# Patient Record
Sex: FEMALE | Race: WHITE | NOT HISPANIC OR LATINO | Employment: OTHER | URBAN - METROPOLITAN AREA
[De-identification: names, ages, dates, MRNs, and addresses within clinical notes are randomized per-mention and may not be internally consistent; named-entity substitution may affect disease eponyms.]

---

## 2017-01-23 LAB
ALT SERPL-CCNC: 10 U/L (ref 0–46)
AST SERPL-CCNC: 14 U/L (ref 0–41)
CHOLEST SERPL-MCNC: 194 MG/DL (ref 100–199)
CREAT SERPL-MCNC: 0.8 MG/DL (ref 0.6–1.4)
GFR SERPL CREATININE-BSD FRML MDRD: 75 ML/MIN/1.73M2
GLUCOSE SERPL-MCNC: 103 MG/DL (ref 65–99)
HDLC SERPL-MCNC: 71 MG/DL (ref 40–85)
LDLC SERPL CALC-MCNC: 115 MG/DL (ref 0–129)
POTASSIUM SERPL-SCNC: 3.85 MMOL/L (ref 3.5–5.2)
TRIGL SERPL-MCNC: 142 MG/DL (ref 0–149)
TSH SERPL-ACNC: 3.77 UIU/ML (ref 0.45–4.5)

## 2017-01-24 ENCOUNTER — TRANSFERRED RECORDS (OUTPATIENT)
Dept: HEALTH INFORMATION MANAGEMENT | Facility: CLINIC | Age: 72
End: 2017-01-24

## 2017-02-16 ENCOUNTER — TRANSFERRED RECORDS (OUTPATIENT)
Dept: HEALTH INFORMATION MANAGEMENT | Facility: CLINIC | Age: 72
End: 2017-02-16

## 2017-04-02 ENCOUNTER — HOSPITAL ENCOUNTER (OUTPATIENT)
Facility: CLINIC | Age: 72
Setting detail: OBSERVATION
Discharge: HOME | End: 2017-04-03
Attending: EMERGENCY MEDICINE | Admitting: INTERNAL MEDICINE
Payer: MEDICARE

## 2017-04-02 ENCOUNTER — APPOINTMENT (OUTPATIENT)
Dept: CT IMAGING | Facility: CLINIC | Age: 72
End: 2017-04-02
Attending: EMERGENCY MEDICINE
Payer: MEDICARE

## 2017-04-02 DIAGNOSIS — R07.89 CHEST PRESSURE: ICD-10-CM

## 2017-04-02 DIAGNOSIS — J18.9 PNEUMONIA OF RIGHT UPPER LOBE DUE TO INFECTIOUS ORGANISM: ICD-10-CM

## 2017-04-02 DIAGNOSIS — R94.31 ABNORMAL ELECTROCARDIOGRAM: ICD-10-CM

## 2017-04-02 DIAGNOSIS — R07.89 OTHER CHEST PAIN: ICD-10-CM

## 2017-04-02 DIAGNOSIS — R07.9 ACUTE CHEST PAIN: ICD-10-CM

## 2017-04-02 LAB
ANION GAP SERPL CALCULATED.3IONS-SCNC: 9 MMOL/L (ref 3–14)
BASOPHILS # BLD AUTO: 0 10E9/L (ref 0–0.2)
BASOPHILS NFR BLD AUTO: 0.6 %
BUN SERPL-MCNC: 21 MG/DL (ref 7–30)
CALCIUM SERPL-MCNC: 8.8 MG/DL (ref 8.5–10.1)
CHLORIDE SERPL-SCNC: 105 MMOL/L (ref 94–109)
CHOLEST SERPL-MCNC: 201 MG/DL
CO2 SERPL-SCNC: 26 MMOL/L (ref 20–32)
CREAT SERPL-MCNC: 0.58 MG/DL (ref 0.52–1.04)
D DIMER PPP FEU-MCNC: 0.3 UG/ML FEU (ref 0–0.5)
DIFFERENTIAL METHOD BLD: NORMAL
EOSINOPHIL # BLD AUTO: 0.2 10E9/L (ref 0–0.7)
EOSINOPHIL NFR BLD AUTO: 2.7 %
ERYTHROCYTE [DISTWIDTH] IN BLOOD BY AUTOMATED COUNT: 13.4 % (ref 10–15)
GFR SERPL CREATININE-BSD FRML MDRD: ABNORMAL ML/MIN/1.7M2
GLUCOSE SERPL-MCNC: 141 MG/DL (ref 70–99)
HCT VFR BLD AUTO: 42 % (ref 35–47)
HDLC SERPL-MCNC: 66 MG/DL
HGB BLD-MCNC: 14 G/DL (ref 11.7–15.7)
IMM GRANULOCYTES # BLD: 0 10E9/L (ref 0–0.4)
IMM GRANULOCYTES NFR BLD: 0.3 %
INTERPRETATION ECG - MUSE: NORMAL
LDLC SERPL CALC-MCNC: 109 MG/DL
LYMPHOCYTES # BLD AUTO: 1.9 10E9/L (ref 0.8–5.3)
LYMPHOCYTES NFR BLD AUTO: 30.5 %
MCH RBC QN AUTO: 29.6 PG (ref 26.5–33)
MCHC RBC AUTO-ENTMCNC: 33.3 G/DL (ref 31.5–36.5)
MCV RBC AUTO: 89 FL (ref 78–100)
MONOCYTES # BLD AUTO: 0.4 10E9/L (ref 0–1.3)
MONOCYTES NFR BLD AUTO: 6.5 %
NEUTROPHILS # BLD AUTO: 3.7 10E9/L (ref 1.6–8.3)
NEUTROPHILS NFR BLD AUTO: 59.4 %
NONHDLC SERPL-MCNC: 135 MG/DL
NRBC # BLD AUTO: 0 10*3/UL
NRBC BLD AUTO-RTO: 0 /100
PLATELET # BLD AUTO: 255 10E9/L (ref 150–450)
POTASSIUM SERPL-SCNC: 3.4 MMOL/L (ref 3.4–5.3)
RBC # BLD AUTO: 4.73 10E12/L (ref 3.8–5.2)
SODIUM SERPL-SCNC: 140 MMOL/L (ref 133–144)
TRIGL SERPL-MCNC: 131 MG/DL
TROPONIN I SERPL-MCNC: NORMAL UG/L (ref 0–0.04)
WBC # BLD AUTO: 6.3 10E9/L (ref 4–11)

## 2017-04-02 PROCEDURE — 93005 ELECTROCARDIOGRAM TRACING: CPT

## 2017-04-02 PROCEDURE — G0378 HOSPITAL OBSERVATION PER HR: HCPCS

## 2017-04-02 PROCEDURE — 96376 TX/PRO/DX INJ SAME DRUG ADON: CPT | Mod: 59

## 2017-04-02 PROCEDURE — 80048 BASIC METABOLIC PNL TOTAL CA: CPT | Performed by: EMERGENCY MEDICINE

## 2017-04-02 PROCEDURE — 25000128 H RX IP 250 OP 636: Performed by: EMERGENCY MEDICINE

## 2017-04-02 PROCEDURE — 36415 COLL VENOUS BLD VENIPUNCTURE: CPT | Performed by: PHYSICIAN ASSISTANT

## 2017-04-02 PROCEDURE — 25000132 ZZH RX MED GY IP 250 OP 250 PS 637: Mod: GY | Performed by: EMERGENCY MEDICINE

## 2017-04-02 PROCEDURE — 80061 LIPID PANEL: CPT | Performed by: EMERGENCY MEDICINE

## 2017-04-02 PROCEDURE — 74177 CT ABD & PELVIS W/CONTRAST: CPT

## 2017-04-02 PROCEDURE — 84484 ASSAY OF TROPONIN QUANT: CPT | Performed by: EMERGENCY MEDICINE

## 2017-04-02 PROCEDURE — 96361 HYDRATE IV INFUSION ADD-ON: CPT

## 2017-04-02 PROCEDURE — 85025 COMPLETE CBC W/AUTO DIFF WBC: CPT | Performed by: EMERGENCY MEDICINE

## 2017-04-02 PROCEDURE — A9270 NON-COVERED ITEM OR SERVICE: HCPCS | Mod: GY | Performed by: EMERGENCY MEDICINE

## 2017-04-02 PROCEDURE — 84484 ASSAY OF TROPONIN QUANT: CPT | Mod: 91 | Performed by: PHYSICIAN ASSISTANT

## 2017-04-02 PROCEDURE — 71260 CT THORAX DX C+: CPT

## 2017-04-02 PROCEDURE — 96365 THER/PROPH/DIAG IV INF INIT: CPT

## 2017-04-02 PROCEDURE — 99285 EMERGENCY DEPT VISIT HI MDM: CPT | Mod: 25

## 2017-04-02 PROCEDURE — 85379 FIBRIN DEGRADATION QUANT: CPT | Performed by: EMERGENCY MEDICINE

## 2017-04-02 PROCEDURE — 25500064 ZZH RX 255 OP 636: Performed by: EMERGENCY MEDICINE

## 2017-04-02 PROCEDURE — 99220 ZZC INITIAL OBSERVATION CARE,LEVL III: CPT | Performed by: PHYSICIAN ASSISTANT

## 2017-04-02 RX ORDER — ALUMINA, MAGNESIA, AND SIMETHICONE 2400; 2400; 240 MG/30ML; MG/30ML; MG/30ML
15-30 SUSPENSION ORAL EVERY 4 HOURS PRN
Status: DISCONTINUED | OUTPATIENT
Start: 2017-04-02 | End: 2017-04-03 | Stop reason: HOSPADM

## 2017-04-02 RX ORDER — CEFTRIAXONE 1 G/1
1 INJECTION, POWDER, FOR SOLUTION INTRAMUSCULAR; INTRAVENOUS ONCE
Status: COMPLETED | OUTPATIENT
Start: 2017-04-02 | End: 2017-04-02

## 2017-04-02 RX ORDER — MECLIZINE HYDROCHLORIDE 25 MG/1
25 TABLET ORAL EVERY 6 HOURS PRN
Status: DISCONTINUED | OUTPATIENT
Start: 2017-04-02 | End: 2017-04-03 | Stop reason: HOSPADM

## 2017-04-02 RX ORDER — TRIAMTERENE/HYDROCHLOROTHIAZID 37.5-25 MG
1 TABLET ORAL DAILY
COMMUNITY
End: 2017-04-19

## 2017-04-02 RX ORDER — ASPIRIN 81 MG/1
81 TABLET ORAL DAILY
Status: DISCONTINUED | OUTPATIENT
Start: 2017-04-03 | End: 2017-04-03 | Stop reason: HOSPADM

## 2017-04-02 RX ORDER — ONDANSETRON 4 MG/1
4 TABLET, ORALLY DISINTEGRATING ORAL EVERY 8 HOURS PRN
Status: DISCONTINUED | OUTPATIENT
Start: 2017-04-02 | End: 2017-04-03 | Stop reason: HOSPADM

## 2017-04-02 RX ORDER — PANTOPRAZOLE SODIUM 40 MG/1
40 TABLET, DELAYED RELEASE ORAL
Status: DISCONTINUED | OUTPATIENT
Start: 2017-04-02 | End: 2017-04-03 | Stop reason: HOSPADM

## 2017-04-02 RX ORDER — MORPHINE SULFATE 2 MG/ML
2-4 INJECTION, SOLUTION INTRAMUSCULAR; INTRAVENOUS
Status: DISCONTINUED | OUTPATIENT
Start: 2017-04-02 | End: 2017-04-03 | Stop reason: HOSPADM

## 2017-04-02 RX ORDER — ACETAMINOPHEN 650 MG/1
650 SUPPOSITORY RECTAL EVERY 4 HOURS PRN
Status: DISCONTINUED | OUTPATIENT
Start: 2017-04-02 | End: 2017-04-03 | Stop reason: HOSPADM

## 2017-04-02 RX ORDER — NITROGLYCERIN 0.4 MG/1
0.4 TABLET SUBLINGUAL EVERY 5 MIN PRN
Status: DISCONTINUED | OUTPATIENT
Start: 2017-04-02 | End: 2017-04-02

## 2017-04-02 RX ORDER — NITROGLYCERIN 0.4 MG/1
0.4 TABLET SUBLINGUAL EVERY 5 MIN PRN
Status: DISCONTINUED | OUTPATIENT
Start: 2017-04-02 | End: 2017-04-03 | Stop reason: HOSPADM

## 2017-04-02 RX ORDER — ASPIRIN 81 MG/1
162 TABLET, CHEWABLE ORAL ONCE
Status: DISCONTINUED | OUTPATIENT
Start: 2017-04-02 | End: 2017-04-02

## 2017-04-02 RX ORDER — NITROGLYCERIN 0.4 MG/1
TABLET SUBLINGUAL
Status: DISCONTINUED
Start: 2017-04-02 | End: 2017-04-02 | Stop reason: HOSPADM

## 2017-04-02 RX ORDER — NALOXONE HYDROCHLORIDE 0.4 MG/ML
.1-.4 INJECTION, SOLUTION INTRAMUSCULAR; INTRAVENOUS; SUBCUTANEOUS
Status: DISCONTINUED | OUTPATIENT
Start: 2017-04-02 | End: 2017-04-03 | Stop reason: HOSPADM

## 2017-04-02 RX ORDER — LEVOFLOXACIN 500 MG/1
500 TABLET, FILM COATED ORAL DAILY
Status: DISCONTINUED | OUTPATIENT
Start: 2017-04-03 | End: 2017-04-03 | Stop reason: HOSPADM

## 2017-04-02 RX ORDER — ASPIRIN 81 MG/1
324 TABLET, CHEWABLE ORAL ONCE
Status: COMPLETED | OUTPATIENT
Start: 2017-04-02 | End: 2017-04-02

## 2017-04-02 RX ORDER — IOPAMIDOL 755 MG/ML
500 INJECTION, SOLUTION INTRAVASCULAR ONCE
Status: COMPLETED | OUTPATIENT
Start: 2017-04-02 | End: 2017-04-02

## 2017-04-02 RX ORDER — LIDOCAINE 40 MG/G
CREAM TOPICAL
Status: DISCONTINUED | OUTPATIENT
Start: 2017-04-02 | End: 2017-04-03 | Stop reason: HOSPADM

## 2017-04-02 RX ORDER — ACETAMINOPHEN 325 MG/1
650 TABLET ORAL EVERY 4 HOURS PRN
Status: DISCONTINUED | OUTPATIENT
Start: 2017-04-02 | End: 2017-04-03 | Stop reason: HOSPADM

## 2017-04-02 RX ORDER — TRIAMTERENE/HYDROCHLOROTHIAZID 37.5-25 MG
1 TABLET ORAL DAILY
Status: DISCONTINUED | OUTPATIENT
Start: 2017-04-03 | End: 2017-04-03 | Stop reason: HOSPADM

## 2017-04-02 RX ADMIN — CEFTRIAXONE 1 G: 1 INJECTION, POWDER, FOR SOLUTION INTRAMUSCULAR; INTRAVENOUS at 20:14

## 2017-04-02 RX ADMIN — SODIUM CHLORIDE 1000 ML: 9 INJECTION, SOLUTION INTRAVENOUS at 18:35

## 2017-04-02 RX ADMIN — IOPAMIDOL 65 ML: 755 INJECTION, SOLUTION INTRAVENOUS at 18:35

## 2017-04-02 RX ADMIN — AZITHROMYCIN MONOHYDRATE 500 MG: 500 INJECTION, POWDER, LYOPHILIZED, FOR SOLUTION INTRAVENOUS at 20:55

## 2017-04-02 RX ADMIN — ASPIRIN 81 MG 324 MG: 81 TABLET ORAL at 20:15

## 2017-04-02 ASSESSMENT — ENCOUNTER SYMPTOMS
DIZZINESS: 0
SHORTNESS OF BREATH: 1
CHEST TIGHTNESS: 1
DIAPHORESIS: 0
VOMITING: 0
DIARRHEA: 0
ABDOMINAL PAIN: 0
NAUSEA: 1
FEVER: 0
LIGHT-HEADEDNESS: 1

## 2017-04-02 NOTE — IP AVS SNAPSHOT
MRN:[id number]                      After Visit Summary   4/2/2017    Cynthia Arita    MRN: [id number]           Thank you!     Thank you for choosing [facility] for your care. Our goal is always to provide you with excellent care. Hearing back from our patients is one way we can continue to improve our services. Please take a few minutes to complete the written survey that you may receive in the mail after you visit. If you would like to speak to someone directly about your visit please contact Patient Relations at [phone]. Thank you!          Patient Information     Date Of Birth          1945        About your hospital stay     You were admitted on:  April 2, 2017 You last received care in the:  [facility] Observation Department    You were discharged on:  April 3, 2017        Reason for your hospital stay       You were hospitalized with chest pain.  You had serial labs and an stress echcoardiogram which were normal.  You had a CT of the chest which revealed pneumonia.  Your chest pain is likely due to pneumonia.                  Who to Call     For medical emergencies, please call 911.  For non-urgent questions about your medical care, please call your primary care provider or clinic, [phone]          Attending Provider     Provider Specialty    Chavez Vizcaino MD Emergency Medicine    Sonia Goode DO Internal Medicine       Primary Care Provider Office Phone # Fax #    Huyne Samuels [phone]       [facility] [address]   [facility] [address]         When to contact your care team       Notify for fever >101.5; black or bloody stools; severe, persistent, or worsening nausea, vomiting, abdominal pain or distention, diarrhea, constipation; chest pain, difficulty breathing, swelling; dizziness, weakness, lightheadedness, fainting.  Proceed to the nearest emergency room for any urgent  "concerns.                  After Care Instructions     Activity       Your activity upon discharge: activity as tolerated            Diet       Follow this diet upon discharge: regular                  Follow-up Appointments     Follow-up and recommended labs and tests        Finish 7 days of Levofloxacin for pneumonia (first dose was given to you today in the hospital) and follow up with your PCP next week.  Nonobstructing kidney stones and Diverticulosis were noted on your CT.  Follow up with PCP.                  Your next 10 appointments already scheduled     May 01, 2017  9:00 AM CDT   [facility]    [address]  [facility] [address]   [phone]                         Pending Results     No orders found for last 3 day(s).            Statement of Approval     Ordered          04/03/17 1017  I have reviewed and agree with all the recommendations and orders detailed in this document.  EFFECTIVE NOW     Approved and electronically signed by:  Adriana Best PA-C             Admission Information     Date & Time Provider Department Dept. Phone    4/2/2017 Sonia Goode DO [facility] Observation Department [phone]      Your Vitals Were     Blood Pressure Pulse Temperature Respirations Height Weight    119/69 (BP Location: Right arm) 71 96.1  F (35.6  C) (Oral) 16 1.6 m (5' 3\") 60.1 kg (132 lb 8 oz)    Pulse Oximetry BMI (Body Mass Index)                98% 23.47 kg/m2          MyChart Information     [employer] lets you send messages to your doctor, view your test results, renew your prescriptions, schedule appointments and more. To sign up, go to [email or web address] . Click on \"Log in\" on the left side of the screen, which will take you to the Welcome page. Then click on \"Sign up Now\" on the right side of the page.     You will be asked to enter the access code listed below, as " well as some personal information. Please follow the directions to create your username and password.     Your access code is: [email or web address]  Expires: 2017 10:25 AM     Your access code will  in 90 days. If you need help or a new code, please call your [facility] clinic or [phone].        Care EveryWhere ID     This is your Care EveryWhere ID. This could be used by other organizations to access your [facility] medical records  [id number]           Review of your medicines      START taking        Dose / Directions    levofloxacin 500 MG tablet   Commonly known as:  LEVAQUIN   Indication:  Community Acquired Pneumonia   Used for:  Pneumonia of right upper lobe due to infectious organism        Dose:  500 mg   Start taking on:  2017   Take 1 tablet (500 mg) by mouth daily   Quantity:  6 tablet   Refills:  0         CONTINUE these medicines which have NOT CHANGED        Dose / Directions    ANTIVERT PO        Dose:  25 mg   Take 25 mg by mouth every 6 hours as needed for dizziness   Refills:  0       calcitonin (salmon) 200 UNIT/ACT nasal spray   Commonly known as:  MIACALCIN        Dose:  1 spray   Spray 1 spray into one nostril alternating nostrils daily Alternate nostril each day.   Refills:  0       * CENTRUM SILVER per tablet        Dose:  1 tablet   Take 1 tablet by mouth daily   Refills:  0       * OCUVITE PRESERVISION PO        Dose:  1 tablet   Take 1 tablet by mouth 2 times daily   Refills:  0       DIAZEPAM PO        Dose:  2 mg   Take 2 mg by mouth every 6 hours as needed for anxiety   Refills:  0       flaxseed oil 1000 MG Caps        Dose:  1000 mg   Take 1,000 mg by mouth every evening   Refills:  0       GLUCOSAMINE CHONDR COMPLEX PO        Dose:  1 capsule   Take 1 capsule by mouth 2 times daily   Refills:  0       NEXIUM PO        Dose:  40 mg   Take 40 mg by mouth daily (with dinner)   Refills:  0       Omega-3 Fish Oil 1000 MG Caps        Dose:  1000 mg   Take 1,000 mg by mouth 2  times daily   Refills:  0       TOPROL XL PO        Dose:  50 mg   Take 50 mg by mouth daily   Refills:  0       triamterene-hydrochlorothiazide 37.5-25 MG per tablet   Commonly known as:  MAXZIDE-25        Dose:  1 tablet   Take 1 tablet by mouth daily   Refills:  0       vitamin D3 2000 UNITS Caps        Dose:  2000 Units   Take 2,000 Units by mouth every morning   Refills:  0       ZOFRAN ODT PO        Dose:  4 mg   Take 4 mg by mouth every 8 hours as needed for nausea   Refills:  0       * Notice:  This list has 2 medication(s) that are the same as other medications prescribed for you. Read the directions carefully, and ask your doctor or other care provider to review them with you.      STOP taking     NONFORMULARY                Where to get your medicines      These medications were sent to [facility] [id number] [facility] [address]     Phone:  [phone]     levofloxacin 500 MG tablet                Protect others around you: Learn how to safely use, store and throw away your medicines at www.disposemymeds.org.             Medication List: This is a list of all your medications and when to take them. Check marks below indicate your daily home schedule. Keep this list as a reference.      Medications           Morning Afternoon Evening Bedtime As Needed    ANTIVERT PO   Take 25 mg by mouth every 6 hours as needed for dizziness                                   calcitonin (salmon) 200 UNIT/ACT nasal spray   Commonly known as:  MIACALCIN   Spray 1 spray into one nostril alternating nostrils daily Alternate nostril each day.   Next Dose Due:  Resume home regimen                                * CENTRUM SILVER per tablet   Take 1 tablet by mouth daily   Next Dose Due:  Resume home regimen                                   * OCUVITE PRESERVISION PO   Take 1 tablet by mouth 2 times daily   Next Dose Due:  Resume home regimen                                       DIAZEPAM PO   Take 2 mg by mouth every 6 hours as needed for anxiety                                   flaxseed oil 1000 MG Caps   Take 1,000 mg by mouth every evening   Next Dose Due:  Resume home regimen                                   GLUCOSAMINE CHONDR COMPLEX PO   Take 1 capsule by mouth 2 times daily   Next Dose Due:  Resume home regimen                                      levofloxacin 500 MG tablet   Commonly known as:  LEVAQUIN   Take 1 tablet (500 mg) by mouth daily   Start taking on:  4/4/2017   Last time this was given:  500 mg on 4/3/2017 10:03 AM   Next Dose Due:  Take next dose tomorrow morning                                   NEXIUM PO   Take 40 mg by mouth daily (with dinner)   Next Dose Due:  Take next dose with dinner                                   Omega-3 Fish Oil 1000 MG Caps   Take 1,000 mg by mouth 2 times daily   Next Dose Due:  Resume home regimen                                      TOPROL XL PO   Take 50 mg by mouth daily   Next Dose Due:  Resume home regimen                                   triamterene-hydrochlorothiazide 37.5-25 MG per tablet   Commonly known as:  MAXZIDE-25   Take 1 tablet by mouth daily   Next Dose Due:  Take next dose tomorrow morning if you took it today.                                    vitamin D3 2000 UNITS Caps   Take 2,000 Units by mouth every morning   Next Dose Due:  Resume home regimen                                   ZOFRAN ODT PO   Take 4 mg by mouth every 8 hours as needed for nausea                                   * Notice:  This list has 2 medication(s) that are the same as other medications prescribed for you. Read the directions carefully, and ask your doctor or other care provider to review them with you.

## 2017-04-02 NOTE — IP AVS SNAPSHOT
[facility] Observation Department    [address]    [facility] [address]    Phone:  [phone]                                       After Visit Summary   4/2/2017    Cynthia Arita    MRN: [id number]           After Visit Summary Signature Page     I have received my discharge instructions, and my questions have been answered. I have discussed any challenges I see with this plan with the nurse or doctor.    ..........................................................................................................................................  Patient/Patient Representative Signature      ..........................................................................................................................................  Patient Representative Print Name and Relationship to Patient    ..................................................               ................................................  Date                                            Time    ..........................................................................................................................................  Reviewed by Signature/Title    ...................................................              ..............................................  Date                                                            Time

## 2017-04-03 ENCOUNTER — APPOINTMENT (OUTPATIENT)
Dept: CARDIOLOGY | Facility: CLINIC | Age: 72
End: 2017-04-03
Attending: PHYSICIAN ASSISTANT
Payer: MEDICARE

## 2017-04-03 VITALS
RESPIRATION RATE: 16 BRPM | BODY MASS INDEX: 23.48 KG/M2 | HEIGHT: 63 IN | WEIGHT: 132.5 LBS | HEART RATE: 71 BPM | SYSTOLIC BLOOD PRESSURE: 119 MMHG | DIASTOLIC BLOOD PRESSURE: 69 MMHG | OXYGEN SATURATION: 98 % | TEMPERATURE: 96.1 F

## 2017-04-03 LAB
TROPONIN I SERPL-MCNC: NORMAL UG/L (ref 0–0.04)
TROPONIN I SERPL-MCNC: NORMAL UG/L (ref 0–0.04)

## 2017-04-03 PROCEDURE — 84484 ASSAY OF TROPONIN QUANT: CPT | Performed by: PHYSICIAN ASSISTANT

## 2017-04-03 PROCEDURE — 99217 ZZC OBSERVATION CARE DISCHARGE: CPT | Performed by: PHYSICIAN ASSISTANT

## 2017-04-03 PROCEDURE — A9270 NON-COVERED ITEM OR SERVICE: HCPCS | Mod: GY | Performed by: PHYSICIAN ASSISTANT

## 2017-04-03 PROCEDURE — G0378 HOSPITAL OBSERVATION PER HR: HCPCS

## 2017-04-03 PROCEDURE — 93321 DOPPLER ECHO F-UP/LMTD STD: CPT | Mod: 26 | Performed by: INTERNAL MEDICINE

## 2017-04-03 PROCEDURE — 93016 CV STRESS TEST SUPVJ ONLY: CPT | Performed by: INTERNAL MEDICINE

## 2017-04-03 PROCEDURE — 93325 DOPPLER ECHO COLOR FLOW MAPG: CPT | Mod: 26 | Performed by: INTERNAL MEDICINE

## 2017-04-03 PROCEDURE — 93018 CV STRESS TEST I&R ONLY: CPT | Performed by: INTERNAL MEDICINE

## 2017-04-03 PROCEDURE — 40000264 ECHO STRESS WITH OPTISON

## 2017-04-03 PROCEDURE — 25000132 ZZH RX MED GY IP 250 OP 250 PS 637: Mod: GY | Performed by: PHYSICIAN ASSISTANT

## 2017-04-03 PROCEDURE — 93350 STRESS TTE ONLY: CPT | Mod: 26 | Performed by: INTERNAL MEDICINE

## 2017-04-03 PROCEDURE — 36415 COLL VENOUS BLD VENIPUNCTURE: CPT | Performed by: PHYSICIAN ASSISTANT

## 2017-04-03 PROCEDURE — 25500064 ZZH RX 255 OP 636: Performed by: INTERNAL MEDICINE

## 2017-04-03 RX ORDER — LEVOFLOXACIN 500 MG/1
500 TABLET, FILM COATED ORAL DAILY
Qty: 6 TABLET | Refills: 0 | Status: SHIPPED | OUTPATIENT
Start: 2017-04-04 | End: 2017-04-19

## 2017-04-03 RX ADMIN — ASPIRIN 81 MG: 81 TABLET, COATED ORAL at 10:03

## 2017-04-03 RX ADMIN — HUMAN ALBUMIN MICROSPHERES AND PERFLUTREN 6 ML: 10; .22 INJECTION, SOLUTION INTRAVENOUS at 07:30

## 2017-04-03 RX ADMIN — LEVOFLOXACIN 500 MG: 500 TABLET, FILM COATED ORAL at 10:03

## 2017-04-03 NOTE — PROGRESS NOTES
ROOM # 226    Living Situation (if not independent, order SW consult): Independent  Facility name: N/A    Activity level at baseline: Independent  Activity level on admit: Independent    Is patient a falls risk? No  Falls armband on? No  Within Arm's Reach? No.Reason not within arm's reach is: N/A  Bed alarm turned on?   No  Personal alarm in place and turned on?   No    Patient registered to observation; given Patient Bill of Rights; given the opportunity to ask questions about observation status and their plan of care.  Patient has been oriented to the observation room, bathroom and call light is in place.    : Son- Jose Luis Arita

## 2017-04-03 NOTE — PLAN OF CARE
Problem: Discharge Planning  Goal: Discharge Planning (Adult, OB, Behavioral, Peds)  Outcome: Adequate for Discharge Date Met:  04/03/17  Pt to D/C to home. Pt provided with d/c instructions, including new medications, when medications were last given, and when to take them again. Pt also informed to f/u with PCP in 1 week. Pt verbalized understanding of all d/c and f/u instructions. All questions were answered at this time. Copy of paperwork sent with pt. Medication (Levaquin) x 1 sent with pt. Friend to provide transport.

## 2017-04-03 NOTE — PLAN OF CARE
Problem: Discharge Planning  Goal: Discharge Planning (Adult, OB, Behavioral, Peds)  Outcome: Improving  PRIMARY DIAGNOSIS: CHEST PAIN  OUTPATIENT/OBSERVATION GOALS TO BE MET BEFORE DISCHARGE:     1. Negative Serial Troponin Yes  2. Resolution of chest pain Yes  3. Pain status: Pain free.  4. Negative stress test No  5. Stable vital signs Yes  6. ADLs back to baseline?  Yes  7. Activity and level of assistance: Ambulating independently.  8. Barriers to discharge noted Yes- Pending serial trops and exercise stress test  9.Interpretation of rhythm per telemetry tech:     VSS. A&O x4. Independent. Denies CP, SOB, dizziness. Trop neg x1, 2nd trop pending. Exercise stress test scheduled for tomorrow. Will continue to monitor.

## 2017-04-03 NOTE — DISCHARGE SUMMARY
[facility] Observation Unit Discharge Summary          Cynthia Arita MRN# [id number]   Age: 71 year old YOB: 1945     Date of Admission:  4/2/2017  Date of Discharge::  4/3/2017  Admitting Physician:  Sonia Goode, DO  Discharge Physician:  Adriana Best PA-C  Primary Physician: Huyen Samuels     Primary Discharge Diagnoses:   Chest Pain, likely MSK in nature  RUL/RML Community Acquired Pneumonia     Secondary Discharge Diagnoses:   HTN  GERD  Meniere's Disaease  Nephrolithiasis  Diverticulosis     Hospital Course:   For detail history, please refer to H & P from 4/2/2017. In brief, this is a 71 year old female who presented to the ED on 4/2 with a productive cough and chest tightness.  ED was concerned because they saw some ST depression on ECG that resolved on repeat ECGs. She has had a cough x 3 weeks and suffers from reflux/anxiety.  Patient was admitted to the observation unit where she had 3 negative troponin levels and negative ddimer.  Telemetry monitoring revealed SR with PACs with HR 65.  She had an exercise stress echocardiogram which was normal.  Patient continued to have reproducible chest pain.  She had a CT C/A/P on arrival to the ED which revealed a RUL and RML Pneumonia.  She remained afebrile with a normal wbc and stable on room air.  She was treated with 1 dose of IV Ceftriaxone and Azithromycin and transitioned to po Levofloxacin. She will complete a 7 day course of Levofloxacin and follow up with her PCP in one week.       Procedures/Imaging:     Results for orders placed or performed during the hospital encounter of 04/02/17   CT Chest/Abdomen/Pelvis w Contrast    Narrative    CT CHEST/ABDOMEN/PELVIS WITH CONTRAST 4/2/2017 7:49 PM     HISTORY: Chest pain and pressure.     COMPARISON: CT abdomen pelvis 6/23/2015.    TECHNIQUE: Axial images from the thoracic inlet to the symphysis are  performed with additional coronal reformatted images. 65 mL of  Isovue  370 are given intravenously.  Radiation dose for this scan was reduced  using automated exposure control, adjustment of the mA and/or kV  according to patient size, or iterative reconstruction technique.    FINDINGS:     Chest: Patchy infiltrate is noted within the posterolateral right  upper lobe concerning for pneumonia. No other infiltrate or lung  consolidation is noted in the right upper lobe. Additional infiltrate  noted in the anterior right middle lobe. Left lung is clear. No  pleural or pericardial fluid. The heart is normal in size. The  esophagus is unremarkable. Right thyroid lobe nodule is indeterminate  measuring 1.4 cm in diameter. No enlarged axillary or intrathoracic  lymph nodes. No evidence of thoracic aortic aneurysm or dissection.    Abdomen: A few scattered tiny liver cysts are present measuring less  than 1 cm. These are technically indeterminate but of doubtful  clinical significance. The liver, spleen, gallbladder, pancreas and  adrenal glands are within normal limits. Small simple appearing cysts  are noted in the kidneys. Nonobstructing stones are noted within each  kidney as well. The largest is in the right renal collecting system  measuring 0.4 cm. No hydronephrosis or ureteral calculi. There are no  enlarged abdominal lymph nodes. Aorta is mildly tortuous with a few  scattered calcified plaques. No evidence of aneurysm or dissection.  The bowel is unremarkable. No obstruction or diverticulitis.    Pelvis: The bladder and rectum are unremarkable. No enlarged pelvic  lymph nodes or free pelvic fluid. Bone window examination demonstrates  mild degenerative spine changes.      Impression    IMPRESSION:  1. Pneumonia right upper lobe and right middle lobe. Lungs are  otherwise clear. No enlarged lymph nodes.  2. No evidence of thoracic or abdominal aortic aneurysm. No evidence  of aortic dissection. No mediastinal or retroperitoneal hemorrhage.  3. Nonobstructing renal collecting  "system stones. No hydronephrosis.  4. Diverticulosis without evidence of diverticulitis. No bowel  obstruction.    ILDA BEARD MD     (4/3/17) Stress Echocardiogram: Interpretation Summary  1. Average exercise capacity for age.  2. The patient exhibited no chest pain during exercise.  3. This was a normal stress EKG with no evidence of stress-induced ischemia.  4. Normal resting echo.  5. This was a normal stress echocardiogram with no evidence of stress-induced  ischemia.     Consultations:   none    Code Status:   Full    Allergies:   No Known Allergies     Subjective:   Patient reports reproducible mid sternal/left of mid sternum chest pain.  Chest feels sore. No shortness of breath and still with intermittent coughing.  Reports she is hungry and denies nausea or emesis.  Has a hx of reflux with no significant symptoms this morning.       Physical Exam:   /69 (BP Location: Right arm)  Pulse 71  Temp 96.1  F (35.6  C) (Oral)  Resp 16  Ht 1.6 m (5' 3\")  Wt 60.1 kg (132 lb 8 oz)  SpO2 98%  BMI 23.47 kg/m2  Temp (24hrs), Av.3  F (35.7  C), Min:95.6  F (35.3  C), Max:96.8  F (36  C)    Blood pressure 119/69, pulse 71, temperature 96.1  F (35.6  C), temperature source Oral, resp. rate 16, height 1.6 m (5' 3\"), weight 60.1 kg (132 lb 8 oz), SpO2 98 %, not currently breastfeeding.  General: Alert, interactive, NAD, sitting in bed talking on her cellphone.  HEENT: PERRL, EOMI  Resp: clear to auscultation bilaterally, no crackles or wheezes  Cardiac: regular rate and rhythm, no murmur.  Reproducible left chest wall tenderness to deep palpation.  Abdomen: Soft, nontender, nondistended. +BS  Extremities: No LE edema  Skin: Warm and dry  Neuro: Alert & oriented x 3, moves all extremities.     Discharge Medicatios:        Current Discharge Medication List      START taking these medications    Details   levofloxacin (LEVAQUIN) 500 MG tablet Take 1 tablet (500 mg) by mouth daily  Qty: 6 tablet, Refills: 0    " Associated Diagnoses: Pneumonia of right upper lobe due to infectious organism         CONTINUE these medications which have NOT CHANGED    Details   triamterene-hydrochlorothiazide (MAXZIDE-25) 37.5-25 MG per tablet Take 1 tablet by mouth daily      Ondansetron (ZOFRAN ODT PO) Take 4 mg by mouth every 8 hours as needed for nausea      DIAZEPAM PO Take 2 mg by mouth every 6 hours as needed for anxiety      Meclizine HCl (ANTIVERT PO) Take 25 mg by mouth every 6 hours as needed for dizziness      Metoprolol Succinate (TOPROL XL PO) Take 50 mg by mouth daily      Esomeprazole Magnesium (NEXIUM PO) Take 40 mg by mouth daily (with dinner)       !! Multiple Vitamins-Minerals (CENTRUM SILVER) per tablet Take 1 tablet by mouth daily      Glucosamine-Chondroitin (GLUCOSAMINE CHONDR COMPLEX PO) Take 1 capsule by mouth 2 times daily       Cholecalciferol (VITAMIN D3) 2000 UNITS CAPS Take 2,000 Units by mouth every morning       !! Multiple Vitamins-Minerals (OCUVITE PRESERVISION PO) Take 1 tablet by mouth 2 times daily       Omega-3 Fatty Acids (OMEGA-3 FISH OIL) 1000 MG CAPS Take 1,000 mg by mouth 2 times daily       calcitonin, salmon, (MIACALCIN) 200 UNIT/ACT nasal spray Spray 1 spray into one nostril alternating nostrils daily Alternate nostril each day.      Flaxseed, Linseed, (FLAXSEED OIL) 1000 MG CAPS Take 1,000 mg by mouth every evening        !! - Potential duplicate medications found. Please discuss with provider.      STOP taking these medications       NONFORMULARY Comments:   Reason for Stopping:               Instructions Given to Patient as Discharge:     Discharge Procedure Orders  Reason for your hospital stay   Order Comments: You were hospitalized with chest pain.  You had serial labs and an stress echcoardiogram which were normal.  You had a CT of the chest which revealed pneumonia.  Your chest pain is likely due to pneumonia.     Follow-up and recommended labs and tests    Order Comments: Finish 7 days  of Levofloxacin for pneumonia (first dose was given to you today in the hospital) and follow up with your PCP next week.  Nonobstructing kidney stones and Diverticulosis were noted on your CT.  Follow up with PCP.     Activity   Order Comments: Your activity upon discharge: activity as tolerated   Order Specific Question Answer Comments   Is discharge order? Yes      When to contact your care team   Order Comments: Notify for fever >101.5; black or bloody stools; severe, persistent, or worsening nausea, vomiting, abdominal pain or distention, diarrhea, constipation; chest pain, difficulty breathing, swelling; dizziness, weakness, lightheadedness, fainting.  Proceed to the nearest emergency room for any urgent concerns.     Full Code     Diet   Order Comments: Follow this diet upon discharge: regular   Order Specific Question Answer Comments   Is discharge order? Yes          Pending Tests at Discharge:   none    Discharge Disposition:   Discharged to home     Adriana Best MS, PA-C  Hospitalist Service  Pager [phone]    >30 minutes was spent in discharge planning, care coordination, physical examination and medication reconciliation on the date of discharge, 4/3/2017

## 2017-04-03 NOTE — H&P
History and Physical     Cynthia Arita MRN# [id number]   YOB: 1945 Age: 71 year old      Date of Admission:  4/2/2017    Primary care provider: Huyen Samuels          Assessment and Plan:   Cynthia Arita is a 71 year old female with a PMH significant for Meniere's disease and vertigo who presents with productive cough and chest tightness.     ED sign out by Dr. Vizcaino and chart review performed: Vitals show temp of 96.8, pulse 62, and pressure 122/74 with spontaneous respirations and no hypoxia. Labs remarkable for Creatinine 0.58, normal electrolytes, WBC 6.3, Hgb 14, undetectable troponin. D dimer negative. CT chest/abdomen/pelvis shows a pneumonia in the right upper lobe and right middle lobe. Initial ECG showed some mild ST depression.    1. Chest tightness  She has had two episodes of non exertional chest tightness with associated shortness of breath. She has chronic nausea and dizziness which was not affected and denies radiating pain. Today the pain lasted 1 hr and resolved after nitro. ED was concerned because they saw some ST depression on ECG that resolved on repeat ECGs. She has had a cough x 3 weeks and suffers from reflux/anxiety. Risk factors include HTN but no HLP, diabetes, smoking or family hx. She was very upset about the potential cost of OBS stay and was reluctant to admit. I offered to discharge her with outpatient stress test but she decided to stay.  -Tele  -Trend trops  -Aspirin-  -Add cholesterol check  -Stress echo in AM   -If negative suspect related to #2    2. Pneumonia  Patient has had a productive cough x 3 weeks and self treated with an extra z pack she had with minimal improvement. She is not febrile nor does she have unstable vital signs or elevated WBC. Clinically I am actually surprised this was positive. Given that she used a Z pack without improvement will order Levaquin.  -Oral Levaquin    3. HTN  Holding metoprolol for stress test    4. Meniere's  disease  Continue home meds        Social: No concerns. Was very upset in ED regarding observation stay.  Code: Full code  VTE prophylaxis: Ambulation  Disposition: Observation                    Chief Complaint:   Chest tightness and productive cough         History of Present Illness:   Cynthia Arita is a 71 year old female who presents with non exertional chest tightness. She states she was at rest today when she developed a non radiating central chest tightness associated with shortness of breath. She is always nauseated with some dizziness and this unchanged. The discomfort lasted 1 hour and resolved after nitro. She had 1 similar episode 1 month ago. For the past 3 weeks she has had a productive cough that she took a z pack for that helped but it came back. She has not had fever or shortness of breath otherwise. She denies new medications and comments that she suffers from reflux and anxiety.    Of note she is very upset about this observation stay and would like to leave as early as possible tomorrow.              Past Medical History:     Past Medical History:   Diagnosis Date     Meniere's disease                Past Surgical History:   No past surgical history on file.            Social History:     Social History     Social History     Marital status:      Spouse name: N/A     Number of children: N/A     Years of education: N/A     Occupational History     Not on file.     Social History Main Topics     Smoking status: Never Smoker     Smokeless tobacco: Not on file     Alcohol use Not on file     Drug use: No     Sexual activity: Not Currently     Partners: Male     Other Topics Concern     Not on file     Social History Narrative               Family History:   Reviewed and non contributory.         Allergies:    No Known Allergies            Medications:     Prior to Admission medications    Medication Sig Last Dose Taking? Auth Provider   NONFORMULARY Take 1 Units by mouth every morning  Narayan-B12 liquid 4/2/2017 at am Yes Unknown, Entered By History   triamterene-hydrochlorothiazide (MAXZIDE-25) 37.5-25 MG per tablet Take 1 tablet by mouth daily 4/2/2017 at am Yes Unknown, Entered By History   Ondansetron (ZOFRAN ODT PO) Take 4 mg by mouth every 8 hours as needed for nausea 4/1/2017 at Unknown time Yes Unknown, Entered By History   DIAZEPAM PO Take 2 mg by mouth every 6 hours as needed for anxiety 4/2/2017 at Unknown time Yes Unknown, Entered By History   Meclizine HCl (ANTIVERT PO) Take 25 mg by mouth every 6 hours as needed for dizziness Past Week at Unknown time Yes Unknown, Entered By History   Metoprolol Succinate (TOPROL XL PO) Take 50 mg by mouth daily 4/2/2017 at am Yes Unknown, Entered By History   Esomeprazole Magnesium (NEXIUM PO) Take 40 mg by mouth daily (with dinner)  4/1/2017 at Unknown time Yes Reported, Patient   Multiple Vitamins-Minerals (CENTRUM SILVER) per tablet Take 1 tablet by mouth daily 4/2/2017 at am Yes Reported, Patient   Glucosamine-Chondroitin (GLUCOSAMINE CHONDR COMPLEX PO) Take 1 capsule by mouth 2 times daily  4/2/2017 at am Yes Reported, Patient   Cholecalciferol (VITAMIN D3) 2000 UNITS CAPS Take 2,000 Units by mouth every morning  4/2/2017 at am Yes Reported, Patient   Multiple Vitamins-Minerals (OCUVITE PRESERVISION PO) Take 1 tablet by mouth 2 times daily  4/2/2017 at am Yes Reported, Patient   Omega-3 Fatty Acids (OMEGA-3 FISH OIL) 1000 MG CAPS Take 1,000 mg by mouth 2 times daily  4/2/2017 at am Yes Reported, Patient   calcitonin, salmon, (MIACALCIN) 200 UNIT/ACT nasal spray Spray 1 spray into one nostril alternating nostrils daily Alternate nostril each day. 4/2/2017 at am Yes Reported, Patient   Flaxseed, Linseed, (FLAXSEED OIL) 1000 MG CAPS Take 1,000 mg by mouth every evening  4/1/2017 at Unknown time Yes Reported, Patient              Review of Systems:   A Comprehensive greater than 10 system review of systems was carried out.  Pertinent positives and  negatives are noted above.  Otherwise negative for contributory information.            Physical Exam:   Blood pressure 138/89, SpO2 100 %, not currently breastfeeding.  Exam:  GENERAL:  Comfortable.  PSYCH: pleasant, oriented, No acute distress.  HEENT:  PERRLA. Normal conjunctiva, normal hearing, nasal mucosa and Oropharynx are normal.  NECK:  Supple, no neck vein distention, adenopathy or bruits, normal thyroid.  HEART:  Normal S1, S2 with no murmur, no pericardial rub, gallops or S3 or S4.  LUNGS:  Clear to auscultation, normal Respiratory effort. No wheezing, rales or ronchi.  ABDOMEN:  Soft, no hepatosplenomegaly, normal bowel sounds. Non-tender, non distended.   EXTREMITIES:  No pedal edema, +2 pulses bilateral and equal.  SKIN:  Dry to touch, No rash, wound or ulcerations.  NEUROLOGIC:  CN 2-12 grossly intact, sensation is intact with no focal deficits.               Data:       Recent Labs  Lab 04/02/17  1734   WBC 6.3   HGB 14.0   HCT 42.0   MCV 89          Recent Labs  Lab 04/02/17  1734      POTASSIUM 3.4   CHLORIDE 105   CO2 26   ANIONGAP 9   *   BUN 21   CR 0.58   GFRESTIMATED >90Non  GFR Calc   GFRESTBLACK >90African American GFR Calc   ANDERS 8.8       Recent Labs  Lab 04/02/17  1734   TROPI <0.015The 99th percentile for upper reference range is 0.045 ug/L.  Troponin values in the range of 0.045 - 0.120 ug/L may be associated with risks of adverse clinical events.         Recent Results (from the past 24 hour(s))   CT Chest/Abdomen/Pelvis w Contrast    Narrative    CT CHEST/ABDOMEN/PELVIS WITH CONTRAST 4/2/2017 7:49 PM     HISTORY: Chest pain and pressure.     COMPARISON: CT abdomen pelvis 6/23/2015.    TECHNIQUE: Axial images from the thoracic inlet to the symphysis are  performed with additional coronal reformatted images. 65 mL of Isovue  370 are given intravenously.  Radiation dose for this scan was reduced  using automated exposure control, adjustment of the mA  and/or kV  according to patient size, or iterative reconstruction technique.    FINDINGS:     Chest: Patchy infiltrate is noted within the posterolateral right  upper lobe concerning for pneumonia. No other infiltrate or lung  consolidation is noted in the right upper lobe. Additional infiltrate  noted in the anterior right middle lobe. Left lung is clear. No  pleural or pericardial fluid. The heart is normal in size. The  esophagus is unremarkable. Right thyroid lobe nodule is indeterminate  measuring 1.4 cm in diameter. No enlarged axillary or intrathoracic  lymph nodes. No evidence of thoracic aortic aneurysm or dissection.    Abdomen: A few scattered tiny liver cysts are present measuring less  than 1 cm. These are technically indeterminate but of doubtful  clinical significance. The liver, spleen, gallbladder, pancreas and  adrenal glands are within normal limits. Small simple appearing cysts  are noted in the kidneys. Nonobstructing stones are noted within each  kidney as well. The largest is in the right renal collecting system  measuring 0.4 cm. No hydronephrosis or ureteral calculi. There are no  enlarged abdominal lymph nodes. Aorta is mildly tortuous with a few  scattered calcified plaques. No evidence of aneurysm or dissection.  The bowel is unremarkable. No obstruction or diverticulitis.    Pelvis: The bladder and rectum are unremarkable. No enlarged pelvic  lymph nodes or free pelvic fluid. Bone window examination demonstrates  mild degenerative spine changes.      Impression    IMPRESSION:  1. Pneumonia right upper lobe and right middle lobe. Lungs are  otherwise clear. No enlarged lymph nodes.  2. No evidence of thoracic or abdominal aortic aneurysm. No evidence  of aortic dissection. No mediastinal or retroperitoneal hemorrhage.  3. Nonobstructing renal collecting system stones. No hydronephrosis.  4. Diverticulosis without evidence of diverticulitis. No bowel  obstruction.    ILDA BEARD MD          Arabella Rodriguez PA-C

## 2017-04-03 NOTE — ED PROVIDER NOTES
History     Chief Complaint:  Chest pain       HPI   Cynthia Arita is a 71 year old female with a history of HTN and Meniere's disease who presents with chest pain.  The patient reports developing tightness in the left side of her chest which radiates toward her back about 2 hours prior to presentation.  She describes feeling as if someone punched her in the lower chest and rates her pain at 8/10.  She could feel her breathing was different than normal, although she had not been exerting herself at the time.  She checked her blood pressure and noted it was quite elevated.  She feels nauseous and has been spitting up bitter tasting mucous for the past few days.  She had similar chest tightness and shortness of breath about a month ago and was evaluated in [place].  She also had a vertigo attack a month ago.  Although she feels lightheaded currently, she denies any similarity to her symptoms of vertigo.  Her vertigo is treated with Triamterene and Metoprolol and she did take these medications this morning.  She just moved to the area from [place] and is to see a new provider tomorrow to establish care.      Cardiac/PE Risk Factors:  The patient had a history of hypertension but denies any history of hyperlipidemia or diabetes and is not a smoker.  She reports no family history of heart disease.  She moved from [place] about 1 month ago.  No recent surgeries or immobilizations.  She denies any personal or family history of blood clots or clotting issues.  She has no history of malignancy and is not on hormone therapy.    Allergies:  No known drug allergies.     Allergies:  No known drug allergies    Medications:  Triamterene  Metoprolol  Meclizine   Esomeprazole  Alprazolam  Calcium Citrate-Vitamin D  Magnesium-Potassium  Glucosamine  [place] B-12  Calcitonin  Omega-3  Multivitamin  Flaxseed    Past Medical History:  GERD  Meniere s disease  Kidney stone  Osteoporosis     Past Surgical History:  History reviewed. No  significant past surgical history.    Social History:  Presents to the ED with her son.  Recently relocated to the [place] from [place].  Tobacco Use: No previous or current tobacco use.   Alcohol Use: No alcohol use.      Review of Systems   Constitutional: Negative for diaphoresis and fever.   Respiratory: Positive for chest tightness and shortness of breath.    Cardiovascular: Positive for chest pain.   Gastrointestinal: Positive for nausea. Negative for abdominal pain, diarrhea and vomiting.   Neurological: Positive for light-headedness. Negative for dizziness.   All other systems reviewed and are negative.      Physical Exam   First Vitals:  BP: 159/75 mmHg  Heart Rate: 76   Resp: 14  SpO2: 99% RA  Temp: 98.3  F (oral)     Physical Exam  General:   Well-nourished   Speaking in full sentences   Appears uncomfortable   No diaphoresis   Anxious affect  Eyes:   Conjunctiva without injection or scleral icterus   PERRL  ENT:   Moist mucous membranes   Posterior oropharynx clear without erythema or exudate   Nares patent   Pinnae normal  Neck:   Full ROM   No stiffness appreciated  Resp:   Lungs CTAB   No crackles, wheezing or audible rubs   Good air movement  CV:    Normal rate, regular rhythm   S1 and S2 present   No murmur, gallop or rub  GI:   BS present   Abdomen soft without distention   Tenderness to palpation to epigastric and luly-umbilical region   No overlying skin changes   No guarding or rebound tenderness  Skin:   Warm, dry, well perfused   No rashes or open wounds on exposed skin  MSK:   Moves all extremities   No focal deformities or swelling   Extremities are warm, well-perfused  Neuro:   Alert   Answers questions appropriately   Moves all extremities equally  Psych:   Normal affect, normal mood    Emergency Department Course   ECG:  @ 1644:  Indication: chest pain   Ventricular Rate 73 bpm, NM interval 152 msec, QRS duration 90 msec, QT/QTc 406/447 msec, P-R-T axes 52 -48 53  Sinus rhythm with  premature atrial complexes. Left anterior fascicular block. Nonspecific ST abnormality. ST depression in leads V3 - V5. Abnormal ECG.  Read at 1645 by Dr. Vizcaino    Posterior @ 1653:  Indication: chest pain   Ventricular Rate 72 bpm, NC interval 154 msec, QRS duration 92 msec, QT/QTc 416/455 msec, P-R-T axes 50 -46 55  Sinus rhythm with premature atrial complexes. Left anterior fascicular block.  Read at 1655 by Dr. Vizcaino    Repeat @ 1731:  Indication: evaluate for change  Ventricular Rate 76 bpm, NC interval 152 msec, QRS duration 90 msec, QT/QTc 434/488 msec, P-R-T axes 40 -54 60  Sinus rhythm with premature atrial complexes. Left anterior fascicular block. Nonspecific ST abnormality. Abnormal ECG.  Read at 1740 by Dr. Vizcaino    Repeat @ 1746:  Indication: recurrent chest pain   Ventricular Rate 74 bpm, NC interval 156 msec, QRS duration 84 msec, PT/PTc 434/481 msec, P-R-T axes 48 -48 46   Sinus rhythm with premature atrial complexes. Left anterior fascicular block. Nonspecific ST abnormality. Abnormal ECG.  Read at 1754 by Dr. Vizcaino    Imaging:  Chest/Abdomen/Pelvis CT with IV contrast:  RUL pneumonia. Non-obstructing stone right kidney. No evidence of aortic aneurysm or dissection.  Report per radiology.     Radiographic findings were communicated with the patient who voiced understanding of the findings.    Laboratory:  CBC: WNL (WBC 6.3, HGB 13.4, )  BMP: Glucose 141 (H), otherwise WNL (Creatinine 0.58)  D-dimer: 0.3  Troponin I: <0.015    Interventions:  (1707) Nitroglycerin, 0.4 mg, SL   (1715) Nitroglycerin, 0.4 mg, SL   (1730) Nitroglycerin, 0.4 mg, SL   (2014) Rocephin, 1 g, IV infusion   (2015) Aspirin, 324 mg, PO   (2055) Azithromycin, 500 mg, IV infusion    Emergency Department Course:  Nursing notes and vitals reviewed.  I performed a history and examination as documented above.    EKG and posterior EKG were done.      IV was inserted, blood drawn and sent to the laboratory for evaluation, and  the patient was placed on continuous cardiac monitoring.      (1725) Patient is now chest pain free after 2 Nitroglycerin.    Repeat EKG was done.    Additional repeat EKG was done after the patient complained of recurrent chest pain.    (1810) Recheck: patient s chest pain is much improved.    The patient was sent for a CT scan of the chest, abdomen, and pelvis while in the emergency department, findings above.      (1930) Recheck: patient updated    Findings and plan explained to the patient who consents to observation.   (1957) I discussed the patient with CHRISTIANO Veras of the hospitalist service, who will place the patient in observation in the observation  area.      (2023) Recheck    Impression & Plan      Medical Decision Making:  Cynthia Arita is a 71 year old female with a past medical history significant for esophageal reflux who presents to the emergency department for evaluation of chest pain.  Vital signs on presentation as above.  History, exam, and ED course as above.  Pt presented with L sided chest pain.  Broad differential considered including ACS, PE, PTX, PNA, among others.  Concern for possible cardiac process.  EKG on presentation reveals ST depression approximately 1 mm in leads V3-V5.  Posterior EKG without STEMI.  CP improved with nitro and repeat EKG demonstrated resolution of STD.  No additional dynamic changes noted and CP essentially resolved with 3 Nitro.  ASA provided.  Initial trop negative.  PE unlikely as pt is low risk by wells criteria and D-dimer within normal limits.  On discussion with the patient she does have symptoms suspicious for recurrent upper respiratory infection.  Advanced imaging does demonstrate right upper lobe pneumonia.  At present, she is clinically well appearing.  White blood cell count is within normal limits.  She is not showing signs of systemic toxicity to suggest severe sepsis or septic shock.  At this point she will be treated for  community-acquired pneumonia with Rocephin and Azithromycin.  Advanced imaging negative for acute aortic pathology.  Pt informed of non-obstructing renal stones without hydronephrosis.  In light of dynamic EKG changes in the setting of infectious process, I am concerned about possible demand ischemia.  Symptoms were well controlled in the ED. Results of the above studies were discussed with the patient.  At this point she will be admitted to the observation unit in light of her dynamic EKG changes for further treatment and care.  Case discussed with Arabella Rodriguez, who has graciously accepted patient for observation.  Pt expressed concern for observation stay, siting financial concerns, though after consideration, is agreeable to staying.    Diagnosis:    ICD-10-CM    1. Pneumonia of right upper lobe due to infectious organism J18.9    2. Acute chest pain R07.9    3. Abnormal electrocardiogram R94.31    4. Other chest pain R07.89 CT Chest/Abdomen/Pelvis w Contrast     CT Chest/Abdomen/Pelvis w Contrast   5. Chest pressure R07.89 CT Chest/Abdomen/Pelvis w Contrast     CT Chest/Abdomen/Pelvis w Contrast       Disposition:  Observation      I, Rosetta Hermanwes, am serving as a scribe on 4/2/2017 at 7:28 PM to personally document services performed by Dr. Vizcaino based on my observations and the provider's statements to me.    Rosetta Cervantes  4/2/2017   [facility] EMERGENCY DEPARTMENT       Chavez Vizcaino MD  04/02/17 3450

## 2017-04-03 NOTE — PLAN OF CARE
Problem: Discharge Planning  Goal: Discharge Planning (Adult, OB, Behavioral, Peds)  Outcome: Improving  PRIMARY DIAGNOSIS: CHEST PAIN  OUTPATIENT/OBSERVATION GOALS TO BE MET BEFORE DISCHARGE:     1. Negative Serial Troponin Yes  2. Resolution of chest pain Yes  3. Pain status: Pain free.  4. Negative stress test Stress test done this morning, results pending  5. Stable vital signs Yes  6. ADLs back to baseline?  Yes  7. Activity and level of assistance: Ambulating independently.  8. Barriers to discharge noted No  9.Interpretation of rhythm per telemetry tech: SR with PAC's HR 65     VSS, afebrile, on room air. Denies chest pain/discomfort. Troponins negative. Stress test done this morning, results pending. LS diminished/clear. Infrequent, nonproductive cough. On oral Levaquin for pneumonia. Ambulating independently.

## 2017-04-03 NOTE — PLAN OF CARE
Problem: Discharge Planning  Goal: Discharge Planning (Adult, OB, Behavioral, Peds)  Outcome: Improving  PRIMARY DIAGNOSIS: CHEST PAIN  OUTPATIENT/OBSERVATION GOALS TO BE MET BEFORE DISCHARGE:      1. Negative Serial Troponin Yes  2. Resolution of chest pain Yes  3. Pain status: Pain free.  4. Negative stress test No  5. Stable vital signs Yes  6. ADLs back to baseline? Yes  7. Activity and level of assistance: Ambulating independently.  8. Barriers to discharge noted Yes- Pending serial trops and exercise stress test  9.Interpretation of rhythm per telemetry tech: NSR HR 61      VSS. A&O x4. Independent. Denies CP, SOB, dizziness. Trop neg x3. Exercise stress test scheduled for today. Will continue to monitor.

## 2017-04-03 NOTE — PHARMACY-ADMISSION MEDICATION HISTORY
Admission medication history interview status for this patient is complete. See [facility] admission navigator for allergy information, prior to admission medications and immunization status.     Medication history interview source(s):Patient  Medication history resources (including written lists, pill bottles, clinic record):written list    Changes made to PTA medication list:  Added: diazepam, toprol, zofran  Deleted: calcium  Changed: triamterene / HCtz,     Actions taken by pharmacist (provider contacted, etc):None     Additional medication history information:None    Medication reconciliation/reorder completed by provider prior to medication history? No    For patients on insulin therapy: no (Yes/No)   Lantus/levemir/NPH/Mix 70/30 dose: _____ in AM/PM or twice daily   Sliding scale Novolog Y/N   If Yes, do you have a baseline novolog pre-meal dose: ______units with meals   Patients eat three meals a day: Y/N   Any Barriers to therapy: cost of medications/comfortable with giving injections (if applicable)/ comfortable and confident with current diabetes regimen       Prior to Admission medications    Medication Sig Last Dose Taking? Auth Provider   NONFORMULARY Take 1 Units by mouth every morning Narayan-B12 liquid 4/2/2017 at am Yes Unknown, Entered By History   triamterene-hydrochlorothiazide (MAXZIDE-25) 37.5-25 MG per tablet Take 1 tablet by mouth daily 4/2/2017 at am Yes Unknown, Entered By History   Ondansetron (ZOFRAN ODT PO) Take 4 mg by mouth every 8 hours as needed for nausea 4/1/2017 at Unknown time Yes Unknown, Entered By History   DIAZEPAM PO Take 2 mg by mouth every 6 hours as needed for anxiety 4/2/2017 at Unknown time Yes Unknown, Entered By History   Meclizine HCl (ANTIVERT PO) Take 25 mg by mouth every 6 hours as needed for dizziness Past Week at Unknown time Yes Unknown, Entered By History   Metoprolol Succinate (TOPROL XL PO) Take 50 mg by mouth daily 4/2/2017 at am Yes Unknown, Entered By History    Esomeprazole Magnesium (NEXIUM PO) Take 40 mg by mouth daily (with dinner)  4/1/2017 at Unknown time Yes Reported, Patient   Multiple Vitamins-Minerals (CENTRUM SILVER) per tablet Take 1 tablet by mouth daily 4/2/2017 at am Yes Reported, Patient   Glucosamine-Chondroitin (GLUCOSAMINE CHONDR COMPLEX PO) Take 1 capsule by mouth 2 times daily  4/2/2017 at am Yes Reported, Patient   Cholecalciferol (VITAMIN D3) 2000 UNITS CAPS Take 2,000 Units by mouth every morning  4/2/2017 at am Yes Reported, Patient   Multiple Vitamins-Minerals (OCUVITE PRESERVISION PO) Take 1 tablet by mouth 2 times daily  4/2/2017 at am Yes Reported, Patient   Omega-3 Fatty Acids (OMEGA-3 FISH OIL) 1000 MG CAPS Take 1,000 mg by mouth 2 times daily  4/2/2017 at am Yes Reported, Patient   calcitonin, salmon, (MIACALCIN) 200 UNIT/ACT nasal spray Spray 1 spray into one nostril alternating nostrils daily Alternate nostril each day. 4/2/2017 at am Yes Reported, Patient   Flaxseed, Linseed, (FLAXSEED OIL) 1000 MG CAPS Take 1,000 mg by mouth every evening  4/1/2017 at Unknown time Yes Reported, Patient

## 2017-04-04 ENCOUNTER — TELEPHONE (OUTPATIENT)
Dept: INTERNAL MEDICINE | Facility: CLINIC | Age: 72
End: 2017-04-04

## 2017-04-04 NOTE — TELEPHONE ENCOUNTER
"ED/Discharge Protocol    \"Hi, my name is Nohemi Morris, a registered nurse, and I am calling on behalf of Dr. Samuels's office at [place].  I am calling to follow up and see how things are going for you after your recent visit.\"    \"I see that you were in the (ER/UC/IP) on 4/2/17.    How are you doing now that you are home?\" its going ok.      Is patient experiencing symptoms that may require a hospital visit?  No    Discharge Instructions    \"Let's review your discharge instructions.  What is/are the follow-up recommendations?  Pt. Response: Levofloxacin 1 tablet by mouth daily x 7 days.    \"Were you instructed to make a follow-up appointment?\"  Pt. Response: Yes.  Has appointment been made?   No.  \"Can I help you schedule that appointment?\" 4/5/17 at 1100 with Dr. Samuels.      \"When you see the provider, I would recommend that you bring your discharge instructions with you.    Medications    \"How many new medications are you on since your hospitalization/ED visit?\"    0-1  \"How many of your current medicines changed (dose, timing, name, etc.) while you were in the hospital/ED visit?\"   0-1 Maxide  \"Do you have questions about your medications?\"   No  \"Were you newly diagnosed with heart failure, COPD, diabetes or did you have a heart attack?\"   NO.  For patients on insulin: \"Did you start on insulin in the hospital or did you have your insulin dose changed?\"   No    Medication reconciliation completed? Yes    Was MTM referral placed (*Make sure to put transitions as reason for referral)?   No    Call Summary    \"Do you have any questions or concerns about your condition or care plan at the moment?\"    No  Triage nurse advice given: Schedule appointment with PCP. 4/5/17 at 1100.    Patient was in ER once in the past year (assess appropriateness of ER visits.)      \"If you have questions or things don't continue to improve, we encourage you contact us through the main clinic number,  [phone].  Even if the clinic " "is not open, triage nurses are available 24/7 to help you.     We would like you to know that our clinic has extended hours (provide information).  We also have urgent care (provide details on closest location and hours/contact info)\"      \"Thank you for your time and take care!\"        "

## 2017-04-04 NOTE — TELEPHONE ENCOUNTER
IP F/U    Date: 04/03/17  Diagnosis: Pneumonia  Is patient active in care coordination? No  Was patient in TCU? No    Next 5 appointments (look out 90 days)     May 01, 2017  9:00 AM CDT   PHYSICAL with Huyen Samuels MD   [facility] ([facility])    [address]  [facility] [address]   [phone]

## 2017-04-05 ENCOUNTER — OFFICE VISIT (OUTPATIENT)
Dept: INTERNAL MEDICINE | Facility: CLINIC | Age: 72
End: 2017-04-05
Payer: MEDICARE

## 2017-04-05 VITALS
TEMPERATURE: 98.2 F | HEART RATE: 74 BPM | OXYGEN SATURATION: 98 % | HEIGHT: 63 IN | WEIGHT: 130.1 LBS | DIASTOLIC BLOOD PRESSURE: 90 MMHG | BODY MASS INDEX: 23.05 KG/M2 | SYSTOLIC BLOOD PRESSURE: 134 MMHG

## 2017-04-05 DIAGNOSIS — Z12.39 SCREENING FOR BREAST CANCER: ICD-10-CM

## 2017-04-05 DIAGNOSIS — K21.00 GASTROESOPHAGEAL REFLUX DISEASE WITH ESOPHAGITIS: ICD-10-CM

## 2017-04-05 DIAGNOSIS — Z12.31 ENCOUNTER FOR SCREENING MAMMOGRAM FOR MALIGNANT NEOPLASM OF BREAST: ICD-10-CM

## 2017-04-05 DIAGNOSIS — J18.9 PNEUMONIA OF RIGHT UPPER LOBE DUE TO INFECTIOUS ORGANISM: Primary | ICD-10-CM

## 2017-04-05 DIAGNOSIS — R19.7 DIARRHEA, UNSPECIFIED TYPE: ICD-10-CM

## 2017-04-05 PROCEDURE — 99496 TRANSJ CARE MGMT HIGH F2F 7D: CPT | Performed by: INTERNAL MEDICINE

## 2017-04-05 RX ORDER — SUCRALFATE ORAL 1 G/10ML
1 SUSPENSION ORAL 4 TIMES DAILY
Qty: 420 ML | Refills: 1 | Status: SHIPPED | OUTPATIENT
Start: 2017-04-05 | End: 2017-07-31

## 2017-04-05 NOTE — NURSING NOTE
"Chief Complaint   Patient presents with     [facility] F/U       Initial /90 (BP Location: Left arm, Patient Position: Chair, Cuff Size: Adult Regular)  Pulse 74  Temp 98.2  F (36.8  C) (Oral)  Ht 5' 3\" (1.6 m)  Wt 130 lb 1.6 oz (59 kg)  SpO2 98%  Breastfeeding? No  BMI 23.05 kg/m2 Estimated body mass index is 23.05 kg/(m^2) as calculated from the following:    Height as of this encounter: 5' 3\" (1.6 m).    Weight as of this encounter: 130 lb 1.6 oz (59 kg).  Medication Reconciliation: complete    "

## 2017-04-05 NOTE — PROGRESS NOTES
SUBJECTIVE:                                                    Cynthia Arita is a 71 year old female who presents to clinic today for the following health issues:      New Patient/Transfer of Care      Hospital Follow-up Visit:    Hospital/Nursing Home/IP Rehab Facility: [facility]  Date of Admission: 4-2-2017  Date of Discharge: 4-3-2017  Reason(s) for Admission: pneumonia            Problems taking medications regularly:  None       Medication changes since discharge: None       Problems adhering to non-medication therapy:  None    Summary of hospitalization:  [facility] discharge summary reviewed  Diagnostic Tests/Treatments reviewed.  Follow up needed: none  Other Healthcare Providers Involved in Patient s Care:         None  Update since discharge: improved. Her cough and breathing are better. But she still has significant   GERD and diarrhea. Denies any fever chills or night sweats. No blood in stool. Some nausea no vomting.        Post Discharge Medication Reconciliation: discharge medications reconciled, continue medications without change.  Plan of care communicated with patient     Coding guidelines for this visit:  Type of Medical   Decision Making Face-to-Face Visit       within 7 Days of discharge Face-to-Face Visit        within 14 days of discharge   Moderate Complexity 75002 [id number]   High Complexity 93282 [id number]            Problem list and histories reviewed & adjusted, as indicated.  Additional history: as documented    Patient Active Problem List   Diagnosis     Esophageal reflux     Osteoporosis     Meniere's disease     Calculus of right kidney     Pneumonia of right upper lobe due to infectious organism     No past surgical history on file.    Social History   Substance Use Topics     Smoking status: Never Smoker     Smokeless tobacco: Not on file     Alcohol use Yes     Family History   Problem Relation Age of Onset     Neurologic Disorder Mother      palsy     Esophageal  "Cancer Father      CANCER Maternal Grandmother      lymph     DIABETES Paternal Grandmother      Neurologic Disorder Sister      Parkinson's     Hypertension Brother      Bipolar Disorder Sister      Brain Cancer Son 17           Reviewed and updated as needed this visit by clinical staff  Tobacco  Allergies  Meds  Soc Hx      Reviewed and updated as needed this visit by Provider         ROS:  Constitutional, HEENT, cardiovascular, pulmonary, GI, , musculoskeletal, neuro, skin, endocrine and psych systems are negative, except as otherwise noted.    OBJECTIVE:                                                    /90 (BP Location: Left arm, Patient Position: Chair, Cuff Size: Adult Regular)  Pulse 74  Temp 98.2  F (36.8  C) (Oral)  Ht 5' 3\" (1.6 m)  Wt 130 lb 1.6 oz (59 kg)  SpO2 98%  Breastfeeding? No  BMI 23.05 kg/m2  Body mass index is 23.05 kg/(m^2).  GENERAL: healthy, alert and no distress  EYES: Eyes grossly normal to inspection, PERRL and conjunctivae and sclerae normal  HENT: ear canals and TM's normal, nose and mouth without ulcers or lesions  NECK: no adenopathy, no asymmetry, masses, or scars and thyroid normal to palpation  RESP: lungs clear to auscultation - no rales, rhonchi or wheezes  CV: regular rate and rhythm, normal S1 S2, no S3 or S4, no murmur, click or rub, no peripheral edema and peripheral pulses strong  ABDOMEN: soft, nontender, no hepatosplenomegaly, no masses and bowel sounds normal  MS: no gross musculoskeletal defects noted, no edema  PSYCH: mentation appears normal, affect normal/bright         ASSESSMENT/PLAN:                                                        1. Pneumonia of right upper lobe due to infectious organism  resolving    2. Gastroesophageal reflux disease with esophagitis  Will add Carafate, Follow up in 2 weeks   - sucralfate (CARAFATE) 1 GM/10ML suspension; Take 10 mLs (1 g) by mouth 4 times daily  Dispense: 420 mL; Refill: 1    3. Diarrhea, unspecified " type  Still on antibiotics she had it before antibiotics started Follow up in 2 weeks If no improvement will start work up then.     4. Screening for breast cancer     - MA Screening Digital Bilateral; Future    5. Encounter for screening mammogram for malignant neoplasm of breast      - MA Screening Digital Bilateral; Future      Huyen Samuels MD  [facility]

## 2017-04-05 NOTE — MR AVS SNAPSHOT
After Visit Summary   4/5/2017    Cynthia Arita    MRN: [id number]           Patient Information     Date Of Birth          1945        Visit Information        Provider Department      4/5/2017 11:00 AM Huyen Samuels MD [facility]        Today's Diagnoses     Pneumonia of right upper lobe due to infectious organism    -  1    Gastroesophageal reflux disease with esophagitis        Diarrhea, unspecified type        Screening for breast cancer        Encounter for screening mammogram for malignant neoplasm of breast            Follow-ups after your visit        Your next 10 appointments already scheduled     Apr 19, 2017  8:40 AM CDT   [facility]    [address]  [facility] [address]   [phone]            May 01, 2017  9:00 AM CDT   PHYSICAL with Huyen Smauels MD   [facility]    [address]  [facility] [address]   [phone]              Future tests that were ordered for you today     Open Future Orders        Priority Expected Expires Ordered    MA Screening Digital Bilateral Routine  4/5/2018 4/5/2017            Who to contact     If you have questions or need follow up information about today's clinic visit or your schedule please contact [facility] directly at [phone].  Normal or non-critical lab and imaging results will be communicated to you by MyChart, letter or phone within 4 business days after the clinic has received the results. If you do not hear from us within 7 days, please contact the clinic through MyChart or phone. If you have a critical or abnormal lab result, we will notify you by phone as soon as possible.  Submit refill requests through [employer] or call your pharmacy and they will forward the refill request to us. Please allow 3 business days for your refill to be  "completed.          Additional Information About Your Visit        [employer] Information     [employer] lets you send messages to your doctor, view your test results, renew your prescriptions, schedule appointments and more. To sign up, go to [email or web address] . Click on \"Log in\" on the left side of the screen, which will take you to the Welcome page. Then click on \"Sign up Now\" on the right side of the page.     You will be asked to enter the access code listed below, as well as some personal information. Please follow the directions to create your username and password.     Your access code is: [email or web address]  Expires: 2017 10:25 AM     Your access code will  in 90 days. If you need help or a new code, please call your [facility] clinic or [phone].        Care EveryWhere ID     This is your Care EveryWhere ID. This could be used by other organizations to access your [place] medical records  [id number]        Your Vitals Were     Pulse Temperature Height Pulse Oximetry Breastfeeding? BMI (Body Mass Index)    74 98.2  F (36.8  C) (Oral) 5' 3\" (1.6 m) 98% No 23.05 kg/m2       Blood Pressure from Last 3 Encounters:   17 134/90   17 119/69   06/18/15 100/60    Weight from Last 3 Encounters:   17 130 lb 1.6 oz (59 kg)   17 132 lb 8 oz (60.1 kg)   06/18/15 125 lb (56.7 kg)                 Today's Medication Changes          These changes are accurate as of: 17  5:45 PM.  If you have any questions, ask your nurse or doctor.               Start taking these medicines.        Dose/Directions    sucralfate 1 GM/10ML suspension   Commonly known as:  CARAFATE   Used for:  Gastroesophageal reflux disease with esophagitis   Started by:  Huyen Samuels MD        Dose:  1 g   Take 10 mLs (1 g) by mouth 4 times daily   Quantity:  420 mL   Refills:  1            Where to get your medicines      These medications were sent to [facility] [address] [place] [address] [id number] AT [place] [address]  [address] [facility] [address]    Hours:  24-hours Phone:  [phone]     sucralfate 1 GM/10ML suspension                Primary Care Provider Office Phone # Fax #    Huyen Samuels [phone]       [facility] [address] [facility] [address]  [facility] [address]        Thank you!     Thank you for choosing [facility]  for your care. Our goal is always to provide you with excellent care. Hearing back from our patients is one way we can continue to improve our services. Please take a few minutes to complete the written survey that you may receive in the mail after your visit with us. Thank you!             Your Updated Medication List - Protect others around you: Learn how to safely use, store and throw away your medicines at www.disposemymeds.org.          This list is accurate as of: 4/5/17  5:45 PM.  Always use your most recent med list.                   Brand Name Dispense Instructions for use    ANTIVERT PO      Take 25 mg by mouth every 6 hours as needed for dizziness       calcitonin (salmon) 200 UNIT/ACT nasal spray    MIACALCIN     Spray 1 spray into one nostril alternating nostrils daily Alternate nostril each day.       * CENTRUM SILVER per tablet      Take 1 tablet by mouth daily       * OCUVITE PRESERVISION PO      Take 1 tablet by mouth 2 times daily       DIAZEPAM PO      Take 2 mg by mouth every 6 hours as needed for anxiety       flaxseed oil 1000 MG Caps      Take 1,000 mg by mouth every evening       GLUCOSAMINE CHONDR COMPLEX PO      Take 1 capsule by mouth 2 times daily       levofloxacin 500 MG tablet    LEVAQUIN    6 tablet    Take 1 tablet (500 mg) by mouth daily       NEXIUM PO      Take 40 mg by mouth daily (with dinner)       Omega-3 Fish Oil 1000 MG Caps      Take 1,000 mg by mouth 2 times daily       sucralfate 1 GM/10ML suspension    CARAFATE    420 mL    Take 10 mLs (1 g) by mouth 4 times  daily       TOPROL XL PO      Take 50 mg by mouth daily       triamterene-hydrochlorothiazide 37.5-25 MG per tablet    MAXZIDE-25     Take 1 tablet by mouth daily       vitamin D3 2000 UNITS Caps      Take 2,000 Units by mouth every morning       ZOFRAN ODT PO      Take 4 mg by mouth every 8 hours as needed for nausea       * Notice:  This list has 2 medication(s) that are the same as other medications prescribed for you. Read the directions carefully, and ask your doctor or other care provider to review them with you.

## 2017-04-19 ENCOUNTER — OFFICE VISIT (OUTPATIENT)
Dept: INTERNAL MEDICINE | Facility: CLINIC | Age: 72
End: 2017-04-19
Payer: MEDICARE

## 2017-04-19 ENCOUNTER — TELEPHONE (OUTPATIENT)
Dept: BONE DENSITY | Facility: CLINIC | Age: 72
End: 2017-04-19

## 2017-04-19 VITALS
WEIGHT: 131.9 LBS | DIASTOLIC BLOOD PRESSURE: 76 MMHG | HEIGHT: 63 IN | OXYGEN SATURATION: 98 % | SYSTOLIC BLOOD PRESSURE: 120 MMHG | BODY MASS INDEX: 23.37 KG/M2 | TEMPERATURE: 98.6 F | HEART RATE: 68 BPM

## 2017-04-19 DIAGNOSIS — K21.00 GASTROESOPHAGEAL REFLUX DISEASE WITH ESOPHAGITIS: ICD-10-CM

## 2017-04-19 DIAGNOSIS — H81.03 MENIERE'S DISEASE, BILATERAL: ICD-10-CM

## 2017-04-19 DIAGNOSIS — M81.0 OSTEOPOROSIS: ICD-10-CM

## 2017-04-19 DIAGNOSIS — I10 ESSENTIAL HYPERTENSION: ICD-10-CM

## 2017-04-19 DIAGNOSIS — F41.9 ANXIETY: ICD-10-CM

## 2017-04-19 DIAGNOSIS — J18.9 PNEUMONIA OF RIGHT UPPER LOBE DUE TO INFECTIOUS ORGANISM: Primary | ICD-10-CM

## 2017-04-19 PROCEDURE — 99214 OFFICE O/P EST MOD 30 MIN: CPT | Performed by: INTERNAL MEDICINE

## 2017-04-19 RX ORDER — DIAZEPAM 2 MG
2 TABLET ORAL EVERY 6 HOURS PRN
Qty: 60 TABLET | Refills: 1 | Status: SHIPPED | OUTPATIENT
Start: 2017-04-19 | End: 2017-07-31 | Stop reason: ALTCHOICE

## 2017-04-19 RX ORDER — ESOMEPRAZOLE MAGNESIUM 40 MG/1
40 GRANULE, DELAYED RELEASE ORAL
Qty: 30 EACH | COMMUNITY
Start: 2017-04-19 | End: 2017-04-19

## 2017-04-19 RX ORDER — ESOMEPRAZOLE MAGNESIUM 40 MG/1
40 GRANULE, DELAYED RELEASE ORAL
Qty: 90 EACH | Refills: 3 | Status: SHIPPED | OUTPATIENT
Start: 2017-04-19 | End: 2017-07-31

## 2017-04-19 RX ORDER — METOPROLOL SUCCINATE 50 MG/1
50 TABLET, EXTENDED RELEASE ORAL DAILY
Qty: 30 TABLET | COMMUNITY
Start: 2017-04-19 | End: 2017-04-19

## 2017-04-19 RX ORDER — METOPROLOL SUCCINATE 50 MG/1
50 TABLET, EXTENDED RELEASE ORAL DAILY
Qty: 90 TABLET | Refills: 3 | Status: SHIPPED | OUTPATIENT
Start: 2017-04-19 | End: 2017-08-28 | Stop reason: SINTOL

## 2017-04-19 RX ORDER — CALCITONIN SALMON 200 [IU]/.09ML
1 SPRAY, METERED NASAL DAILY
Qty: 3 BOTTLE | Refills: 3 | Status: SHIPPED | OUTPATIENT
Start: 2017-04-19 | End: 2017-08-28

## 2017-04-19 RX ORDER — CALCITONIN SALMON 200 [IU]/.09ML
1 SPRAY, METERED NASAL DAILY
COMMUNITY
Start: 2017-04-19 | End: 2017-04-19

## 2017-04-19 RX ORDER — DIAZEPAM 2 MG
2 TABLET ORAL EVERY 6 HOURS PRN
Qty: 60 TABLET | COMMUNITY
Start: 2017-04-19 | End: 2017-04-19

## 2017-04-19 RX ORDER — TRIAMTERENE/HYDROCHLOROTHIAZID 37.5-25 MG
1 TABLET ORAL DAILY
Qty: 60 TABLET | COMMUNITY
Start: 2017-04-19 | End: 2017-08-28

## 2017-04-19 NOTE — NURSING NOTE
"Chief Complaint   Patient presents with     RECHECK     Cough       Initial /76 (BP Location: Left arm, Patient Position: Chair, Cuff Size: Adult Large)  Pulse 68  Temp 98.6  F (37  C) (Oral)  Ht 5' 3\" (1.6 m)  Wt 131 lb 14.4 oz (59.8 kg)  SpO2 98%  Breastfeeding? No  BMI 23.37 kg/m2 Estimated body mass index is 23.37 kg/(m^2) as calculated from the following:    Height as of this encounter: 5' 3\" (1.6 m).    Weight as of this encounter: 131 lb 14.4 oz (59.8 kg).  Medication Reconciliation: complete    "

## 2017-04-19 NOTE — PROGRESS NOTES
SUBJECTIVE:                                                    Cynthia Arita is a 71 year old female who presents to clinic today for the following health issues:    Follow up pneumonia.    HPI:   Cynthia Arita a 71 year old female here for several concerns.    Her pneumonia is much better. Cough is gone, breathing is back to normal and Energy is good. But she still has a lot of chesdt wall pain in various locations. It is getting better. But she wants to know if this is normal.    She has been on both Metoprolol and HCTZ and wants to know if she can stop the Metoprolol since the hctz treats HTN. She has to be on the HCTZ for Meniere's. Explained at length that she needs both meds to control her hypertension. She denies side effects on either medication.     She needs refills on her Calcitonin. She is due for DEXA.    Both GERD and anxiety have been under good control.     Problem list and histories reviewed & adjusted, as indicated.  Additional history: as documented    Patient Active Problem List   Diagnosis     Esophageal reflux     Osteoporosis     Meniere's disease     Calculus of right kidney     Pneumonia of right upper lobe due to infectious organism     Anxiety     History reviewed. No pertinent surgical history.    Social History   Substance Use Topics     Smoking status: Never Smoker     Smokeless tobacco: Not on file     Alcohol use Yes     Family History   Problem Relation Age of Onset     Neurologic Disorder Mother      palsy     Esophageal Cancer Father      CANCER Maternal Grandmother      lymph     DIABETES Paternal Grandmother      Neurologic Disorder Sister      Parkinson's     Hypertension Brother      Bipolar Disorder Sister      Brain Cancer Son 17           Reviewed and updated as needed this visit by clinical staff  Tobacco  Allergies  Meds  Med Hx  Surg Hx  Fam Hx  Soc Hx      Reviewed and updated as needed this visit by Provider         ROS:  Constitutional, HEENT, cardiovascular,  "pulmonary, GI, , musculoskeletal, neuro, skin, endocrine and psych systems are negative, except as otherwise noted.    OBJECTIVE:                                                    /76 (BP Location: Left arm, Patient Position: Chair, Cuff Size: Adult Large)  Pulse 68  Temp 98.6  F (37  C) (Oral)  Ht 5' 3\" (1.6 m)  Wt 131 lb 14.4 oz (59.8 kg)  SpO2 98%  Breastfeeding? No  BMI 23.37 kg/m2  Body mass index is 23.37 kg/(m^2).  GENERAL: healthy, alert and no distress  NECK: no adenopathy, no asymmetry, masses, or scars and thyroid normal to palpation  RESP: lungs clear to auscultation - no rales, rhonchi or wheezes  CV: regular rate and rhythm, normal S1 S2, no S3 or S4, no murmur, click or rub, no peripheral edema and peripheral pulses strong  ABDOMEN: soft, nontender, no hepatosplenomegaly, no masses and bowel sounds normal  MS: no gross musculoskeletal defects noted, no edema  SKIN: no suspicious lesions or rashes         ASSESSMENT/PLAN:                                                        1. Pneumonia of right upper lobe due to infectious organism  Resolving nicely, offered reassurance in regard to her chest wall pain.    2. Essential hypertension  under good control Continue current medications. Follow up in 6 months   - metoprolol (TOPROL XL) 50 MG 24 hr tablet; Take 1 tablet (50 mg) by mouth daily  Dispense: 90 tablet; Refill: 3    3. Meniere's disease, bilateral  under good control Continue current medications. Follow up in 6 months     4. Osteoporosis  Will check DEXA  - calcitonin, salmon, (MIACALCIN) 200 UNIT/ACT nasal spray; Spray 1 spray into one nostril alternating nostrils daily Alternate nostril each day.  Dispense: 3 Bottle; Refill: 3  - DX Hip/Pelvis/Spine; Future    5. Anxiety  under good control   - diazepam (VALIUM) 2 MG tablet; Take 1 tablet (2 mg) by mouth every 6 hours as needed for anxiety  Dispense: 60 tablet; Refill: 1    6. Gastroesophageal reflux disease with " esophagitis  under good control   - Esomeprazole Magnesium (NEXIUM) 40 MG PACK; Take 1 packet (40 mg) by mouth daily (with dinner)  Dispense: 90 each; Refill: 3      Huyen Samuels MD  [facility]

## 2017-04-19 NOTE — MR AVS SNAPSHOT
After Visit Summary   4/19/2017    Cynthia Arita    MRN: [id number]           Patient Information     Date Of Birth          1945        Visit Information        Provider Department      4/19/2017 8:40 AM Huyen Samuels MD [facility]        Today's Diagnoses     Pneumonia of right upper lobe due to infectious organism    -  1    Essential hypertension        Meniere's disease, bilateral        Osteoporosis        Anxiety        Gastroesophageal reflux disease with esophagitis           Follow-ups after your visit        Your next 10 appointments already scheduled     May 01, 2017  9:00 AM CDT   PHYSICAL with Huyen Samuels MD   [facility] ([facility])    [address]  [facility] [address]   [phone]            May 05, 2017  8:30 AM CDT   MA SCREENING DIGITAL BILATERAL with [id number]   [facility] ([facility])    [address]  [facility] [address]   [phone]           Do not use any powder, lotion or deodorant under your arms or on your breast. If you do, we will ask you to remove it before your exam.  Wear comfortable, two-piece clothing.  If you have any allergies, tell your care team.  Bring any previous mammograms from other facilities or have them mailed to the breast center. This mammogram location, [facility], now offers 3D mammography. It doesn't replace a screening mammogram and can be done with a regular screening mammogram. It is optional and not all insurances will pay for it. 3D mammography is a special kind of mammogram that produces a three-dimensional image of the breast by using low dose-xrays. 3D allows the radiologist to see the breast tissue differently from 2D, which reduces the chance of repeat testing due to overlapping breast tissue. If you are interested in have a 3D mammogram, please check with your insurance before you arrive  "for your exam. On the day of your exam you will be asked if you would like 3D imaging.              Future tests that were ordered for you today     Open Future Orders        Priority Expected Expires Ordered    DX Hip/Pelvis/Spine Routine  2018            Who to contact     If you have questions or need follow up information about today's clinic visit or your schedule please contact [facility] directly at [phone].  Normal or non-critical lab and imaging results will be communicated to you by Marakanahart, letter or phone within 4 business days after the clinic has received the results. If you do not hear from us within 7 days, please contact the clinic through [employer] or phone. If you have a critical or abnormal lab result, we will notify you by phone as soon as possible.  Submit refill requests through [employer] or call your pharmacy and they will forward the refill request to us. Please allow 3 business days for your refill to be completed.          Additional Information About Your Visit        [employer] Information     [employer] lets you send messages to your doctor, view your test results, renew your prescriptions, schedule appointments and more. To sign up, go to [email or web address] . Click on \"Log in\" on the left side of the screen, which will take you to the Welcome page. Then click on \"Sign up Now\" on the right side of the page.     You will be asked to enter the access code listed below, as well as some personal information. Please follow the directions to create your username and password.     Your access code is: [email or web address]  Expires: 2017 10:25 AM     Your access code will  in 90 days. If you need help or a new code, please call your [facility] or [phone].        Care EveryWhere ID     This is your Care EveryWhere ID. This could be used by other organizations to access your [place] medical records  [id number]        Your Vitals Were     Pulse " "Temperature Height Pulse Oximetry Breastfeeding? BMI (Body Mass Index)    68 98.6  F (37  C) (Oral) 5' 3\" (1.6 m) 98% No 23.37 kg/m2       Blood Pressure from Last 3 Encounters:   04/19/17 120/76   04/05/17 134/90   04/03/17 119/69    Weight from Last 3 Encounters:   04/19/17 131 lb 14.4 oz (59.8 kg)   04/05/17 130 lb 1.6 oz (59 kg)   04/02/17 132 lb 8 oz (60.1 kg)                 Today's Medication Changes          These changes are accurate as of: 4/19/17  2:51 PM.  If you have any questions, ask your nurse or doctor.               Start taking these medicines.        Dose/Directions    calcitonin (salmon) 200 UNIT/ACT nasal spray   Commonly known as:  MIACALCIN   Used for:  Osteoporosis   Started by:  Huyen aSmuels MD        Dose:  1 spray   Spray 1 spray into one nostril alternating nostrils daily Alternate nostril each day.   Quantity:  3 Bottle   Refills:  3       diazepam 2 MG tablet   Commonly known as:  VALIUM   Used for:  Anxiety   Started by:  Huyen Samuels MD        Dose:  2 mg   Take 1 tablet (2 mg) by mouth every 6 hours as needed for anxiety   Quantity:  60 tablet   Refills:  1       Esomeprazole Magnesium 40 MG Pack   Commonly known as:  nexIUM   Used for:  Gastroesophageal reflux disease with esophagitis   Started by:  Huyen Samuels MD        Dose:  40 mg   Take 1 packet (40 mg) by mouth daily (with dinner)   Quantity:  90 each   Refills:  3       metoprolol 50 MG 24 hr tablet   Commonly known as:  TOPROL XL   Used for:  Essential hypertension   Started by:  Huyen Samuels MD        Dose:  50 mg   Take 1 tablet (50 mg) by mouth daily   Quantity:  90 tablet   Refills:  3            Where to get your medicines      Some of these will need a paper prescription and others can be bought over the counter.  Ask your nurse if you have questions.     Bring a paper prescription for each of these medications     calcitonin (salmon) 200 UNIT/ACT nasal spray    diazepam 2 MG tablet    " Esomeprazole Magnesium 40 MG Pack    metoprolol 50 MG 24 hr tablet                Primary Care Provider Office Phone # Fax #    Huyen Samuels [phone]       [facility] [address] [facility] [address]  [facility] [address]        Thank you!     Thank you for choosing [facility]  for your care. Our goal is always to provide you with excellent care. Hearing back from our patients is one way we can continue to improve our services. Please take a few minutes to complete the written survey that you may receive in the mail after your visit with us. Thank you!             Your Updated Medication List - Protect others around you: Learn how to safely use, store and throw away your medicines at www.disposemymeds.org.          This list is accurate as of: 4/19/17  2:51 PM.  Always use your most recent med list.                   Brand Name Dispense Instructions for use    ANTIVERT PO      Take 25 mg by mouth every 6 hours as needed for dizziness       calcitonin (salmon) 200 UNIT/ACT nasal spray    MIACALCIN    3 Bottle    Spray 1 spray into one nostril alternating nostrils daily Alternate nostril each day.       * CENTRUM SILVER per tablet      Take 1 tablet by mouth daily       * OCUVITE PRESERVISION PO      Take 1 tablet by mouth 2 times daily       diazepam 2 MG tablet    VALIUM    60 tablet    Take 1 tablet (2 mg) by mouth every 6 hours as needed for anxiety       Esomeprazole Magnesium 40 MG Pack    nexIUM    90 each    Take 1 packet (40 mg) by mouth daily (with dinner)       fish oil-omega-3 fatty acids 1000 MG capsule      Take 1,000 mg by mouth 2 times daily       flaxseed oil 1000 MG Caps      Take 1,000 mg by mouth every evening       GLUCOSAMINE CHONDR COMPLEX PO      Take 1 capsule by mouth 2 times daily       metoprolol 50 MG 24 hr tablet    TOPROL XL    90 tablet    Take 1 tablet (50 mg) by mouth daily       sucralfate 1 GM/10ML suspension    CARAFATE    420 mL     Take 10 mLs (1 g) by mouth 4 times daily       triamterene-hydrochlorothiazide 37.5-25 MG per tablet    MAXZIDE-25    60 tablet    Take 1 tablet by mouth daily       vitamin D3 2000 UNITS Caps      Take 2,000 Units by mouth every morning       ZOFRAN ODT PO      Take 4 mg by mouth every 8 hours as needed for nausea       * Notice:  This list has 2 medication(s) that are the same as other medications prescribed for you. Read the directions carefully, and ask your doctor or other care provider to review them with you.

## 2017-05-03 ENCOUNTER — HOSPITAL ENCOUNTER (OUTPATIENT)
Dept: MAMMOGRAPHY | Facility: CLINIC | Age: 72
Discharge: HOME | End: 2017-05-03
Attending: INTERNAL MEDICINE | Admitting: INTERNAL MEDICINE
Payer: MEDICARE

## 2017-05-03 DIAGNOSIS — Z12.39 SCREENING FOR BREAST CANCER: ICD-10-CM

## 2017-05-03 DIAGNOSIS — Z12.31 ENCOUNTER FOR SCREENING MAMMOGRAM FOR MALIGNANT NEOPLASM OF BREAST: ICD-10-CM

## 2017-05-03 PROCEDURE — G0202 SCR MAMMO BI INCL CAD: HCPCS

## 2017-05-08 ENCOUNTER — TELEPHONE (OUTPATIENT)
Dept: BONE DENSITY | Facility: CLINIC | Age: 72
End: 2017-05-08

## 2017-05-17 ENCOUNTER — HOSPITAL ENCOUNTER (OUTPATIENT)
Dept: MAMMOGRAPHY | Facility: CLINIC | Age: 72
Discharge: HOME | End: 2017-05-17
Attending: INTERNAL MEDICINE | Admitting: INTERNAL MEDICINE
Payer: MEDICARE

## 2017-05-17 DIAGNOSIS — R92.8 ABNORMAL MAMMOGRAM: ICD-10-CM

## 2017-05-17 PROCEDURE — G0206 DX MAMMO INCL CAD UNI: HCPCS

## 2017-06-05 ENCOUNTER — TELEPHONE (OUTPATIENT)
Dept: INTERNAL MEDICINE | Facility: CLINIC | Age: 72
End: 2017-06-05

## 2017-06-05 DIAGNOSIS — K21.00 GASTROESOPHAGEAL REFLUX DISEASE WITH ESOPHAGITIS: Primary | ICD-10-CM

## 2017-06-05 NOTE — TELEPHONE ENCOUNTER
Patient states she has had nausea since 2/1/17, it was mild at office visit 4/19/17 but has gradually gotten worse and is nearly constant at this point.  Doesn't really have vomiting but does a lot of belching and bringing up acid into mouth with burning sensation.  Takes Nexium pill and uses Zofran as needed but still has symptoms.  Patient did not feel that the Carafate helped at all.    Has ENT appt 6/7/17 for vertigo but feels she may need to see GI also.     Med not on list: Alprazolam 0.25 mg as needed, given by another MD for her anxiety.  YUE Avalos R.N.

## 2017-06-07 ENCOUNTER — TRANSFERRED RECORDS (OUTPATIENT)
Dept: HEALTH INFORMATION MANAGEMENT | Facility: CLINIC | Age: 72
End: 2017-06-07

## 2017-06-19 ENCOUNTER — OFFICE VISIT (OUTPATIENT)
Dept: NURSING | Facility: CLINIC | Age: 72
End: 2017-06-19
Payer: MEDICARE

## 2017-06-19 DIAGNOSIS — K21.00 GASTROESOPHAGEAL REFLUX DISEASE WITH ESOPHAGITIS: Primary | ICD-10-CM

## 2017-06-19 DIAGNOSIS — R11.0 NAUSEA: Primary | ICD-10-CM

## 2017-06-19 PROCEDURE — 99207 ZZC NO CHARGE NURSE ONLY: CPT

## 2017-06-19 RX ORDER — ONDANSETRON 4 MG/1
4 TABLET, ORALLY DISINTEGRATING ORAL EVERY 8 HOURS PRN
Qty: 120 TABLET | Refills: 1 | Status: SHIPPED | OUTPATIENT
Start: 2017-06-19 | End: 2017-06-21

## 2017-06-19 NOTE — MR AVS SNAPSHOT
"              After Visit Summary   6/19/2017    Cynthia Arita    MRN: [id number]           Patient Information     Date Of Birth          1945        Visit Information        Provider Department      6/19/2017 11:00 AM Ling Riley [facility]        Today's Diagnoses     Gastroesophageal reflux disease with esophagitis    -  1       Follow-ups after your visit        Your next 10 appointments already scheduled     Jun 20, 2017  1:20 PM CDT   Office Visit with Senait Antunez MD   [facility] ([facility])    [address]  [facility] [id number] [phone]           Bring a current list of meds and any records pertaining to this visit.  For Physicals, please bring immunization records and any forms needing to be filled out.  Please arrive 10 minutes early to complete paperwork.              Who to contact     If you have questions or need follow up information about today's clinic visit or your schedule please contact [facility] directly at [phone].  Normal or non-critical lab and imaging results will be communicated to you by myDockethart, letter or phone within 4 business days after the clinic has received the results. If you do not hear from us within 7 days, please contact the clinic through [employer] or phone. If you have a critical or abnormal lab result, we will notify you by phone as soon as possible.  Submit refill requests through [employer] or call your pharmacy and they will forward the refill request to us. Please allow 3 business days for your refill to be completed.          Additional Information About Your Visit        [employer]DocketharSwarmBuild Information     [employer] lets you send messages to your doctor, view your test results, renew your prescriptions, schedule appointments and more. To sign up, go to [email or web address] . Click on \"Log in\" on the left side of the screen, which will take you to the Welcome page. Then " "click on \"Sign up Now\" on the right side of the page.     You will be asked to enter the access code listed below, as well as some personal information. Please follow the directions to create your username and password.     Your access code is: [email or web address]  Expires: 2017 10:25 AM     Your access code will  in 90 days. If you need help or a new code, please call your [place] clinic or [phone].        Care EveryWhere ID     This is your Care EveryWhere ID. This could be used by other organizations to access your [place] medical records  [id number]         Blood Pressure from Last 3 Encounters:   17 120/76   17 134/90   17 119/69    Weight from Last 3 Encounters:   17 131 lb 14.4 oz (59.8 kg)   17 130 lb 1.6 oz (59 kg)   17 132 lb 8 oz (60.1 kg)              Today, you had the following     No orders found for display       Primary Care Provider Office Phone # Fax #    Huyen Samuels [phone]       [facility] [address]   [facility] [address]        Thank you!     Thank you for choosing [facility]  for your care. Our goal is always to provide you with excellent care. Hearing back from our patients is one way we can continue to improve our services. Please take a few minutes to complete the written survey that you may receive in the mail after your visit with us. Thank you!             Your Updated Medication List - Protect others around you: Learn how to safely use, store and throw away your medicines at www.disposemymeds.org.          This list is accurate as of: 17 11:48 AM.  Always use your most recent med list.                   Brand Name Dispense Instructions for use    ANTIVERT PO      Take 25 mg by mouth every 6 hours as needed for dizziness       calcitonin (salmon) 200 UNIT/ACT nasal spray    MIACALCIN    3 Bottle    Spray 1 spray into one nostril alternating nostrils daily Alternate " nostril each day.       * CENTRUM SILVER per tablet      Take 1 tablet by mouth daily       * OCUVITE PRESERVISION PO      Take 1 tablet by mouth 2 times daily       diazepam 2 MG tablet    VALIUM    60 tablet    Take 1 tablet (2 mg) by mouth every 6 hours as needed for anxiety       Esomeprazole Magnesium 40 MG Pack    nexIUM    90 each    Take 1 packet (40 mg) by mouth daily (with dinner)       fish oil-omega-3 fatty acids 1000 MG capsule      Take 1,000 mg by mouth 2 times daily       flaxseed oil 1000 MG Caps      Take 1,000 mg by mouth every evening       GLUCOSAMINE CHONDR COMPLEX PO      Take 1 capsule by mouth 2 times daily       metoprolol 50 MG 24 hr tablet    TOPROL XL    90 tablet    Take 1 tablet (50 mg) by mouth daily       sucralfate 1 GM/10ML suspension    CARAFATE    420 mL    Take 10 mLs (1 g) by mouth 4 times daily       triamterene-hydrochlorothiazide 37.5-25 MG per tablet    MAXZIDE-25    60 tablet    Take 1 tablet by mouth daily       vitamin D3 2000 UNITS Caps      Take 2,000 Units by mouth every morning       ZOFRAN ODT PO      Take 4 mg by mouth every 8 hours as needed for nausea       * Notice:  This list has 2 medication(s) that are the same as other medications prescribed for you. Read the directions carefully, and ask your doctor or other care provider to review them with you.

## 2017-06-19 NOTE — NURSING NOTE
Spoke with Dr. Samuels.  She asked for me to call [facility] and get her procedure moved up.      Called and [facility] and they had her down for consult only.  I asked for an endoscopy and they had appts for this week.  She will keep the July 12th appt for f/u to scope. They had so many openings, they will call her to schedule.    I also asked her about taking Nexium BID for right now.    Christopher KOVACS RN        Pt called, left VM:  Informed of the messages above, Nexium to BID, gonna need to drive self to endoscopy, leave car and have son  and take her home.    Christopher KOVACS RN

## 2017-06-19 NOTE — NURSING NOTE
Pt comes into clinic today.  She is bedside herself.  She states that her GERD is driving her crazy, she states Nexium and Carafate are not helping.  She is scheduled for a Endoscopy in July on the 12.  She is wanting to get that done sooner.  What do you think of trying Protonix?    Pt has a GI Referral but it is for [facility] and for a consult only.  Yet when pt called she was scheduled for procedure only.    I will need to ask Dr. Samuels if ok to do procedure at [facility] for Endoscopy?  Order is pended.      Pt has many other complaints:  Fatigue, needing to sleep a great deal more than her usual, coughing up phlegm all night long, GERD, nausea, body aches.    Pt scheduled to see Dr. Oconnor tomorrow    I see she had a pneumonia in April, did it go away post treatment, does she need an CXR?  April labs show Hgb is good.  Asked about the possibility of Lyme's and has not been in woods, does mow lawn.    She was at a hotel and picked up bed bugs, discussed how to get rid of them, how to treat skin with anti itch cream.    Dr. Samuels, please advise

## 2017-06-19 NOTE — PROGRESS NOTES
Would like her to increase Nexium ro bid. Lets see if we can get her upper endoscopy moved up sooner

## 2017-06-19 NOTE — TELEPHONE ENCOUNTER
Zofran had been prescribed by MD in [place] and she is now in [place] to stay.  Asking if PCP will prescribe this med now.  Knows that MD told her she could double the Nexium but that is too expensive.  Zofran works quite well and she would like to continue to use it.  YUE Avalos R.N.

## 2017-06-21 RX ORDER — ONDANSETRON 4 MG/1
4 TABLET, ORALLY DISINTEGRATING ORAL EVERY 8 HOURS PRN
Qty: 120 TABLET | Refills: 1 | Status: SHIPPED | OUTPATIENT
Start: 2017-06-21 | End: 2018-02-15

## 2017-06-24 DIAGNOSIS — R11.0 NAUSEA: ICD-10-CM

## 2017-06-24 RX ORDER — ONDANSETRON 4 MG/1
TABLET, ORALLY DISINTEGRATING ORAL
Qty: 240 TABLET | OUTPATIENT
Start: 2017-06-24

## 2017-06-26 ENCOUNTER — OFFICE VISIT (OUTPATIENT)
Dept: NURSING | Facility: CLINIC | Age: 72
End: 2017-06-26
Payer: MEDICARE

## 2017-06-26 DIAGNOSIS — Z76.0 ENCOUNTER FOR MEDICATION REFILL: Primary | ICD-10-CM

## 2017-06-26 PROCEDURE — 99207 ZZC NO CHARGE NURSE ONLY: CPT

## 2017-06-26 NOTE — NURSING NOTE
Pt came walking in today.  She is still struggling with her pharmacy [facility].    They will only send her 1 bottle of her nasal spray at an out of pocket expense of 40.00.  Yet if she gets the 3 bottles for a 90 days supply it's 82.47.  S this is costing her a lot more money.  She is at her wits end about this this issue.    Called and spoke to rep    Pt Calcitonin Nasal Spray, I called in a new refill given a 90 day supply/3 bottles with 3 refills. (As Dr. Samuels wrote on 4/19/2017.    Nexium will need a refill before 8/22/17  Metoprolol good until 2/16/2018    Christopher KOVACS RN    PT ID number is [id number] per [facility]  I had to call [phone] to speak to pharmacist to get this med ordered as Dr. Samuels had written on 4/19/17.    Christopher KOVACS RN

## 2017-06-26 NOTE — MR AVS SNAPSHOT
"              After Visit Summary   6/26/2017    Cynthia Arita    MRN: [id number]           Patient Information     Date Of Birth          1945        Visit Information        Provider Department      6/26/2017 3:00 PM Rn, [facility]        Today's Diagnoses     Encounter for medication refill    -  1       Follow-ups after your visit        Your next 10 appointments already scheduled     Jun 26, 2017  3:00 PM CDT   [facility] with Ling [facility]   [facility]    [address]  [facility] [id number] [phone]              Who to contact     If you have questions or need follow up information about today's clinic visit or your schedule please contact [facility] directly at [phone].  Normal or non-critical lab and imaging results will be communicated to you by MyChart, letter or phone within 4 business days after the clinic has received the results. If you do not hear from us within 7 days, please contact the clinic through MyChart or phone. If you have a critical or abnormal lab result, we will notify you by phone as soon as possible.  Submit refill requests through [employer] or call your pharmacy and they will forward the refill request to us. Please allow 3 business days for your refill to be completed.          Additional Information About Your Visit        MyChart Information     [employer] lets you send messages to your doctor, view your test results, renew your prescriptions, schedule appointments and more. To sign up, go to [email or web address] . Click on \"Log in\" on the left side of the screen, which will take you to the Welcome page. Then click on \"Sign up Now\" on the right side of the page.     You will be asked to enter the access code listed below, as well as some personal information. Please follow the directions to create your username and password.     Your access code is: [email or web address]  Expires: 7/2/2017 10:25 " AM     Your access code will  in 90 days. If you need help or a new code, please call your [place] clinic or [phone].        Care EveryWhere ID     This is your Care EveryWhere ID. This could be used by other organizations to access your [place] medical records  [id number]         Blood Pressure from Last 3 Encounters:   17 120/76   17 134/90   17 119/69    Weight from Last 3 Encounters:   17 131 lb 14.4 oz (59.8 kg)   17 130 lb 1.6 oz (59 kg)   17 132 lb 8 oz (60.1 kg)              Today, you had the following     No orders found for display       Primary Care Provider Office Phone # Fax #    Huyen Samuels [phone]       [facility] [address] [facility] [address]  [facility] [address]        Equal Access to Services     [place] EPRLA : Hadii aad ku hadasho Soomaali, waaxda luqadaha, qaybta kaalmada adeegyada, waxay idiin hayaan sharmin lyons . So [facility] [phone].    ATENCIÓN: Si habla español, tiene a moralez disposición servicios gratuitos de asistencia lingüística. Llame al [phone].    We comply with applicable federal civil rights laws and [place] laws. We do not discriminate on the basis of race, color, national origin, age, disability sex, sexual orientation or gender identity.            Thank you!     Thank you for choosing [facility]  for your care. Our goal is always to provide you with excellent care. Hearing back from our patients is one way we can continue to improve our services. Please take a few minutes to complete the written survey that you may receive in the mail after your visit with us. Thank you!             Your Updated Medication List - Protect others around you: Learn how to safely use, store and throw away your medicines at www.disposemymeds.org.          This list is accurate as of: 17  2:46 PM.  Always use your most recent med list.                   Brand Name Dispense  Instructions for use Diagnosis    ANTIVERT PO      Take 25 mg by mouth every 6 hours as needed for dizziness        calcitonin (salmon) 200 UNIT/ACT nasal spray    MIACALCIN    3 Bottle    Spray 1 spray into one nostril alternating nostrils daily Alternate nostril each day.    Osteoporosis       * CENTRUM SILVER per tablet      Take 1 tablet by mouth daily        * OCUVITE PRESERVISION PO      Take 1 tablet by mouth 2 times daily        diazepam 2 MG tablet    VALIUM    60 tablet    Take 1 tablet (2 mg) by mouth every 6 hours as needed for anxiety    Anxiety       Esomeprazole Magnesium 40 MG Pack    nexIUM    90 each    Take 1 packet (40 mg) by mouth daily (with dinner)    Gastroesophageal reflux disease with esophagitis       fish oil-omega-3 fatty acids 1000 MG capsule      Take 1,000 mg by mouth 2 times daily        flaxseed oil 1000 MG Caps      Take 1,000 mg by mouth every evening        GLUCOSAMINE CHONDR COMPLEX PO      Take 1 capsule by mouth 2 times daily        metoprolol 50 MG 24 hr tablet    TOPROL XL    90 tablet    Take 1 tablet (50 mg) by mouth daily    Essential hypertension       ondansetron 4 MG ODT tab    ZOFRAN ODT    120 tablet    Take 1 tablet (4 mg) by mouth every 8 hours as needed for nausea    Nausea       sucralfate 1 GM/10ML suspension    CARAFATE    420 mL    Take 10 mLs (1 g) by mouth 4 times daily    Gastroesophageal reflux disease with esophagitis       triamterene-hydrochlorothiazide 37.5-25 MG per tablet    MAXZIDE-25    60 tablet    Take 1 tablet by mouth daily        vitamin D3 2000 UNITS Caps      Take 2,000 Units by mouth every morning        * Notice:  This list has 2 medication(s) that are the same as other medications prescribed for you. Read the directions carefully, and ask your doctor or other care provider to review them with you.

## 2017-06-29 ENCOUNTER — TRANSFERRED RECORDS (OUTPATIENT)
Dept: HEALTH INFORMATION MANAGEMENT | Facility: CLINIC | Age: 72
End: 2017-06-29

## 2017-07-03 PROBLEM — I10 ESSENTIAL HYPERTENSION: Status: ACTIVE | Noted: 2017-07-03

## 2017-07-21 ENCOUNTER — TELEPHONE (OUTPATIENT)
Dept: INTERNAL MEDICINE | Facility: CLINIC | Age: 72
End: 2017-07-21

## 2017-07-21 ENCOUNTER — OFFICE VISIT (OUTPATIENT)
Dept: NURSING | Facility: CLINIC | Age: 72
End: 2017-07-21

## 2017-07-21 DIAGNOSIS — Z53.9 DIAGNOSIS FOR ++++ WALK IN CLINIC ++++: Primary | ICD-10-CM

## 2017-07-21 DIAGNOSIS — M24.60 FROZEN JOINT: ICD-10-CM

## 2017-07-21 DIAGNOSIS — M79.646 THUMB PAIN: Primary | ICD-10-CM

## 2017-07-21 RX ORDER — PREDNISONE 20 MG/1
20 TABLET ORAL DAILY
Qty: 5 TABLET | Refills: 0 | Status: SHIPPED | OUTPATIENT
Start: 2017-07-21 | End: 2017-07-31

## 2017-07-21 NOTE — TELEPHONE ENCOUNTER
"Pt walked into clinic-Stated about 3wks ago she was laying in bed and all of sudden her L thumb \"snapped\" . Since then its been very painful, and at her knuckle its locking, and pt has to pull it out to unlock it.       Per Elvin-Refer to [facility] for pt to see hand surgeon. And ok to sent rx to pharm for Prednisone 20mg-1 x5d       Called pt-relayed above.   "

## 2017-07-21 NOTE — MR AVS SNAPSHOT
"              After Visit Summary   7/21/2017    Cynthia Arita    MRN: [id number]           Patient Information     Date Of Birth          1945        Visit Information        Provider Department      7/21/2017 3:00 PM Osvaldo, Ri [facility]        Today's Diagnoses     DIAGNOSIS FOR ++++ WALK IN CLINIC ++++    -  1      Care Instructions    Pt walked into clinic regarding her L thumb           Follow-ups after your visit        Your next 10 appointments already scheduled     Jul 31, 2017  8:40 AM CDT   SHORT with Huyen Samuels MD   [facility] ([facility])    [address]  [facility] [id number] [phone]              Who to contact     If you have questions or need follow up information about today's clinic visit or your schedule please contact [facility] directly at [phone].  Normal or non-critical lab and imaging results will be communicated to you by MyChart, letter or phone within 4 business days after the clinic has received the results. If you do not hear from us within 7 days, please contact the clinic through MyChart or phone. If you have a critical or abnormal lab result, we will notify you by phone as soon as possible.  Submit refill requests through Kaazing or call your pharmacy and they will forward the refill request to us. Please allow 3 business days for your refill to be completed.          Additional Information About Your Visit        MyChart Information     Kaazing lets you send messages to your doctor, view your test results, renew your prescriptions, schedule appointments and more. To sign up, go to [email or web address] . Click on \"Log in\" on the left side of the screen, which will take you to the Welcome page. Then click on \"Sign up Now\" on the right side of the page.     You will be asked to enter the access code listed below, as well as some personal information. Please follow the directions to " create your username and password.     Your access code is: [email or web address]  Expires: 10/19/2017  3:13 PM     Your access code will  in 90 days. If you need help or a new code, please call your [facility] or [phone].        Care EveryWhere ID     This is your Care EveryWhere ID. This could be used by other organizations to access your [place] medical records  [id number]         Blood Pressure from Last 3 Encounters:   17 120/76   17 134/90   17 119/69    Weight from Last 3 Encounters:   17 131 lb 14.4 oz (59.8 kg)   17 130 lb 1.6 oz (59 kg)   17 132 lb 8 oz (60.1 kg)              Today, you had the following     No orders found for display       Primary Care Provider Office Phone # Fax #    Huyen Samuels [phone]       [facility] [address]   [facility] [address]        Equal Access to Services     [facility]: Hadii aad ku hadasho Soomaali, waaxda luqadaha, qaybta kaalmada adeegyada, waxay vania haykarly lyons . So [facility] [phone].    ATENCIÓN: Si habla español, tiene a moralez disposición servicios gratuitos de asistencia lingüística. Llame al [phone].    We comply with applicable federal civil rights laws and [place] laws. We do not discriminate on the basis of race, color, national origin, age, disability sex, sexual orientation or gender identity.            Thank you!     Thank you for choosing [facility]  for your care. Our goal is always to provide you with excellent care. Hearing back from our patients is one way we can continue to improve our services. Please take a few minutes to complete the written survey that you may receive in the mail after your visit with us. Thank you!             Your Updated Medication List - Protect others around you: Learn how to safely use, store and throw away your medicines at www.disposemymeds.org.          This list is accurate as of:  7/21/17  3:13 PM.  Always use your most recent med list.                   Brand Name Dispense Instructions for use Diagnosis    ANTIVERT PO      Take 25 mg by mouth every 6 hours as needed for dizziness        calcitonin (salmon) 200 UNIT/ACT nasal spray    MIACALCIN    3 Bottle    Spray 1 spray into one nostril alternating nostrils daily Alternate nostril each day.    Osteoporosis       * CENTRUM SILVER per tablet      Take 1 tablet by mouth daily        * OCUVITE PRESERVISION PO      Take 1 tablet by mouth 2 times daily        diazepam 2 MG tablet    VALIUM    60 tablet    Take 1 tablet (2 mg) by mouth every 6 hours as needed for anxiety    Anxiety       Esomeprazole Magnesium 40 MG Pack    nexIUM    90 each    Take 1 packet (40 mg) by mouth daily (with dinner)    Gastroesophageal reflux disease with esophagitis       fish oil-omega-3 fatty acids 1000 MG capsule      Take 1,000 mg by mouth 2 times daily        flaxseed oil 1000 MG Caps      Take 1,000 mg by mouth every evening        GLUCOSAMINE CHONDR COMPLEX PO      Take 1 capsule by mouth 2 times daily        metoprolol 50 MG 24 hr tablet    TOPROL XL    90 tablet    Take 1 tablet (50 mg) by mouth daily    Essential hypertension       ondansetron 4 MG ODT tab    ZOFRAN ODT    120 tablet    Take 1 tablet (4 mg) by mouth every 8 hours as needed for nausea    Nausea       sucralfate 1 GM/10ML suspension    CARAFATE    420 mL    Take 10 mLs (1 g) by mouth 4 times daily    Gastroesophageal reflux disease with esophagitis       triamterene-hydrochlorothiazide 37.5-25 MG per tablet    MAXZIDE-25    60 tablet    Take 1 tablet by mouth daily        vitamin D3 2000 UNITS Caps      Take 2,000 Units by mouth every morning        * Notice:  This list has 2 medication(s) that are the same as other medications prescribed for you. Read the directions carefully, and ask your doctor or other care provider to review them with you.

## 2017-07-31 ENCOUNTER — OFFICE VISIT (OUTPATIENT)
Dept: INTERNAL MEDICINE | Facility: CLINIC | Age: 72
End: 2017-07-31
Payer: MEDICARE

## 2017-07-31 VITALS
WEIGHT: 133 LBS | OXYGEN SATURATION: 100 % | TEMPERATURE: 97.9 F | SYSTOLIC BLOOD PRESSURE: 104 MMHG | BODY MASS INDEX: 23.57 KG/M2 | HEART RATE: 64 BPM | HEIGHT: 63 IN | DIASTOLIC BLOOD PRESSURE: 60 MMHG | RESPIRATION RATE: 12 BRPM

## 2017-07-31 DIAGNOSIS — F41.9 ANXIETY: ICD-10-CM

## 2017-07-31 DIAGNOSIS — K21.00 GASTROESOPHAGEAL REFLUX DISEASE WITH ESOPHAGITIS: ICD-10-CM

## 2017-07-31 DIAGNOSIS — F43.9 STRESS: ICD-10-CM

## 2017-07-31 DIAGNOSIS — M25.50 ARTHRALGIA, UNSPECIFIED JOINT: ICD-10-CM

## 2017-07-31 DIAGNOSIS — K21.9 GASTROESOPHAGEAL REFLUX DISEASE WITHOUT ESOPHAGITIS: ICD-10-CM

## 2017-07-31 DIAGNOSIS — R35.0 URINARY FREQUENCY: Primary | ICD-10-CM

## 2017-07-31 DIAGNOSIS — I10 ESSENTIAL HYPERTENSION: ICD-10-CM

## 2017-07-31 LAB
ALBUMIN UR-MCNC: NEGATIVE MG/DL
APPEARANCE UR: CLEAR
BACTERIA #/AREA URNS HPF: ABNORMAL /HPF
BILIRUB UR QL STRIP: NEGATIVE
COLOR UR AUTO: YELLOW
GLUCOSE UR STRIP-MCNC: NEGATIVE MG/DL
HGB UR QL STRIP: NEGATIVE
KETONES UR STRIP-MCNC: NEGATIVE MG/DL
LEUKOCYTE ESTERASE UR QL STRIP: NEGATIVE
NITRATE UR QL: NEGATIVE
NON-SQ EPI CELLS #/AREA URNS LPF: ABNORMAL /LPF
PH UR STRIP: 7 PH (ref 5–7)
RBC #/AREA URNS AUTO: ABNORMAL /HPF (ref 0–2)
SP GR UR STRIP: 1.01 (ref 1–1.03)
URN SPEC COLLECT METH UR: ABNORMAL
UROBILINOGEN UR STRIP-ACNC: 0.2 EU/DL (ref 0.2–1)
WBC #/AREA URNS AUTO: ABNORMAL /HPF (ref 0–2)

## 2017-07-31 PROCEDURE — 87086 URINE CULTURE/COLONY COUNT: CPT | Performed by: INTERNAL MEDICINE

## 2017-07-31 PROCEDURE — 81001 URINALYSIS AUTO W/SCOPE: CPT | Performed by: INTERNAL MEDICINE

## 2017-07-31 PROCEDURE — 87088 URINE BACTERIA CULTURE: CPT | Performed by: INTERNAL MEDICINE

## 2017-07-31 PROCEDURE — 99214 OFFICE O/P EST MOD 30 MIN: CPT | Performed by: INTERNAL MEDICINE

## 2017-07-31 PROCEDURE — 87186 SC STD MICRODIL/AGAR DIL: CPT | Performed by: INTERNAL MEDICINE

## 2017-07-31 RX ORDER — ALPRAZOLAM 0.25 MG
0.25 TABLET ORAL 3 TIMES DAILY PRN
COMMUNITY
Start: 2017-07-31 | End: 2018-05-18

## 2017-07-31 RX ORDER — ESOMEPRAZOLE MAGNESIUM 40 MG/1
40 GRANULE, DELAYED RELEASE ORAL
Qty: 90 EACH | Refills: 1 | Status: SHIPPED | OUTPATIENT
Start: 2017-07-31 | End: 2017-08-08

## 2017-07-31 NOTE — NURSING NOTE
"Chief Complaint   Patient presents with     Pain     Follow up for left thumb pain. Had a cortisone injection last week.      RECHECK     Discuss EGD results.        Initial /60 (BP Location: Left arm, Patient Position: Chair, Cuff Size: Adult Regular)  Pulse 64  Temp 97.9  F (36.6  C) (Oral)  Resp 12  Ht 5' 3\" (1.6 m)  Wt 133 lb (60.3 kg)  SpO2 100%  BMI 23.56 kg/m2 Estimated body mass index is 23.56 kg/(m^2) as calculated from the following:    Height as of this encounter: 5' 3\" (1.6 m).    Weight as of this encounter: 133 lb (60.3 kg).  Medication Reconciliation: complete     ARCENIO Hobbs      "

## 2017-07-31 NOTE — MR AVS SNAPSHOT
"              After Visit Summary   2017    Cynthia Arita    MRN: [id number]           Patient Information     Date Of Birth          1945        Visit Information        Provider Department      2017 8:40 AM Huyen Samuels MD [facility]        Today's Diagnoses     Urinary frequency    -  1    Essential hypertension        Gastroesophageal reflux disease without esophagitis           Follow-ups after your visit        Who to contact     If you have questions or need follow up information about today's clinic visit or your schedule please contact [facility] directly at [phone].  Normal or non-critical lab and imaging results will be communicated to you by [employer], letter or phone within 4 business days after the clinic has received the results. If you do not hear from us within 7 days, please contact the clinic through [employer] or phone. If you have a critical or abnormal lab result, we will notify you by phone as soon as possible.  Submit refill requests through [employer] or call your pharmacy and they will forward the refill request to us. Please allow 3 business days for your refill to be completed.          Additional Information About Your Visit        MyChart Information     Flypaper lets you send messages to your doctor, view your test results, renew your prescriptions, schedule appointments and more. To sign up, go to [email or web address] . Click on \"Log in\" on the left side of the screen, which will take you to the Welcome page. Then click on \"Sign up Now\" on the right side of the page.     You will be asked to enter the access code listed below, as well as some personal information. Please follow the directions to create your username and password.     Your access code is: [email or web address]  Expires: 10/19/2017  3:13 PM     Your access code will  in 90 days. If you need help or a new code, please call your [place] clinic or [phone].      " "  Care EveryWhere ID     This is your Care EveryWhere ID. This could be used by other organizations to access your [place] medical records  [id number]        Your Vitals Were     Pulse Temperature Respirations Height Pulse Oximetry BMI (Body Mass Index)    64 97.9  F (36.6  C) (Oral) 12 5' 3\" (1.6 m) 100% 23.56 kg/m2       Blood Pressure from Last 3 Encounters:   07/31/17 104/60   04/19/17 120/76   04/05/17 134/90    Weight from Last 3 Encounters:   07/31/17 133 lb (60.3 kg)   04/19/17 131 lb 14.4 oz (59.8 kg)   04/05/17 130 lb 1.6 oz (59 kg)              We Performed the Following     UA with Microscopic reflex to Culture          Today's Medication Changes          These changes are accurate as of: 7/31/17  9:06 AM.  If you have any questions, ask your nurse or doctor.               Stop taking these medicines if you haven't already. Please contact your care team if you have questions.     diazepam 2 MG tablet   Commonly known as:  VALIUM   Stopped by:  Huyen Samuels MD                    Primary Care Provider Office Phone # Fax #    Huyen Samuels [phone]       [facility] [address] [facility] [address]  [place] [id number]        Equal Access to Services     [facility]: Hadii aad ku hadasho Soomaali, waaxda luqadaha, qaybta kaalmada adeegyada, waxyecenia keithin hayaan sharmin lyons . So [facility] [phone].    ATENCIÓN: Si habla español, tiene a moralez disposición servicios gratuitos de asistencia lingüística. Llame al [phone].    We comply with applicable federal civil rights laws and [place] laws. We do not discriminate on the basis of race, color, national origin, age, disability sex, sexual orientation or gender identity.            Thank you!     Thank you for choosing [facility]  for your care. Our goal is always to provide you with excellent care. Hearing back from our patients is one way we can continue to improve our services. Please take " a few minutes to complete the written survey that you may receive in the mail after your visit with us. Thank you!             Your Updated Medication List - Protect others around you: Learn how to safely use, store and throw away your medicines at www.disposemymeds.org.          This list is accurate as of: 7/31/17  9:06 AM.  Always use your most recent med list.                   Brand Name Dispense Instructions for use Diagnosis    ALPRAZolam 0.25 MG tablet    XANAX     Take 1 tablet (0.25 mg) by mouth 3 times daily as needed for anxiety        ANTIVERT PO      Take 25 mg by mouth every 6 hours as needed for dizziness        calcitonin (salmon) 200 UNIT/ACT nasal spray    MIACALCIN    3 Bottle    Spray 1 spray into one nostril alternating nostrils daily Alternate nostril each day.    Osteoporosis       * CENTRUM SILVER per tablet      Take 1 tablet by mouth daily        * OCUVITE PRESERVISION PO      Take 1 tablet by mouth 2 times daily        Esomeprazole Magnesium 40 MG Pack    nexIUM    90 each    Take 1 packet (40 mg) by mouth daily (with dinner)    Gastroesophageal reflux disease with esophagitis       fish oil-omega-3 fatty acids 1000 MG capsule      Take 1,000 mg by mouth 2 times daily        flaxseed oil 1000 MG Caps      Take 1,000 mg by mouth every evening        GLUCOSAMINE CHONDR COMPLEX PO      Take 1 capsule by mouth 2 times daily        metoprolol 50 MG 24 hr tablet    TOPROL XL    90 tablet    Take 1 tablet (50 mg) by mouth daily    Essential hypertension       ondansetron 4 MG ODT tab    ZOFRAN ODT    120 tablet    Take 1 tablet (4 mg) by mouth every 8 hours as needed for nausea    Nausea       triamterene-hydrochlorothiazide 37.5-25 MG per tablet    MAXZIDE-25    60 tablet    Take 1 tablet by mouth daily        vitamin D3 2000 UNITS Caps      Take 2,000 Units by mouth every morning        * Notice:  This list has 2 medication(s) that are the same as other medications prescribed for you. Read the  directions carefully, and ask your doctor or other care provider to review them with you.

## 2017-07-31 NOTE — TELEPHONE ENCOUNTER
Patient says [facility] has no record of the Rx we gave on 4/19/17 for one year.    nexium      Last Written Prescription Date: 4/19/17  Last Fill Quantity: 90,  # refills: 3   [facility] Visit with [facility] prescribing provider: 7/31/17                                         Next 5 appointments (look out 90 days)     Aug 28, 2017  9:00 AM CDT   SHORT with Huyen Samuels MD   [facility]    [address]  [facility] [id number] [phone]

## 2017-08-02 LAB
BACTERIA SPEC CULT: ABNORMAL
MICRO REPORT STATUS: ABNORMAL
MICROORGANISM SPEC CULT: ABNORMAL
SPECIMEN SOURCE: ABNORMAL

## 2017-08-02 RX ORDER — CIPROFLOXACIN 500 MG/1
500 TABLET, FILM COATED ORAL 2 TIMES DAILY
Qty: 14 TABLET | Refills: 0 | Status: SHIPPED | OUTPATIENT
Start: 2017-08-02 | End: 2017-08-28

## 2017-08-04 ENCOUNTER — TELEPHONE (OUTPATIENT)
Dept: INTERNAL MEDICINE | Facility: CLINIC | Age: 72
End: 2017-08-04

## 2017-08-04 DIAGNOSIS — K21.00 GASTROESOPHAGEAL REFLUX DISEASE WITH ESOPHAGITIS: ICD-10-CM

## 2017-08-04 NOTE — TELEPHONE ENCOUNTER
Received fax from [facility] stating:  Our records indicate the patient was previously on Nexium 40 mg capsule.  The new rx indicates a change to Nexium 40 mg packet.  Please verify if dosage form change is intended.      Order# [id number], call with response to [phone]

## 2017-08-07 NOTE — TELEPHONE ENCOUNTER
Kesha, from [facility] calling asking if provider intended to prescribe the Nexium packets versus the capsules, as patient was previously taking Nexium capsules, previously to refill.    Reference number: [id number] when calling back.    Provider please review and advise. Thank you.

## 2017-08-08 RX ORDER — ESOMEPRAZOLE MAGNESIUM 40 MG/1
40 GRANULE, DELAYED RELEASE ORAL
Qty: 90 EACH | Refills: 1 | Status: CANCELLED | OUTPATIENT
Start: 2017-08-08

## 2017-08-08 RX ORDER — ESOMEPRAZOLE MAGNESIUM 40 MG/1
40 CAPSULE, DELAYED RELEASE ORAL
Qty: 90 CAPSULE | Refills: 0
Start: 2017-08-08 | End: 2017-08-28

## 2017-08-15 ENCOUNTER — ALLIED HEALTH/NURSE VISIT (OUTPATIENT)
Dept: NURSING | Facility: CLINIC | Age: 72
End: 2017-08-15
Payer: MEDICARE

## 2017-08-15 DIAGNOSIS — R35.0 URINARY FREQUENCY: Primary | ICD-10-CM

## 2017-08-15 LAB
ALBUMIN UR-MCNC: NEGATIVE MG/DL
APPEARANCE UR: CLEAR
BILIRUB UR QL STRIP: NEGATIVE
COLOR UR AUTO: YELLOW
GLUCOSE UR STRIP-MCNC: NEGATIVE MG/DL
HGB UR QL STRIP: NEGATIVE
KETONES UR STRIP-MCNC: NEGATIVE MG/DL
LEUKOCYTE ESTERASE UR QL STRIP: NEGATIVE
NITRATE UR QL: NEGATIVE
PH UR STRIP: 7 PH (ref 5–7)
RBC #/AREA URNS AUTO: NORMAL /HPF
SOURCE: NORMAL
SP GR UR STRIP: 1.02 (ref 1–1.03)
UROBILINOGEN UR STRIP-ACNC: 0.2 EU/DL (ref 0.2–1)
WBC #/AREA URNS AUTO: NORMAL /HPF

## 2017-08-15 PROCEDURE — 81001 URINALYSIS AUTO W/SCOPE: CPT | Performed by: INTERNAL MEDICINE

## 2017-08-15 PROCEDURE — 99207 ZZC NO CHARGE NURSE ONLY: CPT

## 2017-08-15 NOTE — MR AVS SNAPSHOT
"              After Visit Summary   8/15/2017    Cynthia Arita    MRN: [id number]           Patient Information     Date Of Birth          1945        Visit Information        Provider Department      8/15/2017 10:00 AM Ling Riley [facility]        Today's Diagnoses     Urinary frequency    -  1       Follow-ups after your visit        Your next 10 appointments already scheduled     Aug 28, 2017  9:00 AM CDT   SHORT with Huyen Samuels MD   [facility]    [address]  [facility] [id number] [phone]              Who to contact     If you have questions or need follow up information about today's clinic visit or your schedule please contact [facility] directly at [phone].  Normal or non-critical lab and imaging results will be communicated to you by MyChart, letter or phone within 4 business days after the clinic has received the results. If you do not hear from us within 7 days, please contact the clinic through MyChart or phone. If you have a critical or abnormal lab result, we will notify you by phone as soon as possible.  Submit refill requests through [employer] or call your pharmacy and they will forward the refill request to us. Please allow 3 business days for your refill to be completed.          Additional Information About Your Visit        Zdoroviohart Information     [employer] lets you send messages to your doctor, view your test results, renew your prescriptions, schedule appointments and more. To sign up, go to [email or web address] . Click on \"Log in\" on the left side of the screen, which will take you to the Welcome page. Then click on \"Sign up Now\" on the right side of the page.     You will be asked to enter the access code listed below, as well as some personal information. Please follow the directions to create your username and password.     Your access code is: [email or web address]  Expires: 10/19/2017  " 3:13 PM     Your access code will  in 90 days. If you need help or a new code, please call your [place] clinic or [phone].        Care EveryWhere ID     This is your Care EveryWhere ID. This could be used by other organizations to access your [place] medical records  [id number]         Blood Pressure from Last 3 Encounters:   17 104/60   17 120/76   17 134/90    Weight from Last 3 Encounters:   17 133 lb (60.3 kg)   17 131 lb 14.4 oz (59.8 kg)   17 130 lb 1.6 oz (59 kg)              We Performed the Following     UA with Microscopic reflex to Culture        Primary Care Provider Office Phone # Fax #    Huyen Samuels [phone]       [address] [facility] [address]  [facility] [address]        Equal Access to Services     [facility] : Hadii aad ku hadasho Soomaali, waaxda luqadaha, qaybta kaalmada adeegyada, waxay idiin hayaan sharmin lyons . So [facility] [phone].    ATENCIÓN: Si habla español, tiene a moralez disposición servicios gratuitos de asistencia lingüística. Aliyabeatriz al [phone].    We comply with applicable federal civil rights laws and [place] laws. We do not discriminate on the basis of race, color, national origin, age, disability sex, sexual orientation or gender identity.            Thank you!     Thank you for choosing [facility]  for your care. Our goal is always to provide you with excellent care. Hearing back from our patients is one way we can continue to improve our services. Please take a few minutes to complete the written survey that you may receive in the mail after your visit with us. Thank you!             Your Updated Medication List - Protect others around you: Learn how to safely use, store and throw away your medicines at www.disposemymeds.org.          This list is accurate as of: 8/15/17 11:59 PM.  Always use your most recent med list.                   Brand Name Dispense Instructions for  use Diagnosis    ALPRAZolam 0.25 MG tablet    XANAX     Take 1 tablet (0.25 mg) by mouth 3 times daily as needed for anxiety        ANTIVERT PO      Take 25 mg by mouth every 6 hours as needed for dizziness        calcitonin (salmon) 200 UNIT/ACT nasal spray    MIACALCIN    3 Bottle    Spray 1 spray into one nostril alternating nostrils daily Alternate nostril each day.    Osteoporosis       * CENTRUM SILVER per tablet      Take 1 tablet by mouth daily        * OCUVITE PRESERVISION PO      Take 1 tablet by mouth 2 times daily        ciprofloxacin 500 MG tablet    CIPRO    14 tablet    Take 1 tablet (500 mg) by mouth 2 times daily    Urinary frequency       esomeprazole 40 MG CR capsule    nexIUM    90 capsule    Take 1 capsule (40 mg) by mouth daily (with dinner)    Gastroesophageal reflux disease with esophagitis       fish oil-omega-3 fatty acids 1000 MG capsule      Take 1,000 mg by mouth 2 times daily        flaxseed oil 1000 MG Caps      Take 1,000 mg by mouth every evening        GLUCOSAMINE CHONDR COMPLEX PO      Take 1 capsule by mouth 2 times daily        metoprolol 50 MG 24 hr tablet    TOPROL XL    90 tablet    Take 1 tablet (50 mg) by mouth daily    Essential hypertension       ondansetron 4 MG ODT tab    ZOFRAN ODT    120 tablet    Take 1 tablet (4 mg) by mouth every 8 hours as needed for nausea    Nausea       triamterene-hydrochlorothiazide 37.5-25 MG per tablet    MAXZIDE-25    60 tablet    Take 1 tablet by mouth daily        vitamin D3 2000 UNITS Caps      Take 2,000 Units by mouth every morning        * Notice:  This list has 2 medication(s) that are the same as other medications prescribed for you. Read the directions carefully, and ask your doctor or other care provider to review them with you.

## 2017-08-15 NOTE — NURSING NOTE
Discussed with Dr. Samuels, recommends pt stop metoprolol and f/u in 3 weeks. Also order UA/UC.    Relay above to pt. Already has appt scheduled for 08/28. Due to pt's schedule and MD not in office first week of Sept pt decided to keep this appt. UA/UC collected and brought to lab.

## 2017-08-15 NOTE — NURSING NOTE
"1) Pt walks-in with nausea and HA.    Per 07/31 OV note: \"She is concerned that the Metoprolol is making her achy and giving her nausea/GERD symptoms\" and \"Gastroesophageal reflux disease without esophagitis  as above. But I think it is stress related. I would like to try her on Zoloft or related med but we were both reluctant to change 2 meds at once in her. Follow up in 1 month\"    Pt reports she has been taking half of her Metoprolol 50 mg XL tablets daily. She continues to have nausea that lasts all day. Taking zofran without relief. Indicates she forces herself to eat. Drinks water t/o day. Has not vomited, but reports having reflex with small amount of regurge into her mouth. Pt states she has recently started getting a HA.     /78, HR 68    2) Pt also reports she was recently treated for UTI. Urinary frequency and urgency continues, but burning with urination resolved.    Finished cipro on 08/09.    Please advise, thanks.  "

## 2017-08-28 ENCOUNTER — OFFICE VISIT (OUTPATIENT)
Dept: INTERNAL MEDICINE | Facility: CLINIC | Age: 72
End: 2017-08-28
Payer: MEDICARE

## 2017-08-28 VITALS
DIASTOLIC BLOOD PRESSURE: 72 MMHG | TEMPERATURE: 98.6 F | BODY MASS INDEX: 23.25 KG/M2 | HEART RATE: 80 BPM | OXYGEN SATURATION: 97 % | HEIGHT: 63 IN | WEIGHT: 131.2 LBS | SYSTOLIC BLOOD PRESSURE: 118 MMHG

## 2017-08-28 DIAGNOSIS — H81.03 MENIERE'S DISEASE, BILATERAL: ICD-10-CM

## 2017-08-28 DIAGNOSIS — I10 ESSENTIAL HYPERTENSION: Primary | ICD-10-CM

## 2017-08-28 DIAGNOSIS — M81.0 AGE-RELATED OSTEOPOROSIS WITHOUT CURRENT PATHOLOGICAL FRACTURE: ICD-10-CM

## 2017-08-28 DIAGNOSIS — K21.00 GASTROESOPHAGEAL REFLUX DISEASE WITH ESOPHAGITIS: ICD-10-CM

## 2017-08-28 DIAGNOSIS — Z23 NEED FOR TDAP VACCINATION: ICD-10-CM

## 2017-08-28 DIAGNOSIS — H53.9 VISION CHANGES: ICD-10-CM

## 2017-08-28 DIAGNOSIS — Z23 NEED FOR PNEUMOCOCCAL VACCINE: ICD-10-CM

## 2017-08-28 PROCEDURE — G0009 ADMIN PNEUMOCOCCAL VACCINE: HCPCS | Mod: 59 | Performed by: INTERNAL MEDICINE

## 2017-08-28 PROCEDURE — 90471 IMMUNIZATION ADMIN: CPT | Performed by: INTERNAL MEDICINE

## 2017-08-28 PROCEDURE — 90715 TDAP VACCINE 7 YRS/> IM: CPT | Performed by: INTERNAL MEDICINE

## 2017-08-28 PROCEDURE — 99214 OFFICE O/P EST MOD 30 MIN: CPT | Mod: 25 | Performed by: INTERNAL MEDICINE

## 2017-08-28 PROCEDURE — 90732 PPSV23 VACC 2 YRS+ SUBQ/IM: CPT | Performed by: INTERNAL MEDICINE

## 2017-08-28 RX ORDER — ESOMEPRAZOLE MAGNESIUM 40 MG/1
40 CAPSULE, DELAYED RELEASE ORAL
Qty: 90 CAPSULE | Refills: 3 | Status: SHIPPED | OUTPATIENT
Start: 2017-08-28 | End: 2018-02-15

## 2017-08-28 RX ORDER — TRIAMTERENE/HYDROCHLOROTHIAZID 37.5-25 MG
1 TABLET ORAL DAILY
Qty: 90 TABLET | Refills: 3 | Status: SHIPPED | OUTPATIENT
Start: 2017-08-28 | End: 2018-05-18

## 2017-08-28 RX ORDER — CALCITONIN SALMON 200 [IU]/.09ML
1 SPRAY, METERED NASAL DAILY
Qty: 3 BOTTLE | Refills: 3 | Status: SHIPPED | OUTPATIENT
Start: 2017-08-28 | End: 2018-05-18

## 2017-08-28 NOTE — PROGRESS NOTES
SUBJECTIVE:   Cynthia Arita is a 72 year old female who presents to clinic today for the following health issues:      Hypertension Follow-up      Outpatient blood pressures are not being checked.    Low Salt Diet: low salt      Amount of exercise or physical activity: 2-3 days/week for an average of 15-30 minutes    Problems taking medications regularly: Yes,  side effects    Medication side effects: Stopped Metoprolol about 2 weeks ago due to severe nausea.  Diet: low salt      HPI:   She is feeling better. Nausea is gone. lightheadedness is at baseline. Energy is good. She is back to exercising.     She needs her glasses checked but does not know who to go to. Wants to know what group I recommend.     Her GERD has been under good control. She is using the Nexium regularly.    She needs her Calcitonin refilled. She has no side effects on it.    Problem list and histories reviewed & adjusted, as indicated.  Additional history: as documented    Patient Active Problem List   Diagnosis     Esophageal reflux     Osteoporosis     Meniere's disease     Calculus of right kidney     Pneumonia of right upper lobe due to infectious organism (H)     Anxiety     Essential hypertension     Stress     No past surgical history on file.    Social History   Substance Use Topics     Smoking status: Never Smoker     Smokeless tobacco: Never Used     Alcohol use Yes     Family History   Problem Relation Age of Onset     Neurologic Disorder Mother      palsy     Esophageal Cancer Father      CANCER Maternal Grandmother      lymph     DIABETES Paternal Grandmother      Neurologic Disorder Sister      Parkinson's     Hypertension Brother      Bipolar Disorder Sister      Brain Cancer Son 17             Reviewed and updated as needed this visit by clinical staffTobacco  Meds       Reviewed and updated as needed this visit by Provider         ROS:  Constitutional, HEENT, cardiovascular, pulmonary, GI, , musculoskeletal, neuro, skin,  "endocrine and psych systems are negative, except as otherwise noted.      OBJECTIVE:   /72 (BP Location: Left arm, Patient Position: Chair, Cuff Size: Adult Regular)  Pulse 80  Temp 98.6  F (37  C) (Oral)  Ht 5' 3\" (1.6 m)  Wt 131 lb 3.2 oz (59.5 kg)  SpO2 97%  Breastfeeding? No  BMI 23.24 kg/m2  Body mass index is 23.24 kg/(m^2).  GENERAL: healthy, alert and no distress  NECK: no adenopathy, no asymmetry, masses, or scars and thyroid normal to palpation  RESP: lungs clear to auscultation - no rales, rhonchi or wheezes  CV: regular rate and rhythm, normal S1 S2, no S3 or S4, no murmur, click or rub, no peripheral edema and peripheral pulses strong  ABDOMEN: soft, nontender, no hepatosplenomegaly, no masses and bowel sounds normal  MS: no gross musculoskeletal defects noted, no edema  PSYCH: mentation appears normal, affect normal/bright        ASSESSMENT/PLAN:       1. Essential hypertension  under good control Continue current medications. Follow up in 6 months   - triamterene-hydrochlorothiazide (MAXZIDE-25) 37.5-25 MG per tablet; Take 1 tablet by mouth daily  Dispense: 90 tablet; Refill: 3    2. Meniere's disease, bilateral  stable    3. Vision changes  Will refer to   - OPHTHALMOLOGY ADULT REFERRAL    4. Gastroesophageal reflux disease with esophagitis  Continue current medications. under good control   - esomeprazole (NEXIUM) 40 MG CR capsule; Take 1 capsule (40 mg) by mouth daily (with dinner)  Dispense: 90 capsule; Refill: 3    5. Age-related osteoporosis without current pathological fracture  Continue current medications.   - calcitonin, salmon, (MIACALCIN) 200 UNIT/ACT nasal spray; Spray 1 spray into one nostril alternating nostrils daily Alternate nostril each day.  Dispense: 3 Bottle; Refill: 3    6. Need for pneumococcal vaccine     - PNEUMOCOCCAL VACCINE,ADULT,SQ OR IM    7. Need for Tdap vaccination     - TDAP VACCINE (ADACEL)      Huyen Samuels MD  [facility]  "

## 2017-08-28 NOTE — NURSING NOTE
Screening Questionnaire for Adult Immunization    Are you sick today?   No   Do you have allergies to medications, food, a vaccine component or latex?   No   Have you ever had a serious reaction after receiving a vaccination?   No   Do you have a long-term health problem with heart disease, lung disease, asthma, kidney disease, metabolic disease (e.g. diabetes), anemia, or other blood disorder?   No   Do you have cancer, leukemia, HIV/AIDS, or any other immune system problem?   No   In the past 3 months, have you taken medications that affect  your immune system, such as prednisone, other steroids, or anticancer drugs; drugs for the treatment of rheumatoid arthritis, Crohn s disease, or psoriasis; or have you had radiation treatments?   No   Have you had a seizure, or a brain or other nervous system problem?   No   During the past year, have you received a transfusion of blood or blood     products, or been given immune (gamma) globulin or antiviral drug?   No   For women: Are you pregnant or is there a chance you could become        pregnant during the next month?   No   Have you received any vaccinations in the past 4 weeks?   No     Immunization questionnaire answers were all negative.        Per orders of Dr. Samuels, injection of  Tdap and Pneumo 23 given by Kylah Gray. Patient instructed to remain in clinic for 15 minutes afterwards, and to report any adverse reaction to me immediately.       Screening performed by Kylah Gray on 8/28/2017 at 9:54 AM.

## 2017-08-28 NOTE — MR AVS SNAPSHOT
After Visit Summary   8/28/2017    Cynthia Arita    MRN: [id number]           Patient Information     Date Of Birth          1945        Visit Information        Provider Department      8/28/2017 9:00 AM Huyen Samuels MD [facility]        Today's Diagnoses     Need for pneumococcal vaccine    -  1    Gastroesophageal reflux disease with esophagitis        Need for Tdap vaccination        Vision changes        Essential hypertension        Age-related osteoporosis without current pathological fracture           Follow-ups after your visit        Additional Services     OPHTHALMOLOGY ADULT REFERRAL       Your provider has referred you to: N: [facility]  [phone]  [email or web address]    Please be aware that coverage of these services is subject to the terms and limitations of your health insurance plan.  Call member services at your health plan with any benefit or coverage questions.      Please bring the following with you to your appointment:    (1) Any X-Rays, CTs or MRIs which have been performed.  Contact the facility where they were done to arrange for  prior to your scheduled appointment.    (2) List of current medications  (3) This referral request   (4) Any documents/labs given to you for this referral                  Who to contact     If you have questions or need follow up information about today's clinic visit or your schedule please contact [facility] directly at [phone].  Normal or non-critical lab and imaging results will be communicated to you by MyChart, letter or phone within 4 business days after the clinic has received the results. If you do not hear from us within 7 days, please contact the clinic through MyChart or phone. If you have a critical or abnormal lab result, we will notify you by phone as soon as possible.  Submit refill requests through [employer] or call your  "pharmacy and they will forward the refill request to us. Please allow 3 business days for your refill to be completed.          Additional Information About Your Visit        MyChart Information     [employer] lets you send messages to your doctor, view your test results, renew your prescriptions, schedule appointments and more. To sign up, go to [email or web address] . Click on \"Log in\" on the left side of the screen, which will take you to the Welcome page. Then click on \"Sign up Now\" on the right side of the page.     You will be asked to enter the access code listed below, as well as some personal information. Please follow the directions to create your username and password.     Your access code is: [email or web address]  Expires: 10/19/2017  3:13 PM     Your access code will  in 90 days. If you need help or a new code, please call your [place] clinic or [phone].        Care EveryWhere ID     This is your [facility] EveryWhere ID. This could be used by other organizations to access your [place] medical records  [id number]        Your Vitals Were     Pulse Temperature Height Pulse Oximetry Breastfeeding? BMI (Body Mass Index)    80 98.6  F (37  C) (Oral) 5' 3\" (1.6 m) 97% No 23.24 kg/m2       Blood Pressure from Last 3 Encounters:   17 118/72   17 104/60   17 120/76    Weight from Last 3 Encounters:   17 131 lb 3.2 oz (59.5 kg)   17 133 lb (60.3 kg)   17 131 lb 14.4 oz (59.8 kg)              We Performed the Following     OPHTHALMOLOGY ADULT REFERRAL     PNEUMOCOCCAL CONJ VACCINE 13 VALENT IM     TDAP VACCINE (ADACEL)          Today's Medication Changes          These changes are accurate as of: 17  9:40 AM.  If you have any questions, ask your nurse or doctor.               Stop taking these medicines if you haven't already. Please contact your care team if you have questions.     metoprolol 50 MG 24 hr tablet   Commonly known as:  TOPROL XL   Stopped by:  Huyen Samuels " MD Julissa                Where to get your medicines      These medications were sent to [facility] - [address]  [address] [facility] [address], [facility] [address]     Phone:  [phone]     calcitonin (salmon) 200 UNIT/ACT nasal spray    esomeprazole 40 MG CR capsule    triamterene-hydrochlorothiazide 37.5-25 MG per tablet                Primary Care Provider Office Phone # Fax #    Huyen Samuels -[phone]       [address]  [facility] [address]        Equal Access to Services     [facility]: Hadii aad ku hadasho Soomaali, waaxda luqadaha, qaybta kaalmada adeegyada, waxay sagarin hayaan adeeg wilberto lyons . So [facility] [phone].    ATENCIÓN: Si habla español, tiene a moralez disposición servicios gratuitos de asistencia lingüística. [facility] [phone].    We comply with applicable federal civil rights laws and [place] laws. We do not discriminate on the basis of race, color, national origin, age, disability sex, sexual orientation or gender identity.            Thank you!     Thank you for choosing [facility]  for your care. Our goal is always to provide you with excellent care. Hearing back from our patients is one way we can continue to improve our services. Please take a few minutes to complete the written survey that you may receive in the mail after your visit with us. Thank you!             Your Updated Medication List - Protect others around you: Learn how to safely use, store and throw away your medicines at www.disposemymeds.org.          This list is accurate as of: 8/28/17  9:40 AM.  Always use your most recent med list.                   Brand Name Dispense Instructions for use Diagnosis    ALPRAZolam 0.25 MG tablet    XANAX     Take 1 tablet (0.25 mg) by mouth 3 times daily as needed for anxiety        ANTIVERT PO      Take 25 mg by mouth every 6 hours as needed for dizziness        calcitonin  (salmon) 200 UNIT/ACT nasal spray    MIACALCIN    3 Bottle    Spray 1 spray into one nostril alternating nostrils daily Alternate nostril each day.    Age-related osteoporosis without current pathological fracture       * CENTRUM SILVER per tablet      Take 1 tablet by mouth daily        * OCUVITE PRESERVISION PO      Take 1 tablet by mouth 2 times daily        esomeprazole 40 MG CR capsule    nexIUM    90 capsule    Take 1 capsule (40 mg) by mouth daily (with dinner)    Gastroesophageal reflux disease with esophagitis       fish oil-omega-3 fatty acids 1000 MG capsule      Take 1,000 mg by mouth 2 times daily        flaxseed oil 1000 MG Caps      Take 1,000 mg by mouth every evening        GLUCOSAMINE CHONDR COMPLEX PO      Take 1 capsule by mouth 2 times daily        ondansetron 4 MG ODT tab    ZOFRAN ODT    120 tablet    Take 1 tablet (4 mg) by mouth every 8 hours as needed for nausea    Nausea       triamterene-hydrochlorothiazide 37.5-25 MG per tablet    MAXZIDE-25    90 tablet    Take 1 tablet by mouth daily    Essential hypertension       vitamin D3 2000 UNITS Caps      Take 2,000 Units by mouth every morning        * Notice:  This list has 2 medication(s) that are the same as other medications prescribed for you. Read the directions carefully, and ask your doctor or other care provider to review them with you.

## 2017-08-28 NOTE — NURSING NOTE
"  Chief Complaint   Patient presents with     RECHECK     Hypertension       Initial /72 (BP Location: Left arm, Patient Position: Chair, Cuff Size: Adult Regular)  Pulse 80  Temp 98.6  F (37  C) (Oral)  Ht 5' 3\" (1.6 m)  Wt 131 lb 3.2 oz (59.5 kg)  SpO2 97%  Breastfeeding? No  BMI 23.24 kg/m2 Estimated body mass index is 23.24 kg/(m^2) as calculated from the following:    Height as of this encounter: 5' 3\" (1.6 m).    Weight as of this encounter: 131 lb 3.2 oz (59.5 kg).  Medication Reconciliation: complete    "

## 2017-09-20 ENCOUNTER — TRANSFERRED RECORDS (OUTPATIENT)
Dept: HEALTH INFORMATION MANAGEMENT | Facility: CLINIC | Age: 72
End: 2017-09-20

## 2017-10-02 ENCOUNTER — OFFICE VISIT (OUTPATIENT)
Dept: INTERNAL MEDICINE | Facility: CLINIC | Age: 72
End: 2017-10-02
Payer: MEDICARE

## 2017-10-02 VITALS
DIASTOLIC BLOOD PRESSURE: 60 MMHG | HEART RATE: 71 BPM | SYSTOLIC BLOOD PRESSURE: 120 MMHG | OXYGEN SATURATION: 96 % | HEIGHT: 63 IN | BODY MASS INDEX: 22.86 KG/M2 | WEIGHT: 129 LBS | TEMPERATURE: 98.2 F

## 2017-10-02 DIAGNOSIS — R35.0 URINARY FREQUENCY: Primary | ICD-10-CM

## 2017-10-02 LAB
ALBUMIN UR-MCNC: NEGATIVE MG/DL
APPEARANCE UR: CLEAR
BACTERIA #/AREA URNS HPF: ABNORMAL /HPF
BILIRUB UR QL STRIP: NEGATIVE
COLOR UR AUTO: YELLOW
GLUCOSE UR STRIP-MCNC: NEGATIVE MG/DL
HGB UR QL STRIP: NEGATIVE
KETONES UR STRIP-MCNC: NEGATIVE MG/DL
LEUKOCYTE ESTERASE UR QL STRIP: ABNORMAL
MUCOUS THREADS #/AREA URNS LPF: PRESENT /LPF
NITRATE UR QL: NEGATIVE
NON-SQ EPI CELLS #/AREA URNS LPF: ABNORMAL /LPF
PH UR STRIP: 7 PH (ref 5–7)
RBC #/AREA URNS AUTO: ABNORMAL /HPF
SOURCE: ABNORMAL
SP GR UR STRIP: 1.01 (ref 1–1.03)
UROBILINOGEN UR STRIP-ACNC: 0.2 EU/DL (ref 0.2–1)
WBC #/AREA URNS AUTO: ABNORMAL /HPF

## 2017-10-02 PROCEDURE — 81001 URINALYSIS AUTO W/SCOPE: CPT | Performed by: FAMILY MEDICINE

## 2017-10-02 PROCEDURE — 99213 OFFICE O/P EST LOW 20 MIN: CPT | Performed by: FAMILY MEDICINE

## 2017-10-02 PROCEDURE — 87086 URINE CULTURE/COLONY COUNT: CPT | Performed by: FAMILY MEDICINE

## 2017-10-02 RX ORDER — CIPROFLOXACIN 500 MG/1
500 TABLET, FILM COATED ORAL 2 TIMES DAILY
Qty: 6 TABLET | Refills: 0 | Status: SHIPPED | OUTPATIENT
Start: 2017-10-02 | End: 2017-11-16

## 2017-10-02 NOTE — PROGRESS NOTES
SUBJECTIVE:   Cynthia Arita is a 72 year old female who  presents today for a possible UTI. Symptoms of dysuria and frequency have been going on for 2day(s).  Hematuria no.  sudden onset and moderate.  There is no history of fever, chills, nausea or vomiting.  No history of vaginal or penile discharge. This patient does have a history of urinary tract infections. Patient denies long duration, rigors and flank pain.    Past Medical History:   Diagnosis Date     Diverticulosis      GERD (gastroesophageal reflux disease)      HTN (hypertension)      Meniere's disease      Nephrolithiasis      Current Outpatient Prescriptions   Medication Sig Dispense Refill     ciprofloxacin (CIPRO) 500 MG tablet Take 1 tablet (500 mg) by mouth 2 times daily 6 tablet 0     esomeprazole (NEXIUM) 40 MG CR capsule Take 1 capsule (40 mg) by mouth daily (with dinner) 90 capsule 3     triamterene-hydrochlorothiazide (MAXZIDE-25) 37.5-25 MG per tablet Take 1 tablet by mouth daily 90 tablet 3     calcitonin, salmon, (MIACALCIN) 200 UNIT/ACT nasal spray Spray 1 spray into one nostril alternating nostrils daily Alternate nostril each day. 3 Bottle 3     ALPRAZolam (XANAX) 0.25 MG tablet Take 1 tablet (0.25 mg) by mouth 3 times daily as needed for anxiety       ondansetron (ZOFRAN ODT) 4 MG ODT tab Take 1 tablet (4 mg) by mouth every 8 hours as needed for nausea 120 tablet 1     Meclizine HCl (ANTIVERT PO) Take 25 mg by mouth every 6 hours as needed for dizziness       Multiple Vitamins-Minerals (CENTRUM SILVER) per tablet Take 1 tablet by mouth daily       Glucosamine-Chondroitin (GLUCOSAMINE CHONDR COMPLEX PO) Take 1 capsule by mouth 2 times daily        Cholecalciferol (VITAMIN D3) 2000 UNITS CAPS Take 2,000 Units by mouth every morning        Multiple Vitamins-Minerals (OCUVITE PRESERVISION PO) Take 1 tablet by mouth 2 times daily        Omega-3 Fatty Acids (OMEGA-3 FISH OIL) 1000 MG CAPS Take 1,000 mg by mouth 2 times daily        Flaxseed,  "Linseed, (FLAXSEED OIL) 1000 MG CAPS Take 1,000 mg by mouth every evening        Social History   Substance Use Topics     Smoking status: Never Smoker     Smokeless tobacco: Never Used     Alcohol use Yes     ROS:   CONSTITUTIONAL:NEGATIVE for fever, chills, change in weight  INTEGUMENTARY/SKIN: NEGATIVE for worrisome rashes, moles or lesions    OBJECTIVE:  /60  Pulse 71  Temp 98.2  F (36.8  C) (Oral)  Ht 5' 3\" (1.6 m)  Wt 129 lb (58.5 kg)  SpO2 96%  BMI 22.85 kg/m2  GENERAL APPEARANCE: healthy, alert and no distress  RESP: lungs clear to auscultation - no rales, rhonchi or wheezes  CV: regular rates and rhythm, normal S1 S2, no murmur noted  ABDOMEN:  soft, nontender, no HSM or masses and bowel sounds normal  BACK: No CVA tenderness  SKIN: no suspicious lesions or rashes    ASSESSMENT:   Lower, uncomplicated urinary tract infection.    PLAN:  As per ordered above  Drink plenty of fluids.  Prevention and treatment of UTI's discussed.Signs and symptoms of pyelonephritis mentioned.  Follow up with primary care physician if not improving    "

## 2017-10-02 NOTE — NURSING NOTE
"Chief Complaint   Patient presents with     UTI   symotoms onset 4-5 days ago , increase in urinary frquency , pellvic presure , no noted fevers    Initial /60  Pulse 71  Temp 98.2  F (36.8  C) (Oral)  Ht 5' 3\" (1.6 m)  Wt 129 lb (58.5 kg)  SpO2 96%  BMI 22.85 kg/m2 Estimated body mass index is 22.85 kg/(m^2) as calculated from the following:    Height as of this encounter: 5' 3\" (1.6 m).    Weight as of this encounter: 129 lb (58.5 kg).  Medication Reconciliation: complete    "

## 2017-10-02 NOTE — MR AVS SNAPSHOT
"              After Visit Summary   10/2/2017    Cynthia Arita    MRN: [id number]           Patient Information     Date Of Birth          1945        Visit Information        Provider Department      10/2/2017 3:40 PM Stan Solis MD [facility]        Today's Diagnoses     Urinary frequency    -  1       Follow-ups after your visit        Who to contact     If you have questions or need follow up information about today's clinic visit or your schedule please contact [facility] directly at [phone].  Normal or non-critical lab and imaging results will be communicated to you by MyChart, letter or phone within 4 business days after the clinic has received the results. If you do not hear from us within 7 days, please contact the clinic through [employer] or phone. If you have a critical or abnormal lab result, we will notify you by phone as soon as possible.  Submit refill requests through [employer] or call your pharmacy and they will forward the refill request to us. Please allow 3 business days for your refill to be completed.          Additional Information About Your Visit        MyChart Information     [employer] lets you send messages to your doctor, view your test results, renew your prescriptions, schedule appointments and more. To sign up, go to [email or web address] . Click on \"Log in\" on the left side of the screen, which will take you to the Welcome page. Then click on \"Sign up Now\" on the right side of the page.     You will be asked to enter the access code listed below, as well as some personal information. Please follow the directions to create your username and password.     Your access code is: [email or web address]  Expires: 10/19/2017  3:13 PM     Your access code will  in 90 days. If you need help or a new code, please call your [facility] or [phone].        Care EveryWhere ID     This is your Care EveryWhere ID. This could be used by other " "organizations to access your [place] medical records  [id number]        Your Vitals Were     Pulse Temperature Height Pulse Oximetry BMI (Body Mass Index)       71 98.2  F (36.8  C) (Oral) 5' 3\" (1.6 m) 96% 22.85 kg/m2        Blood Pressure from Last 3 Encounters:   10/02/17 120/60   08/28/17 118/72   07/31/17 104/60    Weight from Last 3 Encounters:   10/02/17 129 lb (58.5 kg)   08/28/17 131 lb 3.2 oz (59.5 kg)   07/31/17 133 lb (60.3 kg)              We Performed the Following     *UA reflex to Microscopic and Culture ([place] and [facility] (except [place] and [employer])     Urine Culture Aerobic Bacterial     Urine Microscopic          Today's Medication Changes          These changes are accurate as of: 10/2/17  6:03 PM.  If you have any questions, ask your nurse or doctor.               Start taking these medicines.        Dose/Directions    ciprofloxacin 500 MG tablet   Commonly known as:  CIPRO   Used for:  Urinary frequency   Started by:  Stan Solis MD        Dose:  500 mg   Take 1 tablet (500 mg) by mouth 2 times daily   Quantity:  6 tablet   Refills:  0            Where to get your medicines      These medications were sent to [facility] [address] [place] [id number]    Hours:  24-hours Phone:  [phone]     ciprofloxacin 500 MG tablet                Primary Care Provider Office Phone # Fax #    Huyen Samuels [phone]       [address]  [place] [address]        Equal Access to Services     [facility]: Hadmarie Eric, waaxda luqadaha, qaybta kaallilliana moody. So [facility] [phone].    ATENCIÓN: Si habla español, tiene a moralez disposición servicios gratuitos de asistencia lingüística. Vanessa al [phone].    We comply with applicable federal civil rights laws and [place] laws. We do " not discriminate on the basis of race, color, national origin, age, disability, sex, sexual orientation, or gender identity.            Thank you!     Thank you for choosing [facility]  for your care. Our goal is always to provide you with excellent care. Hearing back from our patients is one way we can continue to improve our services. Please take a few minutes to complete the written survey that you may receive in the mail after your visit with us. Thank you!             Your Updated Medication List - Protect others around you: Learn how to safely use, store and throw away your medicines at www.disposemymeds.org.          This list is accurate as of: 10/2/17  6:03 PM.  Always use your most recent med list.                   Brand Name Dispense Instructions for use Diagnosis    ALPRAZolam 0.25 MG tablet    XANAX     Take 1 tablet (0.25 mg) by mouth 3 times daily as needed for anxiety        ANTIVERT PO      Take 25 mg by mouth every 6 hours as needed for dizziness        calcitonin (salmon) 200 UNIT/ACT nasal spray    MIACALCIN    3 Bottle    Spray 1 spray into one nostril alternating nostrils daily Alternate nostril each day.    Age-related osteoporosis without current pathological fracture       * CENTRUM SILVER per tablet      Take 1 tablet by mouth daily        * OCUVITE PRESERVISION PO      Take 1 tablet by mouth 2 times daily        ciprofloxacin 500 MG tablet    CIPRO    6 tablet    Take 1 tablet (500 mg) by mouth 2 times daily    Urinary frequency       esomeprazole 40 MG CR capsule    nexIUM    90 capsule    Take 1 capsule (40 mg) by mouth daily (with dinner)    Gastroesophageal reflux disease with esophagitis       fish oil-omega-3 fatty acids 1000 MG capsule      Take 1,000 mg by mouth 2 times daily        flaxseed oil 1000 MG Caps      Take 1,000 mg by mouth every evening        GLUCOSAMINE CHONDR COMPLEX PO      Take 1 capsule by mouth 2 times daily        ondansetron 4 MG ODT tab     ZOFRAN ODT    120 tablet    Take 1 tablet (4 mg) by mouth every 8 hours as needed for nausea    Nausea       triamterene-hydrochlorothiazide 37.5-25 MG per tablet    MAXZIDE-25    90 tablet    Take 1 tablet by mouth daily    Essential hypertension       vitamin D3 2000 UNITS Caps      Take 2,000 Units by mouth every morning        * Notice:  This list has 2 medication(s) that are the same as other medications prescribed for you. Read the directions carefully, and ask your doctor or other care provider to review them with you.

## 2017-10-04 LAB
BACTERIA SPEC CULT: NORMAL
SPECIMEN SOURCE: NORMAL

## 2017-10-09 ENCOUNTER — ALLIED HEALTH/NURSE VISIT (OUTPATIENT)
Dept: NURSING | Facility: CLINIC | Age: 72
End: 2017-10-09
Payer: MEDICARE

## 2017-10-09 ENCOUNTER — TELEPHONE (OUTPATIENT)
Dept: INTERNAL MEDICINE | Facility: CLINIC | Age: 72
End: 2017-10-09

## 2017-10-09 DIAGNOSIS — R10.2 PELVIC PRESSURE IN FEMALE: ICD-10-CM

## 2017-10-09 DIAGNOSIS — R35.0 URINARY FREQUENCY: Primary | ICD-10-CM

## 2017-10-09 LAB
ALBUMIN UR-MCNC: NEGATIVE MG/DL
APPEARANCE UR: CLEAR
BILIRUB UR QL STRIP: NEGATIVE
COLOR UR AUTO: YELLOW
GLUCOSE UR STRIP-MCNC: NEGATIVE MG/DL
HGB UR QL STRIP: NEGATIVE
KETONES UR STRIP-MCNC: NEGATIVE MG/DL
LEUKOCYTE ESTERASE UR QL STRIP: NEGATIVE
NITRATE UR QL: NEGATIVE
PH UR STRIP: 7 PH (ref 5–7)
SOURCE: NORMAL
SP GR UR STRIP: 1.02 (ref 1–1.03)
UROBILINOGEN UR STRIP-ACNC: 0.2 EU/DL (ref 0.2–1)

## 2017-10-09 PROCEDURE — 99207 ZZC NO CHARGE NURSE ONLY: CPT

## 2017-10-09 PROCEDURE — 81003 URINALYSIS AUTO W/O SCOPE: CPT | Performed by: FAMILY MEDICINE

## 2017-10-09 NOTE — NURSING NOTE
Pt has intense pelvic pressure, worse when her bladder is full.  She was in for the same thing last week and initially her urine was questionable, so Dr Solis gave her a 3 day course of Cipro.  But the culture grew out mixed ochoa.    Her urine was checked again, but this time it was completely normal.  Dr Samuels recommended that she see Urology, so a Urology referral was placed and given to her.      Even after she emptied her bladder she was still feeling cramping and pressure.

## 2017-10-09 NOTE — MR AVS SNAPSHOT
After Visit Summary   10/9/2017    Cynthia Arita    MRN: [id number]           Patient Information     Date Of Birth          1945        Visit Information        Provider Department      10/9/2017 1:00 PM Rn, Ri [facility]        Today's Diagnoses     Urinary frequency    -  1    Pelvic pressure in female          Care Instructions    Given referral for Urology.          Follow-ups after your visit        Additional Services     UROLOGY ADULT REFERRAL       Your provider has referred you to: [facility]: [facility] [place] [phone]   [email or web address]    Please be aware that coverage of these services is subject to the terms and limitations of your health insurance plan.  Call member services at your health plan with any benefit or coverage questions.      Please bring the following with you to your appointment:    (1) Any X-Rays, CTs or MRIs which have been performed.  Contact the facility where they were done to arrange for  prior to your scheduled appointment.    (2) List of current medications  (3) This referral request   (4) Any documents/labs given to you for this referral                  Who to contact     If you have questions or need follow up information about today's clinic visit or your schedule please contact [facility] directly at [phone].  Normal or non-critical lab and imaging results will be communicated to you by MyChart, letter or phone within 4 business days after the clinic has received the results. If you do not hear from us within 7 days, please contact the clinic through MyChart or phone. If you have a critical or abnormal lab result, we will notify you by phone as soon as possible.  Submit refill requests through [employer] or call your pharmacy and they will forward the refill request to us. Please allow 3 business days for your refill to be completed.          Additional Information About Your  "Visit        [employer] Information     [employer] lets you send messages to your doctor, view your test results, renew your prescriptions, schedule appointments and more. To sign up, go to [email or web address] . Click on \"Log in\" on the left side of the screen, which will take you to the Welcome page. Then click on \"Sign up Now\" on the right side of the page.     You will be asked to enter the access code listed below, as well as some personal information. Please follow the directions to create your username and password.     Your access code is: [email or web address]  Expires: 10/19/2017  3:13 PM     Your access code will  in 90 days. If you need help or a new code, please call your [facility] clinic or [phone].        Care EveryWhere ID     This is your Care EveryWhere ID. This could be used by other organizations to access your [facility] medical records  [id number]         Blood Pressure from Last 3 Encounters:   10/02/17 120/60   17 118/72   17 104/60    Weight from Last 3 Encounters:   10/02/17 129 lb (58.5 kg)   17 131 lb 3.2 oz (59.5 kg)   17 133 lb (60.3 kg)              We Performed the Following     **UA reflex to Microscopic FUTURE anytime     UROLOGY ADULT REFERRAL        Primary Care Provider Office Phone # Fax #    Huyen Samuels [phone]       [address] [address]        Equal Access to Services     [facility] : Hadii aad ku hadasho Soomaali, waaxda luqadaha, qaybta kaalmada adeegyada, lilliana lyons . So [facility] [phone].    ATENCIÓN: Si habla español, tiene a moralez disposición servicios gratuitos de asistencia lingüística. Vanessa al [phone].    We comply with applicable federal civil rights laws and [place] laws. We do not discriminate on the basis of race, color, national origin, age, disability, sex, sexual orientation, or gender identity.            Thank you!     Thank you for choosing " [facility]  for your care. Our goal is always to provide you with excellent care. Hearing back from our patients is one way we can continue to improve our services. Please take a few minutes to complete the written survey that you may receive in the mail after your visit with us. Thank you!             Your Updated Medication List - Protect others around you: Learn how to safely use, store and throw away your medicines at www.disposemymeds.org.          This list is accurate as of: 10/9/17  1:30 PM.  Always use your most recent med list.                   Brand Name Dispense Instructions for use Diagnosis    ALPRAZolam 0.25 MG tablet    XANAX     Take 1 tablet (0.25 mg) by mouth 3 times daily as needed for anxiety        ANTIVERT PO      Take 25 mg by mouth every 6 hours as needed for dizziness        calcitonin (salmon) 200 UNIT/ACT nasal spray    MIACALCIN    3 Bottle    Spray 1 spray into one nostril alternating nostrils daily Alternate nostril each day.    Age-related osteoporosis without current pathological fracture       * CENTRUM SILVER per tablet      Take 1 tablet by mouth daily        * OCUVITE PRESERVISION PO      Take 1 tablet by mouth 2 times daily        ciprofloxacin 500 MG tablet    CIPRO    6 tablet    Take 1 tablet (500 mg) by mouth 2 times daily    Urinary frequency       esomeprazole 40 MG CR capsule    nexIUM    90 capsule    Take 1 capsule (40 mg) by mouth daily (with dinner)    Gastroesophageal reflux disease with esophagitis       fish oil-omega-3 fatty acids 1000 MG capsule      Take 1,000 mg by mouth 2 times daily        flaxseed oil 1000 MG Caps      Take 1,000 mg by mouth every evening        GLUCOSAMINE CHONDR COMPLEX PO      Take 1 capsule by mouth 2 times daily        ondansetron 4 MG ODT tab    ZOFRAN ODT    120 tablet    Take 1 tablet (4 mg) by mouth every 8 hours as needed for nausea    Nausea       triamterene-hydrochlorothiazide 37.5-25 MG per tablet     MAXZIDE-25    90 tablet    Take 1 tablet by mouth daily    Essential hypertension       vitamin D3 2000 UNITS Caps      Take 2,000 Units by mouth every morning        * Notice:  This list has 2 medication(s) that are the same as other medications prescribed for you. Read the directions carefully, and ask your doctor or other care provider to review them with you.

## 2017-10-11 ENCOUNTER — ALLIED HEALTH/NURSE VISIT (OUTPATIENT)
Dept: NURSING | Facility: CLINIC | Age: 72
End: 2017-10-11

## 2017-10-11 ENCOUNTER — HOSPITAL ENCOUNTER (EMERGENCY)
Facility: CLINIC | Age: 72
Discharge: HOME | End: 2017-10-11
Attending: EMERGENCY MEDICINE | Admitting: EMERGENCY MEDICINE
Payer: MEDICARE

## 2017-10-11 ENCOUNTER — APPOINTMENT (OUTPATIENT)
Dept: CT IMAGING | Facility: CLINIC | Age: 72
End: 2017-10-11
Attending: EMERGENCY MEDICINE
Payer: MEDICARE

## 2017-10-11 VITALS
BODY MASS INDEX: 22.68 KG/M2 | DIASTOLIC BLOOD PRESSURE: 87 MMHG | SYSTOLIC BLOOD PRESSURE: 154 MMHG | RESPIRATION RATE: 16 BRPM | HEART RATE: 74 BPM | TEMPERATURE: 99 F | HEIGHT: 63 IN | WEIGHT: 128 LBS | OXYGEN SATURATION: 99 %

## 2017-10-11 DIAGNOSIS — Z53.9 DIAGNOSIS FOR ++++ WALK IN CLINIC ++++: Primary | ICD-10-CM

## 2017-10-11 DIAGNOSIS — R10.2 PELVIC PAIN IN FEMALE: ICD-10-CM

## 2017-10-11 LAB
ALBUMIN SERPL-MCNC: 3.9 G/DL (ref 3.4–5)
ALBUMIN UR-MCNC: NEGATIVE MG/DL
ALP SERPL-CCNC: 76 U/L (ref 40–150)
ALT SERPL W P-5'-P-CCNC: 29 U/L (ref 0–50)
ANION GAP SERPL CALCULATED.3IONS-SCNC: 6 MMOL/L (ref 3–14)
APPEARANCE UR: ABNORMAL
AST SERPL W P-5'-P-CCNC: 22 U/L (ref 0–45)
BASOPHILS # BLD AUTO: 0 10E9/L (ref 0–0.2)
BASOPHILS NFR BLD AUTO: 0.6 %
BILIRUB SERPL-MCNC: 0.4 MG/DL (ref 0.2–1.3)
BILIRUB UR QL STRIP: NEGATIVE
BUN SERPL-MCNC: 18 MG/DL (ref 7–30)
CALCIUM SERPL-MCNC: 9.4 MG/DL (ref 8.5–10.1)
CHLORIDE SERPL-SCNC: 103 MMOL/L (ref 94–109)
CO2 SERPL-SCNC: 28 MMOL/L (ref 20–32)
COLOR UR AUTO: YELLOW
CREAT SERPL-MCNC: 0.63 MG/DL (ref 0.52–1.04)
DIFFERENTIAL METHOD BLD: NORMAL
EOSINOPHIL # BLD AUTO: 0.1 10E9/L (ref 0–0.7)
EOSINOPHIL NFR BLD AUTO: 1 %
ERYTHROCYTE [DISTWIDTH] IN BLOOD BY AUTOMATED COUNT: 13.5 % (ref 10–15)
GFR SERPL CREATININE-BSD FRML MDRD: >90 ML/MIN/1.7M2
GLUCOSE SERPL-MCNC: 108 MG/DL (ref 70–99)
GLUCOSE UR STRIP-MCNC: NEGATIVE MG/DL
HCT VFR BLD AUTO: 46.2 % (ref 35–47)
HGB BLD-MCNC: 15.1 G/DL (ref 11.7–15.7)
HGB UR QL STRIP: NEGATIVE
IMM GRANULOCYTES # BLD: 0 10E9/L (ref 0–0.4)
IMM GRANULOCYTES NFR BLD: 0.3 %
KETONES UR STRIP-MCNC: NEGATIVE MG/DL
LEUKOCYTE ESTERASE UR QL STRIP: NEGATIVE
LIPASE SERPL-CCNC: 221 U/L (ref 73–393)
LYMPHOCYTES # BLD AUTO: 1.6 10E9/L (ref 0.8–5.3)
LYMPHOCYTES NFR BLD AUTO: 23.3 %
MCH RBC QN AUTO: 29.6 PG (ref 26.5–33)
MCHC RBC AUTO-ENTMCNC: 32.7 G/DL (ref 31.5–36.5)
MCV RBC AUTO: 91 FL (ref 78–100)
MONOCYTES # BLD AUTO: 0.4 10E9/L (ref 0–1.3)
MONOCYTES NFR BLD AUTO: 5.2 %
MUCOUS THREADS #/AREA URNS LPF: PRESENT /LPF
NEUTROPHILS # BLD AUTO: 4.7 10E9/L (ref 1.6–8.3)
NEUTROPHILS NFR BLD AUTO: 69.6 %
NITRATE UR QL: NEGATIVE
NRBC # BLD AUTO: 0 10*3/UL
NRBC BLD AUTO-RTO: 0 /100
PH UR STRIP: 7 PH (ref 5–7)
PLATELET # BLD AUTO: 278 10E9/L (ref 150–450)
POTASSIUM SERPL-SCNC: 3.8 MMOL/L (ref 3.4–5.3)
PROT SERPL-MCNC: 7.8 G/DL (ref 6.8–8.8)
RBC # BLD AUTO: 5.1 10E12/L (ref 3.8–5.2)
RBC #/AREA URNS AUTO: <1 /HPF (ref 0–2)
SODIUM SERPL-SCNC: 137 MMOL/L (ref 133–144)
SOURCE: ABNORMAL
SP GR UR STRIP: 1.01 (ref 1–1.03)
UROBILINOGEN UR STRIP-MCNC: 0 MG/DL (ref 0–2)
WBC # BLD AUTO: 6.8 10E9/L (ref 4–11)
WBC #/AREA URNS AUTO: 1 /HPF (ref 0–2)

## 2017-10-11 PROCEDURE — 96361 HYDRATE IV INFUSION ADD-ON: CPT

## 2017-10-11 PROCEDURE — 25000128 H RX IP 250 OP 636: Performed by: EMERGENCY MEDICINE

## 2017-10-11 PROCEDURE — 83690 ASSAY OF LIPASE: CPT | Performed by: EMERGENCY MEDICINE

## 2017-10-11 PROCEDURE — 74177 CT ABD & PELVIS W/CONTRAST: CPT

## 2017-10-11 PROCEDURE — 81001 URINALYSIS AUTO W/SCOPE: CPT | Performed by: EMERGENCY MEDICINE

## 2017-10-11 PROCEDURE — 99285 EMERGENCY DEPT VISIT HI MDM: CPT | Mod: 25

## 2017-10-11 PROCEDURE — 80053 COMPREHEN METABOLIC PANEL: CPT | Performed by: EMERGENCY MEDICINE

## 2017-10-11 PROCEDURE — 96360 HYDRATION IV INFUSION INIT: CPT | Mod: 59

## 2017-10-11 PROCEDURE — 85025 COMPLETE CBC W/AUTO DIFF WBC: CPT | Performed by: EMERGENCY MEDICINE

## 2017-10-11 RX ORDER — IOPAMIDOL 755 MG/ML
500 INJECTION, SOLUTION INTRAVASCULAR ONCE
Status: COMPLETED | OUTPATIENT
Start: 2017-10-11 | End: 2017-10-11

## 2017-10-11 RX ORDER — SODIUM CHLORIDE 9 MG/ML
1000 INJECTION, SOLUTION INTRAVENOUS CONTINUOUS
Status: DISCONTINUED | OUTPATIENT
Start: 2017-10-11 | End: 2017-10-11 | Stop reason: HOSPADM

## 2017-10-11 RX ADMIN — IOPAMIDOL 64 ML: 755 INJECTION, SOLUTION INTRAVENOUS at 15:29

## 2017-10-11 RX ADMIN — SODIUM CHLORIDE 1000 ML: 9 INJECTION, SOLUTION INTRAVENOUS at 15:04

## 2017-10-11 RX ADMIN — SODIUM CHLORIDE 57 ML: 9 INJECTION, SOLUTION INTRAVENOUS at 15:31

## 2017-10-11 ASSESSMENT — ENCOUNTER SYMPTOMS
ABDOMINAL PAIN: 1
FREQUENCY: 1
DIZZINESS: 1

## 2017-10-11 NOTE — ED NOTES
Patient complaining of urinary frequency and pressure for about two weeks.  Has been treated for UTI with no relief.  Seen again and PMD and told to follow up with urologist.  Unable to get an appt until nov. 22.      ABCs intact.  Alert and oriented x 3.

## 2017-10-11 NOTE — ED AVS SNAPSHOT
[facility] Emergency Department    [address]    [facility] [address]    Phone:  [phone]    Fax:  [phone]                                       Cynthia Arita   MRN: [id number]    Department:  [facility] Emergency Department   Date of Visit:  10/11/2017           Patient Information     Date Of Birth          1945        Your diagnoses for this visit were:     Pelvic pain in female        You were seen by Ferdinand Plasencia MD.      Follow-up Information     Follow up with Hailey Cowan MD In 4 days.    Specialty:  OB/Gyn    Contact information:    [facility] [place] [address]  [phone]        Discharge References/Attachments     PELVIC PAIN, UNKNOWN CAUSE (ENGLISH)      Future Appointments        Provider Department Dept Phone Center    10/12/2017 7:40 AM CASSIDY López [facility] [phone] [place]    11/22/2017 11:00 AM Era Dyer MD [facility] [phone] [facility]      24 Hour Appointment Hotline       To make an appointment at any [facility], call [phone] ([phone]). If you don't have a family doctor or clinic, we will help you find one. [facility] clinics are conveniently located to serve the needs of you and your family.             Review of your medicines      Our records show that you are taking the medicines listed below. If these are incorrect, please call your family doctor or clinic.        Dose / Directions Last dose taken    ALPRAZolam 0.25 MG tablet   Commonly known as:  XANAX   Dose:  0.25 mg        Take 1 tablet (0.25 mg) by mouth 3 times daily as needed for anxiety   Refills:  0        ANTIVERT PO   Dose:  25 mg        Take 25 mg by mouth every 6 hours as needed for dizziness   Refills:  0        calcitonin (salmon) 200 UNIT/ACT nasal spray   Commonly known as:  MIACALCIN   Dose:  1 spray   Quantity:  3 Bottle         Spray 1 spray into one nostril alternating nostrils daily Alternate nostril each day.   Refills:  3        * CENTRUM SILVER per tablet   Dose:  1 tablet        Take 1 tablet by mouth daily   Refills:  0        * OCUVITE PRESERVISION PO   Dose:  1 tablet        Take 1 tablet by mouth 2 times daily   Refills:  0        ciprofloxacin 500 MG tablet   Commonly known as:  CIPRO   Dose:  500 mg   Quantity:  6 tablet        Take 1 tablet (500 mg) by mouth 2 times daily   Refills:  0        esomeprazole 40 MG CR capsule   Commonly known as:  nexIUM   Dose:  40 mg   Quantity:  90 capsule        Take 1 capsule (40 mg) by mouth daily (with dinner)   Refills:  3        fish oil-omega-3 fatty acids 1000 MG capsule   Dose:  1000 mg        Take 1,000 mg by mouth 2 times daily   Refills:  0        flaxseed oil 1000 MG Caps   Dose:  1000 mg        Take 1,000 mg by mouth every evening   Refills:  0        GLUCOSAMINE CHONDR COMPLEX PO   Dose:  1 capsule        Take 1 capsule by mouth 2 times daily   Refills:  0        ondansetron 4 MG ODT tab   Commonly known as:  ZOFRAN ODT   Dose:  4 mg   Quantity:  120 tablet        Take 1 tablet (4 mg) by mouth every 8 hours as needed for nausea   Refills:  1        triamterene-hydrochlorothiazide 37.5-25 MG per tablet   Commonly known as:  MAXZIDE-25   Dose:  1 tablet   Quantity:  90 tablet        Take 1 tablet by mouth daily   Refills:  3        vitamin D3 2000 UNITS Caps   Dose:  2000 Units        Take 2,000 Units by mouth every morning   Refills:  0        * Notice:  This list has 2 medication(s) that are the same as other medications prescribed for you. Read the directions carefully, and ask your doctor or other care provider to review them with you.            Procedures and tests performed during your visit     Abd/pelvis CT,  IV  contrast only TRAUMA / AAA    CBC with platelets differential    Comprehensive metabolic panel    Lipase    Peripheral IV: Standard    UA with Microscopic       Orders Needing Specimen Collection     None      Pending Results     No orders found from 10/9/2017 to 10/12/2017.            Pending Culture Results     No orders found from 10/9/2017 to 10/12/2017.            Pending Results Instructions     If you had any lab results that were not finalized at the time of your Discharge, you can call the ED Lab Result RN at [phone]. You will be contacted by this team for any positive Lab results or changes in treatment. The nurses are available 7 days a week from 10A to 6:30P.  You can leave a message 24 hours per day and they will return your call.        Test Results From Your Hospital Stay        10/11/2017  2:47 PM      Component Results     Component Value Ref Range & Units Status    WBC 6.8 4.0 - 11.0 10e9/L Final    RBC Count 5.10 3.8 - 5.2 10e12/L Final    Hemoglobin 15.1 11.7 - 15.7 g/dL Final    Hematocrit 46.2 35.0 - 47.0 % Final    MCV 91 78 - 100 fl Final    MCH 29.6 26.5 - 33.0 pg Final    MCHC 32.7 31.5 - 36.5 g/dL Final    RDW 13.5 10.0 - 15.0 % Final    Platelet Count 278 150 - 450 10e9/L Final    Diff Method Automated Method  Final    % Neutrophils 69.6 % Final    % Lymphocytes 23.3 % Final    % Monocytes 5.2 % Final    % Eosinophils 1.0 % Final    % Basophils 0.6 % Final    % Immature Granulocytes 0.3 % Final    Nucleated RBCs 0 0 /100 Final    Absolute Neutrophil 4.7 1.6 - 8.3 10e9/L Final    Absolute Lymphocytes 1.6 0.8 - 5.3 10e9/L Final    Absolute Monocytes 0.4 0.0 - 1.3 10e9/L Final    Absolute Eosinophils 0.1 0.0 - 0.7 10e9/L Final    Absolute Basophils 0.0 0.0 - 0.2 10e9/L Final    Abs Immature Granulocytes 0.0 0 - 0.4 10e9/L Final    Absolute Nucleated RBC 0.0  Final         10/11/2017  3:03 PM      Component Results     Component Value Ref Range & Units Status    Sodium 137 133 - 144 mmol/L Final    Potassium 3.8 3.4 - 5.3 mmol/L Final    Chloride 103 94 - 109 mmol/L Final    Carbon Dioxide 28 20 - 32 mmol/L Final    Anion Gap 6 3 - 14 mmol/L  Final    Glucose 108 (H) 70 - 99 mg/dL Final    Urea Nitrogen 18 7 - 30 mg/dL Final    Creatinine 0.63 0.52 - 1.04 mg/dL Final    GFR Estimate >90 >60 mL/min/1.7m2 Final    Non  GFR Calc    GFR Estimate If Black >90 >60 mL/min/1.7m2 Final    African American GFR Calc    Calcium 9.4 8.5 - 10.1 mg/dL Final    Bilirubin Total 0.4 0.2 - 1.3 mg/dL Final    Albumin 3.9 3.4 - 5.0 g/dL Final    Protein Total 7.8 6.8 - 8.8 g/dL Final    Alkaline Phosphatase 76 40 - 150 U/L Final    ALT 29 0 - 50 U/L Final    AST 22 0 - 45 U/L Final         10/11/2017  3:03 PM      Component Results     Component Value Ref Range & Units Status    Lipase 221 73 - 393 U/L Final         10/11/2017  4:39 PM      Narrative     CT ABDOMEN AND PELVIS WITH CONTRAST   10/11/2017 3:40 PM     HISTORY: Left lower quadrant abdominal pain, pelvic pressure. Evaluate  for diverticulitis, pelvic mass, urinary stone, hydronephrosis.    TECHNIQUE: CT of the abdomen and pelvis was performed following the  administration of 64 mL Isovue-370. Radiation dose for this scan was  reduced using automated exposure control, adjustment of the mA and/or  kV according to patient size, or iterative reconstruction technique.    COMPARISON: CT of the abdomen and pelvis dated 4/2/2017.    FINDINGS: Again identified are extensive colonic diverticuli most  concentrated in the left lower abdomen/pelvis in the sigmoid colon and  descending colon. No definite inflammatory changes adjacent to these  diverticuli are seen to indicate acute diverticulitis.    The remainder of the bowel appears grossly unremarkable.    Small hypodense lesions within the liver likely representing cysts are  unchanged. Again identified are at least 2 small nonobstructing stones  in the left kidney. There are tiny cysts in the kidneys. Remaining  solid organs in the abdomen appear unremarkable.    The patient is status post a hysterectomy. Residual cervical tissue is  identified. There is no  free fluid or free air in the abdomen or  pelvis.        Impression     IMPRESSION:  1. Colonic diverticulosis. No definite CT evidence for acute  diverticulitis.  2. Nonobstructing stones in the right kidney. These are not  significantly changed.    MERRY AGGARWAL MD         10/11/2017  3:13 PM      Component Results     Component Value Ref Range & Units Status    Color Urine Yellow  Final    Appearance Urine Slightly Cloudy  Final    Glucose Urine Negative NEG^Negative mg/dL Final    Bilirubin Urine Negative NEG^Negative Final    Ketones Urine Negative NEG^Negative mg/dL Final    Specific Gravity Urine 1.011 1.003 - 1.035 Final    Blood Urine Negative NEG^Negative Final    pH Urine 7.0 5.0 - 7.0 pH Final    Protein Albumin Urine Negative NEG^Negative mg/dL Final    Urobilinogen mg/dL 0.0 0.0 - 2.0 mg/dL Final    Nitrite Urine Negative NEG^Negative Final    Leukocyte Esterase Urine Negative NEG^Negative Final    Source Midstream Urine  Final    WBC Urine 1 0 - 2 /HPF Final    RBC Urine <1 0 - 2 /HPF Final    Mucous Urine Present (A) NEG^Negative /LPF Final                Clinical Quality Measure: Blood Pressure Screening     Your blood pressure was checked while you were in the emergency department today. The last reading we obtained was  BP: 154/87 . Please read the guidelines below about what these numbers mean and what you should do about them.  If your systolic blood pressure (the top number) is less than 120 and your diastolic blood pressure (the bottom number) is less than 80, then your blood pressure is normal. There is nothing more that you need to do about it.  If your systolic blood pressure (the top number) is 120-139 or your diastolic blood pressure (the bottom number) is 80-89, your blood pressure may be higher than it should be. You should have your blood pressure rechecked within a year by a primary care provider.  If your systolic blood pressure (the top number) is 140 or greater or your diastolic  "blood pressure (the bottom number) is 90 or greater, you may have high blood pressure. High blood pressure is treatable, but if left untreated over time it can put you at risk for heart attack, stroke, or kidney failure. You should have your blood pressure rechecked by a primary care provider within the next 4 weeks.  If your provider in the emergency department today gave you specific instructions to follow-up with your doctor or provider even sooner than that, you should follow that instruction and not wait for up to 4 weeks for your follow-up visit.        Thank you for choosing [facility]       Thank you for choosing [facility] for your care. Our goal is always to provide you with excellent care. Hearing back from our patients is one way we can continue to improve our services. Please take a few minutes to complete the written survey that you may receive in the mail after you visit with us. Thank you!        [employer] Information     [employer] lets you send messages to your doctor, view your test results, renew your prescriptions, schedule appointments and more. To sign up, go to [email or web address] . Click on \"Log in\" on the left side of the screen, which will take you to the Welcome page. Then click on \"Sign up Now\" on the right side of the page.     You will be asked to enter the access code listed below, as well as some personal information. Please follow the directions to create your username and password.     Your access code is: [email or web address]  Expires: 10/19/2017  3:13 PM     Your access code will  in 90 days. If you need help or a new code, please call your [facility] clinic or [phone].        Care EveryWhere ID     This is your Care EveryWhere ID. This could be used by other organizations to access your [facility] medical records  [id number]        Equal Access to Services     [facility] PERLA AH: Arcelia Eric, nery solitario, lilliana carmona " la'karly cindy. So [facility] [phone].    ATENCIÓN: Si habla español, tiene a moralez disposición servicios gratuitos de asistencia lingüística. Llame al [phone].    We comply with applicable federal civil rights laws and [place] laws. We do not discriminate on the basis of race, color, national origin, age, disability, sex, sexual orientation, or gender identity.            After Visit Summary       This is your record. Keep this with you and show to your community pharmacist(s) and doctor(s) at your next visit.

## 2017-10-11 NOTE — NURSING NOTE
Patient presents to clinic with c/o pelvic pressure, nausea, dizziness, frequent urination. Patient observed to have unstable ambulation. Patient states had recent UA/UC which was clear and recently finished a coarse of ABX, patient stated she was feeling better but since yesterday symptoms of dysuria have returned. Advised no clinic appointments today, patient asks for appointment tomorrow in clinic. scheduled for tomorrow with Olinda Small at 7:40 am. Again advised patient to go to Emergency for evaluation, asked patient to have this writer give her an escort, patient declined. Advised patient due to unsteadiness it is advisable to have this writer take pt to ED in wheel chair, patient again declined. Patient states she would go to ED on her own, reported a friend drove her to clinic today.

## 2017-10-11 NOTE — ED AVS SNAPSHOT
[facility] Emergency Department    [address]    [facility] [address]    Phone:  [phone]    Fax:  [phone]                                       Cynthia Arita   MRN: [id number]    Department:  [facility] Emergency Department   Date of Visit:  10/11/2017           After Visit Summary Signature Page     I have received my discharge instructions, and my questions have been answered. I have discussed any challenges I see with this plan with the nurse or doctor.    ..........................................................................................................................................  Patient/Patient Representative Signature      ..........................................................................................................................................  Patient Representative Print Name and Relationship to Patient    ..................................................               ................................................  Date                                            Time    ..........................................................................................................................................  Reviewed by Signature/Title    ...................................................              ..............................................  Date                                                            Time

## 2017-10-11 NOTE — ED PROVIDER NOTES
History     Chief Complaint:  Bladder Pressure  Frequency    HPI   Cynthia Arita is a 72 year old female who presents to the emergency department today for evaluation of increased bladder pressure. The patient reports lower bladder pressure starting 2 weeks ago which she was prescribed Cipro for by Dr. Solis, but reports it did not help and she also went about a week ago and ad a urinalysis which she showed no signs of infection when they called her yesterday. The patient came in because now she also has dizziness with the bladder pressure. When she spoke to Dr. Samuels on 7/31/2017 she was recommended to see a urologist, but the earliest appointment she could get was on 11/22/2017. The patient endorses a history of small kidney stones, which have never passed, diagnosed several months ago. The patient endorses a hysterectomy leaving in one ovary 33 years ago and an appendectomy as the only abdominal surgeries done.The patient denies vaginal bulges or vaginal abnormalities.    Allergies:  Metoprolol    Medications:    Cipro  Nexium  Maxzide  Miacalcin  Xanax  Zofran  Antivert  Centrum  Glucosamine    Past Medical History:    Diverticulosis   GERD (gastroesophageal reflux disease)   HTN (hypertension)   Meniere's disease   Nephrolithiasis     Past Surgical History:    Surgical history reviewed. No pertinent surgical history.     Family History:    Mother: Palsy   Father:Esopageal Cancer  Sister: Parkinson's Disease, Bipolar disorder  Son: Brain Cancer  Brother: Hypertension  Paternal Grandmother: Diabetes   Maternal Grandmother: Diabetes     Social History:  Smoking Status: Never Smoker  Smokeless Tobacco: Never Used  Alcohol Use: Positive  Marital Status:   [5]     Review of Systems   Gastrointestinal: Positive for abdominal pain (bladder pressure).   Genitourinary: Positive for frequency and pelvic pain.        No vaginal abnormalities   Neurological: Positive for dizziness.   All other systems reviewed  "and are negative.    Physical Exam     Patient Vitals for the past 24 hrs:   BP Temp Temp src Pulse Resp SpO2 Height Weight   10/11/17 1653 154/87 - - 74 16 99 % - -   10/11/17 1650 - - - - - 100 % - -   10/11/17 1649 154/87 - - - - - - -   10/11/17 1552 - - - - - 98 % - -   10/11/17 1550 133/80 - - - - - - -   10/11/17 1545 146/80 - - - - 100 % - -   10/11/17 1521 - - - - - 98 % - -   10/11/17 1520 - - - - - 99 % - -   10/11/17 1515 - - - - - 100 % - -   10/11/17 1507 - - - - - 99 % - -   10/11/17 1505 136/74 - - - - - - -   10/11/17 1357 (!) 165/91 99  F (37.2  C) Temporal 72 16 99 % 1.6 m (5' 3\") 58.1 kg (128 lb)     Physical Exam  General: Patient is alert and interactive when I enter the room  Head:  The scalp, face, and head appear normal  Eyes:  The pupils are equal, round, and reactive to light    Conjunctivae and sclerae are normal  ENT:    External acoustic canals are normal    The oropharynx is normal without erythema.     Uvula is in the midline  Neck:  Normal range of motion  CV:  Regular rate. S1/S2. No murmurs.   Resp:  Lungs are clear without wheezes or rales. No distress  GI:  Abdomen is soft, no rigidity, guarding, or rebound    No distension. No tenderness to palpation in any quadrant.     MS:  Normal tone. Joints grossly normal without effusions.     No asymmetric leg swelling, calf or thigh tenderness.      Normal motor assessment of all extremities.  Skin:  No rash or lesions noted. Normal capillary refill noted  Neuro: Speech is normal and fluent. Face is symmetric.     Moving all extremities well.   Psych:  Awake. Alert.  Normal affect.  Appropriate interactions.  Lymph: No anterior cervical lymphadenopathy noted    Pelvic Exam:  Normal external genitalia.  Speculum exam:  No blood in vaginal canal.  No tissue seen in vaginal canal.  Odd appearing Hyperemic vaginal mucosa   No definite mass  Mild cystocele     Emergency Department Course     Imaging:  Radiology findings were communicated with " the patient who voiced understanding of the findings.    Abd/pelvis CT,  IV  contrast only TRAUMA / AAA  1. Colonic diverticulosis. No definite CT evidence for acute  diverticulitis.  2. Nonobstructing stones in the right kidney. These are not  significantly changed.  MERRY AGGARWAL MD  Reading per radiology    Laboratory:  Laboratory findings were communicated with the patient who voiced understanding of the findings.    CBC: WBC 6.8, HGB 15.1,   CMP: Glucose: 108 (H) o/w WNL (Creatinine 0.63)  Lipase: 221  UA: Mucous: Present (A)    Interventions:  1504 NS 1000 ml IV    Emergency Department Course:    1357 Nursing notes and vitals reviewed.    1416 I performed an exam of the patient as documented above.     1529 The patient was sent for a Abd/pelvis CT,  IV  contrast only TRAUMA / AAA while in the emergency department, results above.     1632 I performed a pelvic exam as documented above.     1649 I discussed the treatment plan with the patient. They expressed understanding of this plan and consented to discharge. They will be discharged home with instructions for care and follow up. In addition, the patient will return to the emergency department if their symptoms persist, worsen, if new symptoms arise or if there is any concern.  All questions were answered.    1649 I personally reviewed the laboratory and imaging results with the patient and answered all related questions prior to discharge.    Impression & Plan      Medical Decision Making:  Cynthia Arita is a 72 year old female who presents to the emergency department today for evaluation of pelvic pain.  She looks overall well and without a concerning etiology of pelvic pain and abdominal pain.  A broad differential diagnosis was of course considered. The workup in the ED is at this point negative.  No etiology for the patients pain is found at this point and my suspicion of an intraabdominal catastrophe or other worrisome etiology is very low. CT  and lab work are reassuring. I will not therefore admit her for serial exams and further workup.  Patient is hemodynamically stable in ED. Plan is home with gynecology recheck as unclear reason for her pelvic/abdominal pressure  Return for fevers greater than 102, increasing pain, other new symptoms develop.  Abdominal pain handout given.  Questions were answered.     Diagnosis:    ICD-10-CM    1. Pelvic pain in female R10.2      Disposition:   The patient is discharged to home.    Scribe Disclosure:  Magan CARCAMO, am serving as a scribe at 1:59 PM on 10/11/2017 to document services personally performed by Ferdinand Plasencia MD based on my observations and the provider's statements to me.    [facility] EMERGENCY DEPARTMENT       Ferdinand Plasencia MD  10/11/17 6997

## 2017-10-11 NOTE — MR AVS SNAPSHOT
After Visit Summary   10/11/2017    Cynthia Arita    MRN: [id number]           Patient Information     Date Of Birth          1945        Visit Information        Provider Department      10/11/2017 3:00 PM Rn, [facility]        Today's Diagnoses     DIAGNOSIS FOR ++++ WALK IN CLINIC ++++    -  1       Follow-ups after your visit        Your next 10 appointments already scheduled     Oct 11, 2017  3:00 PM CDT   [facility] with [facility]    [address]  [facility] [id number] [phone]            Oct 12, 2017  7:40 AM CDT   SHORT with CASSIDY López CNP   [facility]    [address]  [facility] [id number] [phone]            Nov 22, 2017 11:00 AM CST   NEW FEMALE PELVIC with Era See Singh Dyer MD   [facility] ([facility])    [address]  [address]  [facility] [id number] [phone]              Who to contact     If you have questions or need follow up information about today's clinic visit or your schedule please contact [facility] directly at [phone].  Normal or non-critical lab and imaging results will be communicated to you by EnergyHub[employer], letter or phone within 4 business days after the clinic has received the results. If you do not hear from us within 7 days, please contact the clinic through MyChart or phone. If you have a critical or abnormal lab result, we will notify you by phone as soon as possible.  Submit refill requests through sageCrowd or call your pharmacy and they will forward the refill request to us. Please allow 3 business days for your refill to be completed.          Additional Information About Your Visit        EnergyHubharCYBRA Information     sageCrowd lets you send messages to your doctor, view your test results, renew your  "prescriptions, schedule appointments and more. To sign up, go to [email or web address] . Click on \"Log in\" on the left side of the screen, which will take you to the Welcome page. Then click on \"Sign up Now\" on the right side of the page.     You will be asked to enter the access code listed below, as well as some personal information. Please follow the directions to create your username and password.     Your access code is: [email or web address]  Expires: 10/19/2017  3:13 PM     Your access code will  in 90 days. If you need help or a new code, please call your [place] clinic or [phone].        Care EveryWhere ID     This is your Care EveryWhere ID. This could be used by other organizations to access your [place] medical records  [id number]         Blood Pressure from Last 3 Encounters:   10/02/17 120/60   17 118/72   17 104/60    Weight from Last 3 Encounters:   10/02/17 129 lb (58.5 kg)   17 131 lb 3.2 oz (59.5 kg)   17 133 lb (60.3 kg)              Today, you had the following     No orders found for display       Primary Care Provider Office Phone # Fax #    Huyen Samuels [phone]       [address]  [facility] [address]        Equal Access to Services     [facility] : Hadii aad ku hadasho Soomaali, waaxda luqadaha, qaybta kaalmada adeegyada, waxay vania hayaanani lyons . So [facility] [phone].    ATENCIÓN: Si habla español, tiene a moralez disposición servicios gratuitos de asistencia lingüística. Vanessa al [phone].    We comply with applicable federal civil rights laws and [place] laws. We do not discriminate on the basis of race, color, national origin, age, disability, sex, sexual orientation, or gender identity.            Thank you!     Thank you for choosing [facility]  for your care. Our goal is always to provide you with excellent care. Hearing back from our patients is one way we can continue to " improve our services. Please take a few minutes to complete the written survey that you may receive in the mail after your visit with us. Thank you!             Your Updated Medication List - Protect others around you: Learn how to safely use, store and throw away your medicines at www.disposemymeds.org.          This list is accurate as of: 10/11/17  1:38 PM.  Always use your most recent med list.                   Brand Name Dispense Instructions for use Diagnosis    ALPRAZolam 0.25 MG tablet    XANAX     Take 1 tablet (0.25 mg) by mouth 3 times daily as needed for anxiety        ANTIVERT PO      Take 25 mg by mouth every 6 hours as needed for dizziness        calcitonin (salmon) 200 UNIT/ACT nasal spray    MIACALCIN    3 Bottle    Spray 1 spray into one nostril alternating nostrils daily Alternate nostril each day.    Age-related osteoporosis without current pathological fracture       * CENTRUM SILVER per tablet      Take 1 tablet by mouth daily        * OCUVITE PRESERVISION PO      Take 1 tablet by mouth 2 times daily        ciprofloxacin 500 MG tablet    CIPRO    6 tablet    Take 1 tablet (500 mg) by mouth 2 times daily    Urinary frequency       esomeprazole 40 MG CR capsule    nexIUM    90 capsule    Take 1 capsule (40 mg) by mouth daily (with dinner)    Gastroesophageal reflux disease with esophagitis       fish oil-omega-3 fatty acids 1000 MG capsule      Take 1,000 mg by mouth 2 times daily        flaxseed oil 1000 MG Caps      Take 1,000 mg by mouth every evening        GLUCOSAMINE CHONDR COMPLEX PO      Take 1 capsule by mouth 2 times daily        ondansetron 4 MG ODT tab    ZOFRAN ODT    120 tablet    Take 1 tablet (4 mg) by mouth every 8 hours as needed for nausea    Nausea       triamterene-hydrochlorothiazide 37.5-25 MG per tablet    MAXZIDE-25    90 tablet    Take 1 tablet by mouth daily    Essential hypertension       vitamin D3 2000 UNITS Caps      Take 2,000 Units by mouth every morning        *  Notice:  This list has 2 medication(s) that are the same as other medications prescribed for you. Read the directions carefully, and ask your doctor or other care provider to review them with you.

## 2017-11-16 ENCOUNTER — OFFICE VISIT (OUTPATIENT)
Dept: OBGYN | Facility: CLINIC | Age: 72
End: 2017-11-16
Payer: MEDICARE

## 2017-11-16 VITALS
BODY MASS INDEX: 23.48 KG/M2 | HEIGHT: 63 IN | WEIGHT: 132.5 LBS | DIASTOLIC BLOOD PRESSURE: 70 MMHG | HEART RATE: 80 BPM | SYSTOLIC BLOOD PRESSURE: 116 MMHG

## 2017-11-16 DIAGNOSIS — R35.0 URINARY FREQUENCY: Primary | ICD-10-CM

## 2017-11-16 DIAGNOSIS — H26.9 CATARACT, UNSPECIFIED CATARACT TYPE, UNSPECIFIED LATERALITY: ICD-10-CM

## 2017-11-16 PROCEDURE — 99203 OFFICE O/P NEW LOW 30 MIN: CPT | Performed by: OBSTETRICS & GYNECOLOGY

## 2017-11-16 NOTE — PROGRESS NOTES
HPI: Cynthia Arita is a 72 year old female   Obstetric History       T3      L3     SAB0   TAB0   Ectopic0   Multiple0   Live Births3    Obstetric Comments      TSVD x 3    No LMP recorded. Patient is postmenopausal. who is a new patient who presents as follow-up from ER for evaluation of urinary symptoms.    Patient reports 1 month history of problems with the bladder, which she describes as frequency at night, 2-3 times per evening, and leakage during the day.  Patient also describes some pelvic pressure.  She states that sometimes she would have to empty her bladder again shortly after voiding.       She reports leakage with coughing or laughing sneezing during that time.  She states that she often more thin pad.    She states that when her symptoms were at their worst, she had frequency during the day of up to 12 times per day.  She reports positive urgency at that time; she denies dysuria during this time.      Patient states currently her symptoms have improved, and now she is only voiding once at night, and approximately 6 times during the day.  She states that she is now getting relief of pelvic pressure and does feel that she is able to empty her bladder fully.  She states that leakage currently is minimal.   Patient reports that she has currently been taking cranberry supplements as well as Azo OTC.  She states that she was recommended from the ER to be evaluated by GYN as well as urology, and sewed both appointments were made.    She currently denies pelvic pain, fever, changes in bowel movements, vaginal discharge, vaginal bleeding.  She states that she is not currently sexually active.  She denies problems with vaginal dryness.    She states that she had a vaginal hysterectomy at age 47 due to abnormal bleeding during which they also took out her left ovary.   She denies other GYN problems, including abnormal Pap test.  She states that her last pelvic exam was approximately 8 years ago,  and was therefore desiring a pelvic examination today.    Patient also reports history of floaters and cataracts, and requests a referral to ophthalmology.    She recently moved from [place] back to [place] where she had spent much of her lifetime; she is currently living with her son.  She is retired.      Past Medical History:   Diagnosis Date     Diverticulosis      GERD (gastroesophageal reflux disease)      HTN (hypertension)      Meniere's disease      Nephrolithiasis      Past Surgical History:   Procedure Laterality Date     C VAGINAL HYSTERECTOMY      with left oophorectomy     Family History   Problem Relation Age of Onset     Neurologic Disorder Mother      palsy     Esophageal Cancer Father      CANCER Maternal Grandmother      lymph     DIABETES Paternal Grandmother      Neurologic Disorder Sister      Parkinson's     Hypertension Brother      Bipolar Disorder Sister      Brain Cancer Son 17     Social History     Social History     Marital status:      Spouse name: N/A     Number of children: N/A     Years of education: N/A     Occupational History     Not on file.     Social History Main Topics     Smoking status: Never Smoker     Smokeless tobacco: Never Used     Alcohol use Yes     Drug use: No     Sexual activity: Not Currently     Partners: Male     Other Topics Concern     Not on file     Social History Narrative       Review of Systems:   CONSTITUTIONAL:NEGATIVE for fever, chills, change in weight  INTEGUMENTARY/SKIN: NEGATIVE for worrisome rashes, moles or lesions  RESP:NEGATIVE for significant cough or SOB  BREAST: NEGATIVE for masses, tenderness or discharge  CV: NEGATIVE for chest pain, palpitations or peripheral edema  GI: NEGATIVE for abdominal pain, heartburn, or change in bowel habits, nausea, emesis  : negative for, dysuria, vaginal discharge and bleeding; see above  MUSCULOSKELETAL: NEGATIVE for significant arthralgias or myalgia  NEURO: + Dizziness, due to Ménière's  disease  PSYCHIATRIC: NEGATIVE for changes in mood or affect       Physical exam:  GENERAL APPEARANCE: healthy, alert and no distress  NECK: no adenopathy, no asymmetry, masses, or scars and thyroid normal to palpation  ABDOMEN:  soft, nontender, no HSM or masses and bowel sounds normal  PELVIC:   Vulva: normal external female genitalia, there was a dry dark crusty exudate on her right vulva .   Underlying skin, and throughout were within normal.    Urethra meatus: normal, non-tender,   Vagina: Atrophic mucosa, minimal rugae, no lesions, scant physiologic discharge.  No evidence of prolapse.  Bimanual exam: No masses palpated throughout the pelvis nontender.  Perineum: normal, no lesions, intact  Anus: normal, no lesions, hemorrhoids            Assessment/Plan:  (R35.0) Urinary frequency  (primary encounter diagnosis)  Comment: Urinary frequency, which now seems to be improving.  Patient's recent encounters were reviewed, as well as her recent urine analysis and cultures.  She did have a positive urine culture for Escherichia coli in July.  These results were reviewed with the patient; and retrospect patient states that her symptoms may have possibly started from July.   We discussed possible etiologies including bladder and the urethral irritation from a UTI, versus overactive bladder/detrusor instability; we also discussed atrophic vaginitis, and the effects of decreasing vaginal estrogen on the vaginal mucosa, urethra and bladder.    As her bladder symptoms are improving, I don't think that treatment for her to use instability is indicated, and her most recent urine analysis was negative.  We did discuss the use of vaginal estrogen, although as she has no vaginal dryness, and no actual demonstration of recurrent UTI, she may have some limited benefit.  At this point patient desires expectant management, which I think can be appropriate in this situation.  She may follow up if she has any questions regarding  vaginal estrogen use.  Plan: Follow-up as needed    (H26.9) Cataract, unspecified cataract type, unspecified laterality  Comment: Referral provided  Plan: OPHTHALMOLOGY ADULT REFERRAL            This encounter was completed using voice-recognition software; errors may be present.

## 2017-11-16 NOTE — MR AVS SNAPSHOT
After Visit Summary   11/16/2017    Cynthia Arita    MRN: [id number]           Patient Information     Date Of Birth          1945        Visit Information        Provider Department      11/16/2017 2:00 PM [facility]an [facility]        Today's Diagnoses     Cataract, unspecified cataract type, unspecified laterality    -  1       Follow-ups after your visit        Additional Services     OPHTHALMOLOGY ADULT REFERRAL       Your provider has referred you to: N: [facility], NIYA Abrams  [phone]  [email or web address]    Please be aware that coverage of these services is subject to the terms and limitations of your health insurance plan.  Call member services at your health plan with any benefit or coverage questions.      Please bring the following with you to your appointment:    (1) Any X-Rays, CTs or MRIs which have been performed.  Contact the facility where they were done to arrange for  prior to your scheduled appointment.    (2) List of current medications  (3) This referral request   (4) Any documents/labs given to you for this referral                  Your next 10 appointments already scheduled     Nov 22, 2017 11:00 AM CST   NEW FEMALE PELVIC with Era See Singh Dyer MD   [facility] ([facility])    [address]  [address]  [facility] [address] [phone]              Who to contact     If you have questions or need follow up information about today's clinic visit or your schedule please contact [facility] directly at [phone].  Normal or non-critical lab and imaging results will be communicated to you by MyChart, letter or phone within 4 business days after the clinic has received the results. If you do not hear from us within 7 days, please contact the clinic through MyChart or phone. If you have a critical or abnormal lab result, we  "will notify you by phone as soon as possible.  Submit refill requests through [employer] or call your pharmacy and they will forward the refill request to us. Please allow 3 business days for your refill to be completed.          Additional Information About Your Visit        MMIThart Information     [employer] lets you send messages to your doctor, view your test results, renew your prescriptions, schedule appointments and more. To sign up, go to [email or web address] . Click on \"Log in\" on the left side of the screen, which will take you to the Welcome page. Then click on \"Sign up Now\" on the right side of the page.     You will be asked to enter the access code listed below, as well as some personal information. Please follow the directions to create your username and password.     Your access code is: [email or web address]  Expires: 2018  2:36 PM     Your access code will  in 90 days. If you need help or a new code, please call your [place] clinic or [phone].        Care EveryWhere ID     This is your Care EveryWhere ID. This could be used by other organizations to access your [place] medical records  [id number]        Your Vitals Were     Pulse Height BMI (Body Mass Index)             80 5' 3\" (1.6 m) 23.47 kg/m2          Blood Pressure from Last 3 Encounters:   17 116/70   10/11/17 154/87   10/02/17 120/60    Weight from Last 3 Encounters:   17 132 lb 8 oz (60.1 kg)   10/11/17 128 lb (58.1 kg)   10/02/17 129 lb (58.5 kg)              We Performed the Following     OPHTHALMOLOGY ADULT REFERRAL          Today's Medication Changes          These changes are accurate as of: 17  2:36 PM.  If you have any questions, ask your nurse or doctor.               Stop taking these medicines if you haven't already. Please contact your care team if you have questions.     ciprofloxacin 500 MG tablet   Commonly known as:  CIPRO   Stopped by:  Hailey Cowan MD                    Primary Care " Provider Office Phone # Fax #    Huyen Samuels [phone]       [address] [facility] [address]  [facility] [address]        Equal Access to Services     MITESHTIN PAN : Hadmarie cydney mead eric Eric, waaxda luqadaha, qaybta kaalmada leonides, lilliana ladd laAliyakarly white. So [facility] [phone].    ATENCIÓN: Si habla español, tiene a moralez disposición servicios gratuitos de asistencia lingüística. Llame al [phone].    We comply with applicable federal civil rights laws and [place] laws. We do not discriminate on the basis of race, color, national origin, age, disability, sex, sexual orientation, or gender identity.            Thank you!     Thank you for choosing [facility]  for your care. Our goal is always to provide you with excellent care. Hearing back from our patients is one way we can continue to improve our services. Please take a few minutes to complete the written survey that you may receive in the mail after your visit with us. Thank you!             Your Updated Medication List - Protect others around you: Learn how to safely use, store and throw away your medicines at www.disposemymeds.org.          This list is accurate as of: 11/16/17  2:36 PM.  Always use your most recent med list.                   Brand Name Dispense Instructions for use Diagnosis    ALPRAZolam 0.25 MG tablet    XANAX     Take 1 tablet (0.25 mg) by mouth 3 times daily as needed for anxiety        ANTIVERT PO      Take 25 mg by mouth every 6 hours as needed for dizziness        calcitonin (salmon) 200 UNIT/ACT nasal spray    MIACALCIN    3 Bottle    Spray 1 spray into one nostril alternating nostrils daily Alternate nostril each day.    Age-related osteoporosis without current pathological fracture       * CENTRUM SILVER per tablet      Take 1 tablet by mouth daily        * OCUVITE PRESERVISION PO      Take 1 tablet by mouth 2 times daily        esomeprazole 40 MG CR capsule    nexIUM     90 capsule    Take 1 capsule (40 mg) by mouth daily (with dinner)    Gastroesophageal reflux disease with esophagitis       fish oil-omega-3 fatty acids 1000 MG capsule      Take 1,000 mg by mouth 2 times daily        flaxseed oil 1000 MG Caps      Take 1,000 mg by mouth every evening        GLUCOSAMINE CHONDR COMPLEX PO      Take 1 capsule by mouth 2 times daily        ondansetron 4 MG ODT tab    ZOFRAN ODT    120 tablet    Take 1 tablet (4 mg) by mouth every 8 hours as needed for nausea    Nausea       triamterene-hydrochlorothiazide 37.5-25 MG per tablet    MAXZIDE-25    90 tablet    Take 1 tablet by mouth daily    Essential hypertension       vitamin D3 2000 UNITS Caps      Take 2,000 Units by mouth every morning        * Notice:  This list has 2 medication(s) that are the same as other medications prescribed for you. Read the directions carefully, and ask your doctor or other care provider to review them with you.

## 2017-11-16 NOTE — NURSING NOTE
"Chief Complaint   Patient presents with     Hospital F/U     Pelvic pain       Initial /70  Pulse 80  Ht 5' 3\" (1.6 m)  Wt 132 lb 8 oz (60.1 kg)  BMI 23.47 kg/m2 Estimated body mass index is 23.47 kg/(m^2) as calculated from the following:    Height as of this encounter: 5' 3\" (1.6 m).    Weight as of this encounter: 132 lb 8 oz (60.1 kg).  BP completed using cuff size: regular    No obstetric history on file.    The following HM Due: NONE      The following patient reported/Care Every where data was sent to:  P ABSTRACT QUALITY INITIATIVES [62343]  NA     patient has appointment for today    Betina DONIS               "

## 2017-12-20 ENCOUNTER — RADIANT APPOINTMENT (OUTPATIENT)
Dept: GENERAL RADIOLOGY | Facility: CLINIC | Age: 72
End: 2017-12-20
Attending: INTERNAL MEDICINE
Payer: MEDICARE

## 2017-12-20 ENCOUNTER — OFFICE VISIT (OUTPATIENT)
Dept: INTERNAL MEDICINE | Facility: CLINIC | Age: 72
End: 2017-12-20
Payer: MEDICARE

## 2017-12-20 VITALS
OXYGEN SATURATION: 100 % | WEIGHT: 130.1 LBS | SYSTOLIC BLOOD PRESSURE: 118 MMHG | TEMPERATURE: 97.9 F | HEART RATE: 70 BPM | HEIGHT: 63 IN | BODY MASS INDEX: 23.05 KG/M2 | DIASTOLIC BLOOD PRESSURE: 72 MMHG

## 2017-12-20 DIAGNOSIS — K21.9 GASTROESOPHAGEAL REFLUX DISEASE WITHOUT ESOPHAGITIS: ICD-10-CM

## 2017-12-20 DIAGNOSIS — R09.89 PHLEGM IN THROAT: ICD-10-CM

## 2017-12-20 DIAGNOSIS — R42 LIGHT-HEADED FEELING: ICD-10-CM

## 2017-12-20 DIAGNOSIS — I10 ESSENTIAL HYPERTENSION: Primary | ICD-10-CM

## 2017-12-20 DIAGNOSIS — R11.0 NAUSEA: ICD-10-CM

## 2017-12-20 PROCEDURE — 71020 XR CHEST 2 VW: CPT

## 2017-12-20 PROCEDURE — 99214 OFFICE O/P EST MOD 30 MIN: CPT | Performed by: INTERNAL MEDICINE

## 2017-12-20 NOTE — PROGRESS NOTES
"  SUBJECTIVE:   Cynthia Arita is a 72 year old female who presents to clinic today for the following health issues:      Hypertension Follow-up      Outpatient blood pressures are being checked at home.  Results are 161/90.    Low Salt Diet: no added salt        Amount of exercise or physical activity: 2-3 days/week for an average of 30-45 minutes    Problems taking medications regularly: No    Medication side effects: none    Diet: low salt        HPI:   She complains that she just does not feel good. Has been going for 6 months to a year. She regurgitates acid at night with actual fluid in her mouth. Then during the day coughs up large amount of clear to white phlegm during the day. The last few months her head \"feels like it is full of air\" but denies headache or actual pain. No sore throat. No dyspnea on exertion or wheezing. Wt is stable. Appetite is good. She does have nausea in the am shortly after she gets out of bed. Passes within 1-2 hrs. She has normal upper endoscopy in June and labs all normal in October. She takes Nexium every day. She is on a long list of supplements.     Problem list and histories reviewed & adjusted, as indicated.  Additional history: as documented    BP Readings from Last 3 Encounters:   12/20/17 118/72   11/16/17 116/70   10/11/17 154/87    Wt Readings from Last 3 Encounters:   12/20/17 130 lb 1.6 oz (59 kg)   11/16/17 132 lb 8 oz (60.1 kg)   10/11/17 128 lb (58.1 kg)                      Reviewed and updated as needed this visit by clinical staffTobacco  Allergies  Meds  Med Hx  Surg Hx  Fam Hx  Soc Hx      Reviewed and updated as needed this visit by Provider         ROS:  Constitutional, HEENT, cardiovascular, pulmonary, GI, , musculoskeletal, neuro, skin, endocrine and psych systems are negative, except as otherwise noted.      OBJECTIVE:   /72 (BP Location: Left arm, Patient Position: Sitting, Cuff Size: Adult Regular)  Pulse 70  Temp 97.9  F (36.6  C) (Oral) " " Ht 5' 3\" (1.6 m)  Wt 130 lb 1.6 oz (59 kg)  SpO2 100%  Breastfeeding? No  BMI 23.05 kg/m2  Body mass index is 23.05 kg/(m^2).  GENERAL: healthy, alert and no distress  NECK: no adenopathy, no asymmetry, masses, or scars and thyroid normal to palpation  RESP: lungs clear to auscultation - no rales, rhonchi or wheezes  CV: regular rate and rhythm, normal S1 S2, no S3 or S4, no murmur, click or rub, no peripheral edema and peripheral pulses strong  ABDOMEN: soft, nontender, no hepatosplenomegaly, no masses and bowel sounds normal  MS: no gross musculoskeletal defects noted, no edema  NEURO: Normal strength and tone, mentation intact and speech normal  PSYCH: mentation appears normal, affect normal/bright      ASSESSMENT/PLAN:       1. Essential hypertension  under good control Continue current medications.     2. Phlegm in throat  Suspect she might be aspirating at night, recommended we start with her elevating the head of the bed 1 in at a time until she gets to 4-6 inches. Follow up in 6 weeks  - XR Chest 2 Views; Future    3. Nausea  as above. Also want her to stop all supplements    4. Gastroesophageal reflux disease without esophagitis  as above.     5. Light-headed feeling  ? Whether side effects of her menieres of is she is getting some sinus irritation from her reflux      Follow up in 6 weeks     Huyen Samuels MD  [facility]    "

## 2017-12-20 NOTE — MR AVS SNAPSHOT
"              After Visit Summary   12/20/2017    Cynthia Arita    MRN: [id number]           Patient Information     Date Of Birth          1945        Visit Information        Provider Department      12/20/2017 8:40 AM Huyen Samuels MD [facility]        Today's Diagnoses     Essential hypertension    -  1    Phlegm in throat        Nausea        Gastroesophageal reflux disease without esophagitis        Light-headed feeling           Follow-ups after your visit        Your next 10 appointments already scheduled     Jan 30, 2018  9:00 AM CST   SHORT with Huyen Samuels MD   [facility]    [address]  [facility] [id number] [phone]              Who to contact     If you have questions or need follow up information about today's clinic visit or your schedule please contact [facility] directly at [phone].  Normal or non-critical lab and imaging results will be communicated to you by MyChart, letter or phone within 4 business days after the clinic has received the results. If you do not hear from us within 7 days, please contact the clinic through MyChart or phone. If you have a critical or abnormal lab result, we will notify you by phone as soon as possible.  Submit refill requests through [employer] or call your pharmacy and they will forward the refill request to us. Please allow 3 business days for your refill to be completed.          Additional Information About Your Visit        MyChart Information     [employer] lets you send messages to your doctor, view your test results, renew your prescriptions, schedule appointments and more. To sign up, go to [email or web address] . Click on \"Log in\" on the left side of the screen, which will take you to the Welcome page. Then click on \"Sign up Now\" on the right side of the page.     You will be asked to enter the access code listed below, as well as some " "personal information. Please follow the directions to create your username and password.     Your access code is: [email or web address]  Expires: 2018  2:36 PM     Your access code will  in 90 days. If you need help or a new code, please call your [place] clinic or [phone].        Care EveryWhere ID     This is your Care EveryWhere ID. This could be used by other organizations to access your [place] medical records  [id number]        Your Vitals Were     Pulse Temperature Height Pulse Oximetry Breastfeeding? BMI (Body Mass Index)    70 97.9  F (36.6  C) (Oral) 5' 3\" (1.6 m) 100% No 23.05 kg/m2       Blood Pressure from Last 3 Encounters:   17 118/72   17 116/70   10/11/17 154/87    Weight from Last 3 Encounters:   17 130 lb 1.6 oz (59 kg)   17 132 lb 8 oz (60.1 kg)   10/11/17 128 lb (58.1 kg)               Primary Care Provider Office Phone # Fax #    Huyen Samuels [phone]       [address]   [facility] [address]        Equal Access to Services     RENO PERLA AH: Hadii aad ku hadasho Soomaali, waaxda luqadaha, qaybta kaalmada adeegyada, waxay idiin haystaceyn sharmin lyons . So [facility] [phone].    ATENCIÓN: Si habla español, tiene a moralez disposición servicios gratuitos de asistencia lingüística. Llame al [phone].    We comply with applicable federal civil rights laws and [place] laws. We do not discriminate on the basis of race, color, national origin, age, disability, sex, sexual orientation, or gender identity.            Thank you!     Thank you for choosing [facility]  for your care. Our goal is always to provide you with excellent care. Hearing back from our patients is one way we can continue to improve our services. Please take a few minutes to complete the written survey that you may receive in the mail after your visit with us. Thank you!             Your Updated Medication List - Protect others around you: " Learn how to safely use, store and throw away your medicines at www.disposemymeds.org.          This list is accurate as of: 12/20/17 11:11 AM.  Always use your most recent med list.                   Brand Name Dispense Instructions for use Diagnosis    ALPRAZolam 0.25 MG tablet    XANAX     Take 1 tablet (0.25 mg) by mouth 3 times daily as needed for anxiety        ANTIVERT PO      Take 25 mg by mouth every 6 hours as needed for dizziness        calcitonin (salmon) 200 UNIT/ACT nasal spray    MIACALCIN    3 Bottle    Spray 1 spray into one nostril alternating nostrils daily Alternate nostril each day.    Age-related osteoporosis without current pathological fracture       * CENTRUM SILVER per tablet      Take 1 tablet by mouth daily        * OCUVITE PRESERVISION PO      Take 1 tablet by mouth 2 times daily        esomeprazole 40 MG CR capsule    nexIUM    90 capsule    Take 1 capsule (40 mg) by mouth daily (with dinner)    Gastroesophageal reflux disease with esophagitis       fish oil-omega-3 fatty acids 1000 MG capsule      Take 1,000 mg by mouth 2 times daily        flaxseed oil 1000 MG Caps      Take 1,000 mg by mouth every evening        GLUCOSAMINE CHONDR COMPLEX PO      Take 1 capsule by mouth 2 times daily        ondansetron 4 MG ODT tab    ZOFRAN ODT    120 tablet    Take 1 tablet (4 mg) by mouth every 8 hours as needed for nausea    Nausea       triamterene-hydrochlorothiazide 37.5-25 MG per tablet    MAXZIDE-25    90 tablet    Take 1 tablet by mouth daily    Essential hypertension       vitamin D3 2000 UNITS Caps      Take 2,000 Units by mouth every morning        * Notice:  This list has 2 medication(s) that are the same as other medications prescribed for you. Read the directions carefully, and ask your doctor or other care provider to review them with you.

## 2017-12-20 NOTE — NURSING NOTE
"Chief Complaint   Patient presents with     Nausea     Patient here for nausea x6 months now and light headed       Initial /72 (BP Location: Left arm, Patient Position: Sitting, Cuff Size: Adult Regular)  Pulse 70  Temp 97.9  F (36.6  C) (Oral)  Ht 5' 3\" (1.6 m)  Wt 130 lb 1.6 oz (59 kg)  SpO2 100%  Breastfeeding? No  BMI 23.05 kg/m2 Estimated body mass index is 23.05 kg/(m^2) as calculated from the following:    Height as of this encounter: 5' 3\" (1.6 m).    Weight as of this encounter: 130 lb 1.6 oz (59 kg).  Medication Reconciliation: complete   Saeed Alvares MA    "

## 2018-01-03 ENCOUNTER — ALLIED HEALTH/NURSE VISIT (OUTPATIENT)
Dept: NURSING | Facility: CLINIC | Age: 73
End: 2018-01-03
Payer: MEDICARE

## 2018-01-03 VITALS — HEART RATE: 68 BPM | SYSTOLIC BLOOD PRESSURE: 142 MMHG | DIASTOLIC BLOOD PRESSURE: 60 MMHG

## 2018-01-03 DIAGNOSIS — Z53.9 DIAGNOSIS FOR ++++ WALK IN CLINIC ++++: Primary | ICD-10-CM

## 2018-01-03 PROCEDURE — 99207 ZZC NO CHARGE NURSE ONLY: CPT

## 2018-01-03 NOTE — MR AVS SNAPSHOT
"              After Visit Summary   1/3/2018    Cynthia Arita    MRN: [id number]           Patient Information     Date Of Birth          1945        Visit Information        Provider Department      1/3/2018 4:00 PM Osvaldo, Ri [facility]        Today's Diagnoses     DIAGNOSIS FOR ++++ WALK IN CLINIC ++++    -  1       Follow-ups after your visit        Your next 10 appointments already scheduled     Jan 30, 2018  9:00 AM CST   SHORT with Huyen Samuels MD   [facility] ([facility])    [address]  [facility] [id number] [phone]              Who to contact     If you have questions or need follow up information about today's clinic visit or your schedule please contact [facility] directly at [phone].  Normal or non-critical lab and imaging results will be communicated to you by MyChart, letter or phone within 4 business days after the clinic has received the results. If you do not hear from us within 7 days, please contact the clinic through MyChart or phone. If you have a critical or abnormal lab result, we will notify you by phone as soon as possible.  Submit refill requests through [employer] or call your pharmacy and they will forward the refill request to us. Please allow 3 business days for your refill to be completed.          Additional Information About Your Visit        MyChart Information     [employer] lets you send messages to your doctor, view your test results, renew your prescriptions, schedule appointments and more. To sign up, go to [email or web address] . Click on \"Log in\" on the left side of the screen, which will take you to the Welcome page. Then click on \"Sign up Now\" on the right side of the page.     You will be asked to enter the access code listed below, as well as some personal information. Please follow the directions to create your username and password.     Your access code is: " [email or web address]  Expires: 2018  2:36 PM     Your access code will  in 90 days. If you need help or a new code, please call your [place] clinic or [phone].        Care EveryWhere ID     This is your Care EveryWhere ID. This could be used by other organizations to access your [place] medical records  [id number]        Your Vitals Were     Pulse                   68            Blood Pressure from Last 3 Encounters:   18 142/60   17 118/72   17 116/70    Weight from Last 3 Encounters:   17 130 lb 1.6 oz (59 kg)   17 132 lb 8 oz (60.1 kg)   10/11/17 128 lb (58.1 kg)              Today, you had the following     No orders found for display       Primary Care Provider Office Phone # Fax #    Huyen Samuels [phone]       [address] [facility] [address]  [facility] [address]        Equal Access to Services     [facility]: Hadii aad ku hadasho Soomaali, waaxda luqadaha, qaybta kaalmada adeegyada, waxay idiin hayaan adeeg kharamacey la'karly . So [facility] [phone].    ATENCIÓN: Si habla español, tiene a moralez disposición servicios gratuitos de asistencia lingüística. Llame al [phone].    We comply with applicable federal civil rights laws and [place] laws. We do not discriminate on the basis of race, color, national origin, age, disability, sex, sexual orientation, or gender identity.            Thank you!     Thank you for choosing [facility]  for your care. Our goal is always to provide you with excellent care. Hearing back from our patients is one way we can continue to improve our services. Please take a few minutes to complete the written survey that you may receive in the mail after your visit with us. Thank you!             Your Updated Medication List - Protect others around you: Learn how to safely use, store and throw away your medicines at www.disposemymeds.org.          This list is accurate as of: 1/3/18  4:10 PM.  Always use  your most recent med list.                   Brand Name Dispense Instructions for use Diagnosis    ALPRAZolam 0.25 MG tablet    XANAX     Take 1 tablet (0.25 mg) by mouth 3 times daily as needed for anxiety        ANTIVERT PO      Take 25 mg by mouth every 6 hours as needed for dizziness        calcitonin (salmon) 200 UNIT/ACT nasal spray    MIACALCIN    3 Bottle    Spray 1 spray into one nostril alternating nostrils daily Alternate nostril each day.    Age-related osteoporosis without current pathological fracture       * CENTRUM SILVER per tablet      Take 1 tablet by mouth daily        * OCUVITE PRESERVISION PO      Take 1 tablet by mouth 2 times daily        esomeprazole 40 MG CR capsule    nexIUM    90 capsule    Take 1 capsule (40 mg) by mouth daily (with dinner)    Gastroesophageal reflux disease with esophagitis       fish oil-omega-3 fatty acids 1000 MG capsule      Take 1,000 mg by mouth 2 times daily        flaxseed oil 1000 MG Caps      Take 1,000 mg by mouth every evening        GLUCOSAMINE CHONDR COMPLEX PO      Take 1 capsule by mouth 2 times daily        ondansetron 4 MG ODT tab    ZOFRAN ODT    120 tablet    Take 1 tablet (4 mg) by mouth every 8 hours as needed for nausea    Nausea       triamterene-hydrochlorothiazide 37.5-25 MG per tablet    MAXZIDE-25    90 tablet    Take 1 tablet by mouth daily    Essential hypertension       vitamin D3 2000 UNITS Caps      Take 2,000 Units by mouth every morning        * Notice:  This list has 2 medication(s) that are the same as other medications prescribed for you. Read the directions carefully, and ask your doctor or other care provider to review them with you.

## 2018-01-25 ENCOUNTER — TELEPHONE (OUTPATIENT)
Dept: INTERNAL MEDICINE | Facility: CLINIC | Age: 73
End: 2018-01-25

## 2018-01-25 DIAGNOSIS — K21.9 GASTROESOPHAGEAL REFLUX DISEASE WITHOUT ESOPHAGITIS: Primary | ICD-10-CM

## 2018-01-25 DIAGNOSIS — R14.2 FLATULENCE, ERUCTATION AND GAS PAIN: ICD-10-CM

## 2018-01-25 DIAGNOSIS — R14.3 FLATULENCE, ERUCTATION AND GAS PAIN: ICD-10-CM

## 2018-01-25 DIAGNOSIS — R14.1 FLATULENCE, ERUCTATION AND GAS PAIN: ICD-10-CM

## 2018-01-25 NOTE — TELEPHONE ENCOUNTER
Pt calls stating she stopped the vitamins after she saw Dr Samuels on 12/20.   Didn't help very much. She takes Nexium and Zofran for nausea.     She is having a lot of belching, starting around 8 AM throughout the day until about 6 pm then resolves. Then starts again the next morning.   Sometimes there is acid reflux in the back of the throat. Nausea throughout the day too. She does prop the HOB up as well.     She states she belches even when she is talking to people and it is embarrassing.     She states she left a message on Monday this week but has not heard back, don't see anything in epic.     She states she would rather go see a specialist or have further testing done instead of making another OV.     Please advise.

## 2018-01-30 ENCOUNTER — TRANSFERRED RECORDS (OUTPATIENT)
Dept: HEALTH INFORMATION MANAGEMENT | Facility: CLINIC | Age: 73
End: 2018-01-30

## 2018-02-06 ENCOUNTER — TRANSFERRED RECORDS (OUTPATIENT)
Dept: HEALTH INFORMATION MANAGEMENT | Facility: CLINIC | Age: 73
End: 2018-02-06

## 2018-02-07 ENCOUNTER — TRANSFERRED RECORDS (OUTPATIENT)
Dept: HEALTH INFORMATION MANAGEMENT | Facility: CLINIC | Age: 73
End: 2018-02-07

## 2018-02-15 ENCOUNTER — OFFICE VISIT (OUTPATIENT)
Dept: INTERNAL MEDICINE | Facility: CLINIC | Age: 73
End: 2018-02-15
Payer: MEDICARE

## 2018-02-15 DIAGNOSIS — K21.9 GASTROESOPHAGEAL REFLUX DISEASE WITHOUT ESOPHAGITIS: ICD-10-CM

## 2018-02-15 DIAGNOSIS — I10 ESSENTIAL HYPERTENSION: ICD-10-CM

## 2018-02-15 DIAGNOSIS — R11.0 NAUSEA: ICD-10-CM

## 2018-02-15 DIAGNOSIS — F41.9 ANXIETY: Primary | ICD-10-CM

## 2018-02-15 PROCEDURE — 99214 OFFICE O/P EST MOD 30 MIN: CPT | Performed by: INTERNAL MEDICINE

## 2018-02-15 RX ORDER — PANTOPRAZOLE SODIUM 40 MG/1
40 TABLET, DELAYED RELEASE ORAL DAILY
Qty: 90 TABLET | COMMUNITY
Start: 2018-02-15 | End: 2018-02-15

## 2018-02-15 RX ORDER — PANTOPRAZOLE SODIUM 40 MG/1
40 TABLET, DELAYED RELEASE ORAL DAILY
Qty: 90 TABLET | Refills: 3 | Status: SHIPPED | OUTPATIENT
Start: 2018-02-15 | End: 2018-06-29

## 2018-02-15 RX ORDER — ALPRAZOLAM 0.5 MG
TABLET ORAL
Qty: 30 TABLET | Refills: 1 | Status: SHIPPED | OUTPATIENT
Start: 2018-02-15 | End: 2018-10-09

## 2018-02-15 RX ORDER — ONDANSETRON 4 MG/1
4 TABLET, ORALLY DISINTEGRATING ORAL EVERY 8 HOURS PRN
Qty: 120 TABLET | Refills: 1 | Status: SHIPPED | OUTPATIENT
Start: 2018-02-15 | End: 2019-01-29

## 2018-02-15 RX ORDER — ALPRAZOLAM 0.25 MG
0.25 TABLET ORAL 3 TIMES DAILY PRN
Qty: 90 TABLET | Status: CANCELLED | OUTPATIENT
Start: 2018-02-15

## 2018-02-15 ASSESSMENT — ANXIETY QUESTIONNAIRES
GAD7 TOTAL SCORE: 5
IF YOU CHECKED OFF ANY PROBLEMS ON THIS QUESTIONNAIRE, HOW DIFFICULT HAVE THESE PROBLEMS MADE IT FOR YOU TO DO YOUR WORK, TAKE CARE OF THINGS AT HOME, OR GET ALONG WITH OTHER PEOPLE: NOT DIFFICULT AT ALL
5. BEING SO RESTLESS THAT IT IS HARD TO SIT STILL: NOT AT ALL
1. FEELING NERVOUS, ANXIOUS, OR ON EDGE: SEVERAL DAYS
3. WORRYING TOO MUCH ABOUT DIFFERENT THINGS: SEVERAL DAYS
7. FEELING AFRAID AS IF SOMETHING AWFUL MIGHT HAPPEN: SEVERAL DAYS
2. NOT BEING ABLE TO STOP OR CONTROL WORRYING: SEVERAL DAYS
6. BECOMING EASILY ANNOYED OR IRRITABLE: SEVERAL DAYS

## 2018-02-15 ASSESSMENT — PATIENT HEALTH QUESTIONNAIRE - PHQ9: 5. POOR APPETITE OR OVEREATING: NOT AT ALL

## 2018-02-15 NOTE — NURSING NOTE
"Chief Complaint   Patient presents with     RECHECK     Gastrophageal Reflux     Anxiety     Hypertension     Knee Pain       Initial There were no vitals taken for this visit. Estimated body mass index is 23.05 kg/(m^2) as calculated from the following:    Height as of 12/20/17: 5' 3\" (1.6 m).    Weight as of 12/20/17: 130 lb 1.6 oz (59 kg).  Medication Reconciliation: complete    "

## 2018-02-15 NOTE — MR AVS SNAPSHOT
"              After Visit Summary   2/15/2018    Cynthia Arita    MRN: [id number]           Patient Information     Date Of Birth          1945        Visit Information        Provider Department      2/15/2018 10:20 AM Huyen Samuels MD [facility]        Today's Diagnoses     Anxiety    -  1    Nausea        Gastroesophageal reflux disease without esophagitis        Essential hypertension           Follow-ups after your visit        Who to contact     If you have questions or need follow up information about today's clinic visit or your schedule please contact [facility] directly at [phone].  Normal or non-critical lab and imaging results will be communicated to you by [facility], letter or phone within 4 business days after the clinic has received the results. If you do not hear from us within 7 days, please contact the clinic through BarBirdt or phone. If you have a critical or abnormal lab result, we will notify you by phone as soon as possible.  Submit refill requests through Polimetrix or call your pharmacy and they will forward the refill request to us. Please allow 3 business days for your refill to be completed.          Additional Information About Your Visit        MyChart Information     Polimetrix lets you send messages to your doctor, view your test results, renew your prescriptions, schedule appointments and more. To sign up, go to [email or web address] . Click on \"Log in\" on the left side of the screen, which will take you to the Welcome page. Then click on \"Sign up Now\" on the right side of the page.     You will be asked to enter the access code listed below, as well as some personal information. Please follow the directions to create your username and password.     Your access code is: [email or web address]  Expires: 2018 12:02 PM     Your access code will  in 90 days. If you need help or a new code, please call your [facility] clinic or [phone].      "   Care EveryWhere ID     This is your Care EveryWhere ID. This could be used by other organizations to access your [place] medical records  [id number]         Blood Pressure from Last 3 Encounters:   02/19/18 124/80   01/03/18 142/60   12/20/17 118/72    Weight from Last 3 Encounters:   12/20/17 130 lb 1.6 oz (59 kg)   11/16/17 132 lb 8 oz (60.1 kg)   10/11/17 128 lb (58.1 kg)              Today, you had the following     No orders found for display         Today's Medication Changes          These changes are accurate as of 2/15/18 11:59 PM.  If you have any questions, ask your nurse or doctor.               These medicines have changed or have updated prescriptions.        Dose/Directions    * ALPRAZolam 0.25 MG tablet   Commonly known as:  XANAX   This may have changed:  Another medication with the same name was added. Make sure you understand how and when to take each.        Dose:  0.25 mg   Take 1 tablet (0.25 mg) by mouth 3 times daily as needed for anxiety   Refills:  0       * ALPRAZolam 0.5 MG tablet   Commonly known as:  XANAX   This may have changed:  You were already taking a medication with the same name, and this prescription was added. Make sure you understand how and when to take each.   Used for:  Anxiety        Take 1/2 tab tid prn   Quantity:  30 tablet   Refills:  1       * Notice:  This list has 2 medication(s) that are the same as other medications prescribed for you. Read the directions carefully, and ask your doctor or other care provider to review them with you.         Where to get your medicines      These medications were sent to [facility] - [address], [facility] [address]     Phone:  [phone]     ondansetron 4 MG ODT tab    pantoprazole 40 MG EC tablet         Some of these will need a paper prescription and others can be bought over the counter.  Ask your nurse if you have questions.     Bring a paper  prescription for each of these medications     ALPRAZolam 0.5 MG tablet                Primary Care Provider Office Phone # Fax #    Huyen Samuels [phone]       [address] [facility] [address]  [facility] [address]        Equal Access to Services     MITESHTIN PAN : Hadii aad ku hadasho Soomaali, waaxda luqadaha, qaybta kaalmada adeegyada, waxay idiin hayaan adeeg khartissh lakaterina white. So [facility] [phone].    ATENCIÓN: Si habla español, tiene a moralez disposición servicios gratuitos de asistencia lingüística. Llame al [phone].    We comply with applicable federal civil rights laws and [place] laws. We do not discriminate on the basis of race, color, national origin, age, disability, sex, sexual orientation, or gender identity.            Thank you!     Thank you for choosing [facility]  for your care. Our goal is always to provide you with excellent care. Hearing back from our patients is one way we can continue to improve our services. Please take a few minutes to complete the written survey that you may receive in the mail after your visit with us. Thank you!             Your Updated Medication List - Protect others around you: Learn how to safely use, store and throw away your medicines at www.disposemymeds.org.          This list is accurate as of 2/15/18 11:59 PM.  Always use your most recent med list.                   Brand Name Dispense Instructions for use Diagnosis    * ALPRAZolam 0.25 MG tablet    XANAX     Take 1 tablet (0.25 mg) by mouth 3 times daily as needed for anxiety        * ALPRAZolam 0.5 MG tablet    XANAX    30 tablet    Take 1/2 tab tid prn    Anxiety       ANTIVERT PO      Take 25 mg by mouth every 6 hours as needed for dizziness        calcitonin (salmon) 200 UNIT/ACT nasal spray    MIACALCIN    3 Bottle    Spray 1 spray into one nostril alternating nostrils daily Alternate nostril each day.    Age-related osteoporosis without current pathological  fracture       * CENTRUM SILVER per tablet      Take 1 tablet by mouth daily        * OCUVITE PRESERVISION PO      Take 1 tablet by mouth 2 times daily        fish oil-omega-3 fatty acids 1000 MG capsule      Take 1,000 mg by mouth 2 times daily        flaxseed oil 1000 MG Caps      Take 1,000 mg by mouth every evening        GLUCOSAMINE CHONDR COMPLEX PO      Take 1 capsule by mouth 2 times daily        HERBALS      2 times daily as needed        ondansetron 4 MG ODT tab    ZOFRAN ODT    120 tablet    Take 1 tablet (4 mg) by mouth every 8 hours as needed for nausea    Nausea       pantoprazole 40 MG EC tablet    PROTONIX    90 tablet    Take 1 tablet (40 mg) by mouth daily Take 30-60 minutes before a meal.    Gastroesophageal reflux disease without esophagitis       triamterene-hydrochlorothiazide 37.5-25 MG per tablet    MAXZIDE-25    90 tablet    Take 1 tablet by mouth daily    Essential hypertension       vitamin D3 2000 UNITS Caps      Take 2,000 Units by mouth every morning        * Notice:  This list has 4 medication(s) that are the same as other medications prescribed for you. Read the directions carefully, and ask your doctor or other care provider to review them with you.

## 2018-02-15 NOTE — PROGRESS NOTES
SUBJECTIVE:   Cynthia Arita is a 72 year old female who presents to clinic today for the following health issues:    Saw GI. New meds helping.  Saw ortho for right knee. Injection helpeding.        Outpatient blood pressures are being checked at home.  Results are 120-130's/70-82.    Low Salt Diet: low salt      Amount of exercise or physical activity: Walks 30-60 mns a day.    Problems taking medications regularly: No    Medication side effects: none    Diet: low salt        HPI:   Her nausea and heartburn are better. She is needing less and less zofran but still needs it at times and currently needs a refill.    Her a.nx is also under good control with the Xanax but she needs a refill.    She got a steroid injection in her right knee with significant improvement. She is delighted.    Problem list and histories reviewed & adjusted, as indicated.  Additional history: as documented    Current Outpatient Prescriptions   Medication Sig Dispense Refill     HERBALS 2 times daily as needed       ondansetron (ZOFRAN ODT) 4 MG ODT tab Take 1 tablet (4 mg) by mouth every 8 hours as needed for nausea 120 tablet 1     ALPRAZolam (XANAX) 0.5 MG tablet Take 1/2 tab tid prn 30 tablet 1     pantoprazole (PROTONIX) 40 MG EC tablet Take 1 tablet (40 mg) by mouth daily Take 30-60 minutes before a meal. 90 tablet 3     triamterene-hydrochlorothiazide (MAXZIDE-25) 37.5-25 MG per tablet Take 1 tablet by mouth daily 90 tablet 3     calcitonin, salmon, (MIACALCIN) 200 UNIT/ACT nasal spray Spray 1 spray into one nostril alternating nostrils daily Alternate nostril each day. 3 Bottle 3     ALPRAZolam (XANAX) 0.25 MG tablet Take 1 tablet (0.25 mg) by mouth 3 times daily as needed for anxiety       Meclizine HCl (ANTIVERT PO) Take 25 mg by mouth every 6 hours as needed for dizziness       Flaxseed, Linseed, (FLAXSEED OIL) 1000 MG CAPS Take 1,000 mg by mouth every evening        Multiple Vitamins-Minerals (CENTRUM SILVER) per tablet Take 1  tablet by mouth daily       Glucosamine-Chondroitin (GLUCOSAMINE CHONDR COMPLEX PO) Take 1 capsule by mouth 2 times daily        Cholecalciferol (VITAMIN D3) 2000 UNITS CAPS Take 2,000 Units by mouth every morning        Multiple Vitamins-Minerals (OCUVITE PRESERVISION PO) Take 1 tablet by mouth 2 times daily        Omega-3 Fatty Acids (OMEGA-3 FISH OIL) 1000 MG CAPS Take 1,000 mg by mouth 2 times daily        BP Readings from Last 3 Encounters:   01/03/18 142/60   12/20/17 118/72   11/16/17 116/70    Wt Readings from Last 3 Encounters:   12/20/17 130 lb 1.6 oz (59 kg)   11/16/17 132 lb 8 oz (60.1 kg)   10/11/17 128 lb (58.1 kg)                    Reviewed and updated as needed this visit by clinical staff  Allergies       Reviewed and updated as needed this visit by Provider         ROS:  Constitutional, HEENT, cardiovascular, pulmonary, GI, , musculoskeletal, neuro, skin, endocrine and psych systems are negative, except as otherwise noted.    OBJECTIVE:     There were no vitals taken for this visit.  There is no height or weight on file to calculate BMI.  GENERAL: healthy, alert and no distress  NECK: no adenopathy, no asymmetry, masses, or scars and thyroid normal to palpation  RESP: lungs clear to auscultation - no rales, rhonchi or wheezes  CV: regular rate and rhythm, normal S1 S2, no S3 or S4, no murmur, click or rub, no peripheral edema and peripheral pulses strong  ABDOMEN: soft, nontender, no hepatosplenomegaly, no masses and bowel sounds normal  MS: no gross musculoskeletal defects noted, no edema  PSYCH: mentation appears normal, affect normal/bright    ASSESSMENT/PLAN:       1. Nausea  Resolving as her GERD gets treated. Did refill Zofran for now.  - ondansetron (ZOFRAN ODT) 4 MG ODT tab; Take 1 tablet (4 mg) by mouth every 8 hours as needed for nausea  Dispense: 120 tablet; Refill: 1    2. Anxiety  under good control Continue current medications.   - ALPRAZolam (XANAX) 0.5 MG tablet; Take 1/2 tab  tid prn  Dispense: 30 tablet; Refill: 1    3. Gastroesophageal reflux disease without esophagitis  Making progress, Continue current medications.   - pantoprazole (PROTONIX) 40 MG EC tablet; Take 1 tablet (40 mg) by mouth daily Take 30-60 minutes before a meal.  Dispense: 90 tablet; Refill: 3    4. Essential hypertension  under good control Continue current medications.       Huyen Samuels MD  [facility]

## 2018-02-16 ASSESSMENT — ANXIETY QUESTIONNAIRES: GAD7 TOTAL SCORE: 5

## 2018-02-16 ASSESSMENT — PATIENT HEALTH QUESTIONNAIRE - PHQ9: SUM OF ALL RESPONSES TO PHQ QUESTIONS 1-9: 3

## 2018-02-19 VITALS — SYSTOLIC BLOOD PRESSURE: 124 MMHG | DIASTOLIC BLOOD PRESSURE: 80 MMHG

## 2018-03-22 ENCOUNTER — TELEPHONE (OUTPATIENT)
Dept: INTERNAL MEDICINE | Facility: CLINIC | Age: 73
End: 2018-03-22

## 2018-03-22 NOTE — TELEPHONE ENCOUNTER
Reason for Call:  Medication or medication refill:    Do you use a [facility]?  Name of the pharmacy and phone number for the current request:  [facility] [phone]    Name of the medication requested: ondansetron (zofran)    Other request: patient needs prior auth on this rx, please call [phone], without that it's a $700 rx vs $0 copay    Can we leave a detailed message on this number? YES    Phone number patient can be reached at: Cell number on file:    Telephone Information:   Mobile [phone]       Best Time: any    Call taken on 3/22/2018 at 11:33 AM by Karlee Topete

## 2018-03-23 NOTE — TELEPHONE ENCOUNTER
Central Prior Authorization Team   Phone: [phone]      PA Initiation    Medication: Zofran - Initiated  Insurance Company: [employer] ([facility]) - Phone [phone] Fax [phone]  Pharmacy Filling the Rx: [facility] - [address]  Filling Pharmacy Phone: [phone]  Filling Pharmacy Fax:    Start Date: 3/23/2018        PA initiated by phone

## 2018-03-27 ENCOUNTER — TRANSFERRED RECORDS (OUTPATIENT)
Dept: HEALTH INFORMATION MANAGEMENT | Facility: CLINIC | Age: 73
End: 2018-03-27

## 2018-04-18 ENCOUNTER — TRANSFERRED RECORDS (OUTPATIENT)
Dept: HEALTH INFORMATION MANAGEMENT | Facility: CLINIC | Age: 73
End: 2018-04-18

## 2018-04-23 ENCOUNTER — TRANSFERRED RECORDS (OUTPATIENT)
Dept: HEALTH INFORMATION MANAGEMENT | Facility: CLINIC | Age: 73
End: 2018-04-23

## 2018-04-23 ENCOUNTER — HOSPITAL ENCOUNTER (OUTPATIENT)
Dept: GENERAL RADIOLOGY | Facility: CLINIC | Age: 73
Discharge: HOME | End: 2018-04-23
Attending: PHYSICIAN ASSISTANT | Admitting: PHYSICIAN ASSISTANT
Payer: MEDICARE

## 2018-04-23 DIAGNOSIS — K21.9 GASTROESOPHAGEAL REFLUX DISEASE WITHOUT ESOPHAGITIS: ICD-10-CM

## 2018-04-23 DIAGNOSIS — R11.2 NAUSEA AND VOMITING, INTRACTABILITY OF VOMITING NOT SPECIFIED, UNSPECIFIED VOMITING TYPE: ICD-10-CM

## 2018-04-23 DIAGNOSIS — R14.2 BELCHING: ICD-10-CM

## 2018-04-23 PROCEDURE — 74019 RADEX ABDOMEN 2 VIEWS: CPT

## 2018-05-02 ENCOUNTER — TELEPHONE (OUTPATIENT)
Dept: INTERNAL MEDICINE | Facility: CLINIC | Age: 73
End: 2018-05-02

## 2018-05-02 ENCOUNTER — TRANSFERRED RECORDS (OUTPATIENT)
Dept: HEALTH INFORMATION MANAGEMENT | Facility: CLINIC | Age: 73
End: 2018-05-02

## 2018-05-02 NOTE — TELEPHONE ENCOUNTER
Dr. Parker ENT called recommending pt be seen by a psychologist.  Pt is concerned/thinks she has tumors in her head and/or ears. Has tinnitus and menere's disease. Dr. Parker feels since she has anxiety this is causing her many concerns and feels having pt discuss her reasoning will be of help.  Spoke with Dr. Parker and told him to have pt call our scheduling line to make an appt with our psychologist. Dr. Parker gave pt info for appt and will have her call to schedule appt.      Sarah Tejeda RN

## 2018-05-15 ENCOUNTER — TRANSFERRED RECORDS (OUTPATIENT)
Dept: HEALTH INFORMATION MANAGEMENT | Facility: CLINIC | Age: 73
End: 2018-05-15

## 2018-05-17 ENCOUNTER — TELEPHONE (OUTPATIENT)
Dept: INTERNAL MEDICINE | Facility: CLINIC | Age: 73
End: 2018-05-17

## 2018-05-17 NOTE — TELEPHONE ENCOUNTER
Patient had an ortho and a GI appt this week and at both appts she was told her BP was running high.  She cannot remember exactly what the readings were but thinks 160-180/82-83.  She has not been feeling well lately, has had R knee pain and received shot in knee this week, also vertigo is acting up again and she has been more bothered by GERD sx.  Denies HA but sometimes feels pressure in her head.  Does not check BP at home as she is not convinced her monitor works and it tends to cause her to get more anxious.  Patient is currently taking Triamterene-HCTZ 37.5-25 mg once a day.    There was an opening tomorrow morning on Dr. Oconnor's schedule so pt was put in the spot.  If any instruction before then call pt.  YUE Avalos R.N.

## 2018-05-18 ENCOUNTER — OFFICE VISIT (OUTPATIENT)
Dept: INTERNAL MEDICINE | Facility: CLINIC | Age: 73
End: 2018-05-18
Payer: MEDICARE

## 2018-05-18 DIAGNOSIS — F41.9 ANXIETY: ICD-10-CM

## 2018-05-18 DIAGNOSIS — I10 ESSENTIAL HYPERTENSION: Primary | ICD-10-CM

## 2018-05-18 DIAGNOSIS — M81.0 AGE-RELATED OSTEOPOROSIS WITHOUT CURRENT PATHOLOGICAL FRACTURE: ICD-10-CM

## 2018-05-18 DIAGNOSIS — H81.03 MENIERE'S DISEASE, BILATERAL: ICD-10-CM

## 2018-05-18 PROCEDURE — 99214 OFFICE O/P EST MOD 30 MIN: CPT | Performed by: INTERNAL MEDICINE

## 2018-05-18 RX ORDER — CALCITONIN SALMON 200 [IU]/.09ML
1 SPRAY, METERED NASAL DAILY
Qty: 3 BOTTLE | Refills: 1 | Status: SHIPPED | OUTPATIENT
Start: 2018-05-18 | End: 2018-10-06

## 2018-05-18 RX ORDER — PROPRANOLOL HYDROCHLORIDE 20 MG/1
20 TABLET ORAL DAILY PRN
Qty: 30 TABLET | Refills: 0 | Status: SHIPPED | OUTPATIENT
Start: 2018-05-18 | End: 2018-05-22

## 2018-05-18 RX ORDER — TRIAMTERENE/HYDROCHLOROTHIAZID 37.5-25 MG
1 TABLET ORAL DAILY
Qty: 90 TABLET | Refills: 1 | Status: SHIPPED | OUTPATIENT
Start: 2018-05-18 | End: 2018-10-06

## 2018-05-18 NOTE — MR AVS SNAPSHOT
"              After Visit Summary   5/18/2018    Cynthia Arita    MRN: [id number]           Patient Information     Date Of Birth          1945        Visit Information        Provider Department      5/18/2018 10:20 AM Senait Antunez MD [facility]        Today's Diagnoses     Essential hypertension    -  1      Care Instructions    Plan:  1. Check the blood pressure at home   2. If the blood pressure stays constantly higher than 170 take 1 tablet propranolol 20 mg  3. If you resume the supplements do them one by one every 2-3 weeks   4. Continue same meds, same doses for now           Follow-ups after your visit        Your next 10 appointments already scheduled     Jun 07, 2018 10:00 AM CDT   MA SCREENING DIGITAL BILATERAL with [id number]   [facility] ([facility])    [address], [address]  [facility] [address] [phone]           Do not use any powder, lotion or deodorant under your arms or on your breast. If you do, we will ask you to remove it before your exam.  Wear comfortable, two-piece clothing.  If you have any allergies, tell your care team.  Bring any previous mammograms from other facilities or have them mailed to the breast center. Three-dimensional (3D) mammograms are available at [facility] locations in [facility], [facility], [facility], and [place]. [facility] locations include [place] [facility] in [place]. Benefits of 3D mammograms include: - Improved rate of cancer detection - Decreases your chance of having to go back for more tests, which means fewer: - \"False-positive\" results (This means that there is an abnormal area but it isn't cancer.) - Invasive testing procedures, such as a biopsy or surgery - Can provide clearer images of the breast if you have dense breast tissue. 3D mammography is an optional exam that anyone can have with a 2D mammogram. It " "doesn't replace or take the place of a 2D mammogram. 2D mammograms remain an effective screening test for all women.  Not all insurance companies cover the cost of a 3D mammogram. Check with your insurance.              Who to contact     If you have questions or need follow up information about today's clinic visit or your schedule please contact [facility] directly at [phone].  Normal or non-critical lab and imaging results will be communicated to you by Joosyhart, letter or phone within 4 business days after the clinic has received the results. If you do not hear from us within 7 days, please contact the clinic through Joosyhart or phone. If you have a critical or abnormal lab result, we will notify you by phone as soon as possible.  Submit refill requests through TuneWiki or call your pharmacy and they will forward the refill request to us. Please allow 3 business days for your refill to be completed.          Additional Information About Your Visit        [employer] Information     TuneWiki lets you send messages to your doctor, view your test results, renew your prescriptions, schedule appointments and more. To sign up, go to [email or web address] . Click on \"Log in\" on the left side of the screen, which will take you to the Welcome page. Then click on \"Sign up Now\" on the right side of the page.     You will be asked to enter the access code listed below, as well as some personal information. Please follow the directions to create your username and password.     Your access code is: [email or web address]  Expires: 2018  1:02 PM     Your access code will  in 90 days. If you need help or a new code, please call your [facility] clinic or [phone].        Care EveryWhere ID     This is your Care EveryWhere ID. This could be used by other organizations to access your [facility] medical records  [id number]        Your Vitals Were     Pulse Temperature Respirations Height Pulse Oximetry BMI (Body " "Mass Index)    75 97.5  F (36.4  C) (Oral) 20 5' 2.5\" (1.588 m) 97% 24.48 kg/m2       Blood Pressure from Last 3 Encounters:   05/18/18 142/80   02/19/18 124/80   01/03/18 142/60    Weight from Last 3 Encounters:   05/18/18 136 lb (61.7 kg)   12/20/17 130 lb 1.6 oz (59 kg)   11/16/17 132 lb 8 oz (60.1 kg)              Today, you had the following     No orders found for display         Today's Medication Changes          These changes are accurate as of 5/18/18 10:58 AM.  If you have any questions, ask your nurse or doctor.               These medicines have changed or have updated prescriptions.        Dose/Directions    ALPRAZolam 0.5 MG tablet   Commonly known as:  XANAX   This may have changed:  Another medication with the same name was removed. Continue taking this medication, and follow the directions you see here.   Used for:  Anxiety   Changed by:  Senait Antunez MD        Take 1/2 tab tid prn   Quantity:  30 tablet   Refills:  1                Primary Care Provider Office Phone # Fax #    Huyen Julissa Samuels [phone]       [address] [id number]        Equal Access to Services     [facility]: Hadii aad ku hadasho Soomaali, waaxda luqadaha, qaybta kaalmada adeegyada, waxay idiin hayaan adedarwin khtessy la'karly . So [facility] [phone].    ATENCIÓN: Si habla español, tiene a moralez disposición servicios gratuitos de asistencia lingüística. Llame al [phone].    We comply with applicable federal civil rights laws and [place] laws. We do not discriminate on the basis of race, color, national origin, age, disability, sex, sexual orientation, or gender identity.            Thank you!     Thank you for choosing [facility]  for your care. Our goal is always to provide you with excellent care. Hearing back from our patients is one way we can continue to improve our services. Please take a few minutes to complete the written survey that " you may receive in the mail after your visit with us. Thank you!             Your Updated Medication List - Protect others around you: Learn how to safely use, store and throw away your medicines at www.disposemymeds.org.          This list is accurate as of 5/18/18 10:58 AM.  Always use your most recent med list.                   Brand Name Dispense Instructions for use Diagnosis    ALPRAZolam 0.5 MG tablet    XANAX    30 tablet    Take 1/2 tab tid prn    Anxiety       ANTIVERT PO      Take 25 mg by mouth every 6 hours as needed for dizziness        calcitonin (salmon) 200 UNIT/ACT nasal spray    MIACALCIN    3 Bottle    Spray 1 spray into one nostril alternating nostrils daily Alternate nostril each day.    Age-related osteoporosis without current pathological fracture       * CENTRUM SILVER per tablet      Take 1 tablet by mouth daily        * OCUVITE PRESERVISION PO      Take 1 tablet by mouth 2 times daily        fish oil-omega-3 fatty acids 1000 MG capsule      Take 1,000 mg by mouth 2 times daily        flaxseed oil 1000 MG Caps      Take 1,000 mg by mouth every evening        GLUCOSAMINE CHONDR COMPLEX PO      Take 1 capsule by mouth 2 times daily        HERBALS      2 times daily as needed        ondansetron 4 MG ODT tab    ZOFRAN ODT    120 tablet    Take 1 tablet (4 mg) by mouth every 8 hours as needed for nausea    Nausea       pantoprazole 40 MG EC tablet    PROTONIX    90 tablet    Take 1 tablet (40 mg) by mouth daily Take 30-60 minutes before a meal.    Gastroesophageal reflux disease without esophagitis       triamterene-hydrochlorothiazide 37.5-25 MG per tablet    MAXZIDE-25    90 tablet    Take 1 tablet by mouth daily    Essential hypertension       vitamin D3 2000 units Caps      Take 2,000 Units by mouth every morning        * Notice:  This list has 2 medication(s) that are the same as other medications prescribed for you. Read the directions carefully, and ask your doctor or other care provider  to review them with you.

## 2018-05-18 NOTE — PATIENT INSTRUCTIONS
Plan:  1. Check the blood pressure at home   2. If the blood pressure stays constantly higher than 170 take 1 tablet propranolol 20 mg  3. If you resume the supplements do them one by one every 2-3 weeks   4. Continue same meds, same doses for now

## 2018-05-18 NOTE — PROGRESS NOTES
Dr Oconnor's note      Patient's instructions / PLAN:                                                        Plan:  1. Check the blood pressure at home   2. If the blood pressure stays constantly higher than 170 take 1 tablet propranolol 20 mg  3. If you resume the supplements do them one by one every 2-3 weeks   4. Continue same meds, same doses for now     ASSESSMENT & PLAN:                                                      (I10) Essential hypertension  (primary encounter diagnosis)  Comment: I think her blood pressure is controlled but indeed goes high with anxiety.  I advised her if her blood pressure stays constantly higher at home she should take alprazolam and propranolol.  Propranolol also might help her with anxiety.  No need to change on her daily medications at this time  Plan: propranolol (INDERAL) 20 MG tablet,         triamterene-hydrochlorothiazide (MAXZIDE-25)         37.5-25 MG per tablet            (F41.9) Anxiety  Comment: Not controlled, but she does not need a daily medication  Plan: As needed alprazolam    (H81.03) Meniere's disease, bilateral  Comment: On and off symptoms  Plan: Follow-up with ENT    (M81.0) Age-related osteoporosis without current pathological fracture  Comment: No fractures  Plan: calcitonin, salmon, (MIACALCIN) 200 UNIT/ACT         nasal spray                 Chief complaint:                                                      HTN  Anxiety       SUBJECTIVE:                                                    Cynthia Arita is a 72 year old female who presents to clinic today for the following health issues:    HTN  --She had orthopedic appointment and GI appointment and she was told her blood pressure was 160-180/82-83 and she should see her primary care doctor.  She admits she has anxiety which goes higher ones she goes to the doctor's office.  --In the past for her blood pressure was high she ended up in the emergency room.  She would like to know what to do if her  "blood pressure goes again very high    She has been dealing with Ménière disease and vertigo for good years.  She had surgery for Ménière's disease in 2002.    She thinks that tinnitus causes anxiety and both of them cause tiredness around the temples.  No blurred vision, no numbness tingling or weakness in arms or legs      I checked the BP 3 times today myslef: 132-134/70    She admits she has anxiety.  She takes as needed alprazolam.  She does not take it daily    Questions about vit D, flaxseed, omega 3 acid, glucosamine - dr Samuels advised her to stop these because of the GERD. She takes protonix bid , getting beter     Hypertension Follow-up      Outpatient blood pressures are being checked at home. Results are high .-not doing anymore     Low Salt Diet: low salt       Amount of exercise or physical activity: 2-3 days/week for an average of 30-45 minutes    Problems taking medications regularly: No    Medication side effects: doesn't  Know     Diet: regular (no restrictions) and low salt        Review of Systems:                                                      ROS: negative for fever, chills, cough, wheezes, chest pain, shortness of breath, vomiting, abdominal pain, leg swelling      OBJECTIVE:             Physical exam:   Blood pressure 132/70, pulse 75, temperature 97.5  F (36.4  C), temperature source Oral, resp. rate 20, height 5' 2.5\" (1.588 m), weight 136 lb (61.7 kg), SpO2 97 %, not currently breastfeeding.     NAD, appears comfortable  Skin: no rashes     Neck: supple, no JVD, no thyroidmegaly. Lymph nodes nonpalpable cervical and supraclavicular.  Chest: clear to auscultation bilaterally, good respiratory effort  Heart: S1 S2, RRR, no mgr appreciated  Abdomen: soft, not tender, no hepatosplenomegaly or masses appreciated, no abdominal bruit, present bowel sounds  Extremities: no edema,  Neurologic: A, Ox3, no focal signs appreciated  Good eye contact. Speech with normal volume, pattern, tone. " Thought process seems coherent, logical. Standard dressed appearance. Mood anxious     PMHx: reviewed  Past Medical History:   Diagnosis Date     Diverticulosis      GERD (gastroesophageal reflux disease)      HTN (hypertension)      Meniere's disease      Nephrolithiasis       PSHx: reviewed  Past Surgical History:   Procedure Laterality Date     C VAGINAL HYSTERECTOMY      with left oophorectomy        Meds: reviewed  Current Outpatient Prescriptions   Medication Sig Dispense Refill     ALPRAZolam (XANAX) 0.5 MG tablet Take 1/2 tab tid prn 30 tablet 1     calcitonin, salmon, (MIACALCIN) 200 UNIT/ACT nasal spray Spray 1 spray into one nostril alternating nostrils daily Alternate nostril each day. 3 Bottle 3     Flaxseed, Linseed, (FLAXSEED OIL) 1000 MG CAPS Take 1,000 mg by mouth every evening        Glucosamine-Chondroitin (GLUCOSAMINE CHONDR COMPLEX PO) Take 1 capsule by mouth 2 times daily        Meclizine HCl (ANTIVERT PO) Take 25 mg by mouth every 6 hours as needed for dizziness       ondansetron (ZOFRAN ODT) 4 MG ODT tab Take 1 tablet (4 mg) by mouth every 8 hours as needed for nausea 120 tablet 1     pantoprazole (PROTONIX) 40 MG EC tablet Take 1 tablet (40 mg) by mouth daily Take 30-60 minutes before a meal. 90 tablet 3     triamterene-hydrochlorothiazide (MAXZIDE-25) 37.5-25 MG per tablet Take 1 tablet by mouth daily 90 tablet 3     Cholecalciferol (VITAMIN D3) 2000 UNITS CAPS Take 2,000 Units by mouth every morning        HERBALS 2 times daily as needed       Multiple Vitamins-Minerals (CENTRUM SILVER) per tablet Take 1 tablet by mouth daily       Multiple Vitamins-Minerals (OCUVITE PRESERVISION PO) Take 1 tablet by mouth 2 times daily        Omega-3 Fatty Acids (OMEGA-3 FISH OIL) 1000 MG CAPS Take 1,000 mg by mouth 2 times daily          Soc Hx: reviewed  Fam Hx: reviewed          Senait Oconnor MD  Internal Medicine

## 2018-05-20 VITALS
WEIGHT: 136 LBS | HEIGHT: 63 IN | OXYGEN SATURATION: 97 % | RESPIRATION RATE: 20 BRPM | BODY MASS INDEX: 24.1 KG/M2 | TEMPERATURE: 97.5 F | DIASTOLIC BLOOD PRESSURE: 70 MMHG | SYSTOLIC BLOOD PRESSURE: 132 MMHG | HEART RATE: 75 BPM

## 2018-05-21 ENCOUNTER — ALLIED HEALTH/NURSE VISIT (OUTPATIENT)
Dept: NURSING | Facility: CLINIC | Age: 73
End: 2018-05-21
Payer: MEDICARE

## 2018-05-21 VITALS — DIASTOLIC BLOOD PRESSURE: 64 MMHG | SYSTOLIC BLOOD PRESSURE: 134 MMHG

## 2018-05-21 DIAGNOSIS — I10 ESSENTIAL HYPERTENSION: Primary | ICD-10-CM

## 2018-05-21 PROCEDURE — 99207 ZZC NO CHARGE NURSE ONLY: CPT

## 2018-05-22 DIAGNOSIS — I10 ESSENTIAL HYPERTENSION: ICD-10-CM

## 2018-05-22 RX ORDER — PROPRANOLOL HYDROCHLORIDE 20 MG/1
20 TABLET ORAL DAILY PRN
Qty: 90 TABLET | Refills: 0 | Status: SHIPPED | OUTPATIENT
Start: 2018-05-22 | End: 2019-01-10

## 2018-05-22 NOTE — TELEPHONE ENCOUNTER
Patient reqeusting Propranolol rx for qty of 90.  Uses as needed BP higher than 170.  Allergy/contraindication associated with this med.  YUE Avalos R.N.

## 2018-05-23 ENCOUNTER — INFUSION THERAPY VISIT (OUTPATIENT)
Dept: INFUSION THERAPY | Facility: CLINIC | Age: 73
End: 2018-05-23
Attending: OTOLARYNGOLOGY
Payer: MEDICARE

## 2018-05-23 VITALS
HEART RATE: 77 BPM | OXYGEN SATURATION: 98 % | SYSTOLIC BLOOD PRESSURE: 113 MMHG | RESPIRATION RATE: 16 BRPM | TEMPERATURE: 98.1 F | DIASTOLIC BLOOD PRESSURE: 70 MMHG

## 2018-05-23 DIAGNOSIS — H93.19 TINNITUS, UNSPECIFIED LATERALITY: Primary | ICD-10-CM

## 2018-05-23 DIAGNOSIS — H81.03 MENIERE'S DISEASE OF BOTH EARS: ICD-10-CM

## 2018-05-23 PROCEDURE — 25000128 H RX IP 250 OP 636: Performed by: OTOLARYNGOLOGY

## 2018-05-23 PROCEDURE — 96365 THER/PROPH/DIAG IV INF INIT: CPT

## 2018-05-23 RX ADMIN — SODIUM CHLORIDE 1000 MG: 0.9 INJECTION, SOLUTION INTRAVENOUS at 13:39

## 2018-05-23 NOTE — PROGRESS NOTES
Infusion Nursing Note:  Cynthia Arita presents today for solumedrol.    Patient seen by provider today: No   present during visit today: Not Applicable.    Note: Written information given on solumedrol.    Intravenous Access:  Peripheral IV placed.    Treatment Conditions:  Not Applicable.      Post Infusion Assessment:  Patient tolerated infusion without incident.  Site patent and intact, free from redness, edema or discomfort.  No evidence of extravasations.  Access discontinued per protocol.    Discharge Plan:   Discharge instructions reviewed with: Patient.  Copy of AVS reviewed with patient and/or family.  Patient will return 5/24 for next appointment.  Patient discharged in stable condition accompanied by: self.  Departure Mode: Ambulatory.    Baron Graff RN

## 2018-05-23 NOTE — MR AVS SNAPSHOT
"              After Visit Summary   5/23/2018    Cynthia Arita    MRN: [id number]           Patient Information     Date Of Birth          1945        Visit Information        Provider Department      5/23/2018 1:30 PM  INFUSION CHAIR 6 [facility]        Today's Diagnoses     Tinnitus, unspecified laterality    -  1    Meniere's disease of both ears           Follow-ups after your visit        Your next 10 appointments already scheduled     May 24, 2018  2:00 PM CDT   Level 1 with  INFUSION CHAIR 3   [facility] ([facility])    [facility]  [address]  [place] [address]   [phone]            May 25, 2018 12:00 PM CDT   Level 1 with  INFUSION CHAIR 8   [facility] ([facility])    [facility]  [address]  [place] [address]   [phone]            Jun 07, 2018 10:00 AM CDT   MA SCREENING DIGITAL BILATERAL with RHBCMA2   [facility] ([facility])    [address]  [facility] [address]   [phone]           Do not use any powder, lotion or deodorant under your arms or on your breast. If you do, we will ask you to remove it before your exam.  Wear comfortable, two-piece clothing.  If you have any allergies, tell your care team.  Bring any previous mammograms from other facilities or have them mailed to the breast center. Three-dimensional (3D) mammograms are available at [place] locations in [facility], [facility], [facility], and [place]. [facility] locations include [place] and [facility] in [place]. Benefits of 3D mammograms include: - Improved rate of cancer detection - Decreases your chance of having to go back for more tests, which means fewer: - \"False-positive\" results (This means that there " "is an abnormal area but it isn't cancer.) - Invasive testing procedures, such as a biopsy or surgery - Can provide clearer images of the breast if you have dense breast tissue. 3D mammography is an optional exam that anyone can have with a 2D mammogram. It doesn't replace or take the place of a 2D mammogram. 2D mammograms remain an effective screening test for all women.  Not all insurance companies cover the cost of a 3D mammogram. Check with your insurance.              Who to contact     If you have questions or need follow up information about today's clinic visit or your schedule please contact [facility] [place] directly at [phone].  Normal or non-critical lab and imaging results will be communicated to you by Radicohart, letter or phone within 4 business days after the clinic has received the results. If you do not hear from us within 7 days, please contact the clinic through Radicohart or phone. If you have a critical or abnormal lab result, we will notify you by phone as soon as possible.  Submit refill requests through Rallyhood or call your pharmacy and they will forward the refill request to us. Please allow 3 business days for your refill to be completed.          Additional Information About Your Visit        Radicohart Information     Rallyhood lets you send messages to your doctor, view your test results, renew your prescriptions, schedule appointments and more. To sign up, go to [email or web address] . Click on \"Log in\" on the left side of the screen, which will take you to the Welcome page. Then click on \"Sign up Now\" on the right side of the page.     You will be asked to enter the access code listed below, as well as some personal information. Please follow the directions to create your username and password.     Your access code is: [email or web address]  Expires: 2018  3:19 PM     Your access code will  in 90 days. If you need help or a new code, please call your [place] " clinic or [phone].        Care EveryWhere ID     This is your Care EveryWhere ID. This could be used by other organizations to access your [place] medical records  [id number]        Your Vitals Were     Pulse Temperature Respirations Pulse Oximetry          77 98.1  F (36.7  C) (Oral) 16 98%         Blood Pressure from Last 3 Encounters:   05/23/18 113/70   05/21/18 134/64   05/18/18 132/70    Weight from Last 3 Encounters:   05/18/18 61.7 kg (136 lb)   12/20/17 59 kg (130 lb 1.6 oz)   11/16/17 60.1 kg (132 lb 8 oz)              Today, you had the following     No orders found for display       Primary Care Provider Office Phone # Fax #    Huyen Samuels [phone]       [address]  [facility] [address]        Equal Access to Services     [facility]: Hadii aad ku hadasho Soomaali, waaxda luqadaha, qaybta kaalmada adeegyada, waxay sagarin haystaceyn sharmin lyons . So [facility] [phone].    ATENCIÓN: Si habla español, tiene a moralez disposición servicios gratuitos de asistencia lingüística. Vanessa al [phone].    We comply with applicable federal civil rights laws and [place] laws. We do not discriminate on the basis of race, color, national origin, age, disability, sex, sexual orientation, or gender identity.            Thank you!     Thank you for choosing [facility] INFUSION CENTER  for your care. Our goal is always to provide you with excellent care. Hearing back from our patients is one way we can continue to improve our services. Please take a few minutes to complete the written survey that you may receive in the mail after your visit with us. Thank you!             Your Updated Medication List - Protect others around you: Learn how to safely use, store and throw away your medicines at www.disposemymeds.org.          This list is accurate as of 5/23/18  1:55 PM.  Always use your most recent med list.                   Brand Name Dispense  Instructions for use Diagnosis    ALPRAZolam 0.5 MG tablet    XANAX    30 tablet    Take 1/2 tab tid prn    Anxiety       calcitonin (salmon) 200 UNIT/ACT nasal spray    MIACALCIN    3 Bottle    Spray 1 spray into one nostril alternating nostrils daily Alternate nostril each day.    Age-related osteoporosis without current pathological fracture       * CENTRUM SILVER per tablet      Take 1 tablet by mouth daily        * OCUVITE PRESERVISION PO      Take 1 tablet by mouth 2 times daily        fish oil-omega-3 fatty acids 1000 MG capsule      Take 1,000 mg by mouth 2 times daily        flaxseed oil 1000 MG Caps      Take 1,000 mg by mouth every evening        GLUCOSAMINE CHONDR COMPLEX PO      Take 1 capsule by mouth 2 times daily        HERBALS      2 times daily as needed        ondansetron 4 MG ODT tab    ZOFRAN ODT    120 tablet    Take 1 tablet (4 mg) by mouth every 8 hours as needed for nausea    Nausea       pantoprazole 40 MG EC tablet    PROTONIX    90 tablet    Take 1 tablet (40 mg) by mouth daily Take 30-60 minutes before a meal.    Gastroesophageal reflux disease without esophagitis       propranolol 20 MG tablet    INDERAL    90 tablet    Take 1 tablet (20 mg) by mouth daily as needed For blood pressure higher than 170    Essential hypertension       triamterene-hydrochlorothiazide 37.5-25 MG per tablet    MAXZIDE-25    90 tablet    Take 1 tablet by mouth daily    Essential hypertension       vitamin D3 2000 units Caps      Take 2,000 Units by mouth every morning        * Notice:  This list has 2 medication(s) that are the same as other medications prescribed for you. Read the directions carefully, and ask your doctor or other care provider to review them with you.

## 2018-05-24 ENCOUNTER — INFUSION THERAPY VISIT (OUTPATIENT)
Dept: INFUSION THERAPY | Facility: CLINIC | Age: 73
End: 2018-05-24
Attending: OTOLARYNGOLOGY
Payer: MEDICARE

## 2018-05-24 VITALS
HEART RATE: 77 BPM | RESPIRATION RATE: 16 BRPM | DIASTOLIC BLOOD PRESSURE: 71 MMHG | SYSTOLIC BLOOD PRESSURE: 138 MMHG | TEMPERATURE: 97.8 F

## 2018-05-24 DIAGNOSIS — H93.19 TINNITUS, UNSPECIFIED LATERALITY: Primary | ICD-10-CM

## 2018-05-24 DIAGNOSIS — H81.03 MENIERE'S DISEASE OF BOTH EARS: ICD-10-CM

## 2018-05-24 PROCEDURE — 96365 THER/PROPH/DIAG IV INF INIT: CPT

## 2018-05-24 PROCEDURE — 25000128 H RX IP 250 OP 636: Performed by: OTOLARYNGOLOGY

## 2018-05-24 RX ADMIN — SODIUM CHLORIDE 1000 MG: 0.9 INJECTION, SOLUTION INTRAVENOUS at 14:05

## 2018-05-24 ASSESSMENT — PAIN SCALES - GENERAL: PAINLEVEL: NO PAIN (0)

## 2018-05-24 NOTE — MR AVS SNAPSHOT
"              After Visit Summary   5/24/2018    Cynthia Arita    MRN: [id number]           Patient Information     Date Of Birth          1945        Visit Information        Provider Department      5/24/2018 2:00 PM  INFUSION CHAIR 3 [facility]        Today's Diagnoses     Tinnitus, unspecified laterality    -  1    Meniere's disease of both ears           Follow-ups after your visit        Your next 10 appointments already scheduled     May 25, 2018 12:00 PM CDT   Level 1 with  INFUSION CHAIR 8   [facility] ([facility])    [facility]  [address]  [facility] [address]   [phone]            Jun 07, 2018 10:00 AM CDT   MA SCREENING DIGITAL BILATERAL with RHBCMA2   [facility] ([facility])    [address]  [facility] [id number] [phone]           Do not use any powder, lotion or deodorant under your arms or on your breast. If you do, we will ask you to remove it before your exam.  Wear comfortable, two-piece clothing.  If you have any allergies, tell your care team.  Bring any previous mammograms from other facilities or have them mailed to the breast center. Three-dimensional (3D) mammograms are available at [place] locations in [facility], [facility], [place], [place], and [place]. [facility] locations include [place] and [facility] in [place]. Benefits of 3D mammograms include: - Improved rate of cancer detection - Decreases your chance of having to go back for more tests, which means fewer: - \"False-positive\" results (This means that there is an abnormal area but it isn't cancer.) - Invasive testing procedures, such as a biopsy or surgery - Can provide clearer images of the breast if you have dense breast tissue. 3D mammography is an optional exam that anyone can have with a 2D " "mammogram. It doesn't replace or take the place of a 2D mammogram. 2D mammograms remain an effective screening test for all women.  Not all insurance companies cover the cost of a 3D mammogram. Check with your insurance.              Who to contact     If you have questions or need follow up information about today's clinic visit or your schedule please contact [facility] directly at [phone].  Normal or non-critical lab and imaging results will be communicated to you by Scopelyhart, letter or phone within 4 business days after the clinic has received the results. If you do not hear from us within 7 days, please contact the clinic through E-Blinkt or phone. If you have a critical or abnormal lab result, we will notify you by phone as soon as possible.  Submit refill requests through Sponduu or call your pharmacy and they will forward the refill request to us. Please allow 3 business days for your refill to be completed.          Additional Information About Your Visit        Sponduu Information     Sponduu lets you send messages to your doctor, view your test results, renew your prescriptions, schedule appointments and more. To sign up, go to [email or web address] . Click on \"Log in\" on the left side of the screen, which will take you to the Welcome page. Then click on \"Sign up Now\" on the right side of the page.     You will be asked to enter the access code listed below, as well as some personal information. Please follow the directions to create your username and password.     Your access code is: [email or web address]  Expires: 2018  3:19 PM     Your access code will  in 90 days. If you need help or a new code, please call your [place] clinic or [phone].        Care EveryWhere ID     This is your Care EveryWhere ID. This could be used by other organizations to access your [place] medical records  [id number]        Your Vitals Were     Pulse Temperature Respirations    "          77 97.8  F (36.6  C) (Oral) 16          Blood Pressure from Last 3 Encounters:   05/24/18 138/71   05/23/18 113/70   05/21/18 134/64    Weight from Last 3 Encounters:   05/18/18 61.7 kg (136 lb)   12/20/17 59 kg (130 lb 1.6 oz)   11/16/17 60.1 kg (132 lb 8 oz)              Today, you had the following     No orders found for display       Primary Care Provider Office Phone # Fax #    Huyen Samuels [phone]       [address] [facility] [address]  [facility] [address]        Equal Access to Services     [facility]: Hadii cydney mead hadcindyo Soanamika, waaxda luqadaha, qaybta kaalmada adedarwinyada, lilliana lyons . So [facility] [phone].    ATENCIÓN: Si habla español, tiene a moralez disposición servicios gratuitos de asistencia lingüística. [facility] [phone].    We comply with applicable federal civil rights laws and [place] laws. We do not discriminate on the basis of race, color, national origin, age, disability, sex, sexual orientation, or gender identity.            Thank you!     Thank you for choosing [facility] CLINIC AND [facility] CENTER  for your care. Our goal is always to provide you with excellent care. Hearing back from our patients is one way we can continue to improve our services. Please take a few minutes to complete the written survey that you may receive in the mail after your visit with us. Thank you!             Your Updated Medication List - Protect others around you: Learn how to safely use, store and throw away your medicines at www.disposemymeds.org.          This list is accurate as of 5/24/18  3:14 PM.  Always use your most recent med list.                   Brand Name Dispense Instructions for use Diagnosis    ALPRAZolam 0.5 MG tablet    XANAX    30 tablet    Take 1/2 tab tid prn    Anxiety       calcitonin (salmon) 200 UNIT/ACT nasal spray    MIACALCIN    3 Bottle    Spray 1 spray into one nostril alternating nostrils daily Alternate  nostril each day.    Age-related osteoporosis without current pathological fracture       * CENTRUM SILVER per tablet      Take 1 tablet by mouth daily        * OCUVITE PRESERVISION PO      Take 1 tablet by mouth 2 times daily        fish oil-omega-3 fatty acids 1000 MG capsule      Take 1,000 mg by mouth 2 times daily        flaxseed oil 1000 MG Caps      Take 1,000 mg by mouth every evening        GLUCOSAMINE CHONDR COMPLEX PO      Take 1 capsule by mouth 2 times daily        HERBALS      2 times daily as needed        ondansetron 4 MG ODT tab    ZOFRAN ODT    120 tablet    Take 1 tablet (4 mg) by mouth every 8 hours as needed for nausea    Nausea       pantoprazole 40 MG EC tablet    PROTONIX    90 tablet    Take 1 tablet (40 mg) by mouth daily Take 30-60 minutes before a meal.    Gastroesophageal reflux disease without esophagitis       propranolol 20 MG tablet    INDERAL    90 tablet    Take 1 tablet (20 mg) by mouth daily as needed For blood pressure higher than 170    Essential hypertension       triamterene-hydrochlorothiazide 37.5-25 MG per tablet    MAXZIDE-25    90 tablet    Take 1 tablet by mouth daily    Essential hypertension       vitamin D3 2000 units Caps      Take 2,000 Units by mouth every morning        * Notice:  This list has 2 medication(s) that are the same as other medications prescribed for you. Read the directions carefully, and ask your doctor or other care provider to review them with you.

## 2018-05-24 NOTE — PROGRESS NOTES
Infusion Nursing Note:  Cynthia Arita presents today for solumedrol.    Patient seen by provider today: No   present during visit today: Not Applicable.    Note: N/A.    Intravenous Access:  Peripheral IV placed.    Treatment Conditions:  Not Applicable.      Post Infusion Assessment:  Patient tolerated infusion without incident.  Site patent and intact, free from redness, edema or discomfort.  No evidence of extravasations.  Access discontinued per protocol.    Discharge Plan:   Discharge instructions reviewed with: Patient.  Patient and/or family verbalized understanding of discharge instructions and all questions answered.  Copy of AVS reviewed with patient and/or family.  Patient will return 5/25/18 for next appointment.  Patient discharged in stable condition accompanied by: self.  Departure Mode: Ambulatory.    Velia Rg RN

## 2018-05-25 ENCOUNTER — INFUSION THERAPY VISIT (OUTPATIENT)
Dept: INFUSION THERAPY | Facility: CLINIC | Age: 73
End: 2018-05-25
Attending: OTOLARYNGOLOGY
Payer: MEDICARE

## 2018-05-25 VITALS
RESPIRATION RATE: 16 BRPM | SYSTOLIC BLOOD PRESSURE: 131 MMHG | DIASTOLIC BLOOD PRESSURE: 79 MMHG | TEMPERATURE: 98.3 F | HEART RATE: 72 BPM | OXYGEN SATURATION: 95 %

## 2018-05-25 DIAGNOSIS — H93.19 TINNITUS, UNSPECIFIED LATERALITY: Primary | ICD-10-CM

## 2018-05-25 DIAGNOSIS — H81.03 MENIERE'S DISEASE OF BOTH EARS: ICD-10-CM

## 2018-05-25 PROCEDURE — 96365 THER/PROPH/DIAG IV INF INIT: CPT

## 2018-05-25 PROCEDURE — 25000128 H RX IP 250 OP 636: Performed by: OTOLARYNGOLOGY

## 2018-05-25 RX ADMIN — SODIUM CHLORIDE 1000 MG: 0.9 INJECTION, SOLUTION INTRAVENOUS at 12:28

## 2018-05-25 ASSESSMENT — PAIN SCALES - GENERAL: PAINLEVEL: NO PAIN (0)

## 2018-05-25 NOTE — PROGRESS NOTES
Infusion Nursing Note:  Cynthia Arita presents today for Solumedrol.    Patient seen by provider today: No   present during visit today: Not Applicable.    Note: N/A.    Intravenous Access:  Peripheral IV placed.    Treatment Conditions:  Not Applicable.      Post Infusion Assessment:  Patient tolerated infusion without incident.  Site patent and intact, free from redness, edema or discomfort.  No evidence of extravasations.  Access discontinued per protocol.    Discharge Plan:   Discharge instructions reviewed with: Patient.  Patient and/or family verbalized understanding of discharge instructions and all questions answered.  Copy of AVS reviewed with patient and/or family.  Patient will return prn for next appointment.  Patient discharged in stable condition accompanied by: self.  Departure Mode: Ambulatory.    Velia Rg RN

## 2018-05-25 NOTE — MR AVS SNAPSHOT
"              After Visit Summary   5/25/2018    Cynthia Arita    MRN: [id number]           Patient Information     Date Of Birth          1945        Visit Information        Provider Department      5/25/2018 12:00 PM  INFUSION CHAIR 8 [facility] [place]        Today's Diagnoses     Tinnitus, unspecified laterality    -  1    Meniere's disease of both ears           Follow-ups after your visit        Your next 10 appointments already scheduled     Jun 07, 2018 10:00 AM CDT   MA SCREENING DIGITAL BILATERAL with [id number]   [facility] ([facility])    [address], [address]  [facility] [id number] [phone]           Do not use any powder, lotion or deodorant under your arms or on your breast. If you do, we will ask you to remove it before your exam.  Wear comfortable, two-piece clothing.  If you have any allergies, tell your care team.  Bring any previous mammograms from other facilities or have them mailed to the breast center. Three-dimensional (3D) mammograms are available at [facility] locations in [facility], [facility], [facility], and [place]. [facility] locations include [place] and [facility] in [place]. Benefits of 3D mammograms include: - Improved rate of cancer detection - Decreases your chance of having to go back for more tests, which means fewer: - \"False-positive\" results (This means that there is an abnormal area but it isn't cancer.) - Invasive testing procedures, such as a biopsy or surgery - Can provide clearer images of the breast if you have dense breast tissue. 3D mammography is an optional exam that anyone can have with a 2D mammogram. It doesn't replace or take the place of a 2D mammogram. 2D mammograms remain an effective screening test for all women.  Not all insurance companies cover the cost of a 3D mammogram. Check with your insurance.              Who to " "contact     If you have questions or need follow up information about today's clinic visit or your schedule please contact [facility] directly at [phone].  Normal or non-critical lab and imaging results will be communicated to you by MyChart, letter or phone within 4 business days after the clinic has received the results. If you do not hear from us within 7 days, please contact the clinic through MyChart or phone. If you have a critical or abnormal lab result, we will notify you by phone as soon as possible.  Submit refill requests through [employer] or call your pharmacy and they will forward the refill request to us. Please allow 3 business days for your refill to be completed.          Additional Information About Your Visit        [facility] [employer] Information     [employer] lets you send messages to your doctor, view your test results, renew your prescriptions, schedule appointments and more. To sign up, go to [email or web address] . Click on \"Log in\" on the left side of the screen, which will take you to the Welcome page. Then click on \"Sign up Now\" on the right side of the page.     You will be asked to enter the access code listed below, as well as some personal information. Please follow the directions to create your username and password.     Your access code is: [email or web address]  Expires: 2018  3:19 PM     Your access code will  in 90 days. If you need help or a new code, please call your [facility] clinic or [phone].        Care EveryWhere ID     This is your Care EveryWhere ID. This could be used by other organizations to access your [facility] medical records  [id number]        Your Vitals Were     Pulse Temperature Respirations Pulse Oximetry          72 98.3  F (36.8  C) (Oral) 16 95%         Blood Pressure from Last 3 Encounters:   18 131/79   18 138/71   18 113/70    Weight from Last 3 Encounters:   18 61.7 kg (136 lb)   17 59 kg (130 " lb 1.6 oz)   11/16/17 60.1 kg (132 lb 8 oz)              Today, you had the following     No orders found for display       Primary Care Provider Office Phone # Fax #    Huyen Samuels [phone]       [address] [facility] [address]  [facility] [address]        Equal Access to Services     [facility]: Hadii aad ku hadasho Soomaali, waaxda luqadaha, qaybta kaalmada adeegyada, waxay idiin hayaan adeeg kharash la'aan . So [facility] [phone].    ATENCIÓN: Si habla español, tiene a moralez disposición servicios gratuitos de asistencia lingüística. [facility] [phone].    We comply with applicable federal civil rights laws and [place] laws. We do not discriminate on the basis of race, color, national origin, age, disability, sex, sexual orientation, or gender identity.            Thank you!     Thank you for choosing [facility]  for your care. Our goal is always to provide you with excellent care. Hearing back from our patients is one way we can continue to improve our services. Please take a few minutes to complete the written survey that you may receive in the mail after your visit with us. Thank you!             Your Updated Medication List - Protect others around you: Learn how to safely use, store and throw away your medicines at www.disposemymeds.org.          This list is accurate as of 5/25/18  2:00 PM.  Always use your most recent med list.                   Brand Name Dispense Instructions for use Diagnosis    ALPRAZolam 0.5 MG tablet    XANAX    30 tablet    Take 1/2 tab tid prn    Anxiety       calcitonin (salmon) 200 UNIT/ACT nasal spray    MIACALCIN    3 Bottle    Spray 1 spray into one nostril alternating nostrils daily Alternate nostril each day.    Age-related osteoporosis without current pathological fracture       * CENTRUM SILVER per tablet      Take 1 tablet by mouth daily        * OCUVITE PRESERVISION PO      Take 1 tablet by mouth 2 times daily         fish oil-omega-3 fatty acids 1000 MG capsule      Take 1,000 mg by mouth 2 times daily        flaxseed oil 1000 MG Caps      Take 1,000 mg by mouth every evening        GLUCOSAMINE CHONDR COMPLEX PO      Take 1 capsule by mouth 2 times daily        HERBALS      2 times daily as needed        ondansetron 4 MG ODT tab    ZOFRAN ODT    120 tablet    Take 1 tablet (4 mg) by mouth every 8 hours as needed for nausea    Nausea       pantoprazole 40 MG EC tablet    PROTONIX    90 tablet    Take 1 tablet (40 mg) by mouth daily Take 30-60 minutes before a meal.    Gastroesophageal reflux disease without esophagitis       propranolol 20 MG tablet    INDERAL    90 tablet    Take 1 tablet (20 mg) by mouth daily as needed For blood pressure higher than 170    Essential hypertension       triamterene-hydrochlorothiazide 37.5-25 MG per tablet    MAXZIDE-25    90 tablet    Take 1 tablet by mouth daily    Essential hypertension       vitamin D3 2000 units Caps      Take 2,000 Units by mouth every morning        * Notice:  This list has 2 medication(s) that are the same as other medications prescribed for you. Read the directions carefully, and ask your doctor or other care provider to review them with you.

## 2018-05-27 ENCOUNTER — APPOINTMENT (OUTPATIENT)
Dept: MRI IMAGING | Facility: CLINIC | Age: 73
End: 2018-05-27
Attending: INTERNAL MEDICINE
Payer: MEDICARE

## 2018-05-27 ENCOUNTER — HOSPITAL ENCOUNTER (EMERGENCY)
Facility: CLINIC | Age: 73
Discharge: HOME | End: 2018-05-27
Attending: INTERNAL MEDICINE | Admitting: INTERNAL MEDICINE
Payer: MEDICARE

## 2018-05-27 VITALS
TEMPERATURE: 98 F | WEIGHT: 132 LBS | HEART RATE: 81 BPM | SYSTOLIC BLOOD PRESSURE: 139 MMHG | OXYGEN SATURATION: 96 % | DIASTOLIC BLOOD PRESSURE: 91 MMHG | RESPIRATION RATE: 16 BRPM | BODY MASS INDEX: 23.76 KG/M2

## 2018-05-27 DIAGNOSIS — R42 VERTIGO: ICD-10-CM

## 2018-05-27 LAB
ALBUMIN SERPL-MCNC: 3.7 G/DL (ref 3.4–5)
ALP SERPL-CCNC: 73 U/L (ref 40–150)
ALT SERPL W P-5'-P-CCNC: 91 U/L (ref 0–50)
ANION GAP SERPL CALCULATED.3IONS-SCNC: 10 MMOL/L (ref 3–14)
AST SERPL W P-5'-P-CCNC: 96 U/L (ref 0–45)
BASOPHILS # BLD AUTO: 0 10E9/L (ref 0–0.2)
BASOPHILS NFR BLD AUTO: 0.2 %
BILIRUB SERPL-MCNC: 0.9 MG/DL (ref 0.2–1.3)
BUN SERPL-MCNC: 27 MG/DL (ref 7–30)
CALCIUM SERPL-MCNC: 8.5 MG/DL (ref 8.5–10.1)
CHLORIDE SERPL-SCNC: 103 MMOL/L (ref 94–109)
CO2 SERPL-SCNC: 24 MMOL/L (ref 20–32)
CREAT SERPL-MCNC: 0.68 MG/DL (ref 0.52–1.04)
DIFFERENTIAL METHOD BLD: ABNORMAL
EOSINOPHIL # BLD AUTO: 0 10E9/L (ref 0–0.7)
EOSINOPHIL NFR BLD AUTO: 0.3 %
ERYTHROCYTE [DISTWIDTH] IN BLOOD BY AUTOMATED COUNT: 13.9 % (ref 10–15)
GFR SERPL CREATININE-BSD FRML MDRD: 85 ML/MIN/1.7M2
GLUCOSE SERPL-MCNC: 96 MG/DL (ref 70–99)
HCT VFR BLD AUTO: 44 % (ref 35–47)
HGB BLD-MCNC: 14.6 G/DL (ref 11.7–15.7)
IMM GRANULOCYTES # BLD: 0.1 10E9/L (ref 0–0.4)
IMM GRANULOCYTES NFR BLD: 0.8 %
LYMPHOCYTES # BLD AUTO: 1.8 10E9/L (ref 0.8–5.3)
LYMPHOCYTES NFR BLD AUTO: 14.4 %
MCH RBC QN AUTO: 29.9 PG (ref 26.5–33)
MCHC RBC AUTO-ENTMCNC: 33.2 G/DL (ref 31.5–36.5)
MCV RBC AUTO: 90 FL (ref 78–100)
MONOCYTES # BLD AUTO: 1 10E9/L (ref 0–1.3)
MONOCYTES NFR BLD AUTO: 8 %
NEUTROPHILS # BLD AUTO: 9.4 10E9/L (ref 1.6–8.3)
NEUTROPHILS NFR BLD AUTO: 76.3 %
NRBC # BLD AUTO: 0 10*3/UL
NRBC BLD AUTO-RTO: 0 /100
PLATELET # BLD AUTO: 324 10E9/L (ref 150–450)
POTASSIUM SERPL-SCNC: 3.2 MMOL/L (ref 3.4–5.3)
PROT SERPL-MCNC: 6.8 G/DL (ref 6.8–8.8)
RBC # BLD AUTO: 4.88 10E12/L (ref 3.8–5.2)
SODIUM SERPL-SCNC: 137 MMOL/L (ref 133–144)
WBC # BLD AUTO: 12.3 10E9/L (ref 4–11)

## 2018-05-27 PROCEDURE — 25000128 H RX IP 250 OP 636: Performed by: INTERNAL MEDICINE

## 2018-05-27 PROCEDURE — 96361 HYDRATE IV INFUSION ADD-ON: CPT

## 2018-05-27 PROCEDURE — 80053 COMPREHEN METABOLIC PANEL: CPT | Performed by: INTERNAL MEDICINE

## 2018-05-27 PROCEDURE — 85025 COMPLETE CBC W/AUTO DIFF WBC: CPT | Performed by: INTERNAL MEDICINE

## 2018-05-27 PROCEDURE — 99285 EMERGENCY DEPT VISIT HI MDM: CPT | Mod: 25

## 2018-05-27 PROCEDURE — 25000131 ZZH RX MED GY IP 250 OP 636 PS 637: Mod: GY | Performed by: INTERNAL MEDICINE

## 2018-05-27 PROCEDURE — 70551 MRI BRAIN STEM W/O DYE: CPT

## 2018-05-27 PROCEDURE — 96374 THER/PROPH/DIAG INJ IV PUSH: CPT | Mod: 59

## 2018-05-27 PROCEDURE — A9270 NON-COVERED ITEM OR SERVICE: HCPCS | Mod: GY | Performed by: INTERNAL MEDICINE

## 2018-05-27 PROCEDURE — A9585 GADOBUTROL INJECTION: HCPCS | Performed by: INTERNAL MEDICINE

## 2018-05-27 RX ORDER — GADOBUTROL 604.72 MG/ML
7.5 INJECTION INTRAVENOUS ONCE
Status: COMPLETED | OUTPATIENT
Start: 2018-05-27 | End: 2018-05-27

## 2018-05-27 RX ORDER — MECLIZINE HYDROCHLORIDE 25 MG/1
25 TABLET ORAL ONCE
Status: COMPLETED | OUTPATIENT
Start: 2018-05-27 | End: 2018-05-27

## 2018-05-27 RX ORDER — MECLIZINE HYDROCHLORIDE 25 MG/1
25 TABLET ORAL EVERY 6 HOURS PRN
Qty: 30 TABLET | Refills: 1 | Status: SHIPPED | OUTPATIENT
Start: 2018-05-27 | End: 2019-05-16

## 2018-05-27 RX ORDER — LORAZEPAM 1 MG/1
0.5-1 TABLET ORAL EVERY 8 HOURS PRN
Qty: 20 TABLET | Refills: 0 | Status: SHIPPED | OUTPATIENT
Start: 2018-05-27 | End: 2018-06-08

## 2018-05-27 RX ORDER — MECLIZINE HYDROCHLORIDE 25 MG/1
25 TABLET ORAL EVERY 6 HOURS PRN
Qty: 30 TABLET | Refills: 1 | Status: SHIPPED | OUTPATIENT
Start: 2018-05-27 | End: 2018-10-01

## 2018-05-27 RX ORDER — LORAZEPAM 2 MG/ML
1 INJECTION INTRAMUSCULAR ONCE
Status: COMPLETED | OUTPATIENT
Start: 2018-05-27 | End: 2018-05-27

## 2018-05-27 RX ADMIN — SODIUM CHLORIDE 1000 ML: 9 INJECTION, SOLUTION INTRAVENOUS at 15:09

## 2018-05-27 RX ADMIN — LORAZEPAM 1 MG: 2 INJECTION INTRAMUSCULAR; INTRAVENOUS at 15:09

## 2018-05-27 RX ADMIN — MECLIZINE HYDROCHLORIDE 25 MG: 25 TABLET ORAL at 15:09

## 2018-05-27 RX ADMIN — GADOBUTROL 7.5 ML: 604.72 INJECTION INTRAVENOUS at 16:34

## 2018-05-27 ASSESSMENT — ENCOUNTER SYMPTOMS: DIZZINESS: 1

## 2018-05-27 NOTE — ED PROVIDER NOTES
Patient signed out to me by Dr. Branch for follow-up of MRI results, with plan for discharge home if normal and patient feeling more comfortable. Please see Dr. Branch's full note for details. I discussed the findings with the patient, which were fortunately normal. She reports feeling improved and would like to go home. Meclizine, Ativan prn Rx per Dr. Branch. Follow-up and return precautions discussed and understood by patient. All questions answered prior to discharge.     Naz Finch MD  05/27/18 2722

## 2018-05-27 NOTE — ED TRIAGE NOTES
Pt has menieres and had injections this past week on Wednesday and Thursday.  Pt feels like her vertigo is worse than usual.  Pt also c/o body, headache.

## 2018-05-27 NOTE — ED NOTES
Pt and family member given plastic bag with label for jewelry and other personal items needing to be removed for MRI.

## 2018-05-27 NOTE — ED AVS SNAPSHOT
[facility] Emergency Department    [address]    [facility] [address]    Phone:  [phone]    Fax:  [phone]                                       Cynthia Arita   MRN: [id number]    Department:  [facility] Emergency Department   Date of Visit:  5/27/2018           After Visit Summary Signature Page     I have received my discharge instructions, and my questions have been answered. I have discussed any challenges I see with this plan with the nurse or doctor.    ..........................................................................................................................................  Patient/Patient Representative Signature      ..........................................................................................................................................  Patient Representative Print Name and Relationship to Patient    ..................................................               ................................................  Date                                            Time    ..........................................................................................................................................  Reviewed by Signature/Title    ...................................................              ..............................................  Date                                                            Time

## 2018-05-27 NOTE — ED PROVIDER NOTES
History     Chief Complaint:  Vertigo       HPI   Cynthia Arita is a 72 year old female who presents with vertigo.  Patient has a history of Ménière's disease and is under the care of Dr. Fuller.  She has recently been receiving IV Solu-Medrol for her Ménière's, and after her last infusion began to feel worse.  She now notes profound vertigo with any head motion.  She feels like there is pressure in her head like air expanding inside her skull.  She denies any other neurologic symptoms.  No fever that she is aware of.  She did try some of her Valium that she has at home for relief of symptoms..    Allergies:  Metoprolol     Medications:      DIAZEPAM PO   ALPRAZolam (XANAX) 0.5 MG tablet   calcitonin, salmon, (MIACALCIN) 200 UNIT/ACT nasal spray   Cholecalciferol (VITAMIN D3) 2000 UNITS CAPS   Flaxseed, Linseed, (FLAXSEED OIL) 1000 MG CAPS   Glucosamine-Chondroitin (GLUCOSAMINE CHONDR COMPLEX PO)   HERBALS   Multiple Vitamins-Minerals (CENTRUM SILVER) per tablet   Multiple Vitamins-Minerals (OCUVITE PRESERVISION PO)   Omega-3 Fatty Acids (OMEGA-3 FISH OIL) 1000 MG CAPS   ondansetron (ZOFRAN ODT) 4 MG ODT tab   pantoprazole (PROTONIX) 40 MG EC tablet   propranolol (INDERAL) 20 MG tablet   triamterene-hydrochlorothiazide (MAXZIDE-25) 37.5-25 MG per tablet       Past Medical History:    Past Medical History:   Diagnosis Date     Diverticulosis      GERD (gastroesophageal reflux disease)      HTN (hypertension)      Meniere's disease      Nephrolithiasis        Patient Active Problem List    Diagnosis Date Noted     Tinnitus, unspecified laterality 05/23/2018     Priority: Medium     Gastroesophageal reflux disease without esophagitis 12/20/2017     Priority: Medium     Stress 07/31/2017     Priority: Medium     Essential hypertension 07/03/2017     Priority: Medium     Anxiety 04/19/2017     Priority: Medium     Pneumonia of right upper lobe due to infectious organism (H) 04/05/2017     Priority: Medium     Calculus  of right kidney 06/29/2015     Priority: Medium     Esophageal reflux 06/18/2015     Priority: Medium     Osteoporosis 06/18/2015     Priority: Medium     Meniere's disease 06/18/2015     Priority: Medium        Past Surgical History:    Past Surgical History:   Procedure Laterality Date     C VAGINAL HYSTERECTOMY      with left oophorectomy        Family History:    family history includes Bipolar Disorder in her sister; Brain Cancer (age of onset: 17) in her son; CANCER in her maternal grandmother; DIABETES in her paternal grandmother; Esophageal Cancer in her father; Hypertension in her brother; Neurologic Disorder in her mother and sister.    Social History:   reports that she has never smoked. She has never used smokeless tobacco. She reports that she drinks alcohol. She reports that she does not use illicit drugs.    PCP: Huyen Samuels     Review of Systems   HENT: Positive for tinnitus.    Neurological: Positive for dizziness.   All other systems reviewed and are negative.        Physical Exam   Patient Vitals for the past 24 hrs:   BP Temp Pulse Resp SpO2 Weight   05/27/18 1600 124/70 - - - - -   05/27/18 1530 142/73 - - - 96 % -   05/27/18 1500 120/71 - - - 98 % -   05/27/18 1407 138/90 98  F (36.7  C) 81 16 98 % 59.9 kg (132 lb)        Physical Exam   Constitutional: She is cooperative.   HENT:   Right Ear: Tympanic membrane normal.   Left Ear: Tympanic membrane normal.   Mouth/Throat: Oropharynx is clear and moist and mucous membranes are normal.   Eyes: Conjunctivae are normal.   Neck: Normal range of motion.   Cardiovascular: Regular rhythm and normal heart sounds.    Pulmonary/Chest: Effort normal and breath sounds normal.   Abdominal: Soft. Normal appearance and bowel sounds are normal. There is no rebound and no guarding.   Musculoskeletal: Normal range of motion.   Lymphadenopathy:     She has no cervical adenopathy.   Neurological: She is alert.   Skin: Skin is warm and dry.   Psychiatric: She  has a normal mood and affect.       Emergency Department Course         Imaging:    MR Brain w/o Contrast    (Results Pending)        Laboratory:    Labs Ordered and Resulted from Time of ED Arrival Up to the Time of Departure from the ED   CBC WITH PLATELETS DIFFERENTIAL - Abnormal; Notable for the following:        Result Value    WBC 12.3 (*)     Absolute Neutrophil 9.4 (*)     All other components within normal limits   COMPREHENSIVE METABOLIC PANEL - Abnormal; Notable for the following:     Potassium 3.2 (*)     ALT 91 (*)     AST 96 (*)     All other components within normal limits            Interventions:    Medications   gadobutrol (GADAVIST) injection 7.5 mL (not administered)   LORazepam (ATIVAN) injection 1 mg (1 mg Intravenous Given 5/27/18 1509)   meclizine (ANTIVERT) tablet 25 mg (25 mg Oral Given 5/27/18 1509)   0.9% sodium chloride BOLUS (1,000 mLs Intravenous New Bag 5/27/18 1509)        Emergency Department Course:  Past medical records, nursing notes, and vitals reviewed.  I performed an exam of the patient and obtained history, as documented above.  I rechecked the patient. Findings and plan explained to the Patient and family.   Impression & Plan       Medical Decision Making:  Cynthia Arita is a 72 year old female with a history of Ménière's disease who presents complaining of increased vertigo after recent Solu-Medrol infusions.  I spoke with Dr. Carvalho at the ENT who felt that if her symptoms were worsened we should consider MRI.  After speaking with the patient this is ordered and results pending.  She is beginning to feel improved after IV Ativan and oral meclizine.  She is very motivated to be discharged home where she has plans to visit her sister tomorrow.  At this point I will sign the case out to my partner to review MRI results, provide further symptomatic care as needed.      Diagnosis:  Vertigo    Discharge Medications:  New Prescriptions    No medications on file         5/27/2018   Heaven Branch MD [place], Susan Gail, MD  06/04/18 7792

## 2018-05-27 NOTE — ED AVS SNAPSHOT
[facility] Emergency Department    [address]    [facility] [address]    Phone:  [phone]    Fax:  [phone]                                       Cynthia Arita   MRN: [id number]    Department:  [facility] Emergency Department   Date of Visit:  5/27/2018           Patient Information     Date Of Birth          1945        Your diagnoses for this visit were:     Vertigo        You were seen by Heaven Branch MD.      Follow-up Information     Follow up with Your primary clinic, ENT or vestibular clinic.    Why:  As needed        Follow up with [facility] Emergency Department.    Specialty:  EMERGENCY MEDICINE    Why:  As needed, If symptoms worsen    Contact information:    [address]  [facility] [address] [phone]        Discharge Instructions       Discharge Instructions  Vertigo  You have been diagnosed with vertigo.  This is a dizzy feeling often described as spinning or that the room is moving around you. You will often have nausea (sick to your stomach), vomiting (throwing up), and balance problems with it.  Vertigo is usually caused by a problem in the inner ear which helps control your balance.  Many things can cause vertigo, including calcium collections in the inner ear, a virus infection of the inner ear, concussion, migraine, and some medicines.  Luckily, these causes are not life threatening and will eventually go away.  However, sometimes there is a serious problem that does not show up right away.  Generally, every Emergency Department visit should have a follow-up clinic visit with either a primary or a specialty clinic/provider. Please follow-up as instructed by your emergency provider today.  Return to the Emergency Department if you have:    New or severe headache.    Double vision (seeing two of things).    Trouble speaking or hearing.    Weakness or trouble moving/using one side of your  body.    Passing out.    Numbness or tingling.    Chest pain.    Vomiting that will not stop.    Treatment:    There are several commonly prescribed medications:  o Antihistamines such as meclizine (Antivert ), dimenhydrinate (Dramamine ), or diphenhydramine (Benadryl ).  o Prescription anti-nausea medicines, such as promethazine (Phenergan ), metoclopramide (Reglan ), or ondansetron (Zofran ).  o Prescription sedative medicines, such as diazepam (Valium ), lorazepam (Ativan ), or clonazepam (Klonopin ).    Most of these medicines make you sleepy, and you should not take them before you work or drive. You should only take prescription medicines to treat severe vertigo symptoms, and you should stop the medicine when your symptoms improve.    Follow Up:    If you have vertigo longer than three days, it is important that you follow up either with your primary provider or an Ear, Nose, and Throat (ENT) specialist.  You may need further testing to evaluate your vertigo and you may also need  vestibular  therapy which is a special form of physical therapy to make the vertigo go away.    If you were given a prescription for medicine here today, be sure to read all of the information (including the package insert) that comes with your prescription.  This will include important information about the medicine, its side effects, and any warnings that you need to know about.  The pharmacist who fills the prescription can provide more information and answer questions you may have about the medicine.  If you have questions or concerns that the pharmacist cannot address, please call or return to the Emergency Department.     Remember that you can always come back to the Emergency Department if you are not able to see your regular provider in the amount of time listed above, if you get any new symptoms, or if there is anything that worries you.    Your next 10 appointments already scheduled     Jun 07, 2018 10:00 AM CDT   MA  "SCREENING DIGITAL BILATERAL with [id number]   [facility] ([facility])    [address] [facility], [address]  [facility] [address] [phone]           Do not use any powder, lotion or deodorant under your arms or on your breast. If you do, we will ask you to remove it before your exam.  Wear comfortable, two-piece clothing.  If you have any allergies, tell your care team.  Bring any previous mammograms from other facilities or have them mailed to the breast center. Three-dimensional (3D) mammograms are available at [facility] locations in [facility], [facility], and [place]. [facility] locations include [place] and [facility] in [place]. Benefits of 3D mammograms include: - Improved rate of cancer detection - Decreases your chance of having to go back for more tests, which means fewer: - \"False-positive\" results (This means that there is an abnormal area but it isn't cancer.) - Invasive testing procedures, such as a biopsy or surgery - Can provide clearer images of the breast if you have dense breast tissue. 3D mammography is an optional exam that anyone can have with a 2D mammogram. It doesn't replace or take the place of a 2D mammogram. 2D mammograms remain an effective screening test for all women.  Not all insurance companies cover the cost of a 3D mammogram. Check with your insurance.              24 Hour Appointment Hotline       To make an appointment at any [facility] clinic, call [phone] ([phone]). If you don't have a family doctor or clinic, we will help you find one. [facility] clinics are conveniently located to serve the needs of you and your family.             Review of your medicines      START taking        Dose / Directions Last dose taken    LORazepam 1 MG tablet   Commonly known as:  ATIVAN   Dose:  0.5-1 mg   Quantity:  20 tablet        Take 0.5-1 tablets (0.5-1 mg) by mouth every 8 hours as " needed for other (vertigo) Take 30 minutes prior to departure.  Do not operate a vehicle after taking this medication   Refills:  0        * meclizine 25 MG tablet   Commonly known as:  ANTIVERT   Dose:  25 mg   Quantity:  30 tablet        Take 1 tablet (25 mg) by mouth every 6 hours as needed for dizziness   Refills:  1        * meclizine 25 MG tablet   Commonly known as:  ANTIVERT   Dose:  25 mg   Quantity:  30 tablet        Take 1 tablet (25 mg) by mouth every 6 hours as needed for dizziness   Refills:  1        * Notice:  This list has 2 medication(s) that are the same as other medications prescribed for you. Read the directions carefully, and ask your doctor or other care provider to review them with you.      Our records show that you are taking the medicines listed below. If these are incorrect, please call your family doctor or clinic.        Dose / Directions Last dose taken    ALPRAZolam 0.5 MG tablet   Commonly known as:  XANAX   Quantity:  30 tablet        Take 1/2 tab tid prn   Refills:  1        calcitonin (salmon) 200 UNIT/ACT nasal spray   Commonly known as:  MIACALCIN   Dose:  1 spray   Quantity:  3 Bottle        Spray 1 spray into one nostril alternating nostrils daily Alternate nostril each day.   Refills:  1        * CENTRUM SILVER per tablet   Dose:  1 tablet        Take 1 tablet by mouth daily   Refills:  0        * OCUVITE PRESERVISION PO   Dose:  1 tablet        Take 1 tablet by mouth 2 times daily   Refills:  0        DIAZEPAM PO   Dose:  2 mg        Take 2 mg by mouth every 8 hours as needed for other (dizziness)   Refills:  0        fish oil-omega-3 fatty acids 1000 MG capsule   Dose:  1000 mg        Take 1,000 mg by mouth 2 times daily   Refills:  0        flaxseed oil 1000 MG Caps   Dose:  1000 mg        Take 1,000 mg by mouth every evening   Refills:  0        GLUCOSAMINE CHONDR COMPLEX PO   Dose:  1 capsule        Take 1 capsule by mouth 2 times daily   Refills:  0        HERBALS         2 times daily as needed   Refills:  0        ondansetron 4 MG ODT tab   Commonly known as:  ZOFRAN ODT   Dose:  4 mg   Quantity:  120 tablet        Take 1 tablet (4 mg) by mouth every 8 hours as needed for nausea   Refills:  1        pantoprazole 40 MG EC tablet   Commonly known as:  PROTONIX   Dose:  40 mg   Quantity:  90 tablet        Take 1 tablet (40 mg) by mouth daily Take 30-60 minutes before a meal.   Refills:  3        propranolol 20 MG tablet   Commonly known as:  INDERAL   Dose:  20 mg   Quantity:  90 tablet        Take 1 tablet (20 mg) by mouth daily as needed For blood pressure higher than 170   Refills:  0        triamterene-hydrochlorothiazide 37.5-25 MG per tablet   Commonly known as:  MAXZIDE-25   Dose:  1 tablet   Quantity:  90 tablet        Take 1 tablet by mouth daily   Refills:  1        vitamin D3 2000 units Caps   Dose:  2000 Units        Take 2,000 Units by mouth every morning   Refills:  0        * Notice:  This list has 2 medication(s) that are the same as other medications prescribed for you. Read the directions carefully, and ask your doctor or other care provider to review them with you.            Prescriptions were sent or printed at these locations (3 Prescriptions)                   Other Prescriptions                Printed at Department/Unit printer (3 of 3)         LORazepam (ATIVAN) 1 MG tablet               meclizine (ANTIVERT) 25 MG tablet               meclizine (ANTIVERT) 25 MG tablet                Procedures and tests performed during your visit     CBC with platelets differential    Comprehensive metabolic panel    MR Brain w/o Contrast      Orders Needing Specimen Collection     None      Pending Results     Date and Time Order Name Status Description    5/27/2018 1519 MR Brain w/o Contrast Preliminary             Pending Culture Results     No orders found from 5/25/2018 to 5/28/2018.            Pending Results Instructions     If you had any lab results that were not  finalized at the time of your Discharge, you can call the ED Lab Result RN at [phone]. You will be contacted by this team for any positive Lab results or changes in treatment. The nurses are available 7 days a week from 10A to 6:30P.  You can leave a message 24 hours per day and they will return your call.        Test Results From Your Hospital Stay        5/27/2018  2:56 PM      Component Results     Component Value Ref Range & Units Status    WBC 12.3 (H) 4.0 - 11.0 10e9/L Final    RBC Count 4.88 3.8 - 5.2 10e12/L Final    Hemoglobin 14.6 11.7 - 15.7 g/dL Final    Hematocrit 44.0 35.0 - 47.0 % Final    MCV 90 78 - 100 fl Final    MCH 29.9 26.5 - 33.0 pg Final    MCHC 33.2 31.5 - 36.5 g/dL Final    RDW 13.9 10.0 - 15.0 % Final    Platelet Count 324 150 - 450 10e9/L Final    Diff Method Automated Method  Final    % Neutrophils 76.3 % Final    % Lymphocytes 14.4 % Final    % Monocytes 8.0 % Final    % Eosinophils 0.3 % Final    % Basophils 0.2 % Final    % Immature Granulocytes 0.8 % Final    Nucleated RBCs 0 0 /100 Final    Absolute Neutrophil 9.4 (H) 1.6 - 8.3 10e9/L Final    Absolute Lymphocytes 1.8 0.8 - 5.3 10e9/L Final    Absolute Monocytes 1.0 0.0 - 1.3 10e9/L Final    Absolute Eosinophils 0.0 0.0 - 0.7 10e9/L Final    Absolute Basophils 0.0 0.0 - 0.2 10e9/L Final    Abs Immature Granulocytes 0.1 0 - 0.4 10e9/L Final    Absolute Nucleated RBC 0.0  Final         5/27/2018  3:14 PM      Component Results     Component Value Ref Range & Units Status    Sodium 137 133 - 144 mmol/L Final    Potassium 3.2 (L) 3.4 - 5.3 mmol/L Final    Chloride 103 94 - 109 mmol/L Final    Carbon Dioxide 24 20 - 32 mmol/L Final    Anion Gap 10 3 - 14 mmol/L Final    Glucose 96 70 - 99 mg/dL Final    Urea Nitrogen 27 7 - 30 mg/dL Final    Creatinine 0.68 0.52 - 1.04 mg/dL Final    GFR Estimate 85 >60 mL/min/1.7m2 Final    Non  GFR Calc    GFR Estimate If Black >90 >60 mL/min/1.7m2 Final    African American GFR  Calc    Calcium 8.5 8.5 - 10.1 mg/dL Final    Bilirubin Total 0.9 0.2 - 1.3 mg/dL Final    Albumin 3.7 3.4 - 5.0 g/dL Final    Protein Total 6.8 6.8 - 8.8 g/dL Final    Alkaline Phosphatase 73 40 - 150 U/L Final    ALT 91 (H) 0 - 50 U/L Final    AST 96 (H) 0 - 45 U/L Final         5/27/2018  5:28 PM      Narrative     MRI OF THE BRAIN WITHOUT AND WITH CONTRAST;  MRI OF THE SKULL BASE WITHOUT AND WITH CONTRAST 5/27/2018 5:20 PM     COMPARISON: None.     HISTORY:  Vertigo.    TECHNIQUE: Axial diffusion-weighted with ADC map, T2-weighted,  turboFLAIR and T1-weighted images of the brain and axial T1-weighted  and coronal T2-weighted with fat saturation images centered on the  internal auditory canals were obtained without intravenous contrast.  Following intravenous administration of IV gadolinium (7.5mL  Gadavist), axial turboFLAIR images of the brain and axial T1-weighted  with fat saturation and coronal T1-weighted images, centered on the  internal auditory canals, were obtained.    FINDINGS: The ventricles and basal cisterns are normal in  configuration. There is no midline shift. There are no extra-axial  fluid collections. Gray-white differentiation is well maintained.  There is no evidence for stroke or acute intracranial hemorrhage.  There is no abnormal contrast enhancement in the brain or its  coverings.    The contents of the internal auditory canals are within normal limits  in contour and signal intensity with no abnormal contrast enhancement.   The labyrinthine structures of the inner ears bilaterally are normal  in contour and signal intensity with no abnormal contrast enhancement.  There is no evidence for cerebellopontine angle cistern mass or  vascular lesion on either side.    There is no sinusitis or mastoiditis.        Impression     IMPRESSION:   1. Normal brain MRI.  2. Normal MRI of the skull base and internal auditory canals  bilaterally.                 Clinical Quality Measure: Blood Pressure  Screening     Your blood pressure was checked while you were in the emergency department today. The last reading we obtained was  BP: 124/70 . Please read the guidelines below about what these numbers mean and what you should do about them.  If your systolic blood pressure (the top number) is less than 120 and your diastolic blood pressure (the bottom number) is less than 80, then your blood pressure is normal. There is nothing more that you need to do about it.  If your systolic blood pressure (the top number) is 120-139 or your diastolic blood pressure (the bottom number) is 80-89, your blood pressure may be higher than it should be. You should have your blood pressure rechecked within a year by a primary care provider.  If your systolic blood pressure (the top number) is 140 or greater or your diastolic blood pressure (the bottom number) is 90 or greater, you may have high blood pressure. High blood pressure is treatable, but if left untreated over time it can put you at risk for heart attack, stroke, or kidney failure. You should have your blood pressure rechecked by a primary care provider within the next 4 weeks.  If your provider in the emergency department today gave you specific instructions to follow-up with your doctor or provider even sooner than that, you should follow that instruction and not wait for up to 4 weeks for your follow-up visit.        Thank you for choosing [facility]       Thank you for choosing [facility] for your care. Our goal is always to provide you with excellent care. Hearing back from our patients is one way we can continue to improve our services. Please take a few minutes to complete the written survey that you may receive in the mail after you visit with us. Thank you!        [facility] Information     [facility] lets you send messages to your doctor, view your test results, renew your prescriptions, schedule appointments and more. To sign up, go to [email or web address] . Click on  "\"Log in\" on the left side of the screen, which will take you to the Welcome page. Then click on \"Sign up Now\" on the right side of the page.     You will be asked to enter the access code listed below, as well as some personal information. Please follow the directions to create your username and password.     Your access code is: [email or web address]  Expires: 2018  3:19 PM     Your access code will  in 90 days. If you need help or a new code, please call your [facility] clinic or [phone].        Care EveryWhere ID     This is your Care EveryWhere ID. This could be used by other organizations to access your [facility] medical records  [id number]        Equal Access to Services     RENO PERLA : Arcelia Eric, wagogo solitario, qaybta kaalmaansley coyle, lilliana white. So [facility] [phone].    ATENCIÓN: Si habla español, tiene a moralez disposición servicios gratuitos de asistencia lingüística. Llame al [phone].    We comply with applicable federal civil rights laws and [place] laws. We do not discriminate on the basis of race, color, national origin, age, disability, sex, sexual orientation, or gender identity.            After Visit Summary       This is your record. Keep this with you and show to your community pharmacist(s) and doctor(s) at your next visit.                  "

## 2018-06-04 ENCOUNTER — TELEPHONE (OUTPATIENT)
Dept: INTERNAL MEDICINE | Facility: CLINIC | Age: 73
End: 2018-06-04

## 2018-06-04 DIAGNOSIS — F41.9 ANXIETY: Primary | ICD-10-CM

## 2018-06-04 NOTE — TELEPHONE ENCOUNTER
Rebecca, ENT specialty Dr. Parker trying to get a hold of Dr. Oconnor, Hx of Vertigo attacks, and Hx of anxious would like Dr. Mello to be seen. C/o feet a sweaty and tingles and left foot in numb, medications, vertigo, much of issues are related to anxiety, tingling in left arms and leg. Patient has called ENT almost every day with multiple issues that Dr. Parker states patient should be scheduling appointment with PCP office. Rebecca, nurse will call patient first and asks this writer to call patient to schedule.

## 2018-06-04 NOTE — TELEPHONE ENCOUNTER
"Attempted to contact patient, patient states biggest issues Lorazepam. Patient was going to ask if can go off Lorazepam. when I get \"a little excited I used to take Alprazolam and I used to take 0.25 dosing. This worked well for me.\" this lorazepam has put me over the edge, and I sit like a zombie, I cant comprehend what is going on.\" patient asks if can just take Lorazepam 1/2 tablets, advised per directions: Take 0.5-1 tablets (0.5-1 mg) by mouth every 8 hours as needed for other (vertigo) Take 30 minutes prior to departure.   Scheduled for 6/8/18 at 12:00pm with Dr. Oconnor.  Provider please review and advise. Thank you.    "

## 2018-06-05 RX ORDER — ALPRAZOLAM 0.25 MG
0.25 TABLET ORAL 3 TIMES DAILY PRN
Qty: 15 TABLET | Refills: 0 | Status: SHIPPED | OUTPATIENT
Start: 2018-06-05 | End: 2018-10-01 | Stop reason: DRUGHIGH

## 2018-06-07 ENCOUNTER — HOSPITAL ENCOUNTER (OUTPATIENT)
Dept: MAMMOGRAPHY | Facility: CLINIC | Age: 73
Discharge: HOME | End: 2018-06-07
Attending: INTERNAL MEDICINE | Admitting: INTERNAL MEDICINE
Payer: MEDICARE

## 2018-06-07 DIAGNOSIS — Z12.31 VISIT FOR SCREENING MAMMOGRAM: ICD-10-CM

## 2018-06-07 PROCEDURE — 77063 BREAST TOMOSYNTHESIS BI: CPT

## 2018-06-08 ENCOUNTER — OFFICE VISIT (OUTPATIENT)
Dept: INTERNAL MEDICINE | Facility: CLINIC | Age: 73
End: 2018-06-08
Payer: MEDICARE

## 2018-06-08 ENCOUNTER — TELEPHONE (OUTPATIENT)
Dept: PHARMACY | Facility: CLINIC | Age: 73
End: 2018-06-08

## 2018-06-08 VITALS
HEART RATE: 64 BPM | DIASTOLIC BLOOD PRESSURE: 74 MMHG | SYSTOLIC BLOOD PRESSURE: 130 MMHG | HEIGHT: 63 IN | TEMPERATURE: 98 F | WEIGHT: 131 LBS | BODY MASS INDEX: 23.21 KG/M2

## 2018-06-08 DIAGNOSIS — H81.03 MENIERE'S DISEASE OF BOTH EARS: ICD-10-CM

## 2018-06-08 DIAGNOSIS — F41.9 ANXIETY: Primary | ICD-10-CM

## 2018-06-08 DIAGNOSIS — M79.621 PAIN OF RIGHT UPPER ARM: ICD-10-CM

## 2018-06-08 PROCEDURE — 99215 OFFICE O/P EST HI 40 MIN: CPT | Performed by: INTERNAL MEDICINE

## 2018-06-08 ASSESSMENT — ANXIETY QUESTIONNAIRES
2. NOT BEING ABLE TO STOP OR CONTROL WORRYING: SEVERAL DAYS
IF YOU CHECKED OFF ANY PROBLEMS ON THIS QUESTIONNAIRE, HOW DIFFICULT HAVE THESE PROBLEMS MADE IT FOR YOU TO DO YOUR WORK, TAKE CARE OF THINGS AT HOME, OR GET ALONG WITH OTHER PEOPLE: SOMEWHAT DIFFICULT
6. BECOMING EASILY ANNOYED OR IRRITABLE: NOT AT ALL
5. BEING SO RESTLESS THAT IT IS HARD TO SIT STILL: NOT AT ALL
7. FEELING AFRAID AS IF SOMETHING AWFUL MIGHT HAPPEN: SEVERAL DAYS
GAD7 TOTAL SCORE: 4
3. WORRYING TOO MUCH ABOUT DIFFERENT THINGS: NOT AT ALL
1. FEELING NERVOUS, ANXIOUS, OR ON EDGE: SEVERAL DAYS

## 2018-06-08 ASSESSMENT — PATIENT HEALTH QUESTIONNAIRE - PHQ9: 5. POOR APPETITE OR OVEREATING: SEVERAL DAYS

## 2018-06-08 NOTE — TELEPHONE ENCOUNTER
Patient called to report that she started having some pain in right arm a day or two after completing her Solu-medrol infusions. She had her solu-medrol infusions on 5/23/18, 5/24/18 & 5/25/18. The pain in the right arm started around 5/26 or 5/27. The pain seems to be between her elbow and shoulder and she reports that it appears to be muscle pain. As of today she rates the pain 6 on a scale of 1 to 10. She said that the pain has improved since she started experiencing it. She reports that the pain is tolerable but irritable. The patient had called the provider who had prescribed the Solu-medrol and the provider told her to call us to see if we have had any reports like this.    I told the patient that we do quite a number of Solu-medrol infusions and we have not had any reports of pain in the arm. I told her that I would do a literature search and get back to her in a day or two    Toño Gibbons PharmD.

## 2018-06-08 NOTE — PATIENT INSTRUCTIONS
Plan:  1. Do not mix Alprazolam ( Xanax) with Diazepam ( Valium)   2. Continue the other meds, same doses for now.

## 2018-06-08 NOTE — MR AVS SNAPSHOT
"              After Visit Summary   2018    Cynthia Arita    MRN: [id number]           Patient Information     Date Of Birth          1945        Visit Information        Provider Department      2018 12:00 PM Senait Antunez MD [facility]        Care Instructions      Plan:  1. Do not mix Alprazolam ( Xanax) with Diazepam ( Valium)   2. Continue the other meds, same doses for now.          Follow-ups after your visit        Who to contact     If you have questions or need follow up information about today's clinic visit or your schedule please contact [facility] directly at [phone].  Normal or non-critical lab and imaging results will be communicated to you by MyChart, letter or phone within 4 business days after the clinic has received the results. If you do not hear from us within 7 days, please contact the clinic through MyChart or phone. If you have a critical or abnormal lab result, we will notify you by phone as soon as possible.  Submit refill requests through [employer] or call your pharmacy and they will forward the refill request to us. Please allow 3 business days for your refill to be completed.          Additional Information About Your Visit        MyChart Information     [employer] lets you send messages to your doctor, view your test results, renew your prescriptions, schedule appointments and more. To sign up, go to [email or web address] . Click on \"Log in\" on the left side of the screen, which will take you to the Welcome page. Then click on \"Sign up Now\" on the right side of the page.     You will be asked to enter the access code listed below, as well as some personal information. Please follow the directions to create your username and password.     Your access code is: [email or web address]  Expires: 2018  3:19 PM     Your access code will  in 90 days. If you need help or a new code, please call your [place] clinic or " "[phone].        Care EveryWhere ID     This is your Care EveryWhere ID. This could be used by other organizations to access your [place] medical records  [id number]        Your Vitals Were     Pulse Temperature Height BMI (Body Mass Index)          64 98  F (36.7  C) (Oral) 5' 2.75\" (1.594 m) 23.39 kg/m2         Blood Pressure from Last 3 Encounters:   06/08/18 130/74   05/27/18 (!) 139/91   05/25/18 131/79    Weight from Last 3 Encounters:   06/08/18 131 lb (59.4 kg)   05/27/18 132 lb (59.9 kg)   05/18/18 136 lb (61.7 kg)              Today, you had the following     No orders found for display         Today's Medication Changes          These changes are accurate as of 6/8/18 12:42 PM.  If you have any questions, ask your nurse or doctor.               Stop taking these medicines if you haven't already. Please contact your care team if you have questions.     LORazepam 1 MG tablet   Commonly known as:  ATIVAN   Stopped by:  Senait Antunez MD                    Primary Care Provider Office Phone # Fax #    Huyen Julissa Samuels -[phone]       [address] [id number]        Equal Access to Services     [facility]: Hadii cydney ku hadasho Soomaali, waaxda luqadaha, qaybta kaalmada adeegyada, lilliana caro haykarly lyons . So [facility] [phone].    ATENCIÓN: Si habla español, tiene a moralez disposición servicios gratuitos de asistencia lingüística. Llame al [phone].    We comply with applicable federal civil rights laws and [place] laws. We do not discriminate on the basis of race, color, national origin, age, disability, sex, sexual orientation, or gender identity.            Thank you!     Thank you for choosing [facility]  for your care. Our goal is always to provide you with excellent care. Hearing back from our patients is one way we can continue to improve our services. Please take a few minutes to complete the " written survey that you may receive in the mail after your visit with us. Thank you!             Your Updated Medication List - Protect others around you: Learn how to safely use, store and throw away your medicines at www.disposemymeds.org.          This list is accurate as of 6/8/18 12:42 PM.  Always use your most recent med list.                   Brand Name Dispense Instructions for use Diagnosis    * ALPRAZolam 0.5 MG tablet    XANAX    30 tablet    Take 1/2 tab tid prn    Anxiety       * ALPRAZolam 0.25 MG tablet    XANAX    15 tablet    Take 1 tablet (0.25 mg) by mouth 3 times daily as needed for anxiety    Anxiety       calcitonin (salmon) 200 UNIT/ACT nasal spray    MIACALCIN    3 Bottle    Spray 1 spray into one nostril alternating nostrils daily Alternate nostril each day.    Age-related osteoporosis without current pathological fracture       * CENTRUM SILVER per tablet      Take 1 tablet by mouth daily        * OCUVITE PRESERVISION PO      Take 1 tablet by mouth 2 times daily        DIAZEPAM PO      Take 2 mg by mouth every 8 hours as needed for other (dizziness)        fish oil-omega-3 fatty acids 1000 MG capsule      Take 1,000 mg by mouth 2 times daily        flaxseed oil 1000 MG Caps      Take 1,000 mg by mouth every evening        GLUCOSAMINE CHONDR COMPLEX PO      Take 1 capsule by mouth 2 times daily        HERBALS      2 times daily as needed        * meclizine 25 MG tablet    ANTIVERT    30 tablet    Take 1 tablet (25 mg) by mouth every 6 hours as needed for dizziness        * meclizine 25 MG tablet    ANTIVERT    30 tablet    Take 1 tablet (25 mg) by mouth every 6 hours as needed for dizziness        ondansetron 4 MG ODT tab    ZOFRAN ODT    120 tablet    Take 1 tablet (4 mg) by mouth every 8 hours as needed for nausea    Nausea       pantoprazole 40 MG EC tablet    PROTONIX    90 tablet    Take 1 tablet (40 mg) by mouth daily Take 30-60 minutes before a meal.    Gastroesophageal reflux  disease without esophagitis       propranolol 20 MG tablet    INDERAL    90 tablet    Take 1 tablet (20 mg) by mouth daily as needed For blood pressure higher than 170    Essential hypertension       triamterene-hydrochlorothiazide 37.5-25 MG per tablet    MAXZIDE-25    90 tablet    Take 1 tablet by mouth daily    Essential hypertension       vitamin D3 2000 units Caps      Take 2,000 Units by mouth every morning        * Notice:  This list has 6 medication(s) that are the same as other medications prescribed for you. Read the directions carefully, and ask your doctor or other care provider to review them with you.

## 2018-06-09 ASSESSMENT — ANXIETY QUESTIONNAIRES: GAD7 TOTAL SCORE: 4

## 2018-06-09 ASSESSMENT — PATIENT HEALTH QUESTIONNAIRE - PHQ9: SUM OF ALL RESPONSES TO PHQ QUESTIONS 1-9: 3

## 2018-06-11 ENCOUNTER — TELEPHONE (OUTPATIENT)
Dept: PHARMACY | Facility: CLINIC | Age: 73
End: 2018-06-11

## 2018-06-11 ENCOUNTER — TELEPHONE (OUTPATIENT)
Dept: INTERNAL MEDICINE | Facility: CLINIC | Age: 73
End: 2018-06-11

## 2018-06-11 DIAGNOSIS — E87.6 HYPOPOTASSEMIA: Primary | ICD-10-CM

## 2018-06-11 DIAGNOSIS — R94.5 NONSPECIFIC ABNORMAL RESULTS OF LIVER FUNCTION STUDY: ICD-10-CM

## 2018-06-11 LAB
ALBUMIN SERPL-MCNC: 3.7 G/DL (ref 3.4–5)
ALP SERPL-CCNC: 93 U/L (ref 40–150)
ALT SERPL W P-5'-P-CCNC: 29 U/L (ref 0–50)
ANION GAP SERPL CALCULATED.3IONS-SCNC: 8 MMOL/L (ref 3–14)
AST SERPL W P-5'-P-CCNC: 17 U/L (ref 0–45)
BILIRUB DIRECT SERPL-MCNC: 0.1 MG/DL (ref 0–0.2)
BILIRUB SERPL-MCNC: 0.4 MG/DL (ref 0.2–1.3)
CHLORIDE SERPL-SCNC: 106 MMOL/L (ref 94–109)
CO2 SERPL-SCNC: 30 MMOL/L (ref 20–32)
POTASSIUM SERPL-SCNC: 3.7 MMOL/L (ref 3.4–5.3)
PROT SERPL-MCNC: 7.5 G/DL (ref 6.8–8.8)
SODIUM SERPL-SCNC: 144 MMOL/L (ref 133–144)

## 2018-06-11 PROCEDURE — 80051 ELECTROLYTE PANEL: CPT | Performed by: OTOLARYNGOLOGY

## 2018-06-11 PROCEDURE — 80076 HEPATIC FUNCTION PANEL: CPT | Performed by: OTOLARYNGOLOGY

## 2018-06-11 PROCEDURE — 36415 COLL VENOUS BLD VENIPUNCTURE: CPT | Performed by: OTOLARYNGOLOGY

## 2018-06-11 NOTE — TELEPHONE ENCOUNTER
Halley from [facility] calling per request from Dr. Parker.  Patient contacted Dr. Parker over the weekend regarding dizziness/Meniere's.     Dr. Parker would like a call this afternoon at [phone], have him paged. He is available after 1 p.m.    Fax copy of last electrolytes and LFTs to Halley at [phone]--done.  YUE Avalos R.N.

## 2018-06-11 NOTE — TELEPHONE ENCOUNTER
Patient had called on Friday to report that she had some pain in her arm after getting 3 solu-medrol infusions. I did some checking for solu-medrol avdverse effects and could not find any information about pain in the arm after solu-medrol infusions. We also have many patients who are on Solu-medrol and we have never had any reports of pain in the arm.    The solu-medrol for this patient was prescribed by Dr Ralph Parker. I told the patient to follow up with Dr Parker and also check with him if he has seen such a reaction.    Toño WorkmanD.

## 2018-06-12 ENCOUNTER — OFFICE VISIT (OUTPATIENT)
Dept: INTERNAL MEDICINE | Facility: CLINIC | Age: 73
End: 2018-06-12
Payer: MEDICARE

## 2018-06-12 VITALS
DIASTOLIC BLOOD PRESSURE: 70 MMHG | HEART RATE: 88 BPM | OXYGEN SATURATION: 98 % | HEIGHT: 63 IN | SYSTOLIC BLOOD PRESSURE: 136 MMHG | RESPIRATION RATE: 16 BRPM | TEMPERATURE: 98.4 F | WEIGHT: 129.5 LBS | BODY MASS INDEX: 22.95 KG/M2

## 2018-06-12 DIAGNOSIS — R20.2 TINGLING IN EXTREMITIES: ICD-10-CM

## 2018-06-12 DIAGNOSIS — H81.03 MENIERE'S DISEASE, BILATERAL: Primary | ICD-10-CM

## 2018-06-12 DIAGNOSIS — M25.511 ACUTE PAIN OF RIGHT SHOULDER: ICD-10-CM

## 2018-06-12 DIAGNOSIS — R94.5 NONSPECIFIC ABNORMAL RESULTS OF LIVER FUNCTION STUDY: ICD-10-CM

## 2018-06-12 PROCEDURE — 99214 OFFICE O/P EST MOD 30 MIN: CPT | Performed by: INTERNAL MEDICINE

## 2018-06-12 NOTE — PATIENT INSTRUCTIONS
Plan:  1. Lidocaine patch 4% - 1-3 patches for max 12 h a day - using it at night  2. Make an appointment with ortho for the shoulders --  dr Amor   3. Please make a lab appointment for  Non fasting labs  In 2-3 weeks  4.  Please make an appointment few days after the labs to discuss about the results.

## 2018-06-12 NOTE — MR AVS SNAPSHOT
After Visit Summary   6/12/2018    Cynthia Arita    MRN: [id number]           Patient Information     Date Of Birth          1945        Visit Information        Provider Department      6/12/2018 1:20 PM Senait Antunez MD [facility]        Today's Diagnoses     Meniere's disease, bilateral    -  1    Tingling in extremities          Care Instructions    Plan:  1. Lidocaine patch 4% - 1-3 patches for max 12 h a day - using it at night  2. Make an appointment with ortho for the shoulders --  dr Amor   3. Please make a lab appointment for fasting labs  In 2-3 weeks  4.  Please make an appointment few days after the labs to discuss about the results.           Follow-ups after your visit        Future tests that were ordered for you today     Open Future Orders        Priority Expected Expires Ordered    Vitamin B12 Routine  6/12/2019 6/12/2018    Folate Routine  6/12/2019 6/12/2018    Comprehensive metabolic panel Routine  6/12/2019 6/12/2018            Who to contact     If you have questions or need follow up information about today's clinic visit or your schedule please contact [facility] directly at [phone].  Normal or non-critical lab and imaging results will be communicated to you by MyChart, letter or phone within 4 business days after the clinic has received the results. If you do not hear from us within 7 days, please contact the clinic through MyChart or phone. If you have a critical or abnormal lab result, we will notify you by phone as soon as possible.  Submit refill requests through [employer] or call your pharmacy and they will forward the refill request to us. Please allow 3 business days for your refill to be completed.          Additional Information About Your Visit        MyChart Information     [employer] lets you send messages to your doctor, view your test results, renew your prescriptions, schedule appointments and more. To  "sign up, go to [email or web address] . Click on \"Log in\" on the left side of the screen, which will take you to the Welcome page. Then click on \"Sign up Now\" on the right side of the page.     You will be asked to enter the access code listed below, as well as some personal information. Please follow the directions to create your username and password.     Your access code is: [email or web address]  Expires: 2018  3:19 PM     Your access code will  in 90 days. If you need help or a new code, please call your [place] clinic or [phone].        Care EveryWhere ID     This is your Care EveryWhere ID. This could be used by other organizations to access your [place] medical records  [id number]        Your Vitals Were     Pulse Temperature Respirations Height Pulse Oximetry BMI (Body Mass Index)    88 98.4  F (36.9  C) 16 5' 2.75\" (1.594 m) 98% 23.12 kg/m2       Blood Pressure from Last 3 Encounters:   18 136/70   18 130/74   18 (!) 139/91    Weight from Last 3 Encounters:   18 129 lb 8 oz (58.7 kg)   18 131 lb (59.4 kg)   18 132 lb (59.9 kg)               Primary Care Provider Office Phone # Fax #    Huyen Samuels [phone]       [address] [address]        Equal Access to Services     [facility] : Hadii cydney ku hadasho Soomaali, waaxda luqadaha, qaybta kaalmada adeegyada, lilliana white. So [facility] [phone].    ATENCIÓN: Si habla español, tiene a moralez disposición servicios gratuitos de asistencia lingüística. Llame al [phone].    We comply with applicable federal civil rights laws and [place] laws. We do not discriminate on the basis of race, color, national origin, age, disability, sex, sexual orientation, or gender identity.            Thank you!     Thank you for choosing [facility]  for your care. Our goal is always to provide you with excellent care. Hearing back " from our patients is one way we can continue to improve our services. Please take a few minutes to complete the written survey that you may receive in the mail after your visit with us. Thank you!             Your Updated Medication List - Protect others around you: Learn how to safely use, store and throw away your medicines at www.disposemymeds.org.          This list is accurate as of 6/12/18  1:22 PM.  Always use your most recent med list.                   Brand Name Dispense Instructions for use Diagnosis    * ALPRAZolam 0.5 MG tablet    XANAX    30 tablet    Take 1/2 tab tid prn    Anxiety       * ALPRAZolam 0.25 MG tablet    XANAX    15 tablet    Take 1 tablet (0.25 mg) by mouth 3 times daily as needed for anxiety    Anxiety       calcitonin (salmon) 200 UNIT/ACT nasal spray    MIACALCIN    3 Bottle    Spray 1 spray into one nostril alternating nostrils daily Alternate nostril each day.    Age-related osteoporosis without current pathological fracture       * CENTRUM SILVER per tablet      Take 1 tablet by mouth daily        * OCUVITE PRESERVISION PO      Take 1 tablet by mouth 2 times daily        DIAZEPAM PO      Take 2 mg by mouth every 8 hours as needed for other (dizziness)        fish oil-omega-3 fatty acids 1000 MG capsule      Take 1,000 mg by mouth 2 times daily        flaxseed oil 1000 MG Caps      Take 1,000 mg by mouth every evening        GLUCOSAMINE CHONDR COMPLEX PO      Take 1 capsule by mouth 2 times daily        HERBALS      2 times daily as needed        * meclizine 25 MG tablet    ANTIVERT    30 tablet    Take 1 tablet (25 mg) by mouth every 6 hours as needed for dizziness        * meclizine 25 MG tablet    ANTIVERT    30 tablet    Take 1 tablet (25 mg) by mouth every 6 hours as needed for dizziness        ondansetron 4 MG ODT tab    ZOFRAN ODT    120 tablet    Take 1 tablet (4 mg) by mouth every 8 hours as needed for nausea    Nausea       pantoprazole 40 MG EC tablet    PROTONIX    90  tablet    Take 1 tablet (40 mg) by mouth daily Take 30-60 minutes before a meal.    Gastroesophageal reflux disease without esophagitis       propranolol 20 MG tablet    INDERAL    90 tablet    Take 1 tablet (20 mg) by mouth daily as needed For blood pressure higher than 170    Essential hypertension       triamterene-hydrochlorothiazide 37.5-25 MG per tablet    MAXZIDE-25    90 tablet    Take 1 tablet by mouth daily    Essential hypertension       vitamin D3 2000 units Caps      Take 2,000 Units by mouth every morning        * Notice:  This list has 6 medication(s) that are the same as other medications prescribed for you. Read the directions carefully, and ask your doctor or other care provider to review them with you.

## 2018-06-19 ENCOUNTER — TRANSFERRED RECORDS (OUTPATIENT)
Dept: HEALTH INFORMATION MANAGEMENT | Facility: CLINIC | Age: 73
End: 2018-06-19

## 2018-06-26 DIAGNOSIS — R20.2 TINGLING IN EXTREMITIES: ICD-10-CM

## 2018-06-26 DIAGNOSIS — H81.03 MENIERE'S DISEASE, BILATERAL: ICD-10-CM

## 2018-06-26 LAB
ALBUMIN SERPL-MCNC: 3.7 G/DL (ref 3.4–5)
ALP SERPL-CCNC: 76 U/L (ref 40–150)
ALT SERPL W P-5'-P-CCNC: 23 U/L (ref 0–50)
ANION GAP SERPL CALCULATED.3IONS-SCNC: 5 MMOL/L (ref 3–14)
AST SERPL W P-5'-P-CCNC: 14 U/L (ref 0–45)
BILIRUB SERPL-MCNC: 0.6 MG/DL (ref 0.2–1.3)
BUN SERPL-MCNC: 23 MG/DL (ref 7–30)
CALCIUM SERPL-MCNC: 9.4 MG/DL (ref 8.5–10.1)
CHLORIDE SERPL-SCNC: 105 MMOL/L (ref 94–109)
CO2 SERPL-SCNC: 32 MMOL/L (ref 20–32)
CREAT SERPL-MCNC: 0.66 MG/DL (ref 0.52–1.04)
FOLATE SERPL-MCNC: 16 NG/ML
GFR SERPL CREATININE-BSD FRML MDRD: 87 ML/MIN/1.7M2
GLUCOSE SERPL-MCNC: 72 MG/DL (ref 70–99)
POTASSIUM SERPL-SCNC: 4.5 MMOL/L (ref 3.4–5.3)
PROT SERPL-MCNC: 6.9 G/DL (ref 6.8–8.8)
SODIUM SERPL-SCNC: 142 MMOL/L (ref 133–144)
VIT B12 SERPL-MCNC: 852 PG/ML (ref 193–986)

## 2018-06-26 PROCEDURE — 80053 COMPREHEN METABOLIC PANEL: CPT | Performed by: INTERNAL MEDICINE

## 2018-06-26 PROCEDURE — 82746 ASSAY OF FOLIC ACID SERUM: CPT | Performed by: INTERNAL MEDICINE

## 2018-06-26 PROCEDURE — 36415 COLL VENOUS BLD VENIPUNCTURE: CPT | Performed by: INTERNAL MEDICINE

## 2018-06-26 PROCEDURE — 82607 VITAMIN B-12: CPT | Performed by: INTERNAL MEDICINE

## 2018-06-29 ENCOUNTER — OFFICE VISIT (OUTPATIENT)
Dept: INTERNAL MEDICINE | Facility: CLINIC | Age: 73
End: 2018-06-29
Payer: MEDICARE

## 2018-06-29 VITALS
DIASTOLIC BLOOD PRESSURE: 68 MMHG | BODY MASS INDEX: 23.39 KG/M2 | WEIGHT: 131 LBS | HEART RATE: 70 BPM | SYSTOLIC BLOOD PRESSURE: 114 MMHG | TEMPERATURE: 98 F | OXYGEN SATURATION: 99 %

## 2018-06-29 DIAGNOSIS — R53.83 OTHER FATIGUE: Primary | ICD-10-CM

## 2018-06-29 DIAGNOSIS — K21.9 GASTROESOPHAGEAL REFLUX DISEASE WITHOUT ESOPHAGITIS: ICD-10-CM

## 2018-06-29 DIAGNOSIS — F41.9 ANXIETY: ICD-10-CM

## 2018-06-29 PROCEDURE — 99214 OFFICE O/P EST MOD 30 MIN: CPT | Performed by: INTERNAL MEDICINE

## 2018-06-29 RX ORDER — ESOMEPRAZOLE MAGNESIUM 40 MG
40 CAPSULE,DELAYED RELEASE (ENTERIC COATED) ORAL DAILY
COMMUNITY
Start: 2018-01-01 | End: 2019-08-21

## 2018-06-29 NOTE — PROGRESS NOTES
Dr Oconnor's note      Patient's instructions / PLAN:                                                        Plan:  1. You may want to try Ranitidine/Zantac 150 mg twice a day instead of Protonix   2. May want to wait on Nexium for 1-3 weeks  3. Continue the other meds, same doses for now.      ASSESSMENT & PLAN:                                                      No specific treatment started after Protonix.  Possible side effects from Protonix, but for sure they increased her baseline anxiety.    (R53.83) Other fatigue  (primary encounter diagnosis)  Comment:   Plan:     (F41.9) Anxiety  Comment:   Plan:     (K21.9) Gastroesophageal reflux disease without esophagitis  Comment:   Plan:        Chief complaint:                                                      Discussed Protonix and possible allergy    SUBJECTIVE:   Cynthia Arita is a 72 year old female who presents to clinic today for the following health issues:    She comes in today to discuss with me about Protonix and possible allergy.    Dr Gin Cruz, GI, advised her to change Nexium to Protonix twice daily.  After she started Protonix, she noticed spasms in her hands and feet, hands joints pain, nausea.  Her friends noticed that her face was red.  She read about the Protonix and she thinks these are the side effects from it.    She states she called [facility], she talked briefly with the doctor and she was told they would call her back.  She is anxious and frustrated because she has not received a call back.  She decided on her own to stop Protonix and resume next Nexium 4 days ago.  She has been on Nexium for many years without any side effects.    She feels tired and she wants to know when she will feel back to normal.  If indeed she had allergy to Protonix, she might have cross allergy to Nexium.  I thought stopping Nexium for few days would benefit her.  She argues that she needs something for her stomach and she wants to continue with the Nexium.   I told her she may want to try ranitidine.  She is not sure.  She will think about this at home    Besides feeling very tired, she feels mild abdominal discomfort.  On the physical exam she is mild tender in epigastric area.  She wants to know why.      Labs June 26 - in acceptable range - discussed      HTN:   Per patient request checked BP with her BP machine and [place] BP cuff.        Patient BP MACHINE Result:  120/78  116/75    Review of Systems:                                                      ROS: negative for fever, chills, cough, wheezes, chest pain, shortness of breath, vomiting, abdominal pain, leg swelling       OBJECTIVE:             Physical exam:  Blood pressure 114/68, pulse 70, temperature 98  F (36.7  C), temperature source Oral, weight 131 lb (59.4 kg), SpO2 99 %, not currently breastfeeding.   NAD, appears comfortable  Skin: maybe mild face rash, I am not sure   HEENT: PERRLA, EOMI, pink conjunctiva, anicteric sclerae, bilateral tympanic membranes are clinically normal, oropharynx is normal color  Neck: supple, no JVD,  No thyroidmegaly. Lymph nodes nonpalpable cervical and supraclavicular.  Chest: clear to auscultation bilaterally, good respiratory effort  Heart: S1 S2, RRR, no mgr appreciated  Abdomen: soft, mild epig  tender, no hepatosplenomegaly or masses appreciated, no abdominal bruit, present bowel sounds  Extremities: no edema,  Neurologic: A, Ox3, no focal signs appreciated  Good eye contact. Speech with normal volume, pattern, tone, talks fast. Thought process seems coherent, logical. Standard dressed appearance. Mood very anxious      PMHx: reviewed  Past Medical History:   Diagnosis Date     Diverticulosis      GERD (gastroesophageal reflux disease)      HTN (hypertension)      Meniere's disease      Nephrolithiasis       PSHx: reviewed  Past Surgical History:   Procedure Laterality Date     C VAGINAL HYSTERECTOMY      with left oophorectomy        Meds: reviewed  Current  Outpatient Prescriptions   Medication Sig Dispense Refill     ALPRAZolam (XANAX) 0.25 MG tablet Take 1 tablet (0.25 mg) by mouth 3 times daily as needed for anxiety 15 tablet 0     ALPRAZolam (XANAX) 0.5 MG tablet Take 1/2 tab tid prn 30 tablet 1     calcitonin, salmon, (MIACALCIN) 200 UNIT/ACT nasal spray Spray 1 spray into one nostril alternating nostrils daily Alternate nostril each day. 3 Bottle 1     DIAZEPAM PO Take 2 mg by mouth every 8 hours as needed for other (dizziness)       Glucosamine-Chondroitin (GLUCOSAMINE CHONDR COMPLEX PO) Take 1 capsule by mouth 2 times daily        meclizine (ANTIVERT) 25 MG tablet Take 1 tablet (25 mg) by mouth every 6 hours as needed for dizziness 30 tablet 1     meclizine (ANTIVERT) 25 MG tablet Take 1 tablet (25 mg) by mouth every 6 hours as needed for dizziness 30 tablet 1     NEXIUM 40 MG CR capsule 40 mg by Oral or Feeding Tube route daily       Omega-3 Fatty Acids (OMEGA-3 FISH OIL) 1000 MG CAPS Take 1,000 mg by mouth 2 times daily        ondansetron (ZOFRAN ODT) 4 MG ODT tab Take 1 tablet (4 mg) by mouth every 8 hours as needed for nausea 120 tablet 1     propranolol (INDERAL) 20 MG tablet Take 1 tablet (20 mg) by mouth daily as needed For blood pressure higher than 170 90 tablet 0     triamterene-hydrochlorothiazide (MAXZIDE-25) 37.5-25 MG per tablet Take 1 tablet by mouth daily 90 tablet 1     Cholecalciferol (VITAMIN D3) 2000 UNITS CAPS Take 2,000 Units by mouth every morning        Flaxseed, Linseed, (FLAXSEED OIL) 1000 MG CAPS Take 1,000 mg by mouth every evening        HERBALS 2 times daily as needed       Multiple Vitamins-Minerals (CENTRUM SILVER) per tablet Take 1 tablet by mouth daily       Multiple Vitamins-Minerals (OCUVITE PRESERVISION PO) Take 1 tablet by mouth 2 times daily          Soc Hx: reviewed  Fam Hx: reviewed          Senait Oconnor MD  Internal Medicine

## 2018-06-29 NOTE — MR AVS SNAPSHOT
"              After Visit Summary   2018    Cynthia Arita    MRN: [id number]           Patient Information     Date Of Birth          1945        Visit Information        Provider Department      2018 10:00 AM Senait Antunez MD [facility]        Care Instructions    Plan:  1. You may want to try Ranitidine/Zantac 150 mg twice a day instead of Protonix   2. May want to wait on Nexium for 1-3 weeks  3. Continue the other meds, same doses for now.          Follow-ups after your visit        Who to contact     If you have questions or need follow up information about today's clinic visit or your schedule please contact [facility] directly at [phone].  Normal or non-critical lab and imaging results will be communicated to you by MyChart, letter or phone within 4 business days after the clinic has received the results. If you do not hear from us within 7 days, please contact the clinic through MyChart or phone. If you have a critical or abnormal lab result, we will notify you by phone as soon as possible.  Submit refill requests through [employer] or call your pharmacy and they will forward the refill request to us. Please allow 3 business days for your refill to be completed.          Additional Information About Your Visit        MyChart Information     Pocits lets you send messages to your doctor, view your test results, renew your prescriptions, schedule appointments and more. To sign up, go to [email or web address] . Click on \"Log in\" on the left side of the screen, which will take you to the Welcome page. Then click on \"Sign up Now\" on the right side of the page.     You will be asked to enter the access code listed below, as well as some personal information. Please follow the directions to create your username and password.     Your access code is: [email or web address]  Expires: 2018  9:47 AM     Your access code will  in 90 days. If you " need help or a new code, please call your [facility] clinic or [phone].        Care EveryWhere ID     This is your Care EveryWhere ID. This could be used by other organizations to access your [facility] medical records  [id number]        Your Vitals Were     Pulse Temperature Pulse Oximetry BMI (Body Mass Index)          70 98  F (36.7  C) (Oral) 99% 23.39 kg/m2         Blood Pressure from Last 3 Encounters:   06/29/18 114/68   06/12/18 136/70   06/08/18 130/74    Weight from Last 3 Encounters:   06/29/18 131 lb (59.4 kg)   06/12/18 129 lb 8 oz (58.7 kg)   06/08/18 131 lb (59.4 kg)              Today, you had the following     No orders found for display         Today's Medication Changes          These changes are accurate as of 6/29/18 11:10 AM.  If you have any questions, ask your nurse or doctor.               Stop taking these medicines if you haven't already. Please contact your care team if you have questions.     pantoprazole 40 MG EC tablet   Commonly known as:  PROTONIX   Stopped by:  Senait Antunez MD                    Primary Care Provider Office Phone # Fax #    Huyen Samuels [phone]       [address] [address]        Equal Access to Services     [facility]: Hadii aad ku hadasho Soomaali, waaxda luqadaha, qaybta kaalmada adeeglottie, lilliana lyons . So [facility] [phone].    ATENCIÓN: Si habla español, tiene a moralez disposición servicios gratuitos de asistencia lingüística. Llame al [phone].    We comply with applicable federal civil rights laws and [place] laws. We do not discriminate on the basis of race, color, national origin, age, disability, sex, sexual orientation, or gender identity.            Thank you!     Thank you for choosing [facility]  for your care. Our goal is always to provide you with excellent care. Hearing back from our patients is one way we can continue to  improve our services. Please take a few minutes to complete the written survey that you may receive in the mail after your visit with us. Thank you!             Your Updated Medication List - Protect others around you: Learn how to safely use, store and throw away your medicines at www.disposemymeds.org.          This list is accurate as of 6/29/18 11:10 AM.  Always use your most recent med list.                   Brand Name Dispense Instructions for use Diagnosis    * ALPRAZolam 0.5 MG tablet    XANAX    30 tablet    Take 1/2 tab tid prn    Anxiety       * ALPRAZolam 0.25 MG tablet    XANAX    15 tablet    Take 1 tablet (0.25 mg) by mouth 3 times daily as needed for anxiety    Anxiety       calcitonin (salmon) 200 UNIT/ACT nasal spray    MIACALCIN    3 Bottle    Spray 1 spray into one nostril alternating nostrils daily Alternate nostril each day.    Age-related osteoporosis without current pathological fracture       * CENTRUM SILVER per tablet      Take 1 tablet by mouth daily        * OCUVITE PRESERVISION PO      Take 1 tablet by mouth 2 times daily        DIAZEPAM PO      Take 2 mg by mouth every 8 hours as needed for other (dizziness)        fish oil-omega-3 fatty acids 1000 MG capsule      Take 1,000 mg by mouth 2 times daily        flaxseed oil 1000 MG Caps      Take 1,000 mg by mouth every evening        GLUCOSAMINE CHONDR COMPLEX PO      Take 1 capsule by mouth 2 times daily        HERBALS      2 times daily as needed        * meclizine 25 MG tablet    ANTIVERT    30 tablet    Take 1 tablet (25 mg) by mouth every 6 hours as needed for dizziness        * meclizine 25 MG tablet    ANTIVERT    30 tablet    Take 1 tablet (25 mg) by mouth every 6 hours as needed for dizziness        nexIUM 40 MG CR capsule   Generic drug:  esomeprazole      40 mg by Oral or Feeding Tube route daily        ondansetron 4 MG ODT tab    ZOFRAN ODT    120 tablet    Take 1 tablet (4 mg) by mouth every 8 hours as needed for nausea     Nausea       propranolol 20 MG tablet    INDERAL    90 tablet    Take 1 tablet (20 mg) by mouth daily as needed For blood pressure higher than 170    Essential hypertension       triamterene-hydrochlorothiazide 37.5-25 MG per tablet    MAXZIDE-25    90 tablet    Take 1 tablet by mouth daily    Essential hypertension       vitamin D3 2000 units Caps      Take 2,000 Units by mouth every morning        * Notice:  This list has 6 medication(s) that are the same as other medications prescribed for you. Read the directions carefully, and ask your doctor or other care provider to review them with you.

## 2018-06-29 NOTE — PATIENT INSTRUCTIONS
Plan:  1. You may want to try Ranitidine/Zantac 150 mg twice a day instead of Protonix   2. May want to wait on Nexium for 1-3 weeks  3. Continue the other meds, same doses for now.

## 2018-07-25 ENCOUNTER — TELEPHONE (OUTPATIENT)
Dept: INTERNAL MEDICINE | Facility: CLINIC | Age: 73
End: 2018-07-25

## 2018-07-25 DIAGNOSIS — M25.511 RIGHT SHOULDER PAIN, UNSPECIFIED CHRONICITY: Primary | ICD-10-CM

## 2018-07-25 NOTE — TELEPHONE ENCOUNTER
Reason for Call:  Medication or medication refill:    Do you use a [facility]?  Name of the pharmacy and phone number for the current request:  [facility] [address] ([place]) - [phone]    Name of the medication requested: Lidoderm Patch 5%    Other request: Pt was seen 6/29. Dr. Oconnor had recommended the pt try salonpas however pt is stating that the lidoderm patches work a lot better for her than the salonpas. Would like rx for this. Please advise    Can we leave a detailed message on this number? YES    Phone number patient can be reached at: Home number on file [phone] (home)    Best Time: any    Call taken on 7/25/2018 at 1:44 PM by Sonia Reddy

## 2018-07-25 NOTE — TELEPHONE ENCOUNTER
Call to patient. States she had these covered by insurance previously when she lived in [place]. Advised these are typically not covered by insurance. Requested prescription be written to see if they are covered. Advised if they are not covered 4% OTC patches are available. Order pended. Please add diagnosis and sign if in agreement. May want to add note to pharmacy that patient may purchase OTC if not covered so they do not contact us for a prior authorization since patient has already been advised they may not be covered?

## 2018-07-26 RX ORDER — LIDOCAINE 50 MG/G
PATCH TOPICAL
Qty: 30 PATCH | Refills: 3 | Status: ON HOLD | OUTPATIENT
Start: 2018-07-26 | End: 2019-01-21

## 2018-08-02 ENCOUNTER — TRANSFERRED RECORDS (OUTPATIENT)
Dept: HEALTH INFORMATION MANAGEMENT | Facility: CLINIC | Age: 73
End: 2018-08-02

## 2018-08-09 ENCOUNTER — TELEPHONE (OUTPATIENT)
Dept: OTOLARYNGOLOGY | Facility: CLINIC | Age: 73
End: 2018-08-09

## 2018-08-16 ENCOUNTER — TRANSFERRED RECORDS (OUTPATIENT)
Dept: HEALTH INFORMATION MANAGEMENT | Facility: CLINIC | Age: 73
End: 2018-08-16

## 2018-09-11 ENCOUNTER — TELEPHONE (OUTPATIENT)
Dept: OTOLARYNGOLOGY | Facility: CLINIC | Age: 73
End: 2018-09-11

## 2018-09-17 ENCOUNTER — TRANSFERRED RECORDS (OUTPATIENT)
Dept: HEALTH INFORMATION MANAGEMENT | Facility: CLINIC | Age: 73
End: 2018-09-17

## 2018-09-17 LAB — PHQ9 SCORE: 4

## 2018-09-18 NOTE — TELEPHONE ENCOUNTER
8-9-18  Spoke with patient after Dr Zuniga reviewed records and explained that there is nothing Dr Zuniga could do for her dizziness. Patient was recommended to follow up with Doctor she has already seen. Patient voiced she understood.    Carla Guzman  [facility] Coordinator      PT IRP Treatment    Primary Rehabilitation Diagnosis: MS Exacerbation; L fibula fracture  Expected Discharge Date: 09/22/17  Planned Discharge Destination: Home    SUBJECTIVE: Subjective: Pt agreeable to PT (09/15/17 1101)  Subjective/Objective Comments: Pt begins and ends session in power w/c at end of session. Rehab aide placed personal 4ww in room at end of session (09/15/17 1101)    OBJECTIVE:  Precautions  Weight Bearing Status: Weight bearing as tolerated right lower extremity;Weight bearing as tolerated left lower extremity (09/10/17 0930)  Other Precautions: Avulsion fx of medial aspect of L talar dome and L fibular fx (09/05/17 1100)  Precautions Comments: goes by IRAIS, fall risk due to BLE spacticity and weakness with MS (09/05/17 1100)    See below for current functional status overview.  See PT flowsheet for full details regarding the PT therapy provided.    ASSESSMENT:   Treatment today focused on gait training 4ww, transfers and BLE stretching for pt comfort.  Patient is demonstrating good progress supported by increased ambulation tolerance.  Patient will benefit from further skilled PT  for continued training with functional mobility to help the patient meet goal of safe return home.      PT Identified Barriers to Discharge: medical     EDUCATION:   On this date, education was provided to patient regarding  transfers and ambulation  The response to education was/were: Needs reinforcement    PLAN:   Continue skilled PT, including the following Treatment/Interventions: Functional transfer training;Strengthening;Bed mobility;Gait training;Stairs retraining;Neuromuscular re-education (09/10/17 0930)   PT Frequency: 7 days/week (09/15/17 1101), Frequency Comments: 90min/day at least 5x/week (09/15/17 1101)    Treatment Plan for Next Session: LE stretching and soft tissue massage, sit to/from stand reps, ambulation with R NDT splint and 4WW with L aircasts  Additional Plan Considerations: hip abductor  strengthening  Plan Comments: bed mobility reps, re-apply kinesiotape as needed    RECOMMENDATIONS FOR DISCHARGE:  Recommendation for Discharge: PT: Home, Home therapy    PT/OT Mobility Equipment for Discharge: pt has power w/c. No needs anticipated (09/15/17 1101)  PT/OT ADL Equipment for Discharge: no needs anticipated (09/13/17 1001)      FUNCTIONAL DATA OVERVIEW LAST 24 HOURS  Bed Mobility        Transfers  Transfers  Sit to Stand: Minimal Assist (Min) (09/15/17 1101)  Stand to Sit: Supervision (Supv) (09/15/17 1101)  Stand Pivot Transfers: Supervision (Supv) (09/14/17 1430)  Assistive Device/: 4-wheeled walker (09/15/17 1101)  Transfer Comments 1: Min A for anterior weight shift  x 2 trials (09/15/17 1101)    Gait  Gait  Gait Assistance: Supervision (Supv) (09/15/17 1101)  Assistive Device/: 4-wheeled walker (09/15/17 1101)  Ambulation Distance (Feet): 75 Feet (09/15/17 1101)  Gait Comments 1: supv for safety with w/c follow,  RNDT splint intact L air splint. 75% able to step through with RLE  (09/15/17 1101),      Stairs       Wheelchair Mobility  PT Wheelchair Mobility  Type of Wheelchair: Power (09/14/17 1430)  Wheelchair Mobility: Modified Independent (09/14/17 1430)  Distance (ft): 1000 Feet (09/14/17 1430)  Propelling Method: Left upper;Joystick (09/14/17 1430)  Wheelchair Mobility Comments: mod I in powerchair indoors, outdoors, crossing street, elevator management. (09/14/17 1430)    Balance       Neuromuscular Re-education

## 2018-09-27 ENCOUNTER — TRANSFERRED RECORDS (OUTPATIENT)
Dept: HEALTH INFORMATION MANAGEMENT | Facility: CLINIC | Age: 73
End: 2018-09-27

## 2018-09-27 ENCOUNTER — TELEPHONE (OUTPATIENT)
Dept: INTERNAL MEDICINE | Facility: CLINIC | Age: 73
End: 2018-09-27

## 2018-09-27 ENCOUNTER — HOSPITAL ENCOUNTER (OUTPATIENT)
Dept: LAB | Facility: CLINIC | Age: 73
Discharge: HOME | End: 2018-09-27
Attending: PHYSICIAN ASSISTANT | Admitting: PHYSICIAN ASSISTANT
Payer: MEDICARE

## 2018-09-27 DIAGNOSIS — M25.569 KNEE PAIN: ICD-10-CM

## 2018-09-27 DIAGNOSIS — M79.669 PAIN, LOWER LEG: ICD-10-CM

## 2018-09-27 DIAGNOSIS — M79.603 ARM PAIN: Primary | ICD-10-CM

## 2018-09-27 LAB
CRP SERPL-MCNC: <2.9 MG/L (ref 0–8)
ERYTHROCYTE [SEDIMENTATION RATE] IN BLOOD BY WESTERGREN METHOD: 6 MM/H (ref 0–30)

## 2018-09-27 PROCEDURE — 86140 C-REACTIVE PROTEIN: CPT | Performed by: PHYSICIAN ASSISTANT

## 2018-09-27 PROCEDURE — 86431 RHEUMATOID FACTOR QUANT: CPT | Performed by: PHYSICIAN ASSISTANT

## 2018-09-27 PROCEDURE — 86618 LYME DISEASE ANTIBODY: CPT | Performed by: PHYSICIAN ASSISTANT

## 2018-09-27 PROCEDURE — 85652 RBC SED RATE AUTOMATED: CPT | Performed by: PHYSICIAN ASSISTANT

## 2018-09-27 PROCEDURE — 36415 COLL VENOUS BLD VENIPUNCTURE: CPT | Performed by: PHYSICIAN ASSISTANT

## 2018-09-27 NOTE — TELEPHONE ENCOUNTER
CHRISTIANO Boston with [facility] calling.  Dr. Amor is ordering Lyme, ESR, CRP, RA on patient and would like her to follow up with primary care provider and perhaps rheumatology.  Will be having labs done at [facility] today.      Patient had been instructed to call our office to schedule follow up but if she does not call today then call patient and assist with follow up appt..  YUE Avalos R.N.

## 2018-09-28 LAB
B BURGDOR IGG+IGM SER QL: <0.01 (ref 0–0.89)
RHEUMATOID FACT SER NEPH-ACNC: <20 IU/ML (ref 0–20)

## 2018-10-01 ENCOUNTER — HOSPITAL ENCOUNTER (EMERGENCY)
Facility: CLINIC | Age: 73
Discharge: HOME | End: 2018-10-01
Attending: EMERGENCY MEDICINE | Admitting: EMERGENCY MEDICINE
Payer: MEDICARE

## 2018-10-01 ENCOUNTER — APPOINTMENT (OUTPATIENT)
Dept: GENERAL RADIOLOGY | Facility: CLINIC | Age: 73
End: 2018-10-01
Attending: EMERGENCY MEDICINE
Payer: MEDICARE

## 2018-10-01 ENCOUNTER — OFFICE VISIT (OUTPATIENT)
Dept: INTERNAL MEDICINE | Facility: CLINIC | Age: 73
End: 2018-10-01
Payer: MEDICARE

## 2018-10-01 ENCOUNTER — APPOINTMENT (OUTPATIENT)
Dept: ULTRASOUND IMAGING | Facility: CLINIC | Age: 73
End: 2018-10-01
Attending: EMERGENCY MEDICINE
Payer: MEDICARE

## 2018-10-01 VITALS
TEMPERATURE: 97 F | DIASTOLIC BLOOD PRESSURE: 85 MMHG | OXYGEN SATURATION: 94 % | HEART RATE: 73 BPM | HEIGHT: 63 IN | SYSTOLIC BLOOD PRESSURE: 148 MMHG | RESPIRATION RATE: 20 BRPM | BODY MASS INDEX: 23.04 KG/M2 | WEIGHT: 130 LBS

## 2018-10-01 VITALS
TEMPERATURE: 98.5 F | SYSTOLIC BLOOD PRESSURE: 138 MMHG | HEART RATE: 74 BPM | HEIGHT: 63 IN | WEIGHT: 129 LBS | BODY MASS INDEX: 22.86 KG/M2 | DIASTOLIC BLOOD PRESSURE: 78 MMHG | OXYGEN SATURATION: 99 % | RESPIRATION RATE: 16 BRPM

## 2018-10-01 DIAGNOSIS — M89.8X5 PAIN IN FEMUR: ICD-10-CM

## 2018-10-01 DIAGNOSIS — M79.651 PAIN OF RIGHT THIGH: ICD-10-CM

## 2018-10-01 DIAGNOSIS — H81.09 MENIERE'S DISEASE, UNSPECIFIED LATERALITY: ICD-10-CM

## 2018-10-01 DIAGNOSIS — K21.9 GASTROESOPHAGEAL REFLUX DISEASE WITHOUT ESOPHAGITIS: ICD-10-CM

## 2018-10-01 DIAGNOSIS — M79.651 PAIN OF RIGHT THIGH: Primary | ICD-10-CM

## 2018-10-01 PROCEDURE — 93971 EXTREMITY STUDY: CPT | Mod: RT

## 2018-10-01 PROCEDURE — 99214 OFFICE O/P EST MOD 30 MIN: CPT | Performed by: INTERNAL MEDICINE

## 2018-10-01 PROCEDURE — 73552 X-RAY EXAM OF FEMUR 2/>: CPT | Mod: RT

## 2018-10-01 PROCEDURE — 99284 EMERGENCY DEPT VISIT MOD MDM: CPT | Mod: 25

## 2018-10-01 RX ORDER — METHYLPREDNISOLONE 4 MG
TABLET, DOSE PACK ORAL
Qty: 21 TABLET | Refills: 0 | COMMUNITY
Start: 2018-10-01 | End: 2018-10-11

## 2018-10-01 RX ORDER — GABAPENTIN 100 MG/1
100 CAPSULE ORAL 3 TIMES DAILY
Qty: 270 CAPSULE | Refills: 1 | Status: SHIPPED | OUTPATIENT
Start: 2018-10-01 | End: 2019-01-10

## 2018-10-01 RX ORDER — HYDROCODONE BITARTRATE AND ACETAMINOPHEN 5; 325 MG/1; MG/1
1 TABLET ORAL EVERY 6 HOURS PRN
Qty: 10 TABLET | Refills: 0 | Status: SHIPPED | OUTPATIENT
Start: 2018-10-01 | End: 2018-11-20

## 2018-10-01 ASSESSMENT — ENCOUNTER SYMPTOMS
FEVER: 0
NUMBNESS: 0
MYALGIAS: 1
WEAKNESS: 0
BACK PAIN: 0

## 2018-10-01 NOTE — ED TRIAGE NOTES
Right leg pain, from knee to hip for 1 week.  Was seen at clinic, started on prednisone, pain not better.  ABCDs intact.  No known injury.

## 2018-10-01 NOTE — ED PROVIDER NOTES
"  History     Chief Complaint:  Leg Pain    HPI   Cynthia Arita is a 73 year old female with a history of HTN who presents to the emergency department for evaluation of leg pain. The patient reports she awoke at 0300 one week ago with sharp right leg pain radiating from her right lateral hip to her knee, worse in her mid thigh. She indicates it also radiates towards her anterior mid thigh somewhat. The patient states she went to orthopedics on 9/27 and was prescribed prednisone with instructions for a follow up appointment, scheduled for today at 1300. She remarks she only has 2 doses of prednisone left, with little improvement in pain. The patient denies any injury to that area, as well as any numbness, back pain, or fever. She has had difficulty sleeping secondary to the pain.    Allergies:  Ativan [Lorazepam]  Metoprolol  Pantoprazole     Medications:    Ranitidine  Xanax  Diazepam  Glucosamine  Lidoderm  Antivert  Nexium  Zofran  Inderal  Maxzide  Prednisone     Past Medical History:    Diverticulosis  GERD  HTN  Meniere's disease  Nephrolithiasis   Osteoporosis    Past Surgical History:    Hysterectomy with left oophorectomy    Family History:    Palsy  Esophageal cancer   Parkinson's  HTN  Bipolar  Brain cancer    Social History:  Presents alone.  Never smoker.  Positive for alcohol use.   Marital Status:   [5]     Review of Systems   Constitutional: Negative for fever.   Musculoskeletal: Positive for myalgias. Negative for back pain.   Neurological: Negative for weakness and numbness.   All other systems reviewed and are negative.    Physical Exam     Patient Vitals for the past 24 hrs:   BP Temp Temp src Pulse Resp SpO2 Height Weight   10/01/18 1021 - - - - - 94 % - -   10/01/18 1019 148/85 - - - - 92 % - -   10/01/18 0842 (!) 152/97 97  F (36.1  C) Temporal 73 20 100 % 1.6 m (5' 3\") 59 kg (130 lb)     Physical Exam  General: Patient is alert and interactive when I enter the room  Head:  The scalp, " face, and head appear normal  Eyes:  Conjunctivae are normal  ENT:    The nose is normal    Pinnae are normal    External acoustic canals are normal  Neck:  Trachea midline  CV:  Pulses are normal    Resp:  No respiratory distress   Abdomen:      Soft, non-tender, non-distended  Musc:  Normal muscular tone    No major joint effusions    Very tender to light touch on right lateral thigh     Able to perform straight leg raise of right leg without difficult, flexion and extension intact, good ROM    No tenderness to greater trochanter     No erythema or no mass palpated, 2+ DP pulse,  Skin:  No rash or lesions noted  Neuro:  Speech is normal and fluent. Face is symmetric.     Moving all extremities well.   Psych: Awake. Alert.  Normal affect.  Appropriate interactions.    Emergency Department Course     Imaging:  Radiographic findings were communicated with the patient who voiced understanding of the findings.    XR Femur Right 2 Views:  No fracture identified. As per radiology.    US Lower Extremity Venous Duplex Right:  1. No evidence of lower extremity deep venous thrombosis.   2. No suspicious finding is seen in the region of the patient's  lateral thigh lump. If indicated clinically, MRI would be considered  the study of choice for evaluation of a soft tissue mass. As per radiology.    Emergency Department Course:  Nursing notes and vitals reviewed. 0900 I performed an exam of the patient as documented above.     The patient was sent for a XR Femur, US LE while in the emergency department, findings above.     1020 I rechecked the patient and discussed the results of her workup thus far.     Findings and plan explained to the Patient. Patient discharged home with instructions regarding supportive care, medications, and reasons to return. The importance of close follow-up was reviewed. The patient was prescribed Norco.    I personally reviewed the laboratory results with the Patient and answered all related  questions prior to discharge.     Impression & Plan      Medical Decision Making:  Cynthia Arita is a 73 year old female who presents with 1 week of right thigh pain. Her pain seems very focal. We were able to pinpoint it to her right anteriorlateral femur. She has fairly good ROM, and her quadriceps tendon is certainly intact, as she is able straight leg raise. She has well-perfusion distal to her point of pain. She can feel a little mass when she presses. We did do an US and attempted to US where her pain is, to see if there is any mass that can be visualized by US. US revealed no blood clot and no mass. Her X-ray of her right femur also revealed no bony abnormalities. She already had blood work including ESR and CRP on 27th while she had this pain, and those were completely normal. It sounds like patient needs MRI. However, she has an appointment at 1300 with her PCP. I think it would be more reasonable to have further workup through her PCP. In the meantime, I will give her pain mediation. I doubt cellulitis, as it is not erythematous, and there are no skin changes. Patient felt comfortable with this plan. Patient discharged.    Diagnosis:    ICD-10-CM   1. Pain in femur M89.9   2. Pain of right thigh M79.651       Disposition:  discharged to home    Discharge Medications:  New Prescriptions    HYDROCODONE-ACETAMINOPHEN (NORCO) 5-325 MG PER TABLET    Take 1 tablet by mouth every 6 hours as needed for severe pain     Alexis CARCAMO, am serving as a scribe on 10/1/2018 at 8:55 AM to personally document services performed by Chantel Márquez MD based on my observations and the provider's statements to me.     Alexis Garay  10/1/2018   [facility] EMERGENCY DEPARTMENT       Chantel Márquez MD  10/02/18 0846

## 2018-10-01 NOTE — ED AVS SNAPSHOT
[facility] Emergency Department    [address]    [facility] [address]    Phone:  [phone]    Fax:  [phone]                                       Cynthia Arita   MRN: [id number]    Department:  [facility] Emergency Department   Date of Visit:  10/1/2018           Patient Information     Date Of Birth          1945        Your diagnoses for this visit were:     Pain in femur     Pain of right thigh        You were seen by Chantel Márquez MD.      Follow-up Information     Follow up with Huyen Samuels MD In 2 days.    Specialty:  Internal Medicine    Contact information:    [address] [place]  [address]  [facility] [address]  [phone]          Discharge Instructions         Myalgias  Myalgias are another word for muscle aches and soreness. This is a symptom, not a disease. Myalgias can have many causes. A cold, the flu, or an acute infection can cause them. So can any illness with a high fever. They may happen after exertion (such as heavy exercise) or injury (such as an accident or fall). Some medicines (such as statins and certain antidepressants) can cause myalgias. They can also be a symptom of chronic or ongoing medical problems (such as lupus, chronic fatigue, or hypothyroidism). With these illnesses, other serious symptoms often occur in addition to muscle pain and soreness.    Myalgias most often go away on their own. If they don't go away, come back, or are severe, testing may be needed to help find the cause.  Home care    Rest until you feel better.    Follow instructions that you were given for how to care for yourself. This may depend on the cause of your myalgias.     If myalgia is thought to be due to a medicine, be sure to talk to the doctor that prescribed the medicine about the best course of action.    To control pain, take prescription or over-the-counter medicines as directed. Unless told not to, you can try acetaminophen or  ibuprofen.  Follow-up care  Follow up with your healthcare provider or as advised. If your symptoms do not go away in a few days or if they come back, follow up with your healthcare provider for an exam and testing.  When to see medical advice  Call your healthcare provider for any of the following:    Fever of 100.4 F (38 C) or higher, or as directed by your healthcare provider    Pain that gets worse and not better, or that goes away and comes back    New joint pains    New rash    Severe headache, neck pain, drowsiness, or confusion  Date Last Reviewed: 3/1/2017    6027-5751 [employer]. [address] [place] [address]. All rights reserved. This information is not intended as a substitute for professional medical care. Always follow your healthcare professional's instructions.          Possible Causes of Low Back or Leg Pain    The symptoms in your back or leg may be due to pressure on a nerve. This pressure may be caused by a damaged disk or by abnormal bone growth. Either way, you may feel pain, burning, tingling, or numbness. If you have pressure on a nerve that connects to the sciatic nerve, pain may shoot down your leg.    Pressure from the disk  Constant wear and tear can weaken a disk over time and cause back pain. The disk can then be damaged by a sudden movement or injury. If its soft center begins to bulge, the disk may press on a nerve. Or the outside of the disk may tear, and the soft center may squeeze through and pinch a nerve.    Pressure from bone  As a disk wears out, the vertebrae right above and below the disk begin to touch. This can put pressure on a nerve. Often, abnormal bone (called bone spurs) grows where the vertebrae rub against each other. This can cause the foramen or the spinal canal to narrow (called stenosis) and press against a nerve.  Date Last Reviewed: 10/4/2015    3364-6421 [employer]. [address] [place] [address]. All rights  reserved. This information is not intended as a substitute for professional medical care. Always follow your healthcare professional's instructions.          Your next 10 appointments already scheduled     Oct 01, 2018  1:00 PM CDT   SHORT with Huyen Samuels MD   [facility] ([facility])    [address]  [facility] [address]   [phone]              24 Hour Appointment Hotline       To make an appointment at any [facility], call [phone] ([phone]). If you don't have a family doctor or clinic, we will help you find one. [facility] are conveniently located to serve the needs of you and your family.             Review of your medicines      START taking        Dose / Directions Last dose taken    HYDROcodone-acetaminophen 5-325 MG per tablet   Commonly known as:  NORCO   Dose:  1 tablet   Quantity:  10 tablet        Take 1 tablet by mouth every 6 hours as needed for severe pain   Refills:  0          Our records show that you are taking the medicines listed below. If these are incorrect, please call your family doctor or clinic.        Dose / Directions Last dose taken    * ALPRAZolam 0.5 MG tablet   Commonly known as:  XANAX   Quantity:  30 tablet        Take 1/2 tab tid prn   Refills:  1        * ALPRAZolam 0.25 MG tablet   Commonly known as:  XANAX   Dose:  0.25 mg   Quantity:  15 tablet        Take 1 tablet (0.25 mg) by mouth 3 times daily as needed for anxiety   Refills:  0        calcitonin (salmon) 200 UNIT/ACT nasal spray   Commonly known as:  MIACALCIN   Dose:  1 spray   Quantity:  3 Bottle        Spray 1 spray into one nostril alternating nostrils daily Alternate nostril each day.   Refills:  1        * CENTRUM SILVER per tablet   Dose:  1 tablet        Take 1 tablet by mouth daily   Refills:  0        * OCUVITE PRESERVISION PO   Dose:  1 tablet        Take 1 tablet by mouth 2 times daily   Refills:  0        DIAZEPAM PO   Dose:  2  mg        Take 2 mg by mouth every 8 hours as needed for other (dizziness)   Refills:  0        fish oil-omega-3 fatty acids 1000 MG capsule   Dose:  1000 mg        Take 1,000 mg by mouth 2 times daily   Refills:  0        flaxseed oil 1000 MG Caps   Dose:  1000 mg        Take 1,000 mg by mouth every evening   Refills:  0        GLUCOSAMINE CHONDR COMPLEX PO   Dose:  1 capsule        Take 1 capsule by mouth 2 times daily   Refills:  0        HERBALS        2 times daily as needed   Refills:  0        lidocaine 5 % Patch   Commonly known as:  LIDODERM   Quantity:  30 patch        Apply up to 3 patches to painful area at once for up to 12 h within a 24 h period.  Remove after 12 hours.   Refills:  3        * meclizine 25 MG tablet   Commonly known as:  ANTIVERT   Dose:  25 mg   Quantity:  30 tablet        Take 1 tablet (25 mg) by mouth every 6 hours as needed for dizziness   Refills:  1        * meclizine 25 MG tablet   Commonly known as:  ANTIVERT   Dose:  25 mg   Quantity:  30 tablet        Take 1 tablet (25 mg) by mouth every 6 hours as needed for dizziness   Refills:  1        nexIUM 40 MG CR capsule   Dose:  40 mg   Generic drug:  esomeprazole        40 mg by Oral or Feeding Tube route daily   Refills:  0        ondansetron 4 MG ODT tab   Commonly known as:  ZOFRAN ODT   Dose:  4 mg   Quantity:  120 tablet        Take 1 tablet (4 mg) by mouth every 8 hours as needed for nausea   Refills:  1        propranolol 20 MG tablet   Commonly known as:  INDERAL   Dose:  20 mg   Quantity:  90 tablet        Take 1 tablet (20 mg) by mouth daily as needed For blood pressure higher than 170   Refills:  0        RANITIDINE HCL PO        Refills:  0        triamterene-hydrochlorothiazide 37.5-25 MG per tablet   Commonly known as:  MAXZIDE-25   Dose:  1 tablet   Quantity:  90 tablet        Take 1 tablet by mouth daily   Refills:  1        vitamin D3 2000 units Caps   Dose:  2000 Units        Take 2,000 Units by mouth every morning    Refills:  0        * Notice:  This list has 6 medication(s) that are the same as other medications prescribed for you. Read the directions carefully, and ask your doctor or other care provider to review them with you.            Information about OPIOIDS     PRESCRIPTION OPIOIDS: WHAT YOU NEED TO KNOW   We gave you an opioid (narcotic) pain medicine. It is important to manage your pain, but opioids are not always the best choice. You should first try all the other options your care team gave you. Take this medicine for as short a time (and as few doses) as possible.    Some activities can increase your pain, such as bandage changes or therapy sessions. It may help to take your pain medicine 30 to 60 minutes before these activities. Reduce your stress by getting enough sleep, working on hobbies you enjoy and practicing relaxation or meditation. Talk to your care team about ways to manage your pain beyond prescription opioids.    These medicines have risks:    DO NOT drive when on new or higher doses of pain medicine. These medicines can affect your alertness and reaction times, and you could be arrested for driving under the influence (DUI). If you need to use opioids long-term, talk to your care team about driving.    DO NOT operate heavy machinery    DO NOT do any other dangerous activities while taking these medicines.    DO NOT drink any alcohol while taking these medicines.     If the opioid prescribed includes acetaminophen, DO NOT take with any other medicines that contain acetaminophen. Read all labels carefully. Look for the word  acetaminophen  or  Tylenol.  Ask your pharmacist if you have questions or are unsure.    You can get addicted to pain medicines, especially if you have a history of addiction (chemical, alcohol or substance dependence). Talk to your care team about ways to reduce this risk.    All opioids tend to cause constipation. Drink plenty of water and eat foods that have a lot of fiber,  such as fruits, vegetables, prune juice, apple juice and high-fiber cereal. Take a laxative (Miralax, milk of magnesia, Colace, Senna) if you don t move your bowels at least every other day. Other side effects include upset stomach, sleepiness, dizziness, throwing up, tolerance (needing more of the medicine to have the same effect), physical dependence and slowed breathing.    Store your pills in a secure place, locked if possible. We will not replace any lost or stolen medicine. If you don t finish your medicine, please throw away (dispose) as directed by your pharmacist. The [place] [employer]ution [employer] has more information about safe disposal: https://www.pca.[facility].mn.us/living-green/managing-unwanted-medications        Prescriptions were sent or printed at these locations (1 Prescription)                   Other Prescriptions                Printed at Department/Unit printer (1 of 1)         HYDROcodone-acetaminophen (NORCO) 5-325 MG per tablet                Procedures and tests performed during your visit     US Lower Extremity Venous Duplex Right    XR Femur Right 2 Views      Orders Needing Specimen Collection     None      Pending Results     No orders found from 9/29/2018 to 10/2/2018.            Pending Culture Results     No orders found from 9/29/2018 to 10/2/2018.            Pending Results Instructions     If you had any lab results that were not finalized at the time of your Discharge, you can call the ED Lab Result RN at [phone]. You will be contacted by this team for any positive Lab results or changes in treatment. The nurses are available 7 days a week from 10A to 6:30P.  You can leave a message 24 hours per day and they will return your call.        Test Results From Your Hospital Stay        10/1/2018 10:19 AM      Narrative     FEMUR RIGHT TWO VIEWS October 1, 2018 9:52 AM     HISTORY: Right femur pain.     FINDINGS: Knee medial and lateral compartment narrowing.  Chondrocalcinosis  of the knee and hip, consistent with calcium  pyrophosphate deposition disease.          Impression     IMPRESSION: No fracture identified.    SEGUNDO CUNNINGHAM MD         10/1/2018  9:54 AM      Narrative     ULTRASOUND RIGHT LOWER EXTREMITY VENOUS DUPLEX October 1, 2018 9:47 AM    HISTORY: Right-sided pain.    TECHNIQUE: Venous Doppler US.?Color flow and spectral Doppler with  waveform analysis performed.        Impression     IMPRESSION:    1. No evidence of lower extremity deep venous thrombosis.   2. No suspicious finding is seen in the region of the patient's  lateral thigh lump. If indicated clinically, MRI would be considered  the study of choice for evaluation of a soft tissue mass.    SEGUNDO CUNNINGHAM MD                Clinical Quality Measure: Blood Pressure Screening     Your blood pressure was checked while you were in the emergency department today. The last reading we obtained was  BP: 148/85 (Simultaneous filing. User may not have seen previous data.) . Please read the guidelines below about what these numbers mean and what you should do about them.  If your systolic blood pressure (the top number) is less than 120 and your diastolic blood pressure (the bottom number) is less than 80, then your blood pressure is normal. There is nothing more that you need to do about it.  If your systolic blood pressure (the top number) is 120-139 or your diastolic blood pressure (the bottom number) is 80-89, your blood pressure may be higher than it should be. You should have your blood pressure rechecked within a year by a primary care provider.  If your systolic blood pressure (the top number) is 140 or greater or your diastolic blood pressure (the bottom number) is 90 or greater, you may have high blood pressure. High blood pressure is treatable, but if left untreated over time it can put you at risk for heart attack, stroke, or kidney failure. You should have your blood pressure rechecked by a primary care provider within  "the next 4 weeks.  If your provider in the emergency department today gave you specific instructions to follow-up with your doctor or provider even sooner than that, you should follow that instruction and not wait for up to 4 weeks for your follow-up visit.        Thank you for choosing [facility]       Thank you for choosing [facility] for your care. Our goal is always to provide you with excellent care. Hearing back from our patients is one way we can continue to improve our services. Please take a few minutes to complete the written survey that you may receive in the mail after you visit with us. Thank you!        [employer] Information     [employer] lets you send messages to your doctor, view your test results, renew your prescriptions, schedule appointments and more. To sign up, go to [email or web address] . Click on \"Log in\" on the left side of the screen, which will take you to the Welcome page. Then click on \"Sign up Now\" on the right side of the page.     You will be asked to enter the access code listed below, as well as some personal information. Please follow the directions to create your username and password.     Your access code is: [email or web address]  Expires: 2018 10:32 AM     Your access code will  in 90 days. If you need help or a new code, please call your [facility] clinic or [phone].        Care EveryWhere ID     This is your Care EveryWhere ID. This could be used by other organizations to access your [facility] medical records  [id number]        Equal Access to Services     RENO PERLA AH: Hadii cydney Eric, waaxda luerinnadaha, qaybta kaalmada leonides, lilliana white. So [facility] [phone].    ATENCIÓN: Si habla español, tiene a moralez disposición servicios gratuitos de asistencia lingüística. Vanessa al [phone].    We comply with applicable federal civil rights laws and [place] laws. We do not discriminate on the basis of race, color, national origin, " age, disability, sex, sexual orientation, or gender identity.            After Visit Summary       This is your record. Keep this with you and show to your community pharmacist(s) and doctor(s) at your next visit.

## 2018-10-01 NOTE — DISCHARGE INSTRUCTIONS
Myalgias  Myalgias are another word for muscle aches and soreness. This is a symptom, not a disease. Myalgias can have many causes. A cold, the flu, or an acute infection can cause them. So can any illness with a high fever. They may happen after exertion (such as heavy exercise) or injury (such as an accident or fall). Some medicines (such as statins and certain antidepressants) can cause myalgias. They can also be a symptom of chronic or ongoing medical problems (such as lupus, chronic fatigue, or hypothyroidism). With these illnesses, other serious symptoms often occur in addition to muscle pain and soreness.    Myalgias most often go away on their own. If they don't go away, come back, or are severe, testing may be needed to help find the cause.  Home care    Rest until you feel better.    Follow instructions that you were given for how to care for yourself. This may depend on the cause of your myalgias.     If myalgia is thought to be due to a medicine, be sure to talk to the doctor that prescribed the medicine about the best course of action.    To control pain, take prescription or over-the-counter medicines as directed. Unless told not to, you can try acetaminophen or ibuprofen.  Follow-up care  Follow up with your healthcare provider or as advised. If your symptoms do not go away in a few days or if they come back, follow up with your healthcare provider for an exam and testing.  When to see medical advice  Call your healthcare provider for any of the following:    Fever of 100.4 F (38 C) or higher, or as directed by your healthcare provider    Pain that gets worse and not better, or that goes away and comes back    New joint pains    New rash    Severe headache, neck pain, drowsiness, or confusion  Date Last Reviewed: 3/1/2017    8071-2413 [employer] [address] [facility], [place] [address]. All rights reserved. This information is not intended as a substitute for professional medical  care. Always follow your healthcare professional's instructions.          Possible Causes of Low Back or Leg Pain    The symptoms in your back or leg may be due to pressure on a nerve. This pressure may be caused by a damaged disk or by abnormal bone growth. Either way, you may feel pain, burning, tingling, or numbness. If you have pressure on a nerve that connects to the sciatic nerve, pain may shoot down your leg.    Pressure from the disk  Constant wear and tear can weaken a disk over time and cause back pain. The disk can then be damaged by a sudden movement or injury. If its soft center begins to bulge, the disk may press on a nerve. Or the outside of the disk may tear, and the soft center may squeeze through and pinch a nerve.    Pressure from bone  As a disk wears out, the vertebrae right above and below the disk begin to touch. This can put pressure on a nerve. Often, abnormal bone (called bone spurs) grows where the vertebrae rub against each other. This can cause the foramen or the spinal canal to narrow (called stenosis) and press against a nerve.  Date Last Reviewed: 10/4/2015    6924-9994 [employer]. [address] [place] [address], [place] [address]. All rights reserved. This information is not intended as a substitute for professional medical care. Always follow your healthcare professional's instructions.

## 2018-10-01 NOTE — PROGRESS NOTES
SUBJECTIVE:   Cynthia Arita is a 73 year old female who presents to clinic today for the following health issues:    ED/UC Followup:    Facility:  [facility]  Date of visit: 10-1-2018  Reason for visit: leg pain         HPI:   1 week ago she awoke with severe burning pain right lateral thigh and perhaps some numbness. Became slightly worse as week went on. Did go into ER where plain XRAY and ultrasound were normal. Light touch is very painful. Denies weakness or rash. Denies bowel or bladder changes.     Her other concern is that has has ongoing intermittent issues with Meniere's. She has been seen by ENT here at the [facility] and [place]. They really have nothing more to offer her.      Her GERD had been worse but is improving. She takes Advil for her joint stiffness. She understands that Advil can cause GERD.     Problem list and histories reviewed & adjusted, as indicated.  Additional history: as documented    Patient Active Problem List   Diagnosis     Esophageal reflux     Osteoporosis     Meniere's disease     Calculus of right kidney     Pneumonia of right upper lobe due to infectious organism (H)     Anxiety     Essential hypertension     Stress     Gastroesophageal reflux disease without esophagitis     Tinnitus, unspecified laterality     Pain of right thigh     Past Surgical History:   Procedure Laterality Date     C VAGINAL HYSTERECTOMY      with left oophorectomy       Social History   Substance Use Topics     Smoking status: Never Smoker     Smokeless tobacco: Never Used     Alcohol use Yes     Family History   Problem Relation Age of Onset     Neurologic Disorder Mother      palsy     Esophageal Cancer Father      Cancer Maternal Grandmother      lymph     Diabetes Paternal Grandmother      Neurologic Disorder Sister      Parkinson's     Hypertension Brother      Bipolar Disorder Sister      Brain Cancer Son 17           Reviewed and updated as needed this visit by clinical staff  Tobacco  Allergies  Meds  " Med Hx  Surg Hx  Fam Hx  Soc Hx      Reviewed and updated as needed this visit by Provider         ROS:  Constitutional, HEENT, cardiovascular, pulmonary, GI, , musculoskeletal, neuro, skin, endocrine and psych systems are negative, except as otherwise noted.    OBJECTIVE:     /78 (BP Location: Left arm, Patient Position: Chair, Cuff Size: Adult Large)  Pulse 74  Temp 98.5  F (36.9  C) (Oral)  Resp 16  Ht 5' 3\" (1.6 m)  Wt 129 lb (58.5 kg)  SpO2 99%  Breastfeeding? No  BMI 22.85 kg/m2  Body mass index is 22.85 kg/(m^2).  GENERAL: healthy, alert and no distress  RESP: lungs clear to auscultation - no rales, rhonchi or wheezes  CV: regular rate and rhythm, normal S1 S2, no S3 or S4, no murmur, click or rub, no peripheral edema and peripheral pulses strong  ABDOMEN: soft, nontender, no hepatosplenomegaly, no masses and bowel sounds normal  MS: thigh with no rash, no palpable masses but very tender, hip good range of motion no palpable tenderness over the low back  NEURO: Normal strength and tone, mentation normal  and cranial nerves 2-12 intact      ASSESSMENT/PLAN:       1. Pain of right thigh  Likely local nerve injury, will try neurontin, consider MRI lumbar spine if symptoms worsen, Follow up in 2-3 weeks  - gabapentin (NEURONTIN) 100 MG capsule; Take 1 capsule (100 mg) by mouth 3 times daily Add 1 pill every 3-4 days until taking 300 mg tid or until pain controlled  Dispense: 270 capsule; Refill: 1    2. Gastroesophageal reflux disease without esophagitis  Better now, if returns we could try her on Celebrex instead    3. Meniere's disease, unspecified laterality  Will follow clinically for now. Its also possible the Neurontin could help some.        Huyen Samuels MD  [facility]  "

## 2018-10-01 NOTE — ED AVS SNAPSHOT
[facility] Emergency Department    [address]    [facility] [address]    Phone:  [phone]    Fax:  [phone]                                       Cynthia Arita   MRN: [id number]    Department:  [facility] Emergency Department   Date of Visit:  10/1/2018           After Visit Summary Signature Page     I have received my discharge instructions, and my questions have been answered. I have discussed any challenges I see with this plan with the nurse or doctor.    ..........................................................................................................................................  Patient/Patient Representative Signature      ..........................................................................................................................................  Patient Representative Print Name and Relationship to Patient    ..................................................               ................................................  Date                                   Time    ..........................................................................................................................................  Reviewed by Signature/Title    ...................................................              ..............................................  Date                                               Time          22EPIC Rev 08/18

## 2018-10-01 NOTE — MR AVS SNAPSHOT
"              After Visit Summary   10/1/2018    Cynthia Arita    MRN: [id number]           Patient Information     Date Of Birth          1945        Visit Information        Provider Department      10/1/2018 1:00 PM Huyen Samuels MD [facility]        Today's Diagnoses     Pain of right thigh    -  1    Gastroesophageal reflux disease without esophagitis        Meniere's disease, unspecified laterality           Follow-ups after your visit        Who to contact     If you have questions or need follow up information about today's clinic visit or your schedule please contact [facility] directly at [phone].  Normal or non-critical lab and imaging results will be communicated to you by MyChart, letter or phone within 4 business days after the clinic has received the results. If you do not hear from us within 7 days, please contact the clinic through [employer] or phone. If you have a critical or abnormal lab result, we will notify you by phone as soon as possible.  Submit refill requests through [employer] or call your pharmacy and they will forward the refill request to us. Please allow 3 business days for your refill to be completed.          Additional Information About Your Visit        MyChart Information     [employer] lets you send messages to your doctor, view your test results, renew your prescriptions, schedule appointments and more. To sign up, go to [email or web address] . Click on \"Log in\" on the left side of the screen, which will take you to the Welcome page. Then click on \"Sign up Now\" on the right side of the page.     You will be asked to enter the access code listed below, as well as some personal information. Please follow the directions to create your username and password.     Your access code is: [email or web address]  Expires: 2018 10:32 AM     Your access code will  in 90 days. If you need help or a new code, please call your [facility] clinic or " "[phone].        Care EveryWhere ID     This is your Care EveryWhere ID. This could be used by other organizations to access your [facility] medical records  [id number]        Your Vitals Were     Pulse Temperature Respirations Height Pulse Oximetry Breastfeeding?    74 98.5  F (36.9  C) (Oral) 16 5' 3\" (1.6 m) 99% No    BMI (Body Mass Index)                   22.85 kg/m2            Blood Pressure from Last 3 Encounters:   10/01/18 138/78   10/01/18 148/85   06/29/18 114/68    Weight from Last 3 Encounters:   10/01/18 129 lb (58.5 kg)   10/01/18 130 lb (59 kg)   06/29/18 131 lb (59.4 kg)              Today, you had the following     No orders found for display         Today's Medication Changes          These changes are accurate as of 10/1/18  1:44 PM.  If you have any questions, ask your nurse or doctor.               Start taking these medicines.        Dose/Directions    gabapentin 100 MG capsule   Commonly known as:  NEURONTIN   Used for:  Pain of right thigh   Started by:  Huyen Samuels MD        Dose:  100 mg   Take 1 capsule (100 mg) by mouth 3 times daily Add 1 pill every 3-4 days until taking 300 mg tid or until pain controlled   Quantity:  270 capsule   Refills:  1         These medicines have changed or have updated prescriptions.        Dose/Directions    ALPRAZolam 0.5 MG tablet   Commonly known as:  XANAX   This may have changed:  Another medication with the same name was removed. Continue taking this medication, and follow the directions you see here.   Used for:  Anxiety   Changed by:  Huyen Samuels MD        Take 1/2 tab tid prn   Quantity:  30 tablet   Refills:  1            Where to get your medicines      These medications were sent to [facility] [address] [place] [address] - [address] AT [facility] [address]  [address], [facility] [address]     Phone:  [phone]     gabapentin 100 MG capsule                Primary Care Provider Office " Phone # Fax #    Huyen Samuels [phone]       [address] [facility] [address]  [facility] [address]        Equal Access to Services     RENO PERLA : Hadmarie cydney mead eric Eric, waalexeyda luqadaha, qabriannata kaaniada leonides, lilliana lawrence queeniedarwin ladd eloy white. So [facility] [phone].    ATENCIÓN: Si habla español, tiene a moralez disposición servicios gratuitos de asistencia lingüística. Llame al [phone].    We comply with applicable federal civil rights laws and [place] laws. We do not discriminate on the basis of race, color, national origin, age, disability, sex, sexual orientation, or gender identity.            Thank you!     Thank you for choosing [facility]  for your care. Our goal is always to provide you with excellent care. Hearing back from our patients is one way we can continue to improve our services. Please take a few minutes to complete the written survey that you may receive in the mail after your visit with us. Thank you!             Your Updated Medication List - Protect others around you: Learn how to safely use, store and throw away your medicines at www.disposemymeds.org.          This list is accurate as of 10/1/18  1:44 PM.  Always use your most recent med list.                   Brand Name Dispense Instructions for use Diagnosis    ALPRAZolam 0.5 MG tablet    XANAX    30 tablet    Take 1/2 tab tid prn    Anxiety       calcitonin (salmon) 200 UNIT/ACT nasal spray    MIACALCIN    3 Bottle    Spray 1 spray into one nostril alternating nostrils daily Alternate nostril each day.    Age-related osteoporosis without current pathological fracture       * CENTRUM SILVER per tablet      Take 1 tablet by mouth daily        * OCUVITE PRESERVISION PO      Take 1 tablet by mouth 2 times daily        DIAZEPAM PO      Take 2 mg by mouth every 8 hours as needed for other (dizziness)        fish oil-omega-3 fatty acids 1000 MG capsule      Take 1,000 mg by mouth 2  times daily        flaxseed oil 1000 MG Caps      Take 1,000 mg by mouth every evening        gabapentin 100 MG capsule    NEURONTIN    270 capsule    Take 1 capsule (100 mg) by mouth 3 times daily Add 1 pill every 3-4 days until taking 300 mg tid or until pain controlled    Pain of right thigh       GLUCOSAMINE CHONDR COMPLEX PO      Take 1 capsule by mouth 2 times daily        HERBALS      2 times daily as needed        HYDROcodone-acetaminophen 5-325 MG per tablet    NORCO    10 tablet    Take 1 tablet by mouth every 6 hours as needed for severe pain        lidocaine 5 % Patch    LIDODERM    30 patch    Apply up to 3 patches to painful area at once for up to 12 h within a 24 h period.  Remove after 12 hours.    Right shoulder pain, unspecified chronicity       meclizine 25 MG tablet    ANTIVERT    30 tablet    Take 1 tablet (25 mg) by mouth every 6 hours as needed for dizziness        methylPREDNISolone 4 MG tablet    MEDROL DOSEPAK    21 tablet    Follow package instructions        nexIUM 40 MG CR capsule   Generic drug:  esomeprazole      40 mg by Oral or Feeding Tube route daily        ondansetron 4 MG ODT tab    ZOFRAN ODT    120 tablet    Take 1 tablet (4 mg) by mouth every 8 hours as needed for nausea    Nausea       propranolol 20 MG tablet    INDERAL    90 tablet    Take 1 tablet (20 mg) by mouth daily as needed For blood pressure higher than 170    Essential hypertension       RANITIDINE HCL PO           triamterene-hydrochlorothiazide 37.5-25 MG per tablet    MAXZIDE-25    90 tablet    Take 1 tablet by mouth daily    Essential hypertension       vitamin D3 2000 units Caps      Take 2,000 Units by mouth every morning        * Notice:  This list has 2 medication(s) that are the same as other medications prescribed for you. Read the directions carefully, and ask your doctor or other care provider to review them with you.

## 2018-10-06 DIAGNOSIS — I10 ESSENTIAL HYPERTENSION: ICD-10-CM

## 2018-10-06 DIAGNOSIS — M81.0 AGE-RELATED OSTEOPOROSIS WITHOUT CURRENT PATHOLOGICAL FRACTURE: ICD-10-CM

## 2018-10-08 NOTE — TELEPHONE ENCOUNTER
"Requested Prescriptions   Pending Prescriptions Disp Refills     triamterene-hydrochlorothiazide (MAXZIDE-25) 37.5-25 MG per tablet [Pharmacy Med Name: TRIAMT-HCTZ 37.5-25MG TABLET]  Last Written Prescription Date:  5/18/2018  Last Fill Quantity: 90,  # refills: 1   Last office visit: 10/1/2018 with prescribing provider:     Future Office Visit:   Next 5 appointments (look out 90 days)     Oct 11, 2018  8:20 AM CDT   SHORT with Huyen Samuels MD   [facility] ([facility])    [address]  [facility] [address]   [phone]            Nov 12, 2018  7:40 AM CST   SHORT with Huyen Samuels MD   [facility] ([facility])    [address]  [facility] [address]   [phone]                90 tablet      Sig: TAKE 1 TABLET BY MOUTH  DAILY    Diuretics (Including Combos) Protocol Passed    10/6/2018  2:10 PM       Passed - Blood pressure under 140/90 in past 12 months    BP Readings from Last 3 Encounters:   10/01/18 138/78   10/01/18 148/85   06/29/18 114/68                Passed - Recent (12 mo) or future (30 days) visit within the authorizing provider's specialty    Patient had office visit in the last 12 months or has a visit in the next 30 days with authorizing provider or within the authorizing provider's specialty.  See \"Patient Info\" tab in inbasket, or \"Choose Columns\" in Meds & Orders section of the refill encounter.           Passed - Patient is age 18 or older       Passed - No active pregancy on record       Passed - Normal serum creatinine on file in past 12 months    Recent Labs   Lab Test  06/26/18   0831   CR  0.66             Passed - Normal serum potassium on file in past 12 months    Recent Labs   Lab Test  06/26/18   0831   POTASSIUM  4.5                   Passed - Normal serum sodium on file in past 12 months    Recent Labs   Lab Test  06/26/18   0831   NA  142             Passed - No positive " "pregnancy test in past 12 months        calcitonin, salmon, (MIACALCIN) 200 UNIT/ACT nasal spray [Pharmacy Med Name: CALCITONIN  SPRAY  NASL 30 DOSE]  Last Written Prescription Date:  5/18/2018  Last Fill Quantity: 3,  # refills: 1   Last office visit: 10/1/2018 with prescribing provider:     Future Office Visit:   Next 5 appointments (look out 90 days)     Oct 11, 2018  8:20 AM CDT   SHORT with Huyen Samuels MD   [facility]    [address] [place]  [facility] [address]   [phone]            Nov 12, 2018  7:40 AM CST   SHORT with Huyen Samuels MD   [facility] ([facility])    [address] [place]  [facility] [address]   [phone]                11.1 mL      Sig: USE 1 SPRAY IN 1 NOSTRIL  DAILY (ALTERNATING NOSTRILS DAILY)    Bisphosphonates Failed    10/6/2018  2:10 PM       Failed - Dexa on file within past 2 years    Please review last Dexa result.          Passed - Recent (12 mo) or future (30 days) visit within the authorizing provider's specialty    Patient had office visit in the last 12 months or has a visit in the next 30 days with authorizing provider or within the authorizing provider's specialty.  See \"Patient Info\" tab in inbasket, or \"Choose Columns\" in Meds & Orders section of the refill encounter.           Passed - Patient is age 18 or older       Passed - Normal serum creatinine on file within past 12 months    Recent Labs   Lab Test  06/26/18   0831   06/23/15   1004   CR  0.66   < >   --    CREAT   --    --   0.7    < > = values in this interval not displayed.             "

## 2018-10-09 DIAGNOSIS — F41.9 ANXIETY: ICD-10-CM

## 2018-10-09 RX ORDER — CALCITONIN SALMON 200 [IU]/.09ML
SPRAY, METERED NASAL
Qty: 11.1 ML | Refills: 1 | Status: SHIPPED | OUTPATIENT
Start: 2018-10-09 | End: 2019-01-10

## 2018-10-09 RX ORDER — TRIAMTERENE/HYDROCHLOROTHIAZID 37.5-25 MG
TABLET ORAL
Qty: 90 TABLET | Refills: 0 | Status: SHIPPED | OUTPATIENT
Start: 2018-10-09 | End: 2019-01-10

## 2018-10-09 NOTE — TELEPHONE ENCOUNTER
Requested Prescriptions   Pending Prescriptions Disp Refills     ALPRAZolam (XANAX) 0.5 MG tablet  Last Written Prescription Date:  2/15/18  Last Fill Quantity: 30,  # refills: 1   Last office visit: 10/1/2018 with prescribing provider:  Arvind   Future Office Visit:   Next 5 appointments (look out 90 days)     Oct 11, 2018  8:20 AM CDT   SHORT with Huyen Samuels MD   [facility] ([facility])    [address]  [facility] [address]   [phone]            Nov 12, 2018  7:40 AM CST   SHORT with Huyen Samuels MD   [facility] ([facility])    [address]  [facility] [address]   [phone]                  30 tablet 1     Sig: Take 1/2 tab tid prn    There is no refill protocol information for this order

## 2018-10-10 RX ORDER — ALPRAZOLAM 0.5 MG
TABLET ORAL
Qty: 30 TABLET | Refills: 1 | Status: SHIPPED | OUTPATIENT
Start: 2018-10-10 | End: 2018-10-10

## 2018-10-10 RX ORDER — ALPRAZOLAM 0.5 MG
TABLET ORAL
Qty: 30 TABLET | Refills: 1 | Status: ON HOLD | OUTPATIENT
Start: 2018-10-10 | End: 2019-01-25

## 2018-10-11 ENCOUNTER — TELEPHONE (OUTPATIENT)
Dept: INTERNAL MEDICINE | Facility: CLINIC | Age: 73
End: 2018-10-11

## 2018-10-11 ENCOUNTER — OFFICE VISIT (OUTPATIENT)
Dept: INTERNAL MEDICINE | Facility: CLINIC | Age: 73
End: 2018-10-11
Payer: MEDICARE

## 2018-10-11 VITALS
HEART RATE: 90 BPM | RESPIRATION RATE: 16 BRPM | DIASTOLIC BLOOD PRESSURE: 72 MMHG | BODY MASS INDEX: 23.18 KG/M2 | SYSTOLIC BLOOD PRESSURE: 128 MMHG | HEIGHT: 63 IN | WEIGHT: 130.8 LBS | OXYGEN SATURATION: 99 % | TEMPERATURE: 97.6 F

## 2018-10-11 DIAGNOSIS — H81.09 MENIERE'S DISEASE, UNSPECIFIED LATERALITY: ICD-10-CM

## 2018-10-11 DIAGNOSIS — B02.9 HERPES ZOSTER WITHOUT COMPLICATION: Primary | ICD-10-CM

## 2018-10-11 PROCEDURE — 99213 OFFICE O/P EST LOW 20 MIN: CPT | Performed by: INTERNAL MEDICINE

## 2018-10-11 RX ORDER — OXYCODONE HYDROCHLORIDE 5 MG/1
5-10 TABLET ORAL EVERY 6 HOURS PRN
Qty: 90 TABLET | Refills: 0 | Status: SHIPPED | OUTPATIENT
Start: 2018-10-11 | End: 2019-01-02

## 2018-10-11 RX ORDER — VALACYCLOVIR HYDROCHLORIDE 1 G/1
1000 TABLET, FILM COATED ORAL 3 TIMES DAILY
Qty: 21 TABLET | Refills: 0 | Status: SHIPPED | OUTPATIENT
Start: 2018-10-11 | End: 2018-11-20

## 2018-10-11 ASSESSMENT — PAIN SCALES - GENERAL: PAINLEVEL: WORST PAIN (10)

## 2018-10-11 NOTE — MR AVS SNAPSHOT
"              After Visit Summary   10/11/2018    Cynthia Arita    MRN: [id number]           Patient Information     Date Of Birth          1945        Visit Information        Provider Department      10/11/2018 8:20 AM Huyen Samuels MD [facility]        Today's Diagnoses     Herpes zoster without complication    -  1    Meniere's disease, unspecified laterality           Follow-ups after your visit        Your next 10 appointments already scheduled     Oct 18, 2018 10:00 AM CDT   [facility]    [address]  [facility] [id number] [phone]            Nov 12, 2018  7:40 AM CST   [facility] with [facility]els [facility]   [facility]    [address]  [facility] [id number] [phone]              Who to contact     If you have questions or need follow up information about today's clinic visit or your schedule please contact [facility] directly at [phone].  Normal or non-critical lab and imaging results will be communicated to you by MyChart, letter or phone within 4 business days after the clinic has received the results. If you do not hear from us within 7 days, please contact the clinic through MyChart or phone. If you have a critical or abnormal lab result, we will notify you by phone as soon as possible.  Submit refill requests through [facility] or call your pharmacy and they will forward the refill request to us. Please allow 3 business days for your refill to be completed.          Additional Information About Your Visit        Care EveryWhere ID     This is your Care EveryWhere ID. This could be used by other organizations to access your [place] medical records  [id number]        Your Vitals Were     Pulse Temperature Respirations Height Pulse Oximetry Breastfeeding?    90 97.6  F (36.4  C) (Oral) 16 5' 3\" " (1.6 m) 99% No    BMI (Body Mass Index)                   23.17 kg/m2            Blood Pressure from Last 3 Encounters:   10/11/18 128/72   10/01/18 138/78   10/01/18 148/85    Weight from Last 3 Encounters:   10/11/18 130 lb 12.8 oz (59.3 kg)   10/01/18 129 lb (58.5 kg)   10/01/18 130 lb (59 kg)              Today, you had the following     No orders found for display         Today's Medication Changes          These changes are accurate as of 10/11/18  9:22 AM.  If you have any questions, ask your nurse or doctor.               Start taking these medicines.        Dose/Directions    oxyCODONE IR 5 MG tablet   Commonly known as:  ROXICODONE   Used for:  Herpes zoster without complication   Started by:  Huyen Samuels MD        Dose:  5-10 mg   Take 1-2 tablets (5-10 mg) by mouth every 6 hours as needed for severe pain   Quantity:  90 tablet   Refills:  0       valACYclovir 1000 mg tablet   Commonly known as:  VALTREX   Used for:  Herpes zoster without complication   Started by:  Huyen Samuels MD        Dose:  1000 mg   Take 1 tablet (1,000 mg) by mouth 3 times daily   Quantity:  21 tablet   Refills:  0            Where to get your medicines      These medications were sent to [facility] [address] [place] [address] [address] AT [place] [address] & [facility] [employer] [facility]  [address], [facility] [address]     Phone:  [phone]     valACYclovir 1000 mg tablet         Some of these will need a paper prescription and others can be bought over the counter.  Ask your nurse if you have questions.     Bring a paper prescription for each of these medications     oxyCODONE IR 5 MG tablet               Information about OPIOIDS     PRESCRIPTION OPIOIDS: WHAT YOU NEED TO KNOW   We gave you an opioid (narcotic) pain medicine. It is important to manage your pain, but opioids are not always the best choice. You should first try all the other options your care team gave you. Take this medicine for as  short a time (and as few doses) as possible.    Some activities can increase your pain, such as bandage changes or therapy sessions. It may help to take your pain medicine 30 to 60 minutes before these activities. Reduce your stress by getting enough sleep, working on hobbies you enjoy and practicing relaxation or meditation. Talk to your care team about ways to manage your pain beyond prescription opioids.    These medicines have risks:    DO NOT drive when on new or higher doses of pain medicine. These medicines can affect your alertness and reaction times, and you could be arrested for driving under the influence (DUI). If you need to use opioids long-term, talk to your care team about driving.    DO NOT operate heavy machinery    DO NOT do any other dangerous activities while taking these medicines.    DO NOT drink any alcohol while taking these medicines.     If the opioid prescribed includes acetaminophen, DO NOT take with any other medicines that contain acetaminophen. Read all labels carefully. Look for the word  acetaminophen  or  Tylenol.  Ask your pharmacist if you have questions or are unsure.    You can get addicted to pain medicines, especially if you have a history of addiction (chemical, alcohol or substance dependence). Talk to your care team about ways to reduce this risk.    All opioids tend to cause constipation. Drink plenty of water and eat foods that have a lot of fiber, such as fruits, vegetables, prune juice, apple juice and high-fiber cereal. Take a laxative (Miralax, milk of magnesia, Colace, Senna) if you don t move your bowels at least every other day. Other side effects include upset stomach, sleepiness, dizziness, throwing up, tolerance (needing more of the medicine to have the same effect), physical dependence and slowed breathing.    Store your pills in a secure place, locked if possible. We will not replace any lost or stolen medicine. If you don t finish your medicine, please throw  away (dispose) as directed by your pharmacist. The [place] Pollution Control Agency has more information about safe disposal: https://www.pca.formerly [email or web address]/living-[email or web address]/[email or web address]-medications         Primary Care Provider Office Phone # Fax #    Huyen Samuels [phone]       [address]  [facility] [address]        Equal Access to Services     TIN PERLA : Hadii aad ku hadasho Soomaali, waaxda luqadaha, qaybta kaalmada adeegyada, waxay idiin hayaan adeeg kharash la'aan ah. So [facility] [phone].    ATENCIÓN: Si habla español, tiene a moralez disposición servicios gratuitos de asistencia lingüística. Aliyaame al [phone].    We comply with applicable federal civil rights laws and [place] laws. We do not discriminate on the basis of race, color, national origin, age, disability, sex, sexual orientation, or gender identity.            Thank you!     Thank you for choosing [facility]  for your care. Our goal is always to provide you with excellent care. Hearing back from our patients is one way we can continue to improve our services. Please take a few minutes to complete the written survey that you may receive in the mail after your visit with us. Thank you!             Your Updated Medication List - Protect others around you: Learn how to safely use, store and throw away your medicines at www.disposemymeds.org.          This list is accurate as of 10/11/18  9:22 AM.  Always use your most recent med list.                   Brand Name Dispense Instructions for use Diagnosis    ALPRAZolam 0.5 MG tablet    XANAX    30 tablet    Take 1/2 tab tid prn    Anxiety       calcitonin (salmon) 200 UNIT/ACT nasal spray    MIACALCIN    11.1 mL    USE 1 SPRAY IN 1 NOSTRIL  DAILY (ALTERNATING NOSTRILS DAILY)    Age-related osteoporosis without current pathological fracture       * CENTRUM SILVER per tablet      Take 1 tablet by mouth daily        * OCUVITE PRESERVISION PO       Take 1 tablet by mouth 2 times daily        DIAZEPAM PO      Take 2 mg by mouth every 8 hours as needed for other (dizziness)        fish oil-omega-3 fatty acids 1000 MG capsule      Take 1,000 mg by mouth 2 times daily        flaxseed oil 1000 MG Caps      Take 1,000 mg by mouth every evening        gabapentin 100 MG capsule    NEURONTIN    270 capsule    Take 1 capsule (100 mg) by mouth 3 times daily Add 1 pill every 3-4 days until taking 300 mg tid or until pain controlled    Pain of right thigh       GLUCOSAMINE CHONDR COMPLEX PO      Take 1 capsule by mouth 2 times daily        HERBALS      2 times daily as needed        HYDROcodone-acetaminophen 5-325 MG per tablet    NORCO    10 tablet    Take 1 tablet by mouth every 6 hours as needed for severe pain        lidocaine 5 % Patch    LIDODERM    30 patch    Apply up to 3 patches to painful area at once for up to 12 h within a 24 h period.  Remove after 12 hours.    Right shoulder pain, unspecified chronicity       meclizine 25 MG tablet    ANTIVERT    30 tablet    Take 1 tablet (25 mg) by mouth every 6 hours as needed for dizziness        nexIUM 40 MG CR capsule   Generic drug:  esomeprazole      40 mg by Oral or Feeding Tube route daily        ondansetron 4 MG ODT tab    ZOFRAN ODT    120 tablet    Take 1 tablet (4 mg) by mouth every 8 hours as needed for nausea    Nausea       oxyCODONE IR 5 MG tablet    ROXICODONE    90 tablet    Take 1-2 tablets (5-10 mg) by mouth every 6 hours as needed for severe pain    Herpes zoster without complication       propranolol 20 MG tablet    INDERAL    90 tablet    Take 1 tablet (20 mg) by mouth daily as needed For blood pressure higher than 170    Essential hypertension       RANITIDINE HCL PO           triamterene-hydrochlorothiazide 37.5-25 MG per tablet    MAXZIDE-25    90 tablet    TAKE 1 TABLET BY MOUTH  DAILY    Essential hypertension       valACYclovir 1000 mg tablet    VALTREX    21 tablet    Take 1 tablet (1,000  mg) by mouth 3 times daily    Herpes zoster without complication       vitamin D3 2000 units Caps      Take 2,000 Units by mouth every morning        * Notice:  This list has 2 medication(s) that are the same as other medications prescribed for you. Read the directions carefully, and ask your doctor or other care provider to review them with you.

## 2018-10-11 NOTE — PROGRESS NOTES
"  SUBJECTIVE:   Cynthia Arita is a 73 year old female who presents to clinic today for the following health issues:    Right leg rash x 3 weeks. Follow up [place] for dizziness.    HPI:   See my note from last week. Since then her pain has gotten worse and she has developed 3 small areas of erythema 1 with a central vesicle.. She had a shingles vaccine over 1 year ago.     She is up to 200 mg tid in Gabapentin without side effects.     Problem list and histories reviewed & adjusted, as indicated.  Additional history: as documented    Patient Active Problem List   Diagnosis     Esophageal reflux     Osteoporosis     Meniere's disease     Calculus of right kidney     Pneumonia of right upper lobe due to infectious organism (H)     Anxiety     Essential hypertension     Stress     Gastroesophageal reflux disease without esophagitis     Tinnitus, unspecified laterality     Pain of right thigh     Past Surgical History:   Procedure Laterality Date     C VAGINAL HYSTERECTOMY      with left oophorectomy       Social History   Substance Use Topics     Smoking status: Never Smoker     Smokeless tobacco: Never Used     Alcohol use Yes     Family History   Problem Relation Age of Onset     Neurologic Disorder Mother      palsy     Esophageal Cancer Father      Cancer Maternal Grandmother      lymph     Diabetes Paternal Grandmother      Neurologic Disorder Sister      Parkinson's     Hypertension Brother      Bipolar Disorder Sister      Brain Cancer Son 17           Reviewed and updated as needed this visit by clinical staff  Tobacco  Allergies  Med Hx  Surg Hx  Fam Hx  Soc Hx      Reviewed and updated as needed this visit by Provider         ROS:  Constitutional, HEENT, cardiovascular, pulmonary, gi and gu systems are negative, except as otherwise noted.    OBJECTIVE:     /72 (BP Location: Right arm, Patient Position: Chair, Cuff Size: Adult Regular)  Pulse 90  Temp 97.6  F (36.4  C) (Oral)  Resp 16  Ht 5' 3\" " (1.6 m)  Wt 130 lb 12.8 oz (59.3 kg)  SpO2 99%  Breastfeeding? No  BMI 23.17 kg/m2  Body mass index is 23.17 kg/(m^2).  GENERAL: healthy, alert and no distress  RESP: lungs clear to auscultation - no rales, rhonchi or wheezes  CV: regular rate and rhythm, normal S1 S2, no S3 or S4, no murmur, click or rub, no peripheral edema and peripheral pulses strong  ABDOMEN: soft, nontender, no hepatosplenomegaly, no masses and bowel sounds normal  SKIN: 3 lesion anterior thigh 1-2 cm red slightly raised and 1 with 2mm central vesicle      ASSESSMENT/PLAN:       1. Herpes zoster without complication  Start valtrex add Oxycodone and she should continue to increase her Gabapetin  - valACYclovir (VALTREX) 1000 mg tablet; Take 1 tablet (1,000 mg) by mouth 3 times daily  Dispense: 21 tablet; Refill: 0  - oxyCODONE IR (ROXICODONE) 5 MG tablet; Take 1-2 tablets (5-10 mg) by mouth every 6 hours as needed for severe pain  Dispense: 90 tablet; Refill: 0    2. Meniere's disease, unspecified laterality         Follow up in 1 week     Huyen Samuels MD  [facility]

## 2018-10-11 NOTE — TELEPHONE ENCOUNTER
Patient calls reporting she took the 1000 mg of Valtrex and 2 tabs of oxycodone 9:30am then again at 3:30pm and then felt dizzy and out of it. Asked patient if she was use to taking 2 tabs of oxycodone and she stated she didn't think she had before. Informed patient that starting on 2 tabs of oxycodone can do that. Recommended only taking 1 tab and seeing if that helps with the pain, then can take additional one if needed. Patient requests I inform provider of what happened and see if any further comments.    Provider please review and advise. Thanks.

## 2018-10-15 NOTE — PROGRESS NOTES
"Patient's instructions / PLAN:                                                        Plan:  1. Lidocaine patch 4% - 1-3 patches for max 12 h a day - using it at night  2. Make an appointment with ortho for the shoulders --  dr Amor   3. Please make a lab appointment for non fasting labs  In 2-3 weeks  4.  Please make an appointment few days after the labs to discuss about the results.         ASSESSMENT & PLAN:                                                      (H81.03) Meniere's disease, bilateral  (primary encounter diagnosis)  Comment:   Plan: Vitamin B12, Folate, Comprehensive metabolic         panel        Follow-up with ENT    (R20.2) Tingling in extremities  Comment: I wonder if it is related with her increased anxiety  Plan: Vitamin B12, Folate, Comprehensive metabolic         panel        Follow-up in 2-3 weeks    (M25.511) Acute pain of right shoulder  Comment: She has seen Dr. Amor in the past and she would like to see him again  Plan: As above    (R94.5) Nonspecific abnormal results of liver function study  Comment: Resolved  Plan:        Chief Complaint:                                                      LFTs  Right shoulder pain  Toes and fingers tingling  Vertigo      SUBJECTIVE:                                                    History of present illness     R shoulder pain   -- decreased ROM   -- tender on post shoulder and bicpes  -- again no swelling, no suspicion for DVT  -- L shoulder decreased ROM as well    Tingling in the L arm and L foot, more in the fingers and toes   -- bottom of the L foot almost all the time  -- L fingers - mild symptoms \" if I think about them, I have the tingling\"   -- normal brain MRI May 2018    Vertigo  -- nausea  -- she can't take zofran because of the test tomorrow   -- I discuss with ENT dr Parker about her as per her request    ROS:                                                      ROS: negative for fever, chills, cough, wheezes, chest pain, shortness " "of breath, vomiting, abdominal pain, leg swelling    OBJECTIVE:                                                    Physical Exam :     Blood pressure 136/70, pulse 88, temperature 98.4  F (36.9  C), resp. rate 16, height 5' 2.75\" (1.594 m), weight 129 lb 8 oz (58.7 kg), SpO2 98 %, not currently breastfeeding.     NAD, appears comfortable  Skin: no rashes   Chest: clear to auscultation bilaterally, good respiratory effort  Heart: S1 S2, RRR, no mgr appreciated  Abdomen: soft, not tender,   Extremities: no edema,  Neurologic: A, Ox3, no focal signs appreciated  R shoulder: Decreased range of motion passively and active leg.  No swelling.  She has tenderness on the posterior part and the biceps.  She also has decreased range of motion in the left shoulder but no pain    PMHx: reviewed  Past Medical History:   Diagnosis Date     Diverticulosis      GERD (gastroesophageal reflux disease)      HTN (hypertension)      Meniere's disease      Nephrolithiasis       PSHx: reviewed  Past Surgical History:   Procedure Laterality Date     C VAGINAL HYSTERECTOMY      with left oophorectomy        Meds: reviewed  Current Outpatient Prescriptions   Medication Sig Dispense Refill     ALPRAZolam (XANAX) 0.25 MG tablet Take 1 tablet (0.25 mg) by mouth 3 times daily as needed for anxiety 15 tablet 0     ALPRAZolam (XANAX) 0.5 MG tablet Take 1/2 tab tid prn 30 tablet 1     calcitonin, salmon, (MIACALCIN) 200 UNIT/ACT nasal spray Spray 1 spray into one nostril alternating nostrils daily Alternate nostril each day. 3 Bottle 1     Cholecalciferol (VITAMIN D3) 2000 UNITS CAPS Take 2,000 Units by mouth every morning        DIAZEPAM PO Take 2 mg by mouth every 8 hours as needed for other (dizziness)       Flaxseed, Linseed, (FLAXSEED OIL) 1000 MG CAPS Take 1,000 mg by mouth every evening        Glucosamine-Chondroitin (GLUCOSAMINE CHONDR COMPLEX PO) Take 1 capsule by mouth 2 times daily        HERBALS 2 times daily as needed       meclizine " (ANTIVERT) 25 MG tablet Take 1 tablet (25 mg) by mouth every 6 hours as needed for dizziness 30 tablet 1     meclizine (ANTIVERT) 25 MG tablet Take 1 tablet (25 mg) by mouth every 6 hours as needed for dizziness 30 tablet 1     Multiple Vitamins-Minerals (CENTRUM SILVER) per tablet Take 1 tablet by mouth daily       Multiple Vitamins-Minerals (OCUVITE PRESERVISION PO) Take 1 tablet by mouth 2 times daily        Omega-3 Fatty Acids (OMEGA-3 FISH OIL) 1000 MG CAPS Take 1,000 mg by mouth 2 times daily        ondansetron (ZOFRAN ODT) 4 MG ODT tab Take 1 tablet (4 mg) by mouth every 8 hours as needed for nausea 120 tablet 1     pantoprazole (PROTONIX) 40 MG EC tablet Take 1 tablet (40 mg) by mouth daily Take 30-60 minutes before a meal. 90 tablet 3     propranolol (INDERAL) 20 MG tablet Take 1 tablet (20 mg) by mouth daily as needed For blood pressure higher than 170 90 tablet 0     triamterene-hydrochlorothiazide (MAXZIDE-25) 37.5-25 MG per tablet Take 1 tablet by mouth daily 90 tablet 1       Soc Hx: reviewed  Fam Hx: reviewed          Senait Oconnor MD  Internal Medicine      Partially impaired: cannot see medication labels or newsprint, but can see obstacles in path, and the surrounding layout; can count fingers at arm's length

## 2018-10-18 ENCOUNTER — OFFICE VISIT (OUTPATIENT)
Dept: INTERNAL MEDICINE | Facility: CLINIC | Age: 73
End: 2018-10-18
Payer: MEDICARE

## 2018-10-18 VITALS
RESPIRATION RATE: 16 BRPM | WEIGHT: 132 LBS | SYSTOLIC BLOOD PRESSURE: 132 MMHG | TEMPERATURE: 97.8 F | HEART RATE: 85 BPM | DIASTOLIC BLOOD PRESSURE: 80 MMHG | HEIGHT: 63 IN | BODY MASS INDEX: 23.39 KG/M2 | OXYGEN SATURATION: 99 %

## 2018-10-18 DIAGNOSIS — L98.9 SKIN LESION: ICD-10-CM

## 2018-10-18 DIAGNOSIS — B02.9 HERPES ZOSTER WITHOUT COMPLICATION: Primary | ICD-10-CM

## 2018-10-18 DIAGNOSIS — M79.10 MYALGIA: ICD-10-CM

## 2018-10-18 DIAGNOSIS — R35.0 INCREASED FREQUENCY OF URINATION: ICD-10-CM

## 2018-10-18 PROCEDURE — 99214 OFFICE O/P EST MOD 30 MIN: CPT | Performed by: INTERNAL MEDICINE

## 2018-10-18 NOTE — PROGRESS NOTES
SUBJECTIVE:   Cynthia Arita is a 73 year old female who presents to clinic today for the following health issues:      HPI:   Cynthia Arita a 73 year old female here for follow-up of shingles. She had developed right thigh pain mainly burning 4-6 weeks ago. Presented last week with small area rash anterior thigh. Since she was started on Valtrex her pain is about 50% better. She can now lay on her right side in bed. She is up to 300 mg tid on the neurontin. Seems to be tolerating it but is a little tired. When she developed her thigh and leg pain she also developed generalized muscle aching and stiffness. This is a little better as well. She alos developed urinary frequency about the same time. No burning.    She has developed some lesion on her forehead. She would like to see Dermatology.       Problem list and histories reviewed & adjusted, as indicated.  Additional history: as documented    Patient Active Problem List   Diagnosis     Esophageal reflux     Osteoporosis     Meniere's disease     Calculus of right kidney     Pneumonia of right upper lobe due to infectious organism (H)     Anxiety     Essential hypertension     Stress     Gastroesophageal reflux disease without esophagitis     Tinnitus, unspecified laterality     Pain of right thigh     Past Surgical History:   Procedure Laterality Date     C VAGINAL HYSTERECTOMY      with left oophorectomy       Social History   Substance Use Topics     Smoking status: Never Smoker     Smokeless tobacco: Never Used     Alcohol use Yes     Family History   Problem Relation Age of Onset     Neurologic Disorder Mother      palsy     Esophageal Cancer Father      Cancer Maternal Grandmother      lymph     Diabetes Paternal Grandmother      Neurologic Disorder Sister      Parkinson's     Hypertension Brother      Bipolar Disorder Sister      Brain Cancer Son 17           Reviewed and updated as needed this visit by clinical staff  Tobacco  Allergies  Meds  Med Hx   "Surg Hx  Fam Hx  Soc Hx      Reviewed and updated as needed this visit by Provider         ROS:  Constitutional, HEENT, cardiovascular, pulmonary, GI, , musculoskeletal, neuro, skin, endocrine and psych systems are negative, except as otherwise noted.    OBJECTIVE:     /80 (BP Location: Left arm, Patient Position: Chair, Cuff Size: Adult Regular)  Pulse 85  Temp 97.8  F (36.6  C) (Oral)  Resp 16  Ht 5' 3\" (1.6 m)  Wt 132 lb (59.9 kg)  SpO2 99%  BMI 23.38 kg/m2  Body mass index is 23.38 kg/(m^2).  GENERAL: healthy, alert and no distress  NECK: no adenopathy, no asymmetry, masses, or scars and thyroid normal to palpation  RESP: lungs clear to auscultation - no rales, rhonchi or wheezes  CV: regular rate and rhythm, normal S1 S2, no S3 or S4, no murmur, click or rub, no peripheral edema and peripheral pulses strong  ABDOMEN: soft, nontender, no hepatosplenomegaly, no masses and bowel sounds normal  MS: rash resolving on anterior thigh  SKIN: few darl lesion on right forehead    ASSESSMENT/PLAN:       1. Herpes zoster without complication  Doing well, Continue current medications. Follow up in 6 weeks     2. Myalgia  Maybe side effect of her severe leg pain, will see back in 6 weeks     3. Increased frequency of urination  Will check   - UA with Microscopic    4. Skin lesion  Will refer to   - DERMATOLOGY REFERRAL      Huyen Samuels MD  [facility]    "

## 2018-10-18 NOTE — MR AVS SNAPSHOT
After Visit Summary   10/18/2018    Cynthia Arita    MRN: [id number]           Patient Information     Date Of Birth          1945        Visit Information        Provider Department      10/18/2018 10:00 AM Huyen Samuels MD [facility]        Today's Diagnoses     Increased frequency of urination    -  1    Skin lesion           Follow-ups after your visit        Additional Services     DERMATOLOGY REFERRAL       Your provider has referred you to: FMG: [facility] Dermatology [facility] [phone]   [email or web address]  FMG: [facility] Dermatology [facility] [phone]    Please be aware that coverage of these services is subject to the terms and limitations of your health insurance plan.  Call member services at your health plan with any benefit or coverage questions.      Please bring the following with you to your appointment:    (1) Any X-Rays, CTs or MRIs which have been performed.  Contact the facility where they were done to arrange for  prior to your scheduled appointment.    (2) List of current medications  (3) This referral request   (4) Any documents/labs given to you for this referral                  Your next 10 appointments already scheduled     Nov 12, 2018  7:40 AM CST   SHORT with Huyen Samuels MD   [facility] ([facility])    [address] [place] [address] [phone]              Who to contact     If you have questions or need follow up information about today's clinic visit or your schedule please contact [facility] directly at [phone].  Normal or non-critical lab and imaging results will be communicated to you by MyChart, letter or phone within 4 business days after the clinic has received the results. If you do not hear from us within 7 days, please contact the clinic through MyChart or phone. If you have a critical  "or abnormal lab result, we will notify you by phone as soon as possible.  Submit refill requests through Mobly or call your pharmacy and they will forward the refill request to us. Please allow 3 business days for your refill to be completed.          Additional Information About Your Visit        Care EveryWhere ID     This is your Care EveryWhere ID. This could be used by other organizations to access your [facility] medical records  [id number]        Your Vitals Were     Pulse Temperature Respirations Height Pulse Oximetry BMI (Body Mass Index)    85 97.8  F (36.6  C) (Oral) 16 5' 3\" (1.6 m) 99% 23.38 kg/m2       Blood Pressure from Last 3 Encounters:   10/18/18 132/80   10/11/18 128/72   10/01/18 138/78    Weight from Last 3 Encounters:   10/18/18 132 lb (59.9 kg)   10/11/18 130 lb 12.8 oz (59.3 kg)   10/01/18 129 lb (58.5 kg)              We Performed the Following     DERMATOLOGY REFERRAL     UA with Microscopic        Primary Care Provider Office Phone # Fax #    Huyen Samuels [phone]       [address] [facility] [address]  [place] [id number]        Equal Access to Services     TIN PERLA : Hadii cydney ku hadasho Soomaali, waaxda luqadaha, qaybta kaalmada adeegyada, lilliana white. So [facility] [phone].    ATENCIÓN: Si habla español, tiene a moralez disposición servicios gratuitos de asistencia lingüística. Aliyaame al [phone].    We comply with applicable federal civil rights laws and [place] laws. We do not discriminate on the basis of race, color, national origin, age, disability, sex, sexual orientation, or gender identity.            Thank you!     Thank you for choosing [facility]  for your care. Our goal is always to provide you with excellent care. Hearing back from our patients is one way we can continue to improve our services. Please take a few minutes to complete the written survey that you may receive in the mail after your visit " with us. Thank you!             Your Updated Medication List - Protect others around you: Learn how to safely use, store and throw away your medicines at www.disposemymeds.org.          This list is accurate as of 10/18/18 10:11 AM.  Always use your most recent med list.                   Brand Name Dispense Instructions for use Diagnosis    ALPRAZolam 0.5 MG tablet    XANAX    30 tablet    Take 1/2 tab tid prn    Anxiety       calcitonin (salmon) 200 UNIT/ACT nasal spray    MIACALCIN    11.1 mL    USE 1 SPRAY IN 1 NOSTRIL  DAILY (ALTERNATING NOSTRILS DAILY)    Age-related osteoporosis without current pathological fracture       * CENTRUM SILVER per tablet      Take 1 tablet by mouth daily        * OCUVITE PRESERVISION PO      Take 1 tablet by mouth 2 times daily        DIAZEPAM PO      Take 2 mg by mouth every 8 hours as needed for other (dizziness)        fish oil-omega-3 fatty acids 1000 MG capsule      Take 1,000 mg by mouth 2 times daily        flaxseed oil 1000 MG Caps      Take 1,000 mg by mouth every evening        gabapentin 100 MG capsule    NEURONTIN    270 capsule    Take 1 capsule (100 mg) by mouth 3 times daily Add 1 pill every 3-4 days until taking 300 mg tid or until pain controlled    Pain of right thigh       GLUCOSAMINE CHONDR COMPLEX PO      Take 1 capsule by mouth 2 times daily        HERBALS      2 times daily as needed        HYDROcodone-acetaminophen 5-325 MG per tablet    NORCO    10 tablet    Take 1 tablet by mouth every 6 hours as needed for severe pain        lidocaine 5 % Patch    LIDODERM    30 patch    Apply up to 3 patches to painful area at once for up to 12 h within a 24 h period.  Remove after 12 hours.    Right shoulder pain, unspecified chronicity       meclizine 25 MG tablet    ANTIVERT    30 tablet    Take 1 tablet (25 mg) by mouth every 6 hours as needed for dizziness        nexIUM 40 MG CR capsule   Generic drug:  esomeprazole      40 mg by Oral or Feeding Tube route daily         ondansetron 4 MG ODT tab    ZOFRAN ODT    120 tablet    Take 1 tablet (4 mg) by mouth every 8 hours as needed for nausea    Nausea       oxyCODONE IR 5 MG tablet    ROXICODONE    90 tablet    Take 1-2 tablets (5-10 mg) by mouth every 6 hours as needed for severe pain    Herpes zoster without complication       propranolol 20 MG tablet    INDERAL    90 tablet    Take 1 tablet (20 mg) by mouth daily as needed For blood pressure higher than 170    Essential hypertension       RANITIDINE HCL PO      Take 150 mg by mouth At Bedtime        triamterene-hydrochlorothiazide 37.5-25 MG per tablet    MAXZIDE-25    90 tablet    TAKE 1 TABLET BY MOUTH  DAILY    Essential hypertension       valACYclovir 1000 mg tablet    VALTREX    21 tablet    Take 1 tablet (1,000 mg) by mouth 3 times daily    Herpes zoster without complication       vitamin D3 2000 units Caps      Take 2,000 Units by mouth every morning        * Notice:  This list has 2 medication(s) that are the same as other medications prescribed for you. Read the directions carefully, and ask your doctor or other care provider to review them with you.

## 2018-10-19 ENCOUNTER — TELEPHONE (OUTPATIENT)
Dept: INTERNAL MEDICINE | Facility: CLINIC | Age: 73
End: 2018-10-19

## 2018-10-19 NOTE — TELEPHONE ENCOUNTER
[facility] calls, they received prescription for Alprazolam and show patient recently filled prescription for Oxycodone. They are getting an interaction warning between Oxycodone and Alprazolam.    Call transferred to Huyen, pharmacist. Informed her Oxycodone was ordered for shingles, the Oxycodone will hopefully be for short term only use and patient was started on Gabapentin. They will fill this for patient.

## 2018-10-22 ENCOUNTER — TELEPHONE (OUTPATIENT)
Dept: INTERNAL MEDICINE | Facility: CLINIC | Age: 73
End: 2018-10-22

## 2018-10-22 DIAGNOSIS — N39.0 URINARY TRACT INFECTION: Primary | ICD-10-CM

## 2018-10-22 DIAGNOSIS — R35.0 INCREASED FREQUENCY OF URINATION: ICD-10-CM

## 2018-10-22 DIAGNOSIS — R82.90 NONSPECIFIC FINDING ON EXAMINATION OF URINE: Primary | ICD-10-CM

## 2018-10-22 LAB
ALBUMIN UR-MCNC: NEGATIVE MG/DL
APPEARANCE UR: CLEAR
BACTERIA #/AREA URNS HPF: ABNORMAL /HPF
BILIRUB UR QL STRIP: NEGATIVE
COLOR UR AUTO: YELLOW
GLUCOSE UR STRIP-MCNC: NEGATIVE MG/DL
HGB UR QL STRIP: NEGATIVE
KETONES UR STRIP-MCNC: NEGATIVE MG/DL
LEUKOCYTE ESTERASE UR QL STRIP: ABNORMAL
NITRATE UR QL: POSITIVE
NON-SQ EPI CELLS #/AREA URNS LPF: ABNORMAL /LPF
PH UR STRIP: 7.5 PH (ref 5–7)
RBC #/AREA URNS AUTO: ABNORMAL /HPF
SOURCE: ABNORMAL
SP GR UR STRIP: 1.02 (ref 1–1.03)
UROBILINOGEN UR STRIP-ACNC: 0.2 EU/DL (ref 0.2–1)
WBC #/AREA URNS AUTO: ABNORMAL /HPF

## 2018-10-22 PROCEDURE — 87088 URINE BACTERIA CULTURE: CPT | Performed by: INTERNAL MEDICINE

## 2018-10-22 PROCEDURE — 81001 URINALYSIS AUTO W/SCOPE: CPT | Performed by: INTERNAL MEDICINE

## 2018-10-22 PROCEDURE — 87186 SC STD MICRODIL/AGAR DIL: CPT | Performed by: INTERNAL MEDICINE

## 2018-10-22 PROCEDURE — 87086 URINE CULTURE/COLONY COUNT: CPT | Performed by: INTERNAL MEDICINE

## 2018-10-22 RX ORDER — CIPROFLOXACIN 250 MG/1
250 TABLET, FILM COATED ORAL 2 TIMES DAILY
Qty: 6 TABLET | Refills: 0 | Status: SHIPPED | OUTPATIENT
Start: 2018-10-22 | End: 2018-11-20

## 2018-10-22 NOTE — TELEPHONE ENCOUNTER
Pt called super angry that nobody has called her back today about her lab results for uti. Was told her pcp/nurse will contact her by the end of today but no one has not yet. Please call pt back asap by tomorrow or else pt will walk in and demand to talk with pcp or nurse.     Phone - [phone]    Detail Msg - Yes

## 2018-10-22 NOTE — TELEPHONE ENCOUNTER
Clinic Action Needed:Yes/follow up as needed    Reason for Call: Caller was transferred to [facility] to speak to nurse (see message below).  She was under the impression that she would have lab results by the end of the day today.  I advised that UA was complete, however a UC was being done to see what bacteria was growing for proper diagnosis/possible treatment. Cynthia is c/o urgency, frequency, trouble emptying bladder and abdominal pain.  Denies fever, pain with urination, blood in urine or foul odor.  Paged on call provider for [facility] to speak to me at [facility] to discuss sxs that patient is reporting and results from UA.  Dr. Oconnor is on call, page sent @ 6:45 pm via smart web. 2ND page sent @ 6:59 pm. Dr. Oconnor will return call when she gets home. Dr. Oconnor ordered Cipro 250 mg BID x 3 days.  Notified patient and will send to [facility] at [address], [place].    Routed to: RI Grayson Randall, RN  [facility]

## 2018-10-24 ENCOUNTER — ALLIED HEALTH/NURSE VISIT (OUTPATIENT)
Dept: NURSING | Facility: CLINIC | Age: 73
End: 2018-10-24
Payer: MEDICARE

## 2018-10-24 DIAGNOSIS — N39.0 URINARY TRACT INFECTION: Primary | ICD-10-CM

## 2018-10-24 PROCEDURE — 99211 OFF/OP EST MAY X REQ PHY/QHP: CPT

## 2018-10-24 RX ORDER — CIPROFLOXACIN 250 MG/1
250 TABLET, FILM COATED ORAL 2 TIMES DAILY
Qty: 10 TABLET | Refills: 0 | Status: SHIPPED | OUTPATIENT
Start: 2018-10-24 | End: 2018-11-20

## 2018-10-24 NOTE — MR AVS SNAPSHOT
After Visit Summary   10/24/2018    Cynthia Arita    MRN: [id number]           Patient Information     Date Of Birth          1945        Visit Information        Provider Department      10/24/2018 11:15 AM Osvaldo, Ri [facility]        Today's Diagnoses     Urinary tract infection    -  1       Follow-ups after your visit        Your next 10 appointments already scheduled     Nov 20, 2018 10:20 AM CST   SHORT with Huyen Samuels MD   [facility] ([facility])    [address]  [facility] [id number] [phone]              Who to contact     If you have questions or need follow up information about today's clinic visit or your schedule please contact [facility] directly at [phone].  Normal or non-critical lab and imaging results will be communicated to you by MyChart, letter or phone within 4 business days after the clinic has received the results. If you do not hear from us within 7 days, please contact the clinic through MyChart or phone. If you have a critical or abnormal lab result, we will notify you by phone as soon as possible.  Submit refill requests through [employer] or call your pharmacy and they will forward the refill request to us. Please allow 3 business days for your refill to be completed.          Additional Information About Your Visit        Care EveryWhere ID     This is your Care EveryWhere ID. This could be used by other organizations to access your [facility] medical records  [id number]         Blood Pressure from Last 3 Encounters:   10/18/18 132/80   10/11/18 128/72   10/01/18 138/78    Weight from Last 3 Encounters:   10/18/18 132 lb (59.9 kg)   10/11/18 130 lb 12.8 oz (59.3 kg)   10/01/18 129 lb (58.5 kg)              Today, you had the following     No orders found for display         Today's Medication Changes          These changes are accurate as of 10/24/18 12:28 PM.  If you have  any questions, ask your nurse or doctor.               These medicines have changed or have updated prescriptions.        Dose/Directions    * ciprofloxacin 250 MG tablet   Commonly known as:  CIPRO   This may have changed:  Another medication with the same name was added. Make sure you understand how and when to take each.   Used for:  Urinary tract infection        Dose:  250 mg   Take 1 tablet (250 mg) by mouth 2 times daily   Quantity:  6 tablet   Refills:  0       * ciprofloxacin 250 MG tablet   Commonly known as:  CIPRO   This may have changed:  You were already taking a medication with the same name, and this prescription was added. Make sure you understand how and when to take each.   Used for:  Urinary tract infection        Dose:  250 mg   Take 1 tablet (250 mg) by mouth 2 times daily   Quantity:  10 tablet   Refills:  0       * Notice:  This list has 2 medication(s) that are the same as other medications prescribed for you. Read the directions carefully, and ask your doctor or other care provider to review them with you.         Where to get your medicines      These medications were sent to [facility] [address] [place] [address] [address] AT [place] [address] & [facility]  [address], [facility] [address]     Phone:  [phone]     ciprofloxacin 250 MG tablet                Primary Care Provider Office Phone # Fax #    Huyen Samuels [phone]       [address] [facility] [address]  [facility] [address]        Equal Access to Services     RENO PERLA : Hadii cydney mead hadasho Soanamika, waaxda luqadaha, qaybta kaalmada adedarwinyada, lilliana white. So [facility] [phone].    ATENCIÓN: Si habla español, tiene a moralez disposición servicios gratuitos de asistencia lingüística. Vanessa al [phone].    We comply with applicable federal civil rights laws and [place] laws. We do not discriminate on the basis of race, color, national origin,  age, disability, sex, sexual orientation, or gender identity.            Thank you!     Thank you for choosing [facility]  for your care. Our goal is always to provide you with excellent care. Hearing back from our patients is one way we can continue to improve our services. Please take a few minutes to complete the written survey that you may receive in the mail after your visit with us. Thank you!             Your Updated Medication List - Protect others around you: Learn how to safely use, store and throw away your medicines at www.disposemymeds.org.          This list is accurate as of 10/24/18 12:28 PM.  Always use your most recent med list.                   Brand Name Dispense Instructions for use Diagnosis    ALPRAZolam 0.5 MG tablet    XANAX    30 tablet    Take 1/2 tab tid prn    Anxiety       calcitonin (salmon) 200 UNIT/ACT nasal spray    MIACALCIN    11.1 mL    USE 1 SPRAY IN 1 NOSTRIL  DAILY (ALTERNATING NOSTRILS DAILY)    Age-related osteoporosis without current pathological fracture       * CENTRUM SILVER per tablet      Take 1 tablet by mouth daily        * OCUVITE PRESERVISION PO      Take 1 tablet by mouth 2 times daily        * ciprofloxacin 250 MG tablet    CIPRO    6 tablet    Take 1 tablet (250 mg) by mouth 2 times daily    Urinary tract infection       * ciprofloxacin 250 MG tablet    CIPRO    10 tablet    Take 1 tablet (250 mg) by mouth 2 times daily    Urinary tract infection       DIAZEPAM PO      Take 2 mg by mouth every 8 hours as needed for other (dizziness)        fish oil-omega-3 fatty acids 1000 MG capsule      Take 1,000 mg by mouth 2 times daily        flaxseed oil 1000 MG Caps      Take 1,000 mg by mouth every evening        gabapentin 100 MG capsule    NEURONTIN    270 capsule    Take 1 capsule (100 mg) by mouth 3 times daily Add 1 pill every 3-4 days until taking 300 mg tid or until pain controlled    Pain of right thigh       GLUCOSAMINE CHONDR COMPLEX PO       Take 1 capsule by mouth 2 times daily        HERBALS      2 times daily as needed        HYDROcodone-acetaminophen 5-325 MG per tablet    NORCO    10 tablet    Take 1 tablet by mouth every 6 hours as needed for severe pain        lidocaine 5 % Patch    LIDODERM    30 patch    Apply up to 3 patches to painful area at once for up to 12 h within a 24 h period.  Remove after 12 hours.    Right shoulder pain, unspecified chronicity       meclizine 25 MG tablet    ANTIVERT    30 tablet    Take 1 tablet (25 mg) by mouth every 6 hours as needed for dizziness        nexIUM 40 MG CR capsule   Generic drug:  esomeprazole      40 mg by Oral or Feeding Tube route daily        ondansetron 4 MG ODT tab    ZOFRAN ODT    120 tablet    Take 1 tablet (4 mg) by mouth every 8 hours as needed for nausea    Nausea       oxyCODONE IR 5 MG tablet    ROXICODONE    90 tablet    Take 1-2 tablets (5-10 mg) by mouth every 6 hours as needed for severe pain    Herpes zoster without complication       propranolol 20 MG tablet    INDERAL    90 tablet    Take 1 tablet (20 mg) by mouth daily as needed For blood pressure higher than 170    Essential hypertension       RANITIDINE HCL PO      Take 150 mg by mouth At Bedtime        triamterene-hydrochlorothiazide 37.5-25 MG per tablet    MAXZIDE-25    90 tablet    TAKE 1 TABLET BY MOUTH  DAILY    Essential hypertension       valACYclovir 1000 mg tablet    VALTREX    21 tablet    Take 1 tablet (1,000 mg) by mouth 3 times daily    Herpes zoster without complication       vitamin D3 2000 units Caps      Take 2,000 Units by mouth every morning        * Notice:  This list has 4 medication(s) that are the same as other medications prescribed for you. Read the directions carefully, and ask your doctor or other care provider to review them with you.

## 2018-10-24 NOTE — NURSING NOTE
Cynthia Arita is a 73 year old female who presents with urinary tract infection symptoms and possible side effects from Cipro.    NURSING ASSESSMENT:  Description:  Urinary frequency, every couple hours with urinary pressure, burning and pain. Started Cipro on Monday evening. Taking as directed but experiencing symptoms of dizziness and nausea after taking Cipro.  Onset/duration:  4 days  Precip. factors:  NA  Associated symptoms:   Urinary frequency, pressure and burning  Improves/worsens symptoms:  None  Pain scale (0-10)   9/10 with urination  Last exam/Treatment:  10/18/18  Allergies:   Allergies   Allergen Reactions     Ativan [Lorazepam]      Sick -      Metoprolol      Nausea       Pantoprazole Other (See Comments), Nausea and Vomiting and GI Disturbance     Red in the face, sleepiness, face swelling, joint pain, dizziness       MEDICATIONS:   Taking medication(s) as prescribed? Yes  Taking over the counter medication(s?) Yes, meclizine  Any medication side effects? fatigue, GI  upset and dizziness    Any barriers to taking medication(s) as prescribed?  No  Medication(s) improving/managing symptoms?  No  Medication reconciliation completed: Yes      NURSING PLAN: Huddle with provider, plan includes prescribing 5 additional days of Cipro    RECOMMENDED DISPOSITION:  Home care advice - Take medication and prescribed. Call clinic if not having a relief from symptoms by Friday.  Will comply with recommendation: Yes  If further questions/concerns or if symptoms do not improve, worsen or new symptoms develop, call your PCP or [facility] as soon as possible.      Guideline used:  Clinical Judgement    Renato Ramirez RN

## 2018-10-25 LAB
BACTERIA SPEC CULT: ABNORMAL
BACTERIA SPEC CULT: ABNORMAL
SPECIMEN SOURCE: ABNORMAL

## 2018-10-26 ENCOUNTER — TELEPHONE (OUTPATIENT)
Dept: INTERNAL MEDICINE | Facility: CLINIC | Age: 73
End: 2018-10-26

## 2018-10-26 NOTE — TELEPHONE ENCOUNTER
Called patient to see how she's feeling regarding her urinary tract infection and inform of final results of urine culture and sensitivities. See Allied Health/Nurse Visit dated 10/24/18.     Patient reports feeling better, no longer has urgency, pressure and burning. Does report a lot of nausea and tiredness with taking the Cipro. Reports she wants to stop taking it after tomorrow morning dose as it will have been a total of 5 days on it. Informed patient we normally wouldn't recommend stopping antibiotics, but since this was a second dose and she's having so much nausea she could stop after tomorrow mornings dose. Advised to call back if she begins to have symptoms again.

## 2018-11-04 ENCOUNTER — TRANSFERRED RECORDS (OUTPATIENT)
Dept: HEALTH INFORMATION MANAGEMENT | Facility: CLINIC | Age: 73
End: 2018-11-04

## 2018-11-17 ENCOUNTER — HOSPITAL ENCOUNTER (EMERGENCY)
Facility: CLINIC | Age: 73
Discharge: HOME | End: 2018-11-17
Attending: EMERGENCY MEDICINE | Admitting: EMERGENCY MEDICINE
Payer: MEDICARE

## 2018-11-17 VITALS
RESPIRATION RATE: 10 BRPM | SYSTOLIC BLOOD PRESSURE: 168 MMHG | OXYGEN SATURATION: 100 % | DIASTOLIC BLOOD PRESSURE: 94 MMHG | TEMPERATURE: 97.7 F | WEIGHT: 130 LBS | BODY MASS INDEX: 23.03 KG/M2

## 2018-11-17 DIAGNOSIS — I10 ASYMPTOMATIC HYPERTENSION: ICD-10-CM

## 2018-11-17 LAB
ALBUMIN SERPL-MCNC: 3.9 G/DL (ref 3.4–5)
ALP SERPL-CCNC: 73 U/L (ref 40–150)
ALT SERPL W P-5'-P-CCNC: 37 U/L (ref 0–50)
ANION GAP SERPL CALCULATED.3IONS-SCNC: 7 MMOL/L (ref 3–14)
AST SERPL W P-5'-P-CCNC: 20 U/L (ref 0–45)
BASOPHILS # BLD AUTO: 0 10E9/L (ref 0–0.2)
BASOPHILS NFR BLD AUTO: 0.2 %
BILIRUB SERPL-MCNC: 0.5 MG/DL (ref 0.2–1.3)
BUN SERPL-MCNC: 27 MG/DL (ref 7–30)
CALCIUM SERPL-MCNC: 9.1 MG/DL (ref 8.5–10.1)
CHLORIDE SERPL-SCNC: 103 MMOL/L (ref 94–109)
CO2 SERPL-SCNC: 28 MMOL/L (ref 20–32)
CREAT SERPL-MCNC: 0.76 MG/DL (ref 0.52–1.04)
D DIMER PPP FEU-MCNC: 0.3 UG/ML FEU (ref 0–0.5)
DIFFERENTIAL METHOD BLD: NORMAL
EOSINOPHIL # BLD AUTO: 0.1 10E9/L (ref 0–0.7)
EOSINOPHIL NFR BLD AUTO: 1 %
ERYTHROCYTE [DISTWIDTH] IN BLOOD BY AUTOMATED COUNT: 14 % (ref 10–15)
GFR SERPL CREATININE-BSD FRML MDRD: 74 ML/MIN/1.7M2
GLUCOSE SERPL-MCNC: 94 MG/DL (ref 70–99)
HCT VFR BLD AUTO: 45.5 % (ref 35–47)
HGB BLD-MCNC: 14.5 G/DL (ref 11.7–15.7)
IMM GRANULOCYTES # BLD: 0 10E9/L (ref 0–0.4)
IMM GRANULOCYTES NFR BLD: 0.5 %
LYMPHOCYTES # BLD AUTO: 1.9 10E9/L (ref 0.8–5.3)
LYMPHOCYTES NFR BLD AUTO: 22.6 %
MCH RBC QN AUTO: 29.1 PG (ref 26.5–33)
MCHC RBC AUTO-ENTMCNC: 31.9 G/DL (ref 31.5–36.5)
MCV RBC AUTO: 91 FL (ref 78–100)
MONOCYTES # BLD AUTO: 0.7 10E9/L (ref 0–1.3)
MONOCYTES NFR BLD AUTO: 8.5 %
NEUTROPHILS # BLD AUTO: 5.6 10E9/L (ref 1.6–8.3)
NEUTROPHILS NFR BLD AUTO: 67.2 %
NRBC # BLD AUTO: 0 10*3/UL
NRBC BLD AUTO-RTO: 0 /100
PLATELET # BLD AUTO: 281 10E9/L (ref 150–450)
POTASSIUM SERPL-SCNC: 3.8 MMOL/L (ref 3.4–5.3)
PROT SERPL-MCNC: 7.1 G/DL (ref 6.8–8.8)
RBC # BLD AUTO: 4.99 10E12/L (ref 3.8–5.2)
SODIUM SERPL-SCNC: 138 MMOL/L (ref 133–144)
TROPONIN I SERPL-MCNC: <0.015 UG/L (ref 0–0.04)
WBC # BLD AUTO: 8.4 10E9/L (ref 4–11)

## 2018-11-17 PROCEDURE — 80053 COMPREHEN METABOLIC PANEL: CPT | Performed by: EMERGENCY MEDICINE

## 2018-11-17 PROCEDURE — 85379 FIBRIN DEGRADATION QUANT: CPT | Performed by: EMERGENCY MEDICINE

## 2018-11-17 PROCEDURE — 84484 ASSAY OF TROPONIN QUANT: CPT | Performed by: EMERGENCY MEDICINE

## 2018-11-17 PROCEDURE — 99284 EMERGENCY DEPT VISIT MOD MDM: CPT

## 2018-11-17 PROCEDURE — 85025 COMPLETE CBC W/AUTO DIFF WBC: CPT | Performed by: EMERGENCY MEDICINE

## 2018-11-17 PROCEDURE — 93005 ELECTROCARDIOGRAM TRACING: CPT

## 2018-11-17 ASSESSMENT — ENCOUNTER SYMPTOMS
VOMITING: 0
SHORTNESS OF BREATH: 1
FEVER: 0
HEADACHES: 1
SPEECH DIFFICULTY: 0
NUMBNESS: 0
NAUSEA: 0
WEAKNESS: 0
NERVOUS/ANXIOUS: 1

## 2018-11-17 NOTE — DISCHARGE INSTRUCTIONS
Please return to the ED if you have active chest pain, shortness of breath, nausea, sweatiness, or other acute changes.  Please follow up with your PCP in the next 2-3 days.      Discharge Instructions  Hypertension - High Blood Pressure    During you visit to the Emergency Department, your blood pressure was higher than the recommended blood pressure.  This may be related to stress, pain, medication or other temporary conditions. In these cases, your blood pressure may return to normal on its own. If you have a history of high blood pressure, you may need to have your provider adjust your medications. Sometimes, your high measurement here may indicate that you have developed high blood pressure that will stay high unless it is treated. As a general rule, high blood pressure causes problems over years rather than days, weeks, or months. So, while it is important to treat blood pressure, it is rarely important to treat blood pressure immediately. Occasionally we will begin a medication in the Emergency Department; more often we will recommend close follow-up for medications with a primary doctor/clinic.    Generally, every Emergency Department visit should have a follow-up clinic visit with either a primary or a specialty clinic/provider. Please follow-up as instructed by your emergency provider today.    Return to the Emergency Department if you start to have:    A severe headache.    Chest pain.    Shortness of breath.    Weakness or numbness that affects one part of the body.    Confusion.    Vision changes.    Significant swelling of legs and/or eyes.    A reaction to any medication started in the Emergency Department.    What can I do to help myself?    Avoid alcohol.    Take any blood pressure medicine that you are prescribed.    Get a good night s sleep.    Lower your salt intake.    Exercise.    Lose weight.    Manage stress.    See your doctor regularly    If blood pressure medication was started in the  Emergency Department:    The medicine may not have an immediate effect. The body and brain determine what blood pressure you have. The medicine s job is to retrain the body s  thermostat  to a lower blood pressure.    You will need to follow up with your provider to see how this medicine is working for you.  If you were given a prescription for medicine here today, be sure to read all of the information (including the package insert) that comes with your prescription.  This will include important information about the medicine, its side effects, and any warnings that you need to know about.  The pharmacist who fills the prescription can provide more information and answer questions you may have about the medicine.  If you have questions or concerns that the pharmacist cannot address, please call or return to the Emergency Department.   Remember that you can always come back to the Emergency Department if you are not able to see your regular provider in the amount of time listed above, if you get any new symptoms, or if there is anything that worries you.

## 2018-11-17 NOTE — ED PROVIDER NOTES
History     Chief Complaint:  Chest Pain & Headache     HPI   Cynthia Arita is a 73 year old female, with a history of Meniere's, hypertension, and anxiety, who presents to the ED for evaluation of chest pain and headache. The patient reports she has been having intermittent left sided chest pain for the past couple of weeks. The pain is a sudden onset with no pattern. Her chest is tender to touch. She has associated facial flushing and shortness of breath. The patient notes she woke up with a tension headache today. The pain is a tightness sensation. She took 0.25mg Xanax with alleviation of headache. Her headache is currently resolved. She is feeling anxious. She was concerned about her blood pressure and came into the ED to have this measured. The patient reports she has an appointment with her PCP in 3 days. The patient denies any fall, fever, blurry vision, speech difficulty, nausea, vomiting, gait problems, numbness, tingling, or weakness.      Allergies:  Ativan   Metoprolol: nausea   Pantoprazole: GI disturbance, facial redness/swelling, joint pain, sleepiness, & dizziness      Medications:    Gabapentin  Glucosamine-chondroitin  Ranitidine  Xanax  Vitamin D3  Diazepam  Flaxseed oil  Centrum  Ocuvite  Nexium  Fish oil  Inderal  Maxzide    Past Medical History:    Diverticulosis  GERD  HTN  Anxiety  Pneumonia  Right kidney calculus  Osteoporosis  Meniere's disease  Nephrolithiasis     Past Surgical History:    Vaginal hysterectomy w/ left oophorectomy    Family History:    Cerebral palsy  Esophageal cancer   Parkinson's  HTN  Bipolar disorder   Brain cancer    Social History:  Smoking status: Never smoker    Substance use: No  Alcohol use: No  Presents to ED with son   Marital Status:   [5]     Review of Systems   Constitutional: Negative for fever.   Eyes: Negative for visual disturbance.   Respiratory: Positive for shortness of breath.    Cardiovascular: Positive for chest pain.   Gastrointestinal:  Negative for nausea and vomiting.   Musculoskeletal: Negative for gait problem.   Neurological: Positive for headaches. Negative for speech difficulty, weakness and numbness.   Psychiatric/Behavioral: The patient is nervous/anxious.    All other systems reviewed and are negative.    Physical Exam     Patient Vitals for the past 24 hrs:   BP Temp Temp src Heart Rate Resp SpO2 Weight   11/17/18 1545 - - - 65 10 100 % -   11/17/18 1530 142/88 - - 68 20 96 % -   11/17/18 1515 - - - 71 (!) 31 99 % -   11/17/18 1511 159/89 97.7  F (36.5  C) Oral 69 26 100 % 59 kg (130 lb)     Physical Exam  General: Resting on the bed.  Head: No obvious trauma to head.  Ears, Nose, Throat:  External ears normal.  Nose normal.   Eyes:  Conjunctivae clear.    CV: Regular rate and rhythm.  No murmurs.    Mild chest wall pain to palpation over the left pectoralis.    Respiratory: Effort normal and breath sounds normal.  No wheezing or crackles.   Gastrointestinal: Soft.  No distension. There is no tenderness.    Musculoskeletal: Non tender non edematous calves   Neuro: Alert. Moving all extremities appropriately.  Normal speech.  CN II-XII grossly intact.  Gross muscle strength intact of the proximal and distal bilateral upper and lower extremities.  Sensation intact to light touch in all 4 extremities.    Skin: Skin is warm and dry.  No rash noted.     Emergency Department Course     ECG (15:06:02):  Rate 69 bpm. RI interval 134. QRS duration 84. QT/QTc 406/435. P-R-T axes 43 -40 34. Normal sinus rhythm. Left axis deviation. Minimal voltage criteria for LVH, may be normal variant. Abnormal ECG. No significant change compared to EKG dated 4/2/12. Interpreted at 1612 by Elli Ramires MD.    Laboratory:  Troponin I (1520): <0.015  D dimer (1520): 0.3    CBC: o/w WNL (WBC 8.4, HGB 14.5, )  CMP: o/w WNL (Creatinine 0.76)     Emergency Department Course:  Past medical records, nursing notes, and vitals reviewed.  1548: I performed an  exam of the patient and obtained history, as documented above.    IV inserted and blood drawn.    Findings and plan explained to the Patient. Patient discharged home with instructions regarding supportive care, medications, and reasons to return. The importance of close follow-up was reviewed.     Impression & Plan      Medical Decision Makin-year-old female with Ménière's disease, acid reflux, anxiety presents with tension headache, chest discomfort and concern for high blood pressure.  It appears that she largely presented for concern for high blood pressure.  Her blood pressure readings in the emergency department are very reasonable there is no evidence of acute hypertensive emergency.  Broad differential was pursued including not limited to hypertensive emergency urgency, acute coronary syndrome, electrolyte metabolic or renal dysfunction, PE, dissection, subarachnoid, acute anxiety, etc.  Overall is a very vague history.  CBC shows no leukocytosis or anemia.  BMP shows no acute electrolyte metabolic or renal dysfunction.  EKG is unchanged from prior, no acute evidence of ischemia, no acute ST-T wave changes.  Troponin is negative.  This discomfort is been going on for 3+ weeks on and off.  Do not suspect this is ACS, she was offered observation but did not present for the pain.  Thus doubt ACS and feel comfortable with outpatient management.  PE was considered but d-dimer is negative.  LFTs are unremarkable as well.  There is no evidence of endorgan dysfunction to suggest hypertensive emergency or urgency.  Patient has no tearing back pain, symmetric pulses, no suggestion of dissection.  She denies any thunderclap headache to suggest subarachnoid or serious intracranial pathology.  Her headache resolved prior to presentation.  Not concerning for meningitis or encephalitis.  No temporal pain to suggest temporal arteritis nor vision changes.  Normal white count.  No suggestion of inflammatory etiology.  A  lot of the symptoms seem to be anxiety related.  She also presented for concern for largely about her blood pressure but her blood pressure was actually only mildly elevated in the emergency department.  Had lengthy discussion with her and she really had just wanted her blood pressure check.  With reassuring laboratory workup and reassuring blood pressure she felt comfortable going home.  Patient was discharged in stable but improved condition.  she has follow up on tuesday arranged.      Diagnosis:    ICD-10-CM   1. Asymptomatic hypertension I10     Disposition: Patient discharged to home with son      Janeth Rodriguez  11/17/2018   [facility] EMERGENCY DEPARTMENT    Scribe Disclosure:  I, Janeth Rodriguez, am serving as a scribe at 3:48 PM on 11/17/2018 to document services personally performed by Elli Ramires MD based on my observations and the provider's statements to me.        Elli Ramires MD  11/17/18 1934

## 2018-11-17 NOTE — ED AVS SNAPSHOT
[facility] Emergency Department    [address]    [facility] [address]    Phone:  [phone]    Fax:  [phone]                                       Cynthia Arita   MRN: [id number]    Department:  [facility] Emergency Department   Date of Visit:  11/17/2018           Patient Information     Date Of Birth          1945        Your diagnoses for this visit were:     Asymptomatic hypertension        You were seen by Elli Ramires MD.      Follow-up Information     Follow up with Huyen Samuels MD. Go in 3 days.    Specialty:  Internal Medicine    Contact information:    [address]  [facility] [address]  [phone]          Discharge Instructions       Please return to the ED if you have active chest pain, shortness of breath, nausea, sweatiness, or other acute changes.  Please follow up with your PCP in the next 2-3 days.      Discharge Instructions  Hypertension - High Blood Pressure    During you visit to the Emergency Department, your blood pressure was higher than the recommended blood pressure.  This may be related to stress, pain, medication or other temporary conditions. In these cases, your blood pressure may return to normal on its own. If you have a history of high blood pressure, you may need to have your provider adjust your medications. Sometimes, your high measurement here may indicate that you have developed high blood pressure that will stay high unless it is treated. As a general rule, high blood pressure causes problems over years rather than days, weeks, or months. So, while it is important to treat blood pressure, it is rarely important to treat blood pressure immediately. Occasionally we will begin a medication in the Emergency Department; more often we will recommend close follow-up for medications with a primary doctor/clinic.    Generally, every Emergency Department visit should have a follow-up clinic visit with  either a primary or a specialty clinic/provider. Please follow-up as instructed by your emergency provider today.    Return to the Emergency Department if you start to have:    A severe headache.    Chest pain.    Shortness of breath.    Weakness or numbness that affects one part of the body.    Confusion.    Vision changes.    Significant swelling of legs and/or eyes.    A reaction to any medication started in the Emergency Department.    What can I do to help myself?    Avoid alcohol.    Take any blood pressure medicine that you are prescribed.    Get a good night s sleep.    Lower your salt intake.    Exercise.    Lose weight.    Manage stress.    See your doctor regularly    If blood pressure medication was started in the Emergency Department:    The medicine may not have an immediate effect. The body and brain determine what blood pressure you have. The medicine s job is to retrain the body s  thermostat  to a lower blood pressure.    You will need to follow up with your provider to see how this medicine is working for you.  If you were given a prescription for medicine here today, be sure to read all of the information (including the package insert) that comes with your prescription.  This will include important information about the medicine, its side effects, and any warnings that you need to know about.  The pharmacist who fills the prescription can provide more information and answer questions you may have about the medicine.  If you have questions or concerns that the pharmacist cannot address, please call or return to the Emergency Department.   Remember that you can always come back to the Emergency Department if you are not able to see your regular provider in the amount of time listed above, if you get any new symptoms, or if there is anything that worries you.    Your next 10 appointments already scheduled     Nov 20, 2018 10:20 AM MARY HERNANDEZ with Huyen Samuels MD   [facility]  ([facility])    [address]  [facility] [address]   [phone]            Nov 27, 2018  3:45 PM CST   New Visit with Suzette Holder PA-C   [facility] ([facility])    [address] [id number] [phone]              24 Hour Appointment Hotline       To make an appointment at any [facility], call [phone] ([phone]). If you don't have a family doctor or clinic, we will help you find one. [facility] are conveniently located to serve the needs of you and your family.             Review of your medicines      Our records show that you are taking the medicines listed below. If these are incorrect, please call your family doctor or clinic.        Dose / Directions Last dose taken    ALPRAZolam 0.5 MG tablet   Commonly known as:  XANAX   Quantity:  30 tablet        Take 1/2 tab tid prn   Refills:  1        calcitonin (salmon) 200 UNIT/ACT nasal spray   Commonly known as:  MIACALCIN   Quantity:  11.1 mL        USE 1 SPRAY IN 1 NOSTRIL  DAILY (ALTERNATING NOSTRILS DAILY)   Refills:  1        * CENTRUM SILVER tablet   Dose:  1 tablet        Take 1 tablet by mouth daily   Refills:  0        * OCUVITE PRESERVISION PO   Dose:  1 tablet        Take 1 tablet by mouth 2 times daily   Refills:  0        * ciprofloxacin 250 MG tablet   Commonly known as:  CIPRO   Dose:  250 mg   Quantity:  6 tablet        Take 1 tablet (250 mg) by mouth 2 times daily   Refills:  0        * ciprofloxacin 250 MG tablet   Commonly known as:  CIPRO   Dose:  250 mg   Quantity:  10 tablet        Take 1 tablet (250 mg) by mouth 2 times daily   Refills:  0        DIAZEPAM PO   Dose:  2 mg        Take 2 mg by mouth every 8 hours as needed for other (dizziness)   Refills:  0        fish oil-omega-3 fatty acids 1000 MG capsule   Dose:  1000 mg        Take 1,000 mg by mouth 2 times daily   Refills:  0        flaxseed oil 1000 MG  Caps   Dose:  1000 mg        Take 1,000 mg by mouth every evening   Refills:  0        gabapentin 100 MG capsule   Commonly known as:  NEURONTIN   Dose:  100 mg   Quantity:  270 capsule        Take 1 capsule (100 mg) by mouth 3 times daily Add 1 pill every 3-4 days until taking 300 mg tid or until pain controlled   Refills:  1        GLUCOSAMINE CHONDR COMPLEX PO   Dose:  1 capsule        Take 1 capsule by mouth 2 times daily   Refills:  0        HERBALS        2 times daily as needed   Refills:  0        HYDROcodone-acetaminophen 5-325 MG per tablet   Commonly known as:  NORCO   Dose:  1 tablet   Quantity:  10 tablet        Take 1 tablet by mouth every 6 hours as needed for severe pain   Refills:  0        lidocaine 5 % Patch   Commonly known as:  LIDODERM   Quantity:  30 patch        Apply up to 3 patches to painful area at once for up to 12 h within a 24 h period.  Remove after 12 hours.   Refills:  3        meclizine 25 MG tablet   Commonly known as:  ANTIVERT   Dose:  25 mg   Quantity:  30 tablet        Take 1 tablet (25 mg) by mouth every 6 hours as needed for dizziness   Refills:  1        nexIUM 40 MG CR capsule   Dose:  40 mg   Generic drug:  esomeprazole        40 mg by Oral or Feeding Tube route daily   Refills:  0        ondansetron 4 MG ODT tab   Commonly known as:  ZOFRAN ODT   Dose:  4 mg   Quantity:  120 tablet        Take 1 tablet (4 mg) by mouth every 8 hours as needed for nausea   Refills:  1        oxyCODONE IR 5 MG tablet   Commonly known as:  ROXICODONE   Dose:  5-10 mg   Quantity:  90 tablet        Take 1-2 tablets (5-10 mg) by mouth every 6 hours as needed for severe pain   Refills:  0        propranolol 20 MG tablet   Commonly known as:  INDERAL   Dose:  20 mg   Quantity:  90 tablet        Take 1 tablet (20 mg) by mouth daily as needed For blood pressure higher than 170   Refills:  0        RANITIDINE HCL PO   Dose:  150 mg        Take 150 mg by mouth At Bedtime   Refills:  0         triamterene-hydrochlorothiazide 37.5-25 MG per tablet   Commonly known as:  MAXZIDE-25   Quantity:  90 tablet        TAKE 1 TABLET BY MOUTH  DAILY   Refills:  0        valACYclovir 1000 mg tablet   Commonly known as:  VALTREX   Dose:  1000 mg   Quantity:  21 tablet        Take 1 tablet (1,000 mg) by mouth 3 times daily   Refills:  0        vitamin D3 2000 units Caps   Dose:  2000 Units        Take 2,000 Units by mouth every morning   Refills:  0        * Notice:  This list has 4 medication(s) that are the same as other medications prescribed for you. Read the directions carefully, and ask your doctor or other care provider to review them with you.            Procedures and tests performed during your visit     CBC with platelets differential    Comprehensive metabolic panel    D dimer quantitative    EKG 12 lead    Troponin I      Orders Needing Specimen Collection     None      Pending Results     Date and Time Order Name Status Description    11/17/2018 1605 EKG 12 lead Preliminary             Pending Culture Results     No orders found from 11/15/2018 to 11/18/2018.            Pending Results Instructions     If you had any lab results that were not finalized at the time of your Discharge, you can call the ED Lab Result RN at [phone]. You will be contacted by this team for any positive Lab results or changes in treatment. The nurses are available 7 days a week from 10A to 6:30P.  You can leave a message 24 hours per day and they will return your call.        Test Results From Your Hospital Stay        11/17/2018  3:29 PM      Component Results     Component Value Ref Range & Units Status    WBC 8.4 4.0 - 11.0 10e9/L Final    RBC Count 4.99 3.8 - 5.2 10e12/L Final    Hemoglobin 14.5 11.7 - 15.7 g/dL Final    Hematocrit 45.5 35.0 - 47.0 % Final    MCV 91 78 - 100 fl Final    MCH 29.1 26.5 - 33.0 pg Final    MCHC 31.9 31.5 - 36.5 g/dL Final    RDW 14.0 10.0 - 15.0 % Final    Platelet Count 281 150 - 450 10e9/L  Final    Diff Method Automated Method  Final    % Neutrophils 67.2 % Final    % Lymphocytes 22.6 % Final    % Monocytes 8.5 % Final    % Eosinophils 1.0 % Final    % Basophils 0.2 % Final    % Immature Granulocytes 0.5 % Final    Nucleated RBCs 0 0 /100 Final    Absolute Neutrophil 5.6 1.6 - 8.3 10e9/L Final    Absolute Lymphocytes 1.9 0.8 - 5.3 10e9/L Final    Absolute Monocytes 0.7 0.0 - 1.3 10e9/L Final    Absolute Eosinophils 0.1 0.0 - 0.7 10e9/L Final    Absolute Basophils 0.0 0.0 - 0.2 10e9/L Final    Abs Immature Granulocytes 0.0 0 - 0.4 10e9/L Final    Absolute Nucleated RBC 0.0  Final         11/17/2018  3:50 PM      Component Results     Component Value Ref Range & Units Status    D Dimer 0.3 0.0 - 0.50 ug/ml FEU Final    This D-dimer assay is intended for use in conjunction with a clinical pretest   probability assessment model to exclude pulmonary embolism (PE) and deep   venous thrombosis (DVT) in outpatients suspected of PE or DVT. The cut-off   value is 0.5 ug/mL FEU.           11/17/2018  3:50 PM      Component Results     Component Value Ref Range & Units Status    Sodium 138 133 - 144 mmol/L Final    Potassium 3.8 3.4 - 5.3 mmol/L Final    Chloride 103 94 - 109 mmol/L Final    Carbon Dioxide 28 20 - 32 mmol/L Final    Anion Gap 7 3 - 14 mmol/L Final    Glucose 94 70 - 99 mg/dL Final    Urea Nitrogen 27 7 - 30 mg/dL Final    Creatinine 0.76 0.52 - 1.04 mg/dL Final    GFR Estimate 74 >60 mL/min/1.7m2 Final    Non  GFR Calc    GFR Estimate If Black 90 >60 mL/min/1.7m2 Final    African American GFR Calc    Calcium 9.1 8.5 - 10.1 mg/dL Final    Bilirubin Total 0.5 0.2 - 1.3 mg/dL Final    Albumin 3.9 3.4 - 5.0 g/dL Final    Protein Total 7.1 6.8 - 8.8 g/dL Final    Alkaline Phosphatase 73 40 - 150 U/L Final    ALT 37 0 - 50 U/L Final    AST 20 0 - 45 U/L Final         11/17/2018  3:50 PM      Component Results     Component Value Ref Range & Units Status    Troponin I ES <0.015 0.000 -  0.045 ug/L Final    The 99th percentile for upper reference range is 0.045 ug/L.  Troponin values   in the range of 0.045 - 0.120 ug/L may be associated with risks of adverse   clinical events.                  Clinical Quality Measure: Blood Pressure Screening     Your blood pressure was checked while you were in the emergency department today. The last reading we obtained was  BP: 142/88 . Please read the guidelines below about what these numbers mean and what you should do about them.  If your systolic blood pressure (the top number) is less than 120 and your diastolic blood pressure (the bottom number) is less than 80, then your blood pressure is normal. There is nothing more that you need to do about it.  If your systolic blood pressure (the top number) is 120-139 or your diastolic blood pressure (the bottom number) is 80-89, your blood pressure may be higher than it should be. You should have your blood pressure rechecked within a year by a primary care provider.  If your systolic blood pressure (the top number) is 140 or greater or your diastolic blood pressure (the bottom number) is 90 or greater, you may have high blood pressure. High blood pressure is treatable, but if left untreated over time it can put you at risk for heart attack, stroke, or kidney failure. You should have your blood pressure rechecked by a primary care provider within the next 4 weeks.  If your provider in the emergency department today gave you specific instructions to follow-up with your doctor or provider even sooner than that, you should follow that instruction and not wait for up to 4 weeks for your follow-up visit.        Thank you for choosing [facility]       Thank you for choosing [facility] for your care. Our goal is always to provide you with excellent care. Hearing back from our patients is one way we can continue to improve our services. Please take a few minutes to complete the written survey that you may receive in the mail  after you visit with us. Thank you!        Care EveryWhere ID     This is your Care EveryWhere ID. This could be used by other organizations to access your [facility] medical records  [id number]        Equal Access to Services     RENO PERLA : Arcelia Eric, nery solitario, qaarnulfo kaaniaansley coyle, lilliana white. So [facility] [phone].    ATENCIÓN: Si habla español, tiene a moralez disposición servicios gratuitos de asistencia lingüística. Llame al [phone].    We comply with applicable federal civil rights laws and [place] laws. We do not discriminate on the basis of race, color, national origin, age, disability, sex, sexual orientation, or gender identity.            After Visit Summary       This is your record. Keep this with you and show to your community pharmacist(s) and doctor(s) at your next visit.

## 2018-11-17 NOTE — ED AVS SNAPSHOT
[facility] Emergency Department    [address]    [facility] [address]    Phone:  [phone]    Fax:  [phone]                                       Cynthia Arita   MRN: [id number]    Department:  [facility] Emergency Department   Date of Visit:  11/17/2018           After Visit Summary Signature Page     I have received my discharge instructions, and my questions have been answered. I have discussed any challenges I see with this plan with the nurse or doctor.    ..........................................................................................................................................  Patient/Patient Representative Signature      ..........................................................................................................................................  Patient Representative Print Name and Relationship to Patient    ..................................................               ................................................  Date                                   Time    ..........................................................................................................................................  Reviewed by Signature/Title    ...................................................              ..............................................  Date                                               Time          22EPIC Rev 08/18

## 2018-11-17 NOTE — ED TRIAGE NOTES
Patient presents with complaints of dizziness, headaches, SOB and chest pain for the past two days. Patient states that she she does have history of vertigo. No medication PTA. ABC intact without need for intervention at this time.

## 2018-11-18 LAB — INTERPRETATION ECG - MUSE: NORMAL

## 2018-11-20 ENCOUNTER — OFFICE VISIT (OUTPATIENT)
Dept: INTERNAL MEDICINE | Facility: CLINIC | Age: 73
End: 2018-11-20
Payer: MEDICARE

## 2018-11-20 VITALS
OXYGEN SATURATION: 97 % | RESPIRATION RATE: 16 BRPM | SYSTOLIC BLOOD PRESSURE: 129 MMHG | BODY MASS INDEX: 22.96 KG/M2 | HEART RATE: 72 BPM | HEIGHT: 63 IN | WEIGHT: 129.6 LBS | TEMPERATURE: 98 F | DIASTOLIC BLOOD PRESSURE: 84 MMHG

## 2018-11-20 DIAGNOSIS — R11.0 NAUSEA: Primary | ICD-10-CM

## 2018-11-20 DIAGNOSIS — R19.7 DIARRHEA, UNSPECIFIED TYPE: ICD-10-CM

## 2018-11-20 PROCEDURE — 99214 OFFICE O/P EST MOD 30 MIN: CPT | Performed by: INTERNAL MEDICINE

## 2018-11-20 ASSESSMENT — PATIENT HEALTH QUESTIONNAIRE - PHQ9: SUM OF ALL RESPONSES TO PHQ QUESTIONS 1-9: 7

## 2018-11-20 ASSESSMENT — ANXIETY QUESTIONNAIRES
2. NOT BEING ABLE TO STOP OR CONTROL WORRYING: SEVERAL DAYS
5. BEING SO RESTLESS THAT IT IS HARD TO SIT STILL: SEVERAL DAYS
1. FEELING NERVOUS, ANXIOUS, OR ON EDGE: MORE THAN HALF THE DAYS
3. WORRYING TOO MUCH ABOUT DIFFERENT THINGS: MORE THAN HALF THE DAYS
IF YOU CHECKED OFF ANY PROBLEMS ON THIS QUESTIONNAIRE, HOW DIFFICULT HAVE THESE PROBLEMS MADE IT FOR YOU TO DO YOUR WORK, TAKE CARE OF THINGS AT HOME, OR GET ALONG WITH OTHER PEOPLE: VERY DIFFICULT
7. FEELING AFRAID AS IF SOMETHING AWFUL MIGHT HAPPEN: SEVERAL DAYS
6. BECOMING EASILY ANNOYED OR IRRITABLE: SEVERAL DAYS

## 2018-11-20 NOTE — MR AVS SNAPSHOT
After Visit Summary   11/20/2018    Cynthia Arita    MRN: [id number]           Patient Information     Date Of Birth          1945        Visit Information        Provider Department      11/20/2018 10:20 AM Huyen Samuels MD [facility]        Today's Diagnoses     Nausea    -  1    Diarrhea, unspecified type           Follow-ups after your visit        Follow-up notes from your care team     Return in about 2 weeks (around 12/4/2018).      Your next 10 appointments already scheduled     Nov 27, 2018  3:45 PM CST   [facility] Visit with Suzette Holder [facility]-C   [facility]    [address] [address] [phone]              Future tests that were ordered for you today     Open Future Orders        Priority Expected Expires Ordered    Clostridium difficile Toxin B PCR Routine  12/20/2018 11/20/2018            Who to contact     If you have questions or need follow up information about today's clinic visit or your schedule please contact [facility] directly at [phone].  Normal or non-critical lab and imaging results will be communicated to you by MyChart, letter or phone within 4 business days after the clinic has received the results. If you do not hear from us within 7 days, please contact the clinic through MyChart or phone. If you have a critical or abnormal lab result, we will notify you by phone as soon as possible.  Submit refill requests through [employer] or call your pharmacy and they will forward the refill request to us. Please allow 3 business days for your refill to be completed.          Additional Information About Your Visit        Care EveryWhere ID     This is your Care EveryWhere ID. This could be used by other organizations to access your [place] medical records  [id number]        Your Vitals Were     Pulse Temperature Respirations Height Pulse Oximetry  "Breastfeeding?    72 98  F (36.7  C) (Oral) 16 5' 3\" (1.6 m) 97% No    BMI (Body Mass Index)                   22.96 kg/m2            Blood Pressure from Last 3 Encounters:   11/20/18 129/84   11/17/18 (!) 168/94   10/18/18 132/80    Weight from Last 3 Encounters:   11/20/18 129 lb 9.6 oz (58.8 kg)   11/17/18 130 lb (59 kg)   10/18/18 132 lb (59.9 kg)               Primary Care Provider Office Phone # Fax #    Huyen Samuels [phone]       [address] [facility] [address]  [facility] [address]        Equal Access to Services     TIN PERLA : Hadii aad ku hadasho Soanamika, waaxda luqadaha, qaybta kaalmada adeegyada, lilliana lyons . So [facility] [phone].    ATENCIÓN: Si habla español, tiene a moralez disposición servicios gratuitos de asistencia lingüística. [facility] [phone].    We comply with applicable federal civil rights laws and [place] laws. We do not discriminate on the basis of race, color, national origin, age, disability, sex, sexual orientation, or gender identity.            Thank you!     Thank you for choosing [facility]  for your care. Our goal is always to provide you with excellent care. Hearing back from our patients is one way we can continue to improve our services. Please take a few minutes to complete the written survey that you may receive in the mail after your visit with us. Thank you!             Your Updated Medication List - Protect others around you: Learn how to safely use, store and throw away your medicines at www.disposemymeds.org.          This list is accurate as of 11/20/18  1:29 PM.  Always use your most recent med list.                   Brand Name Dispense Instructions for use Diagnosis    ALPRAZolam 0.5 MG tablet    XANAX    30 tablet    Take 1/2 tab tid prn    Anxiety       calcitonin (salmon) 200 UNIT/ACT nasal spray    MIACALCIN    11.1 mL    USE 1 SPRAY IN 1 NOSTRIL  DAILY (ALTERNATING NOSTRILS DAILY)    " Age-related osteoporosis without current pathological fracture       * CENTRUM SILVER tablet      Take 1 tablet by mouth daily        * OCUVITE PRESERVISION PO      Take 1 tablet by mouth 2 times daily        DIAZEPAM PO      Take 2 mg by mouth every 8 hours as needed for other (dizziness)        fish oil-omega-3 fatty acids 1000 MG capsule      Take 1,000 mg by mouth 2 times daily        flaxseed oil 1000 MG Caps      Take 1,000 mg by mouth every evening        gabapentin 100 MG capsule    NEURONTIN    270 capsule    Take 1 capsule (100 mg) by mouth 3 times daily Add 1 pill every 3-4 days until taking 300 mg tid or until pain controlled    Pain of right thigh       GLUCOSAMINE CHONDR COMPLEX PO      Take 1 capsule by mouth 2 times daily        HERBALS      2 times daily as needed        lidocaine 5 % Patch    LIDODERM    30 patch    Apply up to 3 patches to painful area at once for up to 12 h within a 24 h period.  Remove after 12 hours.    Right shoulder pain, unspecified chronicity       meclizine 25 MG tablet    ANTIVERT    30 tablet    Take 1 tablet (25 mg) by mouth every 6 hours as needed for dizziness        nexIUM 40 MG CR capsule   Generic drug:  esomeprazole      40 mg by Oral or Feeding Tube route daily        ondansetron 4 MG ODT tab    ZOFRAN ODT    120 tablet    Take 1 tablet (4 mg) by mouth every 8 hours as needed for nausea    Nausea       oxyCODONE IR 5 MG tablet    ROXICODONE    90 tablet    Take 1-2 tablets (5-10 mg) by mouth every 6 hours as needed for severe pain    Herpes zoster without complication       propranolol 20 MG tablet    INDERAL    90 tablet    Take 1 tablet (20 mg) by mouth daily as needed For blood pressure higher than 170    Essential hypertension       RANITIDINE HCL PO      Take 150 mg by mouth At Bedtime        triamterene-hydrochlorothiazide 37.5-25 MG per tablet    MAXZIDE-25    90 tablet    TAKE 1 TABLET BY MOUTH  DAILY    Essential hypertension       vitamin D3 2000 units  Caps      Take 2,000 Units by mouth every morning        * Notice:  This list has 2 medication(s) that are the same as other medications prescribed for you. Read the directions carefully, and ask your doctor or other care provider to review them with you.

## 2018-11-20 NOTE — PROGRESS NOTES
"  SUBJECTIVE:   Cynthia Arita is a 73 year old female who presents to clinic today for the following health issues:    Follow up shingles & UTI's.      ED/UC Followup:    Facility:  [facility]  Date of visit: 11-  Reason for visit: BP/tightness in head           HPI:   Over the past 2 weeks she has developed nausea, lightheadedness, vague headache and just feeling ill. She also has developed loose stools with occasional diarrhea. She finished a course of antibiotics before the stool changes started. She was seen in ER and labs were all normal.    Her shingles pain is much better.     Problem list and histories reviewed & adjusted, as indicated.  Additional history: as documented    BP Readings from Last 3 Encounters:   11/20/18 129/84   11/17/18 (!) 168/94   10/18/18 132/80    Wt Readings from Last 3 Encounters:   11/20/18 129 lb 9.6 oz (58.8 kg)   11/17/18 130 lb (59 kg)   10/18/18 132 lb (59.9 kg)                    Reviewed and updated as needed this visit by clinical staff  Tobacco  Allergies  Meds  Med Hx  Surg Hx  Fam Hx  Soc Hx      Reviewed and updated as needed this visit by Provider         ROS:  Constitutional, HEENT, cardiovascular, pulmonary, GI, , musculoskeletal, neuro, skin, endocrine and psych systems are negative, except as otherwise noted.    OBJECTIVE:     /84  Pulse 72  Temp 98  F (36.7  C) (Oral)  Resp 16  Ht 5' 3\" (1.6 m)  Wt 129 lb 9.6 oz (58.8 kg)  SpO2 97%  Breastfeeding? No  BMI 22.96 kg/m2  Body mass index is 22.96 kg/(m^2).  GENERAL: healthy, alert and no distress  NECK: no adenopathy, no asymmetry, masses, or scars and thyroid normal to palpation  RESP: lungs clear to auscultation - no rales, rhonchi or wheezes  CV: regular rate and rhythm, normal S1 S2, no S3 or S4, no murmur, click or rub, no peripheral edema and peripheral pulses strong  ABDOMEN: soft, nontender, no hepatosplenomegaly, no masses and bowel sounds normal  MS: no gross musculoskeletal defects " noted, no edema  NEURO: Normal strength and tone, mentation intact and speech normal  PSYCH: mentation appears normal, affect normal/bright      ASSESSMENT/PLAN:       1. Nausea  I think she is having side effects from her neurontin. She stopped it 3 days ago and want her to stay off of it. Follow up in 2 weeks     2. Diarrhea, unspecified type  Will check for C diff  - Clostridium difficile Toxin B PCR; Future      Huyen Samuels MD  [facility]

## 2018-11-27 ENCOUNTER — OFFICE VISIT (OUTPATIENT)
Dept: DERMATOLOGY | Facility: CLINIC | Age: 73
End: 2018-11-27
Payer: MEDICARE

## 2018-11-27 VITALS — HEART RATE: 84 BPM | OXYGEN SATURATION: 100 % | SYSTOLIC BLOOD PRESSURE: 133 MMHG | DIASTOLIC BLOOD PRESSURE: 84 MMHG

## 2018-11-27 DIAGNOSIS — D22.9 NEVUS: ICD-10-CM

## 2018-11-27 DIAGNOSIS — L82.1 SEBORRHEIC KERATOSIS: ICD-10-CM

## 2018-11-27 DIAGNOSIS — D48.5 NEOPLASM OF UNCERTAIN BEHAVIOR OF SKIN: Primary | ICD-10-CM

## 2018-11-27 DIAGNOSIS — L81.4 LENTIGO: ICD-10-CM

## 2018-11-27 DIAGNOSIS — D18.00 ANGIOMA: ICD-10-CM

## 2018-11-27 PROCEDURE — 88305 TISSUE EXAM BY PATHOLOGIST: CPT | Mod: TC | Performed by: PHYSICIAN ASSISTANT

## 2018-11-27 PROCEDURE — 99214 OFFICE O/P EST MOD 30 MIN: CPT | Mod: 25 | Performed by: PHYSICIAN ASSISTANT

## 2018-11-27 PROCEDURE — 11100 HC BIOPSY SKIN/SUBQ/MUC MEM, SINGLE LESION: CPT | Performed by: PHYSICIAN ASSISTANT

## 2018-11-27 NOTE — PATIENT INSTRUCTIONS
Wound Care Instructions     FOR SUPERFICIAL WOUNDS     [facility] [phone]    [facility] [phone]                       AFTER 24 HOURS YOU SHOULD REMOVE THE BANDAGE AND BEGIN DAILY DRESSING CHANGES AS FOLLOWS:     1) Remove Dressing.     2) Clean and dry the area with tap water using a Q-tip or sterile gauze pad.     3) Apply Vaseline, Aquaphor, Polysporin ointment or Bacitracin ointment over entire wound.  Do NOT use Neosporin ointment.     4) Cover the wound with a band-aid, or a sterile non-stick gauze pad and micropore paper tape      REPEAT THESE INSTRUCTIONS AT LEAST ONCE A DAY UNTIL THE WOUND HAS COMPLETELY HEALED.    It is an old wives tale that a wound heals better when it is exposed to air and allowed to dry out. The wound will heal faster with a better cosmetic result if it is kept moist with ointment and covered with a bandage.    **Do not let the wound dry out.**      Supplies Needed:      *Cotton tipped applicators (Q-tips)    *Polysporin Ointment or Bacitracin Ointment (NOT NEOSPORIN)    *Band-aids or non-stick gauze pads and micropore paper tape.      PATIENT INFORMATION:    During the healing process you will notice a number of changes. All wounds develop a small halo of redness surrounding the wound.  This means healing is occurring. Severe itching with extensive redness usually indicates sensitivity to the ointment or bandage tape used to dress the wound.  You should call our office if this develops.      Swelling  and/or discoloration around your surgical site is common, particularly when performed around the eye.    All wounds normally drain.  The larger the wound the more drainage there will be.  After 7-10 days, you will notice the wound beginning to shrink and new skin will begin to grow.  The wound is healed when you can see skin has formed over the entire area.  A healed wound has a healthy, shiny look to the surface and is red to dark pink in color  to normalize.  Wounds may take approximately 4-6 weeks to heal.  Larger wounds may take 6-8 weeks.  After the wound is healed you may discontinue dressing changes.    You may experience a sensation of tightness as your wound heals. This is normal and will gradually subside.    Your healed wound may be sensitive to temperature changes. This sensitivity improves with time, but if you re having a lot of discomfort, try to avoid temperature extremes.    Patients frequently experience itching after their wound appears to have healed because of the continue healing under the skin.  Plain Vaseline will help relieve the itching.        POSSIBLE COMPLICATIONS    BLEEDIN. Leave the bandage in place.  2. Use tightly rolled up gauze or a cloth to apply direct pressure over the bandage for 30  minutes.  3. Reapply pressure for an additional 30 minutes if necessary  4. Use additional gauze and tape to maintain pressure once the bleeding has stopped.

## 2018-11-27 NOTE — MR AVS SNAPSHOT
After Visit Summary   11/27/2018    Cynthia Arita    MRN: [id number]           Patient Information     Date Of Birth          1945        Visit Information        Provider Department      11/27/2018 3:45 PM Suzette Holder PA-C [facility]        Today's Diagnoses     Neoplasm of uncertain behavior of skin    -  1    Angioma        Lentigo        Seborrheic keratosis        Nevus          Care Instructions          Wound Care Instructions     FOR SUPERFICIAL WOUNDS     [facility] [phone]    [facility] [phone]                       AFTER 24 HOURS YOU SHOULD REMOVE THE BANDAGE AND BEGIN DAILY DRESSING CHANGES AS FOLLOWS:     1) Remove Dressing.     2) Clean and dry the area with tap water using a Q-tip or sterile gauze pad.     3) Apply Vaseline, Aquaphor, Polysporin ointment or Bacitracin ointment over entire wound.  Do NOT use Neosporin ointment.     4) Cover the wound with a band-aid, or a sterile non-stick gauze pad and micropore paper tape      REPEAT THESE INSTRUCTIONS AT LEAST ONCE A DAY UNTIL THE WOUND HAS COMPLETELY HEALED.    It is an old wives tale that a wound heals better when it is exposed to air and allowed to dry out. The wound will heal faster with a better cosmetic result if it is kept moist with ointment and covered with a bandage.    **Do not let the wound dry out.**      Supplies Needed:      *Cotton tipped applicators (Q-tips)    *Polysporin Ointment or Bacitracin Ointment (NOT NEOSPORIN)    *Band-aids or non-stick gauze pads and micropore paper tape.      PATIENT INFORMATION:    During the healing process you will notice a number of changes. All wounds develop a small halo of redness surrounding the wound.  This means healing is occurring. Severe itching with extensive redness usually indicates sensitivity to the ointment or bandage tape used to dress the wound.  You should call our office if this develops.       Swelling  and/or discoloration around your surgical site is common, particularly when performed around the eye.    All wounds normally drain.  The larger the wound the more drainage there will be.  After 7-10 days, you will notice the wound beginning to shrink and new skin will begin to grow.  The wound is healed when you can see skin has formed over the entire area.  A healed wound has a healthy, shiny look to the surface and is red to dark pink in color to normalize.  Wounds may take approximately 4-6 weeks to heal.  Larger wounds may take 6-8 weeks.  After the wound is healed you may discontinue dressing changes.    You may experience a sensation of tightness as your wound heals. This is normal and will gradually subside.    Your healed wound may be sensitive to temperature changes. This sensitivity improves with time, but if you re having a lot of discomfort, try to avoid temperature extremes.    Patients frequently experience itching after their wound appears to have healed because of the continue healing under the skin.  Plain Vaseline will help relieve the itching.        POSSIBLE COMPLICATIONS    BLEEDIN. Leave the bandage in place.  2. Use tightly rolled up gauze or a cloth to apply direct pressure over the bandage for 30  minutes.  3. Reapply pressure for an additional 30 minutes if necessary  4. Use additional gauze and tape to maintain pressure once the bleeding has stopped.            Follow-ups after your visit        Your next 10 appointments already scheduled     2018  3:45 PM CST   [facility]    [address] [address] [phone]              Who to contact     If you have questions or need follow up information about today's clinic visit or your schedule please contact [facility] directly at [phone].  Normal or  non-critical lab and imaging results will be communicated to you by MyChart, letter or phone within 4 business days after the clinic has received the results. If you do not hear from us within 7 days, please contact the clinic through MyChart or phone. If you have a critical or abnormal lab result, we will notify you by phone as soon as possible.  Submit refill requests through [employer] or call your pharmacy and they will forward the refill request to us. Please allow 3 business days for your refill to be completed.          Additional Information About Your Visit        Care EveryWhere ID     This is your Care EveryWhere ID. This could be used by other organizations to access your [place] medical records  [id number]        Your Vitals Were     Pulse Pulse Oximetry Breastfeeding?             84 100% No          Blood Pressure from Last 3 Encounters:   11/27/18 133/84   11/20/18 129/84   11/17/18 (!) 168/94    Weight from Last 3 Encounters:   11/20/18 58.8 kg (129 lb 9.6 oz)   11/17/18 59 kg (130 lb)   10/18/18 59.9 kg (132 lb)              Today, you had the following     No orders found for display       Primary Care Provider Office Phone # Fax #    Huyen Samuels [phone]       [address] [facility] [address]  [facility] [address]        Equal Access to Services     RENO PERLA AH: Hadii aad ku hadasho Soomaali, waaxda luqadaha, qaybta kaalmada adeegyada, waxay idiin hayaan sharmin khtessy lyons . So [facility] [phone].    ATENCIÓN: Si habla español, tiene a moralez disposición servicios gratuitos de asistencia lingüística. Llame al [phone].    We comply with applicable federal civil rights laws and [place] laws. We do not discriminate on the basis of race, color, national origin, age, disability, sex, sexual orientation, or gender identity.            Thank you!     Thank you for choosing [facility]  for your care. Our goal is always to provide you with excellent  care. Hearing back from our patients is one way we can continue to improve our services. Please take a few minutes to complete the written survey that you may receive in the mail after your visit with us. Thank you!             Your Updated Medication List - Protect others around you: Learn how to safely use, store and throw away your medicines at www.disposemymeds.org.          This list is accurate as of 11/27/18  3:31 PM.  Always use your most recent med list.                   Brand Name Dispense Instructions for use Diagnosis    ALPRAZolam 0.5 MG tablet    XANAX    30 tablet    Take 1/2 tab tid prn    Anxiety       calcitonin (salmon) 200 UNIT/ACT nasal spray    MIACALCIN    11.1 mL    USE 1 SPRAY IN 1 NOSTRIL  DAILY (ALTERNATING NOSTRILS DAILY)    Age-related osteoporosis without current pathological fracture       DIAZEPAM PO      Take 2 mg by mouth every 8 hours as needed for other (dizziness)        fish oil-omega-3 fatty acids 1000 MG capsule      Take 1,000 mg by mouth 2 times daily        flaxseed oil 1000 MG Caps      Take 1,000 mg by mouth every evening        gabapentin 100 MG capsule    NEURONTIN    270 capsule    Take 1 capsule (100 mg) by mouth 3 times daily Add 1 pill every 3-4 days until taking 300 mg tid or until pain controlled    Pain of right thigh       GLUCOSAMINE CHONDR COMPLEX PO      Take 1 capsule by mouth 2 times daily        HERBALS      2 times daily as needed        lidocaine 5 % patch    LIDODERM    30 patch    Apply up to 3 patches to painful area at once for up to 12 h within a 24 h period.  Remove after 12 hours.    Right shoulder pain, unspecified chronicity       meclizine 25 MG tablet    ANTIVERT    30 tablet    Take 1 tablet (25 mg) by mouth every 6 hours as needed for dizziness        * multivitamin tablet      Take 1 tablet by mouth daily        * OCUVITE PRESERVISION PO      Take 1 tablet by mouth 2 times daily        nexIUM 40 MG DR capsule   Generic drug:  esomeprazole       40 mg by Oral or Feeding Tube route daily        ondansetron 4 MG ODT tab    ZOFRAN ODT    120 tablet    Take 1 tablet (4 mg) by mouth every 8 hours as needed for nausea    Nausea       oxyCODONE 5 MG tablet    ROXICODONE    90 tablet    Take 1-2 tablets (5-10 mg) by mouth every 6 hours as needed for severe pain    Herpes zoster without complication       propranolol 20 MG tablet    INDERAL    90 tablet    Take 1 tablet (20 mg) by mouth daily as needed For blood pressure higher than 170    Essential hypertension       RANITIDINE HCL PO      Take 150 mg by mouth At Bedtime        triamterene-HCTZ 37.5-25 MG tablet    MAXZIDE-25    90 tablet    TAKE 1 TABLET BY MOUTH  DAILY    Essential hypertension       vitamin D3 2000 units Caps      Take 2,000 Units by mouth every morning        * Notice:  This list has 2 medication(s) that are the same as other medications prescribed for you. Read the directions carefully, and ask your doctor or other care provider to review them with you.

## 2018-11-27 NOTE — LETTER
11/27/2018         RE: Cynthia Arita  [address] [facility]  [place] [address]        Dear Colleague,    Thank you for referring your patient, Cynthia Arita, to the [facility]. Please see a copy of my visit note below.    HPI:   Cynthia Arita is a 73 year old female who presents for Full skin cancer screening.  chief complaint  Last Skin Exam: n/a      1st Baseline: YES  Personal HX of Skin Cancer: none   Personal HX of Malignant Melanoma: none   Family HX of Skin Cancer / Malignant Melanoma: none  Personal HX of Atypical Moles: none  Risk factors: sun exposure  New / Changing lesions: yes, spot on right temple  Social History: was living in [place] but moved back to [place] to be with family for medical conditions. Has 3 sons and 6 grandchildren.   On review of systems, there are no further skin complaints, patient is feeling otherwise well.  See patient intake sheet.  ROS of the following were done and are negative: Constitutional, Eyes, Ears, Nose,   Mouth, Throat, Cardiovascular, Respiratory, GI, Genitourinary, Musculoskeletal,   Psychiatric, Endocrine, Allergic/Immunologic.    This document serves as a record of the services and decisions personally performed and made by Suzette Holder, MS, PA-C. It was created on her behalf by Cierra Pandey, a trained medical scribe. The creation of this document is based on the provider's statements to the medical scribe.  Cierra Pandey 3:19 PM November 27, 2018    PHYSICAL EXAM:   /84  Pulse 84  SpO2 100%  Breastfeeding? No  Skin exam performed as follows: Type 2 skin. Mood appropriate  Alert and Oriented X 3. Well developed, well nourished in no distress.  General appearance: Normal  Head including face: Normal  Eyes: conjunctiva and lids: Normal  Mouth: Lips, teeth, gums: Normal  Neck: Normal  Chest-breast/axillae: Normal  Back: Normal  Spleen and liver: Normal  Cardiovascular: Exam of peripheral vascular system by  observation for swelling, varicosities, edema: Normal  Genitalia: groin, buttocks: Normal  Extremities: digits/nails (clubbing): Normal  Eccrine and Apocrine glands: Normal  Right upper extremity: Normal  Left upper extremity: Normal  Right lower extremity: Normal  Left lower extremity: Normal  Skin: Scalp and body hair: See below    Pt deferred exam of breasts, groin, buttocks: No    Other physical findings:  1. Multiple pigmented macules on extremities and trunk  2. Multiple pigmented macules on face, trunk and extremities  3. Multiple vascular papules on trunk, arms and legs  4. Multiple scattered keratotic plaques  5. 7 mm pink papule on right forearm       Except as noted above, no other signs of skin cancer or melanoma.     ASSESSMENT/PLAN:   Benign Full skin cancer screening today.     Patient with history of none  Advised on monthly self exams and 1 year  Patient Education: Appropriate brochures given.    Multiple benign appearing nevi on arms, legs and trunk. Discussed ABCDEs of melanoma and sunscreen.   Multiple lentigos on arms, legs and trunk. Advised benign, no treatment needed.  Multiple scattered angiomas. Advised benign, no treatment needed.   Seborrheic keratosis on arms, legs and trunk. Advised benign, no treatment needed.  R/o BCC on right forearm. Photo taken and placed in chart. Shave bx in typical fashion .  Area cleaned with betadyne and anesthetized with 1% lidocaine with epi .  Dermablade used to remove the lesion and sent to pathology. Bleeding was cauterized. Pt tolerated procedure well.      Follow-up: pending path/yearly FSE/PRN sooner    1.) Patient was asked about new and changing moles. YES  2.) Patient received a complete physical skin examination: YES  3.) Patient was counseled to perform a monthly self skin examination: YES  Scribed By: Cierra Pandey, Medical Scribe    The information in this document, created by the medical scribe for me, accurately reflects the  services I personally performed and the decisions made by me. I have reviewed and approved this document for accuracy prior to leaving the patient care area.  November 27, 2018 3:29 PM    Suzette Holder MS, PASurekhaC      Again, thank you for allowing me to participate in the care of your patient.        Sincerely,        Suzette Holder PA-C

## 2018-11-27 NOTE — PROGRESS NOTES
HPI:   Cynthia Arita is a 73 year old female who presents for Full skin cancer screening.  chief complaint  Last Skin Exam: n/a      1st Baseline: YES  Personal HX of Skin Cancer: none   Personal HX of Malignant Melanoma: none   Family HX of Skin Cancer / Malignant Melanoma: none  Personal HX of Atypical Moles: none  Risk factors: sun exposure  New / Changing lesions: yes, spot on right temple  Social History: was living in [place] but moved back to [place] to be with family for medical conditions. Has 3 sons and 6 grandchildren.   On review of systems, there are no further skin complaints, patient is feeling otherwise well.  See patient intake sheet.  ROS of the following were done and are negative: Constitutional, Eyes, Ears, Nose,   Mouth, Throat, Cardiovascular, Respiratory, GI, Genitourinary, Musculoskeletal,   Psychiatric, Endocrine, Allergic/Immunologic.    This document serves as a record of the services and decisions personally performed and made by Suzette Holder, MS, PA-C. It was created on her behalf by Cierra Pandey, a trained medical scribe. The creation of this document is based on the provider's statements to the medical scribe.  Cierra Pandey 3:19 PM November 27, 2018    PHYSICAL EXAM:   /84  Pulse 84  SpO2 100%  Breastfeeding? No  Skin exam performed as follows: Type 2 skin. Mood appropriate  Alert and Oriented X 3. Well developed, well nourished in no distress.  General appearance: Normal  Head including face: Normal  Eyes: conjunctiva and lids: Normal  Mouth: Lips, teeth, gums: Normal  Neck: Normal  Chest-breast/axillae: Normal  Back: Normal  Spleen and liver: Normal  Cardiovascular: Exam of peripheral vascular system by observation for swelling, varicosities, edema: Normal  Genitalia: groin, buttocks: Normal  Extremities: digits/nails (clubbing): Normal  Eccrine and Apocrine glands: Normal  Right upper extremity: Normal  Left upper extremity: Normal  Right lower  extremity: Normal  Left lower extremity: Normal  Skin: Scalp and body hair: See below    Pt deferred exam of breasts, groin, buttocks: No    Other physical findings:  1. Multiple pigmented macules on extremities and trunk  2. Multiple pigmented macules on face, trunk and extremities  3. Multiple vascular papules on trunk, arms and legs  4. Multiple scattered keratotic plaques  5. 7 mm pink papule on right forearm       Except as noted above, no other signs of skin cancer or melanoma.     ASSESSMENT/PLAN:   Benign Full skin cancer screening today.     Patient with history of none  Advised on monthly self exams and 1 year  Patient Education: Appropriate brochures given.    Multiple benign appearing nevi on arms, legs and trunk. Discussed ABCDEs of melanoma and sunscreen.   Multiple lentigos on arms, legs and trunk. Advised benign, no treatment needed.  Multiple scattered angiomas. Advised benign, no treatment needed.   Seborrheic keratosis on arms, legs and trunk. Advised benign, no treatment needed.  R/o BCC on right forearm. Photo taken and placed in chart. Shave bx in typical fashion .  Area cleaned with betadyne and anesthetized with 1% lidocaine with epi .  Dermablade used to remove the lesion and sent to pathology. Bleeding was cauterized. Pt tolerated procedure well.      Follow-up: pending path/yearly FSE/PRN sooner    1.) Patient was asked about new and changing moles. YES  2.) Patient received a complete physical skin examination: YES  3.) Patient was counseled to perform a monthly self skin examination: YES  Scribed By: Cierra Pandey, Medical Scribe    The information in this document, created by the medical scribe for me, accurately reflects the services I personally performed and the decisions made by me. I have reviewed and approved this document for accuracy prior to leaving the patient care area.  November 27, 2018 3:29 PM    Suzette Holder MS, PA-C

## 2018-11-30 LAB — COPATH REPORT: NORMAL

## 2018-12-03 ENCOUNTER — TELEPHONE (OUTPATIENT)
Dept: DERMATOLOGY | Facility: CLINIC | Age: 73
End: 2018-12-03

## 2018-12-03 NOTE — TELEPHONE ENCOUNTER
Notes Recorded by Suzette Holder PA-C on 11/30/2018 at 8:27 PM  Path for the biopsy on the forearm came back as an actinic keratosis. Recommend cryo for complete resolution.

## 2018-12-03 NOTE — TELEPHONE ENCOUNTER
Called and spoke to patient. Educated patient on biopsy results- AK. Scheduled future Cryo appointment, patient voiced understanding.    Ree RN-BSN  [facility]  [phone]

## 2018-12-20 ENCOUNTER — TRANSFERRED RECORDS (OUTPATIENT)
Dept: HEALTH INFORMATION MANAGEMENT | Facility: CLINIC | Age: 73
End: 2018-12-20

## 2019-01-08 ENCOUNTER — OFFICE VISIT (OUTPATIENT)
Dept: DERMATOLOGY | Facility: CLINIC | Age: 74
End: 2019-01-08
Payer: MEDICARE

## 2019-01-08 VITALS — OXYGEN SATURATION: 98 % | SYSTOLIC BLOOD PRESSURE: 143 MMHG | DIASTOLIC BLOOD PRESSURE: 89 MMHG | HEART RATE: 73 BPM

## 2019-01-08 DIAGNOSIS — L82.0 INFLAMED SEBORRHEIC KERATOSIS: ICD-10-CM

## 2019-01-08 DIAGNOSIS — L57.0 AK (ACTINIC KERATOSIS): Primary | ICD-10-CM

## 2019-01-08 DIAGNOSIS — L82.1 SEBORRHEIC KERATOSIS: ICD-10-CM

## 2019-01-08 PROCEDURE — 17000 DESTRUCT PREMALG LESION: CPT | Mod: 59 | Performed by: PHYSICIAN ASSISTANT

## 2019-01-08 PROCEDURE — 17110 DESTRUCTION B9 LES UP TO 14: CPT | Performed by: PHYSICIAN ASSISTANT

## 2019-01-08 PROCEDURE — 99212 OFFICE O/P EST SF 10 MIN: CPT | Mod: 25 | Performed by: PHYSICIAN ASSISTANT

## 2019-01-08 NOTE — LETTER
1/8/2019         RE: Cynthia Arita  [address] [place] [address]        Dear Colleague,    Thank you for referring your patient, Cynthia Arita, to the [facility][1]. Please see a copy of my visit note below.    HPI:   Cynthia Arita is a 73 year old female who presents for treatment for a biopsy proven AK on right forearm   chief complaint    Additional concern:    Keratosis on right side that has increased in size    Skin tags on left side of neck    Review Of Systems  Eyes: negative  Ears/Nose/Throat: negative  Respiratory: No shortness of breath, dyspnea on exertion, cough, or hemoptysis  Cardiovascular: negative  Gastrointestinal: negative  Genitourinary: negative  Musculoskeletal: negative  Neurologic: negative  Psychiatric: negative    This document serves as a record of the services and decisions personally performed and made by Suzette Holder, MS, PA-C. It was created on her behalf by Cierra Pandey, a trained medical scribe. The creation of this document is based on the provider's statements to the medical scribe.  Cierra Pandey 11:43 AM January 8, 2019    PHYSICAL EXAM:    /89   Pulse 73   SpO2 98%   Breastfeeding? No   Skin exam performed as follows: Type 2 skin. Mood appropriate  Alert and Oriented X 3. Well developed, well nourished in no distress.  General appearance: Normal  Head including face: Normal  Eyes: conjunctiva and lids: Normal  Mouth: Lips, teeth, gums: Normal  Neck: Normal  Chest-breast/axillae: Normal  Back: Normal  Spleen and liver: Normal  Cardiovascular: Exam of peripheral vascular system by observation for swelling, varicosities, edema: Normal  Genitalia: groin, buttocks: Normal  Extremities: digits/nails (clubbing): Normal  Eccrine and Apocrine glands: Normal  Right upper extremity: Normal  Left upper extremity: Normal  Right lower extremity: Normal  Left lower extremity: Normal  Skin: Scalp and body hair: See  below    1. Pink scaly papule located on right forearm x 1  2. Inflamed keratotic plaque located on right temple x 1   3. Keratotic plaque located on right lower back      ASSESSMENT/PLAN:     1. Biopsy proven actinic keratosis on right forearm x 1. As precancerous, cryosurgery performed. Advised on blistering and post-op care. Advised if not resolved in 1-2 months to return for evaluation  2. Inflamed seborrheic keratosis on right temple x 1. As physically tender cryosurgery performed. Advised on post op care.   3. Seborrheic keratosis on right lower back. Advised benign, no treatment needed.  4. Patient to follow up with Primary Care provider regarding elevated blood pressure.      Follow-up: yearly FSE/PRN sooner  CC:   Scribed By: Cierra Pandey, Medical Scribe    The information in this document, created by the medical scribe for me, accurately reflects the services I personally performed and the decisions made by me. I have reviewed and approved this document for accuracy prior to leaving the patient care area.  January 8, 2019 11:49 AM    Suzette Holder MS, PA-C      Again, thank you for allowing me to participate in the care of your patient.        Sincerely,        Suzette Holder PA-C

## 2019-01-08 NOTE — PROGRESS NOTES
HPI:   Cynthia Arita is a 73 year old female who presents for treatment for a biopsy proven AK on right forearm   chief complaint    Additional concern:    Keratosis on right side that has increased in size    Skin tags on left side of neck    Review Of Systems  Eyes: negative  Ears/Nose/Throat: negative  Respiratory: No shortness of breath, dyspnea on exertion, cough, or hemoptysis  Cardiovascular: negative  Gastrointestinal: negative  Genitourinary: negative  Musculoskeletal: negative  Neurologic: negative  Psychiatric: negative    This document serves as a record of the services and decisions personally performed and made by Suzette Holder, MS, PA-C. It was created on her behalf by Cierra Pandey, a trained medical scribe. The creation of this document is based on the provider's statements to the medical scribe.  Cierra Pandey 11:43 AM January 8, 2019    PHYSICAL EXAM:    /89   Pulse 73   SpO2 98%   Breastfeeding? No   Skin exam performed as follows: Type 2 skin. Mood appropriate  Alert and Oriented X 3. Well developed, well nourished in no distress.  General appearance: Normal  Head including face: Normal  Eyes: conjunctiva and lids: Normal  Mouth: Lips, teeth, gums: Normal  Neck: Normal  Chest-breast/axillae: Normal  Back: Normal  Spleen and liver: Normal  Cardiovascular: Exam of peripheral vascular system by observation for swelling, varicosities, edema: Normal  Genitalia: groin, buttocks: Normal  Extremities: digits/nails (clubbing): Normal  Eccrine and Apocrine glands: Normal  Right upper extremity: Normal  Left upper extremity: Normal  Right lower extremity: Normal  Left lower extremity: Normal  Skin: Scalp and body hair: See below    1. Pink scaly papule located on right forearm x 1  2. Inflamed keratotic plaque located on right temple x 1   3. Keratotic plaque located on right lower back      ASSESSMENT/PLAN:     1. Biopsy proven actinic keratosis on right forearm x 1. As  precancerous, cryosurgery performed. Advised on blistering and post-op care. Advised if not resolved in 1-2 months to return for evaluation  2. Inflamed seborrheic keratosis on right temple x 1. As physically tender cryosurgery performed. Advised on post op care.   3. Seborrheic keratosis on right lower back. Advised benign, no treatment needed.  4. Patient to follow up with Primary Care provider regarding elevated blood pressure.      Follow-up: yearly FSE/PRN sooner  CC:   Scribed By: Cierra Pandey, Medical Scribe    The information in this document, created by the medical scribe for me, accurately reflects the services I personally performed and the decisions made by me. I have reviewed and approved this document for accuracy prior to leaving the patient care area.  January 8, 2019 11:49 AM    Suzette Holder MS, PA-C

## 2019-01-10 ENCOUNTER — OFFICE VISIT (OUTPATIENT)
Dept: INTERNAL MEDICINE | Facility: CLINIC | Age: 74
End: 2019-01-10
Payer: MEDICARE

## 2019-01-10 ENCOUNTER — HOSPITAL ENCOUNTER (OUTPATIENT)
Dept: LAB | Facility: CLINIC | Age: 74
Discharge: HOME | End: 2019-01-10
Attending: ORTHOPAEDIC SURGERY | Admitting: ORTHOPAEDIC SURGERY
Payer: MEDICARE

## 2019-01-10 VITALS
HEIGHT: 63 IN | RESPIRATION RATE: 16 BRPM | WEIGHT: 132.8 LBS | SYSTOLIC BLOOD PRESSURE: 122 MMHG | HEART RATE: 80 BPM | TEMPERATURE: 98.4 F | BODY MASS INDEX: 23.53 KG/M2 | OXYGEN SATURATION: 98 % | DIASTOLIC BLOOD PRESSURE: 72 MMHG

## 2019-01-10 DIAGNOSIS — K21.9 GASTROESOPHAGEAL REFLUX DISEASE WITHOUT ESOPHAGITIS: ICD-10-CM

## 2019-01-10 DIAGNOSIS — I10 ESSENTIAL HYPERTENSION: ICD-10-CM

## 2019-01-10 DIAGNOSIS — F41.9 ANXIETY: ICD-10-CM

## 2019-01-10 DIAGNOSIS — M81.0 AGE-RELATED OSTEOPOROSIS WITHOUT CURRENT PATHOLOGICAL FRACTURE: ICD-10-CM

## 2019-01-10 DIAGNOSIS — Z01.812 PRE-OPERATIVE LABORATORY EXAMINATION: ICD-10-CM

## 2019-01-10 DIAGNOSIS — M79.651 PAIN OF RIGHT THIGH: ICD-10-CM

## 2019-01-10 DIAGNOSIS — R82.90 ABNORMAL URINALYSIS: ICD-10-CM

## 2019-01-10 DIAGNOSIS — M17.11 PRIMARY OSTEOARTHRITIS OF RIGHT KNEE: ICD-10-CM

## 2019-01-10 DIAGNOSIS — Z01.818 PREOP GENERAL PHYSICAL EXAM: Primary | ICD-10-CM

## 2019-01-10 LAB
ALBUMIN SERPL-MCNC: 4 G/DL (ref 3.4–5)
ALBUMIN UR-MCNC: NEGATIVE MG/DL
ALP SERPL-CCNC: 74 U/L (ref 40–150)
ALT SERPL W P-5'-P-CCNC: 29 U/L (ref 0–50)
ANION GAP SERPL CALCULATED.3IONS-SCNC: 4 MMOL/L (ref 3–14)
APPEARANCE UR: CLEAR
AST SERPL W P-5'-P-CCNC: 15 U/L (ref 0–45)
BACTERIA #/AREA URNS HPF: ABNORMAL /HPF
BILIRUB SERPL-MCNC: 0.4 MG/DL (ref 0.2–1.3)
BILIRUB UR QL STRIP: NEGATIVE
BUN SERPL-MCNC: 25 MG/DL (ref 7–30)
CALCIUM SERPL-MCNC: 9.8 MG/DL (ref 8.5–10.1)
CHLORIDE SERPL-SCNC: 103 MMOL/L (ref 94–109)
CO2 SERPL-SCNC: 31 MMOL/L (ref 20–32)
COLOR UR AUTO: YELLOW
CREAT SERPL-MCNC: 0.62 MG/DL (ref 0.52–1.04)
ERYTHROCYTE [DISTWIDTH] IN BLOOD BY AUTOMATED COUNT: 13.8 % (ref 10–15)
GFR SERPL CREATININE-BSD FRML MDRD: 88 ML/MIN/{1.73_M2}
GLUCOSE SERPL-MCNC: 92 MG/DL (ref 70–99)
GLUCOSE UR STRIP-MCNC: NEGATIVE MG/DL
HCT VFR BLD AUTO: 45.4 % (ref 35–47)
HGB BLD-MCNC: 14.5 G/DL (ref 11.7–15.7)
HGB UR QL STRIP: NEGATIVE
KETONES UR STRIP-MCNC: NEGATIVE MG/DL
LEUKOCYTE ESTERASE UR QL STRIP: NEGATIVE
MCH RBC QN AUTO: 29.4 PG (ref 26.5–33)
MCHC RBC AUTO-ENTMCNC: 31.9 G/DL (ref 31.5–36.5)
MCV RBC AUTO: 92 FL (ref 78–100)
MRSA DNA SPEC QL NAA+PROBE: NEGATIVE
NITRATE UR QL: NEGATIVE
NON-SQ EPI CELLS #/AREA URNS LPF: ABNORMAL /LPF
PH UR STRIP: 7.5 PH (ref 5–7)
PLATELET # BLD AUTO: 283 10E9/L (ref 150–450)
POTASSIUM SERPL-SCNC: 3.8 MMOL/L (ref 3.4–5.3)
PROT SERPL-MCNC: 7.2 G/DL (ref 6.8–8.8)
RBC # BLD AUTO: 4.94 10E12/L (ref 3.8–5.2)
RBC #/AREA URNS AUTO: ABNORMAL /HPF
SODIUM SERPL-SCNC: 138 MMOL/L (ref 133–144)
SOURCE: ABNORMAL
SP GR UR STRIP: 1.02 (ref 1–1.03)
SPECIMEN SOURCE: NORMAL
UROBILINOGEN UR STRIP-ACNC: 0.2 EU/DL (ref 0.2–1)
WBC # BLD AUTO: 5.4 10E9/L (ref 4–11)
WBC #/AREA URNS AUTO: ABNORMAL /HPF

## 2019-01-10 PROCEDURE — 40000829 ZZHCL STATISTIC STAPH AUREUS SUSCEPT SCREEN PCR: Performed by: ORTHOPAEDIC SURGERY

## 2019-01-10 PROCEDURE — 80053 COMPREHEN METABOLIC PANEL: CPT | Performed by: INTERNAL MEDICINE

## 2019-01-10 PROCEDURE — 87640 STAPH A DNA AMP PROBE: CPT | Performed by: ORTHOPAEDIC SURGERY

## 2019-01-10 PROCEDURE — 36415 COLL VENOUS BLD VENIPUNCTURE: CPT | Performed by: INTERNAL MEDICINE

## 2019-01-10 PROCEDURE — 87088 URINE BACTERIA CULTURE: CPT | Performed by: INTERNAL MEDICINE

## 2019-01-10 PROCEDURE — 87186 SC STD MICRODIL/AGAR DIL: CPT | Performed by: INTERNAL MEDICINE

## 2019-01-10 PROCEDURE — 87086 URINE CULTURE/COLONY COUNT: CPT | Performed by: INTERNAL MEDICINE

## 2019-01-10 PROCEDURE — 99214 OFFICE O/P EST MOD 30 MIN: CPT | Performed by: INTERNAL MEDICINE

## 2019-01-10 PROCEDURE — 81001 URINALYSIS AUTO W/SCOPE: CPT | Performed by: INTERNAL MEDICINE

## 2019-01-10 PROCEDURE — 40000830 ZZHCL STATISTIC STAPH AUREUS METH RESIST SCREEN PCR: Performed by: ORTHOPAEDIC SURGERY

## 2019-01-10 PROCEDURE — 87641 MR-STAPH DNA AMP PROBE: CPT | Performed by: ORTHOPAEDIC SURGERY

## 2019-01-10 PROCEDURE — 85027 COMPLETE CBC AUTOMATED: CPT | Performed by: INTERNAL MEDICINE

## 2019-01-10 RX ORDER — PROPRANOLOL HYDROCHLORIDE 20 MG/1
20 TABLET ORAL DAILY PRN
Qty: 90 TABLET | Refills: 0 | Status: ON HOLD | OUTPATIENT
Start: 2019-01-10 | End: 2019-01-21

## 2019-01-10 RX ORDER — TRIAMTERENE/HYDROCHLOROTHIAZID 37.5-25 MG
1 TABLET ORAL DAILY
Qty: 90 TABLET | Refills: 3 | Status: SHIPPED | OUTPATIENT
Start: 2019-01-10 | End: 2019-02-07

## 2019-01-10 RX ORDER — CALCITONIN SALMON 200 [IU]/.09ML
SPRAY, METERED NASAL
Qty: 11.1 ML | Refills: 3 | Status: SHIPPED | OUTPATIENT
Start: 2019-01-10 | End: 2019-08-21

## 2019-01-10 RX ORDER — GABAPENTIN 100 MG/1
100 CAPSULE ORAL 3 TIMES DAILY
Qty: 270 CAPSULE | Refills: 1 | Status: ON HOLD | OUTPATIENT
Start: 2019-01-10 | End: 2019-01-21

## 2019-01-10 ASSESSMENT — MIFFLIN-ST. JEOR: SCORE: 1071.51

## 2019-01-10 NOTE — PROGRESS NOTES
[place] [address]  [facility] [address]  [phone]  Dept: [phone]    PRE-OP EVALUATION:  Today's date: 1/10/2019    Cynthia Arita (: 1945) presents for pre-operative evaluation assessment as requested by Dr. CHAPO Chawla.  She requires evaluation and anesthesia risk assessment prior to undergoing surgery/procedure for treatment of total right knee replacemaent.    Primary Physician: Huyen Samuels  Type of Anesthesia Anticipated: General    Patient has a Health Care Directive or Living Will:  NO    Preop Questions 1/10/2019   What are you having done? pre op   Date of Surgery/Procedure: 2019   Facility or Hospital where procedure/surgery will be performed: [facility]   1.  Do you have a history of Heart attack, stroke, stent, coronary bypass surgery, or other heart surgery? No   2.  Do you ever have any pain or discomfort in your chest? Yes, epigastric burning well documented reflux   3.  Do you have a history of  Heart Failure? No   4.   Are you troubled by shortness of breath when:  walking on a level surface, or up a slight hill, or at night? No   5.  Do you currently have a cold, bronchitis or other respiratory infection? No   6.  Do you have a cough, shortness of breath, or wheezing? No   7.  Do you sometimes get pains in the calves of your legs when you walk? No   8. Do you or anyone in your family have previous history of blood clots? No   9.  Do you or does anyone in your family have a serious bleeding problem such as prolonged bleeding following surgeries or cuts? No   10. Have you ever had problems with anemia or been told to take iron pills? No   11. Have you had any abnormal blood loss such as black, tarry or bloody stools, or abnormal vaginal bleeding? No   12. Have you ever had a blood transfusion? No   13. Have you or any of your relatives ever had problems with anesthesia? YES - very prone to nausea   14. Do you have sleep apnea,  excessive snoring or daytime drowsiness? No   15. Do you have any prosthetic heart valves? No   16. Do you have prosthetic joints? No   17. Is there any chance that you may be pregnant? No         HPI:     HPI related to upcoming procedure: right knee pain for about 1 year getting worse. Did not respond to steroids. Going in for total knee arthroplasty       See problem list for active medical problems.  Problems all longstanding and stable, except as noted/documented.  See ROS for pertinent symptoms related to these conditions.                                                                                                                                                          .    MEDICAL HISTORY:     Patient Active Problem List    Diagnosis Date Noted     Pain of right thigh 10/01/2018     Priority: Medium     Tinnitus, unspecified laterality 05/23/2018     Priority: Medium     Gastroesophageal reflux disease without esophagitis 12/20/2017     Priority: Medium     Stress 07/31/2017     Priority: Medium     Essential hypertension 07/03/2017     Priority: Medium     Anxiety 04/19/2017     Priority: Medium     Pneumonia of right upper lobe due to infectious organism (H) 04/05/2017     Priority: Medium     Calculus of right kidney 06/29/2015     Priority: Medium     Esophageal reflux 06/18/2015     Priority: Medium     Osteoporosis 06/18/2015     Priority: Medium     Meniere's disease 06/18/2015     Priority: Medium      Past Medical History:   Diagnosis Date     Anxiety      Complication of anesthesia      Diverticulosis      GERD (gastroesophageal reflux disease)      HTN (hypertension)      Meniere's disease      Nephrolithiasis      Pain in right knee      PONV (postoperative nausea and vomiting)      Past Surgical History:   Procedure Laterality Date     C VAGINAL HYSTERECTOMY      with left oophorectomy     EYE SURGERY      cataract surgery both eyes     Current Outpatient Medications   Medication Sig Dispense  Refill     ALPRAZolam (XANAX) 0.5 MG tablet Take 1/2 tab tid prn 30 tablet 1     calcitonin, salmon, (MIACALCIN) 200 UNIT/ACT nasal spray USE 1 SPRAY IN 1 NOSTRIL  DAILY (ALTERNATING NOSTRILS DAILY) 11.1 mL 1     Cholecalciferol (VITAMIN D3) 2000 UNITS CAPS Take 2,000 Units by mouth every morning        DIAZEPAM PO Take 2 mg by mouth every 8 hours as needed for other (dizziness)       Flaxseed, Linseed, (FLAXSEED OIL) 1000 MG CAPS Take 1,000 mg by mouth every evening        Glucosamine-Chondroitin (GLUCOSAMINE CHONDR COMPLEX PO) Take 1 capsule by mouth 2 times daily        HERBALS 2 times daily as needed       lidocaine (LIDODERM) 5 % Patch Apply up to 3 patches to painful area at once for up to 12 h within a 24 h period.  Remove after 12 hours. 30 patch 3     meclizine (ANTIVERT) 25 MG tablet Take 1 tablet (25 mg) by mouth every 6 hours as needed for dizziness 30 tablet 1     Multiple Vitamins-Minerals (CENTRUM SILVER) per tablet Take 1 tablet by mouth daily       Multiple Vitamins-Minerals (OCUVITE PRESERVISION PO) Take 1 tablet by mouth 2 times daily        NEXIUM 40 MG CR capsule 40 mg by Oral or Feeding Tube route daily       Omega-3 Fatty Acids (OMEGA-3 FISH OIL) 1000 MG CAPS Take 1,000 mg by mouth 2 times daily        ondansetron (ZOFRAN ODT) 4 MG ODT tab Take 1 tablet (4 mg) by mouth every 8 hours as needed for nausea 120 tablet 1     propranolol (INDERAL) 20 MG tablet Take 1 tablet (20 mg) by mouth daily as needed For blood pressure higher than 170 90 tablet 0     RANITIDINE HCL PO Take 150 mg by mouth At Bedtime        triamterene-hydrochlorothiazide (MAXZIDE-25) 37.5-25 MG per tablet TAKE 1 TABLET BY MOUTH  DAILY 90 tablet 0     OTC products: None, except as noted above    Allergies   Allergen Reactions     Ativan [Lorazepam]      Sick -      Gabapentin Nausea     Metoprolol      Nausea       Pantoprazole Other (See Comments), Nausea and Vomiting and GI Disturbance     Red in the face, sleepiness, face  swelling, joint pain, dizziness      Latex Allergy: NO    Social History     Tobacco Use     Smoking status: Never Smoker     Smokeless tobacco: Never Used   Substance Use Topics     Alcohol use: Yes     Comment: rare     History   Drug Use No       REVIEW OF SYSTEMS:   CONSTITUTIONAL: NEGATIVE for fever, chills, change in weight  INTEGUMENTARY/SKIN: NEGATIVE for worrisome rashes, moles or lesions  EYES: NEGATIVE for vision changes or irritation  ENT/MOUTH: NEGATIVE for ear, mouth and throat problems  RESP: NEGATIVE for significant cough or SOB  BREAST: NEGATIVE for masses, tenderness or discharge  CV: NEGATIVE for chest pain, palpitations or peripheral edema  GI: NEGATIVE for nausea, abdominal pain, heartburn, or change in bowel habits  : NEGATIVE for frequency, dysuria, or hematuria  MUSCULOSKELETAL: NEGATIVE for significant arthralgias or myalgia  NEURO: very prone to dizziness and lightheadedness and nausea  ENDOCRINE: NEGATIVE for temperature intolerance, skin/hair changes  HEME: NEGATIVE for bleeding problems  PSYCHIATRIC: NEGATIVE for changes in mood or affect    EXAM:   There were no vitals taken for this visit.    GENERAL APPEARANCE: healthy, alert and no distress     EYES: EOMI, PERRL     HENT: ear canals and TM's normal and nose and mouth without ulcers or lesions     NECK: no adenopathy, no asymmetry, masses, or scars and thyroid normal to palpation     RESP: lungs clear to auscultation - no rales, rhonchi or wheezes     CV: regular rates and rhythm, normal S1 S2, no S3 or S4 and no murmur, click or rub     ABDOMEN:  soft, nontender, no HSM or masses and bowel sounds normal     MS: extremities normal- no gross deformities noted, no evidence of inflammation in joints, FROM in all extremities.     SKIN: no suspicious lesions or rashes     NEURO: Normal strength and tone, sensory exam grossly normal, mentation intact and speech normal     PSYCH: mentation appears normal. and affect normal/bright      LYMPHATICS: No cervical adenopathy    DIAGNOSTICS:     Labs Drawn and in Process:   Unresulted Labs Ordered in the Past 30 Days of this Admission     No orders found from 11/11/2018 to 1/11/2019.          Recent Labs   Lab Test 11/17/18  1520 06/26/18  0831  05/27/18  1422   HGB 14.5  --   --  14.6     --   --  324    142   < > 137   POTASSIUM 3.8 4.5   < > 3.2*   CR 0.76 0.66  --  0.68    < > = values in this interval not displayed.        IMPRESSION:   Reason for surgery/procedure: right total knee arthroplasty   Diagnosis/reason for consult: hypertension     The proposed surgical procedure is considered INTERMEDIATE risk.    REVISED CARDIAC RISK INDEX  The patient has the following serious cardiovascular risks for perioperative complications such as (MI, PE, VFib and 3  AV Block):  High risk surgery (>5% cardiac complication risk)  INTERPRETATION: 2 risks: Class III (moderate risk - 6.6% complication rate)    The patient has the following additional risks for perioperative complications:  No identified additional risks      ICD-10-CM    1. Pain of right thigh M79.651    2. Essential hypertension I10    3. Age-related osteoporosis without current pathological fracture M81.0        RECOMMENDATIONS:     --Consult hospital rounder / IM to assist post-op medical management    --Patient is to take all scheduled medications on the day of surgery EXCEPT for modifications listed below.    APPROVAL GIVEN to proceed with proposed procedure, without further diagnostic evaluation       Signed Electronically by: Huyen Samuels MD    Copy of this evaluation report is provided to requesting physician.    [facility] Preop Guidelines    Revised Cardiac Risk Index

## 2019-01-11 DIAGNOSIS — N39.0 URINARY TRACT INFECTION WITHOUT HEMATURIA, SITE UNSPECIFIED: Primary | ICD-10-CM

## 2019-01-11 RX ORDER — CIPROFLOXACIN 500 MG/1
500 TABLET, FILM COATED ORAL 2 TIMES DAILY
Qty: 14 TABLET | Refills: 0 | Status: ON HOLD | OUTPATIENT
Start: 2019-01-11 | End: 2019-01-21

## 2019-01-11 RX ORDER — CIPROFLOXACIN 500 MG/1
500 TABLET, FILM COATED ORAL 2 TIMES DAILY
Qty: 14 TABLET | Refills: 0 | Status: SHIPPED | OUTPATIENT
Start: 2019-01-11 | End: 2019-01-11

## 2019-01-12 LAB
BACTERIA SPEC CULT: ABNORMAL
SPECIMEN SOURCE: ABNORMAL

## 2019-01-20 ENCOUNTER — ANESTHESIA EVENT (OUTPATIENT)
Dept: SURGERY | Facility: CLINIC | Age: 74
DRG: 470 | End: 2019-01-20
Payer: MEDICARE

## 2019-01-21 ENCOUNTER — HOSPITAL ENCOUNTER (INPATIENT)
Facility: CLINIC | Age: 74
LOS: 4 days | Discharge: NURSING, REHAB OR LONG-TERM CARE | DRG: 470 | End: 2019-01-25
Attending: ORTHOPAEDIC SURGERY | Admitting: ORTHOPAEDIC SURGERY
Payer: MEDICARE

## 2019-01-21 ENCOUNTER — APPOINTMENT (OUTPATIENT)
Dept: GENERAL RADIOLOGY | Facility: CLINIC | Age: 74
DRG: 470 | End: 2019-01-21
Attending: ORTHOPAEDIC SURGERY
Payer: MEDICARE

## 2019-01-21 ENCOUNTER — ANESTHESIA (OUTPATIENT)
Dept: SURGERY | Facility: CLINIC | Age: 74
DRG: 470 | End: 2019-01-21
Payer: MEDICARE

## 2019-01-21 ENCOUNTER — APPOINTMENT (OUTPATIENT)
Dept: PHYSICAL THERAPY | Facility: CLINIC | Age: 74
DRG: 470 | End: 2019-01-21
Attending: ORTHOPAEDIC SURGERY
Payer: MEDICARE

## 2019-01-21 DIAGNOSIS — F41.9 ANXIETY: ICD-10-CM

## 2019-01-21 DIAGNOSIS — G47.00 INSOMNIA, UNSPECIFIED TYPE: ICD-10-CM

## 2019-01-21 DIAGNOSIS — Z96.651 STATUS POST TOTAL RIGHT KNEE REPLACEMENT: Primary | ICD-10-CM

## 2019-01-21 DIAGNOSIS — E78.00 HYPERCHOLESTEROLEMIA: ICD-10-CM

## 2019-01-21 PROBLEM — Z96.659 S/P TOTAL KNEE ARTHROPLASTY: Status: ACTIVE | Noted: 2019-01-21

## 2019-01-21 LAB — INTERPRETATION ECG - MUSE: NORMAL

## 2019-01-21 PROCEDURE — 0SRC0J9 REPLACEMENT OF RIGHT KNEE JOINT WITH SYNTHETIC SUBSTITUTE, CEMENTED, OPEN APPROACH: ICD-10-PCS | Performed by: ORTHOPAEDIC SURGERY

## 2019-01-21 PROCEDURE — A9270 NON-COVERED ITEM OR SERVICE: HCPCS | Mod: GY | Performed by: INTERNAL MEDICINE

## 2019-01-21 PROCEDURE — 25000128 H RX IP 250 OP 636: Performed by: NURSE ANESTHETIST, CERTIFIED REGISTERED

## 2019-01-21 PROCEDURE — 25000125 ZZHC RX 250: Performed by: ORTHOPAEDIC SURGERY

## 2019-01-21 PROCEDURE — 12000000 ZZH R&B MED SURG/OB

## 2019-01-21 PROCEDURE — 37000008 ZZH ANESTHESIA TECHNICAL FEE, 1ST 30 MIN: Performed by: ORTHOPAEDIC SURGERY

## 2019-01-21 PROCEDURE — C1776 JOINT DEVICE (IMPLANTABLE): HCPCS | Performed by: ORTHOPAEDIC SURGERY

## 2019-01-21 PROCEDURE — 25000132 ZZH RX MED GY IP 250 OP 250 PS 637: Mod: GY | Performed by: PHYSICIAN ASSISTANT

## 2019-01-21 PROCEDURE — 36000063 ZZH SURGERY LEVEL 4 EA 15 ADDTL MIN: Performed by: ORTHOPAEDIC SURGERY

## 2019-01-21 PROCEDURE — 25000132 ZZH RX MED GY IP 250 OP 250 PS 637: Mod: GY | Performed by: INTERNAL MEDICINE

## 2019-01-21 PROCEDURE — 27210794 ZZH OR GENERAL SUPPLY STERILE: Performed by: ORTHOPAEDIC SURGERY

## 2019-01-21 PROCEDURE — 25000128 H RX IP 250 OP 636: Performed by: ANESTHESIOLOGY

## 2019-01-21 PROCEDURE — 40000171 ZZH STATISTIC PRE-PROCEDURE ASSESSMENT III: Performed by: ORTHOPAEDIC SURGERY

## 2019-01-21 PROCEDURE — 97530 THERAPEUTIC ACTIVITIES: CPT | Mod: GP | Performed by: PHYSICAL THERAPIST

## 2019-01-21 PROCEDURE — A9270 NON-COVERED ITEM OR SERVICE: HCPCS | Mod: GY | Performed by: PHYSICIAN ASSISTANT

## 2019-01-21 PROCEDURE — 25800025 ZZH RX 258: Performed by: ORTHOPAEDIC SURGERY

## 2019-01-21 PROCEDURE — 25000125 ZZHC RX 250: Performed by: NURSE ANESTHETIST, CERTIFIED REGISTERED

## 2019-01-21 PROCEDURE — 71000012 ZZH RECOVERY PHASE 1 LEVEL 1 FIRST HR: Performed by: ORTHOPAEDIC SURGERY

## 2019-01-21 PROCEDURE — 71000013 ZZH RECOVERY PHASE 1 LEVEL 1 EA ADDTL HR: Performed by: ORTHOPAEDIC SURGERY

## 2019-01-21 PROCEDURE — 27810169 ZZH OR IMPLANT GENERAL: Performed by: ORTHOPAEDIC SURGERY

## 2019-01-21 PROCEDURE — 93010 ELECTROCARDIOGRAM REPORT: CPT | Performed by: INTERNAL MEDICINE

## 2019-01-21 PROCEDURE — 25000128 H RX IP 250 OP 636: Performed by: PHYSICIAN ASSISTANT

## 2019-01-21 PROCEDURE — 36000093 ZZH SURGERY LEVEL 4 1ST 30 MIN: Performed by: ORTHOPAEDIC SURGERY

## 2019-01-21 PROCEDURE — 97110 THERAPEUTIC EXERCISES: CPT | Mod: GP | Performed by: PHYSICAL THERAPIST

## 2019-01-21 PROCEDURE — 25000125 ZZHC RX 250: Performed by: PHYSICIAN ASSISTANT

## 2019-01-21 PROCEDURE — 40000193 ZZH STATISTIC PT WARD VISIT: Performed by: PHYSICAL THERAPIST

## 2019-01-21 PROCEDURE — A9270 NON-COVERED ITEM OR SERVICE: HCPCS | Mod: GY | Performed by: ORTHOPAEDIC SURGERY

## 2019-01-21 PROCEDURE — 40000986 XR KNEE PORT LT 1/2 VW: Mod: LT

## 2019-01-21 PROCEDURE — 97161 PT EVAL LOW COMPLEX 20 MIN: CPT | Mod: GP | Performed by: PHYSICAL THERAPIST

## 2019-01-21 PROCEDURE — 37000009 ZZH ANESTHESIA TECHNICAL FEE, EACH ADDTL 15 MIN: Performed by: ORTHOPAEDIC SURGERY

## 2019-01-21 PROCEDURE — 25000125 ZZHC RX 250: Performed by: ANESTHESIOLOGY

## 2019-01-21 PROCEDURE — 99221 1ST HOSP IP/OBS SF/LOW 40: CPT | Performed by: INTERNAL MEDICINE

## 2019-01-21 PROCEDURE — 99207 ZZC CONSULT E&M CHANGED TO INITIAL LEVEL: CPT | Performed by: INTERNAL MEDICINE

## 2019-01-21 PROCEDURE — 25000132 ZZH RX MED GY IP 250 OP 250 PS 637: Mod: GY | Performed by: ORTHOPAEDIC SURGERY

## 2019-01-21 PROCEDURE — 25000128 H RX IP 250 OP 636: Performed by: ORTHOPAEDIC SURGERY

## 2019-01-21 DEVICE — IMPLANTABLE DEVICE: Type: IMPLANTABLE DEVICE | Site: KNEE | Status: FUNCTIONAL

## 2019-01-21 DEVICE — IMP COMP FEMORAL ZIM NEXGEN E PS RT 00-5996-015-52: Type: IMPLANTABLE DEVICE | Site: KNEE | Status: FUNCTIONAL

## 2019-01-21 DEVICE — IMP COMP TIBIAL STEMMED ZIM NEXGEN SIZE 3 5980-37-01: Type: IMPLANTABLE DEVICE | Site: KNEE | Status: FUNCTIONAL

## 2019-01-21 DEVICE — BONE CEMENT SIMPLEX FULL DOSE 6191-1-001: Type: IMPLANTABLE DEVICE | Site: KNEE | Status: FUNCTIONAL

## 2019-01-21 DEVICE — IMP COMP PATELLA ZIM NEXGEN 8.5X32MM 00-5972-065-32: Type: IMPLANTABLE DEVICE | Site: KNEE | Status: FUNCTIONAL

## 2019-01-21 RX ORDER — PROPOFOL 10 MG/ML
INJECTION, EMULSION INTRAVENOUS CONTINUOUS PRN
Status: DISCONTINUED | OUTPATIENT
Start: 2019-01-21 | End: 2019-01-21

## 2019-01-21 RX ORDER — ONDANSETRON 4 MG/1
4 TABLET, ORALLY DISINTEGRATING ORAL EVERY 30 MIN PRN
Status: DISCONTINUED | OUTPATIENT
Start: 2019-01-21 | End: 2019-01-21 | Stop reason: HOSPADM

## 2019-01-21 RX ORDER — DIPHENHYDRAMINE HCL 12.5MG/5ML
12.5 LIQUID (ML) ORAL EVERY 6 HOURS PRN
Status: DISCONTINUED | OUTPATIENT
Start: 2019-01-21 | End: 2019-01-25 | Stop reason: HOSPADM

## 2019-01-21 RX ORDER — ONDANSETRON 4 MG/1
4 TABLET, ORALLY DISINTEGRATING ORAL EVERY 6 HOURS PRN
Status: DISCONTINUED | OUTPATIENT
Start: 2019-01-21 | End: 2019-01-25 | Stop reason: HOSPADM

## 2019-01-21 RX ORDER — LABETALOL HYDROCHLORIDE 5 MG/ML
INJECTION, SOLUTION INTRAVENOUS PRN
Status: DISCONTINUED | OUTPATIENT
Start: 2019-01-21 | End: 2019-01-21

## 2019-01-21 RX ORDER — OXYCODONE HCL 10 MG/1
10 TABLET, FILM COATED, EXTENDED RELEASE ORAL ONCE
Status: COMPLETED | OUTPATIENT
Start: 2019-01-21 | End: 2019-01-21

## 2019-01-21 RX ORDER — AMOXICILLIN 250 MG
1 CAPSULE ORAL 2 TIMES DAILY
Status: DISCONTINUED | OUTPATIENT
Start: 2019-01-21 | End: 2019-01-25 | Stop reason: HOSPADM

## 2019-01-21 RX ORDER — LIDOCAINE 40 MG/G
CREAM TOPICAL
Status: DISCONTINUED | OUTPATIENT
Start: 2019-01-21 | End: 2019-01-25 | Stop reason: HOSPADM

## 2019-01-21 RX ORDER — FERROUS GLUCONATE 324(38)MG
324 TABLET ORAL DAILY
Status: DISCONTINUED | OUTPATIENT
Start: 2019-01-21 | End: 2019-01-25 | Stop reason: HOSPADM

## 2019-01-21 RX ORDER — NALOXONE HYDROCHLORIDE 0.4 MG/ML
.1-.4 INJECTION, SOLUTION INTRAMUSCULAR; INTRAVENOUS; SUBCUTANEOUS
Status: DISCONTINUED | OUTPATIENT
Start: 2019-01-21 | End: 2019-01-25 | Stop reason: HOSPADM

## 2019-01-21 RX ORDER — HYDROMORPHONE HCL/0.9% NACL/PF 0.2MG/0.2
0.2 SYRINGE (ML) INTRAVENOUS
Status: DISCONTINUED | OUTPATIENT
Start: 2019-01-21 | End: 2019-01-25 | Stop reason: HOSPADM

## 2019-01-21 RX ORDER — DIPHENHYDRAMINE HYDROCHLORIDE 50 MG/ML
12.5 INJECTION INTRAMUSCULAR; INTRAVENOUS EVERY 6 HOURS PRN
Status: DISCONTINUED | OUTPATIENT
Start: 2019-01-21 | End: 2019-01-25 | Stop reason: HOSPADM

## 2019-01-21 RX ORDER — ONDANSETRON 2 MG/ML
4 INJECTION INTRAMUSCULAR; INTRAVENOUS EVERY 30 MIN PRN
Status: DISCONTINUED | OUTPATIENT
Start: 2019-01-21 | End: 2019-01-21 | Stop reason: HOSPADM

## 2019-01-21 RX ORDER — ALPRAZOLAM 0.25 MG
0.25 TABLET ORAL 3 TIMES DAILY PRN
Status: DISCONTINUED | OUTPATIENT
Start: 2019-01-21 | End: 2019-01-25 | Stop reason: HOSPADM

## 2019-01-21 RX ORDER — ACETAMINOPHEN 325 MG/1
975 TABLET ORAL EVERY 8 HOURS
Status: DISPENSED | OUTPATIENT
Start: 2019-01-21 | End: 2019-01-24

## 2019-01-21 RX ORDER — FENTANYL CITRATE 50 UG/ML
INJECTION, SOLUTION INTRAMUSCULAR; INTRAVENOUS PRN
Status: DISCONTINUED | OUTPATIENT
Start: 2019-01-21 | End: 2019-01-21

## 2019-01-21 RX ORDER — LANOLIN ALCOHOL/MO/W.PET/CERES
3-6 CREAM (GRAM) TOPICAL
Status: DISCONTINUED | OUTPATIENT
Start: 2019-01-22 | End: 2019-01-25 | Stop reason: HOSPADM

## 2019-01-21 RX ORDER — ACETAMINOPHEN 325 MG/1
650 TABLET ORAL EVERY 4 HOURS PRN
Status: DISCONTINUED | OUTPATIENT
Start: 2019-01-24 | End: 2019-01-25 | Stop reason: HOSPADM

## 2019-01-21 RX ORDER — MECLIZINE HCL 12.5 MG 12.5 MG/1
25 TABLET ORAL EVERY 6 HOURS PRN
Status: DISCONTINUED | OUTPATIENT
Start: 2019-01-21 | End: 2019-01-25 | Stop reason: HOSPADM

## 2019-01-21 RX ORDER — ESOMEPRAZOLE MAGNESIUM 40 MG/1
40 CAPSULE, DELAYED RELEASE ORAL DAILY
Status: DISCONTINUED | OUTPATIENT
Start: 2019-01-22 | End: 2019-01-25 | Stop reason: HOSPADM

## 2019-01-21 RX ORDER — DIPHENHYDRAMINE HYDROCHLORIDE 50 MG/ML
25 INJECTION INTRAMUSCULAR; INTRAVENOUS ONCE
Status: COMPLETED | OUTPATIENT
Start: 2019-01-21 | End: 2019-01-21

## 2019-01-21 RX ORDER — SODIUM CHLORIDE, SODIUM LACTATE, POTASSIUM CHLORIDE, CALCIUM CHLORIDE 600; 310; 30; 20 MG/100ML; MG/100ML; MG/100ML; MG/100ML
INJECTION, SOLUTION INTRAVENOUS CONTINUOUS
Status: DISCONTINUED | OUTPATIENT
Start: 2019-01-21 | End: 2019-01-21 | Stop reason: HOSPADM

## 2019-01-21 RX ORDER — LIDOCAINE 40 MG/G
CREAM TOPICAL
Status: DISCONTINUED | OUTPATIENT
Start: 2019-01-21 | End: 2019-01-21 | Stop reason: HOSPADM

## 2019-01-21 RX ORDER — CELECOXIB 100 MG/1
100 CAPSULE ORAL 2 TIMES DAILY
Status: DISPENSED | OUTPATIENT
Start: 2019-01-21 | End: 2019-01-23

## 2019-01-21 RX ORDER — PROPOFOL 10 MG/ML
INJECTION, EMULSION INTRAVENOUS PRN
Status: DISCONTINUED | OUTPATIENT
Start: 2019-01-21 | End: 2019-01-21

## 2019-01-21 RX ORDER — PROCHLORPERAZINE MALEATE 5 MG
5 TABLET ORAL EVERY 6 HOURS PRN
Status: DISCONTINUED | OUTPATIENT
Start: 2019-01-21 | End: 2019-01-25 | Stop reason: HOSPADM

## 2019-01-21 RX ORDER — OXYCODONE HYDROCHLORIDE 5 MG/1
5-10 TABLET ORAL EVERY 4 HOURS PRN
Status: DISCONTINUED | OUTPATIENT
Start: 2019-01-21 | End: 2019-01-21

## 2019-01-21 RX ORDER — CALCITONIN SALMON 200 [IU]/.09ML
1 SPRAY, METERED NASAL DAILY
Status: DISCONTINUED | OUTPATIENT
Start: 2019-01-22 | End: 2019-01-25 | Stop reason: HOSPADM

## 2019-01-21 RX ORDER — DEXAMETHASONE SODIUM PHOSPHATE 4 MG/ML
INJECTION, SOLUTION INTRA-ARTICULAR; INTRALESIONAL; INTRAMUSCULAR; INTRAVENOUS; SOFT TISSUE PRN
Status: DISCONTINUED | OUTPATIENT
Start: 2019-01-21 | End: 2019-01-21

## 2019-01-21 RX ORDER — CEFAZOLIN SODIUM 2 G/100ML
2 INJECTION, SOLUTION INTRAVENOUS
Status: COMPLETED | OUTPATIENT
Start: 2019-01-21 | End: 2019-01-21

## 2019-01-21 RX ORDER — FENTANYL CITRATE 50 UG/ML
25-50 INJECTION, SOLUTION INTRAMUSCULAR; INTRAVENOUS
Status: DISCONTINUED | OUTPATIENT
Start: 2019-01-21 | End: 2019-01-21 | Stop reason: HOSPADM

## 2019-01-21 RX ORDER — NALOXONE HYDROCHLORIDE 0.4 MG/ML
.1-.4 INJECTION, SOLUTION INTRAMUSCULAR; INTRAVENOUS; SUBCUTANEOUS
Status: DISCONTINUED | OUTPATIENT
Start: 2019-01-21 | End: 2019-01-21

## 2019-01-21 RX ORDER — ONDANSETRON 2 MG/ML
4 INJECTION INTRAMUSCULAR; INTRAVENOUS EVERY 6 HOURS PRN
Status: DISCONTINUED | OUTPATIENT
Start: 2019-01-21 | End: 2019-01-25 | Stop reason: HOSPADM

## 2019-01-21 RX ORDER — BUPIVACAINE HYDROCHLORIDE AND EPINEPHRINE 5; 5 MG/ML; UG/ML
INJECTION, SOLUTION PERINEURAL PRN
Status: DISCONTINUED | OUTPATIENT
Start: 2019-01-21 | End: 2019-01-21

## 2019-01-21 RX ORDER — AMOXICILLIN 250 MG
2 CAPSULE ORAL 2 TIMES DAILY
Status: DISCONTINUED | OUTPATIENT
Start: 2019-01-21 | End: 2019-01-25 | Stop reason: HOSPADM

## 2019-01-21 RX ORDER — HYDROMORPHONE HYDROCHLORIDE 1 MG/ML
.3-.5 INJECTION, SOLUTION INTRAMUSCULAR; INTRAVENOUS; SUBCUTANEOUS EVERY 5 MIN PRN
Status: DISCONTINUED | OUTPATIENT
Start: 2019-01-21 | End: 2019-01-21 | Stop reason: HOSPADM

## 2019-01-21 RX ORDER — SODIUM CHLORIDE, SODIUM LACTATE, POTASSIUM CHLORIDE, CALCIUM CHLORIDE 600; 310; 30; 20 MG/100ML; MG/100ML; MG/100ML; MG/100ML
INJECTION, SOLUTION INTRAVENOUS CONTINUOUS
Status: DISCONTINUED | OUTPATIENT
Start: 2019-01-21 | End: 2019-01-22

## 2019-01-21 RX ORDER — CEFAZOLIN SODIUM 1 G/3ML
1 INJECTION, POWDER, FOR SOLUTION INTRAMUSCULAR; INTRAVENOUS EVERY 8 HOURS
Status: COMPLETED | OUTPATIENT
Start: 2019-01-21 | End: 2019-01-22

## 2019-01-21 RX ORDER — LABETALOL HYDROCHLORIDE 5 MG/ML
10 INJECTION, SOLUTION INTRAVENOUS
Status: DISCONTINUED | OUTPATIENT
Start: 2019-01-21 | End: 2019-01-25 | Stop reason: HOSPADM

## 2019-01-21 RX ORDER — CEFAZOLIN SODIUM 1 G/3ML
1 INJECTION, POWDER, FOR SOLUTION INTRAMUSCULAR; INTRAVENOUS SEE ADMIN INSTRUCTIONS
Status: DISCONTINUED | OUTPATIENT
Start: 2019-01-21 | End: 2019-01-21 | Stop reason: HOSPADM

## 2019-01-21 RX ORDER — LIDOCAINE HYDROCHLORIDE 10 MG/ML
INJECTION, SOLUTION INFILTRATION; PERINEURAL PRN
Status: DISCONTINUED | OUTPATIENT
Start: 2019-01-21 | End: 2019-01-21

## 2019-01-21 RX ORDER — HYDROMORPHONE HYDROCHLORIDE 1 MG/ML
.3-.5 INJECTION, SOLUTION INTRAMUSCULAR; INTRAVENOUS; SUBCUTANEOUS
Status: DISCONTINUED | OUTPATIENT
Start: 2019-01-21 | End: 2019-01-21

## 2019-01-21 RX ORDER — CELECOXIB 200 MG/1
400 CAPSULE ORAL ONCE
Status: COMPLETED | OUTPATIENT
Start: 2019-01-21 | End: 2019-01-21

## 2019-01-21 RX ORDER — CALCITONIN SALMON 200 [IU]/.09ML
1 SPRAY, METERED NASAL DAILY
Status: DISCONTINUED | OUTPATIENT
Start: 2019-01-21 | End: 2019-01-21 | Stop reason: CLARIF

## 2019-01-21 RX ADMIN — LABETALOL HYDROCHLORIDE 5 MG: 5 INJECTION INTRAVENOUS at 07:55

## 2019-01-21 RX ADMIN — CELECOXIB 400 MG: 200 CAPSULE ORAL at 06:17

## 2019-01-21 RX ADMIN — MIDAZOLAM 1 MG: 1 INJECTION INTRAMUSCULAR; INTRAVENOUS at 07:27

## 2019-01-21 RX ADMIN — FERROUS GLUCONATE 324 MG: 324 TABLET ORAL at 17:39

## 2019-01-21 RX ADMIN — ACETAMINOPHEN 975 MG: 325 TABLET, FILM COATED ORAL at 19:41

## 2019-01-21 RX ADMIN — Medication 1 G: at 07:33

## 2019-01-21 RX ADMIN — FENTANYL CITRATE 100 MCG: 50 INJECTION, SOLUTION INTRAMUSCULAR; INTRAVENOUS at 07:12

## 2019-01-21 RX ADMIN — Medication 2 LOZENGE: at 19:46

## 2019-01-21 RX ADMIN — ASPIRIN 325 MG: 325 TABLET, DELAYED RELEASE ORAL at 11:38

## 2019-01-21 RX ADMIN — MIDAZOLAM 2 MG: 1 INJECTION INTRAMUSCULAR; INTRAVENOUS at 07:12

## 2019-01-21 RX ADMIN — CEFAZOLIN SODIUM 1 G: 1 INJECTION, POWDER, FOR SOLUTION INTRAMUSCULAR; INTRAVENOUS at 15:35

## 2019-01-21 RX ADMIN — ASPIRIN 325 MG: 325 TABLET, DELAYED RELEASE ORAL at 21:11

## 2019-01-21 RX ADMIN — Medication 2 SPRAY: at 17:39

## 2019-01-21 RX ADMIN — PROPOFOL 150 MG: 10 INJECTION, EMULSION INTRAVENOUS at 07:29

## 2019-01-21 RX ADMIN — SENNOSIDES AND DOCUSATE SODIUM 1 TABLET: 8.6; 5 TABLET ORAL at 19:41

## 2019-01-21 RX ADMIN — SODIUM CHLORIDE, POTASSIUM CHLORIDE, SODIUM LACTATE AND CALCIUM CHLORIDE: 600; 310; 30; 20 INJECTION, SOLUTION INTRAVENOUS at 07:27

## 2019-01-21 RX ADMIN — OXYCODONE HYDROCHLORIDE 5 MG: 5 TABLET ORAL at 13:29

## 2019-01-21 RX ADMIN — HYDROMORPHONE HYDROCHLORIDE 0.5 MG: 1 INJECTION, SOLUTION INTRAMUSCULAR; INTRAVENOUS; SUBCUTANEOUS at 07:47

## 2019-01-21 RX ADMIN — DIPHENHYDRAMINE HYDROCHLORIDE 12.5 MG: 25 SOLUTION ORAL at 15:35

## 2019-01-21 RX ADMIN — CEFAZOLIN SODIUM 1 G: 1 INJECTION, POWDER, FOR SOLUTION INTRAMUSCULAR; INTRAVENOUS at 23:58

## 2019-01-21 RX ADMIN — LIDOCAINE HYDROCHLORIDE 30 MG: 10 INJECTION, SOLUTION INFILTRATION; PERINEURAL at 07:29

## 2019-01-21 RX ADMIN — DEXAMETHASONE SODIUM PHOSPHATE 4 MG: 4 INJECTION, SOLUTION INTRA-ARTICULAR; INTRALESIONAL; INTRAMUSCULAR; INTRAVENOUS; SOFT TISSUE at 07:29

## 2019-01-21 RX ADMIN — Medication 0.3 MG: at 12:47

## 2019-01-21 RX ADMIN — SODIUM CHLORIDE, POTASSIUM CHLORIDE, SODIUM LACTATE AND CALCIUM CHLORIDE: 600; 310; 30; 20 INJECTION, SOLUTION INTRAVENOUS at 21:11

## 2019-01-21 RX ADMIN — SODIUM CHLORIDE, POTASSIUM CHLORIDE, SODIUM LACTATE AND CALCIUM CHLORIDE: 600; 310; 30; 20 INJECTION, SOLUTION INTRAVENOUS at 08:40

## 2019-01-21 RX ADMIN — VITAMIN D, TAB 1000IU (100/BT) 2000 UNITS: 25 TAB at 12:47

## 2019-01-21 RX ADMIN — HYDROMORPHONE HYDROCHLORIDE 0.5 MG: 1 INJECTION, SOLUTION INTRAMUSCULAR; INTRAVENOUS; SUBCUTANEOUS at 07:45

## 2019-01-21 RX ADMIN — ACETAMINOPHEN 975 MG: 325 TABLET, FILM COATED ORAL at 11:38

## 2019-01-21 RX ADMIN — CEFAZOLIN SODIUM 2 G: 2 INJECTION, SOLUTION INTRAVENOUS at 07:27

## 2019-01-21 RX ADMIN — TRAMADOL HYDROCHLORIDE 25 MG: 50 TABLET, FILM COATED ORAL at 22:31

## 2019-01-21 RX ADMIN — PROPOFOL 35 MCG/KG/MIN: 10 INJECTION, EMULSION INTRAVENOUS at 07:35

## 2019-01-21 RX ADMIN — DIPHENHYDRAMINE HYDROCHLORIDE 25 MG: 50 INJECTION INTRAMUSCULAR; INTRAVENOUS at 09:37

## 2019-01-21 RX ADMIN — RANITIDINE 150 MG: 150 TABLET ORAL at 21:11

## 2019-01-21 RX ADMIN — CELECOXIB 100 MG: 100 CAPSULE ORAL at 19:41

## 2019-01-21 RX ADMIN — FENTANYL CITRATE 100 MCG: 50 INJECTION, SOLUTION INTRAMUSCULAR; INTRAVENOUS at 07:29

## 2019-01-21 RX ADMIN — OXYCODONE HYDROCHLORIDE 10 MG: 10 TABLET, FILM COATED, EXTENDED RELEASE ORAL at 06:17

## 2019-01-21 RX ADMIN — BUPIVACAINE HYDROCHLORIDE AND EPINEPHRINE BITARTRATE 20 ML: 5; .005 INJECTION, SOLUTION EPIDURAL; INTRACAUDAL; PERINEURAL at 07:16

## 2019-01-21 ASSESSMENT — ENCOUNTER SYMPTOMS: DYSRHYTHMIAS: 0

## 2019-01-21 ASSESSMENT — ACTIVITIES OF DAILY LIVING (ADL)
RETIRED_EATING: 0-->INDEPENDENT
SWALLOWING: 0-->SWALLOWS FOODS/LIQUIDS WITHOUT DIFFICULTY
AMBULATION: 1-->ASSISTIVE EQUIPMENT
ADLS_ACUITY_SCORE: 16
DRESS: 0-->INDEPENDENT
ADLS_ACUITY_SCORE: 16
BATHING: 0-->INDEPENDENT
FALL_HISTORY_WITHIN_LAST_SIX_MONTHS: NO
TRANSFERRING: 0-->INDEPENDENT
TOILETING: 0-->INDEPENDENT
RETIRED_COMMUNICATION: 0-->UNDERSTANDS/COMMUNICATES WITHOUT DIFFICULTY
ADLS_ACUITY_SCORE: 15
COGNITION: 0 - NO COGNITION ISSUES REPORTED

## 2019-01-21 ASSESSMENT — LIFESTYLE VARIABLES: TOBACCO_USE: 0

## 2019-01-21 ASSESSMENT — MIFFLIN-ST. JEOR: SCORE: 1067.88

## 2019-01-21 NOTE — ANESTHESIA PROCEDURE NOTES
Peripheral nerve/Neuraxial procedure note : Saphenous  Pre-Procedure  Performed by Shaheed Castaneda MD  Referred by PAUL  Location: pre-op      Pre-Anesthestic Checklist: patient identified, IV checked, site marked, risks and benefits discussed, informed consent, monitors and equipment checked, pre-op evaluation, at physician/surgeon's request and post-op pain management    Timeout  Correct Patient: Yes   Correct Procedure: Yes   Correct Site: Yes   Correct Laterality: Yes   Correct Position: Yes   Site Marked: Yes   .   Procedure Documentation    .    Procedure:  right  Saphenous.     Ultrasound used to identify targeted nerve, plexus, or vascular marker and placed a needle adjacent to it., Ultrasound was used to visualize the spread of the anesthetic in close proximity to the above stated nerve.   Patient Prep;mask, sterile gloves, povidone-iodine 7.5% surgical scrub.  .  Needle: insulated, short bevel Needle Gauge: 20.    Needle Length (Inches) 2  Insertion Method: Single Shot.       Assessment/Narrative  .  The placement was negative for: blood aspirated, painful injection and site bleeding.  Bolus given via needle..   Secured via.   Complications: none. Test dose of mL at. Test dose negative for signs of intravascular, subdural or intrathecal injection. Comments:  The surgeon has given a verbal order transferring care of this patient to me for the performance of a regional analgesia block for post-op pain control. It is requested of me because I am uniquely trained and qualified to perform this block and the surgeon is neither trained nor qualified to perform this procedure.

## 2019-01-21 NOTE — ANESTHESIA CARE TRANSFER NOTE
Patient: Cynthia Arita    Procedure(s):  Right total knee arthroplasty    Diagnosis: DJD  Diagnosis Additional Information: No value filed.    Anesthesia Type:   Spinal, Periph. Nerve Block for postop pain     Note:  Airway :Face Mask  Patient transferred to:PACU  Handoff Report: Identifed the Patient, Identified the Reponsible Provider, Reviewed the pertinent medical history, Discussed the surgical course, Reviewed Intra-OP anesthesia mangement and issues during anesthesia, Set expectations for post-procedure period and Allowed opportunity for questions and acknowledgement of understanding      Vitals: (Last set prior to Anesthesia Care Transfer)    CRNA VITALS  1/21/2019 0820 - 1/21/2019 0854      1/21/2019             Resp Rate (observed):  1  (Abnormal)                 Electronically Signed By: Dean Dennis Severson, APRN CRNA  January 21, 2019  8:54 AM

## 2019-01-21 NOTE — PROGRESS NOTES
01/21/19 1433   Quick Adds   Type of Visit Initial PT Evaluation   Living Environment   Lives With alone   Living Arrangements house   Home Accessibility stairs to enter home  (1 step, all main level living)   Self-Care   Usual Activity Tolerance moderate   Current Activity Tolerance fair   Equipment Currently Used at Home cane, straight   Functional Level Prior   Ambulation 1-->assistive equipment   Transferring 0-->independent   Toileting 0-->independent   Bathing 0-->independent   Fall history within last six months no   Which of the above functional risks had a recent onset or change? ambulation;transferring;toileting;bathing;dressing   General Information   Onset of Illness/Injury or Date of Surgery - Date 01/21/19   Referring Physician Denton Amor MD   Patient/Family Goals Statement Discharge to TCU   Pertinent History of Current Problem (include personal factors and/or comorbidities that impact the POC) Pt is POD0 T TKA. Pt with history of vertigo and anxiety   Precautions/Limitations fall precautions   Weight-Bearing Status - LLE full weight-bearing   Weight-Bearing Status - RLE weight-bearing as tolerated   General Observations Pt supine upon initiation, agreeable to session.    Cognitive Status Examination   Orientation orientation to person, place and time   Level of Consciousness alert   Follows Commands and Answers Questions 75% of the time   Personal Safety and Judgment at risk behaviors demonstrated   Memory intact   Cognitive Comment Anticipate mild congnitive concerns are related to anesthesia and pain meds   Pain Assessment   Patient Currently in Pain Yes, see Vital Sign flowsheet   Integumentary/Edema   Integumentary/Edema Comments Ace wrap intact to R LE.    Posture    Posture Forward head position;Protracted shoulders   Range of Motion (ROM)   ROM Comment R knee flexion to ~45, L knee flexion WNL   Strength   Strength Comments Able to SLR B LE, good quad set   Bed Mobility   Bed  "Mobility Comments sit<>supine with SBA   Transfer Skills   Transfer Comments Deferred 2/2 pt cognition/feeling \"woozy\" 2/2 pain meds   Gait   Gait Comments Deferred 2/2 pt cognition/feeling \"woozy\" 2/2 pain meds   Balance   Balance Comments Anticipate pt will require B UE support for safe dynamic mobility. Impaired at baseline 2/2 long term difficulties with vertigo.    Sensory Examination   Sensory Perception Comments Pt has intact sensation to B LE, but notes that it feels \"different\" in R LE   Coordination   Coordination no deficits were identified   Muscle Tone   Muscle Tone no deficits were identified   Modality Interventions   Planned Modality Interventions Cryotherapy   Planned Modality Interventions Comments Ice PRN   General Therapy Interventions   Planned Therapy Interventions bed mobility training;gait training;ROM;strengthening;stretching;transfer training;home program guidelines;progressive activity/exercise   Clinical Impression   Criteria for Skilled Therapeutic Intervention yes, treatment indicated   PT Diagnosis Impaired functional mobility   Influenced by the following impairments Pain, weakness, impaired R LE ROM, impaired balance at baseline.    Functional limitations due to impairments Difficulty with bed mobility, transfers, ambulation   Clinical Presentation Stable/Uncomplicated   Clinical Presentation Rationale medically stable, progressing as expected   Clinical Decision Making (Complexity) Low complexity   Therapy Frequency` 2 times/day   Predicted Duration of Therapy Intervention (days/wks) 3 days   Anticipated Discharge Disposition Transitional Care Facility   Risk & Benefits of therapy have been explained Yes   Patient, Family & other staff in agreement with plan of care Yes   [facility] AM-PAC TM \"6 Clicks\"   2016, Trustees of [facility], under license to [employer].  All rights reserved.   6 Clicks Short Forms Basic Mobility Inpatient Short Form   [facility] " "AM-PAC  \"6 Clicks\" V.2 Basic Mobility Inpatient Short Form   1. Turning from your back to your side while in a flat bed without using bedrails? 4 - None   2. Moving from lying on your back to sitting on the side of a flat bed without using bedrails? 3 - A Little   3. Moving to and from a bed to a chair (including a wheelchair)? 3 - A Little   4. Standing up from a chair using your arms (e.g., wheelchair, or bedside chair)? 3 - A Little   5. To walk in hospital room? 2 - A Lot   6. Climbing 3-5 steps with a railing? 2 - A Lot   Basic Mobility Raw Score (Score out of 24.Lower scores equate to lower levels of function) 17   Total Evaluation Time   Total Evaluation Time (Minutes) 8     "

## 2019-01-21 NOTE — CONSULTS
[facility]  Consult Note - Hospitalist Service     Date of Admission:  1/21/2019  Consult Requested by: Dr. Amor  Reason for Consult: Postoperative management.    Assessment & Plan   Cynthia Arita is a 73 year old female admitted on 1/21/2019. She has a past medical history significant for hypertension, osteoarthritis, anxiety, GERD, and Ménière's disease.  She underwent right total knee arthroplasty on 1/21/19.    1.  Right total knee arthroplasty.  Pain medications as needed.  Use incentive spirometry.  Work with therapy.    2.  Hypertension.  Blood pressure appropriate at this time.  Hold Maxide initially.  Monitor blood pressures.  Have IV labetalol available if needed.    3.  GERD.  Restart home medications of Nexium and ranitidine.    4.  Anxiety.  Have alprazolam available if needed.    Chau Griffiths, DO  [facility]    ______________________________________________________________________    Chief Complaint   Right knee surgery.    History is obtained from the patient    History of Present Illness   Cynthia Arita is a 73 year old female who has a past medical history significant for hypertension, osteoarthritis, anxiety, GERD, and Ménière's disease.  She underwent right total knee arthroplasty on 1/21/19.  Currently having only mild right knee pain.  Does still feel that she is having some trouble with her thinking from anesthetics given for surgery.  No shortness of breath or nausea at this time.  No other complaints.    Review of Systems   The 10 point Review of Systems is negative other than noted in the HPI     Past Medical History    I have reviewed this patient's medical history and updated it with pertinent information if needed.   Past Medical History:   Diagnosis Date     Anxiety      Complication of anesthesia      Diverticulosis      GERD (gastroesophageal reflux disease)      HTN (hypertension)      Meniere's disease      Nephrolithiasis      Pain in right knee       PONV (postoperative nausea and vomiting)        Past Surgical History   I have reviewed this patient's surgical history and updated it with pertinent information if needed.  Past Surgical History:   Procedure Laterality Date     C VAGINAL HYSTERECTOMY      with left oophorectomy     EYE SURGERY      cataract surgery both eyes       Social History   I have reviewed this patient's social history and updated it with pertinent information if needed.  Social History     Tobacco Use     Smoking status: Never Smoker     Smokeless tobacco: Never Used   Substance Use Topics     Alcohol use: Yes     Comment: rare     Drug use: No       Family History   I have reviewed this patient's family history and updated it with pertinent information if needed.   Family History   Problem Relation Age of Onset     Neurologic Disorder Mother         palsy     Esophageal Cancer Father      Cancer Maternal Grandmother         lymph     Diabetes Paternal Grandmother      Neurologic Disorder Sister         Parkinson's     Hypertension Brother      Bipolar Disorder Sister      Brain Cancer Son 17       Medications   I have reviewed this patient's current medications    Allergies   Allergies   Allergen Reactions     Ativan [Lorazepam]      Sick -      Gabapentin Nausea     Metoprolol      Nausea       Pantoprazole Other (See Comments), Nausea and Vomiting and GI Disturbance     Red in the face, sleepiness, face swelling, joint pain, dizziness       Physical Exam   Vital Signs: Temp: 96.6  F (35.9  C) Temp src: Oral BP: 145/64 Pulse: 71 Heart Rate: 62 Resp: 19 SpO2: 99 % O2 Device: Nasal cannula Oxygen Delivery: 2 LPM  Weight: 132 lbs 0 oz    Gen:  NAD, A&Ox3.  Eyes:  PERRL, sclera anicteric.  OP:  MMM, no lesions.  Neck:  Supple.  CV:  Regular, no murmurs.  Lung:  CTA b/l, normal effort.  Ab:  +BS, soft.  Skin:  Warm, dry to touch.  No rash.  Ext:  No pitting edema LE b/l.      Data   Results for orders placed or performed during the  hospital encounter of 01/21/19 (from the past 24 hour(s))   EKG 12-lead, tracing only   Result Value Ref Range    Interpretation ECG Click View Image link to view waveform and result    XR Knee Port Left 1/2 Views    Narrative    KNEE PORTABLE LEFT ONE TO TWO VIEWS January 21, 2019 9:25 AM     HISTORY: Total knee arthroplasty.    COMPARISON: None.      Impression    IMPRESSION: Right total knee arthroplasty in place. No evidence of  complication. Surgical drain in place anteriorly.

## 2019-01-21 NOTE — ANESTHESIA PREPROCEDURE EVALUATION
Anesthesia Pre-Procedure Evaluation    Patient: Cynthia Arita   MRN: [id number] : 1945          Preoperative Diagnosis: DJD    Procedure(s):  Right total knee arthroplasty (Spinal with adducter canal block)    Past Medical History:   Diagnosis Date     Anxiety      Complication of anesthesia      Diverticulosis      GERD (gastroesophageal reflux disease)      HTN (hypertension)      Meniere's disease      Nephrolithiasis      Pain in right knee      PONV (postoperative nausea and vomiting)      Past Surgical History:   Procedure Laterality Date     C VAGINAL HYSTERECTOMY      with left oophorectomy     EYE SURGERY      cataract surgery both eyes     Anesthesia Evaluation     . Pt has had prior anesthetic. Type: General    History of anesthetic complications   - PONV        ROS/MED HX    ENT/Pulmonary:      (-) tobacco use, asthma, sleep apnea and Other pulmonary disease   Neurologic:     (+)other neuro Menieres, Tinnitus   (-) Dementia and migraines   Cardiovascular:     (+) hypertension----. : . . . :. .      (-) CAD, CHF, arrhythmias, pulmonary hypertension and dyslipidemia   METS/Exercise Tolerance:     Hematologic:        (-) anemia   Musculoskeletal:   (+) arthritis, , , -       GI/Hepatic:     (+) GERD      (-) hepatitis   Renal/Genitourinary:     (+) Nephrolithiasis ,    (-) renal disease   Endo:      (-) Type I DM, thyroid disease, chronic steroid usage, other endocrine disorder and obesity   Psychiatric:     (+) psychiatric history anxiety      Infectious Disease:  - neg infectious disease ROS       Malignancy:      - no malignancy   Other:    (+) H/O Chronic Pain,                        Physical Exam      Airway   Mallampati: II  TM distance: >3 FB  Neck ROM: full    Dental     Cardiovascular   Rhythm and rate: regular and normal  (-) no murmur    Pulmonary    breath sounds clear to auscultation    Other findings: Lab Test        01/10/19     11/17/18     05/27/18                       0832          " 1520          1422          WBC          5.4          8.4          12.3*         HGB          14.5         14.5         14.6          MCV          92           91           90            PLT          283          281          324            Lab Test        01/10/19     11/17/18     06/26/18                       0832          1520          0831          NA           138          138          142           POTASSIUM    3.8          3.8          4.5           CHLORIDE     103          103          105           CO2          31           28           32            BUN          25           27           23            CR           0.62         0.76         0.66          ANIONGAP     4            7            5             ANDERS          9.8          9.1          9.4           GLC          92           94           72                  Lab Results   Component Value Date    WBC 5.4 01/10/2019    HGB 14.5 01/10/2019    HCT 45.4 01/10/2019     01/10/2019    CRP <2.9 09/27/2018    SED 6 09/27/2018     01/10/2019    POTASSIUM 3.8 01/10/2019    CHLORIDE 103 01/10/2019    CO2 31 01/10/2019    BUN 25 01/10/2019    CR 0.62 01/10/2019    GLC 92 01/10/2019    ANDERS 9.8 01/10/2019    ALBUMIN 4.0 01/10/2019    PROTTOTAL 7.2 01/10/2019    ALT 29 01/10/2019    AST 15 01/10/2019    ALKPHOS 74 01/10/2019    BILITOTAL 0.4 01/10/2019    LIPASE 221 10/11/2017    TSH 3.77 01/23/2017       Preop Vitals  BP Readings from Last 3 Encounters:   01/10/19 122/72   01/08/19 143/89   11/27/18 133/84    Pulse Readings from Last 3 Encounters:   01/10/19 80   01/08/19 73   11/27/18 84      Resp Readings from Last 3 Encounters:   01/10/19 16   11/20/18 16   11/17/18 10    SpO2 Readings from Last 3 Encounters:   01/10/19 98%   01/08/19 98%   11/27/18 100%      Temp Readings from Last 1 Encounters:   01/21/19 96.9  F (36.1  C) (Temporal)    Ht Readings from Last 1 Encounters:   01/21/19 1.6 m (5' 3\")      Wt Readings from Last 1 Encounters: " "  01/21/19 59.9 kg (132 lb)    Estimated body mass index is 23.38 kg/m  as calculated from the following:    Height as of this encounter: 1.6 m (5' 3\").    Weight as of this encounter: 59.9 kg (132 lb).       Anesthesia Plan      History & Physical Review  History and physical reviewed and following examination; no interval change.    ASA Status:  2 .    NPO Status:  > 8 hours    Plan for Periph. Nerve Block for postop pain, General and ETT with Propofol induction. Maintenance will be Balanced.    PONV prophylaxis:  Ondansetron (or other 5HT-3), Dexamethasone or Solumedrol and Scopolamine patch       Postoperative Care  Postoperative pain management:  IV analgesics, Oral pain medications and Peripheral nerve block (Single Shot).      Consents  Anesthetic plan, risks, benefits and alternatives discussed with:  Patient..                 Shaheed Castaneda MD                    .  "

## 2019-01-21 NOTE — PLAN OF CARE
PT: Orders received. PT evaluation completed and treatment initiated. Pt reports living alone in a house with 1 step to enter and no stairs within. The pt has been ambulating with a cane prior to surgery. Pt has a history of vertigo, and has poor balance at baseline.     Discharge Planner PT   Patient plan for discharge: TCU per [facility] plan  Current status: Pt alert and oriented upon inititiation, but note slight confusion and difficulty following instructions as session progresses. Pt reports that it is the pain meds. Pt able to complete SLR, no KI needed. Pt falling asleep intermittently during LE strengthening. Pt completes supine>sit with SBA and cues for technique. Once EOB pt immediately attempts standing and does not initially follow cues for return to sitting. Once sitting pt notes feeling lightheaded and dizzy-does not feel safe standing/ambulating at this time. This writer in agreement due to pt's impaired safety awareness, difficulty maintaining wakeful state, and difficulty with following instructions. Pt returns to supine at end of session, all needs in reach and bed alarm on. RN updated.   Barriers to return to prior living situation: Step to enter home, lives alone  Recommendations for discharge: At this time in agreement with [facility] plan for TCU.   Rationale for recommendations: Pt is not currently at baseline for mobility, and is unsafe to discharge home. With continued PT, both IP and after discharge, pt is likely to obtain mobility goals.        Entered by: Sharyn Plascencia 01/21/2019 2:54 PM

## 2019-01-21 NOTE — PROVIDER NOTIFICATION
Web based paged: Hi, can change oxycodone to Tramadol or dilaudid PO? Pt. was very confused following oxy 5.

## 2019-01-21 NOTE — PLAN OF CARE
Arrived to room 603 from PACU at 1100 via cart, transferred to bed via hover mat without difficulty, oriented to room and call system, reviewed welcome folder and pain/medication information packet with patient.     Pt A&O x4. VS stable; afebrile. 2L O2-capnography on. PO oxycodone and scheduled tylenol managing pain. IV dilaudid given x1 for increased pain. CMS: numbness to RLE-block in place. Dressing CDI-ace wrap on. Incision iced. Dyer patent and draining. Hemovac compressed. Tolerating clear liquids. Plan is TCU @ discharge. Will continue to monitor.

## 2019-01-21 NOTE — ANESTHESIA POSTPROCEDURE EVALUATION
Patient: Cynthia Arita    Procedure(s):  Right total knee arthroplasty    Diagnosis:DJD  Diagnosis Additional Information:   PREOPERATIVE DIAGNOSIS:  Osteoarthritis, right knee.      POSTOPERATIVE DIAGNOSIS:  Osteoarthritis, right knee.      PROCEDURE PERFORMED:  Right total knee arthroplasty.         Anesthesia Type:  Spinal, Periph. Nerve Block for postop pain    Note:  Anesthesia Post Evaluation    Patient location during evaluation: PACU  Patient participation: Able to fully participate in evaluation  Level of consciousness: awake  Pain management: adequate  Airway patency: patent  Cardiovascular status: acceptable  Respiratory status: acceptable  Hydration status: acceptable  PONV: none     Anesthetic complications: None          Last vitals:  Vitals:    01/21/19 1247 01/21/19 1302 01/21/19 1329   BP:  133/60    Pulse:      Resp: 18 22 15   Temp:      SpO2:  100%          Electronically Signed By: Manas Blue MD  January 21, 2019  1:38 PM

## 2019-01-21 NOTE — PHARMACY-ADMISSION MEDICATION HISTORY
Admission medication history interview status for this patient is complete. See [facility] admission navigator for allergy information, prior to admission medications and immunization status.     Medication history interview source(s):Patient, Preop RN went over the meds with pt and last dose times per preop RN. I also went over with the pt.   Medication history resources (including written lists, pill bottles, clinic record):None  Primary pharmacy:    Changes made to PTA medication list:  Added:   Deleted: cipro and propranolol   Changed:     Actions taken by pharmacist (provider contacted, etc):None     Additional medication history information:None    Medication reconciliation/reorder completed by provider prior to medication history? Yes    Do you take OTC medications (eg tylenol, ibuprofen, fish oil, eye/ear drops, etc)? Y(Y/N)      Prior to Admission medications    Medication Sig Last Dose Taking? Auth Provider   ALPRAZolam (XANAX) 0.5 MG tablet Take 1/2 tab tid prn 1/19/2019 Yes Huyen Samuels MD   calcitonin, salmon, (MIACALCIN) 200 UNIT/ACT nasal spray USE 1 SPRAY IN 1 NOSTRIL  DAILY (ALTERNATING NOSTRILS DAILY) 1/21/2019 at Unknown time Yes Huyen Samuels MD   Cholecalciferol (VITAMIN D3) 2000 UNITS CAPS Take 2,000 Units by mouth every morning  1/19/2019 Yes Reported, Patient   DIAZEPAM PO Take 2 mg by mouth every 8 hours as needed for other (dizziness) Past Month at Unknown time Yes Reported, Patient   Flaxseed, Linseed, (FLAXSEED OIL) 1000 MG CAPS Take 1,000 mg by mouth every evening  1/18/2019 Yes Reported, Patient   Glucosamine-Chondroitin (GLUCOSAMINE CHONDR COMPLEX PO) Take 1 capsule by mouth 2 times daily  1/18/2019 Yes Reported, Patient   Multiple Vitamins-Minerals (CENTRUM SILVER) per tablet Take 1 tablet by mouth daily 1/19/2019 Yes Reported, Patient   NEXIUM 40 MG CR capsule 40 mg by Oral or Feeding Tube route daily 1/21/2019 at 0400 Yes Reported, Patient   ondansetron (ZOFRAN ODT) 4 MG ODT  tab Take 1 tablet (4 mg) by mouth every 8 hours as needed for nausea 1/20/2019 at Unknown time Yes Huyen Samuels MD   RANITIDINE HCL PO Take 150 mg by mouth At Bedtime  1/20/2019 at Unknown time Yes Reported, Patient   triamterene-HCTZ (MAXZIDE-25) 37.5-25 MG tablet Take 1 tablet by mouth daily 1/21/2019 at 0400 Yes Huyen Samuels MD   HERBALS 2 times daily as needed Unknown at Unknown time  Huyen Samuels MD   meclizine (ANTIVERT) 25 MG tablet Take 1 tablet (25 mg) by mouth every 6 hours as needed for dizziness More than a month at Unknown time  [place] MD   Multiple Vitamins-Minerals (OCUVITE PRESERVISION PO) Take 1 tablet by mouth 2 times daily  1/19/2019  Reported, Patient   Omega-3 Fatty Acids (OMEGA-3 FISH OIL) 1000 MG CAPS Take 1,000 mg by mouth 2 times daily  1/12/2019 at Unknown time  Reported, Patient

## 2019-01-21 NOTE — OP NOTE
Procedure Date: 01/21/2019      PREOPERATIVE DIAGNOSIS:  Osteoarthritis, right knee.      POSTOPERATIVE DIAGNOSIS:  Osteoarthritis, right knee.      PROCEDURE PERFORMED:  Right total knee arthroplasty.      SURGEON:  Denton Amor MD      ASSISTANT:  Chanelle Hua PA-C      ANESTHESIA:  General with adductor canal block.      ESTIMATED BLOOD LOSS:  25 mL.      TOURNIQUET TIME:  Approximately 65 minutes.      COMPLICATIONS:  None.      DESCRIPTION OF PROCEDURE:  The patient is a 73-year-old female with anxiety disorder who presents for arthroplasty after failing conservative measures.  She was taken to the operating room where after successful administration of general anesthetic, adductor canal block, antibiotic prophylaxis, tranexamic acid and sterile prep and drape, the leg was exsanguinated and an anterior incision was made followed by medial arthrotomy with a moderate medial release due to her substantial lateral-sided laxity.  An intramedullary 5 degree guide was used with anterior referencing at 3 degrees of external rotation, which did appear to match the epicondylar axis, and a box cut was made for PCL substitution.  Retractors were carefully placed about the proximal tibia and a cut was made perpendicular to the mechanical axis of the tibia, removing no medial bone for the above reasons.  Medial osteophytes were removed and the component slightly downsized in order to improve soft tissue balance medially without doing a full medial release.  Tibial rotation was matched to the femur and at the junction of the medial and middle thirds of the tubercle.  Posterior osteophytes were removed under direct vision and a capsular injection was performed carefully protecting the neurovascular structures.  Approximately 8 mm was resected from the undersurface of a 23 mm patella and sized appropriately.  Flexion and extension gaps were rectangular and symmetric.  There was scant residual lateral laxity at 30  degrees.  After copious lavage and drying, Simplex cementing was performed for a Olena NexGen cruciate substituting size E femur, size 3 tibia, 14 mm polyethylene insert and a 32 mm patellar button.  Range of motion, balance and tracking were excellent, apart from subtle residual lateral laxity and gravity flexion was to 110 degrees.  Of note, a large polyethylene was required despite no medial bone resection due to her varus alignment.  After additional Betadine and antibiotic irrigation, layered closure was accomplished over a deep drain.  She will receive antibiotic and DVT prophylaxis.         YONATAN MILLER MD             D: 2019   T: 2019   MT: HAYLEY      Name:     JAZMINE DELGADO   MRN:      [id number]        Account:        [id number]   :      1945           Procedure Date: 2019      Document: [id number]

## 2019-01-22 ENCOUNTER — APPOINTMENT (OUTPATIENT)
Dept: PHYSICAL THERAPY | Facility: CLINIC | Age: 74
DRG: 470 | End: 2019-01-22
Attending: ORTHOPAEDIC SURGERY
Payer: MEDICARE

## 2019-01-22 LAB
ANION GAP SERPL CALCULATED.3IONS-SCNC: 3 MMOL/L (ref 3–14)
BUN SERPL-MCNC: 10 MG/DL (ref 7–30)
CALCIUM SERPL-MCNC: 8.1 MG/DL (ref 8.5–10.1)
CHLORIDE SERPL-SCNC: 106 MMOL/L (ref 94–109)
CO2 SERPL-SCNC: 30 MMOL/L (ref 20–32)
CREAT SERPL-MCNC: 0.62 MG/DL (ref 0.52–1.04)
GFR SERPL CREATININE-BSD FRML MDRD: 88 ML/MIN/{1.73_M2}
GLUCOSE SERPL-MCNC: 88 MG/DL (ref 70–99)
HGB BLD-MCNC: 10.7 G/DL (ref 11.7–15.7)
POTASSIUM SERPL-SCNC: 3.6 MMOL/L (ref 3.4–5.3)
SODIUM SERPL-SCNC: 139 MMOL/L (ref 133–144)

## 2019-01-22 PROCEDURE — 97110 THERAPEUTIC EXERCISES: CPT | Mod: GP

## 2019-01-22 PROCEDURE — A9270 NON-COVERED ITEM OR SERVICE: HCPCS | Mod: GY | Performed by: INTERNAL MEDICINE

## 2019-01-22 PROCEDURE — 40000193 ZZH STATISTIC PT WARD VISIT

## 2019-01-22 PROCEDURE — 93005 ELECTROCARDIOGRAM TRACING: CPT

## 2019-01-22 PROCEDURE — 97116 GAIT TRAINING THERAPY: CPT | Mod: GP

## 2019-01-22 PROCEDURE — 25000132 ZZH RX MED GY IP 250 OP 250 PS 637: Mod: GY | Performed by: ORTHOPAEDIC SURGERY

## 2019-01-22 PROCEDURE — 25000132 ZZH RX MED GY IP 250 OP 250 PS 637: Mod: GY | Performed by: PHYSICIAN ASSISTANT

## 2019-01-22 PROCEDURE — 97530 THERAPEUTIC ACTIVITIES: CPT | Mod: GP

## 2019-01-22 PROCEDURE — A9270 NON-COVERED ITEM OR SERVICE: HCPCS | Mod: GY | Performed by: PHYSICIAN ASSISTANT

## 2019-01-22 PROCEDURE — 25000128 H RX IP 250 OP 636: Performed by: INTERNAL MEDICINE

## 2019-01-22 PROCEDURE — 40000275 ZZH STATISTIC RCP TIME EA 10 MIN

## 2019-01-22 PROCEDURE — 85018 HEMOGLOBIN: CPT | Performed by: ORTHOPAEDIC SURGERY

## 2019-01-22 PROCEDURE — 12000000 ZZH R&B MED SURG/OB

## 2019-01-22 PROCEDURE — 80048 BASIC METABOLIC PNL TOTAL CA: CPT | Performed by: ORTHOPAEDIC SURGERY

## 2019-01-22 PROCEDURE — 25000132 ZZH RX MED GY IP 250 OP 250 PS 637: Mod: GY | Performed by: INTERNAL MEDICINE

## 2019-01-22 PROCEDURE — A9270 NON-COVERED ITEM OR SERVICE: HCPCS | Mod: GY | Performed by: ORTHOPAEDIC SURGERY

## 2019-01-22 PROCEDURE — 99231 SBSQ HOSP IP/OBS SF/LOW 25: CPT | Performed by: INTERNAL MEDICINE

## 2019-01-22 PROCEDURE — 25000128 H RX IP 250 OP 636: Performed by: STUDENT IN AN ORGANIZED HEALTH CARE EDUCATION/TRAINING PROGRAM

## 2019-01-22 PROCEDURE — 93010 ELECTROCARDIOGRAM REPORT: CPT | Performed by: INTERNAL MEDICINE

## 2019-01-22 RX ORDER — QUETIAPINE FUMARATE 25 MG/1
25 TABLET, FILM COATED ORAL EVERY 8 HOURS PRN
Status: DISCONTINUED | OUTPATIENT
Start: 2019-01-22 | End: 2019-01-25 | Stop reason: HOSPADM

## 2019-01-22 RX ORDER — HYDROXYZINE HYDROCHLORIDE 25 MG/1
25 TABLET, FILM COATED ORAL EVERY 6 HOURS PRN
Status: DISCONTINUED | OUTPATIENT
Start: 2019-01-22 | End: 2019-01-25 | Stop reason: HOSPADM

## 2019-01-22 RX ORDER — HALOPERIDOL 5 MG/ML
2 INJECTION INTRAMUSCULAR ONCE
Status: COMPLETED | OUTPATIENT
Start: 2019-01-22 | End: 2019-01-22

## 2019-01-22 RX ORDER — HYDROXYZINE HYDROCHLORIDE 50 MG/1
50 TABLET, FILM COATED ORAL EVERY 6 HOURS PRN
Status: DISCONTINUED | OUTPATIENT
Start: 2019-01-22 | End: 2019-01-25 | Stop reason: HOSPADM

## 2019-01-22 RX ADMIN — CALCITONIN SALMON 1 SPRAY: 200 SPRAY, METERED NASAL at 11:10

## 2019-01-22 RX ADMIN — RANITIDINE 150 MG: 150 TABLET ORAL at 19:56

## 2019-01-22 RX ADMIN — ACETAMINOPHEN 975 MG: 325 TABLET, FILM COATED ORAL at 19:56

## 2019-01-22 RX ADMIN — ESOMEPRAZOLE MAGNESIUM 40 MG: 40 CAPSULE, DELAYED RELEASE ORAL at 11:12

## 2019-01-22 RX ADMIN — ACETAMINOPHEN 1000 MG: 325 TABLET, FILM COATED ORAL at 11:08

## 2019-01-22 RX ADMIN — ACETAMINOPHEN 975 MG: 325 TABLET, FILM COATED ORAL at 02:07

## 2019-01-22 RX ADMIN — CELECOXIB 100 MG: 100 CAPSULE ORAL at 08:10

## 2019-01-22 RX ADMIN — VITAMIN D, TAB 1000IU (100/BT) 2000 UNITS: 25 TAB at 08:10

## 2019-01-22 RX ADMIN — Medication 1 SPRAY: at 08:09

## 2019-01-22 RX ADMIN — ASPIRIN 325 MG: 325 TABLET, DELAYED RELEASE ORAL at 19:55

## 2019-01-22 RX ADMIN — Medication 0.2 MG: at 01:25

## 2019-01-22 RX ADMIN — TRAMADOL HYDROCHLORIDE 25 MG: 50 TABLET, FILM COATED ORAL at 22:33

## 2019-01-22 RX ADMIN — TRAMADOL HYDROCHLORIDE 25 MG: 50 TABLET, FILM COATED ORAL at 14:51

## 2019-01-22 RX ADMIN — SENNOSIDES AND DOCUSATE SODIUM 1 TABLET: 8.6; 5 TABLET ORAL at 08:10

## 2019-01-22 RX ADMIN — HALOPERIDOL LACTATE 2 MG: 5 INJECTION, SOLUTION INTRAMUSCULAR at 17:19

## 2019-01-22 RX ADMIN — ASPIRIN 325 MG: 325 TABLET, DELAYED RELEASE ORAL at 08:11

## 2019-01-22 RX ADMIN — TRAMADOL HYDROCHLORIDE 25 MG: 50 TABLET, FILM COATED ORAL at 08:10

## 2019-01-22 RX ADMIN — HYDROXYZINE HYDROCHLORIDE 25 MG: 25 TABLET ORAL at 11:09

## 2019-01-22 RX ADMIN — Medication 1 LOZENGE: at 02:07

## 2019-01-22 RX ADMIN — FERROUS GLUCONATE 324 MG: 324 TABLET ORAL at 08:10

## 2019-01-22 RX ADMIN — SENNOSIDES AND DOCUSATE SODIUM 2 TABLET: 8.6; 5 TABLET ORAL at 19:55

## 2019-01-22 ASSESSMENT — ACTIVITIES OF DAILY LIVING (ADL)
ADLS_ACUITY_SCORE: 14
ADLS_ACUITY_SCORE: 15
ADLS_ACUITY_SCORE: 14
ADLS_ACUITY_SCORE: 15
ADLS_ACUITY_SCORE: 16
ADLS_ACUITY_SCORE: 15

## 2019-01-22 NOTE — PLAN OF CARE
Pt. up with assist of one using gait belt and walker. Minimal to no pain managed with scheduled tylenol and ice. Dyer and hemovac patent and emptying. Ace wrap clean, dry, and intact. Plans to discharge to TCU in upcoming days.

## 2019-01-22 NOTE — CONSULTS
.Care Transition Initial Assessment - SW  Reason For Consult: discharge planning. Chart reviewed noting BPCI care plan with anticipation of pt's discharge to [facility] on Thursday. Noted PT assessment and recommendation for TCU on discharge.   Met with: Patient    Active Problems:    S/P total knee arthroplasty       DATA  Lives With: alone   Living Arrangements: house  Quality of Family Relationships: involved, supportive  Description of Support System: Supportive, Involved  Who is your support system?: Children  Support Assessment: Adequate family and caregiver support.     Resources List: Skilled Nursing Facility     Quality of Family Relationships: involved, supportive  Transportation Anticipated: (to be determined, family anticipted )    INTERVENTION    Met with pt who affirms planning for her transfer to [facility], semi-private room requested. Pt has noted that she is anticipating that her daughter-in-law, Mely will be providing the transport for her on Thursday, she's like to discharge before 1300 as Mely will need to go to work that afternoon.       Referral made to [facility], assessment completed and they will be able to accept pt on Thursday. No semi-private rooms currently available, they will accept pt into a private room, no charge for the private room fee for her due to the unavailability of requested semi-private room.       ASSESSMENT  Cognitive Status:  alert and oriented     PLAN  Patient/family is agreeable to the plan?  Yes  Patient Goals and Preferences: independence with ambulation and ADLs  Patient anticipates discharging to:  Rehab facility     SW will follow-up with pt tomorrow to finalize arrangements in anticipation of discharge on Thursday, will discuss further the time of availability of daughter-in-law for transport.     .PAS-RR    D: Per DHS regulation, BALJIT completed and submitted PAS-RR to [place] [employer] Direct [employer] via the [employer].  PAS-RR confirmation #  is : [id number]

## 2019-01-22 NOTE — PLAN OF CARE
Pt cont to be forgetful with confused  Conversation . Attempting to get OOB,   With legs over edge, easy to reorient, Bed alarm and chair alarm at all times. .

## 2019-01-22 NOTE — PLAN OF CARE
Discharge Planner OT   Patient plan for discharge: TCU per BPCI plan  Current status: Order received, chart reviewed. Per established BPCI plan, pt will discharge to TCU. Most appropriate to initiate skilled OT services in TCU setting. Will complete IP OT order with recommendation for eval at TCU.   Barriers to return to prior living situation: None to TCU  Recommendations for discharge: Defer OT eval to TCU  Rationale for recommendations: Pt with planned discharge to TCU - defer OT eval to TCU.        Entered by: Rena Irving 01/22/2019 11:56 AM

## 2019-01-22 NOTE — PROGRESS NOTES
[facility]    Medicine Progress Note - Hospitalist Service       Date of Admission:  1/21/2019  Assessment & Plan   Cynthia Arita is a 73 year old female admitted on 1/21/2019. She has a past medical history significant for hypertension, osteoarthritis, anxiety, GERD, and Ménière's disease.  She underwent right total knee arthroplasty on 1/21/19.     1.  Right total knee arthroplasty.  Pain medications as needed.  Use incentive spirometry.  Work with therapy.     2.  Hypertension.  Blood pressure appropriate at this time.  Continue to hold Maxzide.  Monitor blood pressures.  Have IV labetalol available if needed.     3.  GERD.  Continue Nexium and ranitidine.     4.  Anxiety.  Alprazolam and hydroxyzine available if needed.           Diet: Advance Diet as Tolerated: Regular Diet Adult    DVT Prophylaxis: ASA  Dyer Catheter: not present  Code Status: Full Code      Disposition Plan   Expected discharge: 2 - 3 days, recommended to transitional care unit   Entered: Chau Griffiths DO 01/22/2019, 1:37 PM           Chau Griffiths DO  Hospitalist Service  [facility]    ______________________________________________________________________    Interval History   Having some knee pain.  Denies chest pain, shortness of breath, fevers, chills, nausea, vomiting, or diarrhea.    Data reviewed today: I reviewed all medications, new labs and imaging results over the last 24 hours.     Physical Exam   Vital Signs: Temp: 95.6  F (35.3  C) Temp src: Axillary BP: 126/61   Heart Rate: 68 Resp: 20 SpO2: 100 % O2 Device: None (Room air) Oxygen Delivery: 2 LPM  Weight: 132 lbs 0 oz  Gen:  NAD, A&Ox3.  Eyes:  PERRL, sclera anicteric.  OP:  MMM, no lesions.  Neck:  Supple.  CV:  Regular, no murmurs.  Lung:  CTA b/l, normal effort.  Ab:  +BS, soft.  Skin:  Warm, dry to touch.  No rash.  Ext:  No pitting edema LE b/l.      Data   Recent Labs   Lab 01/22/19  0654   HGB 10.7*      POTASSIUM 3.6    CHLORIDE 106   CO2 30   BUN 10   CR 0.62   ANIONGAP 3   ANDERS 8.1*   GLC 88

## 2019-01-22 NOTE — PROGRESS NOTES
Patient referred by Pre-op nurse for Healing Touch due to pain and anxiety.  Healing Touch explained to patient and patient consented to HT session.  Pain prior to session 5/10 anxiety prior to session 9/10. Performed HT  Pain post session denies anxiety 2/10 post session. Time spent with patient 60 minutes.    Ramon MONSALVE RN-BC HNB-BC HTP

## 2019-01-22 NOTE — PROGRESS NOTES
"Orthopedic Surgery  1/22/2019  POD 1    S: Patient voices no unexpected ortho complaints today. Denies chest pain or shortness of breath. States that she has had some burning but did get up and mobilize last night. She is very anxious about making sure Dr Amor has a copy of her Living Will.    O: Blood pressure 126/61, pulse 67, temperature 95.6  F (35.3  C), temperature source Axillary, resp. rate 20, height 1.6 m (5' 3\"), weight 59.9 kg (132 lb), SpO2 100 %, not currently breastfeeding.  Lab Results   Component Value Date    HGB 10.7 01/22/2019     No results found for: INR  I/O last 3 completed shifts:  In: 3445 [P.O.:480; I.V.:2965]  Out: 4670 [Urine:4325; Drains:320; Blood:25]  Distal extremity CMSI bilaterally.  Calves are negative bilaterally, both soft and nontender.  The dressing is C/D/I.      A: Ms. Arita is doing well status post Procedure(s):  Right total knee arthroplasty.    P: Continue physical therapy. Continue DVT pphx with ASA due to high mobility and low risk factors for DVT. Anticipate discharge to TCU likely on Thursday. I did review her chart at [facility] and a copy of her Living Will is on file at [facility]. I have also added Hydroxyzine for adjuvant to pain.    Chanelle Hua PA-C  [phone]  "

## 2019-01-22 NOTE — PROGRESS NOTES
.Discharge Planner   Discharge Plans in progress: rehab facility transfer anticipated for Thursday.   Barriers to discharge plan: none identified  Follow up plan: Follow-up with pt tomorrow to discuss family availability for transfer to [facility] on Thursday.        Entered by: RUTHIE Sy 01/22/2019 11:28 AM

## 2019-01-22 NOTE — PROGRESS NOTES
Patient alert, oriented x2.  Up with assist of 1 with walker and gait belt.  Capnography in place.  Dressing CDI, cms intact.  Hemovac and hadley removed, see flowsheet.  Pain managed with prn iv dilaudid x1.  VSS.  Will continue to monitor.

## 2019-01-22 NOTE — PROGRESS NOTES
"SPIRITUAL HEALTH SERVICES Progress Note  [facility] Ortho     consult per pt's request for chaplaincy support.   Met with pt, Cynthia, and she welcomes support.   Cynthia immediately engaged in reflective and episodic life review conversation sharing the following:    She moved to [place] from [place] to be closer to her three sons who all live in the area.     Part of the move was driven by the death of her mother, father, and  over a short time span around 2013.     The joys of being a grandparent.     Her strong connection to her [employer] orth[employer] del and enjoying the community at [facility].    Her hopes that after rehab she can be more independently mobile and \"do some things that I've been putting off.\"     She reflected on how she focuses on \"life being too short to hold grudges.\"     Provided supportive listening while pt shared openly about blessings and burdens of \"getting older\" and the joys of her children and grand children.     No formal plans to follow.  remains available per pt/family/staff need or request. She will continue to be served by the [employer] during the week as well.     LESLIE Burns.  Staff    Pager #[phone]     "

## 2019-01-22 NOTE — PLAN OF CARE
Discharge Planner PT   Patient plan for discharge: TCU per BPCI plan  Current status: Tangential with thoughts. Appears very anxious, but does well with encouragement and feedback. Supine to sit with SBA. Sit to/from stand with CGA. Pt with x2 min LOB initially in standing; needing assist to correct. Ambulates 20' with FWW and CGA/min A. Pt demonstrating jerky steps, impaired balance, but does place weight through the R LE with encouragement. Tolerates TKA exercises with assist. R knee AROM ~5-50 degrees.  Barriers to return to prior living situation: Step to enter home, lives alone, balance concerns, high fall risk  Recommendations for discharge: In agreement with BPCI plan for TCU.   Rationale for recommendations: Pt is not currently at baseline for mobility, and is unsafe to discharge home. With continued PT, both IP and after discharge, pt is likely to obtain mobility goals.        Entered by: Antony Dickson 01/22/2019 10:19 AM

## 2019-01-22 NOTE — PLAN OF CARE
"Pt up in chair after PT, while assisting to BR pt quite unsteady, impulsive, needing cues to slow down and how to use the walker. Voided in toilet missed hat. Pt anxious, many questions about tcu, discharge , etc. Complains of pain in right knee but very reluctant to use pain meds, \"they make me confused\", pt forgetful, and having some confused converstation at times.(\" did you shut the garage door)\". Reorients easily, and  knows she is making confused statements. Cms intact to right leg, Ice on for comfort. VSS. IS encouraged. Bed alarm on.  "

## 2019-01-23 ENCOUNTER — APPOINTMENT (OUTPATIENT)
Dept: PHYSICAL THERAPY | Facility: CLINIC | Age: 74
DRG: 470 | End: 2019-01-23
Attending: ORTHOPAEDIC SURGERY
Payer: MEDICARE

## 2019-01-23 ENCOUNTER — APPOINTMENT (OUTPATIENT)
Dept: MRI IMAGING | Facility: CLINIC | Age: 74
DRG: 470 | End: 2019-01-23
Attending: ORTHOPAEDIC SURGERY
Payer: MEDICARE

## 2019-01-23 LAB
GLUCOSE BLDC GLUCOMTR-MCNC: 162 MG/DL (ref 70–99)
HGB BLD-MCNC: 9.9 G/DL (ref 11.7–15.7)
INTERPRETATION ECG - MUSE: NORMAL

## 2019-01-23 PROCEDURE — 85018 HEMOGLOBIN: CPT | Performed by: ORTHOPAEDIC SURGERY

## 2019-01-23 PROCEDURE — 40000914 ZZH STATISTIC SITTER, DAY HOURS

## 2019-01-23 PROCEDURE — A9270 NON-COVERED ITEM OR SERVICE: HCPCS | Mod: GY | Performed by: INTERNAL MEDICINE

## 2019-01-23 PROCEDURE — 25000128 H RX IP 250 OP 636: Performed by: INTERNAL MEDICINE

## 2019-01-23 PROCEDURE — 36416 COLLJ CAPILLARY BLOOD SPEC: CPT | Performed by: ORTHOPAEDIC SURGERY

## 2019-01-23 PROCEDURE — 00000146 ZZHCL STATISTIC GLUCOSE BY METER IP

## 2019-01-23 PROCEDURE — 40000193 ZZH STATISTIC PT WARD VISIT

## 2019-01-23 PROCEDURE — 70551 MRI BRAIN STEM W/O DYE: CPT

## 2019-01-23 PROCEDURE — 97116 GAIT TRAINING THERAPY: CPT | Mod: GP

## 2019-01-23 PROCEDURE — 99232 SBSQ HOSP IP/OBS MODERATE 35: CPT | Performed by: INTERNAL MEDICINE

## 2019-01-23 PROCEDURE — 25000132 ZZH RX MED GY IP 250 OP 250 PS 637: Mod: GY | Performed by: PHYSICIAN ASSISTANT

## 2019-01-23 PROCEDURE — 25000132 ZZH RX MED GY IP 250 OP 250 PS 637: Mod: GY | Performed by: STUDENT IN AN ORGANIZED HEALTH CARE EDUCATION/TRAINING PROGRAM

## 2019-01-23 PROCEDURE — 25000132 ZZH RX MED GY IP 250 OP 250 PS 637: Mod: GY | Performed by: INTERNAL MEDICINE

## 2019-01-23 PROCEDURE — A9270 NON-COVERED ITEM OR SERVICE: HCPCS | Mod: GY | Performed by: PHYSICIAN ASSISTANT

## 2019-01-23 PROCEDURE — A9270 NON-COVERED ITEM OR SERVICE: HCPCS | Mod: GY | Performed by: ORTHOPAEDIC SURGERY

## 2019-01-23 PROCEDURE — 25000132 ZZH RX MED GY IP 250 OP 250 PS 637: Mod: GY | Performed by: ORTHOPAEDIC SURGERY

## 2019-01-23 PROCEDURE — 99207 ZZC CDG-MDM COMPONENT: MEETS LOW - DOWN CODED: CPT | Performed by: INTERNAL MEDICINE

## 2019-01-23 PROCEDURE — A9270 NON-COVERED ITEM OR SERVICE: HCPCS | Mod: GY | Performed by: STUDENT IN AN ORGANIZED HEALTH CARE EDUCATION/TRAINING PROGRAM

## 2019-01-23 PROCEDURE — 97110 THERAPEUTIC EXERCISES: CPT | Mod: GP

## 2019-01-23 PROCEDURE — 12000000 ZZH R&B MED SURG/OB

## 2019-01-23 RX ORDER — QUETIAPINE FUMARATE 25 MG/1
25 TABLET, FILM COATED ORAL AT BEDTIME
Status: DISCONTINUED | OUTPATIENT
Start: 2019-01-23 | End: 2019-01-24

## 2019-01-23 RX ORDER — TRIAMTERENE/HYDROCHLOROTHIAZID 37.5-25 MG
1 TABLET ORAL EVERY MORNING
Status: DISCONTINUED | OUTPATIENT
Start: 2019-01-23 | End: 2019-01-25 | Stop reason: HOSPADM

## 2019-01-23 RX ORDER — ATORVASTATIN CALCIUM 20 MG/1
20 TABLET, FILM COATED ORAL EVERY EVENING
Status: DISCONTINUED | OUTPATIENT
Start: 2019-01-23 | End: 2019-01-25 | Stop reason: HOSPADM

## 2019-01-23 RX ORDER — HALOPERIDOL 5 MG/ML
2 INJECTION INTRAMUSCULAR EVERY 6 HOURS PRN
Status: DISCONTINUED | OUTPATIENT
Start: 2019-01-23 | End: 2019-01-23

## 2019-01-23 RX ORDER — GADOBUTROL 604.72 MG/ML
7.5 INJECTION INTRAVENOUS ONCE
Status: DISCONTINUED | OUTPATIENT
Start: 2019-01-23 | End: 2019-01-23 | Stop reason: CLARIF

## 2019-01-23 RX ADMIN — ACETAMINOPHEN 975 MG: 325 TABLET, FILM COATED ORAL at 04:45

## 2019-01-23 RX ADMIN — ACETAMINOPHEN 975 MG: 325 TABLET, FILM COATED ORAL at 18:42

## 2019-01-23 RX ADMIN — ASPIRIN 325 MG: 325 TABLET, DELAYED RELEASE ORAL at 21:11

## 2019-01-23 RX ADMIN — HYDROXYZINE HYDROCHLORIDE 25 MG: 25 TABLET ORAL at 13:56

## 2019-01-23 RX ADMIN — TRAMADOL HYDROCHLORIDE 25 MG: 50 TABLET, FILM COATED ORAL at 04:45

## 2019-01-23 RX ADMIN — HALOPERIDOL LACTATE 2 MG: 5 INJECTION, SOLUTION INTRAMUSCULAR at 05:54

## 2019-01-23 RX ADMIN — FERROUS GLUCONATE 324 MG: 324 TABLET ORAL at 08:28

## 2019-01-23 RX ADMIN — VITAMIN D, TAB 1000IU (100/BT) 2000 UNITS: 25 TAB at 08:29

## 2019-01-23 RX ADMIN — ATORVASTATIN CALCIUM 20 MG: 20 TABLET, FILM COATED ORAL at 21:33

## 2019-01-23 RX ADMIN — QUETIAPINE FUMARATE 25 MG: 25 TABLET ORAL at 08:26

## 2019-01-23 RX ADMIN — SENNOSIDES AND DOCUSATE SODIUM 1 TABLET: 8.6; 5 TABLET ORAL at 21:11

## 2019-01-23 RX ADMIN — QUETIAPINE FUMARATE 25 MG: 25 TABLET ORAL at 00:01

## 2019-01-23 RX ADMIN — HYDROXYZINE HYDROCHLORIDE 25 MG: 25 TABLET ORAL at 08:29

## 2019-01-23 RX ADMIN — QUETIAPINE FUMARATE 25 MG: 25 TABLET ORAL at 21:11

## 2019-01-23 RX ADMIN — ALPRAZOLAM 0.25 MG: 0.25 TABLET ORAL at 00:48

## 2019-01-23 RX ADMIN — CELECOXIB 100 MG: 100 CAPSULE ORAL at 08:29

## 2019-01-23 RX ADMIN — RANITIDINE 150 MG: 150 TABLET ORAL at 21:11

## 2019-01-23 RX ADMIN — TRIAMTERENE AND HYDROCHLOROTHIAZIDE 1 TABLET: 37.5; 25 TABLET ORAL at 17:00

## 2019-01-23 RX ADMIN — SENNOSIDES AND DOCUSATE SODIUM 1 TABLET: 8.6; 5 TABLET ORAL at 08:46

## 2019-01-23 RX ADMIN — ASPIRIN 325 MG: 325 TABLET, DELAYED RELEASE ORAL at 08:29

## 2019-01-23 RX ADMIN — ALPRAZOLAM 0.25 MG: 0.25 TABLET ORAL at 17:00

## 2019-01-23 RX ADMIN — ALPRAZOLAM 0.25 MG: 0.25 TABLET ORAL at 08:26

## 2019-01-23 ASSESSMENT — ACTIVITIES OF DAILY LIVING (ADL)
ADLS_ACUITY_SCORE: 15
ADLS_ACUITY_SCORE: 15
ADLS_ACUITY_SCORE: 11.5
ADLS_ACUITY_SCORE: 17
ADLS_ACUITY_SCORE: 15
ADLS_ACUITY_SCORE: 17

## 2019-01-23 NOTE — PROVIDER NOTIFICATION
REASON FOR CONTACT: We have had to apply wrist restraints. She was hitting and biting and trying to leave. Could we please get an order for restraints and something else to calm her? She had Seroquel and Zanax.   PROVIDER CONTACTED:  Admitting  hospitalist  TIME CONTACTED: now  MODE OF CONTACT: web text  RESPONSE:

## 2019-01-23 NOTE — PROGRESS NOTES
.Discharge Planner   Discharge Plans in progress: TCU  Barriers to discharge plan: need sitter free status for 24 hours  Follow up plan: Follow, continue planning per rehab facility transfer when appropriate.        Entered by: RUTHIE Sy 01/23/2019 2:56 PM

## 2019-01-23 NOTE — PROGRESS NOTES
"Orthopedic Surgery  1/23/2019  POD 2    S: Patient had difficult night. Was up restless, disoriented, and combative. Had security in overnight and paged hospitalist for Haldol rx. Denies chest pain or shortness of breath. Denies knee pain. Is in soft restraints.  Patient states does not use ETOH except an occasional drink on a holiday. She states she normally lives at home and calls family or neighbors if she has an \"anxious moment.\"      O: Blood pressure 149/70, pulse 67, temperature 97.9  F (36.6  C), temperature source Oral, resp. rate 18, height 1.6 m (5' 3\"), weight 59.9 kg (132 lb), SpO2 94 %, not currently breastfeeding.  Lab Results   Component Value Date    HGB 10.7 01/22/2019     No results found for: INR  I/O last 3 completed shifts:  In: 1300 [P.O.:1300]  Out: 1150 [Urine:1100; Drains:50]  Distal extremity CMSI bilaterally.  Calves are negative bilaterally, both soft and nontender.  The dressing is C/D/I.      A: Ms. Arita is doing well status post Procedure(s):  Right total knee arthroplasty.    P: Oxycodone changed to Tramadol. Concern for delirium post-op vs withdrawal vs undiagnosed psych issue. Continue physical therapy. Continue DVT pphx with ASA due to high mobility and low risk factors for DVT. Anticipate discharge to TCU. Will need social work and need to be cleared prior to discharge.  Chanelle Hua PA-C  [phone]  "

## 2019-01-23 NOTE — PLAN OF CARE
At start of the shift, pt very confused, still somewhat agitaited. Up to BR with assist, needs cues and direction,from 2 nurses. Voiding well. Pt  able to take seroquel, and xanax and the rest of her routine meds.   Slept most of the morning. At noon pt is awake and cooperative. No further need for the wrist restraints,  pt very talkative, at times confused conversation, but clearer than yesterday patient denies pain of need for pain meds. Son lilian here for short time and she remembers his visit. VSS. Cms intact. Dressing dry and intact to RLE.Continue bedside attendant at this time.

## 2019-01-23 NOTE — PROGRESS NOTES
SWS     D: Discharge planning continuing... noted per rounds today that pt is currently with sitter. SW has updated [facility], per protocol pt will need to be sitter-free for 24 hours before they would be able to accept pt. SW will monitor status, and continue planning when sitter is discontinued and per MD determination of discharge date.       Addendum:      D: Spoke by phone this afternoon with pt's son, Jose Luis who was updated regarding planning for pt's transfer to [facility] when medically stable and sitter free for 24 hours. In conversation with pt yesterday morning she informed SW that her daughter-in-law, Mely would be providing transport for pt, this is Jose Luis's wife but he had no knowledge of this plan. Discussed with Jose Luis the availability of medical transport if desired but that Medicare does not cover the transport, cost of $75/base and $5/mi which he acknowledged understanding of. SW contact number provided as needed.      A/P: SW following for continued discharge planning as noted above.

## 2019-01-23 NOTE — PROGRESS NOTES
Patient attempting to leave moving suitcase and bag around the room when this writer checked on her at 0045. Attempted to re-orientate patient and reminded her that Healing Touch helped to calm her last night and offered another session. Patient called her son twice and conversation was confused. Patient then agreed to Healing Touch session to help her to sleep. Performed Healing Touch with music, patient falling asleep during session and then son called her back X2. Patient much more relaxed but not sleeping when this writer left at 0145.    Ramon GILBERTN, RN-BC, HNB-BC HTP

## 2019-01-23 NOTE — PROGRESS NOTES
[facility]  Hospitalist Progress Note  Azul Patel MD 01/23/2019    Reason for Stay (Diagnosis):Right TKA         Assessment and Plan:      Summary of Stay: Cynthia Arita is a 73 year old female with a history of hypertension, OA, GERD, anxiety, and meniere's dz admitted on 1/21/2019 for elective right TKA in setting of refractory DJD.  She did reasonably well from a medical standpoint in the post operative setting.      Overnight into 1/23/19 she apparently became quite combative necessitating restraints/haloperidol and sitter placement.  She is calmer today but remains mildly confused  Problem List:   1. Right TKA:  PT/OT, pain control,dvt ppx, and placement per primary service.  I know the plan is TCU but patient will need to be sitter free for at least 24 hours  2. Delirium:  Did end up getting a couple doses of haloperidol 2 mg and quetiapine 25 mg prn.  So will just start with quetiapine at bedtime, 25 mg schedI am concerned that she's more sedated from those medications and we'll have a hard time re-establishing her day night cycle.  I wonder if this is just med induced-discontinued oxy agents and ok for prn tramadol, addendum: On reexamination this evening she actually continues to seem like she is clearing although she frequently jumps from topic to topic.  Her son is actually there and his concern is that she has slurred speech which he says is quite new for her.  Her speech has been slurred all day as far as I am concerned although it might even have some improvement t this evening.  Neurologically she is otherwise intact cranial nerves II through XII are grossly intact strength is good and symmetric in her bilateral upper extremities and her left lower extremities.  She is moving her right lower extremity without difficulty but weak in the setting of pain after recent surgery.  She is certainly at risk for acute stroke in the setting of surgery so we will check MRI tonight and she states she  "is comfortable with this approach.  3. Htn:  Resume triamterene - hydrochlorothiazide 37/5-25 today and recheck bmp in am  4. Anxiety:  Prn alprazolam already resumed  DVT Prophylaxis: asa bid per primary service  Code Status: Full Code    Discussed in depth with some Jose Luis at the bedside        Interval History (Subjective):      Feeling tired and fatigued, no pain though.  Denies any cp or sob.  No n/v but seems like po intake is poor.     She recalls the events of last night and states that she was just trying to get out of bed and then was told that she could not then she felt like she was held down and then pretty soon she was told that she can get out of bed and then she ended up in restraints.  Now she states that she is feeling okay she knows where she is she no longer feels upset, she does recognize that she was somehow out of it last night but she is not sure how.  Her son who spoke with her last night states she was in her usual state of health with her normal speech pattern, he spoke with her also about an hour and a half ago and states that the thing that bothers him the most is that she is slurring her words.  He also notes that she jumps from topic to topic-on recheck this afternoon she actually seems clearer to me although she still is jumping from topic to topic and she does indeed slur her speech                  Physical Exam:      Last Vital Signs:  /58 (BP Location: Right arm)   Pulse 67   Temp 98.7  F (37.1  C) (Oral)   Resp 16   Ht 1.6 m (5' 3\")   Wt 59.9 kg (132 lb)   SpO2 92%   BMI 23.38 kg/m      Laying in bed, somewhat sedated but will open eyes to voice and able to carry on brief conversation before drifting off to sleep.  Knows she's in hospital at  in [place], knows its January of 2019 mid [age]. Head n/cat sclera clear lungs cta b nl effort rrr no mrg no le edema skin w/d no c/c abd s/nt/nd, affect seems moderately sedated, baker            Medications:    "   All current medications were reviewed with changes reflected in problem list.         Data:      All new lab and imaging data was reviewed.   Labs:  Recent Labs   Lab 01/22/19  0654      POTASSIUM 3.6   CHLORIDE 106   CO2 30   ANIONGAP 3   GLC 88   BUN 10   CR 0.62   GFRESTIMATED 88   GFRESTBLACK >90   ANDERS 8.1*     Recent Labs   Lab 01/23/19  0802   HGB 9.9*      Imaging:

## 2019-01-23 NOTE — PLAN OF CARE
Pt very confused, thinks that her sons are coming to get her and take her home. Pt agitated and constantly moving. PSC present. Pt frequently up ambulating in room gathering purse, suitcase and belongings and ambulating in jarquin trying to find her way out. Threw walker up onto a cart in the jarquin and lost balance- assisted back to room by PSC and nurse. Refused to get back into bed, insisting on getting ready to leave. Repeating actions frequently- picking up suitcase and then putting it back, searching through purse, pulling things out and replacing them. Speech is confused, not redirectable. Medications given. Healing touch attempted. Son calling her repeatedly on her cell phone and she would become agitated again. Became upset with staff attempts to get her to return to bed- striking out at staff, scratching and attempting to bite. Staff assisted pt back to bed and applied bilateral soft wrist restraints. Son informed of the application of restraints. Son stated that he would come up. Pt continued to squirm down towards the foot of the bed and attempt to get out. Haldol given. Pt continued to be restless throughout night.

## 2019-01-23 NOTE — PLAN OF CARE
PT: Per chart patient restless overnight. Spoke with RN, pt currently resting comfortably. Will hold AM PT session and plan to see at scheduled PM time.     Discharge Planner PT   Patient plan for discharge: TCU  Current status: Agreeable to PT session. Supine to sit with SBA. Sit to/from stand with CGA. Needs cues for safe hand placement. Pt ambulates 175' with FWW and CGA/min A. Pt demonstrates variable step length, gait speed and mild unsteadiness. Pt has one LOB where she needs mod LOB where she reaches out for the wall rail.   Barriers to return to prior living situation: Step to enter home, lives alone, balance concerns, high fall risk  Recommendations for discharge: Per BPCI plan: TCU  Rationale for recommendations: Pt is not currently at baseline for mobility, and is unsafe to discharge home. With continued PT, both IP and after discharge, pt is likely to obtain mobility goals.        Entered by: Antony Dickson 01/23/2019 3:56 PM

## 2019-01-23 NOTE — PLAN OF CARE
"Pt observed to be confused, restless, and paranoid. Exhibits hallucination at times. Refused to take xanax. Pt stated ' I take too many educations.\" Pt continued to self transfer despite multiple reminders and eduction on safety and use of call light. Pt was fixated on receiving physical therapy at [facility]. She was jumping one conversation to another. Pt was insisting on getting out of the hospital. She was packing her belongings. Not able to direct. Her sons were present. Code 21 was called. Vpm was ordered at 1600. Given haldol IV. Despite VPM, Pt had many STAT alarms and verba; interventions. Pt will jump out and into bed.  Sitter was order after two hours of unsuccessful with the VPM.     Pt observed to display discomfort to surgical site. encouraged to take tramadol. Pt agreed. Ace wrap CDI. CMS intact. Bowels active. Refused supper. Bladder scanned oer 600. Cath 650 @6;30. Pt voided 200cc since. Low grade temp 99/8, received tylenol. Refused to use IS. Sitter at beside. Continues to require reorientation. Will continue to monitor.   "

## 2019-01-24 ENCOUNTER — APPOINTMENT (OUTPATIENT)
Dept: PHYSICAL THERAPY | Facility: CLINIC | Age: 74
DRG: 470 | End: 2019-01-24
Attending: ORTHOPAEDIC SURGERY
Payer: MEDICARE

## 2019-01-24 LAB
ANION GAP SERPL CALCULATED.3IONS-SCNC: 7 MMOL/L (ref 3–14)
BUN SERPL-MCNC: 12 MG/DL (ref 7–30)
CALCIUM SERPL-MCNC: 8.1 MG/DL (ref 8.5–10.1)
CHLORIDE SERPL-SCNC: 105 MMOL/L (ref 94–109)
CHOLEST SERPL-MCNC: 124 MG/DL
CO2 SERPL-SCNC: 27 MMOL/L (ref 20–32)
CREAT SERPL-MCNC: 0.56 MG/DL (ref 0.52–1.04)
GFR SERPL CREATININE-BSD FRML MDRD: >90 ML/MIN/{1.73_M2}
GLUCOSE SERPL-MCNC: 115 MG/DL (ref 70–99)
HDLC SERPL-MCNC: 46 MG/DL
LDLC SERPL CALC-MCNC: 60 MG/DL
MAGNESIUM SERPL-MCNC: 1.8 MG/DL (ref 1.6–2.3)
NONHDLC SERPL-MCNC: 78 MG/DL
POTASSIUM SERPL-SCNC: 3.1 MMOL/L (ref 3.4–5.3)
SODIUM SERPL-SCNC: 139 MMOL/L (ref 133–144)
TRIGL SERPL-MCNC: 91 MG/DL

## 2019-01-24 PROCEDURE — 80061 LIPID PANEL: CPT | Performed by: INTERNAL MEDICINE

## 2019-01-24 PROCEDURE — 97116 GAIT TRAINING THERAPY: CPT | Mod: GP

## 2019-01-24 PROCEDURE — 25000132 ZZH RX MED GY IP 250 OP 250 PS 637: Mod: GY | Performed by: INTERNAL MEDICINE

## 2019-01-24 PROCEDURE — 80048 BASIC METABOLIC PNL TOTAL CA: CPT | Performed by: INTERNAL MEDICINE

## 2019-01-24 PROCEDURE — 83735 ASSAY OF MAGNESIUM: CPT | Performed by: INTERNAL MEDICINE

## 2019-01-24 PROCEDURE — 99232 SBSQ HOSP IP/OBS MODERATE 35: CPT | Performed by: INTERNAL MEDICINE

## 2019-01-24 PROCEDURE — 40000914 ZZH STATISTIC SITTER, DAY HOURS

## 2019-01-24 PROCEDURE — A9270 NON-COVERED ITEM OR SERVICE: HCPCS | Mod: GY | Performed by: ORTHOPAEDIC SURGERY

## 2019-01-24 PROCEDURE — A9270 NON-COVERED ITEM OR SERVICE: HCPCS | Mod: GY | Performed by: INTERNAL MEDICINE

## 2019-01-24 PROCEDURE — 36415 COLL VENOUS BLD VENIPUNCTURE: CPT | Performed by: INTERNAL MEDICINE

## 2019-01-24 PROCEDURE — 12000000 ZZH R&B MED SURG/OB

## 2019-01-24 PROCEDURE — 40000193 ZZH STATISTIC PT WARD VISIT

## 2019-01-24 PROCEDURE — 97110 THERAPEUTIC EXERCISES: CPT | Mod: GP

## 2019-01-24 PROCEDURE — 25000132 ZZH RX MED GY IP 250 OP 250 PS 637: Mod: GY | Performed by: ORTHOPAEDIC SURGERY

## 2019-01-24 RX ORDER — POTASSIUM CHLORIDE 1.5 G/1.58G
20-40 POWDER, FOR SOLUTION ORAL
Status: DISCONTINUED | OUTPATIENT
Start: 2019-01-24 | End: 2019-01-25 | Stop reason: HOSPADM

## 2019-01-24 RX ORDER — POTASSIUM CHLORIDE 1500 MG/1
20-40 TABLET, EXTENDED RELEASE ORAL
Status: DISCONTINUED | OUTPATIENT
Start: 2019-01-24 | End: 2019-01-25 | Stop reason: HOSPADM

## 2019-01-24 RX ORDER — POTASSIUM CL/LIDO/0.9 % NACL 10MEQ/0.1L
10 INTRAVENOUS SOLUTION, PIGGYBACK (ML) INTRAVENOUS
Status: DISCONTINUED | OUTPATIENT
Start: 2019-01-24 | End: 2019-01-25 | Stop reason: HOSPADM

## 2019-01-24 RX ORDER — POTASSIUM CHLORIDE 29.8 MG/ML
20 INJECTION INTRAVENOUS
Status: DISCONTINUED | OUTPATIENT
Start: 2019-01-24 | End: 2019-01-24

## 2019-01-24 RX ORDER — MAGNESIUM SULFATE HEPTAHYDRATE 40 MG/ML
4 INJECTION, SOLUTION INTRAVENOUS EVERY 4 HOURS PRN
Status: DISCONTINUED | OUTPATIENT
Start: 2019-01-24 | End: 2019-01-25 | Stop reason: HOSPADM

## 2019-01-24 RX ORDER — POTASSIUM CHLORIDE 7.45 MG/ML
10 INJECTION INTRAVENOUS
Status: DISCONTINUED | OUTPATIENT
Start: 2019-01-24 | End: 2019-01-25 | Stop reason: HOSPADM

## 2019-01-24 RX ADMIN — VITAMIN D, TAB 1000IU (100/BT) 2000 UNITS: 25 TAB at 08:15

## 2019-01-24 RX ADMIN — SENNOSIDES AND DOCUSATE SODIUM 2 TABLET: 8.6; 5 TABLET ORAL at 20:51

## 2019-01-24 RX ADMIN — POTASSIUM CHLORIDE 20 MEQ: 1500 TABLET, EXTENDED RELEASE ORAL at 11:48

## 2019-01-24 RX ADMIN — Medication 1 LOZENGE: at 16:31

## 2019-01-24 RX ADMIN — CALCITONIN SALMON 1 SPRAY: 200 SPRAY, METERED NASAL at 08:21

## 2019-01-24 RX ADMIN — ACETAMINOPHEN 975 MG: 325 TABLET, FILM COATED ORAL at 03:05

## 2019-01-24 RX ADMIN — ESOMEPRAZOLE MAGNESIUM 40 MG: 40 CAPSULE, DELAYED RELEASE ORAL at 08:19

## 2019-01-24 RX ADMIN — FERROUS GLUCONATE 324 MG: 324 TABLET ORAL at 08:15

## 2019-01-24 RX ADMIN — ASPIRIN 325 MG: 325 TABLET, DELAYED RELEASE ORAL at 20:51

## 2019-01-24 RX ADMIN — SENNOSIDES AND DOCUSATE SODIUM 2 TABLET: 8.6; 5 TABLET ORAL at 08:15

## 2019-01-24 RX ADMIN — ATORVASTATIN CALCIUM 20 MG: 20 TABLET, FILM COATED ORAL at 20:52

## 2019-01-24 RX ADMIN — TRIAMTERENE AND HYDROCHLOROTHIAZIDE 1 TABLET: 37.5; 25 TABLET ORAL at 08:15

## 2019-01-24 RX ADMIN — RANITIDINE 150 MG: 150 TABLET ORAL at 20:52

## 2019-01-24 RX ADMIN — ACETAMINOPHEN 650 MG: 325 TABLET, FILM COATED ORAL at 14:01

## 2019-01-24 RX ADMIN — ACETAMINOPHEN 650 MG: 325 TABLET, FILM COATED ORAL at 20:54

## 2019-01-24 RX ADMIN — POTASSIUM CHLORIDE 40 MEQ: 1500 TABLET, EXTENDED RELEASE ORAL at 09:26

## 2019-01-24 RX ADMIN — ASPIRIN 325 MG: 325 TABLET, DELAYED RELEASE ORAL at 08:15

## 2019-01-24 ASSESSMENT — ACTIVITIES OF DAILY LIVING (ADL)
ADLS_ACUITY_SCORE: 17
ADLS_ACUITY_SCORE: 11.5
ADLS_ACUITY_SCORE: 17
ADLS_ACUITY_SCORE: 17
ADLS_ACUITY_SCORE: 16
ADLS_ACUITY_SCORE: 17

## 2019-01-24 NOTE — PROGRESS NOTES
"[facility]  Hospitalist Progress Note  Azul Patel MD 01/24/2019    Reason for Stay (Diagnosis):Right TKA         Assessment and Plan:      Summary of Stay: Cynthia Arita is a 73 year old female with a history of hypertension, OA, GERD, anxiety, and meniere's dz admitted on 1/21/2019 for elective right TKA in setting of refractory DJD.  She did reasonably well from a medical standpoint in the post operative setting.      Overnight into 1/23/19 she apparently became quite combative necessitating restraints/haloperidol and sitter placement.  She has dramatically improved today and retrospectively the delirium was all likely medication induced  Problem List:   1. Right TKA:  PT/OT, pain control,dvt ppx, and placement per primary service.  I know the plan is TCU but patient will need to be sitter free for at least 24 hours-sitter discontinued this am.  2. Delirium:  Did end up getting a couple doses of haloperidol 2 mg and quetiapine 25 mg prn and became quite sedated.  She was high risk for day night reversal and so she was given single dose of quetiapine last night and slept from 7p-7a.  She has now significantly cleared.  I suspect this is all delirium in association with anesthesia and pain medications.  Last evening per my discussion with her son she was slurring her speech (although had no other neurological deficits) prompting MRI to r/o stroke which it did.  resovlign  3. Htn:  Resume triamterene - hydrochlorothiazide 37.5-25 today and recheck bmp in am  4. Anxiety:  Prn alprazolam already resumed  5. Hypokalemia:  Check mag and replace per protocol  DVT Prophylaxis: asa bid per primary service  Code Status: Full Code    Discussed in depth with some Jose Luis at the bedside        Interval History (Subjective):      Slept well last night, \"that pill knocked me out\", she feels embarrassed about the events over the past 2 days.  Otherwise she's feeling well, pain is well controlled, no cp or sob, po intake " "is good.  No n/v/d                  Physical Exam:      Last Vital Signs:  /57   Pulse 67   Temp 97.5  F (36.4  C) (Oral)   Resp 18   Ht 1.6 m (5' 3\")   Wt 59.9 kg (132 lb)   SpO2 97%   BMI 23.38 kg/m      Laying in bed awake and alert nad looks stated age head nc/at sclera clear lungs ctab nl effort rrr no mrg no le edema skin warm and dry no c/c alert and oriented affect appropriate baker            Medications:      All current medications were reviewed with changes reflected in problem list.         Data:      All new lab and imaging data was reviewed.   Labs:  Recent Labs   Lab 01/24/19  0617      POTASSIUM 3.1*   CHLORIDE 105   CO2 27   ANIONGAP 7   *   BUN 12   CR 0.56   GFRESTIMATED >90   GFRESTBLACK >90   ANDERS 8.1*     Recent Labs   Lab 01/23/19  0802   HGB 9.9*      Imaging:       "

## 2019-01-24 NOTE — PLAN OF CARE
Vital signs stable.  Pt confused to place, time, situation, needs reorientation, sitter present.Dr Patel visited pt, spoke with son Jose Luis.  Mri done.  Lungs clear, encouraged inspirometer use.Sat up in chair for supper and ambulated with walker and assist of 1 in hallway.Voided.Hgb 9.9.  Pain controlled with tylenol.Dressing intact.Falls risk precautions.Plan of care reviewed with pt and son.

## 2019-01-24 NOTE — PLAN OF CARE
"Pt awake, alert and oriented. Answers  questions appropriately. Calm and cooperative. Pt apologizing for her behavior of last couple of days, states \"just can't believe what happened to me\" . Pain is well controlled with tylenol, up ambulating in halls with gait belt, walker asssist of 1, follow direction well, appetite is good. Ace wrap removed right knee swollen and eccymotic,dressing dry and intact. Right foot swollen 2+, bruising noted on top of foot and lateral foot. Dr Amor was made aware on his visit. Pinky cancelled today at 1100.  "

## 2019-01-24 NOTE — PLAN OF CARE
Discharge Planner PT   Patient plan for discharge: TCU  Current status: Supine to sit with SBA. Sit to/from stand with CGA; needs cues for safe hand placement. Ambulates 200' with FWW and CGA. Repeated cues to decrease speed and progress to step through gait. Tolerates TKA exercises well with assist. R knee AROM 5-80 degrees.   Barriers to return to prior living situation: Step to enter home, lives alone, balance concerns, high fall risk  Recommendations for discharge: Per BPCI plan: TCU  Rationale for recommendations: Pt is not currently at baseline for mobility, and is unsafe to discharge home. With continued PT, both IP and after discharge, pt is likely to obtain mobility goals.        Entered by: Antony Dickson 01/24/2019 9:07 AM

## 2019-01-24 NOTE — PROGRESS NOTES
"Orthopedic Surgery  1/24/2019  POD 3    S: Patient voices no unexpected ortho complaints today. States knee is stiff. Denies chest pain or shortness of breath. States she is extremely tiered. \"the medicine she took last night just knocked me out.\"     O: Blood pressure 136/63, pulse 67, temperature 97.4  F (36.3  C), temperature source Oral, resp. rate 16, height 1.6 m (5' 3\"), weight 59.9 kg (132 lb), SpO2 96 %, not currently breastfeeding.  Lab Results   Component Value Date    HGB 9.9 01/23/2019     No results found for: INR  I/O last 3 completed shifts:  In: 1450 [P.O.:1450]  Out: 1400 [Urine:1400]  Distal extremity CMSI bilaterally.  Calves are negative bilaterally, both soft and nontender.  The dressing is C/D/I.      A: Ms. Arita is doing well status post Procedure(s):  Right total knee arthroplasty.    P: Noted Dr Patel's note and Brain MRI which appears to be negative for stroke. Did order psych consult yesterday as concerned regarding her behavior, tangential thoughts, and perseveration on odd topics. Would appreciate psych input on medications. Continue physical therapy. Continue DVT pphx with ASA due to high mobility and low risk factors for DVT. Anticipate discharge to TCU likely in the next day or so when cleared from sitter.    Chanelle Hua PA-C  [phone]  "

## 2019-01-24 NOTE — PLAN OF CARE
Pt.  Disoriented to time, place and situation. Speech more clear.  On fall precautions. Needs reminder to safely ambulate and walk, trying to run when ambulating to bathroom. Less agitated and sleeping most of the night. Sitter at bedside.CMS: intact. Drsg: Compression wrap: CDI. Pain managed with scheduled Tylenol. Voiding adequately. Passing flatus, Nursing continue to monitor.

## 2019-01-24 NOTE — PROGRESS NOTES
ALONZO     D: Discharge planning continuing.. per discussion with MD sitter will be discontinued based on pt's improvement. SW has updated [facility], they will have bed available tomorrow, and can accept her there. SW has left message with sonJose Luis inquiring of whether family will be providing transport...per pt, daughter-in-law was planning to do this but when SW spoke with Jose Luis ( Mely's ) yesterday he was not aware of this.     Addendum:     D: Spoke with pt's sonJose Luis and also per his suggestion message was left with pt's sonFerdinand regarding transportation for pt tomorrow. SW has met this afternoon with pt, she notes that she is uncertain who will provide the transport for her but that she will discuss with family, and also is going to check with a friend to see of her availability. It was noted to pt that if possible per BPCI protocol they would like to have pt at facility in the early afternoon so she can have an afternoon therapy there with them, pt will see if she can get the family/friend transport for 1300 tomorrow.      A/P: SW will continue to follow, will adjust arrangements in planning per transport availability as needed.

## 2019-01-25 VITALS
BODY MASS INDEX: 23.39 KG/M2 | HEIGHT: 63 IN | RESPIRATION RATE: 16 BRPM | SYSTOLIC BLOOD PRESSURE: 136 MMHG | DIASTOLIC BLOOD PRESSURE: 70 MMHG | HEART RATE: 86 BPM | WEIGHT: 132 LBS | TEMPERATURE: 96.9 F | OXYGEN SATURATION: 98 %

## 2019-01-25 LAB
ANION GAP SERPL CALCULATED.3IONS-SCNC: 5 MMOL/L (ref 3–14)
BASOPHILS # BLD AUTO: 0.1 10E9/L (ref 0–0.2)
BASOPHILS NFR BLD AUTO: 0.6 %
BUN SERPL-MCNC: 13 MG/DL (ref 7–30)
CALCIUM SERPL-MCNC: 8.4 MG/DL (ref 8.5–10.1)
CHLORIDE SERPL-SCNC: 106 MMOL/L (ref 94–109)
CO2 SERPL-SCNC: 30 MMOL/L (ref 20–32)
CREAT SERPL-MCNC: 0.58 MG/DL (ref 0.52–1.04)
DIFFERENTIAL METHOD BLD: ABNORMAL
EOSINOPHIL # BLD AUTO: 0.3 10E9/L (ref 0–0.7)
EOSINOPHIL NFR BLD AUTO: 3.3 %
ERYTHROCYTE [DISTWIDTH] IN BLOOD BY AUTOMATED COUNT: 13.4 % (ref 10–15)
GFR SERPL CREATININE-BSD FRML MDRD: >90 ML/MIN/{1.73_M2}
GLUCOSE SERPL-MCNC: 103 MG/DL (ref 70–99)
HCT VFR BLD AUTO: 30.7 % (ref 35–47)
HGB BLD-MCNC: 9.8 G/DL (ref 11.7–15.7)
IMM GRANULOCYTES # BLD: 0.1 10E9/L (ref 0–0.4)
IMM GRANULOCYTES NFR BLD: 0.6 %
LYMPHOCYTES # BLD AUTO: 1.5 10E9/L (ref 0.8–5.3)
LYMPHOCYTES NFR BLD AUTO: 17.4 %
MAGNESIUM SERPL-MCNC: 1.8 MG/DL (ref 1.6–2.3)
MCH RBC QN AUTO: 29.3 PG (ref 26.5–33)
MCHC RBC AUTO-ENTMCNC: 31.9 G/DL (ref 31.5–36.5)
MCV RBC AUTO: 92 FL (ref 78–100)
MONOCYTES # BLD AUTO: 0.7 10E9/L (ref 0–1.3)
MONOCYTES NFR BLD AUTO: 8.6 %
NEUTROPHILS # BLD AUTO: 5.9 10E9/L (ref 1.6–8.3)
NEUTROPHILS NFR BLD AUTO: 69.5 %
NRBC # BLD AUTO: 0 10*3/UL
NRBC BLD AUTO-RTO: 0 /100
PLATELET # BLD AUTO: 290 10E9/L (ref 150–450)
POTASSIUM SERPL-SCNC: 4.6 MMOL/L (ref 3.4–5.3)
RBC # BLD AUTO: 3.35 10E12/L (ref 3.8–5.2)
SODIUM SERPL-SCNC: 141 MMOL/L (ref 133–144)
WBC # BLD AUTO: 8.4 10E9/L (ref 4–11)

## 2019-01-25 PROCEDURE — 25000132 ZZH RX MED GY IP 250 OP 250 PS 637: Mod: GY | Performed by: INTERNAL MEDICINE

## 2019-01-25 PROCEDURE — 85025 COMPLETE CBC W/AUTO DIFF WBC: CPT | Performed by: INTERNAL MEDICINE

## 2019-01-25 PROCEDURE — 36415 COLL VENOUS BLD VENIPUNCTURE: CPT | Performed by: INTERNAL MEDICINE

## 2019-01-25 PROCEDURE — 83735 ASSAY OF MAGNESIUM: CPT | Performed by: INTERNAL MEDICINE

## 2019-01-25 PROCEDURE — 80048 BASIC METABOLIC PNL TOTAL CA: CPT | Performed by: INTERNAL MEDICINE

## 2019-01-25 PROCEDURE — A9270 NON-COVERED ITEM OR SERVICE: HCPCS | Mod: GY | Performed by: ORTHOPAEDIC SURGERY

## 2019-01-25 PROCEDURE — 25000132 ZZH RX MED GY IP 250 OP 250 PS 637: Mod: GY | Performed by: ORTHOPAEDIC SURGERY

## 2019-01-25 PROCEDURE — A9270 NON-COVERED ITEM OR SERVICE: HCPCS | Mod: GY | Performed by: INTERNAL MEDICINE

## 2019-01-25 PROCEDURE — 99232 SBSQ HOSP IP/OBS MODERATE 35: CPT | Performed by: INTERNAL MEDICINE

## 2019-01-25 RX ORDER — TRAMADOL HYDROCHLORIDE 50 MG/1
TABLET ORAL
Qty: 40 TABLET | Refills: 0 | Status: SHIPPED | OUTPATIENT
Start: 2019-01-25 | End: 2019-06-17

## 2019-01-25 RX ORDER — ATORVASTATIN CALCIUM 20 MG/1
20 TABLET, FILM COATED ORAL EVERY EVENING
Qty: 30 TABLET | Refills: 0 | DISCHARGE
Start: 2019-01-25 | End: 2019-01-25

## 2019-01-25 RX ORDER — AMOXICILLIN 250 MG
2 CAPSULE ORAL 2 TIMES DAILY
Qty: 120 TABLET | Refills: 0 | DISCHARGE
Start: 2019-01-25 | End: 2019-07-29

## 2019-01-25 RX ORDER — QUETIAPINE FUMARATE 25 MG/1
12.5 TABLET, FILM COATED ORAL
DISCHARGE
Start: 2019-01-25 | End: 2019-01-29

## 2019-01-25 RX ORDER — ASPIRIN 325 MG
325 TABLET, DELAYED RELEASE (ENTERIC COATED) ORAL 2 TIMES DAILY
Qty: 60 TABLET | Refills: 0 | DISCHARGE
Start: 2019-01-25 | End: 2019-07-29

## 2019-01-25 RX ORDER — ALPRAZOLAM 0.5 MG
TABLET ORAL
Qty: 15 TABLET | Refills: 1 | Status: SHIPPED | OUTPATIENT
Start: 2019-01-25 | End: 2019-01-27

## 2019-01-25 RX ORDER — LANOLIN ALCOHOL/MO/W.PET/CERES
3 CREAM (GRAM) TOPICAL
Status: ON HOLD | DISCHARGE
Start: 2019-01-25 | End: 2019-02-03

## 2019-01-25 RX ORDER — ACETAMINOPHEN 325 MG/1
650 TABLET ORAL EVERY 4 HOURS PRN
DISCHARGE
Start: 2019-01-25 | End: 2019-09-12

## 2019-01-25 RX ADMIN — CALCITONIN SALMON 1 SPRAY: 200 SPRAY, METERED NASAL at 08:43

## 2019-01-25 RX ADMIN — ASPIRIN 325 MG: 325 TABLET, DELAYED RELEASE ORAL at 08:42

## 2019-01-25 RX ADMIN — VITAMIN D, TAB 1000IU (100/BT) 2000 UNITS: 25 TAB at 08:41

## 2019-01-25 RX ADMIN — ACETAMINOPHEN 650 MG: 325 TABLET, FILM COATED ORAL at 01:23

## 2019-01-25 RX ADMIN — ACETAMINOPHEN 650 MG: 325 TABLET, FILM COATED ORAL at 07:18

## 2019-01-25 RX ADMIN — SENNOSIDES AND DOCUSATE SODIUM 1 TABLET: 8.6; 5 TABLET ORAL at 08:42

## 2019-01-25 RX ADMIN — ESOMEPRAZOLE MAGNESIUM 40 MG: 40 CAPSULE, DELAYED RELEASE ORAL at 08:43

## 2019-01-25 RX ADMIN — TRIAMTERENE AND HYDROCHLOROTHIAZIDE 1 TABLET: 37.5; 25 TABLET ORAL at 08:41

## 2019-01-25 RX ADMIN — FERROUS GLUCONATE 324 MG: 324 TABLET ORAL at 08:41

## 2019-01-25 ASSESSMENT — ACTIVITIES OF DAILY LIVING (ADL)
ADLS_ACUITY_SCORE: 16

## 2019-01-25 NOTE — PROGRESS NOTES
"[facility]  Hospitalist Progress Note  Patient Name: Cynthia Arita    MRN: [id number]  Provider: Jt Thomas MD  01/25/19    Initial presenting complaint/issue to hospital (Diagnosis): right total knee arthroplasty         Assessment and Plan:      Summary of Stay: Cynthia Arita is a 73 year old female with a history of hypertension, OA, GERD, anxiety, and meniere's dz.  She was admitted on 1/21/2019 after elective right TKA in setting of refractory DJD.  She did reasonably well from a medical standpoint in the post operative setting.       Overnight into 1/23/19 she apparently became quite combative necessitating restraints/haloperidol and sitter placement.  She has dramatically improved and retrospectively the delirium was all likely medication induced.    Problem List:   1. S/p right TKA on 1/21/19:  to TCU today  2. Delirium, likely related to anesthesia and analgesics.  Resolved. MRI was obtained and was negative.   3. Htn:  Continue triamterene/hydrochlorothiazide 37.5/25  4. Anxiety:  continue prn alprazolam   5. Hypokalemia:  Resovled.     DVT Prophylaxis:  -  Aspirin  Code Status: Full Code  Discharge Dispo: TCU   Estimated Disch Date / # of Days until Discharge: today        Interval History:      Doing well. Delirium resolved.  Some slight confusion still. Some nausea this morning.                   Physical Exam:      Last Vital Signs:  /70   Pulse 86   Temp 96.9  F (36.1  C)   Resp 16   Ht 1.6 m (5' 3\")   Wt 59.9 kg (132 lb)   SpO2 98%   BMI 23.38 kg/m      Intake/Output Summary (Last 24 hours) at 1/25/2019 0949  Last data filed at 1/25/2019 0900  Gross per 24 hour   Intake 1510 ml   Output 1700 ml   Net -190 ml       GENERAL:  Comfortable. Cooperative.  PSYCH: pleasant, oriented, No acute distress.  EYES: PERRLA, Normal conjunctiva.  HEART:  Regular rate and rhythm. No JVD. Pulses normal. No edema.  LUNGS:  Clear to auscultation, normal Respiratory effort.  ABDOMEN:  Soft, " no hepatosplenomegaly, normal bowel sounds.  EXTREMETIES: No clubbing, cyanosis or ischemia  SKIN:  Dry to touch, No rash.           Medications:      All current medications were reviewed.         Data:      All new lab and imaging data was reviewed.   Labs:       Lab Results   Component Value Date     01/25/2019     01/24/2019     01/22/2019    Lab Results   Component Value Date    CHLORIDE 106 01/25/2019    CHLORIDE 105 01/24/2019    CHLORIDE 106 01/22/2019    Lab Results   Component Value Date    BUN 13 01/25/2019    BUN 12 01/24/2019    BUN 10 01/22/2019      Lab Results   Component Value Date    POTASSIUM 4.6 01/25/2019    POTASSIUM 3.1 01/24/2019    POTASSIUM 3.6 01/22/2019    Lab Results   Component Value Date    CO2 30 01/25/2019    CO2 27 01/24/2019    CO2 30 01/22/2019    Lab Results   Component Value Date    CR 0.58 01/25/2019    CR 0.56 01/24/2019    CR 0.62 01/22/2019        Recent Labs   Lab 01/25/19  0653 01/23/19  0802 01/22/19  0654   WBC 8.4  --   --    HGB 9.8* 9.9* 10.7*   HCT 30.7*  --   --    MCV 92  --   --      --   --

## 2019-01-25 NOTE — PLAN OF CARE
Physical Therapy Discharge Summary    Reason for therapy discharge:    Discharged to transitional care facility.    Progress towards therapy goal(s). See goals on Care Plan in [facility] electronic health record for goal details.  Goals partially met.  Barriers to achieving goals:   discharge from facility.    Therapy recommendation(s):    Continued therapy is recommended.  Rationale/Recommendations:  PT as indicated at TCU.     Note: Pt not seen by documenting PT on this date. Information obtained from chart review.

## 2019-01-25 NOTE — PROGRESS NOTES
"Orthopedic Surgery  1/25/2019  POD 4    S: Patient voices no unexpected ortho complaints today. Denies chest pain or shortness of breath. Feeling much better today. Feels ready to go to TCU today. States that she has family coming to take her.    O: Blood pressure 133/71, pulse 92, temperature 98.6  F (37  C), temperature source Oral, resp. rate 16, height 1.6 m (5' 3\"), weight 59.9 kg (132 lb), SpO2 98 %, not currently breastfeeding.  Lab Results   Component Value Date    HGB 9.8 01/25/2019     No results found for: INR  I/O last 3 completed shifts:  In: 1690 [P.O.:1690]  Out: 2000 [Urine:2000]  Distal extremity CMSI bilaterally.  Calves are negative bilaterally, both soft and nontender.  The dressing is C/D/I.      A: Ms. Arita is doing well status post Procedure(s):  Right total knee arthroplasty.    P: Continue physical therapy. Continue DVT pphx with ASA due to high mobility and low risk factors for DVT. Anticipate discharge to TCU today. Will send with Tramadol Rx but has been taking Tylenol alone. Has been sitter free and appropriate.    Chanelle Hua PA-C  [phone]  "

## 2019-01-25 NOTE — PROGRESS NOTES
ALONZO     D: Discharge planning continuing.. pt will have friend provide transport @ 1100 to [facility].

## 2019-01-25 NOTE — DISCHARGE SUMMARY
Diagnosis: right DJD knee  Procedure: right Cemented total knee replacement  Surgeon: Denton Amor MD  Patient underwent total knee replacement without complication. Patient had typical transient post-op anemia. Patient's hospital stay included physical therapy and DVT prophylaxis. Patient had an episode of Delirium in the early post-op period. This resolved. After discussion with patient regarding no history of DVT and current high mobility, patient is discharged with ASA for home DVT pphx. Patient will follow -up in the office 10-14days post-op for wound check. Please refer to chart for any other specifics of this hospital stay.  Chanelle Hua PA-C

## 2019-01-25 NOTE — PLAN OF CARE
A&Ox4, VSS. Pain managed with prn tylenol.  Right knee dressing with dried scant drainage.  Edema present, bruising present.  Denies numbness/tingling.  Palpable pedal pulses.  Up to BR with walker/gait belt and assist 1/SBA.  Voiding without difficulty.  BM overnight.  Tolerating regular diet, fair to good appetite.  No PIV present.  Discharge instructions reviewed with patient, stated good understanding of all information.  Discharge packet give to patient for TCU.  All belongings with patient at discharge.  Discharged to TCU in c/o friend.

## 2019-01-25 NOTE — PLAN OF CARE
Pt A&O, cooperative in cares and making needs known via call light. Ax1 with transfers, voiding adquate amounts.  Pain controlled with scheduled tylenol only.  Tolerating regular diet.  Will discharge to [facility] via family transport and 1030.

## 2019-01-25 NOTE — PLAN OF CARE
A&O x4. VSS. LS CTA all fields. BS active x4, BM this shift. Dressing to R knee has scant amt of dried drainage. +2 edema to RLE and bruising noted. CMS intact. Carmen regular diet well. up with A1 and walker. P tylenol managing pain, avoiding narcotics. voiding in good amts, plans to dc to [facility] today at 1300. Will continue to monitor.

## 2019-01-26 DIAGNOSIS — Z53.9 ERRONEOUS ENCOUNTER--DISREGARD: Primary | ICD-10-CM

## 2019-01-27 ENCOUNTER — TELEPHONE (OUTPATIENT)
Dept: GERIATRICS | Facility: CLINIC | Age: 74
End: 2019-01-27

## 2019-01-27 VITALS
DIASTOLIC BLOOD PRESSURE: 68 MMHG | HEART RATE: 82 BPM | WEIGHT: 135.4 LBS | HEIGHT: 63 IN | OXYGEN SATURATION: 98 % | TEMPERATURE: 98.1 F | RESPIRATION RATE: 16 BRPM | BODY MASS INDEX: 23.99 KG/M2 | SYSTOLIC BLOOD PRESSURE: 153 MMHG

## 2019-01-27 RX ORDER — ALPRAZOLAM 0.5 MG
0.25 TABLET ORAL 3 TIMES DAILY PRN
COMMUNITY
End: 2019-05-09

## 2019-01-27 ASSESSMENT — MIFFLIN-ST. JEOR: SCORE: 1083.3

## 2019-01-27 NOTE — TELEPHONE ENCOUNTER
Patient with bladder pain, incontinence. Gave order for UA/UC.     Electronically signed by CASSIDY Jones GNP

## 2019-01-28 NOTE — PROGRESS NOTES
[place] [facility]  PRIMARY CARE PROVIDER AND CLINIC:  Huyen Samuels [address] [facility] [address] / [facility] [address]  Chief Complaint   Patient presents with     Hospital F/U     [place] Medical Record Number:  [id number]  Place of Service where encounter took place:  [facility]  ([facility]) [[id number]]    HPI:    Cynthia Arita is a 73 year old  (1945),admitted to the above facility from  [facility].  Hospital stay 01/21/2019 through 01/25/2019.  Admitted to this facility for  rehab, medical management and nursing care.  HPI information obtained from: facility chart records.  Current issues are:         Status post total right knee replacement  Delirium  Essential hypertension  Anxiety  Hypokalemia  Physical deconditioning     Summary of Stay: Cynthia Arita is a 73 year old female with a history of hypertension, OA, GERD, anxiety, and meniere's dz admitted on 1/21/2019 for elective right TKA in setting of refractory DJD.  She did reasonably well from a medical standpoint in the post operative setting.    Overnight into 1/23/19 she apparently became quite combative necessitating restraints/haloperidol and sitter placement.  She has dramatically improved today and retrospectively the delirium was all likely medication induced  Problem List:   1. Right TKA:  PT/OT, pain control,dvt ppx, and placement per primary service.  I know the plan is TCU but patient will need to be sitter free for at least 24 hours-sitter discontinued this am.  2. Delirium:  Did end up getting a couple doses of haloperidol 2 mg and quetiapine 25 mg prn and became quite sedated.  She was high risk for day night reversal and so she was given single dose of quetiapine last night and slept from 7p-7a.  She has now significantly cleared.  I suspect this is all delirium in association with anesthesia and pain medications.  Last evening per my discussion with her son she was slurring her speech (although had  no other neurological deficits) prompting MRI to r/o stroke which it did.  resovlign  3. Htn:  Resume triamterene - hydrochlorothiazide 37.5-25 today and recheck bmp in am  4. Anxiety:  Prn alprazolam already resumed  5. Hypokalemia:  Check mag and replace per protocol  DVT Prophylaxis: asa bid per primary service    ----------------  Alert, calm NAD. Reports moderate pain to right knee which Tramadol relieves. Reports urinary frequency and trouble voiding. PVR checks have been < 200. Urine pending. Afebrile and denies fever or chills. Reports appetite is good is sleeping okay and moving bowels without issue. Reports bottom of feet itch.     CODE STATUS/ADVANCE DIRECTIVES DISCUSSION:   CPR/Full code   Patient's living condition: lives alone    ALLERGIES:Ativan [lorazepam]; Gabapentin; Metoprolol; Pantoprazole; and Oxycodone  PAST MEDICAL HISTORY:  has a past medical history of Anxiety, Complication of anesthesia, Diverticulosis, GERD (gastroesophageal reflux disease), HTN (hypertension), Meniere's disease, Nephrolithiasis, Pain in right knee, and PONV (postoperative nausea and vomiting). She also has no past medical history of Basal cell carcinoma, History of blood transfusion, Malignant hyperthermia, Malignant melanoma (H), or Squamous cell carcinoma.  PAST SURGICAL HISTORY:  has a past surgical history that includes VAGINAL HYSTERECTOMY; Eye surgery; and Arthroplasty knee (Right, 1/21/2019).  FAMILY HISTORY: family history includes Bipolar Disorder in her sister; Brain Cancer (age of onset: 17) in her son; Cancer in her maternal grandmother; Diabetes in her paternal grandmother; Esophageal Cancer in her father; Hypertension in her brother; Neurologic Disorder in her mother and sister.  SOCIAL HISTORY:  reports that  has never smoked. she has never used smokeless tobacco. She reports that she drinks alcohol. She reports that she does not use drugs.    Post Discharge Medication Reconciliation Status: discharge  medications reconciled and changed, per note/orders (see AVS).  Current Outpatient Medications   Medication Sig Dispense Refill     acetaminophen (TYLENOL) 325 MG tablet Take 2 tablets (650 mg) by mouth every 4 hours as needed for other (multimodal surgical pain management along with NSAIDS and opioid medication as indicated based on pain control and physical function.)       ALPRAZolam (XANAX) 0.5 MG tablet Take 0.25 mg by mouth 3 times daily as needed for anxiety       aspirin (ASA) 325 MG EC tablet Take 1 tablet (325 mg) by mouth 2 times daily 60 tablet 0     calcitonin, salmon, (MIACALCIN) 200 UNIT/ACT nasal spray USE 1 SPRAY IN 1 NOSTRIL  DAILY (ALTERNATING NOSTRILS DAILY) 11.1 mL 3     Cholecalciferol (VITAMIN D3) 2000 UNITS CAPS Take 2,000 Units by mouth every morning        Flaxseed, Linseed, (FLAXSEED OIL) 1000 MG CAPS Take 1,000 mg by mouth every evening        Glucosamine-Chondroitin (GLUCOSAMINE CHONDR COMPLEX PO) Take 1 capsule by mouth 2 times daily        meclizine (ANTIVERT) 25 MG tablet Take 1 tablet (25 mg) by mouth every 6 hours as needed for dizziness 30 tablet 1     melatonin 3 MG tablet Take 1 tablet (3 mg) by mouth nightly as needed for sleep       Multiple Vitamins-Minerals (CENTRUM SILVER) per tablet Take 1 tablet by mouth daily       Multiple Vitamins-Minerals (OCUVITE PRESERVISION PO) Take 1 tablet by mouth 2 times daily        NEXIUM 40 MG CR capsule Take 40 mg by mouth daily        Omega-3 Fatty Acids (OMEGA-3 FISH OIL) 1000 MG CAPS Take 1,000 mg by mouth 2 times daily        ondansetron (ZOFRAN ODT) 4 MG ODT tab Take 1 tablet (4 mg) by mouth every 8 hours as needed for nausea 120 tablet 1     QUEtiapine (SEROQUEL) 25 MG tablet Take 0.5 tablets (12.5 mg) by mouth nightly as needed (insomnia)       RANITIDINE HCL PO Take 150 mg by mouth At Bedtime        senna-docusate (SENOKOT-S/PERICOLACE) 8.6-50 MG tablet Take 2 tablets by mouth 2 times daily 120 tablet 0     traMADol (ULTRAM) 50 MG  "tablet 1/2 tab po q 6 hrs prn pain scale 3-6,   1 tab po q 6hrs prn pain scale 7-10 40 tablet 0     triamterene-HCTZ (MAXZIDE-25) 37.5-25 MG tablet Take 1 tablet by mouth daily 90 tablet 3       ROS:  10 point ROS of systems including Constitutional, Eyes, Respiratory, Cardiovascular, Gastroenterology, Genitourinary, Integumentary, Musculoskeletal, Psychiatric were all negative except for pertinent positives noted in my HPI.    Exam:  /68   Pulse 82   Temp 98.1  F (36.7  C)   Resp 16   Ht 1.6 m (5' 3\")   Wt 61.4 kg (135 lb 6.4 oz)   SpO2 98%   BMI 23.99 kg/m      GENERAL APPEARANCE: Alert, in no distress   ENT: Mouth and posterior oropharynx normal, moist mucous membranes   EYES: EOM, conjunctivae, lids, pupils and irises normal   NECK: No adenopathy,masses or thyromegaly   RESP: respiratory effort and palpation of chest normal, Lungs clear to auscultation  CV: Palpation and auscultation of heart done , regular rate and rhythm, no murmur, rub, or gallop, peripheral edema -   ABDOMEN: normal bowel sounds, soft, nontender, no hepatosplenomegaly or other masses   : palpation of bladder WNL   M/S: Gait and station normal   Digits and nails normal - CORDERO  SKIN: Inspection of skin and subcutaneous tissue baseline, Palpation of skin and subcutaneous tissue baseline, Aquacell intact with small amount of shadow drainage. Feet are dry, no rash or redness present.  NEURO: Cranial nerves 2-12 are normal tested and grossly at patient's baseline, Examination of sensation by touch normal   PSYCH: oriented X 3, affect and mood normal             BP 1/25-1/27: 128-153/64-78 mmHg    Lab/Diagnostic data:    CBC RESULTS:   Recent Labs   Lab Test 01/25/19  0653 01/23/19  0802  01/10/19  0832   WBC 8.4  --   --  5.4   RBC 3.35*  --   --  4.94   HGB 9.8* 9.9*   < > 14.5   HCT 30.7*  --   --  45.4   MCV 92  --   --  92   MCH 29.3  --   --  29.4   MCHC 31.9  --   --  31.9   RDW 13.4  --   --  13.8     --   --  283    < " > = values in this interval not displayed.       Last Basic Metabolic Panel:  Recent Labs   Lab Test 01/25/19  0653 01/24/19  0617    139   POTASSIUM 4.6 3.1*   CHLORIDE 106 105   ANDERS 8.4* 8.1*   CO2 30 27   BUN 13 12   CR 0.58 0.56   * 115*       Liver Function Studies -   Recent Labs   Lab Test 01/10/19  0832 11/17/18  1520   PROTTOTAL 7.2 7.1   ALBUMIN 4.0 3.9   BILITOTAL 0.4 0.5   ALKPHOS 74 73   AST 15 20   ALT 29 37       ASSESSMENT/PLAN:  Status post total right knee replacement  WBAT  - keep incision clean and dry  - continue on acetaminophen and prn Tramadol  - ASA for DVT prophylaxis per ortho  - f/w ortho as directed  - will discharge 1/31 per ortho and with outpatient PT    Delirium  - thought 2/2 meds, resolved  - follow clinically    Essential hypertension  - controlled  - continue on Maxzide    Anxiety  - chronic  - continue on prn alprazolam    Hypokalemia  - postop, resolved  - will recheck BMP    Physical deconditioning  - 2/2 above  - PT/OT  - ongoing discharge planning, SW follow and care conferences per unit protocol- plan at this point per ortho is discharge 1/31 as above    Urinary frequency  - had cath inpatient  - bladder training, check PVR q shift,straight cath for volume > 300 and update provider  - UC pending  - follow clinically    Itchy feet  - lotion to feet BID  - follow clinically               Electronically signed by:  CASSIDY Iniguez CNP

## 2019-01-29 ENCOUNTER — NURSING HOME VISIT (OUTPATIENT)
Dept: GERIATRICS | Facility: CLINIC | Age: 74
End: 2019-01-29
Payer: MEDICARE

## 2019-01-29 DIAGNOSIS — Z96.651 STATUS POST TOTAL RIGHT KNEE REPLACEMENT: Primary | ICD-10-CM

## 2019-01-29 DIAGNOSIS — F41.9 ANXIETY: ICD-10-CM

## 2019-01-29 DIAGNOSIS — R53.81 PHYSICAL DECONDITIONING: ICD-10-CM

## 2019-01-29 DIAGNOSIS — R41.0 DELIRIUM: ICD-10-CM

## 2019-01-29 DIAGNOSIS — E87.6 HYPOKALEMIA: ICD-10-CM

## 2019-01-29 DIAGNOSIS — I10 ESSENTIAL HYPERTENSION: ICD-10-CM

## 2019-01-29 DIAGNOSIS — N39.0 URINARY TRACT INFECTION WITHOUT HEMATURIA, SITE UNSPECIFIED: ICD-10-CM

## 2019-01-29 PROCEDURE — 99310 SBSQ NF CARE HIGH MDM 45: CPT | Performed by: NURSE PRACTITIONER

## 2019-01-29 NOTE — LETTER
1/29/2019        RE: Cynthia Arita  [address] [place] [address]        [place] [facility]  PRIMARY CARE PROVIDER AND CLINIC:  Huyen Samuels [address] [facility] [address] / [facility] [address]  Chief Complaint   Patient presents with     Hospital F/U     [place] Medical Record Number:  [id number]  Place of Service where encounter took place:  [facility]  ([facility]) [[id number]]    HPI:    Cynthia Arita is a 73 year old  (1945),admitted to the above facility from  [facility].  Hospital stay 01/21/2019 through 01/25/2019.  Admitted to this facility for  rehab, medical management and nursing care.  HPI information obtained from: facility chart records.  Current issues are:         Status post total right knee replacement  Delirium  Essential hypertension  Anxiety  Hypokalemia  Physical deconditioning     Summary of Stay: Cynthia Arita is a 73 year old female with a history of hypertension, OA, GERD, anxiety, and meniere's dz admitted on 1/21/2019 for elective right TKA in setting of refractory DJD.  She did reasonably well from a medical standpoint in the post operative setting.    Overnight into 1/23/19 she apparently became quite combative necessitating restraints/haloperidol and sitter placement.  She has dramatically improved today and retrospectively the delirium was all likely medication induced  Problem List:   1. Right TKA:  PT/OT, pain control,dvt ppx, and placement per primary service.  I know the plan is TCU but patient will need to be sitter free for at least 24 hours-sitter discontinued this am.  2. Delirium:  Did end up getting a couple doses of haloperidol 2 mg and quetiapine 25 mg prn and became quite sedated.  She was high risk for day night reversal and so she was given single dose of quetiapine last night and slept from 7p-7a.  She has now significantly cleared.  I suspect this is all delirium in association with anesthesia and pain medications.  Last  evening per my discussion with her son she was slurring her speech (although had no other neurological deficits) prompting MRI to r/o stroke which it did.  resovlign  3. Htn:  Resume triamterene - hydrochlorothiazide 37.5-25 today and recheck bmp in am  4. Anxiety:  Prn alprazolam already resumed  5. Hypokalemia:  Check mag and replace per protocol  DVT Prophylaxis: asa bid per primary service    ----------------  Alert, calm NAD. Reports moderate pain to right knee which Tramadol relieves. Reports urinary frequency and trouble voiding. PVR checks have been < 200. Urine pending. Afebrile and denies fever or chills. Reports appetite is good is sleeping okay and moving bowels without issue. Reports bottom of feet itch.     CODE STATUS/ADVANCE DIRECTIVES DISCUSSION:   CPR/Full code   Patient's living condition: lives alone    ALLERGIES:Ativan [lorazepam]; Gabapentin; Metoprolol; Pantoprazole; and Oxycodone  PAST MEDICAL HISTORY:  has a past medical history of Anxiety, Complication of anesthesia, Diverticulosis, GERD (gastroesophageal reflux disease), HTN (hypertension), Meniere's disease, Nephrolithiasis, Pain in right knee, and PONV (postoperative nausea and vomiting). She also has no past medical history of Basal cell carcinoma, History of blood transfusion, Malignant hyperthermia, Malignant melanoma (H), or Squamous cell carcinoma.  PAST SURGICAL HISTORY:  has a past surgical history that includes VAGINAL HYSTERECTOMY; Eye surgery; and Arthroplasty knee (Right, 1/21/2019).  FAMILY HISTORY: family history includes Bipolar Disorder in her sister; Brain Cancer (age of onset: 17) in her son; Cancer in her maternal grandmother; Diabetes in her paternal grandmother; Esophageal Cancer in her father; Hypertension in her brother; Neurologic Disorder in her mother and sister.  SOCIAL HISTORY:  reports that  has never smoked. she has never used smokeless tobacco. She reports that she drinks alcohol. She reports that she does  not use drugs.    Post Discharge Medication Reconciliation Status: discharge medications reconciled and changed, per note/orders (see AVS).  Current Outpatient Medications   Medication Sig Dispense Refill     acetaminophen (TYLENOL) 325 MG tablet Take 2 tablets (650 mg) by mouth every 4 hours as needed for other (multimodal surgical pain management along with NSAIDS and opioid medication as indicated based on pain control and physical function.)       ALPRAZolam (XANAX) 0.5 MG tablet Take 0.25 mg by mouth 3 times daily as needed for anxiety       aspirin (ASA) 325 MG EC tablet Take 1 tablet (325 mg) by mouth 2 times daily 60 tablet 0     calcitonin, salmon, (MIACALCIN) 200 UNIT/ACT nasal spray USE 1 SPRAY IN 1 NOSTRIL  DAILY (ALTERNATING NOSTRILS DAILY) 11.1 mL 3     Cholecalciferol (VITAMIN D3) 2000 UNITS CAPS Take 2,000 Units by mouth every morning        Flaxseed, Linseed, (FLAXSEED OIL) 1000 MG CAPS Take 1,000 mg by mouth every evening        Glucosamine-Chondroitin (GLUCOSAMINE CHONDR COMPLEX PO) Take 1 capsule by mouth 2 times daily        meclizine (ANTIVERT) 25 MG tablet Take 1 tablet (25 mg) by mouth every 6 hours as needed for dizziness 30 tablet 1     melatonin 3 MG tablet Take 1 tablet (3 mg) by mouth nightly as needed for sleep       Multiple Vitamins-Minerals (CENTRUM SILVER) per tablet Take 1 tablet by mouth daily       Multiple Vitamins-Minerals (OCUVITE PRESERVISION PO) Take 1 tablet by mouth 2 times daily        NEXIUM 40 MG CR capsule Take 40 mg by mouth daily        Omega-3 Fatty Acids (OMEGA-3 FISH OIL) 1000 MG CAPS Take 1,000 mg by mouth 2 times daily        ondansetron (ZOFRAN ODT) 4 MG ODT tab Take 1 tablet (4 mg) by mouth every 8 hours as needed for nausea 120 tablet 1     QUEtiapine (SEROQUEL) 25 MG tablet Take 0.5 tablets (12.5 mg) by mouth nightly as needed (insomnia)       RANITIDINE HCL PO Take 150 mg by mouth At Bedtime        senna-docusate (SENOKOT-S/PERICOLACE) 8.6-50 MG tablet Take  "2 tablets by mouth 2 times daily 120 tablet 0     traMADol (ULTRAM) 50 MG tablet 1/2 tab po q 6 hrs prn pain scale 3-6,   1 tab po q 6hrs prn pain scale 7-10 40 tablet 0     triamterene-HCTZ (MAXZIDE-25) 37.5-25 MG tablet Take 1 tablet by mouth daily 90 tablet 3       ROS:  10 point ROS of systems including Constitutional, Eyes, Respiratory, Cardiovascular, Gastroenterology, Genitourinary, Integumentary, Musculoskeletal, Psychiatric were all negative except for pertinent positives noted in my HPI.    Exam:  /68   Pulse 82   Temp 98.1  F (36.7  C)   Resp 16   Ht 1.6 m (5' 3\")   Wt 61.4 kg (135 lb 6.4 oz)   SpO2 98%   BMI 23.99 kg/m       GENERAL APPEARANCE: Alert, in no distress   ENT: Mouth and posterior oropharynx normal, moist mucous membranes   EYES: EOM, conjunctivae, lids, pupils and irises normal   NECK: No adenopathy,masses or thyromegaly   RESP: respiratory effort and palpation of chest normal, Lungs clear to auscultation  CV: Palpation and auscultation of heart done , regular rate and rhythm, no murmur, rub, or gallop, peripheral edema -   ABDOMEN: normal bowel sounds, soft, nontender, no hepatosplenomegaly or other masses   : palpation of bladder WNL   M/S: Gait and station normal   Digits and nails normal - CORDERO  SKIN: Inspection of skin and subcutaneous tissue baseline, Palpation of skin and subcutaneous tissue baseline, Aquacell intact with small amount of shadow drainage. Feet are dry, no rash or redness present.  NEURO: Cranial nerves 2-12 are normal tested and grossly at patient's baseline, Examination of sensation by touch normal   PSYCH: oriented X 3, affect and mood normal             BP 1/25-1/27: 128-153/64-78 mmHg    Lab/Diagnostic data:    CBC RESULTS:   Recent Labs   Lab Test 01/25/19  0653 01/23/19  0802  01/10/19  0832   WBC 8.4  --   --  5.4   RBC 3.35*  --   --  4.94   HGB 9.8* 9.9*   < > 14.5   HCT 30.7*  --   --  45.4   MCV 92  --   --  92   MCH 29.3  --   --  29.4   MCHC " 31.9  --   --  31.9   RDW 13.4  --   --  13.8     --   --  283    < > = values in this interval not displayed.       Last Basic Metabolic Panel:  Recent Labs   Lab Test 01/25/19  0653 01/24/19  0617    139   POTASSIUM 4.6 3.1*   CHLORIDE 106 105   ANDERS 8.4* 8.1*   CO2 30 27   BUN 13 12   CR 0.58 0.56   * 115*       Liver Function Studies -   Recent Labs   Lab Test 01/10/19  0832 11/17/18  1520   PROTTOTAL 7.2 7.1   ALBUMIN 4.0 3.9   BILITOTAL 0.4 0.5   ALKPHOS 74 73   AST 15 20   ALT 29 37       ASSESSMENT/PLAN:  Status post total right knee replacement  WBAT  - keep incision clean and dry  - continue on acetaminophen and prn Tramadol  - ASA for DVT prophylaxis per ortho  - f/w ortho as directed  - will discharge 1/31 per ortho and with outpatient PT    Delirium  - thought 2/2 meds, resolved  - follow clinically    Essential hypertension  - controlled  - continue on Maxzide    Anxiety  - chronic  - continue on prn alprazolam    Hypokalemia  - postop, resolved  - will recheck BMP    Physical deconditioning  - 2/2 above  - PT/OT  - ongoing discharge planning, SW follow and care conferences per unit protocol- plan at this point per ortho is discharge 1/31 as above    Urinary frequency  - had cath inpatient  - bladder training, check PVR q shift,straight cath for volume > 300 and update provider  - UC pending  - follow clinically    Itchy feet  - lotion to feet BID  - follow clinically               Electronically signed by:  CASSIDY Iniguez CNP                    Sincerely,        CASSIDY Iniguez CNP

## 2019-01-30 ENCOUNTER — HOSPITAL LABORATORY (OUTPATIENT)
Dept: OTHER | Facility: CLINIC | Age: 74
End: 2019-01-30

## 2019-01-30 ENCOUNTER — NURSING HOME VISIT (OUTPATIENT)
Dept: GERIATRICS | Facility: CLINIC | Age: 74
End: 2019-01-30
Payer: MEDICARE

## 2019-01-30 VITALS
DIASTOLIC BLOOD PRESSURE: 74 MMHG | HEART RATE: 92 BPM | WEIGHT: 136.2 LBS | TEMPERATURE: 97.8 F | BODY MASS INDEX: 24.13 KG/M2 | RESPIRATION RATE: 16 BRPM | SYSTOLIC BLOOD PRESSURE: 136 MMHG | OXYGEN SATURATION: 97 % | HEIGHT: 63 IN

## 2019-01-30 DIAGNOSIS — N39.0 URINARY TRACT INFECTION WITHOUT HEMATURIA, SITE UNSPECIFIED: Primary | ICD-10-CM

## 2019-01-30 LAB
ANION GAP SERPL CALCULATED.3IONS-SCNC: 9 MMOL/L (ref 3–14)
BUN SERPL-MCNC: 15 MG/DL (ref 7–30)
CALCIUM SERPL-MCNC: 9.3 MG/DL (ref 8.5–10.1)
CHLORIDE SERPL-SCNC: 98 MMOL/L (ref 94–109)
CO2 SERPL-SCNC: 30 MMOL/L (ref 20–32)
CREAT SERPL-MCNC: 0.51 MG/DL (ref 0.52–1.04)
ERYTHROCYTE [DISTWIDTH] IN BLOOD BY AUTOMATED COUNT: 14.4 % (ref 10–15)
GFR SERPL CREATININE-BSD FRML MDRD: >90 ML/MIN/{1.73_M2}
GLUCOSE SERPL-MCNC: 109 MG/DL (ref 70–99)
HCT VFR BLD AUTO: 32.6 % (ref 35–47)
HGB BLD-MCNC: 10.5 G/DL (ref 11.7–15.7)
MCH RBC QN AUTO: 29 PG (ref 26.5–33)
MCHC RBC AUTO-ENTMCNC: 32.2 G/DL (ref 31.5–36.5)
MCV RBC AUTO: 90 FL (ref 78–100)
PLATELET # BLD AUTO: 551 10E9/L (ref 150–450)
POTASSIUM SERPL-SCNC: 3.4 MMOL/L (ref 3.4–5.3)
RBC # BLD AUTO: 3.62 10E12/L (ref 3.8–5.2)
SODIUM SERPL-SCNC: 137 MMOL/L (ref 133–144)
WBC # BLD AUTO: 8.6 10E9/L (ref 4–11)

## 2019-01-30 PROCEDURE — 99308 SBSQ NF CARE LOW MDM 20: CPT | Performed by: NURSE PRACTITIONER

## 2019-01-30 RX ORDER — SULFAMETHOXAZOLE/TRIMETHOPRIM 800-160 MG
1 TABLET ORAL 2 TIMES DAILY
Qty: 10 TABLET | Refills: 0
Start: 2019-01-30 | End: 2019-02-27

## 2019-01-30 ASSESSMENT — MIFFLIN-ST. JEOR: SCORE: 1086.93

## 2019-01-30 NOTE — LETTER
1/30/2019        RE: Cynthia Arita  [address] [facility] [address]        [place] [facility]    Chief Complaint   Patient presents with     UTI       [place] Medical Record Number:  [id number]  Place of Service where encounter took place:  No question data found.    HPI:    Cynthia Arita is a 73 year old  (1945), who is being seen today for an episodic care visit.  HPI information obtained from: facility chart records.Today's concern is:  Urinary tract infection without hematuria, site unspecified   - with some dysuria and frequency, PVRs have been WNL, afebrile  - UC noted    ALLERGIES: Ativan [lorazepam]; Gabapentin; Metoprolol; Pantoprazole; and Oxycodone  Past Medical, Surgical, Family and Social History reviewed and updated in [facility].    Current Outpatient Medications   Medication Sig Dispense Refill     acetaminophen (TYLENOL) 325 MG tablet Take 2 tablets (650 mg) by mouth every 4 hours as needed for other (multimodal surgical pain management along with NSAIDS and opioid medication as indicated based on pain control and physical function.)       ALPRAZolam (XANAX) 0.5 MG tablet Take 0.25 mg by mouth 3 times daily as needed for anxiety       aspirin (ASA) 325 MG EC tablet Take 1 tablet (325 mg) by mouth 2 times daily 60 tablet 0     calcitonin, salmon, (MIACALCIN) 200 UNIT/ACT nasal spray USE 1 SPRAY IN 1 NOSTRIL  DAILY (ALTERNATING NOSTRILS DAILY) 11.1 mL 3     Cholecalciferol (VITAMIN D3) 2000 UNITS CAPS Take 2,000 Units by mouth every morning        Flaxseed, Linseed, (FLAXSEED OIL) 1000 MG CAPS Take 1,000 mg by mouth every evening        Glucosamine-Chondroitin (GLUCOSAMINE CHONDR COMPLEX PO) Take 1 capsule by mouth 2 times daily        meclizine (ANTIVERT) 25 MG tablet Take 1 tablet (25 mg) by mouth every 6 hours as needed for dizziness 30 tablet 1     melatonin 3 MG tablet Take 1 tablet (3 mg) by mouth nightly as needed for sleep       Multiple Vitamins-Minerals (CENTRUM SILVER) per  "tablet Take 1 tablet by mouth daily       Multiple Vitamins-Minerals (OCUVITE PRESERVISION PO) Take 1 tablet by mouth 2 times daily        NEXIUM 40 MG CR capsule Take 40 mg by mouth daily        Omega-3 Fatty Acids (OMEGA-3 FISH OIL) 1000 MG CAPS Take 1,000 mg by mouth 2 times daily        RANITIDINE HCL PO Take 150 mg by mouth At Bedtime        senna-docusate (SENOKOT-S/PERICOLACE) 8.6-50 MG tablet Take 2 tablets by mouth 2 times daily 120 tablet 0     sulfamethoxazole-trimethoprim (BACTRIM DS/SEPTRA DS) 800-160 MG tablet Take 1 tablet by mouth 2 times daily for 5 days 10 tablet 0     traMADol (ULTRAM) 50 MG tablet 1/2 tab po q 6 hrs prn pain scale 3-6,   1 tab po q 6hrs prn pain scale 7-10 40 tablet 0     triamterene-HCTZ (MAXZIDE-25) 37.5-25 MG tablet Take 1 tablet by mouth daily 90 tablet 3     Medications reviewed:  Medications reconciled to facility chart and changes were made to reflect current medications as identified as above med list. Below are the changes that were made:   Medications stopped since last EPIC medication reconciliation:   There are no discontinued medications.    Medications started since last [facility] medication reconciliation:  No orders of the defined types were placed in this encounter.        REVIEW OF SYSTEMS:  4 point ROS including Respiratory, CV, GI and , other than that noted in the HPI,  is negative    Physical Exam:  /74   Pulse 92   Temp 97.8  F (36.6  C)   Resp 16   Ht 1.6 m (5' 3\")   Wt 61.8 kg (136 lb 3.2 oz)   SpO2 97%   BMI 24.13 kg/m       Resp: Effort WNL  CV: VSS  Abd- soft, nontender, BS +  - voiding, bladder non distended and nontender  Musc- CORDERO  Skin- right knee aquacel d/i  Psych- alert, calm, pleasant      BP 1/25-1/30: 128-152/64-81 mmHg    Recent Labs:     CBC RESULTS:   Recent Labs   Lab Test 01/25/19  0653 01/23/19  0802  01/10/19  0832   WBC 8.4  --   --  5.4   RBC 3.35*  --   --  4.94   HGB 9.8* 9.9*   < > 14.5   HCT 30.7*  --   --  45.4   MCV " 92  --   --  92   MCH 29.3  --   --  29.4   MCHC 31.9  --   --  31.9   RDW 13.4  --   --  13.8     --   --  283    < > = values in this interval not displayed.       Last Basic Metabolic Panel:  Recent Labs   Lab Test 01/25/19  0653 01/24/19  0617    139   POTASSIUM 4.6 3.1*   CHLORIDE 106 105   ANDERS 8.4* 8.1*   CO2 30 27   BUN 13 12   CR 0.58 0.56   * 115*       Liver Function Studies -   Recent Labs   Lab Test 01/10/19  0832 11/17/18  1520   PROTTOTAL 7.2 7.1   ALBUMIN 4.0 3.9   BILITOTAL 0.4 0.5   ALKPHOS 74 73   AST 15 20   ALT 29 37         Assessment/Plan:  Urinary tract infection without hematuria, site unspecified  >100,000 colonies/mL   Escherichia coli  - Treat with 5 days of Bactrim  - encouraged hydration  - is okay to proceed with discharge- if urinary symptoms worsen or do not resolve patient instructed to see PCP or urgent care.      Electronically signed by  CASSIDY Iniguez CNP                      Sincerely,        CASSIDY Iniguez CNP

## 2019-01-30 NOTE — PROGRESS NOTES
[facility]    Chief Complaint   Patient presents with     UTI       [facility] Medical Record Number:  [id number]  Place of Service where encounter took place:  No question data found.    HPI:    Cynthia Arita is a 73 year old  (1945), who is being seen today for an episodic care visit.  HPI information obtained from: facility chart records.Today's concern is:  Urinary tract infection without hematuria, site unspecified   - with some dysuria and frequency, PVRs have been WNL, afebrile  - UC noted    ALLERGIES: Ativan [lorazepam]; Gabapentin; Metoprolol; Pantoprazole; and Oxycodone  Past Medical, Surgical, Family and Social History reviewed and updated in [facility].    Current Outpatient Medications   Medication Sig Dispense Refill     acetaminophen (TYLENOL) 325 MG tablet Take 2 tablets (650 mg) by mouth every 4 hours as needed for other (multimodal surgical pain management along with NSAIDS and opioid medication as indicated based on pain control and physical function.)       ALPRAZolam (XANAX) 0.5 MG tablet Take 0.25 mg by mouth 3 times daily as needed for anxiety       aspirin (ASA) 325 MG EC tablet Take 1 tablet (325 mg) by mouth 2 times daily 60 tablet 0     calcitonin, salmon, (MIACALCIN) 200 UNIT/ACT nasal spray USE 1 SPRAY IN 1 NOSTRIL  DAILY (ALTERNATING NOSTRILS DAILY) 11.1 mL 3     Cholecalciferol (VITAMIN D3) 2000 UNITS CAPS Take 2,000 Units by mouth every morning        Flaxseed, Linseed, (FLAXSEED OIL) 1000 MG CAPS Take 1,000 mg by mouth every evening        Glucosamine-Chondroitin (GLUCOSAMINE CHONDR COMPLEX PO) Take 1 capsule by mouth 2 times daily        meclizine (ANTIVERT) 25 MG tablet Take 1 tablet (25 mg) by mouth every 6 hours as needed for dizziness 30 tablet 1     melatonin 3 MG tablet Take 1 tablet (3 mg) by mouth nightly as needed for sleep       Multiple Vitamins-Minerals (CENTRUM SILVER) per tablet Take 1 tablet by mouth daily       Multiple Vitamins-Minerals (OCUVITE  "PRESERVISION PO) Take 1 tablet by mouth 2 times daily        NEXIUM 40 MG CR capsule Take 40 mg by mouth daily        Omega-3 Fatty Acids (OMEGA-3 FISH OIL) 1000 MG CAPS Take 1,000 mg by mouth 2 times daily        RANITIDINE HCL PO Take 150 mg by mouth At Bedtime        senna-docusate (SENOKOT-S/PERICOLACE) 8.6-50 MG tablet Take 2 tablets by mouth 2 times daily 120 tablet 0     sulfamethoxazole-trimethoprim (BACTRIM DS/SEPTRA DS) 800-160 MG tablet Take 1 tablet by mouth 2 times daily for 5 days 10 tablet 0     traMADol (ULTRAM) 50 MG tablet 1/2 tab po q 6 hrs prn pain scale 3-6,   1 tab po q 6hrs prn pain scale 7-10 40 tablet 0     triamterene-HCTZ (MAXZIDE-25) 37.5-25 MG tablet Take 1 tablet by mouth daily 90 tablet 3     Medications reviewed:  Medications reconciled to facility chart and changes were made to reflect current medications as identified as above med list. Below are the changes that were made:   Medications stopped since last EPIC medication reconciliation:   There are no discontinued medications.    Medications started since last [facility] medication reconciliation:  No orders of the defined types were placed in this encounter.        REVIEW OF SYSTEMS:  4 point ROS including Respiratory, CV, GI and , other than that noted in the HPI,  is negative    Physical Exam:  /74   Pulse 92   Temp 97.8  F (36.6  C)   Resp 16   Ht 1.6 m (5' 3\")   Wt 61.8 kg (136 lb 3.2 oz)   SpO2 97%   BMI 24.13 kg/m      Resp: Effort WNL  CV: VSS  Abd- soft, nontender, BS +  - voiding, bladder non distended and nontender  Musc- CORDERO  Skin- right knee aquacel d/i  Psych- alert, calm, pleasant      BP 1/25-1/30: 128-152/64-81 mmHg    Recent Labs:     CBC RESULTS:   Recent Labs   Lab Test 01/25/19  0653 01/23/19  0802  01/10/19  0832   WBC 8.4  --   --  5.4   RBC 3.35*  --   --  4.94   HGB 9.8* 9.9*   < > 14.5   HCT 30.7*  --   --  45.4   MCV 92  --   --  92   MCH 29.3  --   --  29.4   MCHC 31.9  --   --  31.9   RDW " 13.4  --   --  13.8     --   --  283    < > = values in this interval not displayed.       Last Basic Metabolic Panel:  Recent Labs   Lab Test 01/25/19  0653 01/24/19  0617    139   POTASSIUM 4.6 3.1*   CHLORIDE 106 105   ANDERS 8.4* 8.1*   CO2 30 27   BUN 13 12   CR 0.58 0.56   * 115*       Liver Function Studies -   Recent Labs   Lab Test 01/10/19  0832 11/17/18  1520   PROTTOTAL 7.2 7.1   ALBUMIN 4.0 3.9   BILITOTAL 0.4 0.5   ALKPHOS 74 73   AST 15 20   ALT 29 37         Assessment/Plan:  Urinary tract infection without hematuria, site unspecified  >100,000 colonies/mL   Escherichia coli  - Treat with 5 days of Bactrim  - encouraged hydration  - is okay to proceed with discharge- if urinary symptoms worsen or do not resolve patient instructed to see PCP or urgent care.      Electronically signed by  CASSIDY Iniguez CNP

## 2019-02-03 ENCOUNTER — HOSPITAL ENCOUNTER (OUTPATIENT)
Facility: CLINIC | Age: 74
Setting detail: OBSERVATION
Discharge: HOME | End: 2019-02-04
Attending: EMERGENCY MEDICINE | Admitting: INTERNAL MEDICINE
Payer: MEDICARE

## 2019-02-03 ENCOUNTER — NURSE TRIAGE (OUTPATIENT)
Dept: NURSING | Facility: CLINIC | Age: 74
End: 2019-02-03

## 2019-02-03 ENCOUNTER — APPOINTMENT (OUTPATIENT)
Dept: CT IMAGING | Facility: CLINIC | Age: 74
End: 2019-02-03
Payer: MEDICARE

## 2019-02-03 DIAGNOSIS — R11.0 NAUSEA: ICD-10-CM

## 2019-02-03 DIAGNOSIS — E87.6 HYPOKALEMIA: Primary | ICD-10-CM

## 2019-02-03 DIAGNOSIS — R10.9 ABDOMINAL PAIN, UNSPECIFIED ABDOMINAL LOCATION: ICD-10-CM

## 2019-02-03 DIAGNOSIS — N28.9 RENAL LESION: ICD-10-CM

## 2019-02-03 DIAGNOSIS — R19.4 DECREASED FREQUENCY OF BOWEL MOVEMENTS: ICD-10-CM

## 2019-02-03 LAB
ALBUMIN SERPL-MCNC: 3.4 G/DL (ref 3.4–5)
ALBUMIN UR-MCNC: NEGATIVE MG/DL
ALP SERPL-CCNC: 123 U/L (ref 40–150)
ALT SERPL W P-5'-P-CCNC: 31 U/L (ref 0–50)
ANION GAP SERPL CALCULATED.3IONS-SCNC: 10 MMOL/L (ref 3–14)
APPEARANCE UR: ABNORMAL
AST SERPL W P-5'-P-CCNC: 31 U/L (ref 0–45)
BACTERIA #/AREA URNS HPF: ABNORMAL /HPF
BASOPHILS # BLD AUTO: 0 10E9/L (ref 0–0.2)
BASOPHILS NFR BLD AUTO: 0.4 %
BILIRUB SERPL-MCNC: 0.3 MG/DL (ref 0.2–1.3)
BILIRUB UR QL STRIP: NEGATIVE
BUN SERPL-MCNC: 16 MG/DL (ref 7–30)
CALCIUM SERPL-MCNC: 9.6 MG/DL (ref 8.5–10.1)
CHLORIDE SERPL-SCNC: 99 MMOL/L (ref 94–109)
CO2 SERPL-SCNC: 25 MMOL/L (ref 20–32)
COLOR UR AUTO: YELLOW
CREAT BLD-MCNC: 0.9 MG/DL (ref 0.52–1.04)
CREAT SERPL-MCNC: 0.82 MG/DL (ref 0.52–1.04)
DIFFERENTIAL METHOD BLD: ABNORMAL
EOSINOPHIL # BLD AUTO: 0 10E9/L (ref 0–0.7)
EOSINOPHIL NFR BLD AUTO: 0.6 %
ERYTHROCYTE [DISTWIDTH] IN BLOOD BY AUTOMATED COUNT: 15.2 % (ref 10–15)
GFR SERPL CREATININE-BSD FRML MDRD: 61 ML/MIN/{1.73_M2}
GFR SERPL CREATININE-BSD FRML MDRD: 70 ML/MIN/{1.73_M2}
GLUCOSE SERPL-MCNC: 90 MG/DL (ref 70–99)
GLUCOSE UR STRIP-MCNC: NEGATIVE MG/DL
HCT VFR BLD AUTO: 34.5 % (ref 35–47)
HGB BLD-MCNC: 11 G/DL (ref 11.7–15.7)
HGB UR QL STRIP: NEGATIVE
IMM GRANULOCYTES # BLD: 0 10E9/L (ref 0–0.4)
IMM GRANULOCYTES NFR BLD: 0.6 %
KETONES UR STRIP-MCNC: NEGATIVE MG/DL
LACTATE BLD-SCNC: 1.1 MMOL/L (ref 0.7–2)
LEUKOCYTE ESTERASE UR QL STRIP: NEGATIVE
LIPASE SERPL-CCNC: 203 U/L (ref 73–393)
LYMPHOCYTES # BLD AUTO: 0.8 10E9/L (ref 0.8–5.3)
LYMPHOCYTES NFR BLD AUTO: 11.4 %
MCH RBC QN AUTO: 28.9 PG (ref 26.5–33)
MCHC RBC AUTO-ENTMCNC: 31.9 G/DL (ref 31.5–36.5)
MCV RBC AUTO: 91 FL (ref 78–100)
MONOCYTES # BLD AUTO: 0.7 10E9/L (ref 0–1.3)
MONOCYTES NFR BLD AUTO: 8.9 %
MUCOUS THREADS #/AREA URNS LPF: PRESENT /LPF
NEUTROPHILS # BLD AUTO: 5.7 10E9/L (ref 1.6–8.3)
NEUTROPHILS NFR BLD AUTO: 78.1 %
NITRATE UR QL: NEGATIVE
NRBC # BLD AUTO: 0 10*3/UL
NRBC BLD AUTO-RTO: 0 /100
PH UR STRIP: 7 PH (ref 5–7)
PLATELET # BLD AUTO: 596 10E9/L (ref 150–450)
POTASSIUM SERPL-SCNC: 3.3 MMOL/L (ref 3.4–5.3)
PROT SERPL-MCNC: 7.4 G/DL (ref 6.8–8.8)
RBC # BLD AUTO: 3.8 10E12/L (ref 3.8–5.2)
RBC #/AREA URNS AUTO: <1 /HPF (ref 0–2)
SODIUM SERPL-SCNC: 134 MMOL/L (ref 133–144)
SOURCE: ABNORMAL
SP GR UR STRIP: 1.01 (ref 1–1.03)
SQUAMOUS #/AREA URNS AUTO: 1 /HPF (ref 0–1)
UROBILINOGEN UR STRIP-MCNC: 0 MG/DL (ref 0–2)
WBC # BLD AUTO: 7.3 10E9/L (ref 4–11)
WBC #/AREA URNS AUTO: 4 /HPF (ref 0–5)

## 2019-02-03 PROCEDURE — 80053 COMPREHEN METABOLIC PANEL: CPT | Performed by: EMERGENCY MEDICINE

## 2019-02-03 PROCEDURE — 83690 ASSAY OF LIPASE: CPT | Performed by: EMERGENCY MEDICINE

## 2019-02-03 PROCEDURE — A9270 NON-COVERED ITEM OR SERVICE: HCPCS | Mod: GY | Performed by: INTERNAL MEDICINE

## 2019-02-03 PROCEDURE — 99285 EMERGENCY DEPT VISIT HI MDM: CPT | Mod: 25

## 2019-02-03 PROCEDURE — 25000132 ZZH RX MED GY IP 250 OP 250 PS 637: Mod: GY | Performed by: EMERGENCY MEDICINE

## 2019-02-03 PROCEDURE — 96360 HYDRATION IV INFUSION INIT: CPT | Mod: 59

## 2019-02-03 PROCEDURE — 96361 HYDRATE IV INFUSION ADD-ON: CPT

## 2019-02-03 PROCEDURE — G0378 HOSPITAL OBSERVATION PER HR: HCPCS

## 2019-02-03 PROCEDURE — 82565 ASSAY OF CREATININE: CPT | Mod: 91

## 2019-02-03 PROCEDURE — 74177 CT ABD & PELVIS W/CONTRAST: CPT

## 2019-02-03 PROCEDURE — 99220 ZZC INITIAL OBSERVATION CARE,LEVL III: CPT | Performed by: INTERNAL MEDICINE

## 2019-02-03 PROCEDURE — A9270 NON-COVERED ITEM OR SERVICE: HCPCS | Mod: GY | Performed by: EMERGENCY MEDICINE

## 2019-02-03 PROCEDURE — 25000128 H RX IP 250 OP 636: Performed by: EMERGENCY MEDICINE

## 2019-02-03 PROCEDURE — 81001 URINALYSIS AUTO W/SCOPE: CPT | Performed by: EMERGENCY MEDICINE

## 2019-02-03 PROCEDURE — 83605 ASSAY OF LACTIC ACID: CPT | Performed by: EMERGENCY MEDICINE

## 2019-02-03 PROCEDURE — 85025 COMPLETE CBC W/AUTO DIFF WBC: CPT | Performed by: EMERGENCY MEDICINE

## 2019-02-03 PROCEDURE — 25000132 ZZH RX MED GY IP 250 OP 250 PS 637: Mod: GY | Performed by: INTERNAL MEDICINE

## 2019-02-03 PROCEDURE — 25000125 ZZHC RX 250: Performed by: EMERGENCY MEDICINE

## 2019-02-03 RX ORDER — SODIUM CHLORIDE 9 MG/ML
1000 INJECTION, SOLUTION INTRAVENOUS CONTINUOUS
Status: DISCONTINUED | OUTPATIENT
Start: 2019-02-03 | End: 2019-02-04

## 2019-02-03 RX ORDER — BISACODYL 10 MG
10 SUPPOSITORY, RECTAL RECTAL DAILY PRN
Status: DISCONTINUED | OUTPATIENT
Start: 2019-02-03 | End: 2019-02-04 | Stop reason: HOSPADM

## 2019-02-03 RX ORDER — ACETAMINOPHEN 650 MG/1
650 SUPPOSITORY RECTAL EVERY 4 HOURS PRN
Status: DISCONTINUED | OUTPATIENT
Start: 2019-02-03 | End: 2019-02-04 | Stop reason: HOSPADM

## 2019-02-03 RX ORDER — MORPHINE SULFATE 4 MG/ML
4 INJECTION, SOLUTION INTRAMUSCULAR; INTRAVENOUS ONCE
Status: DISCONTINUED | OUTPATIENT
Start: 2019-02-03 | End: 2019-02-03

## 2019-02-03 RX ORDER — HYDROMORPHONE HYDROCHLORIDE 1 MG/ML
0.5 INJECTION, SOLUTION INTRAMUSCULAR; INTRAVENOUS; SUBCUTANEOUS
Status: DISCONTINUED | OUTPATIENT
Start: 2019-02-03 | End: 2019-02-03

## 2019-02-03 RX ORDER — POLYETHYLENE GLYCOL 3350 17 G/17G
17 POWDER, FOR SOLUTION ORAL 2 TIMES DAILY
Status: DISCONTINUED | OUTPATIENT
Start: 2019-02-03 | End: 2019-02-04 | Stop reason: HOSPADM

## 2019-02-03 RX ORDER — NALOXONE HYDROCHLORIDE 0.4 MG/ML
.1-.4 INJECTION, SOLUTION INTRAMUSCULAR; INTRAVENOUS; SUBCUTANEOUS
Status: DISCONTINUED | OUTPATIENT
Start: 2019-02-03 | End: 2019-02-04 | Stop reason: HOSPADM

## 2019-02-03 RX ORDER — MECLIZINE HYDROCHLORIDE 25 MG/1
25 TABLET ORAL EVERY 6 HOURS PRN
Status: DISCONTINUED | OUTPATIENT
Start: 2019-02-03 | End: 2019-02-04 | Stop reason: HOSPADM

## 2019-02-03 RX ORDER — ACETAMINOPHEN 325 MG/1
650 TABLET ORAL EVERY 4 HOURS PRN
Status: DISCONTINUED | OUTPATIENT
Start: 2019-02-03 | End: 2019-02-04 | Stop reason: HOSPADM

## 2019-02-03 RX ORDER — TRIAMTERENE/HYDROCHLOROTHIAZID 37.5-25 MG
1 TABLET ORAL DAILY
Status: DISCONTINUED | OUTPATIENT
Start: 2019-02-04 | End: 2019-02-04 | Stop reason: HOSPADM

## 2019-02-03 RX ORDER — ACETAMINOPHEN 500 MG
1000 TABLET ORAL ONCE
Status: COMPLETED | OUTPATIENT
Start: 2019-02-03 | End: 2019-02-03

## 2019-02-03 RX ORDER — ALPRAZOLAM 0.25 MG
0.25 TABLET ORAL 3 TIMES DAILY PRN
Status: DISCONTINUED | OUTPATIENT
Start: 2019-02-03 | End: 2019-02-04 | Stop reason: HOSPADM

## 2019-02-03 RX ORDER — IOPAMIDOL 755 MG/ML
500 INJECTION, SOLUTION INTRAVASCULAR ONCE
Status: COMPLETED | OUTPATIENT
Start: 2019-02-03 | End: 2019-02-03

## 2019-02-03 RX ORDER — AMOXICILLIN 250 MG
2 CAPSULE ORAL 2 TIMES DAILY
Status: DISCONTINUED | OUTPATIENT
Start: 2019-02-03 | End: 2019-02-04 | Stop reason: HOSPADM

## 2019-02-03 RX ORDER — ESOMEPRAZOLE MAGNESIUM 40 MG/1
40 CAPSULE, DELAYED RELEASE ORAL DAILY
Status: DISCONTINUED | OUTPATIENT
Start: 2019-02-04 | End: 2019-02-04 | Stop reason: HOSPADM

## 2019-02-03 RX ORDER — ACETAMINOPHEN 325 MG/1
650 TABLET ORAL EVERY 4 HOURS PRN
Status: DISCONTINUED | OUTPATIENT
Start: 2019-02-03 | End: 2019-02-03

## 2019-02-03 RX ORDER — ONDANSETRON 4 MG/1
4 TABLET, ORALLY DISINTEGRATING ORAL EVERY 6 HOURS PRN
Status: DISCONTINUED | OUTPATIENT
Start: 2019-02-03 | End: 2019-02-04 | Stop reason: HOSPADM

## 2019-02-03 RX ORDER — OXYCODONE HYDROCHLORIDE 5 MG/1
5-10 TABLET ORAL
Status: DISCONTINUED | OUTPATIENT
Start: 2019-02-03 | End: 2019-02-03

## 2019-02-03 RX ORDER — ONDANSETRON 2 MG/ML
4 INJECTION INTRAMUSCULAR; INTRAVENOUS EVERY 6 HOURS PRN
Status: DISCONTINUED | OUTPATIENT
Start: 2019-02-03 | End: 2019-02-04 | Stop reason: HOSPADM

## 2019-02-03 RX ORDER — HYDROMORPHONE HYDROCHLORIDE 1 MG/ML
0.5 INJECTION, SOLUTION INTRAMUSCULAR; INTRAVENOUS; SUBCUTANEOUS
Status: DISCONTINUED | OUTPATIENT
Start: 2019-02-03 | End: 2019-02-04 | Stop reason: HOSPADM

## 2019-02-03 RX ADMIN — IOPAMIDOL 65 ML: 755 INJECTION, SOLUTION INTRAVENOUS at 18:07

## 2019-02-03 RX ADMIN — TRAMADOL HYDROCHLORIDE 50 MG: 50 TABLET ORAL at 23:53

## 2019-02-03 RX ADMIN — SODIUM CHLORIDE 1000 ML: 9 INJECTION, SOLUTION INTRAVENOUS at 17:18

## 2019-02-03 RX ADMIN — MAGNESIUM CITRATE 286 ML: 1.75 LIQUID ORAL at 19:15

## 2019-02-03 RX ADMIN — SENNOSIDES AND DOCUSATE SODIUM 1 TABLET: 8.6; 5 TABLET ORAL at 23:53

## 2019-02-03 RX ADMIN — ACETAMINOPHEN 1000 MG: 500 TABLET, FILM COATED ORAL at 20:45

## 2019-02-03 RX ADMIN — POLYETHYLENE GLYCOL 3350 17 G: 17 POWDER, FOR SOLUTION ORAL at 22:25

## 2019-02-03 RX ADMIN — ASPIRIN 325 MG: 325 TABLET, DELAYED RELEASE ORAL at 23:53

## 2019-02-03 RX ADMIN — SODIUM CHLORIDE 1000 ML: 9 INJECTION, SOLUTION INTRAVENOUS at 22:25

## 2019-02-03 ASSESSMENT — ENCOUNTER SYMPTOMS
VOMITING: 0
FEVER: 0
ABDOMINAL PAIN: 1
BLOOD IN STOOL: 0
DIARRHEA: 0
NAUSEA: 1
CONSTIPATION: 1

## 2019-02-03 ASSESSMENT — MIFFLIN-ST. JEOR
SCORE: 1058.81
SCORE: 1058.35

## 2019-02-03 NOTE — TELEPHONE ENCOUNTER
FNA triage call :   Presenting problem :  From [id number] Pt called.   Surgery total Right  knee on 1/21/19 by Dr Edilberto rm @ [place] [facility] .   Has UTI  and started  On 1/30/19 Rx  Sulfa and has 2 Rx doses  left . Having persisting  lower abdominal pressure and urgency and frequency , no relief from Antibiotic  , and is constipation .  Currently : denies fever or blood in urine or back pain  .   Guideline used :  Failure to improve with antibiotic and then urinary tract infection on antibiotic   Disposition and recommendations :  Have someone drive you to [facility] ED for eval and Pt agrees.   Caller verbalizes understanding and denies further questions and will call back if further symptoms to triage or questions  . Bety Alarcon RN  - [place] Nurse Advisor       Reason for Disposition    [1] Unable to urinate (or only a few drops) > 4 hours AND     [2] bladder feels very full (e.g., palpable bladder or strong urge to urinate)    Additional Information    Negative: Severe difficulty breathing (e.g., struggling for each breath, speaks in single words)    Negative: Sounds like a life-threatening emergency to the triager    Negative: [1] Recent hospitalization for pneumonia AND [2] taking an antibiotic    Negative: [1] Animal bite infection AND [2] taking an antibiotic    Negative: [1] Ear  infection (Otitis Media) AND [2] taking an antibiotic    Negative: [1] Ear  infection (Swimmer's Ear)) AND [2] taking an antibiotic    Negative: [1] Sinus infection AND [2] taking an antibiotic    Negative: [1] Strep throat AND [2] taking an antibiotic    [1] Urinary tract  infection (e.g., cystitis, pyelonephritis, urethritis, epididymitis) AND [2] male AND [3] taking an antibiotic    Negative: Shock suspected (e.g., cold/pale/clammy skin, too weak to stand, low BP, rapid pulse)    Negative: Sounds like a life-threatening emergency to the triager    Negative: Urinary tract infection suspected, but not taking  antibiotics    Protocols used: URINARY TRACT INFECTION ON ANTIBIOTIC FOLLOW-UP CALL - MALE-ADULT-, INFECTION ON ANTIBIOTIC FOLLOW-UP CALL-ADULT-AH

## 2019-02-03 NOTE — ED TRIAGE NOTES
Pt had knee replacement 2 weeks ago and is now having constipation, reporting passing only very small amount of stool.  She also feels urinary urgency and is on antibiotic.  She took dulcolax yesterday.  She is taking only 6 ounces of liquid since yesterday.

## 2019-02-03 NOTE — ED PROVIDER NOTES
History     Chief Complaint:  Abdominal pain and constipation.     HPI   Sofy Arita is a 73 year old female who presents with abdominal pain and constipation. The patient underwent a right knee replacement on 1/21 and is currently on Tramadol, Bactrim, and Senokot. She presents to the ED with constant lower abdominal pain and difficulty passing bowel movements. Her last bowel movement was yesterday at 1600 after taking Dulcolax this was small and painful to pass, but not black or tarry. She is also nauseated, but has not vomited. She says that she has only been drinking 5-6 oz of water per day and notes decreased urine output. She denies redness of the surgical site or fever, but endorses hot and cold flashes. The patient took Miralax an hour prior to arrival.     Allergies:  Ativan  Gabapentin  Metoprolol  Pantoprazole  Oxycodone     Medications:    Aspirin  Bactrim  Maxzide  Xanax  Miacalcin  Cholecalciferol  Glucosamine chondroitin  Meclizine  Melatonin  Nexium  Zantac  Senokot  Tramadol      Past Medical History:    Tinnitus  GERD  Hypertension  Anxiety  Pneumonia  Kidney stone  Osteoporosis  Meniere's disease  Diverticulosis    Past Surgical History:    Right TKR  Hysterectomy with left oophorectomy  Cataract surgery bilateral    Family History:    Palsy  Esophageal cancer  Lymph cancer  Diabetes  Parkinson's Hypertension  Bipolar disorder  Brain cancer    Social History:  Smoking Status: Never Smoker  Alcohol Use: Yes  Patient presents with son.   Marital Status:        Review of Systems   Constitutional: Negative for fever.   Gastrointestinal: Positive for abdominal pain, constipation and nausea. Negative for blood in stool, diarrhea and vomiting.   Genitourinary: Positive for decreased urine volume.   All other systems reviewed and are negative.      Physical Exam     Patient Vitals for the past 24 hrs:   BP Temp Temp src Pulse Heart Rate Resp SpO2 Height Weight   02/03/19 1715 142/85 -- -- 74  "-- -- -- -- --   02/03/19 1710 -- -- -- -- -- -- 97 % -- --   02/03/19 1705 -- -- -- -- -- -- 96 % -- --   02/03/19 1700 141/74 -- -- 76 -- -- 100 % -- --   02/03/19 1655 -- -- -- -- -- -- 99 % -- --   02/03/19 1650 -- -- -- -- -- -- 98 % -- --   02/03/19 1645 138/71 -- -- 74 -- -- 99 % -- --   02/03/19 1636 170/71 98.2  F (36.8  C) Oral -- 83 20 99 % 1.6 m (5' 3\") 59 kg (130 lb)   02/03/19 1635 170/71 -- -- 88 -- -- -- -- --      Physical Exam  General: Elderly female, sitting upright  Eyes: PERRL, Conjunctive within normal limits. No scleral icterus.  ENT: Moist mucous membranes, oropharynx clear.   CV: Normal S1S2, no murmur, rub or gallop. Regular rate and rhythm  Resp: Clear to auscultation bilaterally, no wheezes, rales or rhonchi. Normal respiratory effort.  GI: Abdomen is soft. Tender diffusely most prominent in the lower abdomen. Voluntary guarding. No rebound. No palpable masses.   MSK: Generalized edema of the right knee into the right lower leg. Ranges RLE at the knee without obvious discomfort. Bandage over right knee intact. No significant fluid accumulation.   Skin: Warm and dry. Mild surrounding erythema and scattered bruising right knee.   Neuro: Alert and oriented. Responds appropriately to all questions and commands. No focal findings appreciated. Normal muscle tone.  Psych: Normal mood and affect.    Emergency Department Course     Imaging:  Radiographic findings were communicated with the patient and Admitting MD who voiced understanding of the findings.    CT-scan Abdomen/Pelvis w/ contrast:  1. Colonic diverticulosis without convincing evidence for acute  diverticulitis. No evidence for bowel obstruction. Appendix is not  confidently identified.  2. Numerous small bilateral kidney stones redemonstrated without  evidence for obstruction.  3. Innumerable small hypodense kidney lesions are technically  indeterminate but may represent small cysts.  4. There is an indeterminate mildly lobulated 1.5 " cm hypodense right  kidney lesion without significant change in size since 2017 but  slightly hyperdense for a simple cyst. Could be a complex cyst or  solid lesion. Question of tiny internal septations on coronal series 3  image 65. Follow-up assessment with ultrasound and if necessary  dedicated renal protocol CT or MRI.  Preliminary result per radiology.      Laboratory:  CBC: HGB 11.0 (L),  (H), o/w WNL (WBC 7.3)  POCT Creatinine: 0.9  CMP: Potassium 3.3 (L), o/w WNL (Creatinine 0.82)  Lipase: 203  Lactic Acid: 1.1    UA: Bacteria few, Mucous present, o/w Negative     Interventions:  1718 - NS 1L IV Bolus   1915 - Pink Lady Enema 286 mL rectal  2045 - Tylenol 1000 mg PO    Emergency Department Course:  Past medical records, nursing notes, and vitals reviewed.  I performed an exam of the patient and obtained history, as documented above.     IV inserted and blood drawn. This was sent to laboratory for testing, findings above.     The patient was given an enema here in the ED.     1700: I rechecked the patient. Explained findings to patient. She denies any significant improvement in symptoms.    The patient was sent for a abdominopelvic CT while in the emergency department, findings above.     Enema ordered and performed. She had minimal output and continues to have severe waves of pain.    2020: I rechecked the patient. MinimalExplained findings and plan to patient who consents to admission.    2040: Discussed the patient with Dr. Rollins, who will admit the patient to an observation bed for further monitoring, evaluation, and treatment.      Impression & Plan      Medical Decision Making:  Cynthia Arita is a 73 year old female two weeks status/post right knee replacement on Tramadol and Tylenol for pain who presents to the emergency department with concern for decreased bowel movement with abdominal pain, nausea, and decreased oral intake. Here in the emergency department, she is somewhat anxious  appearing and uncomfortable. She has diffuse abdominal pain for which I was concerned about possible obstruction or other acute intraabdominal pathologies. Fortunately, CT scan did not show evidence of these problems. She incidentally had a renal lesion which would benefit from further evaluation as an outpatient. She continued to have ongoing pain, seeming to come in waves, suggesting perhaps rectal spasm. She did not benefit from enema here in the emergency department. Her son was concerned with taking her home and the patient felt uncomfortable with the thought of going home given her ongoing pain, but did decline most pain medications besides Tylenol given her troubles with any narcotics. She is recommended admission. She agrees with that plan. All questions answered prior to admission.    Diagnosis:    ICD-10-CM    1. Abdominal pain, unspecified abdominal location R10.9    2. Nausea R11.0    3. Decreased frequency of bowel movements R19.4    4. Renal lesion N28.9      Disposition:  Admitted to observation, accepted by Dr. Rollins of the hospitalist service.    Jt Galindo  2/3/2019   [facility] EMERGENCY DEPARTMENT    Scribe Disclosure:  I, Jt Galindo, am serving as a scribe at 4:52 PM on 2/3/2019 to document services personally performed by Naz Finch MD based on my observations and the provider's statements to me.        Naz Finch MD  02/03/19 0675

## 2019-02-04 ENCOUNTER — TRANSFERRED RECORDS (OUTPATIENT)
Dept: HEALTH INFORMATION MANAGEMENT | Facility: CLINIC | Age: 74
End: 2019-02-04

## 2019-02-04 ENCOUNTER — APPOINTMENT (OUTPATIENT)
Dept: ULTRASOUND IMAGING | Facility: CLINIC | Age: 74
End: 2019-02-04
Payer: MEDICARE

## 2019-02-04 VITALS
OXYGEN SATURATION: 98 % | RESPIRATION RATE: 16 BRPM | HEIGHT: 63 IN | BODY MASS INDEX: 23.02 KG/M2 | HEART RATE: 76 BPM | SYSTOLIC BLOOD PRESSURE: 117 MMHG | WEIGHT: 129.9 LBS | DIASTOLIC BLOOD PRESSURE: 69 MMHG | TEMPERATURE: 97.7 F

## 2019-02-04 LAB
ANION GAP SERPL CALCULATED.3IONS-SCNC: 8 MMOL/L (ref 3–14)
BUN SERPL-MCNC: 10 MG/DL (ref 7–30)
CALCIUM SERPL-MCNC: 8.5 MG/DL (ref 8.5–10.1)
CHLORIDE SERPL-SCNC: 105 MMOL/L (ref 94–109)
CO2 SERPL-SCNC: 25 MMOL/L (ref 20–32)
CREAT SERPL-MCNC: 0.63 MG/DL (ref 0.52–1.04)
GFR SERPL CREATININE-BSD FRML MDRD: 88 ML/MIN/{1.73_M2}
GLUCOSE SERPL-MCNC: 91 MG/DL (ref 70–99)
POTASSIUM SERPL-SCNC: 3.3 MMOL/L (ref 3.4–5.3)
SODIUM SERPL-SCNC: 138 MMOL/L (ref 133–144)

## 2019-02-04 PROCEDURE — 36415 COLL VENOUS BLD VENIPUNCTURE: CPT | Performed by: INTERNAL MEDICINE

## 2019-02-04 PROCEDURE — G0378 HOSPITAL OBSERVATION PER HR: HCPCS

## 2019-02-04 PROCEDURE — 93971 EXTREMITY STUDY: CPT | Mod: RT

## 2019-02-04 PROCEDURE — 25000132 ZZH RX MED GY IP 250 OP 250 PS 637: Mod: GY | Performed by: INTERNAL MEDICINE

## 2019-02-04 PROCEDURE — 96361 HYDRATE IV INFUSION ADD-ON: CPT

## 2019-02-04 PROCEDURE — 99217 ZZC OBSERVATION CARE DISCHARGE: CPT | Performed by: PHYSICIAN ASSISTANT

## 2019-02-04 PROCEDURE — A9270 NON-COVERED ITEM OR SERVICE: HCPCS | Mod: GY | Performed by: PHYSICIAN ASSISTANT

## 2019-02-04 PROCEDURE — 80048 BASIC METABOLIC PNL TOTAL CA: CPT | Performed by: INTERNAL MEDICINE

## 2019-02-04 PROCEDURE — 25000132 ZZH RX MED GY IP 250 OP 250 PS 637: Mod: GY | Performed by: PHYSICIAN ASSISTANT

## 2019-02-04 PROCEDURE — A9270 NON-COVERED ITEM OR SERVICE: HCPCS | Mod: GY | Performed by: INTERNAL MEDICINE

## 2019-02-04 RX ORDER — POTASSIUM CHLORIDE 1.5 G/1.58G
20 POWDER, FOR SOLUTION ORAL ONCE
Status: COMPLETED | OUTPATIENT
Start: 2019-02-04 | End: 2019-02-04

## 2019-02-04 RX ORDER — POTASSIUM CHLORIDE 1500 MG/1
20 TABLET, EXTENDED RELEASE ORAL 2 TIMES DAILY
Qty: 4 TABLET | Refills: 0 | Status: SHIPPED | OUTPATIENT
Start: 2019-02-04 | End: 2019-09-12

## 2019-02-04 RX ADMIN — ACETAMINOPHEN 650 MG: 325 TABLET, FILM COATED ORAL at 07:35

## 2019-02-04 RX ADMIN — POTASSIUM CHLORIDE 20 MEQ: 1.5 POWDER, FOR SOLUTION ORAL at 10:39

## 2019-02-04 RX ADMIN — SENNOSIDES AND DOCUSATE SODIUM 2 TABLET: 8.6; 5 TABLET ORAL at 07:51

## 2019-02-04 RX ADMIN — TRAMADOL HYDROCHLORIDE 50 MG: 50 TABLET ORAL at 13:47

## 2019-02-04 RX ADMIN — ALPRAZOLAM 0.25 MG: 0.25 TABLET ORAL at 07:35

## 2019-02-04 RX ADMIN — TRAMADOL HYDROCHLORIDE 50 MG: 50 TABLET ORAL at 05:52

## 2019-02-04 RX ADMIN — POLYETHYLENE GLYCOL 3350 17 G: 17 POWDER, FOR SOLUTION ORAL at 07:52

## 2019-02-04 RX ADMIN — ESOMEPRAZOLE MAGNESIUM 40 MG: 40 CAPSULE, DELAYED RELEASE ORAL at 07:54

## 2019-02-04 RX ADMIN — TRIAMTERENE AND HYDROCHLOROTHIAZIDE 1 TABLET: 37.5; 25 TABLET ORAL at 07:51

## 2019-02-04 RX ADMIN — ASPIRIN 325 MG: 325 TABLET, DELAYED RELEASE ORAL at 07:51

## 2019-02-04 NOTE — PLAN OF CARE
PRIMARY DIAGNOSIS: Constipation  OUTPATIENT/OBSERVATION GOALS TO BE MET BEFORE DISCHARGE:  1. ADLs back to baseline: No     2. Activity and level of assistance: Up with standby assistance.     3. Pain status: Improved-controlled with oral pain medications.     4. Return to near baseline physical activity: Yes          Discharge Planner Nurse   Safe discharge environment identified: Yes   Barriers to discharge: Yes       Entered by: Clover Henderson 02/04/2019 5:20 AM    Please review provider order for any additional goals.   Nurse to notify provider when observation goals have been met and patient is ready for discharge.    Pt slept on and off during the night. VSS. A&Ox4. Pain managed with Ultram. Miralax and senna given. No BM this shift. AUO. IVF infusing. Transfes with SBA with walker. R knee dressing intact with small dried drainage. Pt very anxious about knee. CMS intact. Will continue to monitor.

## 2019-02-04 NOTE — H&P
Hospitalist Observation Admission Note([facility])    Name: Cynthia Arita    MRN: [id number]          YOB: 1945    Age: 73 year old    Date of admission: 2/3/2019    Primary care provider: Huyen Samuels  Admitting physician:Hubert Rollins                 Assessment      Brief summary of admission assessment:Cynthia Arita is a 73 year old  female with a significant past medical history of recent knee surgery, HTN ,nephrolithiasis, diverticulosis, anxiety   who presents with abdominal pain and constipation.     ED evaluation revealed no acute findings on CT abdomen and pelvis.  She received pink lady enema, tylenol , IVF without resolution of pain and family concerned about discharged from ED.          #1.Abdominal pain: Likely secondary to constipation.  CT scan did not show any evidence of a bowel obstruction or acute diverticulitis  #2.Constipation   #3.Hypokalemia   #4.Mild anemia: Postoperative blood loss anemia recently  #5.Stable abnormal findings on CT result     Colonic diverticulosis     Nephrolithiasis    Renal lesions , suspected cysts but need to be followed by PCP       #6.  Status post recent right total knee replacement by Dr. Denton Amor: Patient was sent to TCU on discharge.  She left TCU last Thursday.  -Patient is complaining of right lower extremity pain, swelling and would like to see Dr. Amor.      Reason for admission:    Observation admission for monitoring and management of abdominal pain, constipation, postoperative pain    Observation Goals: Pain controlled, patient seen by Dr. Amor or his associates if possible           Comorbid conditions:      History of hypertension, Ménière's disease, nephrolithiasis, degenerative joint disease, anxiety, diverticulosis             Plan     > Admission Status:Admit to observation     >Care plan:  --admit to obs for symptom control   -- pain medication with tramadol  -- schedule miralax  -- replace k   --Patient is due  to see Dr. Amor of orthopedic surgery and would like to be seen by him due to complaint of swelling and pain.      >Supportive care:Pain management: anxiolytics and oral narcotics  Nausea and vomiting control measures    >Diet:Diet advanced    >Activity:Advance activity as tolerated    >Education/Counseling :Discussed treatment plan with the patient    >Consults:Inpatient consult with orthopedics    >VTE prophylactic measures:prophylaxis against venous thromboembolism    >Therapies:Physical therapy      >Additional orders    --Care plan discussed with the patient/family and agreed to care plan   --Patient will be transferred to care of hospitalist attending for further evaluation and management as appropriate   --Old medical orders reviewed   --imaging result independently reviewed by me     (See orders placed for this visit by me )     - Home medication reviewed and will be continued as appropriate once pharmacy reconciliation is completed             Code Status:     Full Code         Disposition:       >anticipate discharge to home and Anticipate discharge in 1 days            Disclaimer: This note consists of symbols derived from keyboarding, dictation and/or voice recognition software. As a result, there may be errors in the script that have gone undetected. Please consider this when interpreting information found in this chart.             Chief Complaint:     Abdominal pain     History is obtained from the patient          History of Present Illness:   This patient is a 73 year old  female with a significant past medical history of recent knee replacement who presents with the following condition requiring a hospital admission:    Abdominal pain, right postoperative knee pain    Ms. Arita is a 73-year-old female who is here for joint surgery by Dr. Denton Amor and she was sent to TCU.  She left tissue lab test and since Thursday she complained of lower abdominal pain, nausea and constipation.  Patient  denies any vomiting.  She has no fever or chills.  No chest pain or shortness of breath.  She also has some increased pain of right lower extremity and swelling.  Her lower extremity pain is a burning.  She denies any urinary symptoms.                Past Medical History:     Past Medical History:   Diagnosis Date     Anxiety      Complication of anesthesia      Diverticulosis      GERD (gastroesophageal reflux disease)      HTN (hypertension)      Meniere's disease      Nephrolithiasis      Pain in right knee      PONV (postoperative nausea and vomiting)             Past Surgical History:     Past Surgical History:   Procedure Laterality Date     ARTHROPLASTY KNEE Right 1/21/2019    Procedure: Right total knee arthroplasty;  Surgeon: Denton Amor MD;  Location: [facility] OR     C VAGINAL HYSTERECTOMY      with left oophorectomy     EYE SURGERY      cataract surgery both eyes             Social History:     Social History     Tobacco Use     Smoking status: Never Smoker     Smokeless tobacco: Never Used   Substance Use Topics     Alcohol use: Yes     Comment: rare             Family History:     Family History   Problem Relation Age of Onset     Neurologic Disorder Mother         palsy     Esophageal Cancer Father      Cancer Maternal Grandmother         lymph     Diabetes Paternal Grandmother      Neurologic Disorder Sister         Parkinson's     Hypertension Brother      Bipolar Disorder Sister      Brain Cancer Son 17            Allergies:     Allergies   Allergen Reactions     Ativan [Lorazepam]      Sick -      Gabapentin Nausea     Metoprolol      Nausea       Pantoprazole Other (See Comments), Nausea and Vomiting and GI Disturbance     Red in the face, sleepiness, face swelling, joint pain, dizziness     Oxycodone Anxiety             Medications:         Prior to Admission medications    Medication Sig Last Dose Taking? Auth Provider   aspirin (ASA) 325 MG EC tablet Take 1 tablet (325 mg) by mouth 2 times  daily 2/3/2019 at Unknown time Yes Chanelle Hua PA-C   sulfamethoxazole-trimethoprim (BACTRIM DS/SEPTRA DS) 800-160 MG tablet Take 1 tablet by mouth 2 times daily for 5 days 2/3/2019 at Unknown time Yes Katie Dotson APRN CNP   triamterene-HCTZ (MAXZIDE-25) 37.5-25 MG tablet Take 1 tablet by mouth daily 2/3/2019 at Unknown time Yes Huyen Samuels MD   acetaminophen (TYLENOL) 325 MG tablet Take 2 tablets (650 mg) by mouth every 4 hours as needed for other (multimodal surgical pain management along with NSAIDS and opioid medication as indicated based on pain control and physical function.)   Chanelle Hua PA-C   ALPRAZolam (XANAX) 0.5 MG tablet Take 0.25 mg by mouth 3 times daily as needed for anxiety   Reported, Patient   calcitonin, salmon, (MIACALCIN) 200 UNIT/ACT nasal spray USE 1 SPRAY IN 1 NOSTRIL  DAILY (ALTERNATING NOSTRILS DAILY)   Huyen Samuels MD   Cholecalciferol (VITAMIN D3) 2000 UNITS CAPS Take 2,000 Units by mouth every morning    Reported, Patient   Flaxseed, Linseed, (FLAXSEED OIL) 1000 MG CAPS Take 1,000 mg by mouth every evening    Reported, Patient   Glucosamine-Chondroitin (GLUCOSAMINE CHONDR COMPLEX PO) Take 1 capsule by mouth 2 times daily    Reported, Patient   meclizine (ANTIVERT) 25 MG tablet Take 1 tablet (25 mg) by mouth every 6 hours as needed for dizziness   He[place] Branch MD   melatonin 3 MG tablet Take 1 tablet (3 mg) by mouth nightly as needed for sleep   Jt Thomas MD   Multiple Vitamins-Minerals (CENTRUM SILVER) per tablet Take 1 tablet by mouth daily   Reported, Patient   Multiple Vitamins-Minerals (OCUVITE PRESERVISION PO) Take 1 tablet by mouth 2 times daily    Reported, Patient   NEXIUM 40 MG CR capsule Take 40 mg by mouth daily    Reported, Patient   Omega-3 Fatty Acids (OMEGA-3 FISH OIL) 1000 MG CAPS Take 1,000 mg by mouth 2 times daily    Reported, Patient   RANITIDINE HCL PO Take 150 mg by mouth At Bedtime    Reported,  Patient   senna-docusate (SENOKOT-S/PERICOLACE) 8.6-50 MG tablet Take 2 tablets by mouth 2 times daily   Chanelle Hua PA-C   traMADol (ULTRAM) 50 MG tablet 1/2 tab po q 6 hrs prn pain scale 3-6,   1 tab po q 6hrs prn pain scale 7-10   Chanelle Hua PA-C          Review of Systems:     A Comprehensive greater than 10 system review of systems was carried out.  Pertinent positives and negatives are noted above in HPI.  Otherwise negative for contributory information.              Physical Exam:     Vitals were reviewed    Temp:  [98.2  F (36.8  C)] 98.2  F (36.8  C)  Pulse:  [74-88] 74  Heart Rate:  [83] 83  Resp:  [20] 20  BP: (138-170)/(71-85) 142/85  SpO2:  [96 %-100 %] 97 %      GEN:   Alert, oriented x 3, appears comfortable, NAD.  NECK:Supple ,no mass or thyromegaly   HEENT:   Normocephalic/atraumatic, no scleral icterus, no nasal discharge, mouth moist.  CV:   Regular rate and rhythm, no murmur or JVD.  S1 + S2 noted, no S3 or S4.  LUNGS:   Clear to auscultation bilaterally without rales/rhonchi/wheezing/retractions.  Symmetric chest rise on inhalation noted.  ABD: Abdomen is soft, nontender and upper abdomen but she has lower abdominal tenderness but no rebound tenderness.  Bowel sounds normoactive.  No hepatosplenomegaly.  EXT: Right lower extremity swelling, postoperative dressing right knee.  Slight erythematous area but no definitive cellulitis or drainage evident.  SKIN:  Dry to touch, no exanthems noted in the visualized areas.  Neurologic:Grossly intact,non focal     Neuropsychiatric:  General: normal, calm and normal eye contact  Level of consciousness: alert / normal  Affect: normal  Orientation: oriented to self, place, time and situation           Data:       All laboratory and imaging data in the past 24 hours reviewed     Results for orders placed or performed during the hospital encounter of 02/03/19   CT Abdomen Pelvis w Contrast    Narrative    CT ABDOMEN AND PELVIS WITH  CONTRAST 2/3/2019 6:11 PM    TECHNIQUE: Images from diaphragm to pubic symphysis 65mL Isovue-370 IV  contrast.  Radiation dose for this scan was reduced using automated exposure  control, adjustment of the mA and/or kV according to patient size, or  iterative reconstruction technique.    HISTORY: Abdominal pain, unspecified.    COMPARISON: 10/11/2017 CT abdomen and pelvis.    FINDINGS:   Abdomen and Pelvis: Lung bases clear. Few subcentimeter low-dense  liver lesions are unchanged since 10/11/2017 likely representing  cysts. Probable focal fatty infiltration in the left lobe of the liver  also unchanged. Normal-appearing gallbladder, spleen, pancreas and  adrenal glands.    Multiple low attenuation kidney lesions consistent with cysts are  redemonstrated. Subcentimeter lesions are too small to characterize  but would be consistent with very small cyst. Bilateral intrarenal  kidney stones more numerous on the right are identified. There is mild  segment of right hydroureter unchanged in the upper pelvis but no  evidence for ureteral calculi or hydronephrosis. Distended bladder.    Prominent sigmoid diverticulosis is redemonstrated with more mild  descending colon diverticulosis without convincing evidence for acute  diverticulitis. No free fluid. Absent uterus.      Impression    IMPRESSION:  1. Colonic diverticulosis without convincing evidence for acute  diverticulitis. No evidence for bowel obstruction. Appendix is not  confidently identified.  2. Numerous small bilateral kidney stones redemonstrated without  evidence for obstruction.  3. Innumerable small hypodense kidney lesions are technically  indeterminate but may represent small cysts.  4. There is an indeterminate mildly lobulated 1.5 cm hypodense right  kidney lesion without significant change in size since 2017 but  slightly hyperdense for a simple cyst. Could be a complex cyst or  solid lesion. Question of tiny internal septations on coronal series  3  image 65. Follow-up assessment with ultrasound and if necessary  dedicated renal protocol CT or MRI.               SELENA SOSA MD   UA with Microscopic   Result Value Ref Range    Color Urine Yellow     Appearance Urine Slightly Cloudy     Glucose Urine Negative NEG^Negative mg/dL    Bilirubin Urine Negative NEG^Negative    Ketones Urine Negative NEG^Negative mg/dL    Specific Gravity Urine 1.008 1.003 - 1.035    Blood Urine Negative NEG^Negative    pH Urine 7.0 5.0 - 7.0 pH    Protein Albumin Urine Negative NEG^Negative mg/dL    Urobilinogen mg/dL 0.0 0.0 - 2.0 mg/dL    Nitrite Urine Negative NEG^Negative    Leukocyte Esterase Urine Negative NEG^Negative    Source Midstream Urine     WBC Urine 4 0 - 5 /HPF    RBC Urine <1 0 - 2 /HPF    Bacteria Urine Few (A) NEG^Negative /HPF    Squamous Epithelial /HPF Urine 1 0 - 1 /HPF    Mucous Urine Present (A) NEG^Negative /LPF   CBC + differential   Result Value Ref Range    WBC 7.3 4.0 - 11.0 10e9/L    RBC Count 3.80 3.8 - 5.2 10e12/L    Hemoglobin 11.0 (L) 11.7 - 15.7 g/dL    Hematocrit 34.5 (L) 35.0 - 47.0 %    MCV 91 78 - 100 fl    MCH 28.9 26.5 - 33.0 pg    MCHC 31.9 31.5 - 36.5 g/dL    RDW 15.2 (H) 10.0 - 15.0 %    Platelet Count 596 (H) 150 - 450 10e9/L    Diff Method Automated Method     % Neutrophils 78.1 %    % Lymphocytes 11.4 %    % Monocytes 8.9 %    % Eosinophils 0.6 %    % Basophils 0.4 %    % Immature Granulocytes 0.6 %    Nucleated RBCs 0 0 /100    Absolute Neutrophil 5.7 1.6 - 8.3 10e9/L    Absolute Lymphocytes 0.8 0.8 - 5.3 10e9/L    Absolute Monocytes 0.7 0.0 - 1.3 10e9/L    Absolute Eosinophils 0.0 0.0 - 0.7 10e9/L    Absolute Basophils 0.0 0.0 - 0.2 10e9/L    Abs Immature Granulocytes 0.0 0 - 0.4 10e9/L    Absolute Nucleated RBC 0.0    Lactic acid whole blood   Result Value Ref Range    Lactic Acid 1.1 0.7 - 2.0 mmol/L   Comprehensive metabolic panel   Result Value Ref Range    Sodium 134 133 - 144 mmol/L    Potassium 3.3 (L) 3.4 - 5.3 mmol/L     Chloride 99 94 - 109 mmol/L    Carbon Dioxide 25 20 - 32 mmol/L    Anion Gap 10 3 - 14 mmol/L    Glucose 90 70 - 99 mg/dL    Urea Nitrogen 16 7 - 30 mg/dL    Creatinine 0.82 0.52 - 1.04 mg/dL    GFR Estimate 70 >60 mL/min/[1.73_m2]    GFR Estimate If Black 82 >60 mL/min/[1.73_m2]    Calcium 9.6 8.5 - 10.1 mg/dL    Bilirubin Total 0.3 0.2 - 1.3 mg/dL    Albumin 3.4 3.4 - 5.0 g/dL    Protein Total 7.4 6.8 - 8.8 g/dL    Alkaline Phosphatase 123 40 - 150 U/L    ALT 31 0 - 50 U/L    AST 31 0 - 45 U/L   Lipase   Result Value Ref Range    Lipase 203 73 - 393 U/L   Creatinine POCT   Result Value Ref Range    Creatinine 0.9 0.52 - 1.04 mg/dL    GFR Estimate 61 >60 mL/min/[1.73_m2]    GFR Estimate If Black 74 >60 mL/min/[1.73_m2]            Recent Results (from the past 48 hour(s))   CT Abdomen Pelvis w Contrast    Narrative    CT ABDOMEN AND PELVIS WITH CONTRAST 2/3/2019 6:11 PM    TECHNIQUE: Images from diaphragm to pubic symphysis 65mL Isovue-370 IV  contrast.  Radiation dose for this scan was reduced using automated exposure  control, adjustment of the mA and/or kV according to patient size, or  iterative reconstruction technique.    HISTORY: Abdominal pain, unspecified.    COMPARISON: 10/11/2017 CT abdomen and pelvis.    FINDINGS:   Abdomen and Pelvis: Lung bases clear. Few subcentimeter low-dense  liver lesions are unchanged since 10/11/2017 likely representing  cysts. Probable focal fatty infiltration in the left lobe of the liver  also unchanged. Normal-appearing gallbladder, spleen, pancreas and  adrenal glands.    Multiple low attenuation kidney lesions consistent with cysts are  redemonstrated. Subcentimeter lesions are too small to characterize  but would be consistent with very small cyst. Bilateral intrarenal  kidney stones more numerous on the right are identified. There is mild  segment of right hydroureter unchanged in the upper pelvis but no  evidence for ureteral calculi or hydronephrosis. Distended  bladder.    Prominent sigmoid diverticulosis is redemonstrated with more mild  descending colon diverticulosis without convincing evidence for acute  diverticulitis. No free fluid. Absent uterus.      Impression    IMPRESSION:  1. Colonic diverticulosis without convincing evidence for acute  diverticulitis. No evidence for bowel obstruction. Appendix is not  confidently identified.  2. Numerous small bilateral kidney stones redemonstrated without  evidence for obstruction.  3. Innumerable small hypodense kidney lesions are technically  indeterminate but may represent small cysts.  4. There is an indeterminate mildly lobulated 1.5 cm hypodense right  kidney lesion without significant change in size since 2017 but  slightly hyperdense for a simple cyst. Could be a complex cyst or  solid lesion. Question of tiny internal septations on coronal series 3  image 65. Follow-up assessment with ultrasound and if necessary  dedicated renal protocol CT or MRI.                     All imaging studies reviewed by me.       Patient`s old medical records reviewed and case discussed with the ED physician.    ED course-Reviewed

## 2019-02-04 NOTE — DISCHARGE SUMMARY
[facility]  Discharge Summary        Cynthia Arita MRN# [id number]   YOB: 1945 Age: 73 year old     Date of Admission:  2/3/2019  Date of Discharge:  2/4/2019  Admitting Physician:  Hubert Rollins MD  Discharge Physician: Yaritza Jordan PA-C  Discharging Service: Hospitalist     Primary Provider: Huyen Samuels  Primary Care Physician Phone Number: [phone]         Discharge Diagnoses/Problem Oriented Hospital Course (Providers):    Cynthia Arita was admitted on 2/3/2019 by Hubert Rollins MD and I would refer you to their history and physical.  The following problems were addressed during her hospitalization:  1. Abdominal discomfort likely 2/2 mild constipation  2. S/p recent TKA on Tramadol that likely contributed to #1  3. Recent UTI s/p treatment with Bactrim  4. Mild Hypokalemia s/p replacement.          Code Status:      Full Code        Brief Hospital Stay Summary Sent Home With Patient in AVS:        Reason for your hospital stay      You were admitted for concerns of abdominal discomfort. You were given   enemas and that seemed to have improved your symptoms. Continue to take   senokot as scheduled. Also you will need a couple days of potassium   supplement. Make an appointment with your PCP in 1 week and have repeat   Potassium at that time.       Brief summary of admission assessment:Cynthia Arita is a 73 year old  female with a significant past medical history of recent knee surgery, HTN ,nephrolithiasis, diverticulosis, anxiety   who presents with abdominal pain and constipation. ED evaluation revealed no acute findings on CT abdomen and pelvis.  She received pink lady enema, tylenol , IVF without resolution of pain and family concerned about discharged from ED.      #1.Abdominal pain: Likely secondary to constipation.  CT scan did not show any evidence of a bowel obstruction or acute diverticulitis. She continued to have a couple of nice formed stools  while in the hospital. Her pain improved and was discharged home with senokot.   #2.Constipation --resolved  #3.Hypokalemia --s/p replacement , will need a couple doses of potassium at discharge. Will get repeat BMP later this week.   #4.Mild anemia: Postoperative blood loss anemia recently, stable.   #5.Stable abnormal findings on CT result     Colonic diverticulosis     Nephrolithiasis    Renal lesions , suspected cysts but need to be followed by PCP        #6.  Status post recent right total knee replacement by Dr. Denton Amor: doppler US negative for DVT.   C/o burning like sensation and pain around the knee. Has gabapentin at home, recommended by Ortho to take 1 tab at bedtime PRN. Seen by ortho. Stitches out. Follow up as instructed.                 Important Results:      Recent Labs   Lab 02/03/19  1719 01/30/19  1000   WBC 7.3 8.6   HGB 11.0* 10.5*   HCT 34.5* 32.6*   MCV 91 90   * 551*     Recent Labs   Lab 02/04/19  0611 02/03/19  1727 02/03/19  1719 01/30/19  1000     --  134 137   POTASSIUM 3.3*  --  3.3* 3.4   CHLORIDE 105  --  99 98   CO2 25  --  25 30   ANIONGAP 8  --  10 9   GLC 91  --  90 109*   BUN 10  --  16 15   CR 0.63  --  0.82 0.51*   GFRESTIMATED 88 61 70 >90   GFRESTBLACK >90 74 82 >90   ANDERS 8.5  --  9.6 9.3     No results for input(s): CULT in the last 168 hours.           Pending Results:        Unresulted Labs Ordered in the Past 30 Days of this Admission     No orders found for last 61 day(s).            Discharge Instructions and Follow-Up:      Follow-up Appointments     Follow-up and recommended labs and tests       Follow up with primary care provider, Huyen Samuels, within 7 days   for hospital follow- up.  The following labs/tests are recommended: BMP.                 Discharge Disposition:      Discharged to home         Discharge Medications:        Current Discharge Medication List      START taking these medications    Details   potassium chloride ER  (K-DUR/KLOR-CON M) 20 MEQ CR tablet Take 1 tablet (20 mEq) by mouth 2 times daily for 2 days  Qty: 4 tablet, Refills: 0    Associated Diagnoses: Hypokalemia         CONTINUE these medications which have NOT CHANGED    Details   acetaminophen (TYLENOL) 325 MG tablet Take 2 tablets (650 mg) by mouth every 4 hours as needed for other (multimodal surgical pain management along with NSAIDS and opioid medication as indicated based on pain control and physical function.)    Associated Diagnoses: Status post total right knee replacement      ALPRAZolam (XANAX) 0.5 MG tablet Take 0.25 mg by mouth 3 times daily as needed for anxiety      calcitonin, salmon, (MIACALCIN) 200 UNIT/ACT nasal spray USE 1 SPRAY IN 1 NOSTRIL  DAILY (ALTERNATING NOSTRILS DAILY)  Qty: 11.1 mL, Refills: 3    Associated Diagnoses: Age-related osteoporosis without current pathological fracture      Cholecalciferol (VITAMIN D3) 2000 UNITS CAPS Take 2,000 Units by mouth every morning       Flaxseed, Linseed, (FLAXSEED OIL) 1000 MG CAPS Take 1,000 mg by mouth every evening       Glucosamine-Chondroitin (GLUCOSAMINE CHONDR COMPLEX PO) Take 1 capsule by mouth 2 times daily       meclizine (ANTIVERT) 25 MG tablet Take 1 tablet (25 mg) by mouth every 6 hours as needed for dizziness  Qty: 30 tablet, Refills: 1      !! Multiple Vitamins-Minerals (CENTRUM SILVER) per tablet Take 1 tablet by mouth daily      !! Multiple Vitamins-Minerals (OCUVITE PRESERVISION PO) Take 1 tablet by mouth 2 times daily       NEXIUM 40 MG CR capsule Take 40 mg by mouth daily       Omega-3 Fatty Acids (OMEGA-3 FISH OIL) 1000 MG CAPS Take 1,000 mg by mouth 2 times daily       RANITIDINE HCL PO Take 150 mg by mouth At Bedtime       senna-docusate (SENOKOT-S/PERICOLACE) 8.6-50 MG tablet Take 2 tablets by mouth 2 times daily  Qty: 120 tablet, Refills: 0    Associated Diagnoses: Status post total right knee replacement      sulfamethoxazole-trimethoprim (BACTRIM DS/SEPTRA DS) 800-160 MG  "tablet Take 1 tablet by mouth 2 times daily for 5 days  Qty: 10 tablet, Refills: 0    Associated Diagnoses: Urinary tract infection without hematuria, site unspecified      traMADol (ULTRAM) 50 MG tablet 1/2 tab po q 6 hrs prn pain scale 3-6,   1 tab po q 6hrs prn pain scale 7-10  Qty: 40 tablet, Refills: 0    Associated Diagnoses: Status post total right knee replacement      triamterene-HCTZ (MAXZIDE-25) 37.5-25 MG tablet Take 1 tablet by mouth daily  Qty: 90 tablet, Refills: 3    Associated Diagnoses: Essential hypertension      aspirin (ASA) 325 MG EC tablet Take 1 tablet (325 mg) by mouth 2 times daily  Qty: 60 tablet, Refills: 0    Associated Diagnoses: Status post total right knee replacement       !! - Potential duplicate medications found. Please discuss with provider.            Allergies:         Allergies   Allergen Reactions     Ativan [Lorazepam]      Sick -      Gabapentin Nausea     Metoprolol      Nausea       Pantoprazole Other (See Comments), Nausea and Vomiting and GI Disturbance     Red in the face, sleepiness, face swelling, joint pain, dizziness     Oxycodone Anxiety           Consultations This Hospital Stay:      Consultation during this admission received from orthopedics         Condition and Physical on Discharge:      Discharge condition: Stable   Vitals: Blood pressure 117/69, pulse 76, temperature 97.7  F (36.5  C), temperature source Oral, resp. rate 16, height 1.6 m (5' 3\"), weight 58.9 kg (129 lb 14.4 oz), SpO2 98 %, not currently breastfeeding.  129 lbs 14.4 oz      GENERAL:  Comfortable.  PSYCH: pleasant, oriented, No acute distress.  HEENT:  PERRLA. Normal conjunctiva, normal hearing, nasal mucosa and Oropharynx are normal.  NECK:  Supple, no neck vein distention, adenopathy or bruits, normal thyroid.  HEART:  Normal S1, S2 with no murmur, no pericardial rub, gallops or S3 or S4.  LUNGS:  Clear to auscultation, normal Respiratory effort. No wheezing, rales or ronchi.  ABDOMEN:  " Soft, no hepatosplenomegaly, normal bowel sounds. Non-tender, non distended.   EXTREMITIES:  No pedal edema, +2 pulses bilateral and equal. Right knee incision c/d/i, mild area of swelling and ecchymosis of the medial knee and ankle  SKIN:  Dry to touch, No rash, wound or ulcerations.  NEUROLOGIC:  CN 2-12 intact, BL 5/5 symmetric upper and lower extremity strength, sensation is intact with no focal deficits.         Discharge Time:      >30 mins        Image Results From This Hospital Stay (For Non-EPIC Providers):        Results for orders placed or performed during the hospital encounter of 02/03/19   CT Abdomen Pelvis w Contrast    Narrative    CT ABDOMEN AND PELVIS WITH CONTRAST 2/3/2019 6:11 PM    TECHNIQUE: Images from diaphragm to pubic symphysis 65mL Isovue-370 IV  contrast.  Radiation dose for this scan was reduced using automated exposure  control, adjustment of the mA and/or kV according to patient size, or  iterative reconstruction technique.    HISTORY: Abdominal pain, unspecified.    COMPARISON: 10/11/2017 CT abdomen and pelvis.    FINDINGS:   Abdomen and Pelvis: Lung bases clear. Few subcentimeter low-dense  liver lesions are unchanged since 10/11/2017 likely representing  cysts. Probable focal fatty infiltration in the left lobe of the liver  also unchanged. Normal-appearing gallbladder, spleen, pancreas and  adrenal glands.    Multiple low attenuation kidney lesions consistent with cysts are  redemonstrated. Subcentimeter lesions are too small to characterize  but would be consistent with very small cyst. Bilateral intrarenal  kidney stones more numerous on the right are identified. There is mild  segment of right hydroureter unchanged in the upper pelvis but no  evidence for ureteral calculi or hydronephrosis. Distended bladder.    Prominent sigmoid diverticulosis is redemonstrated with more mild  descending colon diverticulosis without convincing evidence for acute  diverticulitis. No free fluid.  Absent uterus.      Impression    IMPRESSION:  1. Colonic diverticulosis without convincing evidence for acute  diverticulitis. No evidence for bowel obstruction. Appendix is not  confidently identified.  2. Numerous small bilateral kidney stones redemonstrated without  evidence for obstruction.  3. Innumerable small hypodense kidney lesions are technically  indeterminate but may represent small cysts.  4. There is an indeterminate mildly lobulated 1.5 cm hypodense right  kidney lesion without significant change in size since 2017 but  slightly hyperdense for a simple cyst. Could be a complex cyst or  solid lesion. Question of tiny internal septations on coronal series 3  image 65. Follow-up assessment with ultrasound and if necessary  dedicated renal protocol CT or MRI.               SELENA SOSA MD   US Lower Extremity Venous Duplex Right    Narrative    VENOUS ULTRASOUND RIGHT LEG  2/4/2019 9:30 AM     HISTORY: Right total knee arthroplasty. Swelling. Rule out deep venous  thrombosis.    COMPARISON: 10/1/2018    FINDINGS:  Examination of the deep veins with graded compression and  color flow Doppler with spectral wave form analysis shows no evidence  of thrombus in the common femoral vein, femoral vein, popliteal vein  or calf veins.  There is no venous insufficiency.      Impression    IMPRESSION: No evidence of deep venous thrombosis.  No change.    YAIR GARCIA MD

## 2019-02-04 NOTE — PLAN OF CARE
PRIMARY DIAGNOSIS: Constipation  OUTPATIENT/OBSERVATION GOALS TO BE MET BEFORE DISCHARGE:  1. ADLs back to baseline: No    2. Activity and level of assistance: Up with standby assistance.    3. Pain status: Improved-controlled with oral pain medications.    4. Return to near baseline physical activity: Yes     Discharge Planner Nurse   Safe discharge environment identified: Yes   Barriers to discharge: Yes       Entered by: Clover Henderson 02/04/2019 1:06 AM     Please review provider order for any additional goals.   Nurse to notify provider when observation goals have been met and patient is ready for discharge.

## 2019-02-04 NOTE — PHARMACY-ADMISSION MEDICATION HISTORY
Admission medication history interview status for this patient is complete. See [facility] admission navigator for allergy information, prior to admission medications and immunization status.     Medication history interview source(s):Patient  Medication history resources (including written lists, pill bottles, clinic record):None  Primary pharmacy:[facility]    Changes made to PTA medication list:  Added: none  Deleted: melatonin  Changed: aspirin- stopped taking after leaving TCU    Actions taken by pharmacist (provider contacted, etc):None     Additional medication history information: According to patient, she stopped taking the aspirin two times daily after leaving the TCU    Medication reconciliation/reorder completed by provider prior to medication history? Yes    Do you take OTC medications (eg tylenol, ibuprofen, fish oil, eye/ear drops, etc)? Y(Y/N)    For patients on insulin therapy: N (Y/N)    Time spent in this activity: 10 min    Prior to Admission medications    Medication Sig Last Dose Taking? Auth Provider   acetaminophen (TYLENOL) 325 MG tablet Take 2 tablets (650 mg) by mouth every 4 hours as needed for other (multimodal surgical pain management along with NSAIDS and opioid medication as indicated based on pain control and physical function.) 2/3/2019 at Unknown time Yes Chanelle Hua PA-C   ALPRAZolam (XANAX) 0.5 MG tablet Take 0.25 mg by mouth 3 times daily as needed for anxiety  at prn Yes Reported, Patient   calcitonin, salmon, (MIACALCIN) 200 UNIT/ACT nasal spray USE 1 SPRAY IN 1 NOSTRIL  DAILY (ALTERNATING NOSTRILS DAILY) 2/3/2019 at Unknown time Yes Huyen Samuels MD   Cholecalciferol (VITAMIN D3) 2000 UNITS CAPS Take 2,000 Units by mouth every morning  2/3/2019 at Unknown time Yes Reported, Patient   Flaxseed, Linseed, (FLAXSEED OIL) 1000 MG CAPS Take 1,000 mg by mouth every evening  2/2/2019 at Unknown time Yes Reported, Patient   Glucosamine-Chondroitin (GLUCOSAMINE  CHONDR COMPLEX PO) Take 1 capsule by mouth 2 times daily  2/3/2019 at am Yes Reported, Patient   meclizine (ANTIVERT) 25 MG tablet Take 1 tablet (25 mg) by mouth every 6 hours as needed for dizziness  at prn Yes Heaven Branch MD   Multiple Vitamins-Minerals (CENTRUM SILVER) per tablet Take 1 tablet by mouth daily 2/3/2019 at Unknown time Yes Reported, Patient   Multiple Vitamins-Minerals (OCUVITE PRESERVISION PO) Take 1 tablet by mouth 2 times daily  2/3/2019 at am Yes Reported, Patient   NEXIUM 40 MG CR capsule Take 40 mg by mouth daily  2/3/2019 at Unknown time Yes Reported, Patient   Omega-3 Fatty Acids (OMEGA-3 FISH OIL) 1000 MG CAPS Take 1,000 mg by mouth 2 times daily  2/3/2019 at am Yes Reported, Patient   RANITIDINE HCL PO Take 150 mg by mouth At Bedtime  2/2/2019 at pm Yes Reported, Patient   senna-docusate (SENOKOT-S/PERICOLACE) 8.6-50 MG tablet Take 2 tablets by mouth 2 times daily 2/3/2019 at am Yes Chanelle Hua PA-C   sulfamethoxazole-trimethoprim (BACTRIM DS/SEPTRA DS) 800-160 MG tablet Take 1 tablet by mouth 2 times daily for 5 days 2/3/2019 at am Yes Katie Dotson APRN CNP   traMADol (ULTRAM) 50 MG tablet 1/2 tab po q 6 hrs prn pain scale 3-6,   1 tab po q 6hrs prn pain scale 7-10 2/3/2019 at Unknown time Yes Chanelle Hua PA-C   triamterene-HCTZ (MAXZIDE-25) 37.5-25 MG tablet Take 1 tablet by mouth daily 2/3/2019 at am Yes Huyen Samuels MD   aspirin (ASA) 325 MG EC tablet Take 1 tablet (325 mg) by mouth 2 times daily 1/30/2019 at stopped taking after leaving [facility]  Chanelle Hua PA-C

## 2019-02-04 NOTE — ED NOTES
[facility]  ED Nurse Handoff Report    Cynthia Arita is a 73 year old female 2 weeks out from TKR (rt) comes in with complaint of abd pain and constipation.  Enema given with mild results, however pt not comfortable going home at this point.  Pt is alert and a 1x assist due to surgery.    ED Chief complaint: Constipation  . ED Diagnosis:   Final diagnoses:   Abdominal pain, unspecified abdominal location   Nausea   Decreased frequency of bowel movements   Renal lesion     Allergies:   Allergies   Allergen Reactions     Ativan [Lorazepam]      Sick -      Gabapentin Nausea     Metoprolol      Nausea       Pantoprazole Other (See Comments), Nausea and Vomiting and GI Disturbance     Red in the face, sleepiness, face swelling, joint pain, dizziness     Oxycodone Anxiety       Code Status: Full Code  Activity level - Baseline/Home:  Independent. Activity Level - Current:   Stand with Assist. Lift room needed: No. Bariatric: No   Needed: No   Isolation: No. Infection: Not Applicable.     Vital Signs:   Vitals:    02/03/19 1700 02/03/19 1705 02/03/19 1710 02/03/19 1715   BP: 141/74   142/85   Pulse: 76   74   Resp:       Temp:       TempSrc:       SpO2: 100% 96% 97%    Weight:       Height:           Cardiac Rhythm:  ,      Pain level: 0-10 Pain Scale: 8  Patient confused: No. Patient Falls Risk: Yes.   Elimination Status: Has voided   Patient Report - Initial Complaint: abd pain, constipation. Focused Assessment: lower mid abd discomfort   Tests Performed: blood, urine, CT. Abnormal Results: none.   Treatments provided: fluids, enema  Family Comments: son at bedside  OBS brochure/video discussed/provided to patient:  Yes  ED Medications:   Medications   0.9% sodium chloride BOLUS (0 mLs Intravenous Stopped 2/3/19 2033)     Followed by   sodium chloride 0.9% infusion (not administered)   HYDROmorphone (PF) (DILAUDID) injection 0.5 mg (not administered)   iopamidol (ISOVUE-370) solution 500 mL (65  mLs Intravenous Given 2/3/19 1807)   sodium chloride (PF) 0.9% PF flush 100 mL (56 mLs Intravenous Given 2/3/19 1807)   pink lady enema (COMPOUNDED: docusate, magnesium citrate, mineral oil, sodium phosphate) (286 mLs Rectal Given 2/3/19 1915)   acetaminophen (TYLENOL) tablet 1,000 mg (1,000 mg Oral Given 2/3/19 2045)     Drips infusing:  No  For the majority of the shift, the patient's behavior Green. Interventions performed were n/a.     Severe Sepsis OR Septic Shock Diagnosis Present: No      ED Nurse Name/Phone Number: Jonathan Seaver,   8:34 PM    RECEIVING UNIT ED HANDOFF REVIEW    Above ED Nurse Handoff Report was reviewed: Yes  Reviewed by: Clover Henderson on February 3, 2019 at 9:06 PM

## 2019-02-04 NOTE — PLAN OF CARE
ROOM # 224    Living Situation (if not independent, order SW consult): Home ind in [place], but living with son in [facility] during recovery from recent RKA.     Facility name: NA  : Jose Luis (son)     Activity level at baseline: Independent   Activity level on admit: Stand by assist       Patient registered to observation; given Patient Bill of Rights; given the opportunity to ask questions about observation status and their plan of care.  Patient has been oriented to the observation room, bathroom and call light is in place.    Discussed discharge goals and expectations with patient/family.

## 2019-02-04 NOTE — PLAN OF CARE
"PRIMARY DIAGNOSIS: Abdominal pain (constipation) recent (R) knee replacement   OUTPATIENT/OBSERVATION GOALS TO BE MET BEFORE DISCHARGE:  1. Pain Status: Improved-controlled with oral pain medications.    2. Return to near baseline physical activity: Yes    3. Cleared for discharge by consultants (if involved): No    Discharge Planner Nurse   Safe discharge environment identified: Yes  Barriers to discharge: Yes       Entered by: Ara Owens 02/04/2019 8:17 AM     Please review provider order for any additional goals.   Nurse to notify provider when observation goals have been met and patient is ready for discharge.     Patient is alert and oriented x4. Patient very anxious about her (R) knee. Patient believes she has an infection in her (R) leg. (R) knee incision is dry and intact. Staples in place. Mild erythema noted around staples with some mild swelling. Pulses present in LE's. Capillary refill <3 seconds. Patient states she has burning on the inside of her knee and the bottom of her right foot. No indication of infection assessed. Patient getting Ultram and Tylenol for pain. Active bowel movements with LBM yesterday. No constipation issues noted. Patient states she has pressure in her abdomen/bladder then instructed to void and states she feels better. IV fluids infusing 125 ml/hr. Patient has an appointment today with Dr. Amor at 1200. Patient anxiously wants to see him.   /65 (BP Location: Right arm)   Pulse 69   Temp 97.8  F (36.6  C) (Oral)   Resp 16   Ht 1.6 m (5' 3\")   Wt 58.9 kg (129 lb 14.4 oz)   SpO2 96%   BMI 23.01 kg/m      "

## 2019-02-04 NOTE — PLAN OF CARE
"Patient's After Visit Summary was reviewed with patient and/or son .   Patient verbalized understanding of After Visit Summary, recommended follow up and was given an opportunity to ask questions.   Discharge medications sent home with patient/family: No, no new medications     Discharged with son with all belongings. Patient medically cleared. Patient instructed to go to her therapy appointment at TCU at 1430 then head straight to the clinic to be seen by Dr. Amor.   /69 (BP Location: Right arm)   Pulse 76   Temp 97.7  F (36.5  C) (Oral)   Resp 16   Ht 1.6 m (5' 3\")   Wt 58.9 kg (129 lb 14.4 oz)   SpO2 98%   BMI 23.01 kg/m    OBSERVATION patient END time: 1400       "

## 2019-02-04 NOTE — PROGRESS NOTES
Patient seen and examined  Painful TKA but WNL considering she is out two weeks from primary surgery    Knee is without erythema or warmth  Well healing incision without drainage  ROM actively 0 to 95 degrees  Ambulates well with walker - this was observed  Right LE mild swelling but again WNL    US was done to rule out DVT  - this was negative    Continue bowel regimen  Discussed with Dr. Amor and Ludy GIRON  Ok to discharge home today - dressing removed, staples removed, and steri's placed    Continue to follow-up with Dr. Mt GIRON

## 2019-02-05 ENCOUNTER — TELEPHONE (OUTPATIENT)
Dept: INTERNAL MEDICINE | Facility: CLINIC | Age: 74
End: 2019-02-05

## 2019-02-05 ENCOUNTER — PATIENT OUTREACH (OUTPATIENT)
Dept: CARE COORDINATION | Facility: CLINIC | Age: 74
End: 2019-02-05

## 2019-02-05 NOTE — TELEPHONE ENCOUNTER
Reason for call:  Patient reporting a symptom    Symptom or request: possible uti, recurring    Duration (how long have symptoms been present): on and off since Jan 18th.     Have you been treated for this before? Yes    Additional comments: patient is unable to come to the clinic. Does not have a ride.    Phone Number patient can be reached at:  Home number on file [phone] (home)    Best Time:  any    Can we leave a detailed message on this number:  YES    Call taken on 2/5/2019 at 12:15 PM by Karlee Topete

## 2019-02-05 NOTE — TELEPHONE ENCOUNTER
IP F/U    Date: 02/04/19  Diagnosis: Hypokalemia, Abdominal Pain, Unspecified Abdominal Location  Is patient active in care coordination? No  Was patient in TCU? No

## 2019-02-05 NOTE — TELEPHONE ENCOUNTER
Discussed with patient this concern during hospital follow-up call. Patient stated she was not concerned about a uti at all, concerned that her stool softener was not effective. Discussed with patient medications that can cause constipation and recommended patient continue to take medication as prescribed and schedule follow-up appointment for recent hospital visit. Patient stated that her son would be the driving her to the appointment so this is something she would have to discuss with him to schedule. Patient wondered if she was taking her medications correctly and instructed on order for Senna S and miralax. Patient has no further questions at this time.

## 2019-02-05 NOTE — PROGRESS NOTES
Clinic Care Coordination Contact    Clinic Care Coordination Contact  OUTREACH    Referral Information:  Referral Source: IP Report. Patient has had 2 ED visits and/or hospital admissions within the past 30 days and a 62% risk of admission or ED visit risk score.     Chief Complaint   Patient presents with     Clinic Care Coordination - Post Hospital     hypokalemia        Universal Utilization: Patient was recently hospitalized at [facility] from 02/03 to 02/04 with a diagnosis of hypokalemia.   Utilization    Last refreshed: 2/5/2019  8:24 AM:  Hospital Admissions 2           Last refreshed: 2/5/2019  8:24 AM:  ED Visits 3           Last refreshed: 2/5/2019  8:24 AM:  No Show Count (past year) 0              Current as of: 2/5/2019  8:24 AM            Clinical Concerns:  Current Medical Concerns:    Patient Active Problem List   Diagnosis     Esophageal reflux     Osteoporosis     Meniere's disease     Calculus of right kidney     Pneumonia of right upper lobe due to infectious organism (H)     Anxiety     Essential hypertension     Stress     Gastroesophageal reflux disease without esophagitis     Tinnitus, unspecified laterality     Pain of right thigh     S/P total knee arthroplasty     Abdominal pain       Patient reports she is feeling the same since the hospital but has understanding it will take weeks before she can return to baseline. States she is just taking things one day at a time.   Current Behavioral Concerns: Patient declined writers assistance in scheduling Primary Care Provider follow up appointment. Reports she just got out of the hospital and has not yet had time to do this. Patient not interested in conversation with writer and seemed in a rush to end call.   Education Provided to patient: CC role. Importance of scheduling follow up appointment with Primary Care Provider. [employer] transportation.      Health Maintenance Reviewed: Not assessed  Clinical Pathway: None    Medication  Management:  Patient reports son and daughter in law assist her with management of her medications.      Functional Status:  Dependent IADLs:: Meal Preparation, Transportation, Medication Management, Laundry, Cleaning, Cooking  Bed or wheelchair confined:: No  Mobility Status: Independent w/Device    Living Situation:  Current living arrangement:: I live in a private home with family  Type of residence:: Private home - stairs    Diet/Exercise/Sleep:   Not addressed during outreach.     Transportation:  Transportation concerns: No  Transportation means:: Accessible car. Number for [employer] given to patient in the event no family member is able to assist her to appointments/therapy. Informed patient 2 week notice was required with use of this service.      Psychosocial:  Mental health DX:: Yes  Mental health DX how managed:: Medication  Mental health management concern: No  Informal Support system:: Children  Financial/Insurance concerns:: No     Resources and Interventions:  Current Resources:   Community Resources: None  Advance Care Plan/Directive  Advanced Care Plans/Directives on file:: No  Advanced Care Plan/Directive Status: In Process(Patient reports she has one, just not on file. Encouraged to bring a copy at next appointment)    Referrals Placed: Transportation    Patient/Caregiver understanding: Patient verbalized understanding and denies any additional questions or concerns at this time. RNCC engaged in AIDET communications during encounter.        Future Appointments              In 3 days [facility]          Plan: Patient will schedule follow up Primary Care Provider appointment. At this time, patient denies outstanding need for connection or referral to resources or assistance navigating recommended follow up care. No further Care Coordination outreaches scheduled at this time, patient provided with this writer's number and encouraged to call with future  needs or questions.    Radha Byrnes RN Care Coordinator  [facility] [place]  Email: [email or web address]  Phone: [phone]

## 2019-02-05 NOTE — TELEPHONE ENCOUNTER
"ED/Discharge Protocol    \"Hi, my name is Jessie Salvador, a registered nurse, and I am calling on behalf of Dr. Samuels's office at [place].  I am calling to follow up and see how things are going for you after your recent visit.\"    \"I see that you were in the (ER/UC/IP) on 2/3/19.    How are you doing now that you are home?\" Better, still having a little bit of pressure in the abdomen but nothing like what she had before.    Is patient experiencing symptoms that may require a hospital visit?  no    Discharge Instructions    \"Let's review your discharge instructions.  What is/are the follow-up recommendations?   Continue to take senokot as scheduled. Also you will need a couple days of potassium supplement. Make an appointment with your PCP in 1 week and have repeat   Potassium at that time.     Pt. Response: I know I need to make an appointment. Patient just got home from [facility] today and would like some time to settle in and ask her son when he will be able to take her to the clinic.     \"Were you instructed to make a follow-up appointment?\"  Pt. Response: Yes.  Has appointment been made?   No.  \"Can I help you schedule that appointment?\" Would like to consult with son to make appointment as he will be her transportation.       \"When you see the provider, I would recommend that you bring your discharge instructions with you.    Medications    \"How many new medications are you on since your hospitalization/ED visit?\"    2  \"How many of your current medicines changed (dose, timing, name, etc.) while you were in the hospital/ED visit?\"   2  \"Do you have questions about your medications?\"   Yes, does it take the medications a while to be effective?  Writer advised that often times it should improve symptoms within the next couple of hours when on stool softener, patient must establish routine and regulate bowel movements.     Medication reconciliation completed? Yes    Call Summary    \"Do you have any questions or " "concerns about your condition or care plan at the moment?\"    No  Triage nurse advice given: Please schedule your hospital follow-up appointment as soon as you can with your primary care provider and follow-up as recommended by hospital physician. Take medications as prescribed and call clinic if symptoms worsen or do not improve.     \"If you have questions or things don't continue to improve, we encourage you contact us through the main clinic number,  [phone].  Even if the clinic is not open, triage nurses are available 24/7 to help you.     We would like you to know that our clinic has extended hours (provide information).  We also have urgent care (provide details on closest location and hours/contact info)\"      \"Thank you for your time and take care!\"    "

## 2019-02-06 ENCOUNTER — TELEPHONE (OUTPATIENT)
Dept: INTERNAL MEDICINE | Facility: CLINIC | Age: 74
End: 2019-02-06

## 2019-02-06 DIAGNOSIS — I10 ESSENTIAL HYPERTENSION: ICD-10-CM

## 2019-02-06 NOTE — TELEPHONE ENCOUNTER
Reason for Call:  Medication or medication refill:    Do you use a [facility]?  Name of the pharmacy and phone number for the current request:  [facility] on [address] in [facility]. ph. [phone]    Name of the medication requested: triamterene hydrochlorothiazide and ranitidine     Other request: Patient is unable to get home to get her medications due to weather. Has Nexium with her. Is very concerned about getting these meds. Will be at her sons house until this weekend.     Can we leave a detailed message on this number? YES    Phone number patient can be reached at: Home number on file [phone] (home) or Cell number on file:               Best Time: any    Call taken on 2/6/2019 at 4:31 PM by Karlee Topete

## 2019-02-07 RX ORDER — TRIAMTERENE/HYDROCHLOROTHIAZID 37.5-25 MG
1 TABLET ORAL DAILY
Qty: 10 TABLET | Refills: 0 | Status: SHIPPED | OUTPATIENT
Start: 2019-02-07 | End: 2019-08-21

## 2019-02-07 NOTE — TELEPHONE ENCOUNTER
Ranitidine - listed as historic and has not been ordered by our office in the past. Spoke to patient, she states pharmacy did have fill available for the Ranitidine so she does not need this.     Triamterene-hydrochlorothiazide - Previously filled through Mail Order pharmacy. Temporary prescription approved per [facility] Refill Protocol.

## 2019-02-07 NOTE — TELEPHONE ENCOUNTER
Pt is calling stating she is out now and would like someone to refill this today.  Pt is asking for before noon as she has someone that can p/u the med this morning at [facility].

## 2019-02-27 ENCOUNTER — OFFICE VISIT (OUTPATIENT)
Dept: INTERNAL MEDICINE | Facility: CLINIC | Age: 74
End: 2019-02-27
Payer: MEDICARE

## 2019-02-27 VITALS
HEART RATE: 80 BPM | OXYGEN SATURATION: 99 % | BODY MASS INDEX: 22.09 KG/M2 | TEMPERATURE: 97.4 F | RESPIRATION RATE: 18 BRPM | SYSTOLIC BLOOD PRESSURE: 138 MMHG | DIASTOLIC BLOOD PRESSURE: 80 MMHG | WEIGHT: 124.7 LBS | HEIGHT: 63 IN

## 2019-02-27 DIAGNOSIS — R35.0 URINARY FREQUENCY: ICD-10-CM

## 2019-02-27 DIAGNOSIS — K59.00 CONSTIPATION, UNSPECIFIED CONSTIPATION TYPE: ICD-10-CM

## 2019-02-27 DIAGNOSIS — R11.2 NAUSEA AND VOMITING, INTRACTABILITY OF VOMITING NOT SPECIFIED, UNSPECIFIED VOMITING TYPE: ICD-10-CM

## 2019-02-27 DIAGNOSIS — Z96.651 STATUS POST TOTAL RIGHT KNEE REPLACEMENT: Primary | ICD-10-CM

## 2019-02-27 LAB
ALBUMIN UR-MCNC: NEGATIVE MG/DL
APPEARANCE UR: CLEAR
BACTERIA #/AREA URNS HPF: ABNORMAL /HPF
BILIRUB UR QL STRIP: NEGATIVE
COLOR UR AUTO: YELLOW
GLUCOSE UR STRIP-MCNC: NEGATIVE MG/DL
HGB UR QL STRIP: NEGATIVE
KETONES UR STRIP-MCNC: NEGATIVE MG/DL
LEUKOCYTE ESTERASE UR QL STRIP: ABNORMAL
MUCOUS THREADS #/AREA URNS LPF: PRESENT /LPF
NITRATE UR QL: NEGATIVE
NON-SQ EPI CELLS #/AREA URNS LPF: ABNORMAL /LPF
PH UR STRIP: 6 PH (ref 5–7)
RBC #/AREA URNS AUTO: ABNORMAL /HPF
SOURCE: ABNORMAL
SP GR UR STRIP: 1.02 (ref 1–1.03)
UROBILINOGEN UR STRIP-ACNC: 0.2 EU/DL (ref 0.2–1)
WBC #/AREA URNS AUTO: ABNORMAL /HPF

## 2019-02-27 PROCEDURE — 81001 URINALYSIS AUTO W/SCOPE: CPT | Performed by: INTERNAL MEDICINE

## 2019-02-27 PROCEDURE — 99214 OFFICE O/P EST MOD 30 MIN: CPT | Performed by: INTERNAL MEDICINE

## 2019-02-27 PROCEDURE — 87086 URINE CULTURE/COLONY COUNT: CPT | Performed by: INTERNAL MEDICINE

## 2019-02-27 RX ORDER — GABAPENTIN 100 MG/1
100 CAPSULE ORAL DAILY
Qty: 90 CAPSULE | COMMUNITY
Start: 2019-02-27 | End: 2019-07-29

## 2019-02-27 ASSESSMENT — MIFFLIN-ST. JEOR: SCORE: 1034.77

## 2019-02-27 NOTE — PROGRESS NOTES
SUBJECTIVE:   Cynthia Arita is a 73 year old female who presents to clinic today for the following health issues:    Frequent urination with pain and burning x 1 week.      Hospital Follow-up Visit:    Hospital/Nursing Home/IP Rehab Facility: [facility]  Date of Admission: 1- to 1- then to [facility] until 2-. Then to [facility] until 2-.          Problems taking medications regularly:  None       Medication changes since discharge: None       Problems adhering to non-medication therapy:  None    Summary of hospitalization:  [facility] discharge summary reviewed  Diagnostic Tests/Treatments reviewed.  Follow up needed: with ortho  Other Healthcare Providers Involved in Patient s Care:         None  Update since discharge: improved. Bowels are moving normal. Appetite is good. No nausea or vomiting. She is still having a lot of knee pain from the surgery but she is getting along with Tylenol, NSAID and only take Neurontin and Tramadol 25 mg at bedtime. Once she had to take an extra Neurontin.    Denies any fever chills or night sweats. But she is worried she might have a bladder infection. She gets intermittent urgency and frequency but no burning or hematuria.     Post Discharge Medication Reconciliation: discharge medications reconciled, continue medications without change.  Plan of care communicated with patient     Coding guidelines for this visit:  Type of Medical   Decision Making Face-to-Face Visit       within 7 Days of discharge Face-to-Face Visit        within 14 days of discharge   Moderate Complexity [id number]   High Complexity [id number]            Problem list and histories reviewed & adjusted, as indicated.  Additional history: as documented    BP Readings from Last 3 Encounters:   02/27/19 138/80   02/04/19 117/69   01/30/19 136/74    Wt Readings from Last 3 Encounters:   02/27/19 56.6 kg (124 lb 11.2 oz)   02/03/19 58.9 kg (129 lb 14.4 oz)   01/30/19 61.8  "kg (136 lb 3.2 oz)                    Reviewed and updated as needed this visit by clinical staff  Tobacco  Allergies  Meds  Med Hx  Surg Hx  Fam Hx  Soc Hx      Reviewed and updated as needed this visit by Provider         ROS:  Constitutional, HEENT, cardiovascular, pulmonary, GI, , musculoskeletal, neuro, skin, endocrine and psych systems are negative, except as otherwise noted.    OBJECTIVE:     /80 (BP Location: Left arm, Patient Position: Chair, Cuff Size: Adult Regular)   Pulse 80   Temp 97.4  F (36.3  C) (Oral)   Resp 18   Ht 1.6 m (5' 3\")   Wt 56.6 kg (124 lb 11.2 oz)   SpO2 99%   BMI 22.09 kg/m    Body mass index is 22.09 kg/m .  GENERAL: healthy, alert and no distress  NECK: no adenopathy, no asymmetry, masses, or scars and thyroid normal to palpation  RESP: lungs clear to auscultation - no rales, rhonchi or wheezes  CV: regular rate and rhythm, normal S1 S2, no S3 or S4, no murmur, click or rub, no peripheral edema and peripheral pulses strong  ABDOMEN: soft, nontender, no hepatosplenomegaly, no masses and bowel sounds normal  MS: right knee incision looking clean and healing nicely some swelling no erythema   NEURO: Normal strength and tone, mentation intact and speech normal  PSYCH: mentation appears normal, affect normal/bright      ASSESSMENT/PLAN:       1. Status post total right knee replacement  Doing well, continue current cares  - Urine Microscopic    2. Urinary frequency  Will check   - UA reflex to Microscopic and Culture    3. Constipation, unspecified constipation type  Resolved, Continue current medications.     4. Nausea and vomiting, intractability of vomiting not specified, unspecified vomiting type  Resolved Continue current medications.       Follow up in 6 months     Huyen Samuels MD  [facility]  "

## 2019-02-28 DIAGNOSIS — I10 ESSENTIAL HYPERTENSION: ICD-10-CM

## 2019-02-28 LAB
BACTERIA SPEC CULT: NORMAL
SPECIMEN SOURCE: NORMAL

## 2019-02-28 NOTE — TELEPHONE ENCOUNTER
"Requested Prescriptions   Pending Prescriptions Disp Refills     propranolol (INDERAL) 20 MG tablet [Pharmacy Med Name: PROPRANOLOL  20MG  TAB]  Last Written Prescription Date:  historical  Last Fill Quantity: ,  # refills:    Last office visit: 2/27/2019 with prescribing provider:     Future Office Visit:   90 tablet 0     Sig: TAKE 1 TABLET BY MOUTH  DAILY AS NEEDED FOR BLOOD  PRESSURE HIGHER THAN 170    Beta-Blockers Protocol Failed - 2/28/2019  3:59 AM       Failed - Medication is active on med list       Passed - Blood pressure under 140/90 in past 12 months    BP Readings from Last 3 Encounters:   02/27/19 138/80   02/04/19 117/69   01/30/19 136/74                Passed - Patient is age 6 or older       Passed - Recent (12 mo) or future (30 days) visit within the authorizing provider's specialty    Patient had office visit in the last 12 months or has a visit in the next 30 days with authorizing provider or within the authorizing provider's specialty.  See \"Patient Info\" tab in inbasket, or \"Choose Columns\" in Meds & Orders section of the refill encounter.              "

## 2019-03-01 NOTE — TELEPHONE ENCOUNTER
Routing refill request to provider for review/approval because:  Medication is reported/historical  Arabella Celestin RN

## 2019-03-04 RX ORDER — PROPRANOLOL HYDROCHLORIDE 20 MG/1
TABLET ORAL
Qty: 30 TABLET | Refills: 0 | Status: SHIPPED | OUTPATIENT
Start: 2019-03-04 | End: 2020-12-02 | Stop reason: ALTCHOICE

## 2019-03-13 ENCOUNTER — TELEPHONE (OUTPATIENT)
Dept: INTERNAL MEDICINE | Facility: CLINIC | Age: 74
End: 2019-03-13

## 2019-03-13 DIAGNOSIS — H93.13 TINNITUS, BILATERAL: Primary | ICD-10-CM

## 2019-03-13 NOTE — TELEPHONE ENCOUNTER
Patient stopped by the clinic to ask for a ENT referral in [place]. She is wanting a name not just a group. She has hearing aides and also tinitus.

## 2019-03-13 NOTE — TELEPHONE ENCOUNTER
Called patient and informed her of referral. Sent copy of referral in the mail per patient request.

## 2019-03-14 ENCOUNTER — TRANSFERRED RECORDS (OUTPATIENT)
Dept: HEALTH INFORMATION MANAGEMENT | Facility: CLINIC | Age: 74
End: 2019-03-14

## 2019-04-02 ENCOUNTER — TRANSFERRED RECORDS (OUTPATIENT)
Dept: HEALTH INFORMATION MANAGEMENT | Facility: CLINIC | Age: 74
End: 2019-04-02

## 2019-04-05 NOTE — TELEPHONE ENCOUNTER
Ok to try what was suggested and see how she does  Dr Samuels out of office until Monday      Hypercalcemia

## 2019-04-08 ENCOUNTER — TRANSFERRED RECORDS (OUTPATIENT)
Dept: HEALTH INFORMATION MANAGEMENT | Facility: CLINIC | Age: 74
End: 2019-04-08

## 2019-04-22 ENCOUNTER — TRANSFERRED RECORDS (OUTPATIENT)
Dept: HEALTH INFORMATION MANAGEMENT | Facility: CLINIC | Age: 74
End: 2019-04-22

## 2019-04-29 ENCOUNTER — TRANSFERRED RECORDS (OUTPATIENT)
Dept: HEALTH INFORMATION MANAGEMENT | Facility: CLINIC | Age: 74
End: 2019-04-29

## 2019-05-07 ENCOUNTER — HOSPITAL ENCOUNTER (OUTPATIENT)
Dept: ULTRASOUND IMAGING | Facility: CLINIC | Age: 74
Discharge: HOME | End: 2019-05-07
Attending: INTERNAL MEDICINE | Admitting: INTERNAL MEDICINE
Payer: MEDICARE

## 2019-05-07 DIAGNOSIS — K21.9 GASTROESOPHAGEAL REFLUX DISEASE WITHOUT ESOPHAGITIS: ICD-10-CM

## 2019-05-07 DIAGNOSIS — R14.0 ABDOMINAL DISTENSION, GASEOUS: ICD-10-CM

## 2019-05-07 DIAGNOSIS — R11.0 NAUSEA: ICD-10-CM

## 2019-05-07 DIAGNOSIS — R63.4 WEIGHT LOSS: ICD-10-CM

## 2019-05-07 DIAGNOSIS — K58.2 MIXED IRRITABLE BOWEL SYNDROME: ICD-10-CM

## 2019-05-07 PROCEDURE — 76700 US EXAM ABDOM COMPLETE: CPT

## 2019-05-09 ENCOUNTER — TRANSFERRED RECORDS (OUTPATIENT)
Dept: HEALTH INFORMATION MANAGEMENT | Facility: CLINIC | Age: 74
End: 2019-05-09

## 2019-05-09 DIAGNOSIS — F41.9 ANXIETY: Primary | ICD-10-CM

## 2019-05-09 RX ORDER — ALPRAZOLAM 0.5 MG
0.5 TABLET ORAL 3 TIMES DAILY PRN
Qty: 15 TABLET | Refills: 0 | Status: SHIPPED | OUTPATIENT
Start: 2019-05-09 | End: 2019-08-05

## 2019-05-09 NOTE — TELEPHONE ENCOUNTER
Spoke with patient.  She needs a refill on Alprazolam 0.5mg takes 1/2 tab as needed.    Controlled Substance Refill Request for Alprazolam  Problem List Complete:  No     PROVIDER TO CONSIDER COMPLETION OF PROBLEM LIST AND OVERVIEW/CONTROLLED SUBSTANCE AGREEMENT    Last Written Prescription Date:  10-10-18  Last Fill Quantity: 30,   # refills: 1    THE MOST RECENT OFFICE VISIT MUST BE WITHIN THE PAST 3 MONTHS. AT LEAST ONE FACE TO FACE VISIT MUST OCCUR EVERY 6 MONTHS. ADDITIONAL VISITS CAN BE VIRTUAL.  (THIS STATEMENT SHOULD BE DELETED.)    Last Office Visit with [facility] primary care provider: 2-27-19    Future Office visit:     Controlled substance agreement:   Encounter-Level CSA:    There are no encounter-level csa.     Patient-Level CSA:    There are no patient-level csa.         Last Urine Drug Screen: No results found for: CDAUT, No results found for: COMDAT, No results found for: THC13, PCP13, COC13, MAMP13, OPI13, AMP13, BZO13, TCA13, MTD13, BAR13, OXY13, PPX13, BUP13     Processing[facility]  [facility]     [email or web address]       checked in past 3 months? Access not granted by provider.    Please advise, thanks.

## 2019-05-09 NOTE — TELEPHONE ENCOUNTER
Reason for Call:  Medication or medication refill:    Do you use a [facility]?  Name of the pharmacy and phone number for the current request:  [facility] mail order [phone]    Name of the medication requested: alprazolam .5MG    Other request: none pt in hurry to get this filled for anxiety    Can we leave a detailed message on this number? YES    Phone number patient can be reached at: Cell number on file:    Telephone Information:   Mobile [phone]       Best Time: any    Call taken on 5/9/2019 at 11:11 AM by Sydnee Chacon

## 2019-05-11 ENCOUNTER — NURSE TRIAGE (OUTPATIENT)
Dept: NURSING | Facility: CLINIC | Age: 74
End: 2019-05-11

## 2019-05-11 ENCOUNTER — TELEPHONE (OUTPATIENT)
Dept: INTERNAL MEDICINE | Facility: CLINIC | Age: 74
End: 2019-05-11

## 2019-05-11 DIAGNOSIS — H81.09 MENIERE'S DISEASE, UNSPECIFIED LATERALITY: Primary | ICD-10-CM

## 2019-05-11 DIAGNOSIS — F41.9 ANXIETY: ICD-10-CM

## 2019-05-11 NOTE — TELEPHONE ENCOUNTER
Clinic Action Needed: yes, call back  FNA Triage Call  Presenting Problem:  Patient reports her Vertigo is back and usually takes alprazolam 0.5mg (1/2 tablet) and dizaepam 2mg.  Patient says she only has one tablet left of the diazepam and is requesting a refill to be sent to   [facility] (on [address] in [place] or [facility] [address] in [place]).      FNA advised unable to get medication refilled on the weekend so will route message to clinic.  Also, medication was prescribed by non-[place] doctor.    Routed to: HUSAM Burnett RN/[place] [facility]

## 2019-05-11 NOTE — TELEPHONE ENCOUNTER
"Patient reports her Vertigo is back and usually takes alprazolam 0.5mg (1/2 tablet) and dizaepam 2mg.  Patient says she only has one tablet left of the diazepam and is requesting a refill to be sent to   [facility] (on [address] in [place] or [address] in [place]).  FNA advised unable to get medication refilled on the weekend so will route message to clinic.  Also, medication was previously prescribed by non-[place] provider.  Caller verbalizes understanding.    Reason for Disposition    Caller requesting a NON-URGENT new prescription or refill and triager unable to refill per unit policy    Additional Information    Negative: Drug overdose and nurse unable to answer question    Negative: Caller requesting information not related to medicine    Negative: Caller requesting a prescription for Strep throat and has a positive culture result    Negative: Rash while taking a medication or within 3 days of stopping it    Negative: Immunization reaction suspected    Negative: [1] Asthma and [2] having symptoms of asthma (cough, wheezing, etc)    Negative: MORE THAN A DOUBLE DOSE of a prescription or over-the-counter (OTC) drug    Negative: [1] DOUBLE DOSE (an extra dose or lesser amount) of over-the-counter (OTC) drug AND [2] any symptoms (e.g., dizziness, nausea, pain, sleepiness)    Negative: [1] DOUBLE DOSE (an extra dose or lesser amount) of prescription drug AND [2] any symptoms (e.g., dizziness, nausea, pain, sleepiness)    Negative: Took another person's prescription drug    Negative: [1] DOUBLE DOSE (an extra dose or lesser amount) of prescription drug AND [2] NO symptoms (Exception: a double dose of antibiotics)    Negative: Diabetes drug error or overdose (e.g., insulin or extra dose)    Negative: [1] Request for URGENT new prescription or refill of \"essential\" medication (i.e., likelihood of harm to patient if not taken) AND [2] triager unable to fill per unit policy    Negative: [1] Prescription not at " pharmacy AND [2] was prescribed today by PCP    Negative: Pharmacy calling with prescription questions and triager unable to answer question    Negative: Caller has URGENT medication question about med that PCP prescribed and triager unable to answer question    Negative: Caller has NON-URGENT medication question about med that PCP prescribed and triager unable to answer question    Protocols used: MEDICATION QUESTION CALL-A-AH

## 2019-05-13 RX ORDER — DIAZEPAM 2 MG
2 TABLET ORAL EVERY 6 HOURS PRN
Qty: 40 TABLET | Refills: 0 | Status: SHIPPED | OUTPATIENT
Start: 2019-05-13 | End: 2019-06-20

## 2019-05-13 NOTE — TELEPHONE ENCOUNTER
Left message on home voicemail advising her that her prescription has been faxed to pharmacy ([facility] on [address]).  YUE Avalos R.N.

## 2019-05-16 ENCOUNTER — HOSPITAL ENCOUNTER (EMERGENCY)
Facility: CLINIC | Age: 74
Discharge: HOME | End: 2019-05-16
Admitting: PHYSICIAN ASSISTANT
Payer: MEDICARE

## 2019-05-16 VITALS
RESPIRATION RATE: 16 BRPM | HEART RATE: 82 BPM | OXYGEN SATURATION: 99 % | HEIGHT: 63 IN | BODY MASS INDEX: 21.97 KG/M2 | SYSTOLIC BLOOD PRESSURE: 118 MMHG | WEIGHT: 124 LBS | DIASTOLIC BLOOD PRESSURE: 72 MMHG | TEMPERATURE: 98.7 F

## 2019-05-16 DIAGNOSIS — H93.19 TINNITUS, UNSPECIFIED LATERALITY: ICD-10-CM

## 2019-05-16 DIAGNOSIS — R42 VERTIGO: ICD-10-CM

## 2019-05-16 DIAGNOSIS — R11.0 NAUSEA: ICD-10-CM

## 2019-05-16 LAB
ANION GAP SERPL CALCULATED.3IONS-SCNC: 9 MMOL/L (ref 3–14)
BUN SERPL-MCNC: 19 MG/DL (ref 7–30)
CALCIUM SERPL-MCNC: 9.3 MG/DL (ref 8.5–10.1)
CHLORIDE SERPL-SCNC: 103 MMOL/L (ref 94–109)
CO2 SERPL-SCNC: 26 MMOL/L (ref 20–32)
CREAT SERPL-MCNC: 0.61 MG/DL (ref 0.52–1.04)
ERYTHROCYTE [DISTWIDTH] IN BLOOD BY AUTOMATED COUNT: 13.7 % (ref 10–15)
GFR SERPL CREATININE-BSD FRML MDRD: 89 ML/MIN/{1.73_M2}
GLUCOSE SERPL-MCNC: 129 MG/DL (ref 70–99)
HCT VFR BLD AUTO: 45.1 % (ref 35–47)
HGB BLD-MCNC: 14.1 G/DL (ref 11.7–15.7)
MCH RBC QN AUTO: 28 PG (ref 26.5–33)
MCHC RBC AUTO-ENTMCNC: 31.3 G/DL (ref 31.5–36.5)
MCV RBC AUTO: 90 FL (ref 78–100)
PLATELET # BLD AUTO: 284 10E9/L (ref 150–450)
POTASSIUM SERPL-SCNC: 3.4 MMOL/L (ref 3.4–5.3)
RBC # BLD AUTO: 5.04 10E12/L (ref 3.8–5.2)
SODIUM SERPL-SCNC: 138 MMOL/L (ref 133–144)
WBC # BLD AUTO: 8.1 10E9/L (ref 4–11)

## 2019-05-16 PROCEDURE — 99284 EMERGENCY DEPT VISIT MOD MDM: CPT | Mod: 25

## 2019-05-16 PROCEDURE — 25000128 H RX IP 250 OP 636: Performed by: EMERGENCY MEDICINE

## 2019-05-16 PROCEDURE — 96361 HYDRATE IV INFUSION ADD-ON: CPT

## 2019-05-16 PROCEDURE — 80048 BASIC METABOLIC PNL TOTAL CA: CPT | Performed by: EMERGENCY MEDICINE

## 2019-05-16 PROCEDURE — 85027 COMPLETE CBC AUTOMATED: CPT | Performed by: EMERGENCY MEDICINE

## 2019-05-16 PROCEDURE — 96374 THER/PROPH/DIAG INJ IV PUSH: CPT

## 2019-05-16 RX ORDER — MECLIZINE HYDROCHLORIDE 25 MG/1
25 TABLET ORAL EVERY 6 HOURS PRN
Qty: 30 TABLET | Refills: 1 | Status: SHIPPED | OUTPATIENT
Start: 2019-05-16 | End: 2019-07-29

## 2019-05-16 RX ORDER — ONDANSETRON 2 MG/ML
4 INJECTION INTRAMUSCULAR; INTRAVENOUS ONCE
Status: COMPLETED | OUTPATIENT
Start: 2019-05-16 | End: 2019-05-16

## 2019-05-16 RX ADMIN — ONDANSETRON 4 MG: 2 INJECTION INTRAMUSCULAR; INTRAVENOUS at 15:34

## 2019-05-16 RX ADMIN — SODIUM CHLORIDE 500 ML: 9 INJECTION, SOLUTION INTRAVENOUS at 15:34

## 2019-05-16 ASSESSMENT — MIFFLIN-ST. JEOR: SCORE: 1031.59

## 2019-05-16 NOTE — ED PROVIDER NOTES
"  History     Chief Complaint:  Dizziness     HPI   Cynthia Arita is a 73 year old female with history of vertigo who presents to the ED today for evaluation of dizziness and nausea.  The patient states that she has suffered from similar symptoms for many years.  She has been to the [facility] and multiple ENT specialist.  She currently [facility] the [facility] to help with her symptoms.  She takes Valium as needed.  For the past 2 days she has noted an increase in her symptoms which include persistent tinnitus and \"the room spinning.\"  She took 1 dose of Valium yesterday evening and again this morning.  This improved her symptoms but they persisted.  She stated that she did not feel safe at home due to her dizziness, fearing that she may fall.  She called a neighbor to bring her to the ED for further evaluation.  The patient expresses her extreme frustration of seeing multiple providers and having these persistent debilitating symptoms.  She states that there have been no change in her symptoms today, she has no headache, no visual changes, no speech difficulty, no gait abnormalities.  She has not fallen at home.  No recent fever, cough, cold, chills.  She is also tried gentamicin shots in her ear.  After interventions here in the ED, she is starting to feel improved.  She had a normal MRI earlier this year, results below.    MRI brain w/out contrast 1/23/19  IMPRESSION: Unremarkable MRI of the head without contrast. No evidence  of acute infarct.  YAIR LOZA MD    Allergies:  Ativan [Lorazepam]  Gabapentin  Metoprolol  Pantoprazole  Oxycodone     Medications:      meclizine (ANTIVERT) 25 MG tablet   ALPRAZolam (XANAX) 0.5 MG tablet   calcitonin, salmon, (MIACALCIN) 200 UNIT/ACT nasal spray   Cholecalciferol (VITAMIN D3) 2000 UNITS CAPS   diazepam (VALIUM) 2 MG tablet   Flaxseed, Linseed, (FLAXSEED OIL) 1000 MG CAPS   gabapentin (NEURONTIN) 100 MG capsule   Glucosamine-Chondroitin (GLUCOSAMINE " CHONDR COMPLEX PO)   Multiple Vitamins-Minerals (CENTRUM SILVER) per tablet   Multiple Vitamins-Minerals (OCUVITE PRESERVISION PO)   NEXIUM 40 MG CR capsule   Omega-3 Fatty Acids (OMEGA-3 FISH OIL) 1000 MG CAPS   propranolol (INDERAL) 20 MG tablet   RANITIDINE HCL PO   traMADol (ULTRAM) 50 MG tablet   triamterene-HCTZ (MAXZIDE-25) 37.5-25 MG tablet     Past Medical History:    Past Medical History:   Diagnosis Date     Anxiety      Complication of anesthesia      Diverticulosis      GERD (gastroesophageal reflux disease)      HTN (hypertension)      Meniere's disease      Nephrolithiasis      Pain in right knee      PONV (postoperative nausea and vomiting)      Past Surgical History:    Past Surgical History:   Procedure Laterality Date     ARTHROPLASTY KNEE Right 1/21/2019    Procedure: Right total knee arthroplasty;  Surgeon: Denton Amor MD;  Location: [facility] OR     C VAGINAL HYSTERECTOMY      with left oophorectomy     EYE SURGERY      cataract surgery both eyes      Family History:    family history includes Bipolar Disorder in her sister; Brain Cancer (age of onset: 17) in her son; Cancer in her maternal grandmother; Diabetes in her paternal grandmother; Esophageal Cancer in her father; Hypertension in her brother; Neurologic Disorder in her mother and sister.    Social History:   reports that she has never smoked. She has never used smokeless tobacco. She reports that she drinks alcohol. She reports that she does not use drugs.  Presents to the ED by herself.   PCP: Huyen Samuels     Review of Systems   Constitutional: Negative for fatigue and fever.   Respiratory: Negative for shortness of breath.    Cardiovascular: Negative for chest pain.   Gastrointestinal: Positive for nausea. Negative for vomiting.   Musculoskeletal: Negative for gait problem.   Neurological: Positive for dizziness. Negative for speech difficulty, weakness, numbness and headaches.   All other systems reviewed and are  "negative.    Physical Exam     Patient Vitals for the past 24 hrs:   BP Temp Temp src Pulse Heart Rate Resp SpO2 Height Weight   05/16/19 1804 118/72 -- -- -- 74 16 99 % -- --   05/16/19 1512 (!) 127/92 98.7  F (37.1  C) Temporal 82 82 18 99 % 1.6 m (5' 3\") 56.2 kg (124 lb)      Physical Exam  General: Non-toxic appearing. Resting comfortably on the bed.  Skin: Good turgor, no rash, no unusual bruising or prominent lesions.  HEENT: Head: Normocephalic, atraumatic, no visible or palpable masses, depressions, or scarring.  Eyes: Conjunctiva clear, sclera non-icteric, EOM intact, PERRL. Mild horizontal nystagmus bilaterally.   Throat/pharynx: Mucous membranes moist, no mucosal lesions. Mucosa non-inflamed, no tonsillar hypertrophy or exudate.   Neck: Supple, without lesions or adenopathy.  Cardiac: Normal rate and regular rhythm, no murmur or gallop.   Lungs: Clear to auscultation and percussion   Abdomen: Bowel sounds normal, no tenderness, organomegaly, masses, or hernia. No guarding or rebound tenderness.   Musculoskeletal: Normal gait and station. Full strength in upper and lower extremities.   Neurologic: Alert and oriented x3. GCS 15. CN II-VII intact. Normal finger to nose, rapid hand movements, heel to shin. No pronator drift.   Psychiatric: Intact recent and remote memory, judgment and insight, normal mood and affect.     Emergency Department Course   Imaging:  None    Laboratory:  Labs Ordered and Resulted from Time of ED Arrival Up to the Time of Departure from the ED   CBC WITH PLATELETS - Abnormal; Notable for the following components:       Result Value    MCHC 31.3 (*)     All other components within normal limits   BASIC METABOLIC PANEL - Abnormal; Notable for the following components:    Glucose 129 (*)     All other components within normal limits      Procedures:  None    Interventions:  Medications   0.9% sodium chloride BOLUS (0 mLs Intravenous Stopped 5/16/19 1754)   ondansetron (ZOFRAN) injection " 4 mg (4 mg Intravenous Given 5/16/19 0236)        Emergency Department Course:  Past medical records, nursing notes, and vitals reviewed.  I performed an exam of the patient and obtained history, as documented above.    I rechecked the patient. Findings and plan explained to the Patient.  Discharge and follow-up discussed.  All questions answered.    Impression & Plan    Medical Decision Making:  Cynthia Arita is a 73 year old female who presents for evaluation of vertigo, tinnitus, nausea. The differential diagnosis of vertigo is broad and includes common etiologies such as menieres disease, labyrinthitis, benign positional vertigo, otitis media, etc.  More serious etiologies considered include central etiologies such as tumor, intracerebral bleed, dissection, ischemic cerebral vascular accident. The history, physical exam including detailed neurologic exam, and workup in the emergency room suggests a benign cause of vertigo today.  The patient has a long history of suffering from the same symptoms.  There are no new symptoms or neurological findings here today.  Interventions here included IV fluids and Zofran.  Upon recheck, patient noted improvement in her symptoms.  She was able to walk in the ED with staff without difficulty.  I encouraged her to continue going to the [facility] center.  I also encouraged her to call her ENT specialist or her male provider for follow-up.  I did send her home with meclizine as well.  Per chart review, the patient has had this before, but the patient does not remember this.  I suggested that she try this medication at home instead of Valium if desired and symptoms persist.  She  will return for any change worsening symptoms, persistent nausea or vomiting, new neurological deficits, fevers, new concerns.  No indication for advanced imaging at this point, as she appears well, symptoms seem consistent with her typical vertigo.  She also had a normal MRI earlier this year.   Vertigo precautions given for home.  All questions answered prior to discharge.  She is in agreement with the treatment plan as stated above.    Diagnosis:    ICD-10-CM    1. Vertigo R42    2. Nausea R11.0    3. Tinnitus, unspecified laterality H93.19         Discharge Medications:  Meclizine 25 mg tabs as needed for dizziness    CHRISTIANO Cox    This was created at least in part with a voice recognition software. Mistakes/typos may be present.        Cinthya Stratton PA  05/17/19 1020

## 2019-05-16 NOTE — ED TRIAGE NOTES
Pt c/o dizziness that has been worsening, hx of vertigo, pt attempted to take diazepam around 830 with no relief. Pt alert, oriented x3 ABCs intact

## 2019-05-16 NOTE — DISCHARGE INSTRUCTIONS
Call your ENT specialist for follow-up.  Take the meclizine at home as needed.  Continue Zofran as needed for nausea.  Return for any changing or worsening symptoms, new concerns per      Discharge Instructions  Vertigo  You have been diagnosed with vertigo.  This is a feeling that you are spinning or that the room is moving around you. You will often have nausea, vomiting, and balance problems with it.  Vertigo is usually caused by a problem in the inner ear which helps control your balance.  Many things can cause vertigo, including calcium collections in the inner ear, a virus infection of the inner ear, concussion, migraine, and some medicines.  Luckily, these causes are not life threatening and will eventually go away.  However, sometimes there is a serious problem that does not show up right away.  Return to the Emergency Department if you have:  New or severe headache.  Temperature greater than 100.4 F (38 C).  Seeing double or having trouble seeing clearly.  Trouble speaking or hearing.  Weakness in an arm or leg.  Passing out.  Numbness or tingling.  Chest pain.  Vomiting that will not stop.    Treatment:  An antihistamine, such as Antivert  (meclizine), or non-prescription medicines like Dramamine  (dimenhydrinate), or Benadryl  (diphenhydramine).  Prescription anti-nausea medicines, such as Phenergan  (promethazine), Reglan  (metoclopramide), or Zofran  (ondansetron).  Prescription sedative medicines, such as Valium  (diazepam), Ativan  (lorazepam), or Klonopin  (clonazepam).  Most of these medicines make you sleepy, and you should not take them before you work or drive. You should only take prescription medicines to treat severe vertigo symptoms, and you should stop the medicine when your symptoms improve.    Follow Up:  If you have vertigo longer than three days, it is important that you follow up either with your primary doctor or an Ear Nose and Throat doctor.  You may need further testing to evaluate  your vertigo and you may also need ?vestibular? therapy which is a special form of physical therapy to make the vertigo go away.    If you were given a prescription for medicine here today, be sure to read all of the information (including the package insert) that comes with your prescription.  This will include important information about the medicine, its side effects, and any warnings that you need to know about.  The pharmacist who fills the prescription can provide more information and answer questions you may have about the medicine.  If you have questions or concerns that the pharmacist cannot address, please call or return to the Emergency Department.       Opioid Medication Information    Pain medications are among the most commonly prescribed medicines, so we are including this information for all our patients. If you did not receive pain medication or get a prescription for pain medicine, you can ignore it.     You may have been given a prescription for an opioid (narcotic) pain medicine and/or have received a pain medicine while here in the Emergency Department. These medicines can make you drowsy or impaired. You must not drive, operate dangerous equipment, or engage in any other dangerous activities while taking these medications. If you drive while taking these medications, you could be arrested for DUI, or driving under the influence. Do not drink any alcohol while you are taking these medications.     Opioid pain medications can cause addiction. If you have a history of chemical dependency of any type, you are at a higher risk of becoming addicted to pain medications.  Only take these prescribed medications to treat your pain when all other options have been tried. Take it for as short a time and as few doses as possible. Store your pain pills in a secure place, as they are frequently stolen and provide a dangerous opportunity for children or visitors in your house to start abusing these powerful  medications. We will not replace any lost or stolen medicine.  As soon as your pain is better, you should flush all your remaining medication.     Many prescription pain medications contain Tylenol  (acetaminophen), including Vicodin , Tylenol #3 , Norco , Lortab , and Percocet .  You should not take any extra pills of Tylenol  if you are using these prescription medications or you can get very sick.  Do not ever take more than 3000 mg of acetaminophen in any 24 hour period.    All opioids tend to cause constipation. Drink plenty of water and eat foods that have a lot of fiber, such as fruits, vegetables, prune juice, apple juice and high fiber cereal.  Take a laxative if you don?t move your bowels at least every other day. Miralax , Milk of Magnesia, Colace , or Senna  can be used to keep you regular.      Remember that you can always come back to the Emergency Department if you are not able to see your regular doctor in the amount of time listed above, if you get any new symptoms, or if there is anything that worries you.

## 2019-05-16 NOTE — ED AVS SNAPSHOT
[facility] Emergency Department  [address]  [facility] [address]  Phone:  [phone]  Fax:  [phone]                                    Cynthia Arita   MRN: [id number]    Department:  [facility] Emergency Department   Date of Visit:  5/16/2019           After Visit Summary Signature Page    I have received my discharge instructions, and my questions have been answered. I have discussed any challenges I see with this plan with the nurse or doctor.    ..........................................................................................................................................  Patient/Patient Representative Signature      ..........................................................................................................................................  Patient Representative Print Name and Relationship to Patient    ..................................................               ................................................  Date                                   Time    ..........................................................................................................................................  Reviewed by Signature/Title    ...................................................              ..............................................  Date                                               Time          22EPIC Rev 08/18

## 2019-05-17 ASSESSMENT — ENCOUNTER SYMPTOMS
NUMBNESS: 0
SPEECH DIFFICULTY: 0
FATIGUE: 0
HEADACHES: 0
SHORTNESS OF BREATH: 0
VOMITING: 0
FEVER: 0
DIZZINESS: 1
NAUSEA: 1
WEAKNESS: 0

## 2019-06-10 ENCOUNTER — TRANSFERRED RECORDS (OUTPATIENT)
Dept: HEALTH INFORMATION MANAGEMENT | Facility: CLINIC | Age: 74
End: 2019-06-10

## 2019-06-12 ENCOUNTER — TELEPHONE (OUTPATIENT)
Dept: INTERNAL MEDICINE | Facility: CLINIC | Age: 74
End: 2019-06-12

## 2019-06-12 NOTE — TELEPHONE ENCOUNTER
Panel Management Review      Patient has the following on her problem list:     Hypertension   Last three blood pressure readings:  BP Readings from Last 3 Encounters:   05/16/19 118/72   02/27/19 138/80   02/04/19 117/69     Blood pressure: Passed    HTN Guidelines:  Less than 140/90      Composite cancer screening  Chart review shows that this patient is due/due soon for the following Mammogram  Summary:    Patient is due/failing the following:   MAMMOGRAM and PHYSICAL    Action needed:   Patient needs office visit for as above.    Type of outreach:    Sent letter.    Questions for provider review:    None                                                                                                                                    MALENA Gray LPN       Chart routed to none.

## 2019-06-12 NOTE — LETTER
[facility]  [address], [address]  [place], [place]  [address]                                            TEL:[phone]  FAX:[phone]      Cynthia Arita  [address] [place] [address]      June 12, 2019    Dear Cynthia  ,          At [facility], we care about your health and well-being. A review of your chart has indicated that you are due for a physical and a mammogram. Please contact us at [phone] to schedule an appointment.     If you have already had one or all of the above screening tests at another facility, please call us to update your chart.      Sincerely,      Huyen Samuels M.D.

## 2019-06-17 ENCOUNTER — HOSPITAL ENCOUNTER (OUTPATIENT)
Dept: MAMMOGRAPHY | Facility: CLINIC | Age: 74
Discharge: HOME | End: 2019-06-17
Attending: INTERNAL MEDICINE | Admitting: INTERNAL MEDICINE
Payer: MEDICARE

## 2019-06-17 ENCOUNTER — TRANSFERRED RECORDS (OUTPATIENT)
Dept: HEALTH INFORMATION MANAGEMENT | Facility: CLINIC | Age: 74
End: 2019-06-17

## 2019-06-17 ENCOUNTER — OFFICE VISIT (OUTPATIENT)
Dept: INTERNAL MEDICINE | Facility: CLINIC | Age: 74
End: 2019-06-17
Payer: MEDICARE

## 2019-06-17 VITALS
DIASTOLIC BLOOD PRESSURE: 76 MMHG | WEIGHT: 125.1 LBS | SYSTOLIC BLOOD PRESSURE: 122 MMHG | HEART RATE: 92 BPM | BODY MASS INDEX: 22.16 KG/M2 | OXYGEN SATURATION: 99 % | TEMPERATURE: 98 F | HEIGHT: 63 IN

## 2019-06-17 DIAGNOSIS — H81.02 MENIERE'S DISEASE, LEFT: ICD-10-CM

## 2019-06-17 DIAGNOSIS — F41.9 ANXIETY: ICD-10-CM

## 2019-06-17 DIAGNOSIS — I10 ESSENTIAL HYPERTENSION: ICD-10-CM

## 2019-06-17 DIAGNOSIS — Z12.31 VISIT FOR SCREENING MAMMOGRAM: ICD-10-CM

## 2019-06-17 DIAGNOSIS — Z01.818 PREOP GENERAL PHYSICAL EXAM: Primary | ICD-10-CM

## 2019-06-17 DIAGNOSIS — K21.00 GASTROESOPHAGEAL REFLUX DISEASE WITH ESOPHAGITIS: ICD-10-CM

## 2019-06-17 DIAGNOSIS — Z96.651 STATUS POST TOTAL RIGHT KNEE REPLACEMENT: ICD-10-CM

## 2019-06-17 PROCEDURE — 99214 OFFICE O/P EST MOD 30 MIN: CPT | Performed by: INTERNAL MEDICINE

## 2019-06-17 PROCEDURE — 93000 ELECTROCARDIOGRAM COMPLETE: CPT | Performed by: INTERNAL MEDICINE

## 2019-06-17 PROCEDURE — 77063 BREAST TOMOSYNTHESIS BI: CPT

## 2019-06-17 ASSESSMENT — MIFFLIN-ST. JEOR: SCORE: 1036.58

## 2019-06-17 NOTE — PROGRESS NOTES
[place] [address]  [facility] [address]  [phone]  Dept: [phone]    PRE-OP EVALUATION:  Today's date: 2019    Cynthia Arita (: 1945) presents for pre-operative evaluation assessment as requested by Dr. Ralph Parker.  She requires evaluation and anesthesia risk assessment prior to undergoing surgery/procedure for treatment of Ear surgery .    Proposed Surgery/ Procedure: Ear Surgery  Date of Surgery/ Procedure: 2019  Time of Surgery/ Procedure: 1:30PM   Hospital/Surgical Facility: [place] [phone]  Primary Physician: Huyen Samuels  Type of Anesthesia Anticipated: to be determined    Patient has a Health Care Directive or Living Will:  NO    1. NO - Do you have a history of heart attack, stroke, stent, bypass or surgery on an artery in the head, neck, heart or legs?  2. YES - DO YOU EVER HAVE ANY PAIN OR DISCOMFORT IN YOUR CHEST?    3. NO - Do you have a history of  Heart Failure?  4. NO - Are you troubled by shortness of breath when: walking on the level, up a slight hill or at night?  5. NO - Do you currently have a cold, bronchitis or other respiratory infection?  6. NO - Do you have a cough, shortness of breath or wheezing?  7. NO - Do you sometimes get pains in the calves of your legs when you walk?  8. NO - Do you or anyone in your family have previous history of blood clots?  9. NO - Do you or does anyone in your family have a serious bleeding problem such as prolonged bleeding following surgeries or cuts?  10. NO - Have you ever had problems with anemia or been told to take iron pills?  11. NO - Have you had any abnormal blood loss such as black, tarry or bloody stools, or abnormal vaginal bleeding?  12. NO - Have you ever had a blood transfusion?  13. YES - HAVE YOU OR ANY OF YOUR RELATIVES EVER HAD PROBLEMS WITH ANESTHESIA? nausea  14. YES - DO YOU HAVE SLEEP APNEA, EXCESSIVE SNORING OR DAYTIME DROWSINESS?    15. NO - Do you have  any prosthetic heart valves?  16. YES - DO YOU HAVE PROSTHETIC JOINTS? Right total knee arthroplasty   17. NO - Is there any chance that you may be pregnant?      HPI:     HPI related to upcoming procedure: history of [place] status post surgery symptoms returned and going back in for surgery. She will loose her hearing.       See problem list for active medical problems.  Problems all longstanding and stable, except as noted/documented.  See ROS for pertinent symptoms related to these conditions.      MEDICAL HISTORY:     Patient Active Problem List    Diagnosis Date Noted     Abdominal pain 02/03/2019     Priority: Medium     S/P total knee arthroplasty 01/21/2019     Priority: Medium     Pain of right thigh 10/01/2018     Priority: Medium     Tinnitus, unspecified laterality 05/23/2018     Priority: Medium     Gastroesophageal reflux disease without esophagitis 12/20/2017     Priority: Medium     Stress 07/31/2017     Priority: Medium     Essential hypertension 07/03/2017     Priority: Medium     Anxiety 04/19/2017     Priority: Medium     Pneumonia of right upper lobe due to infectious organism (H) 04/05/2017     Priority: Medium     Calculus of right kidney 06/29/2015     Priority: Medium     Esophageal reflux 06/18/2015     Priority: Medium     Osteoporosis 06/18/2015     Priority: Medium     Meniere's disease 06/18/2015     Priority: Medium      Past Medical History:   Diagnosis Date     Anxiety      Complication of anesthesia      Diverticulosis      GERD (gastroesophageal reflux disease)      HTN (hypertension)      Meniere's disease      Nephrolithiasis      Pain in right knee      PONV (postoperative nausea and vomiting)      Past Surgical History:   Procedure Laterality Date     ARTHROPLASTY KNEE Right 1/21/2019    Procedure: Right total knee arthroplasty;  Surgeon: Denton Amor MD;  Location: [facility] OR     C VAGINAL HYSTERECTOMY      with left oophorectomy     EYE SURGERY      cataract surgery  both eyes     Current Outpatient Medications   Medication Sig Dispense Refill     ALPRAZolam (XANAX) 0.5 MG tablet Take 1 tablet (0.5 mg) by mouth 3 times daily as needed for anxiety 15 tablet 0     calcitonin, salmon, (MIACALCIN) 200 UNIT/ACT nasal spray USE 1 SPRAY IN 1 NOSTRIL  DAILY (ALTERNATING NOSTRILS DAILY) 11.1 mL 3     Cholecalciferol (VITAMIN D3) 2000 UNITS CAPS Take 2,000 Units by mouth every morning        diazepam (VALIUM) 2 MG tablet Take 1 tablet (2 mg) by mouth every 6 hours as needed for anxiety or vertigo 40 tablet 0     Flaxseed, Linseed, (FLAXSEED OIL) 1000 MG CAPS Take 1,000 mg by mouth every evening        gabapentin (NEURONTIN) 100 MG capsule Take 1 capsule (100 mg) by mouth daily 90 capsule      Glucosamine-Chondroitin (GLUCOSAMINE CHONDR COMPLEX PO) Take 1 capsule by mouth 2 times daily        meclizine (ANTIVERT) 25 MG tablet Take 1 tablet (25 mg) by mouth every 6 hours as needed for dizziness 30 tablet 1     Multiple Vitamins-Minerals (CENTRUM SILVER) per tablet Take 1 tablet by mouth daily       Multiple Vitamins-Minerals (OCUVITE PRESERVISION PO) Take 1 tablet by mouth 2 times daily        NEXIUM 40 MG CR capsule Take 40 mg by mouth daily        Omega-3 Fatty Acids (OMEGA-3 FISH OIL) 1000 MG CAPS Take 1,000 mg by mouth 2 times daily        propranolol (INDERAL) 20 MG tablet TAKE 1 TABLET BY MOUTH  DAILY AS NEEDED FOR BLOOD  PRESSURE HIGHER THAN 170 30 tablet 0     RANITIDINE HCL PO Take 150 mg by mouth At Bedtime        traMADol (ULTRAM) 50 MG tablet 1/2 tab po q 6 hrs prn pain scale 3-6,   1 tab po q 6hrs prn pain scale 7-10 40 tablet 0     triamterene-HCTZ (MAXZIDE-25) 37.5-25 MG tablet Take 1 tablet by mouth daily 10 tablet 0     OTC products: None, except as noted above    Allergies   Allergen Reactions     Ativan [Lorazepam]      Sick -      Gabapentin Nausea     Metoprolol      Nausea       Pantoprazole Other (See Comments), Nausea and Vomiting and GI Disturbance     Red in the  face, sleepiness, face swelling, joint pain, dizziness     Oxycodone Anxiety      Latex Allergy: NO    Social History     Tobacco Use     Smoking status: Never Smoker     Smokeless tobacco: Never Used   Substance Use Topics     Alcohol use: Yes     Comment: rare     History   Drug Use No       REVIEW OF SYSTEMS:   CONSTITUTIONAL: NEGATIVE for fever, chills, change in weight  INTEGUMENTARY/SKIN: NEGATIVE for worrisome rashes, moles or lesions  EYES: NEGATIVE for vision changes or irritation  ENT/MOUTH: NEGATIVE for ear, mouth and throat problems  RESP: NEGATIVE for significant cough or SOB  BREAST: NEGATIVE for masses, tenderness or discharge  CV: NEGATIVE for chest pain, palpitations or peripheral edema  GI: NEGATIVE for nausea, abdominal pain, heartburn, or change in bowel habits  : NEGATIVE for frequency, dysuria, or hematuria  MUSCULOSKELETAL: NEGATIVE for significant arthralgias or myalgia  NEURO: NEGATIVE for weakness, dizziness or paresthesias  ENDOCRINE: NEGATIVE for temperature intolerance, skin/hair changes  HEME: NEGATIVE for bleeding problems  PSYCHIATRIC: NEGATIVE for changes in mood or affect    EXAM:   There were no vitals taken for this visit.    GENERAL APPEARANCE: healthy, alert and no distress     EYES: EOMI, PERRL     HENT: ear canals and TM's normal and nose and mouth without ulcers or lesions     NECK: no adenopathy, no asymmetry, masses, or scars and thyroid normal to palpation     RESP: lungs clear to auscultation - no rales, rhonchi or wheezes     CV: regular rates and rhythm, normal S1 S2, no S3 or S4 and no murmur, click or rub     ABDOMEN:  soft, nontender, no HSM or masses and bowel sounds normal     MS: extremities normal- no gross deformities noted, no evidence of inflammation in joints, FROM in all extremities.     SKIN: no suspicious lesions or rashes     NEURO: Normal strength and tone, sensory exam grossly normal, mentation intact and speech normal     PSYCH: mentation appears  normal. and affect normal/bright     LYMPHATICS: No cervical adenopathy    DIAGNOSTICS:   EKG: Normal Sinus Rhythm, with PACs, anterior fascicular block, normal intervals, no acute ST/T changes c/w ischemia, no LVH by voltage criteria, unchanged from previous tracings    Recent Labs   Lab Test 05/16/19  1529 02/04/19  0611 02/03/19  1719   HGB 14.1  --  11.0*     --  596*    138 134   POTASSIUM 3.4 3.3* 3.3*   CR 0.61 0.63 0.82        IMPRESSION:   Reason for surgery/procedure: left vestibular surgery    The proposed surgical procedure is considered INTERMEDIATE risk.    REVISED CARDIAC RISK INDEX  The patient has the following serious cardiovascular risks for perioperative complications such as (MI, PE, VFib and 3  AV Block):  No serious cardiac risks  INTERPRETATION: 0 risks: Class I (very low risk - 0.4% complication rate)    The patient has the following additional risks for perioperative complications:  No identified additional risks      ICD-10-CM    1. Preop general physical exam Z01.818        RECOMMENDATIONS:         --Patient is to take all scheduled medications on the day of surgery EXCEPT for modifications listed below.    APPROVAL GIVEN to proceed with proposed procedure, without further diagnostic evaluation       Signed Electronically by: Huyen Samuels MD    Copy of this evaluation report is provided to requesting physician.    [facility] Preop Guidelines    Revised Cardiac Risk Index

## 2019-06-19 DIAGNOSIS — F41.9 ANXIETY: ICD-10-CM

## 2019-06-19 DIAGNOSIS — H81.09 MENIERE'S DISEASE, UNSPECIFIED LATERALITY: ICD-10-CM

## 2019-06-19 NOTE — TELEPHONE ENCOUNTER
Requested Prescriptions   Pending Prescriptions Disp Refills     diazepam (VALIUM) 2 MG tablet   Last Written Prescription Date:  5/13/19  Last Fill Quantity: 40,  # refills: 0   Last office visit: 6/17/2019 with prescribing provider:  6/17/19   Future Office Visit:     40 tablet 0     Sig: Take 1 tablet (2 mg) by mouth every 6 hours as needed for anxiety or vertigo       There is no refill protocol information for this order           Routing refill request to provider for review/approval because:  Drug not on the [facility] or [facility] refill protocol or controlled substance

## 2019-06-20 RX ORDER — DIAZEPAM 2 MG
2 TABLET ORAL EVERY 6 HOURS PRN
Qty: 40 TABLET | Refills: 0 | Status: SHIPPED | OUTPATIENT
Start: 2019-06-20 | End: 2019-07-18

## 2019-06-20 NOTE — TELEPHONE ENCOUNTER
Rx faxed to [facility] pt called and advised. Rx placed with other faxed Rx's at [facility].

## 2019-06-20 NOTE — TELEPHONE ENCOUNTER
Routing refill request to provider for review/approval because:  Drug not on the [facility] refill protocol     PCP has not given this RN access to  monitoring.  Arabella Celestin RN

## 2019-06-27 ENCOUNTER — TRANSFERRED RECORDS (OUTPATIENT)
Dept: HEALTH INFORMATION MANAGEMENT | Facility: CLINIC | Age: 74
End: 2019-06-27

## 2019-07-03 ENCOUNTER — TRANSFERRED RECORDS (OUTPATIENT)
Dept: HEALTH INFORMATION MANAGEMENT | Facility: CLINIC | Age: 74
End: 2019-07-03

## 2019-07-08 ENCOUNTER — TELEPHONE (OUTPATIENT)
Dept: INTERNAL MEDICINE | Facility: CLINIC | Age: 74
End: 2019-07-08

## 2019-07-08 DIAGNOSIS — L60.0 INGROWN TOENAIL: Primary | ICD-10-CM

## 2019-07-08 NOTE — TELEPHONE ENCOUNTER
Pt calling requesting a referral somewhere for her ingrown toe nails. Pt can be reached at [phone]. OK to leave a detailed message. Please advise. Thanks.

## 2019-07-09 NOTE — TELEPHONE ENCOUNTER
Patient prefers to be seen at the [facility], given their phone number to schedule podiatry visit.  YUE Avalos R.N.

## 2019-07-17 ENCOUNTER — OFFICE VISIT (OUTPATIENT)
Dept: PODIATRY | Facility: CLINIC | Age: 74
End: 2019-07-17
Payer: MEDICARE

## 2019-07-17 VITALS
BODY MASS INDEX: 22.16 KG/M2 | SYSTOLIC BLOOD PRESSURE: 120 MMHG | WEIGHT: 125.1 LBS | HEIGHT: 63 IN | DIASTOLIC BLOOD PRESSURE: 72 MMHG

## 2019-07-17 DIAGNOSIS — L60.0 INGROWING LEFT GREAT TOENAIL: ICD-10-CM

## 2019-07-17 DIAGNOSIS — L60.0 INGROWING RIGHT GREAT TOENAIL: ICD-10-CM

## 2019-07-17 DIAGNOSIS — M79.675 TOE PAIN, BILATERAL: Primary | ICD-10-CM

## 2019-07-17 DIAGNOSIS — M79.674 TOE PAIN, BILATERAL: Primary | ICD-10-CM

## 2019-07-17 PROCEDURE — 11732 AVLSN NAIL PLATE SIMPLE EACH: CPT | Mod: T5 | Performed by: PODIATRIST

## 2019-07-17 PROCEDURE — 99203 OFFICE O/P NEW LOW 30 MIN: CPT | Mod: 25 | Performed by: PODIATRIST

## 2019-07-17 PROCEDURE — 11730 AVULSION NAIL PLATE SIMPLE 1: CPT | Mod: TA | Performed by: PODIATRIST

## 2019-07-17 ASSESSMENT — MIFFLIN-ST. JEOR: SCORE: 1031.58

## 2019-07-17 NOTE — PATIENT INSTRUCTIONS
Thank you for choosing [facility]!    DR. STACK'S CLINIC LOCATIONS     MONDAY - [place] WEDNESDAY (AM ONLY) - [place]   [address] [place] [address] [address] [place] [address]   [phone] / [phone] / [phone]       THURSDAY - [place] SCHEDULE SURGERY: [phone]   [address] APPOINTMENTS: [phone]   [place] [address] BILLING QUESTIONS: [phone] / [phone]       [facility] REMOVAL [facility] CARE  1. Keep bandage on until that evening or the day after your procedure. If the bandage falls off, start the soaking process.    2. Some bleeding is normal. If bleeding seems excessive to you, place ice on top of your foot for 15-20 minutes and elevate your foot above heart level.    3. Over the counter pain medication (tylenol / ibuprofen), elevating your foot and ice application is all you will need for pain control.    4. If the bandage feels too tight and your toe is throbbing it is ok to remove the bandage and start soaking.     5. For one to two weeks, soak your foot twice a day in mild skin friendly soap (dish or hand soap) and warm water for 15 minutes. It is ok to soak your foot for a few minutes to loosen the dressing applied in the clinic. After soaking, blot dry and apply a regular band aid.    6. It is normal to experience some discomfort and redness around the nail for several days following the procedure. Drainage will likely appear a red - yellow. This is normal. If your toe is still draining a red - yellow fluid after 2 weeks keep continuing to soak foot another few days.    7. Initial discomfort might last for 2-3 days. You may resume with regular activity as soon as you are comfortable, as long as you keep the wound clean and dry and follow the soaking instruction. It is recommended that you do not enter public swimming pools/hot tubs while your toe is draining.    8. If you are  experiencing worsening pain and redness or notice pus after 2-3 days please contact the clinic. Ask to speak with a triage nurse and they will inform our team of your symptoms and we can advise if a follow up is needed.

## 2019-07-17 NOTE — PROGRESS NOTES
"  ASSESSMENT/PLAN:    Encounter Diagnoses   Name Primary?     Toe pain, bilateral Yes     Ingrowing left great toenail      Ingrowing right great toenail      There appears to be infection of the right hallux.     The potential causes and nature of an ingrown toenail were discussed with the patient.  We reviewed the natural history/prognosis of the condition and potential risks if no treatment is provided.      Treatment options discussed included conservative management (oral antibiotics when coexisting infection, soaking of foot, the use of a toe spacer, adequate width shoes)  as well as surgical management (partial or total nail removal).  The pros and cons of both forms of treatment were reviewed.      After thorough discussion and answering all questions, the patient elected to a partial nail avulsion (lateral edge of the right hallux; medial edge of the left hallux).  I discussed the option of permanent removal, if a recurrent problem and when not infected. I explained that applying the chemical (phenol) creates a chemical burn, kills tissue and this is not ideal when there is an infection.      Nail Avulsion Procedure  (non permanent removal)    The procedure was discussed with the patient, including risk of infection, abnormal nail regrowth, and possible need for a future nail procedure.  Post procedure home cares were explained. These cares are important for preventing infection and aiding in timely healing.   Verbal and written consent was obtained.   The site was marked and the \"Time Out\" called.     The base of the right hallux  was injected with 2 cc of  2% Lidocaine plain.  The toe was then prepped with betadine solution.  A tourniquet was applied around the base of the toe for hemostasis.   Next the toe was checked for adequate anesthesia.  With the Pt comfortable, the lateral nail was freed from the nail bed and marginal soft tissue attachments with a blunt instrument.  ( A nail splitter was then " "used to make a longitudinal cut 2mm from the lateral nail fold.  It was completed, atraumatically, under the eponychium with a [place] blade. ) Next, it was firmly grasped with a hemostat and removed in total.      Silvadene ointment was applied to the nail bed, followed by a compressive dressing.  The tourniquet was removed.  The distal toe turned immediately pink.  The foot was kept elevated for several minutes.  The patient tolerated the procedure well.      Patient is instructed to watch for, and call if,  increasing redness, drainage, and pain after 2-3 days.  Post procedure instructions provided - handout given.    Procedure #2:      The exact procedure performed on the right hallux was performed on the left hallux, with the only exception being that the lateral edge was removed.        Body mass index is 22.16 kg/m .          Spencer Luke DPM, FACFAS, [facility] Department of Podiatry/Foot & Ankle Surgery      ____________________________________________________________________    HPI:       I was asked by Dr. Samuels to evaluate this patient for toe pain related to ingrown toenails, bilateral foot.   Chief Complaint: bilateral toe pain  She specifies the lateral nail unit, right hallux; medial nail unit, left hallux  Onset of problem: 3 weeks  Ratin/10   Frequency:  \"all the time\"    The pain is made worse with enclosed shoe, bumping it  *  Patient Active Problem List   Diagnosis     Esophageal reflux     Osteoporosis     Meniere's disease     Calculus of right kidney     Pneumonia of right upper lobe due to infectious organism (H)     Anxiety     Essential hypertension     Stress     Gastroesophageal reflux disease without esophagitis     Tinnitus, unspecified laterality     Pain of right thigh     S/P total knee arthroplasty     Abdominal pain   *  *  Past Surgical History:   Procedure Laterality Date     ARTHROPLASTY KNEE Right 2019    Procedure: Right total knee arthroplasty;  Surgeon: Mt, " Denton Ndiaye MD;  Location: [facility] OR     C VAGINAL HYSTERECTOMY      with left oophorectomy     EYE SURGERY      cataract surgery both eyes   *  *  Current Outpatient Medications   Medication Sig Dispense Refill     ALPRAZolam (XANAX) 0.5 MG tablet Take 1 tablet (0.5 mg) by mouth 3 times daily as needed for anxiety 15 tablet 0     calcitonin, salmon, (MIACALCIN) 200 UNIT/ACT nasal spray USE 1 SPRAY IN 1 NOSTRIL  DAILY (ALTERNATING NOSTRILS DAILY) 11.1 mL 3     Cholecalciferol (VITAMIN D3) 2000 UNITS CAPS Take 2,000 Units by mouth every morning        diazepam (VALIUM) 2 MG tablet Take 1 tablet (2 mg) by mouth every 6 hours as needed for anxiety or vertigo 40 tablet 0     Flaxseed, Linseed, (FLAXSEED OIL) 1000 MG CAPS Take 1,000 mg by mouth every evening        gabapentin (NEURONTIN) 100 MG capsule Take 1 capsule (100 mg) by mouth daily 90 capsule      Glucosamine-Chondroitin (GLUCOSAMINE CHONDR COMPLEX PO) Take 1 capsule by mouth 2 times daily        meclizine (ANTIVERT) 25 MG tablet Take 1 tablet (25 mg) by mouth every 6 hours as needed for dizziness 30 tablet 1     Multiple Vitamins-Minerals (CENTRUM SILVER) per tablet Take 1 tablet by mouth daily       Multiple Vitamins-Minerals (OCUVITE PRESERVISION PO) Take 1 tablet by mouth 2 times daily        NEXIUM 40 MG CR capsule Take 40 mg by mouth daily        Omega-3 Fatty Acids (OMEGA-3 FISH OIL) 1000 MG CAPS Take 1,000 mg by mouth 2 times daily        propranolol (INDERAL) 20 MG tablet TAKE 1 TABLET BY MOUTH  DAILY AS NEEDED FOR BLOOD  PRESSURE HIGHER THAN 170 (Patient not taking: Reported on 6/17/2019) 30 tablet 0     RANITIDINE HCL PO Take 150 mg by mouth At Bedtime        triamterene-HCTZ (MAXZIDE-25) 37.5-25 MG tablet Take 1 tablet by mouth daily 10 tablet 0       ROS:     A 10-point review of systems was performed and is positive for that noted above in the HPI and as seen below.  All other areas are negative.     Numbness in feet?  no   Calf pain with walking?  no  Recent foot/ankle injury? Knee replacement  Weight change over past 12 months? 12 # loss  Self perception as overweight? no  Recent flu-like symptoms? no  Joint pain other than feet ? no    Social History: Employment:  no;  Exercise/Physical activity:  walking;  Tobacco use:  no  Social History     Socioeconomic History     Marital status:      Spouse name: Not on file     Number of children: Not on file     Years of education: Not on file     Highest education level: Not on file   Occupational History     Not on file   Social Needs     Financial resource strain: Not on file     Food insecurity:     Worry: Not on file     Inability: Not on file     Transportation needs:     Medical: Not on file     Non-medical: Not on file   Tobacco Use     Smoking status: Never Smoker     Smokeless tobacco: Never Used   Substance and Sexual Activity     Alcohol use: Yes     Comment: rare     Drug use: No     Sexual activity: Not Currently     Partners: Male   Lifestyle     Physical activity:     Days per week: Not on file     Minutes per session: Not on file     Stress: Not on file   Relationships     Social connections:     Talks on phone: Not on file     Gets together: Not on file     Attends Holiness service: Not on file     Active member of club or organization: Not on file     Attends meetings of clubs or organizations: Not on file     Relationship status: Not on file     Intimate partner violence:     Fear of current or ex partner: Not on file     Emotionally abused: Not on file     Physically abused: Not on file     Forced sexual activity: Not on file   Other Topics Concern     Parent/sibling w/ CABG, MI or angioplasty before 65F 55M? Not Asked   Social History Narrative     Not on file       Family history:  Family History   Problem Relation Age of Onset     Neurologic Disorder Mother         palsy     Esophageal Cancer Father      Cancer Maternal Grandmother         lymph     Diabetes Paternal Grandmother       "Neurologic Disorder Sister         Parkinson's     Hypertension Brother      Bipolar Disorder Sister      Brain Cancer Son 17       Rheumatoid arthritis:  Parent, sibling  Foot Problems: no  Diabetes: nephew      EXAM:    Vitals: /72   Ht 1.6 m (5' 3\")   Wt 56.7 kg (125 lb 1.6 oz)   BMI 22.16 kg/m    BMI: Body mass index is 22.16 kg/m .  Height: 5' 3\"    Constitutional/ general:  Pt is in no apparent distress, appears well-nourished.  Cooperative with history and physical exam.     Vascular:  Pedal pulses are palpable bilaterally for both the DP and PT arteries.  CFT < 3 sec.  No edema.  Pedal hair growth noted.     Neuro:  Alert and oriented x 3. Coordinated gait.  Light touch sensation is intact to the L4, L5, S1 distributions. No obvious deficits.  No evidence of neurological-based weakness, spasticity, or contracture in the lower extremities.     Derm: localized erythema, edema, hyperkeratotic skin, pain along the the lateral skin fold, right hallux.  Similar presentation, but less inflamed, medial edge of the left hallux.     Musculoskeletal:    Lower extremity muscle strength is normal.  Patient is ambulatory without an assistive device or brace .  No gross deformities.      Spencer Luke DPM, JOSUE, MS    [facility] Department of Podiatry/Foot & Ankle Surgery              "

## 2019-07-17 NOTE — LETTER
"    7/17/2019         RE: Cynthia Arita  [address] [place] [address]        Dear Colleague,    Thank you for referring your patient, [facility]rita[facility]. Please see a copy of my visit note below.      ASSESSMENT/PLAN:    Encounter Diagnoses   Name Primary?     Toe pain, bilateral Yes     Ingrowing left great toenail      Ingrowing right great toenail      There appears to be infection of the right hallux.     The potential causes and nature of an ingrown toenail were discussed with the patient.  We reviewed the natural history/prognosis of the condition and potential risks if no treatment is provided.      Treatment options discussed included conservative management (oral antibiotics when coexisting infection, soaking of foot, the use of a toe spacer, adequate width shoes)  as well as surgical management (partial or total nail removal).  The pros and cons of both forms of treatment were reviewed.      After thorough discussion and answering all questions, the patient elected to a partial nail avulsion (lateral edge of the right hallux; medial edge of the left hallux).  I discussed the option of permanent removal, if a recurrent problem and when not infected. I explained that applying the chemical (phenol) creates a chemical burn, kills tissue and this is not ideal when there is an infection.      Nail Avulsion Procedure  (non permanent removal)    The procedure was discussed with the patient, including risk of infection, abnormal nail regrowth, and possible need for a future nail procedure.  Post procedure home cares were explained. These cares are important for preventing infection and aiding in timely healing.   Verbal and written consent was obtained.   The site was marked and the \"Time Out\" called.     The base of the right hallux  was injected with 2 cc of  2% Lidocaine plain.  The toe was then prepped with betadine solution.  A tourniquet was applied around the base of the toe for " "hemostasis.   Next the toe was checked for adequate anesthesia.  With the Pt comfortable, the lateral nail was freed from the nail bed and marginal soft tissue attachments with a blunt instrument.  ( A nail splitter was then used to make a longitudinal cut 2mm from the lateral nail fold.  It was completed, atraumatically, under the eponychium with a Nulato blade. ) Next, it was firmly grasped with a hemostat and removed in total.      Silvadene ointment was applied to the nail bed, followed by a compressive dressing.  The tourniquet was removed.  The distal toe turned immediately pink.  The foot was kept elevated for several minutes.  The patient tolerated the procedure well.      Patient is instructed to watch for, and call if,  increasing redness, drainage, and pain after 2-3 days.  Post procedure instructions provided - handout given.    Procedure #2:      The exact procedure performed on the right hallux was performed on the left hallux, with the only exception being that the lateral edge was removed.        Body mass index is 22.16 kg/m .          Spencer Luke DPM, FACFAS, [facility] Department of Podiatry/Foot & Ankle Surgery      ____________________________________________________________________    HPI:       I was asked by Dr. Samuels to evaluate this patient for toe pain related to ingrown toenails, bilateral foot.   Chief Complaint: bilateral toe pain  She specifies the lateral nail unit, right hallux; medial nail unit, left hallux  Onset of problem: 3 weeks  Ratin/10   Frequency:  \"all the time\"    The pain is made worse with enclosed shoe, bumping it  *  Patient Active Problem List   Diagnosis     Esophageal reflux     Osteoporosis     Meniere's disease     Calculus of right kidney     Pneumonia of right upper lobe due to infectious organism (H)     Anxiety     Essential hypertension     Stress     Gastroesophageal reflux disease without esophagitis     Tinnitus, unspecified laterality     Pain " of right thigh     S/P total knee arthroplasty     Abdominal pain   *  *  Past Surgical History:   Procedure Laterality Date     ARTHROPLASTY KNEE Right 1/21/2019    Procedure: Right total knee arthroplasty;  Surgeon: Denton Amor MD;  Location: [facility] OR     C VAGINAL HYSTERECTOMY      with left oophorectomy     EYE SURGERY      cataract surgery both eyes   *  *  Current Outpatient Medications   Medication Sig Dispense Refill     ALPRAZolam (XANAX) 0.5 MG tablet Take 1 tablet (0.5 mg) by mouth 3 times daily as needed for anxiety 15 tablet 0     calcitonin, salmon, (MIACALCIN) 200 UNIT/ACT nasal spray USE 1 SPRAY IN 1 NOSTRIL  DAILY (ALTERNATING NOSTRILS DAILY) 11.1 mL 3     Cholecalciferol (VITAMIN D3) 2000 UNITS CAPS Take 2,000 Units by mouth every morning        diazepam (VALIUM) 2 MG tablet Take 1 tablet (2 mg) by mouth every 6 hours as needed for anxiety or vertigo 40 tablet 0     Flaxseed, Linseed, (FLAXSEED OIL) 1000 MG CAPS Take 1,000 mg by mouth every evening        gabapentin (NEURONTIN) 100 MG capsule Take 1 capsule (100 mg) by mouth daily 90 capsule      Glucosamine-Chondroitin (GLUCOSAMINE CHONDR COMPLEX PO) Take 1 capsule by mouth 2 times daily        meclizine (ANTIVERT) 25 MG tablet Take 1 tablet (25 mg) by mouth every 6 hours as needed for dizziness 30 tablet 1     Multiple Vitamins-Minerals (CENTRUM SILVER) per tablet Take 1 tablet by mouth daily       Multiple Vitamins-Minerals (OCUVITE PRESERVISION PO) Take 1 tablet by mouth 2 times daily        NEXIUM 40 MG CR capsule Take 40 mg by mouth daily        Omega-3 Fatty Acids (OMEGA-3 FISH OIL) 1000 MG CAPS Take 1,000 mg by mouth 2 times daily        propranolol (INDERAL) 20 MG tablet TAKE 1 TABLET BY MOUTH  DAILY AS NEEDED FOR BLOOD  PRESSURE HIGHER THAN 170 (Patient not taking: Reported on 6/17/2019) 30 tablet 0     RANITIDINE HCL PO Take 150 mg by mouth At Bedtime        triamterene-HCTZ (MAXZIDE-25) 37.5-25 MG tablet Take 1 tablet by mouth  daily 10 tablet 0       ROS:     A 10-point review of systems was performed and is positive for that noted above in the HPI and as seen below.  All other areas are negative.     Numbness in feet?  no   Calf pain with walking? no  Recent foot/ankle injury? Knee replacement  Weight change over past 12 months? 12 # loss  Self perception as overweight? no  Recent flu-like symptoms? no  Joint pain other than feet ? no    Social History: Employment:  no;  Exercise/Physical activity:  walking;  Tobacco use:  no  Social History     Socioeconomic History     Marital status:      Spouse name: Not on file     Number of children: Not on file     Years of education: Not on file     Highest education level: Not on file   Occupational History     Not on file   Social Needs     Financial resource strain: Not on file     Food insecurity:     Worry: Not on file     Inability: Not on file     Transportation needs:     Medical: Not on file     Non-medical: Not on file   Tobacco Use     Smoking status: Never Smoker     Smokeless tobacco: Never Used   Substance and Sexual Activity     Alcohol use: Yes     Comment: rare     Drug use: No     Sexual activity: Not Currently     Partners: Male   Lifestyle     Physical activity:     Days per week: Not on file     Minutes per session: Not on file     Stress: Not on file   Relationships     Social connections:     Talks on phone: Not on file     Gets together: Not on file     Attends Oriental orthodox service: Not on file     Active member of club or organization: Not on file     Attends meetings of clubs or organizations: Not on file     Relationship status: Not on file     Intimate partner violence:     Fear of current or ex partner: Not on file     Emotionally abused: Not on file     Physically abused: Not on file     Forced sexual activity: Not on file   Other Topics Concern     Parent/sibling w/ CABG, MI or angioplasty before 65F 55M? Not Asked   Social History Narrative     Not on file  "      Family history:  Family History   Problem Relation Age of Onset     Neurologic Disorder Mother         palsy     Esophageal Cancer Father      Cancer Maternal Grandmother         lymph     Diabetes Paternal Grandmother      Neurologic Disorder Sister         Parkinson's     Hypertension Brother      Bipolar Disorder Sister      Brain Cancer Son 17       Rheumatoid arthritis:  Parent, sibling  Foot Problems: no  Diabetes: nephew      EXAM:    Vitals: /72   Ht 1.6 m (5' 3\")   Wt 56.7 kg (125 lb 1.6 oz)   BMI 22.16 kg/m     BMI: Body mass index is 22.16 kg/m .  Height: 5' 3\"    Constitutional/ general:  Pt is in no apparent distress, appears well-nourished.  Cooperative with history and physical exam.     Vascular:  Pedal pulses are palpable bilaterally for both the DP and PT arteries.  CFT < 3 sec.  No edema.  Pedal hair growth noted.     Neuro:  Alert and oriented x 3. Coordinated gait.  Light touch sensation is intact to the L4, L5, S1 distributions. No obvious deficits.  No evidence of neurological-based weakness, spasticity, or contracture in the lower extremities.     Derm: localized erythema, edema, hyperkeratotic skin, pain along the the lateral skin fold, right hallux.  Similar presentation, but less inflamed, medial edge of the left hallux.     Musculoskeletal:    Lower extremity muscle strength is normal.  Patient is ambulatory without an assistive device or brace .  No gross deformities.      Spencer Luke DPM, JOSUE, MS    [place] Department of Podiatry/Foot & Ankle Surgery                Again, thank you for allowing me to participate in the care of your patient.        Sincerely,        Spencer Luke DPM    "

## 2019-07-18 ENCOUNTER — TRANSFERRED RECORDS (OUTPATIENT)
Dept: HEALTH INFORMATION MANAGEMENT | Facility: CLINIC | Age: 74
End: 2019-07-18

## 2019-07-18 DIAGNOSIS — H81.09 MENIERE'S DISEASE, UNSPECIFIED LATERALITY: ICD-10-CM

## 2019-07-18 DIAGNOSIS — F41.9 ANXIETY: ICD-10-CM

## 2019-07-18 RX ORDER — DIAZEPAM 2 MG
TABLET ORAL
Qty: 40 TABLET | Refills: 0 | Status: SHIPPED | OUTPATIENT
Start: 2019-07-18 | End: 2019-08-21

## 2019-07-18 NOTE — TELEPHONE ENCOUNTER
Controlled Substance Refill Request for valium  Problem List Complete:  No     PROVIDER TO CONSIDER COMPLETION OF PROBLEM LIST AND OVERVIEW/CONTROLLED SUBSTANCE AGREEMENT    Last Written Prescription Date:  6/20/19  Last Fill Quantity: 40,   # refills: 0    THE MOST RECENT OFFICE VISIT MUST BE WITHIN THE PAST 3 MONTHS. AT LEAST ONE FACE TO FACE VISIT MUST OCCUR EVERY 6 MONTHS. ADDITIONAL VISITS CAN BE VIRTUAL.  (THIS STATEMENT SHOULD BE DELETED.)    Last Office Visit with [facility] [place] [place] primary care provider: 6/17/19    Future Office visit:     Controlled substance agreement:   Encounter-Level CSA:    There are no encounter-level csa.     Patient-Level CSA:    There are no patient-level csa.         Last Urine Drug Screen: No results found for: CDAUT, No results found for: COMDAT, No results found for: THC13, PCP13, COC13, MAMP13, OPI13, AMP13, BZO13, TCA13, MTD13, BAR13, OXY13, PPX13, BUP13     Processing:  Fax Rx to [facility]     [email or web address]      Writer unable to check -not authorized

## 2019-07-18 NOTE — TELEPHONE ENCOUNTER
Requested Prescriptions   Pending Prescriptions Disp Refills     diazepam (VALIUM) 2 MG tablet [Pharmacy Med Name: DIAZEPAM 2MG TABLETS] 40 tablet 0     Sig: TAKE 1 TABLET BY MOUTH EVERY 6 HOURS AS NEEDED FOR ANXIETY OR VERTIGO       There is no refill protocol information for this order      Last Written Prescription Date:  06/20/2019  Last Fill Quantity: 40,  # refills: 0   Last office visit: 6/17/2019 with prescribing provider:     Future Office Visit:

## 2019-07-22 ENCOUNTER — TELEPHONE (OUTPATIENT)
Dept: PODIATRY | Facility: CLINIC | Age: 74
End: 2019-07-22

## 2019-07-22 NOTE — TELEPHONE ENCOUNTER
Reason for Call:  Call back    Detailed comments: Pt would like a call back as she has some questions in regards to infection. Please call back to advise further. Thanks!    Phone Number Patient can be reached at: Cell number on file:    Telephone Information:   Mobile [phone]       Best Time: any    Can we leave a detailed message on this number? YES    Call taken on 7/22/2019 at 7:32 AM by Betsey Miles

## 2019-07-22 NOTE — TELEPHONE ENCOUNTER
I agree with what was discussed with the patient. I also called and spoke with her.      Spencer Luke DPM, FACFAS, MS    [place] Department of Podiatry/Foot & Ankle Surgery

## 2019-07-22 NOTE — TELEPHONE ENCOUNTER
Pt called again.  She is wondering if she should have taken an antibiotic before her toe surgery 7/17/19.  She had Knee surgery in January and was told then that she should take an antibiotic before surgeries.  Please call her back.  She is very worried.  Her toes are doing fine.

## 2019-07-22 NOTE — TELEPHONE ENCOUNTER
Phone call to patient. She is very anxious as she thinks she was supposed to take prophylactic antibiotics prior to her bilateral partial toenail removal on 7/17/19 by Dr. Luke. She had a right total knee replacement 1/23/19. She had seen the dentist in May and was told she had to take an antibiotic. She did not think to ask before her toenail removal. She is very concerned about an infection and wants to know if she needs an antibiotic now.   Of note, she also had a left labyrinthectomy in the hospital on 6/26/19 and had antibiotics at that time.     Discussed that each surgeon is different of there preferences and recommendations for an antibiotic.  Explained that she had different circumstances each time she was recently treated with antibiotics.  With her being within the first 4 months of having her TKA an antibiotic is usually recommended for dental cleanings.   Explained that antibiotics are commonly given prior to or during surgery in the hospital.   Tried to reassure her that most likely Dr. Luke will recommend she monitor for signs of infection at this time.  Recommended she contact her surgeon for future reference of if and when she would need to take prophylactic antibiotics.     She states her toe nails look good with no areas of redness or concern. She is soaking them twice daily with Jazzmine dish detergent and then applying a band aid. She is NOT using any antibiotic ointment and states she was not told to use any.     She can be reached at : [phone]  Ok to leave message : YES  (she will be at an appointment at 11:45)    Please advise if patient needs oral antibiotics now and if patient should be using an antibiotic ointment on her toenails or not.     BELEL Abad RN

## 2019-07-29 ENCOUNTER — NURSE TRIAGE (OUTPATIENT)
Dept: INTERNAL MEDICINE | Facility: CLINIC | Age: 74
End: 2019-07-29

## 2019-07-29 ENCOUNTER — OFFICE VISIT (OUTPATIENT)
Dept: INTERNAL MEDICINE | Facility: CLINIC | Age: 74
End: 2019-07-29
Payer: MEDICARE

## 2019-07-29 VITALS
TEMPERATURE: 98 F | HEIGHT: 63 IN | HEART RATE: 87 BPM | BODY MASS INDEX: 21.97 KG/M2 | DIASTOLIC BLOOD PRESSURE: 68 MMHG | SYSTOLIC BLOOD PRESSURE: 114 MMHG | RESPIRATION RATE: 20 BRPM | OXYGEN SATURATION: 98 % | WEIGHT: 124 LBS

## 2019-07-29 DIAGNOSIS — K31.9 GASTROPATHY: ICD-10-CM

## 2019-07-29 DIAGNOSIS — R10.13 EPIGASTRIC PAIN: Primary | ICD-10-CM

## 2019-07-29 PROCEDURE — 99214 OFFICE O/P EST MOD 30 MIN: CPT | Performed by: INTERNAL MEDICINE

## 2019-07-29 RX ORDER — ONDANSETRON 4 MG/1
TABLET, ORALLY DISINTEGRATING ORAL
COMMUNITY
Start: 2019-07-19 | End: 2019-08-21

## 2019-07-29 RX ORDER — NAPROXEN SODIUM 220 MG
220 TABLET ORAL
COMMUNITY
End: 2019-11-18

## 2019-07-29 RX ORDER — METOCLOPRAMIDE 5 MG/1
5 TABLET ORAL
Qty: 20 TABLET | Refills: 0 | Status: SHIPPED | OUTPATIENT
Start: 2019-07-29 | End: 2019-07-29

## 2019-07-29 RX ORDER — METOCLOPRAMIDE 5 MG/1
5 TABLET ORAL
Qty: 30 TABLET | Refills: 0 | Status: SHIPPED | OUTPATIENT
Start: 2019-07-29 | End: 2019-08-21

## 2019-07-29 ASSESSMENT — MIFFLIN-ST. JEOR: SCORE: 1026.59

## 2019-07-29 NOTE — PATIENT INSTRUCTIONS
Plan:  1. Hold all the vitamins and supplements for 2-3 weeks  2. Metoclopramide 5 mg 2-3 times a day before meals    3. Hold Nexium for  1-2 weeks. If you feel acid taste, you may resume the Nexium   4. Take Ranitidine 150 mg twice a day   5. Make a follow up appointment with dr Samuels in about 2 weeks

## 2019-07-29 NOTE — TELEPHONE ENCOUNTER
Patient calls, reporting abdominal pain above umbilicus for 2-3 weeks. Pain is constant, but pain level varies. Rates pain today at 7/10-8/10, but states pain does not wake her up at night and she is able to walk. Patient's phone cutting in and out and then phone connection was lost.    Called patient back, recommended appointment today for evaluation, patient initially wanted to wait to see Dr. Samuels, but does agree to see Dr. Oconnor today instead.  Next 5 appointments (look out 90 days)    Jul 29, 2019  2:00 PM CDT  SHORT with Senait [facility]unez MD  [facility] [address]  [facility] [address] [phone]           Additional Information    Negative: Passed out (i.e., fainted, collapsed and was not responding)    Negative: Shock suspected (e.g., cold/pale/clammy skin, too weak to stand, low BP, rapid pulse)    Negative: Sounds like a life-threatening emergency to the triager    Negative: Vomiting red blood or black (coffee ground) material    Negative: SEVERE abdominal pain (e.g., excruciating)    Negative: Bloody, black, or tarry bowel movements    Negative: Vomiting bile (green color)    Negative: Patient sounds very sick or weak to the triager    Negative: Vomiting and abdomen looks much more swollen than usual    Negative: White of the eyes have turned yellow (i.e., jaundice)    Negative: Blood in urine (red, pink, or tea-colored)    Negative: Fever > 103 F (39.4 C)    Negative: Fever > 101 F (38.3 C) and over 60 years of age    Negative: Fever > 100.0 F (37.8 C) and has diabetes mellitus or a weak immune system (e.g., HIV positive, cancer chemotherapy, organ transplant, splenectomy, chronic steroids)    Negative: Fever > 100.0 F (37.8 C) and bedridden (e.g., nursing home patient, stroke, chronic illness, recovering from surgery)    Negative: Pregnant or could be pregnant (i.e., missed last menstrual period)    Age > 60 years     "Negative: MODERATE OR MILD pain that comes and goes (cramps) lasts > 24 hours    Negative: Unusual vaginal discharge    Answer Assessment - Initial Assessment Questions  1. LOCATION: \"Where does it hurt?\"       Above umbilicus  2. RADIATION: \"Does the pain shoot anywhere else?\" (e.g., chest, back)      no  3. ONSET: \"When did the pain begin?\" (e.g., minutes, hours or days ago)       2-3 weeks ago  4. SUDDEN: \"Gradual or sudden onset?\"      Gradual  5. PATTERN \"Does the pain come and go, or is it constant?\"     - If constant: \"Is it getting better, staying the same, or worsening?\"       (Note: Constant means the pain never goes away completely; most serious pain is constant and it progresses)      - If intermittent: \"How long does it last?\" \"Do you have pain now?\"      (Note: Intermittent means the pain goes away completely between bouts)      Constant pain, level varies  6. SEVERITY: \"How bad is the pain?\"  (e.g., Scale 1-10; mild, moderate, or severe)    - MILD (1-3): doesn't interfere with normal activities, abdomen soft and not tender to touch     - MODERATE (4-7): interferes with normal activities or awakens from sleep, tender to touch     - SEVERE (8-10): excruciating pain, doubled over, unable to do any normal activities       7-8  7. RECURRENT SYMPTOM: \"Have you ever had this type of abdominal pain before?\" If so, ask: \"When was the last time?\" and \"What happened that time?\"       no  8. CAUSE: \"What do you think is causing the abdominal pain?\"      unsure  9. RELIEVING/AGGRAVATING FACTORS: \"What makes it better or worse?\" (e.g., movement, antacids, bowel movement)      Tried heating pad  10. OTHER SYMPTOMS: \"Has there been any vomiting, diarrhea, constipation, or urine problems?\"        Loss of appetite, nausea, lightheaded.   11. PREGNANCY: \"Is there any chance you are pregnant?\" \"When was your last menstrual period?\"        n/a    Protocols used: ABDOMINAL PAIN - FEMALE-A-OH      "

## 2019-08-05 DIAGNOSIS — F41.9 ANXIETY: ICD-10-CM

## 2019-08-05 RX ORDER — ALPRAZOLAM 0.5 MG
0.5 TABLET ORAL 3 TIMES DAILY PRN
Qty: 15 TABLET | Refills: 0 | Status: SHIPPED | OUTPATIENT
Start: 2019-08-05 | End: 2019-11-18

## 2019-08-05 NOTE — TELEPHONE ENCOUNTER
Requested Prescriptions   Pending Prescriptions Disp Refills     ALPRAZolam (XANAX) 0.5 MG tablet Last Written Prescription Date:  5/29/2019  Last Fill Quantity: 15 tablet,  # refills: 0   Last office visit: 7/29/2019 with prescribing provider:  7/29/2019   Future Office Visit:   Next 5 appointments (look out 90 days)    Aug 21, 2019  9:40 AM CDT  SHORT with Huyen [facility]uels MD  [facility] [address]  [facility] [id number] [phone]        15 tablet 0     Sig: Take 1 tablet (0.5 mg) by mouth 3 times daily as needed for anxiety       There is no refill protocol information for this order

## 2019-08-12 ENCOUNTER — TELEPHONE (OUTPATIENT)
Dept: INTERNAL MEDICINE | Facility: CLINIC | Age: 74
End: 2019-08-12

## 2019-08-12 NOTE — TELEPHONE ENCOUNTER
Patient is calling to ask if she should continue taking the reglan that she started on 7/29/19 until she sees Dr Samuels on 8/21/19. If so she will need a refill sent to her pharmacy. She is not really sure if the medicine is helping her or not.

## 2019-08-13 NOTE — TELEPHONE ENCOUNTER
Patient advised.  If after stopping Reglan she does feel that it had helped she will let us know.  Scheduled for follow up with primary care provider.  YUE Avalos R.N.

## 2019-08-21 ENCOUNTER — OFFICE VISIT (OUTPATIENT)
Dept: INTERNAL MEDICINE | Facility: CLINIC | Age: 74
End: 2019-08-21
Payer: MEDICARE

## 2019-08-21 VITALS
WEIGHT: 126 LBS | HEART RATE: 76 BPM | SYSTOLIC BLOOD PRESSURE: 132 MMHG | DIASTOLIC BLOOD PRESSURE: 70 MMHG | TEMPERATURE: 98.3 F | OXYGEN SATURATION: 98 % | BODY MASS INDEX: 22.32 KG/M2 | RESPIRATION RATE: 16 BRPM | HEIGHT: 63 IN

## 2019-08-21 DIAGNOSIS — H81.02 MENIERE'S DISEASE OF LEFT EAR: Primary | ICD-10-CM

## 2019-08-21 DIAGNOSIS — M81.0 AGE-RELATED OSTEOPOROSIS WITHOUT CURRENT PATHOLOGICAL FRACTURE: ICD-10-CM

## 2019-08-21 DIAGNOSIS — K31.9 GASTROPATHY: ICD-10-CM

## 2019-08-21 DIAGNOSIS — R11.0 NAUSEA: ICD-10-CM

## 2019-08-21 DIAGNOSIS — F41.9 ANXIETY: ICD-10-CM

## 2019-08-21 DIAGNOSIS — H81.09 MENIERE'S DISEASE, UNSPECIFIED LATERALITY: ICD-10-CM

## 2019-08-21 DIAGNOSIS — I10 ESSENTIAL HYPERTENSION: ICD-10-CM

## 2019-08-21 PROCEDURE — 99214 OFFICE O/P EST MOD 30 MIN: CPT | Performed by: INTERNAL MEDICINE

## 2019-08-21 RX ORDER — CALCITONIN SALMON 200 [IU]/.09ML
SPRAY, METERED NASAL
Qty: 11.1 ML | Refills: 3 | Status: SHIPPED | OUTPATIENT
Start: 2019-08-21 | End: 2019-08-21

## 2019-08-21 RX ORDER — CALCITONIN SALMON 200 [IU]/.09ML
SPRAY, METERED NASAL
Qty: 11.1 ML | Refills: 3 | Status: SHIPPED | OUTPATIENT
Start: 2019-08-21 | End: 2020-09-07

## 2019-08-21 RX ORDER — ESOMEPRAZOLE MAGNESIUM 40 MG
40 CAPSULE,DELAYED RELEASE (ENTERIC COATED) ORAL DAILY
Qty: 90 CAPSULE | Refills: 3 | Status: SHIPPED | OUTPATIENT
Start: 2019-08-21 | End: 2019-08-21

## 2019-08-21 RX ORDER — ONDANSETRON 4 MG/1
4 TABLET, ORALLY DISINTEGRATING ORAL EVERY 6 HOURS
Qty: 120 TABLET | Refills: 1 | Status: SHIPPED | OUTPATIENT
Start: 2019-08-21 | End: 2020-02-18

## 2019-08-21 RX ORDER — ESOMEPRAZOLE MAGNESIUM 40 MG
40 CAPSULE,DELAYED RELEASE (ENTERIC COATED) ORAL DAILY
Qty: 90 CAPSULE | Refills: 3 | Status: SHIPPED | OUTPATIENT
Start: 2019-08-21 | End: 2019-10-02

## 2019-08-21 RX ORDER — TRIAMTERENE/HYDROCHLOROTHIAZID 37.5-25 MG
1 TABLET ORAL DAILY
Qty: 90 TABLET | Refills: 3 | Status: SHIPPED | OUTPATIENT
Start: 2019-08-21 | End: 2020-09-07

## 2019-08-21 RX ORDER — DIAZEPAM 2 MG
TABLET ORAL
Qty: 40 TABLET | Refills: 0 | Status: SHIPPED | OUTPATIENT
Start: 2019-08-21 | End: 2019-08-31

## 2019-08-21 ASSESSMENT — PATIENT HEALTH QUESTIONNAIRE - PHQ9
5. POOR APPETITE OR OVEREATING: SEVERAL DAYS
SUM OF ALL RESPONSES TO PHQ QUESTIONS 1-9: 11

## 2019-08-21 ASSESSMENT — ANXIETY QUESTIONNAIRES
5. BEING SO RESTLESS THAT IT IS HARD TO SIT STILL: NOT AT ALL
6. BECOMING EASILY ANNOYED OR IRRITABLE: SEVERAL DAYS
GAD7 TOTAL SCORE: 6
7. FEELING AFRAID AS IF SOMETHING AWFUL MIGHT HAPPEN: SEVERAL DAYS
1. FEELING NERVOUS, ANXIOUS, OR ON EDGE: SEVERAL DAYS
2. NOT BEING ABLE TO STOP OR CONTROL WORRYING: SEVERAL DAYS
IF YOU CHECKED OFF ANY PROBLEMS ON THIS QUESTIONNAIRE, HOW DIFFICULT HAVE THESE PROBLEMS MADE IT FOR YOU TO DO YOUR WORK, TAKE CARE OF THINGS AT HOME, OR GET ALONG WITH OTHER PEOPLE: SOMEWHAT DIFFICULT
3. WORRYING TOO MUCH ABOUT DIFFERENT THINGS: SEVERAL DAYS

## 2019-08-21 ASSESSMENT — MIFFLIN-ST. JEOR: SCORE: 1035.66

## 2019-08-21 NOTE — PROGRESS NOTES
"Subjective     Cynthia Arita is a 74 year old female who presents to clinic today for the following health issues:    HPI     Follow up GI issues.    She has meniere's and is chronically motion sick. So has ongoing nausea that wakes her up even in the morning if she rolls over. Zofran has helped her the most although she knows valium helps when the Zofran does not work enough.  She felt worse after stopping several of her medications as suggested by Dr Oconnor. She is going back to see ENT but they essentially have told her there is nothing more they can do for her. She takes her Zofran on average bid. She waits to have symptoms.     BP Readings from Last 3 Encounters:   08/21/19 132/70   07/29/19 114/68   07/17/19 120/72    Wt Readings from Last 3 Encounters:   08/21/19 57.2 kg (126 lb)   07/29/19 56.2 kg (124 lb)   07/17/19 56.7 kg (125 lb 1.6 oz)               Reviewed and updated as needed this visit by Provider         Review of Systems   ROS COMP: Constitutional, HEENT, cardiovascular, pulmonary, GI, , musculoskeletal, neuro, skin, endocrine and psych systems are negative, except as otherwise noted.      Objective    /70 (BP Location: Right arm, Patient Position: Chair, Cuff Size: Adult Regular)   Pulse 76   Temp 98.3  F (36.8  C) (Oral)   Resp 16   Ht 1.6 m (5' 3\")   Wt 57.2 kg (126 lb)   SpO2 98%   Breastfeeding? No   BMI 22.32 kg/m    Body mass index is 22.32 kg/m .  Physical Exam   GENERAL: healthy, alert and no distress  NECK: no adenopathy, no asymmetry, masses, or scars and thyroid normal to palpation  RESP: lungs clear to auscultation - no rales, rhonchi or wheezes  CV: regular rate and rhythm, normal S1 S2, no S3 or S4, no murmur, click or rub, no peripheral edema and peripheral pulses strong  ABDOMEN: soft, nontender, no hepatosplenomegaly, no masses and bowel sounds normal  MS: no gross musculoskeletal defects noted, no edema  NEURO: Normal strength and tone, mentation intact and " speech normal  PSYCH: mentation appears normal, affect normal/bright        Assessment & Plan     1. Meniere's disease of left ear  With related chronic nausea her main issue, will try giving her Zofran scheduled qid and see if we can get her more functional. Follow up in 3 weeks   - ondansetron (ZOFRAN ODT) 4 MG ODT tab; Take 1 tablet (4 mg) by mouth every 6 hours  Dispense: 120 tablet; Refill: 1    2. Nausea  as above.   - ondansetron (ZOFRAN ODT) 4 MG ODT tab; Take 1 tablet (4 mg) by mouth every 6 hours  Dispense: 120 tablet; Refill: 1  - NEXIUM 40 MG DR capsule; Take 1 capsule (40 mg) by mouth daily  Dispense: 90 capsule; Refill: 3    3. Gastropathy  Continue current medications.   - NEXIUM 40 MG DR capsule; Take 1 capsule (40 mg) by mouth daily  Dispense: 90 capsule; Refill: 3  - ranitidine (ZANTAC) 150 MG tablet; Take 1 tablet (150 mg) by mouth 2 times daily  Dispense: 180 tablet; Refill: 3    4. Age-related osteoporosis without current pathological fracture     - calcitonin, salmon, (MIACALCIN) 200 UNIT/ACT nasal spray; USE 1 SPRAY IN 1 NOSTRIL  DAILY (ALTERNATING NOSTRILS DAILY)  Dispense: 11.1 mL; Refill: 3    5. Meniere's disease, unspecified laterality     - diazepam (VALIUM) 2 MG tablet; TAKE 1 TABLET BY MOUTH EVERY 6 HOURS AS NEEDED FOR ANXIETY OR VERTIGO  Dispense: 40 tablet; Refill: 0    6. Anxiety  refilled  - diazepam (VALIUM) 2 MG tablet; TAKE 1 TABLET BY MOUTH EVERY 6 HOURS AS NEEDED FOR ANXIETY OR VERTIGO  Dispense: 40 tablet; Refill: 0    7. Essential hypertension     - triamterene-HCTZ (MAXZIDE-25) 37.5-25 MG tablet; Take 1 tablet by mouth daily  Dispense: 90 tablet; Refill: 3       No follow-ups on file.    Huyen Samuels MD  [facility]

## 2019-08-21 NOTE — TELEPHONE ENCOUNTER
"Requested Prescriptions   Pending Prescriptions Disp Refills     ranitidine (ZANTAC) 150 MG tablet [Pharmacy Med Name: RANITIDINE 150MG TABLETS]  Last Written Prescription Date:  8/21/2019  Last Fill Quantity: 180,  # refills: 3   Last office visit: No previous visit found with prescribing provider:     Future Office Visit:   Next 5 appointments (look out 90 days)    Sep 12, 2019  9:00 AM CDT  [facility]els [facility] [address]  [facility] [address]  [phone]        180 tablet 11     Sig: TAKE 1 TABLET(150 MG) BY MOUTH TWICE DAILY       H2 Blockers Protocol Passed - 8/21/2019 10:39 AM        Passed - Patient is age 12 or older        Passed - Recent (12 mo) or future (30 days) visit within the authorizing provider's specialty     Patient had office visit in the last 12 months or has a visit in the next 30 days with authorizing provider or within the authorizing provider's specialty.  See \"Patient Info\" tab in inbasket, or \"Choose Columns\" in Meds & Orders section of the refill encounter.              Passed - Medication is active on med list        "

## 2019-08-22 ENCOUNTER — TELEPHONE (OUTPATIENT)
Dept: INTERNAL MEDICINE | Facility: CLINIC | Age: 74
End: 2019-08-22

## 2019-08-22 DIAGNOSIS — R11.0 NAUSEA: Primary | ICD-10-CM

## 2019-08-22 DIAGNOSIS — I10 ESSENTIAL HYPERTENSION: ICD-10-CM

## 2019-08-22 DIAGNOSIS — Z01.818 PREOP GENERAL PHYSICAL EXAM: ICD-10-CM

## 2019-08-22 DIAGNOSIS — H81.02 MENIERE'S DISEASE, LEFT: ICD-10-CM

## 2019-08-22 LAB
ERYTHROCYTE [DISTWIDTH] IN BLOOD BY AUTOMATED COUNT: 14.2 % (ref 10–15)
HCT VFR BLD AUTO: 44.4 % (ref 35–47)
HGB BLD-MCNC: 14.2 G/DL (ref 11.7–15.7)
MCH RBC QN AUTO: 29.5 PG (ref 26.5–33)
MCHC RBC AUTO-ENTMCNC: 32 G/DL (ref 31.5–36.5)
MCV RBC AUTO: 92 FL (ref 78–100)
PLATELET # BLD AUTO: 280 10E9/L (ref 150–450)
RBC # BLD AUTO: 4.82 10E12/L (ref 3.8–5.2)
WBC # BLD AUTO: 5.7 10E9/L (ref 4–11)

## 2019-08-22 PROCEDURE — 36415 COLL VENOUS BLD VENIPUNCTURE: CPT | Performed by: INTERNAL MEDICINE

## 2019-08-22 PROCEDURE — 85027 COMPLETE CBC AUTOMATED: CPT | Performed by: INTERNAL MEDICINE

## 2019-08-22 PROCEDURE — 80053 COMPREHEN METABOLIC PANEL: CPT | Performed by: INTERNAL MEDICINE

## 2019-08-22 ASSESSMENT — ANXIETY QUESTIONNAIRES: GAD7 TOTAL SCORE: 6

## 2019-08-22 NOTE — LETTER
September 3, 2019      Cynthia Arita  [address] [address]        Dear ,    We are writing to inform you of your test results.    Labs are within acceptable limits.    Resulted Orders   Comprehensive metabolic panel   Result Value Ref Range    Sodium 140 133 - 144 mmol/L    Potassium 3.9 3.4 - 5.3 mmol/L    Chloride 105 94 - 109 mmol/L    Carbon Dioxide 28 20 - 32 mmol/L    Anion Gap 7 3 - 14 mmol/L    Glucose 109 (H) 70 - 99 mg/dL      Comment:      Non Fasting    Urea Nitrogen 20 7 - 30 mg/dL    Creatinine 0.65 0.52 - 1.04 mg/dL    GFR Estimate 87 >60 mL/min/[1.73_m2]      Comment:      Non  GFR Calc  Starting 12/18/2018, serum creatinine based estimated GFR (eGFR) will be   calculated using the Chronic Kidney Disease Epidemiology Collaboration   (CKD-EPI) equation.      GFR Estimate If Black >90 >60 mL/min/[1.73_m2]      Comment:       GFR Calc  Starting 12/18/2018, serum creatinine based estimated GFR (eGFR) will be   calculated using the Chronic Kidney Disease Epidemiology Collaboration   (CKD-EPI) equation.      Calcium 9.6 8.5 - 10.1 mg/dL    Bilirubin Total 0.4 0.2 - 1.3 mg/dL    Albumin 4.0 3.4 - 5.0 g/dL    Protein Total 7.1 6.8 - 8.8 g/dL    Alkaline Phosphatase 73 40 - 150 U/L    ALT 20 0 - 50 U/L    AST 16 0 - 45 U/L   CBC with platelets   Result Value Ref Range    WBC 5.7 4.0 - 11.0 10e9/L    RBC Count 4.82 3.8 - 5.2 10e12/L    Hemoglobin 14.2 11.7 - 15.7 g/dL    Hematocrit 44.4 35.0 - 47.0 %    MCV 92 78 - 100 fl    MCH 29.5 26.5 - 33.0 pg    MCHC 32.0 31.5 - 36.5 g/dL    RDW 14.2 10.0 - 15.0 %    Platelet Count 280 150 - 450 10e9/L       If you have any questions or concerns, please call the clinic at the number listed above.       Sincerely,        Dr Arvind CAVAZOS

## 2019-08-23 LAB
ALBUMIN SERPL-MCNC: 4 G/DL (ref 3.4–5)
ALP SERPL-CCNC: 73 U/L (ref 40–150)
ALT SERPL W P-5'-P-CCNC: 20 U/L (ref 0–50)
ANION GAP SERPL CALCULATED.3IONS-SCNC: 7 MMOL/L (ref 3–14)
AST SERPL W P-5'-P-CCNC: 16 U/L (ref 0–45)
BILIRUB SERPL-MCNC: 0.4 MG/DL (ref 0.2–1.3)
BUN SERPL-MCNC: 20 MG/DL (ref 7–30)
CALCIUM SERPL-MCNC: 9.6 MG/DL (ref 8.5–10.1)
CHLORIDE SERPL-SCNC: 105 MMOL/L (ref 94–109)
CO2 SERPL-SCNC: 28 MMOL/L (ref 20–32)
CREAT SERPL-MCNC: 0.65 MG/DL (ref 0.52–1.04)
GFR SERPL CREATININE-BSD FRML MDRD: 87 ML/MIN/{1.73_M2}
GLUCOSE SERPL-MCNC: 109 MG/DL (ref 70–99)
POTASSIUM SERPL-SCNC: 3.9 MMOL/L (ref 3.4–5.3)
PROT SERPL-MCNC: 7.1 G/DL (ref 6.8–8.8)
SODIUM SERPL-SCNC: 140 MMOL/L (ref 133–144)

## 2019-08-26 ENCOUNTER — TELEPHONE (OUTPATIENT)
Dept: INTERNAL MEDICINE | Facility: CLINIC | Age: 74
End: 2019-08-26

## 2019-08-26 DIAGNOSIS — R11.0 NAUSEA: Primary | ICD-10-CM

## 2019-08-26 NOTE — TELEPHONE ENCOUNTER
Patient would like to know what her next step is. She is still very nauseated    Ok to call and  [phone]

## 2019-08-29 ENCOUNTER — TRANSFERRED RECORDS (OUTPATIENT)
Dept: HEALTH INFORMATION MANAGEMENT | Facility: CLINIC | Age: 74
End: 2019-08-29

## 2019-08-29 DIAGNOSIS — R11.0 NAUSEA: ICD-10-CM

## 2019-08-29 PROCEDURE — 87338 HPYLORI STOOL AG IA: CPT | Performed by: INTERNAL MEDICINE

## 2019-08-29 RX ORDER — PROMETHAZINE HYDROCHLORIDE 25 MG/1
25 TABLET ORAL EVERY 6 HOURS PRN
Qty: 30 TABLET | Refills: 1 | Status: SHIPPED | OUTPATIENT
Start: 2019-08-29 | End: 2019-10-04

## 2019-08-29 NOTE — TELEPHONE ENCOUNTER
Call to patient. Advised. States she called [facility] yesterday and was given a prescription for Dicyclomine and this has helped since she started it yesterday. Patient is going to hold off on starting any additional medications and see how things go with this new medication.

## 2019-08-29 NOTE — TELEPHONE ENCOUNTER
Lets have her try alternating phenergan with Zoffran so she is taking something every 2-3 hrs while awake. RX has been sent in

## 2019-08-30 ENCOUNTER — TELEPHONE (OUTPATIENT)
Dept: INTERNAL MEDICINE | Facility: CLINIC | Age: 74
End: 2019-08-30

## 2019-08-30 LAB — H PYLORI AG STL QL IA: NEGATIVE

## 2019-08-31 ENCOUNTER — NURSE TRIAGE (OUTPATIENT)
Dept: NURSING | Facility: CLINIC | Age: 74
End: 2019-08-31

## 2019-08-31 DIAGNOSIS — H81.09 MENIERE'S DISEASE, UNSPECIFIED LATERALITY: ICD-10-CM

## 2019-08-31 DIAGNOSIS — F41.9 ANXIETY: ICD-10-CM

## 2019-08-31 NOTE — TELEPHONE ENCOUNTER
"Patient asks if ok for her to take promethezine with Diazepam 2mg.  Patient reports feeling nauseas and has dizziness due to Vertigo.  FNA advised to call pharmacy.  Caller verbalizes understanding.      Reason for Disposition    Caller has medication question about med not prescribed by PCP and triager unable to answer question (e.g., compatibility with other med, storage)    Additional Information    Negative: Drug overdose and nurse unable to answer question    Negative: Caller requesting information not related to medicine    Negative: Caller requesting a prescription for Strep throat and has a positive culture result    Negative: Rash while taking a medication or within 3 days of stopping it    Negative: Immunization reaction suspected    Negative: [1] Asthma and [2] having symptoms of asthma (cough, wheezing, etc)    Negative: MORE THAN A DOUBLE DOSE of a prescription or over-the-counter (OTC) drug    Negative: [1] DOUBLE DOSE (an extra dose or lesser amount) of over-the-counter (OTC) drug AND [2] any symptoms (e.g., dizziness, nausea, pain, sleepiness)    Negative: [1] DOUBLE DOSE (an extra dose or lesser amount) of prescription drug AND [2] any symptoms (e.g., dizziness, nausea, pain, sleepiness)    Negative: Took another person's prescription drug    Negative: [1] DOUBLE DOSE (an extra dose or lesser amount) of prescription drug AND [2] NO symptoms (Exception: a double dose of antibiotics)    Negative: Diabetes drug error or overdose (e.g., insulin or extra dose)    Negative: [1] Request for URGENT new prescription or refill of \"essential\" medication (i.e., likelihood of harm to patient if not taken) AND [2] triager unable to fill per unit policy    Negative: [1] Prescription not at pharmacy AND [2] was prescribed today by PCP    Negative: Pharmacy calling with prescription questions and triager unable to answer question    Negative: Caller has URGENT medication question about med that PCP prescribed and " triager unable to answer question    Negative: Caller has NON-URGENT medication question about med that PCP prescribed and triager unable to answer question    Negative: Caller requesting a NON-URGENT new prescription or refill and triager unable to refill per unit policy    Protocols used: MEDICATION QUESTION CALL-A-AH

## 2019-09-03 NOTE — TELEPHONE ENCOUNTER
Encounter-Level CSA:    There are no encounter-level csa.     Patient-Level CSA:    There are no patient-level csa.        PCP has not given this RN access to  monitoring.  Arabella Celestin RN

## 2019-09-03 NOTE — TELEPHONE ENCOUNTER
Requested Prescriptions   Pending Prescriptions Disp Refills     diazepam (VALIUM) 2 MG tablet [Pharmacy Med Name: DIAZEPAM 2MG TABLETS]  Last Written Prescription Date:  8/21/2019  Last Fill Quantity: 40,  # refills: 0   Last office visit: 8/21/2019 with prescribing provider:     Future Office Visit:   Next 5 appointments (look out 90 days)    Sep 12, 2019  9:00 AM CDT  SHORT with Huyen Samuels MD  [facility] [address]  [facility] [id number] [phone]        40 tablet 0     Sig: TAKE 1 TABLET BY MOUTH EVERY 6 HOURS AS NEEDED FOR ANXIETY OR VERTIGO       There is no refill protocol information for this order

## 2019-09-04 RX ORDER — DIAZEPAM 2 MG
TABLET ORAL
Qty: 40 TABLET | Refills: 0 | Status: SHIPPED | OUTPATIENT
Start: 2019-09-04 | End: 2019-11-07

## 2019-09-09 ENCOUNTER — TRANSFERRED RECORDS (OUTPATIENT)
Dept: HEALTH INFORMATION MANAGEMENT | Facility: CLINIC | Age: 74
End: 2019-09-09

## 2019-09-11 ENCOUNTER — HOSPITAL ENCOUNTER (OUTPATIENT)
Dept: CT IMAGING | Facility: CLINIC | Age: 74
Discharge: HOME | End: 2019-09-11
Attending: INTERNAL MEDICINE | Admitting: INTERNAL MEDICINE
Payer: MEDICARE

## 2019-09-11 DIAGNOSIS — K58.2 IRRITABLE BOWEL SYNDROME WITH BOTH CONSTIPATION AND DIARRHEA: ICD-10-CM

## 2019-09-11 DIAGNOSIS — R14.0 ABDOMINAL DISTENSION (GASEOUS): ICD-10-CM

## 2019-09-11 DIAGNOSIS — K21.9 GASTROESOPHAGEAL REFLUX DISEASE WITHOUT ESOPHAGITIS: ICD-10-CM

## 2019-09-11 DIAGNOSIS — R10.33 PERIUMBILICAL ABDOMINAL PAIN: ICD-10-CM

## 2019-09-11 DIAGNOSIS — R14.2 BELCHING: ICD-10-CM

## 2019-09-11 LAB
CREAT BLD-MCNC: 0.6 MG/DL (ref 0.52–1.04)
GFR SERPL CREATININE-BSD FRML MDRD: >90 ML/MIN/{1.73_M2}

## 2019-09-11 PROCEDURE — 25000125 ZZHC RX 250: Performed by: RADIOLOGY

## 2019-09-11 PROCEDURE — 74177 CT ABD & PELVIS W/CONTRAST: CPT

## 2019-09-11 PROCEDURE — 82565 ASSAY OF CREATININE: CPT

## 2019-09-11 PROCEDURE — 25000128 H RX IP 250 OP 636: Performed by: RADIOLOGY

## 2019-09-11 RX ORDER — IOPAMIDOL 755 MG/ML
500 INJECTION, SOLUTION INTRAVASCULAR ONCE
Status: COMPLETED | OUTPATIENT
Start: 2019-09-11 | End: 2019-09-11

## 2019-09-11 RX ADMIN — SODIUM CHLORIDE 41 ML: 9 INJECTION, SOLUTION INTRAVENOUS at 13:14

## 2019-09-11 RX ADMIN — IOPAMIDOL 63 ML: 755 INJECTION, SOLUTION INTRAVENOUS at 13:14

## 2019-09-12 ENCOUNTER — OFFICE VISIT (OUTPATIENT)
Dept: INTERNAL MEDICINE | Facility: CLINIC | Age: 74
End: 2019-09-12
Payer: MEDICARE

## 2019-09-12 ENCOUNTER — TRANSFERRED RECORDS (OUTPATIENT)
Dept: HEALTH INFORMATION MANAGEMENT | Facility: CLINIC | Age: 74
End: 2019-09-12

## 2019-09-12 VITALS
HEART RATE: 76 BPM | SYSTOLIC BLOOD PRESSURE: 133 MMHG | TEMPERATURE: 98.9 F | OXYGEN SATURATION: 98 % | HEIGHT: 63 IN | DIASTOLIC BLOOD PRESSURE: 76 MMHG | WEIGHT: 125.9 LBS | BODY MASS INDEX: 22.31 KG/M2 | RESPIRATION RATE: 16 BRPM

## 2019-09-12 DIAGNOSIS — K58.9 IRRITABLE BOWEL SYNDROME WITHOUT DIARRHEA: ICD-10-CM

## 2019-09-12 DIAGNOSIS — F41.9 ANXIETY: Primary | ICD-10-CM

## 2019-09-12 DIAGNOSIS — H81.09 MENIERE'S DISEASE, UNSPECIFIED LATERALITY: ICD-10-CM

## 2019-09-12 DIAGNOSIS — Z23 NEED FOR PROPHYLACTIC VACCINATION AND INOCULATION AGAINST INFLUENZA: ICD-10-CM

## 2019-09-12 DIAGNOSIS — F41.9 ANXIETY: ICD-10-CM

## 2019-09-12 PROCEDURE — 90662 IIV NO PRSV INCREASED AG IM: CPT | Performed by: INTERNAL MEDICINE

## 2019-09-12 PROCEDURE — G0008 ADMIN INFLUENZA VIRUS VAC: HCPCS | Performed by: INTERNAL MEDICINE

## 2019-09-12 PROCEDURE — 99214 OFFICE O/P EST MOD 30 MIN: CPT | Mod: 25 | Performed by: INTERNAL MEDICINE

## 2019-09-12 ASSESSMENT — MIFFLIN-ST. JEOR: SCORE: 1035.21

## 2019-09-12 NOTE — TELEPHONE ENCOUNTER
"Requested Prescriptions   Pending Prescriptions Disp Refills     sertraline (ZOLOFT) 50 MG tablet [Pharmacy Med Name: SERTRALINE 50MG  Last Written Prescription Date:  9/12/2019  Last Fill Quantity: 60,  # refills: 1   Last office visit: No previous visit found with prescribing provider:     Future Office Visit:   Next 5 appointments (look out 90 days)    Oct 02, 2019 10:00 AM CDT  PHYSICAL with Huyen Samuels MD  [facility] [address]  [facility] [address] [phone]        TABLETS] 90 tablet 1     Sig: TAKE 1 TABLET(50 MG) BY MOUTH DAILY       SSRIs Protocol Passed - 9/12/2019  9:41 AM        Passed - Recent (12 mo) or future (30 days) visit within the authorizing provider's specialty     Patient had office visit in the last 12 months or has a visit in the next 30 days with authorizing provider or within the authorizing provider's specialty.  See \"Patient Info\" tab in inbasket, or \"Choose Columns\" in Meds & Orders section of the refill encounter.              Passed - Medication is active on med list        Passed - Patient is age 18 or older        Passed - No active pregnancy on record        Passed - No positive pregnancy test in last 12 months        "

## 2019-09-12 NOTE — PROGRESS NOTES
"Subjective     Cynthia Arita is a 74 year old female who presents to clinic today for the following health issues:    HPI   She has had recent flare of vertigo, tinnitus and irritable bowel syndrome. She is starting to realize it is all stress related. As when she gets stressed all of her symptoms especially nausea get a lot worse. If she is unstressed she feels pretty good. She is using prn Valium and this has helped her the most. She has a lot of stress in her life which she expects to improve in the next 2 months. She does not want to get hooked on Valium and so is willing to try something like serotonin specific reuptake inhibitor for anxiety.       BP Readings from Last 3 Encounters:   09/12/19 133/76   08/21/19 132/70   07/29/19 114/68    Wt Readings from Last 3 Encounters:   09/12/19 57.1 kg (125 lb 14.4 oz)   08/21/19 57.2 kg (126 lb)   07/29/19 56.2 kg (124 lb)                 Reviewed and updated as needed this visit by Provider         Review of Systems   ROS COMP: Constitutional, HEENT, cardiovascular, pulmonary, GI, , musculoskeletal, neuro, skin, endocrine and psych systems are negative, except as otherwise noted.      Objective    /76 (BP Location: Right arm, Patient Position: Sitting, Cuff Size: Adult Regular)   Pulse 76   Temp 98.9  F (37.2  C) (Oral)   Resp 16   Ht 1.6 m (5' 3\")   Wt 57.1 kg (125 lb 14.4 oz)   SpO2 98%   BMI 22.30 kg/m    Body mass index is 22.3 kg/m .  Physical Exam   GENERAL: healthy, alert and no distress  NECK: no adenopathy, no asymmetry, masses, or scars and thyroid normal to palpation  RESP: lungs clear to auscultation - no rales, rhonchi or wheezes  CV: regular rate and rhythm, normal S1 S2, no S3 or S4, no murmur, click or rub, no peripheral edema and peripheral pulses strong  ABDOMEN: soft, nontender, no hepatosplenomegaly, no masses and bowel sounds normal  MS: no gross musculoskeletal defects noted, no edema  PSYCH: currently calm and more rational than " the last 2 times I saw her.           Assessment & Plan     1. Anxiety  Will try   - sertraline (ZOLOFT) 50 MG tablet; Take 1 tablet (50 mg) by mouth daily  Dispense: 60 tablet; Refill: 1    2. Meniere's disease, unspecified laterality  I talked with her ENT Dr he strongly feels she is having pseudo vertigo related to anxiety     3. Need for prophylactic vaccination and inoculation against influenza     - FLU VACCINE, INCREASED ANTIGEN, PRESV FREE, AGE 65+ [[address]72]    4. Irritable bowel syndrome without diarrhea  Ok to continue Bentyl prn.         Return in about 4 weeks (around 10/10/2019).    Huyen Samuels MD  [facility]

## 2019-09-25 ENCOUNTER — TRANSFERRED RECORDS (OUTPATIENT)
Dept: HEALTH INFORMATION MANAGEMENT | Facility: CLINIC | Age: 74
End: 2019-09-25

## 2019-10-02 ENCOUNTER — OFFICE VISIT (OUTPATIENT)
Dept: INTERNAL MEDICINE | Facility: CLINIC | Age: 74
End: 2019-10-02
Payer: MEDICARE

## 2019-10-02 VITALS
HEIGHT: 63 IN | TEMPERATURE: 97.4 F | DIASTOLIC BLOOD PRESSURE: 70 MMHG | HEART RATE: 80 BPM | RESPIRATION RATE: 16 BRPM | SYSTOLIC BLOOD PRESSURE: 114 MMHG | WEIGHT: 123.2 LBS | OXYGEN SATURATION: 100 % | BODY MASS INDEX: 21.83 KG/M2

## 2019-10-02 DIAGNOSIS — R11.0 NAUSEA: ICD-10-CM

## 2019-10-02 DIAGNOSIS — F41.9 ANXIETY: Primary | ICD-10-CM

## 2019-10-02 DIAGNOSIS — R42 DIZZINESS: ICD-10-CM

## 2019-10-02 DIAGNOSIS — K31.9 GASTROPATHY: ICD-10-CM

## 2019-10-02 PROCEDURE — 99214 OFFICE O/P EST MOD 30 MIN: CPT | Performed by: INTERNAL MEDICINE

## 2019-10-02 RX ORDER — ESOMEPRAZOLE MAGNESIUM 40 MG
40 CAPSULE,DELAYED RELEASE (ENTERIC COATED) ORAL 2 TIMES DAILY
Qty: 90 CAPSULE | Refills: 3 | COMMUNITY
Start: 2019-10-02 | End: 2019-11-18

## 2019-10-02 RX ORDER — MECLIZINE HYDROCHLORIDE 25 MG/1
12.5 TABLET ORAL 3 TIMES DAILY
COMMUNITY
Start: 2019-10-02 | End: 2019-11-18

## 2019-10-02 ASSESSMENT — PATIENT HEALTH QUESTIONNAIRE - PHQ9: 5. POOR APPETITE OR OVEREATING: SEVERAL DAYS

## 2019-10-02 ASSESSMENT — ANXIETY QUESTIONNAIRES
GAD7 TOTAL SCORE: 6
3. WORRYING TOO MUCH ABOUT DIFFERENT THINGS: SEVERAL DAYS
1. FEELING NERVOUS, ANXIOUS, OR ON EDGE: SEVERAL DAYS
7. FEELING AFRAID AS IF SOMETHING AWFUL MIGHT HAPPEN: SEVERAL DAYS
5. BEING SO RESTLESS THAT IT IS HARD TO SIT STILL: NOT AT ALL
2. NOT BEING ABLE TO STOP OR CONTROL WORRYING: SEVERAL DAYS
6. BECOMING EASILY ANNOYED OR IRRITABLE: SEVERAL DAYS
IF YOU CHECKED OFF ANY PROBLEMS ON THIS QUESTIONNAIRE, HOW DIFFICULT HAVE THESE PROBLEMS MADE IT FOR YOU TO DO YOUR WORK, TAKE CARE OF THINGS AT HOME, OR GET ALONG WITH OTHER PEOPLE: VERY DIFFICULT

## 2019-10-02 ASSESSMENT — MIFFLIN-ST. JEOR: SCORE: 1022.96

## 2019-10-02 NOTE — PROGRESS NOTES
"Subjective     Cynthia Arita is a 74 year old female who presents to clinic today for the following health issues:    HPI     Follow up anxiety and abdominal pain. Saw GI provider. Colonoscopy in 2 weeks.    She is doing worse this time. Last week her ENT dr took her off the valium and started meclizine. Her nausea and lightheadedness have gotten worse. She alos notes she is much more anxious.     She has a colonoscopy scheduled and is working with GI. They have her on Nexium bid at this point.     BP Readings from Last 3 Encounters:   10/02/19 114/70   09/12/19 133/76   08/21/19 132/70    Wt Readings from Last 3 Encounters:   10/02/19 55.9 kg (123 lb 3.2 oz)   09/12/19 57.1 kg (125 lb 14.4 oz)   08/21/19 57.2 kg (126 lb)                 Reviewed and updated as needed this visit by Provider         Review of Systems   ROS COMP: Constitutional, HEENT, cardiovascular, pulmonary, GI, , musculoskeletal, neuro, skin, endocrine and psych systems are negative, except as otherwise noted.      Objective    /70 (BP Location: Left arm, Patient Position: Chair, Cuff Size: Adult Large)   Pulse 80   Temp 97.4  F (36.3  C) (Oral)   Resp 16   Ht 1.6 m (5' 3\")   Wt 55.9 kg (123 lb 3.2 oz)   SpO2 100%   Breastfeeding? No   BMI 21.82 kg/m    Body mass index is 21.82 kg/m .  Physical Exam   GENERAL: healthy, alert and no distress  EYES: Eyes grossly normal to inspection, PERRL and conjunctivae and sclerae normal  HENT: ear canals and TM's normal, nose and mouth without ulcers or lesions  NECK: no adenopathy, no asymmetry, masses, or scars and thyroid normal to palpation  RESP: lungs clear to auscultation - no rales, rhonchi or wheezes  CV: regular rate and rhythm, normal S1 S2, no S3 or S4, no murmur, click or rub, no peripheral edema and peripheral pulses strong  ABDOMEN: soft, nontender, no hepatosplenomegaly, no masses and bowel sounds normal  MS: no gross musculoskeletal defects noted, no edema  PSYCH: mentation " appears normal and anxious          Assessment & Plan     1. Nausea  Insurance will not cover bid so she is going to buy the additional Nexium OTC.  - NEXIUM 40 MG DR capsule; Take 1 capsule (40 mg) by mouth 2 times daily Per GI provider.  Dispense: 90 capsule; Refill: 3    2. Gastropathy  as above.   - NEXIUM 40 MG DR capsule; Take 1 capsule (40 mg) by mouth 2 times daily Per GI provider.  Dispense: 90 capsule; Refill: 3    3. Anxiety  I think plays a large role in her dizziness/lightheadedness will stop the meclizine and restart the Valium at 2 mg bid.     4. Dizziness  as above.         Return in about 4 weeks (around 10/30/2019).    Huyen Samuels MD  [facility]

## 2019-10-03 DIAGNOSIS — R11.0 NAUSEA: ICD-10-CM

## 2019-10-03 ASSESSMENT — ANXIETY QUESTIONNAIRES: GAD7 TOTAL SCORE: 6

## 2019-10-03 NOTE — TELEPHONE ENCOUNTER
"Requested Prescriptions   Pending Prescriptions Disp Refills     promethazine (PHENERGAN) 25 MG tablet  Last Written Prescription Date:  8/29/19  Last Fill Quantity: 30,  # refills: 1   Last office visit: 10/2/2019 with prescribing provider:  Arvind   Future Office Visit:   Next 5 appointments (look out 90 days)    Oct 31, 2019 10:20 AM CDT  SHORT with Huyen Samuels MD  [facility]) [address]  [facility] [address] [phone]          30 tablet 1     Sig: Take 1 tablet (25 mg) by mouth every 6 hours as needed for nausea        Antivertigo/Antiemetic Agents Passed - 10/3/2019 10:26 AM        Passed - Recent (12 mo) or future (30 days) visit within the authorizing provider's specialty     Patient has had an office visit with the authorizing provider or a provider within the authorizing providers department within the previous 12 mos or has a future within next 30 days. See \"Patient Info\" tab in inbasket, or \"Choose Columns\" in Meds & Orders section of the refill encounter.              Passed - Medication is active on med list        Passed - Patient is 18 years of age or older          "

## 2019-10-04 RX ORDER — PROMETHAZINE HYDROCHLORIDE 25 MG/1
25 TABLET ORAL EVERY 6 HOURS PRN
Qty: 30 TABLET | Refills: 1 | Status: SHIPPED | OUTPATIENT
Start: 2019-10-04 | End: 2019-11-18

## 2019-10-18 ENCOUNTER — TRANSFERRED RECORDS (OUTPATIENT)
Dept: HEALTH INFORMATION MANAGEMENT | Facility: CLINIC | Age: 74
End: 2019-10-18

## 2019-10-28 ENCOUNTER — TRANSFERRED RECORDS (OUTPATIENT)
Dept: HEALTH INFORMATION MANAGEMENT | Facility: CLINIC | Age: 74
End: 2019-10-28

## 2019-10-28 ENCOUNTER — HOSPITAL ENCOUNTER (OUTPATIENT)
Dept: MRI IMAGING | Facility: CLINIC | Age: 74
Discharge: HOME | End: 2019-10-28
Attending: INTERNAL MEDICINE | Admitting: INTERNAL MEDICINE
Payer: MEDICARE

## 2019-10-28 DIAGNOSIS — R11.0 NAUSEA: ICD-10-CM

## 2019-10-28 DIAGNOSIS — K44.9 DIAPHRAGMATIC HERNIA WITHOUT OBSTRUCTION OR GANGRENE: ICD-10-CM

## 2019-10-28 DIAGNOSIS — K64.0 GRADE I HEMORRHOIDS: ICD-10-CM

## 2019-10-28 DIAGNOSIS — K63.5 BENIGN COLONIC POLYP: ICD-10-CM

## 2019-10-28 DIAGNOSIS — K31.7 GASTRIC POLYPS: ICD-10-CM

## 2019-10-28 DIAGNOSIS — K59.00 CONSTIPATION, UNSPECIFIED CONSTIPATION TYPE: ICD-10-CM

## 2019-10-28 DIAGNOSIS — K21.9 GERD WITHOUT ESOPHAGITIS: ICD-10-CM

## 2019-10-28 DIAGNOSIS — K57.30 DIVERTICULOSIS OF COLON WITHOUT DIVERTICULITIS: ICD-10-CM

## 2019-10-28 PROCEDURE — 70551 MRI BRAIN STEM W/O DYE: CPT

## 2019-10-29 ENCOUNTER — TRANSFERRED RECORDS (OUTPATIENT)
Dept: HEALTH INFORMATION MANAGEMENT | Facility: CLINIC | Age: 74
End: 2019-10-29

## 2019-11-06 ENCOUNTER — TRANSFERRED RECORDS (OUTPATIENT)
Dept: HEALTH INFORMATION MANAGEMENT | Facility: CLINIC | Age: 74
End: 2019-11-06

## 2019-11-07 DIAGNOSIS — F41.9 ANXIETY: ICD-10-CM

## 2019-11-07 DIAGNOSIS — H81.09 MENIERE'S DISEASE, UNSPECIFIED LATERALITY: ICD-10-CM

## 2019-11-07 NOTE — TELEPHONE ENCOUNTER
Diazepam      Last Written Prescription Date:  09/04/19  Last Fill Quantity: 40,   # refills: 0  Last Office Visit: 10/02/19  Future Office visit:    Next 5 appointments (look out 90 days)    Nov 18, 2019 11:00 AM CST  SHORT with Huyen Samuels MD  [facility] ([facility]) [address]  [facility] [address]  [phone]           Routing refill request to provider for review/approval because:  Drug not on the [facility], [facility] or [facility] refill protocol or controlled substance

## 2019-11-08 RX ORDER — DIAZEPAM 2 MG
TABLET ORAL
Qty: 40 TABLET | Refills: 0 | Status: SHIPPED | OUTPATIENT
Start: 2019-11-08 | End: 2020-04-22

## 2019-11-12 ENCOUNTER — DOCUMENTATION ONLY (OUTPATIENT)
Dept: RADIOLOGY | Facility: CLINIC | Age: 74
End: 2019-11-12

## 2019-11-12 NOTE — PROGRESS NOTES
Neuro Radiology consulted for a Cisternogram.  Procedural provider requesting a  CTof the temporal bone pre , if defect noted  will proceed with the Cisternogram    Discussed with Dr. Fareed Young IR RPA  [phone] Call pager  [phone] pager

## 2019-11-18 ENCOUNTER — OFFICE VISIT (OUTPATIENT)
Dept: INTERNAL MEDICINE | Facility: CLINIC | Age: 74
End: 2019-11-18
Payer: MEDICARE

## 2019-11-18 VITALS
OXYGEN SATURATION: 100 % | DIASTOLIC BLOOD PRESSURE: 70 MMHG | RESPIRATION RATE: 18 BRPM | SYSTOLIC BLOOD PRESSURE: 126 MMHG | TEMPERATURE: 98.1 F | HEIGHT: 63 IN | WEIGHT: 127.9 LBS | HEART RATE: 80 BPM | BODY MASS INDEX: 22.66 KG/M2

## 2019-11-18 DIAGNOSIS — F41.9 ANXIETY: Primary | ICD-10-CM

## 2019-11-18 DIAGNOSIS — K21.00 GASTROESOPHAGEAL REFLUX DISEASE WITH ESOPHAGITIS: ICD-10-CM

## 2019-11-18 DIAGNOSIS — R05.9 COUGH: ICD-10-CM

## 2019-11-18 DIAGNOSIS — R11.0 NAUSEA: ICD-10-CM

## 2019-11-18 PROCEDURE — 99214 OFFICE O/P EST MOD 30 MIN: CPT | Performed by: INTERNAL MEDICINE

## 2019-11-18 RX ORDER — PROMETHAZINE HYDROCHLORIDE 25 MG/1
25 TABLET ORAL EVERY 6 HOURS PRN
Qty: 30 TABLET | Refills: 1 | Status: SHIPPED | OUTPATIENT
Start: 2019-11-18 | End: 2020-07-01

## 2019-11-18 RX ORDER — SERTRALINE HYDROCHLORIDE 100 MG/1
100 TABLET, FILM COATED ORAL DAILY
Qty: 30 TABLET | Refills: 1 | Status: SHIPPED | OUTPATIENT
Start: 2019-11-18 | End: 2019-12-10

## 2019-11-18 RX ORDER — FAMOTIDINE 20 MG/1
20 TABLET, FILM COATED ORAL 2 TIMES DAILY
COMMUNITY
Start: 2019-11-18 | End: 2020-08-26

## 2019-11-18 ASSESSMENT — MIFFLIN-ST. JEOR: SCORE: 1044.28

## 2019-11-18 NOTE — PROGRESS NOTES
"Subjective     Cynthia Arita is a 74 year old female who presents to clinic today for the following health issues:    HPI     Follow up anxiety.    She is still having a lot of dizziness and nausea. She alos at night coughs up a lot of thick white phlegm. No dyspnea on exertion. No lower extremity edema.     She says she is getting depressed from the constantly not feeling well. She does not know what to do. She is on Zoloft 50 mg every day which seemed to help at first. She has no side effects on the Zoloft.     BP Readings from Last 3 Encounters:   11/18/19 126/70   10/02/19 114/70   09/12/19 133/76    Wt Readings from Last 3 Encounters:   11/18/19 58 kg (127 lb 14.4 oz)   10/02/19 55.9 kg (123 lb 3.2 oz)   09/12/19 57.1 kg (125 lb 14.4 oz)                 Reviewed and updated as needed this visit by Provider         Review of Systems   ROS COMP: Constitutional, HEENT, cardiovascular, pulmonary, GI, , musculoskeletal, neuro, skin, endocrine and psych systems are negative, except as otherwise noted.      Objective    /70 (BP Location: Right arm, Patient Position: Chair, Cuff Size: Adult Large)   Pulse 80   Temp 98.1  F (36.7  C) (Oral)   Resp 18   Ht 1.6 m (5' 3\")   Wt 58 kg (127 lb 14.4 oz)   SpO2 100%   Breastfeeding No   BMI 22.66 kg/m    Body mass index is 22.66 kg/m .  Physical Exam   GENERAL: healthy, alert and no distress  EYES: Eyes grossly normal to inspection, PERRL and conjunctivae and sclerae normal  NECK: no adenopathy, no asymmetry, masses, or scars and thyroid normal to palpation  RESP: lungs clear to auscultation - no rales, rhonchi or wheezes  CV: regular rate and rhythm, normal S1 S2, no S3 or S4, no murmur, click or rub, no peripheral edema and peripheral pulses strong  ABDOMEN: soft, nontender, no hepatosplenomegaly, no masses and bowel sounds normal  MS: no gross musculoskeletal defects noted, no edema  NEURO: Normal strength and tone, mentation intact and speech normal      "     Assessment & Plan     1. Anxiety  Will increase Zoloft to 100mg every day. Follow up in 1 month   - sertraline (ZOLOFT) 100 MG tablet; Take 1 tablet (100 mg) by mouth daily  Dispense: 30 tablet; Refill: 1    2. Nausea  refilled  - promethazine (PHENERGAN) 25 MG tablet; Take 1 tablet (25 mg) by mouth every 6 hours as needed for nausea  Dispense: 30 tablet; Refill: 1    3. Cough  If no improvement will refer to pulmonary     4. Gastroesophageal reflux disease with esophagitis     - famotidine (PEPCID) 20 MG tablet; Take 1 tablet (20 mg) by mouth 2 times daily       Return in about 4 weeks (around 12/16/2019).    Huyen Samuels MD  [facility]

## 2019-11-21 ENCOUNTER — ALLIED HEALTH/NURSE VISIT (OUTPATIENT)
Dept: NURSING | Facility: CLINIC | Age: 74
End: 2019-11-21
Payer: MEDICARE

## 2019-11-21 ENCOUNTER — NURSE TRIAGE (OUTPATIENT)
Dept: INTERNAL MEDICINE | Facility: CLINIC | Age: 74
End: 2019-11-21

## 2019-11-21 DIAGNOSIS — Z53.9 DIAGNOSIS FOR ++++ WALK IN CLINIC ++++: Primary | ICD-10-CM

## 2019-11-21 NOTE — TELEPHONE ENCOUNTER
"Patient calls and reports she has burning with urination, frequency, and lower abdominal cramps. Denies flank pain, vomiting, blood in urine, or fever. Patient states she isn't sure what could cause this. Writer inquired if patient has had a urinary tract infection before, patient states she has only had two in the last 50 years.    Writer advised that patient should be seen today. Patient inquires if she could drop off an urine sample. Primary care provider not in the office, patient does not have mychart for an e-visit. Writer advised that patient should be evaluated. No appointments in clinic. Recommended urgent care, or another clinic.     Patient states she does not want to see anymore doctors. States she just saw primary care provider. States she knows she has a urinary tract infection. Patient then states she has body aches. Writer inquired if patient can check her temperature, patient does not own a thermometer. Patient does admit she felt \"flushed\" this morning.    Writer advised patient's report of body aches is concerning, and she should be evaluated. Advised ER or urgent care. Patient asked if she should call 911. Writer inquired if patient thought that 911 was necessary, patient declines but states she does not know where to go. Writer advised ER, or urgent care, advised on the nearest locations. Writer inquired if patient has concerns for finding transportation. Patient did not answer writer's question, stated she wants to be seen in [place]. Writer advised there are no appointments today. Patient states she does not want to wait three weeks to get in for an office visit. Writer advised patient's symptoms are concerning, and that she should be seen today. Writer again advised on the nearest urgent care or ER. Writer attempted to give care advice, patient then stated \"I don't care anymore\" and hung up.    Additional Information    Age > 50 years    Negative: Severe pain with urination    Negative: " "Fever > 100.5 F (38.1 C)    Negative: Side (flank) or lower back pain present    Negative: Taking antibiotic > 24 hours for UTI and fever persists    Negative: Taking antibiotic > 3 days for UTI and painful urination not improved    Negative: Unusual vaginal discharge    Negative: > 2 UTIs in last year    Negative: Patient is worried about sexually transmitted disease (STD)    Answer Assessment - Initial Assessment Questions  1. SEVERITY: \"How bad is the pain?\"  (e.g., Scale 1-10; mild, moderate, or severe)    - MILD (1-3): complains slightly about urination hurting    - MODERATE (4-7): interferes with normal activities      - SEVERE (8-10): excruciating, unwilling or unable to urinate because of the pain       Patient did not answer  2. FREQUENCY: \"How many times have you had painful urination today?\"       4 or more  3. PATTERN: \"Is pain present every time you urinate or just sometimes?\"       Everytime  4. ONSET: \"When did the painful urination start?\"       Tuesday  5. FEVER: \"Do you have a fever?\" If so, ask: \"What is your temperature, how was it measured, and when did it start?\"      Patient does not have a thermometer. Admits to feeling flushed this morning.  6. PAST UTI: \"Have you had a urine infection before?\" If so, ask: \"When was the last time?\" and \"What happened that time?\"       Patient states she has had two in the last 50 years  7. CAUSE: \"What do you think is causing the painful urination?\"  (e.g., UTI, scratch, Herpes sore)      Urinary tract infection   8. OTHER SYMPTOMS: \"Do you have any other symptoms?\" (e.g., flank pain, vaginal discharge, genital sores, urgency, blood in urine)      Frequency, body aches, lower abdominal pain  9. PREGNANCY: \"Is there any chance you are pregnant?\" \"When was your last menstrual period?\"      No    Protocols used: URINATION PAIN - FEMALE-A-OH      "

## 2019-11-21 NOTE — TELEPHONE ENCOUNTER
Reason for call:  Patient reporting a symptom    Symptom or request: burning sensation with urination, legs and hands trembling    Duration (how long have symptoms been present): 2 days    Have you been treated for this before? no    Additional comments: Patient calls to report symptoms that started yesterday. Please call her back to advise.    Phone Number patient can be reached at:  Home number on file [phone] (home)    Best Time:  any    Can we leave a detailed message on this number:  YES    Call taken on 11/21/2019 at 8:27 AM by Sandra Pastor

## 2019-11-21 NOTE — PROGRESS NOTES
Patient presents to the clinic with complaints of urinary tract infection symptoms. Patient states she spoke with an nurse earlier who did not give her advice. Writer advised that writer was the one who spoke to patient over the phone earlier. Writer reiterated the concern for her symptoms, and that patient should be seen in the ER or urgent care. Patient states she knows she has a urinary tract infection, and wants to leave a sample. Writer advised she needs to be seen for these symptoms. Advised no appointments in clinic, and primary care provider is out of the office.    Patient states she has been a patient here for two years, and wants to be treated. Writer huddled with providers in clinic, other providers are not able to work patient in, but advised she should be seen somewhere. Patient made aware that she is not able to be worked in for an appointment, advised on nearest urgent care. Patient verbalized understanding.

## 2019-11-25 ENCOUNTER — TRANSFERRED RECORDS (OUTPATIENT)
Dept: HEALTH INFORMATION MANAGEMENT | Facility: CLINIC | Age: 74
End: 2019-11-25

## 2019-12-02 ENCOUNTER — TELEPHONE (OUTPATIENT)
Dept: INTERNAL MEDICINE | Facility: CLINIC | Age: 74
End: 2019-12-02

## 2019-12-02 DIAGNOSIS — R30.0 DYSURIA: Primary | ICD-10-CM

## 2019-12-02 DIAGNOSIS — R35.0 INCREASED FREQUENCY OF URINATION: Primary | ICD-10-CM

## 2019-12-02 DIAGNOSIS — R30.0 DYSURIA: ICD-10-CM

## 2019-12-02 LAB
ALBUMIN UR-MCNC: NEGATIVE MG/DL
APPEARANCE UR: CLEAR
BACTERIA #/AREA URNS HPF: ABNORMAL /HPF
BILIRUB UR QL STRIP: NEGATIVE
COLOR UR AUTO: YELLOW
GLUCOSE UR STRIP-MCNC: NEGATIVE MG/DL
HGB UR QL STRIP: NEGATIVE
KETONES UR STRIP-MCNC: NEGATIVE MG/DL
LEUKOCYTE ESTERASE UR QL STRIP: ABNORMAL
NITRATE UR QL: NEGATIVE
PH UR STRIP: 7 PH (ref 5–7)
RBC #/AREA URNS AUTO: ABNORMAL /HPF
SOURCE: ABNORMAL
SP GR UR STRIP: 1.01 (ref 1–1.03)
UROBILINOGEN UR STRIP-ACNC: 0.2 EU/DL (ref 0.2–1)
WBC #/AREA URNS AUTO: ABNORMAL /HPF

## 2019-12-02 PROCEDURE — 87186 SC STD MICRODIL/AGAR DIL: CPT | Performed by: INTERNAL MEDICINE

## 2019-12-02 PROCEDURE — 81001 URINALYSIS AUTO W/SCOPE: CPT | Performed by: INTERNAL MEDICINE

## 2019-12-02 PROCEDURE — 87086 URINE CULTURE/COLONY COUNT: CPT | Performed by: INTERNAL MEDICINE

## 2019-12-02 PROCEDURE — 87088 URINE BACTERIA CULTURE: CPT | Performed by: INTERNAL MEDICINE

## 2019-12-02 RX ORDER — SULFAMETHOXAZOLE/TRIMETHOPRIM 800-160 MG
1 TABLET ORAL 2 TIMES DAILY
Qty: 14 TABLET | Refills: 0 | Status: SHIPPED | OUTPATIENT
Start: 2019-12-02 | End: 2020-05-13

## 2019-12-02 NOTE — TELEPHONE ENCOUNTER
No openings this week with primary care provider.  primary care provider may want to view encounters from 11/21, phone, walk in and UC visit.  Call placed to [facility], requested UC results/sensitivities be faxed over to this RN, will place in MD in basket once it arrives. YUE Avalos R.N.       11/21/19 urgent care visit:  UA with Microscopic (11/21/2019 1:51 PM CST)  UA with Microscopic (11/21/2019 1:51 PM CST)   Component Value Ref Range Performed At Pathologist Signature   Urine Color Straw Straw-Yellow [facility]     Urine Clarity Clear Clear [facility]     Specific Gravity, Urine 1.020 1.005 - 1.030 [facility]     PH Urine 6.0 5.0 - 8.0 [facility]     Protein, Urine Qual (mg/dL) 30  (A) Neg/Trace [facility]     Glucose Urine Qual (mg/dL) Negative Negative [facility]     Ketones, Urine (mg/dL) Negative Negative [facility]     Urobilinogen, Urine (EU/dL) 0.2 <2.0 [facility]     Bilirubin Urine Negative Negative [facility]     Blood, Urine Large (A) Neg/Trace [facility]     Nitrite Urine Positive (A) Negative [facility]     Leukocyte Est. Small (A) Negative [facility]     Red Blood Cells 11-20 (A) 0 - 3 /HPF [facility]     White Blood Cells 10-20 (A) 0 - 5 /HPF [facility]     Bacteria Moderate (A) None Seen /HPF [facility]

## 2019-12-02 NOTE — TELEPHONE ENCOUNTER
Patient is calling because she was told to follow up with her doctor for a UTI. She finished her antibiotic last Thursday 11/28/19 and she is still having burning with urination and also urgency.

## 2019-12-03 ENCOUNTER — ALLIED HEALTH/NURSE VISIT (OUTPATIENT)
Dept: NURSING | Facility: CLINIC | Age: 74
End: 2019-12-03
Payer: MEDICARE

## 2019-12-03 DIAGNOSIS — Z53.9 DIAGNOSIS FOR ++++ WALK IN CLINIC ++++: Primary | ICD-10-CM

## 2019-12-03 DIAGNOSIS — R51.9 FACIAL PAIN: ICD-10-CM

## 2019-12-03 DIAGNOSIS — R42 DIZZINESS: ICD-10-CM

## 2019-12-03 LAB
BACTERIA SPEC CULT: ABNORMAL
SPECIMEN SOURCE: ABNORMAL

## 2019-12-03 PROCEDURE — 99207 ZZC NO CHARGE NURSE ONLY: CPT

## 2019-12-03 NOTE — PROGRESS NOTES
"Patient was diagnosed with a large mastoid effusion based on MRI on 10/28/19.  Patient stated her head feels like it is in a vice.  Patient stated it makes her shaky and dizzy at times.  Noted patient to have slight unsteadiness with ambulation.      Patient requesting to be worked in to see Dr. Samuels.  Primary care provider not here for the rest of the day.  Assisted patient in scheduling an appointment with primary care provider.  Patient refusing to go to an , stating \"I only want to see Dr. Samuels.\"       Patient also wondering if Dr. Samuels knows of anyone that treats mastoid effusions, if she doesn't.  Does primary care provider have any recommendations.  Please advise, thanks.    "

## 2019-12-03 NOTE — TELEPHONE ENCOUNTER
Patient advised prescription sent per verbal order Dr. Samuels. Rohini in lab will add UC.   Patient wants to be worked into your schedule (for mult issues)?  Next avail appointment 1-7-2020.

## 2019-12-03 NOTE — PROGRESS NOTES
Called patient and informed her of the message from primary care provider.  Patient would like to go ahead with a different ENT group.    ENT referral pended below.  Please sign if you agree.

## 2019-12-09 ENCOUNTER — TELEPHONE (OUTPATIENT)
Dept: INTERNAL MEDICINE | Facility: CLINIC | Age: 74
End: 2019-12-09

## 2019-12-09 NOTE — TELEPHONE ENCOUNTER
[facility] calling to let us know that the patient's brand name nexium will not be covered effective 1/1/20. Right now the patient has been using the brand name of this medication. She told [facility] it hs been a long time since she tried the generic brand which will be covered so she is not sure if I will or not. The PRIOR AUTHORIZATION department phone is  [phone] and  patient's id is [id number].

## 2019-12-10 ENCOUNTER — TELEPHONE (OUTPATIENT)
Dept: OTOLARYNGOLOGY | Facility: CLINIC | Age: 74
End: 2019-12-10

## 2019-12-10 ENCOUNTER — OFFICE VISIT (OUTPATIENT)
Dept: INTERNAL MEDICINE | Facility: CLINIC | Age: 74
End: 2019-12-10
Payer: MEDICARE

## 2019-12-10 VITALS
TEMPERATURE: 97.6 F | RESPIRATION RATE: 16 BRPM | OXYGEN SATURATION: 97 % | DIASTOLIC BLOOD PRESSURE: 70 MMHG | HEART RATE: 90 BPM | HEIGHT: 63 IN | WEIGHT: 128 LBS | BODY MASS INDEX: 22.68 KG/M2 | SYSTOLIC BLOOD PRESSURE: 120 MMHG

## 2019-12-10 DIAGNOSIS — K21.00 GASTROESOPHAGEAL REFLUX DISEASE WITH ESOPHAGITIS: ICD-10-CM

## 2019-12-10 DIAGNOSIS — F41.9 ANXIETY: ICD-10-CM

## 2019-12-10 DIAGNOSIS — R05.9 COUGH: Primary | ICD-10-CM

## 2019-12-10 PROCEDURE — 99214 OFFICE O/P EST MOD 30 MIN: CPT | Performed by: INTERNAL MEDICINE

## 2019-12-10 RX ORDER — ALPRAZOLAM 0.5 MG
0.5 TABLET ORAL 3 TIMES DAILY PRN
Qty: 15 TABLET | Refills: 0 | Status: SHIPPED | OUTPATIENT
Start: 2019-12-10 | End: 2020-03-06

## 2019-12-10 RX ORDER — ESOMEPRAZOLE MAGNESIUM 40 MG/1
40 CAPSULE, DELAYED RELEASE ORAL
Qty: 90 CAPSULE | Refills: 3
Start: 2019-12-10 | End: 2020-02-18

## 2019-12-10 RX ORDER — SERTRALINE HYDROCHLORIDE 100 MG/1
200 TABLET, FILM COATED ORAL DAILY
Qty: 60 TABLET | Refills: 1
Start: 2019-12-10 | End: 2020-01-21

## 2019-12-10 ASSESSMENT — MIFFLIN-ST. JEOR: SCORE: 1044.73

## 2019-12-10 NOTE — PROGRESS NOTES
"Subjective     Cynthia Arita is a 74 year old female who presents to clinic today for the following health issues:    HPI   ED/UC Followup:    Facility:  [facility]    Date of visit: 11/21/19  Reason for visit: possible UTI   Current Status: feels somewhat better         HPI:   She has a chronic cough bringing up volumes of clear phlegm. Denies any fever chills or night sweats. Another provider gave her prednisone thinking this was an asthma flare but did not help.     Her head feels full often but no pain. No sore throat. She admits a lot of her \"dizziness\" is related to her anxiety which has been out of control.     BP Readings from Last 3 Encounters:   12/10/19 120/70   11/18/19 126/70   10/02/19 114/70    Wt Readings from Last 3 Encounters:   12/10/19 58.1 kg (128 lb)   11/18/19 58 kg (127 lb 14.4 oz)   10/02/19 55.9 kg (123 lb 3.2 oz)                    Reviewed and updated as needed this visit by Provider         Review of Systems   ROS COMP: Constitutional, HEENT, cardiovascular, pulmonary, GI, , musculoskeletal, neuro, skin, endocrine and psych systems are negative, except as otherwise noted.      Objective    There were no vitals taken for this visit.  There is no height or weight on file to calculate BMI.  Physical Exam   GENERAL: healthy, alert and no distress  EYES: Eyes grossly normal to inspection, PERRL and conjunctivae and sclerae normal  HENT: ear canals and TM's normal, nose and mouth without ulcers or lesions  NECK: no adenopathy, no asymmetry, masses, or scars and thyroid normal to palpation  RESP: lungs clear to auscultation - no rales, rhonchi or wheezes  CV: regular rate and rhythm, normal S1 S2, no S3 or S4, no murmur, click or rub, no peripheral edema and peripheral pulses strong  ABDOMEN: soft, nontender, no hepatosplenomegaly, no masses and bowel sounds normal  MS: no gross musculoskeletal defects noted, no edema  PSYCH: mentation appears normal and anxious          Assessment & Plan "     1. Cough  Will try OTC Mucinex and Sudafed    2. Anxiety  Will increase Zoloft to 200 mg  - sertraline (ZOLOFT) 100 MG tablet; Take 2 tablets (200 mg) by mouth daily  Dispense: 60 tablet; Refill: 1  - ALPRAZolam (XANAX) 0.5 MG tablet; Take 1 tablet (0.5 mg) by mouth 3 times daily as needed for anxiety  Dispense: 15 tablet; Refill: 0    3. Gastroesophageal reflux disease with esophagitis  refilled  - esomeprazole (NEXIUM) 40 MG DR capsule; Take 1 capsule (40 mg) by mouth every morning (before breakfast) Take 30-60 minutes before eating.  Dispense: 90 capsule; Refill: 3       No follow-ups on file.    Huyen Samuels MD  [facility]

## 2019-12-11 ENCOUNTER — TRANSFERRED RECORDS (OUTPATIENT)
Dept: HEALTH INFORMATION MANAGEMENT | Facility: CLINIC | Age: 74
End: 2019-12-11

## 2019-12-17 NOTE — TELEPHONE ENCOUNTER
1. Have you noticed any changes in hearing? Yes  2. Do you have ringing, buzzing, or other sounds in your ears or head, this is also referred to as Tinnitus? Yes  3. When and where was your last hearing test? [facility] [place] 2019   4. Do you feel lightheaded or foggy? Yes  5. Do you have a spinning sensation? No  6. Is there any specific position that can bring on dizziness? random  7. Does looking up cause dizziness? Yes  8. Does getting in and our of bed cause dizziness? No  9. Does turning over in bed increase or cause dizziness? No  10. Does bending over cause dizziness? No  11. Is there anything that you can do to prevent the dizziness? Move differently, meds  12. Has the dizziness gotten better with time? No  13. Have you seen Physical Therapy for dizziness? (Please indicate clinic and as much of the location as possible): Yes, If yes, where? [facility] [place] if yes, who?   14. Are you being referred to a specific physician? No  15. Have you been evaluated/treated for your dizziness at any other location?  (If yes,obtian as much clinic/provider/locaiton as possible) Yes. (If yes answer the following questions:)   Have you seen any ENT, Neurology, or other providers for these symptoms?             Yes, If yes, where? [facility], [facility] [place] ([facility]), [facility] if yes, who?    Have you had any balance or Audiology testing? Yes, If yes, where? [facility] if yes, who?  Have you had an MRI or CT scan of your head or neck? Yes, If yes, where? [facility] [place] [facility] specialty care if yes, who?     Would you like to receive your Release of Information by mail or e-mail?  mail

## 2019-12-18 ENCOUNTER — OFFICE VISIT (OUTPATIENT)
Dept: INTERNAL MEDICINE | Facility: CLINIC | Age: 74
End: 2019-12-18
Payer: MEDICARE

## 2019-12-18 VITALS
HEART RATE: 70 BPM | WEIGHT: 128.3 LBS | OXYGEN SATURATION: 99 % | SYSTOLIC BLOOD PRESSURE: 142 MMHG | HEIGHT: 63 IN | BODY MASS INDEX: 22.73 KG/M2 | RESPIRATION RATE: 18 BRPM | DIASTOLIC BLOOD PRESSURE: 80 MMHG | TEMPERATURE: 97.9 F

## 2019-12-18 DIAGNOSIS — R25.1 TREMOR: ICD-10-CM

## 2019-12-18 DIAGNOSIS — H81.09 MENIERE'S DISEASE, UNSPECIFIED LATERALITY: ICD-10-CM

## 2019-12-18 DIAGNOSIS — F41.9 ANXIETY: Primary | ICD-10-CM

## 2019-12-18 DIAGNOSIS — R05.9 COUGH: ICD-10-CM

## 2019-12-18 PROCEDURE — 99214 OFFICE O/P EST MOD 30 MIN: CPT | Performed by: INTERNAL MEDICINE

## 2019-12-18 ASSESSMENT — MIFFLIN-ST. JEOR: SCORE: 1046.09

## 2019-12-18 NOTE — PROGRESS NOTES
"Subjective     Cynthia Arita is a 74 year old female who presents to clinic today for the following health issues:    HPI     Follow up dizziness and tremors.     Her dizziness and anxiety are better. The Zoloft is making her tremor slightly worse but she can live with it. She has an appointment in February for a second opinion with ENT.     She has a chronic cough with some clear to white phlegm production. No wheezing or dyspnea on exertion.     She remains very anxious about her health. She is very afraid something is wrong. Spent a lot of time reassuring her today that we have essentially looked at everything and other than managing her anxiety there is not a lot more to do. She seemed to start to understand this.     BP Readings from Last 3 Encounters:   12/18/19 (!) 142/80   12/10/19 120/70   11/18/19 126/70    Wt Readings from Last 3 Encounters:   12/18/19 58.2 kg (128 lb 4.8 oz)   12/10/19 58.1 kg (128 lb)   11/18/19 58 kg (127 lb 14.4 oz)                 Reviewed and updated as needed this visit by Provider         Review of Systems   ROS COMP: Constitutional, HEENT, cardiovascular, pulmonary, GI, , musculoskeletal, neuro, skin, endocrine and psych systems are negative, except as otherwise noted.      Objective    BP (!) 160/90 (BP Location: Right arm, Patient Position: Chair, Cuff Size: Adult Large)   Pulse 70   Temp 97.9  F (36.6  C) (Oral)   Resp 18   Ht 1.6 m (5' 3\")   Wt 58.2 kg (128 lb 4.8 oz)   SpO2 99%   Breastfeeding No   BMI 22.73 kg/m    Body mass index is 22.73 kg/m .  Physical Exam   GENERAL: healthy, alert and no distress  NECK: no adenopathy, no asymmetry, masses, or scars and thyroid normal to palpation  RESP: lungs clear to auscultation - no rales, rhonchi or wheezes  CV: regular rate and rhythm, normal S1 S2, no S3 or S4, no murmur, click or rub, no peripheral edema and peripheral pulses strong  ABDOMEN: soft, nontender, no hepatosplenomegaly, no masses and bowel sounds " normal  MS: no gross musculoskeletal defects noted, no edema  PSYCH: mentation appears normal, anxious but not nearly as bad as at last visit.           Assessment & Plan     1. Anxiety  Will increase zoloft to 200 mg . Follow up in 1 month     2. Cough  Will follow clinically for now.     3. Tremor  Will follow clinically for now.     4. Meniere's disease, unspecified laterality  With distant surgery pending ENT appt.         No follow-ups on file.    Huyen Samuels MD  [facility]

## 2019-12-18 NOTE — TELEPHONE ENCOUNTER
FUTURE VISIT INFORMATION      FUTURE VISIT INFORMATION:    Date: 2/24/20    Time: 1:30PM    Location: [facility]  REFERRAL INFORMATION:    Referring provider:  Huyen Samuels MD    Referring providers clinic:  [facility]     Reason for visit/diagnosis  Dizziness     RECORDS REQUESTED FROM:       Clinic name Comments Records Status Imaging Status   [facility]  12/3/19 referral epic    [facility] Physical therapy notes:  8/6/19 - 3/27/19  notes  with Dora Matthew PT   7/22/19 notes with Olinda Chavez PT  4/19/19, 4/10/19, 4/3/19 notes with Lea Felton PT   3/20/19- 9/10/15 notes with Anjelica Serrano PT  1/15/19- 7/6/17 notes with Sonal Geronimo PTA  12/31/18, 11/6/17, 7/10/17, 6/27/17,  notes with Salima Goodman PT     MD/PA note :  7/16/19 notes with Dr Jj Mays  4/26/19 notes with Dominique Noriega PA    Sent to scan 1/8/2020    [facility] 9/25/19, 6/10/19, 4/8/19 audiogram   9/25/19, 7/3/19, 6/10/19 notes with Dr Parker   4/8/19 notes with Dr Rojas Sent to scan 1/8/2020    [facility] 10/28/19 MR Brain  EPIC PACS   [facility]   06/26/2019 LEFT LABYRINTHECTOMY WITH FACIAL NERVE MONITORING with Ralph Parker MD Care Everywhere     [facility]  06/27/2018 - 7/30/2018 PT notes Care Everywhere    [facility]   11/19/2019 CT ORBITAL WWO Care everywhere  req 1/7/20 - PACs                 RATNA was mailed out on 12/18/19 1/7/20 2:30PM sent a request with signed release to [facility] and [facility] for recs. Also sent a fax [facility] for images - Amay   1/8/2020 11:18AM received recs from [facility], copy sent to scan. Waiting on [facility] recs - amay   1/8/2020 5:21PM received images from [facility] and recs from [facility] , sent a message to audiology to review - Amay

## 2020-01-09 NOTE — TELEPHONE ENCOUNTER
Requested orders for VNG/Chair, Ecog, and hearing test. May change based on physician review and recommendation.      Per Chart Review:  This patient has a history of left Meniere's and PPPD with a left labyrinthectomy with Dr. Parker in June 2019. She has done vestibular rehab at [facility] and is having continued imbalance. 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 I am recommending VNG/rotational chair, Ecog for the right ear, and a hearing test. It looks like she has been adamant about not repeating VNG testing, documented on previous notes from [facility].     MRI findings 10/28/19:   1. No findings of acute intracranial abnormality.  2. Mild global brain parenchymal volume loss and mild scattered signal changes within the subcortical and deep cerebral white matter, likely representing age-commensurate small vessel ischemic changes.  3. Large left mastoid effusion, appearing slightly increased from prior. Clinical correlation is recommended.    Jerman Reece.  Licensed Audiologist  [place] # [id number]

## 2020-01-20 ENCOUNTER — OFFICE VISIT (OUTPATIENT)
Dept: INTERNAL MEDICINE | Facility: CLINIC | Age: 75
End: 2020-01-20
Payer: MEDICARE

## 2020-01-20 VITALS
RESPIRATION RATE: 16 BRPM | BODY MASS INDEX: 23.16 KG/M2 | DIASTOLIC BLOOD PRESSURE: 70 MMHG | TEMPERATURE: 97.1 F | OXYGEN SATURATION: 100 % | HEIGHT: 63 IN | WEIGHT: 130.7 LBS | SYSTOLIC BLOOD PRESSURE: 138 MMHG | HEART RATE: 68 BPM

## 2020-01-20 DIAGNOSIS — F41.9 ANXIETY: Primary | ICD-10-CM

## 2020-01-20 DIAGNOSIS — H81.09 MENIERE'S DISEASE, UNSPECIFIED LATERALITY: ICD-10-CM

## 2020-01-20 PROCEDURE — 36415 COLL VENOUS BLD VENIPUNCTURE: CPT | Performed by: INTERNAL MEDICINE

## 2020-01-20 PROCEDURE — 85652 RBC SED RATE AUTOMATED: CPT | Performed by: INTERNAL MEDICINE

## 2020-01-20 PROCEDURE — 99214 OFFICE O/P EST MOD 30 MIN: CPT | Performed by: INTERNAL MEDICINE

## 2020-01-20 PROCEDURE — 86140 C-REACTIVE PROTEIN: CPT | Performed by: INTERNAL MEDICINE

## 2020-01-20 ASSESSMENT — MIFFLIN-ST. JEOR: SCORE: 1056.98

## 2020-01-20 NOTE — PROGRESS NOTES
"Subjective     Cynthia Arita is a 74 year old female who presents to clinic today for the following health issues:    HPI     Follow up anxiety.    On the Zoloft she is calmer. She is tired during the day but prefers that to being anxious. She is taking 150 mg and wants to stay at that dose.    She has an appointment in early February at [facility] for her dizziness. She has several tests scheduled before then as part of their work up.     BP Readings from Last 3 Encounters:   01/20/20 138/70   12/18/19 (!) 142/80   12/10/19 120/70    Wt Readings from Last 3 Encounters:   01/20/20 59.3 kg (130 lb 11.2 oz)   12/18/19 58.2 kg (128 lb 4.8 oz)   12/10/19 58.1 kg (128 lb)               Reviewed and updated as needed this visit by Provider         Review of Systems   ROS COMP: Constitutional, HEENT, cardiovascular, pulmonary, GI, , musculoskeletal, neuro, skin, endocrine and psych systems are negative, except as otherwise noted.      Objective    /70 (BP Location: Right arm, Patient Position: Chair, Cuff Size: Adult Regular)   Pulse 68   Temp 97.1  F (36.2  C) (Oral)   Resp 16   Ht 1.6 m (5' 3\")   Wt 59.3 kg (130 lb 11.2 oz)   SpO2 100%   Breastfeeding No   BMI 23.15 kg/m    Body mass index is 23.15 kg/m .  Physical Exam   GENERAL: healthy, alert and no distress  NECK: no adenopathy, no asymmetry, masses, or scars and thyroid normal to palpation  RESP: lungs clear to auscultation - no rales, rhonchi or wheezes  CV: regular rate and rhythm, normal S1 S2, no S3 or S4, no murmur, click or rub, no peripheral edema and peripheral pulses strong  ABDOMEN: soft, nontender, no hepatosplenomegaly, no masses and bowel sounds normal  PSYCH: mentation appears normal, affect normal/bright          Assessment & Plan     1. Anxiety  under reasonable control Continue current medications. Follow up in 1 month     2. Meniere's disease, unspecified laterality  She is worried about infection in the mastoid infusion. Her wbc " has been normal Will check   - CRP, inflammation  - ESR: Erythrocyte sedimentation rate       Return in about 4 weeks (around 2/17/2020).    Huyen Samuels MD  [facility]

## 2020-01-21 DIAGNOSIS — F41.9 ANXIETY: ICD-10-CM

## 2020-01-21 RX ORDER — SERTRALINE HYDROCHLORIDE 100 MG/1
TABLET, FILM COATED ORAL
Qty: 30 TABLET | Refills: 3 | Status: SHIPPED | OUTPATIENT
Start: 2020-01-21 | End: 2020-02-13

## 2020-01-21 NOTE — TELEPHONE ENCOUNTER
"Requested Prescriptions   Pending Prescriptions Disp Refills     sertraline (ZOLOFT) 100 MG tablet [Pharmacy Med Name: SERTRALINE 100MG TABLETS] 30 tablet      Sig: TAKE 1 TABLET(100 MG) BY MOUTH DAILY   Last Written Prescription Date:  12/10/2019  Last Fill Quantity: 60,  # refills: 1   Last office visit: 1/20/2020 with prescribing provider   Future Office Visit:        SSRIs Protocol Passed - 1/21/2020 12:22 PM        Passed - Recent (12 mo) or future (30 days) visit within the authorizing provider's specialty     Patient has had an office visit with the authorizing provider or a provider within the authorizing providers department within the previous 12 mos or has a future within next 30 days. See \"Patient Info\" tab in inbasket, or \"Choose Columns\" in Meds & Orders section of the refill encounter.              Passed - Medication is active on med list        Passed - Patient is age 18 or older        Passed - No active pregnancy on record        Passed - No positive pregnancy test in last 12 months        Prescription approved per [facility] Refill Protocol.  Jazzmine Gagnon RN on 1/21/2020 at 12:49 PM    "

## 2020-02-07 ENCOUNTER — TRANSFERRED RECORDS (OUTPATIENT)
Dept: HEALTH INFORMATION MANAGEMENT | Facility: CLINIC | Age: 75
End: 2020-02-07

## 2020-02-13 ENCOUNTER — TELEPHONE (OUTPATIENT)
Dept: INTERNAL MEDICINE | Facility: CLINIC | Age: 75
End: 2020-02-13

## 2020-02-13 DIAGNOSIS — R42 DIZZINESS: Primary | ICD-10-CM

## 2020-02-13 DIAGNOSIS — F41.9 ANXIETY: ICD-10-CM

## 2020-02-13 RX ORDER — SERTRALINE HYDROCHLORIDE 100 MG/1
150 TABLET, FILM COATED ORAL DAILY
Qty: 135 TABLET | Refills: 3 | Status: SHIPPED | OUTPATIENT
Start: 2020-02-13 | End: 2020-08-26

## 2020-02-13 NOTE — PROGRESS NOTES
"AUDIOLOGY REPORT-BALANCE ASSESSMENT    SUBJECTIVE: Cynthia Arita, 74 year old, was seen in Audiology at the [facility] on 2/14/2020, for videonystagmography (VNG) and rotational chair testing referred by Heather Juarez M.D. Patient presents for continued vestibular evaluation following a hearing evaluation and electrocochleography (ECochG) testing performed earlier today.     Patient's case history obtained by Philip Arias during the patient's ECochG testing is as follows:    \"The patient reports concern for imbalance and sensation of room shifting around her accompanied by fullness in her head and nausea that occurs each day approximately one hour after rising from bed. The patient reports that these symptoms started around 2005 and only occur when she is upright, but she finds relief anytime she is laying down with any sleeping position.      Previous hearing evaluations at an outside facility indicated normal to severe hearing loss for the right ear and severe to profound hearing loss for the left ear in April and June of 2019, but bone conduction testing was not completed to confirm what type of hearing loss is present for either ear. The patient does confirm that hearing loss was gradual for each ear. The patient reports that she underwent a surgery for Meniere's disease in 2005 and found relief from her symptoms until 2010, at which point they returned. The patient reports she was treated with prednisone in spring of 2019 but this did not help symptoms and so she underwent a second surgery for Meniere's in June of 2019 which resulted in a worsening of symptoms. The patient reports that she has worked with Physical Therapy since that time and found the vestibular rehabilitation somewhat helpful, although it did not alleviate any of her symptoms.  A hearing evaluation was completed at the same outside facility in September of 2019 and results indicated a " "complete hearing loss for the left ear, which the patient was counseled would be an outcome of her surgery in June of 2019. A hearing evaluation completed at this facility today indicates normal sloping to severe sensorineural hearing loss with 92% speech understanding for the right ear and a complete hearing loss for the left ear which is stable compared to the testing completed in September of 2019. The patient reports that the hearing in her right-ear does fluctuate, but she was not able to further explain this symptom. The patient currently utilizes a Widex Bi-CROS hearing system fit at an outside facility.      The patient reports chronic ear symptoms including right episodic ear pressure and right-ear \"plugging\" that can be \"cleared\" by manipulating the jaw or swallowing. The patient also reports concern for mucous build-up that sometimes is so severe she cannot swallow food until cleared, and constant severe drainage from her nose with both symptoms present over the past few months. The patient also reports severe exhaustion for approximately one year, episodic hoarseness of her own voice for approximately 6 months and chronic dry mouth that she has been prescribed medication for lubrication. The patient also reports dizziness with exertion but denies dizziness with loud sounds, autophonia, or noises with blinking or eye movements.     The patient reports three instances of falling; two in January of 2019 and once in 2018 but the patient confirms that she did not hit her head during these falls and she denies changes in hearing or ear symptoms prior to or after the falls. The patient denies any history of head trauma. The patient reports episodic high-blood pressure but she relates this to anxiety and confirms that she has not been prescribed medication for this. The patient denies heart issues. The patient reports removal and biopsy of skin cancer from her back with no further alarm. She reports a right-knee " "replacement in January of 2019. The patient denies allergies but then did report there is a number of medications that she wonders if she is allergic to; she has never been assessed for allergies. The patient reports that her mother developed bilateral presbycusis and utilized hearing aids but the patient denies any other history of hearing loss or balance issues/dizziness in her family. The patient denies history of chronic ear issues or ear surgeries, diagnosis of TMJ or other jaw-related issues or history of migraines.\"    Patient's most recent MRI Brain performed on 10/28/2019 revealed \"No findings of acute intracranial abnormality. Mild global brain parenchymal volume loss and mild scattered signal changes within the subcortical and deep cerebral white matter, likely representing age-commensurate small vessel ischemic changes. Large left mastoid effusion, appearing slightly increased from prior. Clinical correlation is recommended.\" CT Cisternogram on 11/19/19 revealed \"There are post-operative changes of a left labyrinthectomy/mastoidectomy. The right mastoid air cells are clear. On the pre-contrast images, there is no fluid in the frontal sinuses, sphenoid sinuses or ethmoid air cells. There is no CT evidence of dehiscence of the roof of the sphenoid sinus, frontal sinus or cribriform plate. On the post-contrast images, there is no contrast opacification of the  frontal sinuses, sphenoid sinuses or ethmoid air cells. The visualized intracranial contents and orbits are unremarkable.\"    Patient's hearing evaluation performed earlier today revealed normal sloping to severe sensorineural hearing loss in the right ear (stable) and complete loss in the left ear (stable). ECochG testing performed earlier today was normal in the right and could not be obtained in the left ear. Patient denies double/blurred vision and history of eye surgeries. Patient denies consumption of caffeinated beverages, nicotine, alcoholic " substances, or use of medications with known vestibular interactions within the past 48 hours.    OBJECTIVE:  Abuse Screening:  Do you feel unsafe at home or work/school? No  Do you feel threatened by someone? No  Does anyone try to keep you from having contact with others, or doing things outside of your home? No  Physical signs of abuse present? No    Dizziness Handicap Inventory (DHI): 70/100 severe perceived impairment    Rotational chair testing:   Sinusoidal harmonic acceleration test: 0.01, 0.02, 0.04, 0.08, 0.16, 0.32 and 0.64 Hz  Spontaneous nystagmus: Absent  Phase: Abnormal phase lead (0.01-0.08 Hz) sloping to normal phase lead (0.16-0.64 Hz)  Gain: Borderline reduced-reduced gain (0.01-0.32 Hz) rising to normal gain (0.64 Hz)  Symmetry: Normal across all frequencies  Spectral Purity: Normal across all frequencies  Overall rotational chair test: Results suggest an uncompensated peripheral system.     Videonystagmography (VNG) testing:  Prescreening:  Tympanograms: Normal eardrum mobility bilaterally. Note: this test is completed to determine the status of the middle ear before irrigations are completed. Patient denied dizziness with tympanometry.  Ocular range of motion and ocular counter roll: Normal  Cross/cover: Normal  Head Thrust: Normal    Nystagmus Tests:  Gaze-Horizontal with Fixation:   Center: Abnormal, significant saccadic intrusions throughout   Right: Abnormal, significant saccadic intrusions throughout   Left: Abnormal, significant saccadic intrusions throughout  Gaze-Vertical with Fixation:   Up: Abnormal, significant saccadic intrusions throughout   Down: Abnormal, significant saccadic intrusions throughout  Gaze with Fixation Denied   Center: significant saccadic intrusions throughout   Right: 2 deg/sec right beat (likely endpoint/non-significant), saccadic intrusions throughout   Left: rapid 2-3 deg/sec left beat (likely endpoint/non-significant), saccadic intrusions throughout   Up:  significant saccadic intrusions throughout  High Frequency Headshake:   Horizontal: Positive. 4 deg/sec left beat. Patient reported increased dizziness and sway post headshake.   Vertical: Positive. 2-3 deg/sec left beat. Patient reported increased dizziness post headshake.    Kenna-Hallpike Head Right: Negative for PC BPPV. No nystagmus or symptoms. Saccadic intrusions throughout.  Kenna-Hallpike Head Left: Negative for PC BPPV. No nystagmus or symptoms. Saccadic intrusions throughout.   Roll Test Head Right: Negative for nystagmus and symptoms. Saccadic intrusions throughout.  Roll Test Head Left: Negative for nystagmus and symptoms. Patient reports holding her head to the left is more difficult. Saccadic intrusions throughout.    Positional Testing:  *Significant saccadic intrusions throughout with and without fixation  Positionals: Supine: No nystagmus or symptoms  Positionals: Body Right: No nystagmus or symptoms  Positionals: Body Left: No nystagmus or symptoms  Positionals: Pre-Caloric: No nystagmus or symptoms    Oculomotor Tests:  Saccades (repeatable): Abnormal with prolonged latencies, reduced velocities and significant saccadic intrusions  Anti-saccades: Abnormal due to significant saccadic intrusions but patient could complete task  Pursuit (repeatable): Abnormal with poor morphology and significant saccadic intrusions    Calorics :  (Tested at 44 degrees Celsius for 30 seconds for warm water and 30cc ice water for 15 seconds):  Right Warm Eye Speed: 21 degrees per second right beating  Left Warm Eye Speed: No nystagmus/ 0 degrees per second  Left Ice Eye Speed: No nystagmus/ 0 degrees per second, significant saccadic intrusions  Difference between ear: 100% left hypofunction. (Greater than 25% considered clinically significant.)  Fixation Index: 0.54  Overall caloric test: Abnormal    ASSESSMENT:  1. Indications of central vestibular system involvement noted on today's exam were as follows:     -Abnormal  Saccades, Anti-Saccades and Pursuit testing (repeatable)    -Significant saccadic intrusions with and without fixation throughout all testing     2. Indications of peripheral vestibular system involvement noted on today's exam were as follows:     -Abnormal Calorics: Monothermal warm water irrigations revealed a 100% left hypofunction (>25% is considered significant) with good function in the right ear. Ice water caloric performed in the left ear revealed no residual function.     -Positive High Frequency Headshake    -Abnormal Rotary Chair testing; Borderline reduced-reduced gain and abnormal low-mid frequency phase lead suggests an uncompensated peripheral system.     PLAN:  Follow-up with Dr. Heather Juarez regarding today's results and for medical management on 2/24/2020. Please call this clinic at [phone] with questions regarding these results or recommendations.       Jerman Chandra. [facility]  Clinical Audiologist   [place] #[id number]

## 2020-02-13 NOTE — TELEPHONE ENCOUNTER
Patient calls stating that PCP changed dose of Sertaline to 150 MG.  Patient is requesting new RX be sent to pharmacy to reflect this change to avoid her running out of medication.

## 2020-02-14 ENCOUNTER — OFFICE VISIT (OUTPATIENT)
Dept: AUDIOLOGY | Facility: CLINIC | Age: 75
End: 2020-02-14
Payer: MEDICARE

## 2020-02-14 DIAGNOSIS — R42 DIZZINESS AND GIDDINESS: Primary | ICD-10-CM

## 2020-02-14 DIAGNOSIS — H90.3 ASYMMETRIC SNHL (SENSORINEURAL HEARING LOSS): Primary | ICD-10-CM

## 2020-02-14 NOTE — PROGRESS NOTES
AUDIOLOGY REPORT    BACKGROUND INFORMATION: Cynthia Arita was seen in Audiology at the [facility] on 2/14/2020 for an electrocochleography (ECochG) evaluation, referred by Dr. Heather Juarez. The patient reports concern for imbalance and sensation of room shifting around her accompanied by fullness in her head and nausea that occurs each day approximately one hour after rising from bed. The patient reports that these symptoms started around 2005 and only occur when she is upright, but she finds relief anytime she is laying down with any sleeping position.     Previous hearing evaluations at an outside facility indicated normal to severe hearing loss for the right ear and severe to profound hearing loss for the left ear in April and June of 2019, but bone conduction testing was not completed to confirm what type of hearing loss is present for either ear. The patient does confirm that hearing loss was gradual for each ear. The patient reports that she underwent a surgery for Meniere's disease in 2005 and found relief from her symptoms until 2010, at which point they returned. The patient reports she was treated with prednisone in spring of 2019 but this did not help symptoms and so she underwent a second surgery for Meniere's in June of 2019 which resulted in a worsening of symptoms. The patient reports that she has worked with Physical Therapy since that time and found the vestibular rehabilitation somewhat helpful, although it did not eleviate any of her symptoms.  A hearing evaluation was completed at the same outside facility in September of 2019 and results indicated a complete hearing loss for the left ear, which the patient was counseled would be an outcome of her surgery in June of 2019. A hearing evaluation completed at this facility today indicates normal sloping to severe sensorineural hearing loss with 92% speech understanding for the right ear and a complete  "hearing loss for the left ear which is stable compared to the testing completed in September of 2019. The patient reports that the hearing in her right-ear does fluctuate, but she was not able to further explain this symptom. The patient currently utilizes a Widex Bi-CROS hearing system fit at an outside facility.     The patient reports chronic ear symptoms including right episodic ear pressure and right-ear \"plugging\" that can be \"cleared\" by manipulating the jaw or swallowing. The patient also reports concern for mucous build-up that sometimes is so severe she cannot swallow food until cleared, and constant severe drainage from her nose with both symptoms present over the past few months. The patient also reports severe exhaustion for approximately one year, episodic hoarseness of her own voice for approximately 6 months and chronic dry mouth that she has been prescribed medication for lubrication. The patient also reports dizziness with exertion but denies dizziness with loud sounds, autophonia, or noises with blinking or eye movements.    The patient reports three instances of falling; two in January of 2019 and once in 2018 but the patient confirms that she did not hit her head during these falls and she denies changes in hearing or ear symptoms prior to or after the falls. The patient denies any history of head trauma. The patient reports episodic high-blood pressure but she relates this to anxiety and confirms that she has not been prescribed medication for this. The patient denies heart issues. The patient reports removal and biopsy of skin cancer from her back with no further alarm. She reports a right-knee replacement in January of 2019. The patient denies allergies but then did report there is a number of medications that she wonders if she is allergic to; she has never been assessed for allergies. The patient reports that her mother developed bilateral presbycusis and utilized hearing aids but the " patient denies any other history of hearing loss or balance issues/dizziness in her family. The patient denies history of chronic ear issues or ear surgeries, diagnosis of TMJ or other jaw-related issues or history of migraines.     TEST RESULTS AND PROCEDURES:   Abuse Screening:  Do you feel unsafe at home or work/school? No  Do you feel threatened by someone? No  Does anyone try to keep you from having contact with others, or doing things outside of your home? No  Physical signs of abuse present? No    Electrocochleography (ECochG) testing is performed using an auditory evoked potentials system.  Surface electrodes are placed behind the test ear with extratympanic tymptrode placed in the test ear in order to obtain a near-field recording that measures the response of the cochlear hair cells and auditory nerve in response to auditory stimuli. The measured response consists of the summating potential (SP) and the cochlear nerve action potential (AP). Abnormalities may take the form of an increased summating potential/compound action potential (SP/AP) ratio (due to an increased summating potential) in patients suspected of Meniere's disease (endolymphatic hydrops) or in those patients who have symptoms of vestibular dysfunction or ear symptoms including asymmetric or fluctuating hearing loss, tinnitus and/or aural fullness. The following can be suggestive of an abnormal EcochG: SP/AP ratio exceeds 0.43.  Tympanograms (assessed/billed during earlier appointment today at this facility) showed normal eardrum mobility bilaterally. Using a microscope tympanic membranes were visualized.       A two-channel ECochG recording was performed for clicks bilaterally.  Clicks for the right ear showed normal SP/AP ratios.    The left ear was not assessed due to documented complete hearing loss; ECochG testing requires response to auditory stimulus in order to obtain a measurement of SP/AP ratio.        Click SP/AP ratio   Right ear   .253   Left ear  CNT     Abrnormal SP/AP ratios must be greater than .43 for clicks.     SUMMARY AND RECOMMENDATIONS: Today s ECochG revealed normal SP/AP ratios for the right ear and assessment could not be completed due to the presence of a complete hearing loss in the left ear.  Please call this clinic with questions regarding today s results.  Follow-up with Dr. Heather Juarez for medical management.    Jerman Loo.  Licensed Audiologist  [place] #[id number]

## 2020-02-14 NOTE — PROGRESS NOTES
AUDIOLOGY REPORT    SUMMARY: Audiology visit completed. See audiogram for results.      RECOMMENDATIONS: Follow-up with ENT.      Jerman Loo.  Licensed Audiologist  [place] #[id number]

## 2020-02-17 ENCOUNTER — TELEPHONE (OUTPATIENT)
Dept: OTOLARYNGOLOGY | Facility: CLINIC | Age: 75
End: 2020-02-17

## 2020-02-17 ENCOUNTER — TELEPHONE (OUTPATIENT)
Dept: INTERNAL MEDICINE | Facility: CLINIC | Age: 75
End: 2020-02-17

## 2020-02-17 DIAGNOSIS — R11.0 NAUSEA: ICD-10-CM

## 2020-02-17 DIAGNOSIS — H81.02 MENIERE'S DISEASE OF LEFT EAR: ICD-10-CM

## 2020-02-17 DIAGNOSIS — K21.00 GASTROESOPHAGEAL REFLUX DISEASE WITH ESOPHAGITIS: ICD-10-CM

## 2020-02-17 NOTE — TELEPHONE ENCOUNTER
Patient called and left  stating that she has been experiencing a lot of nausea and has been getting really sick from her dizziness.  She stated that she does have an appt with Dr. Juarez on 2/24 but was wondering since she completed her balance testing if we could call her with the results and also let her know what she can do to make the sickness go away.  Stated maybe she could get some meds or exercises prior to her appointment.  Patient wanting call back from clinic, message sent to provider and clinic support.

## 2020-02-17 NOTE — TELEPHONE ENCOUNTER
"Insurance no longer pays for esomeprazole (NEXIUM) 40 MG DR capsule. Patient needs a replacement.      Requested Prescriptions   Pending Prescriptions Disp Refills     ondansetron (ZOFRAN ODT) 4 MG ODT tab Last Written Prescription Date:  8/21/2018  Last Fill Quantity: 120 tablet,  # refills: 0   Last office visit: 1/20/2020 with prescribing provider:  1/20/2020  Future Office Visit:   120 tablet 1     Sig: Take 1 tablet (4 mg) by mouth every 6 hours        Antivertigo/Antiemetic Agents Passed - 2/17/2020 10:10 AM        Passed - Recent (12 mo) or future (30 days) visit within the authorizing provider's specialty     Patient has had an office visit with the authorizing provider or a provider within the authorizing providers department within the previous 12 mos or has a future within next 30 days. See \"Patient Info\" tab in inbasket, or \"Choose Columns\" in Meds & Orders section of the refill encounter.              Passed - Medication is active on med list        Passed - Patient is 18 years of age or older        "

## 2020-02-17 NOTE — TELEPHONE ENCOUNTER
Informed patient that we have not received the results of her balance testing at this time, but will call her as soon as we see them. Offered referral to vestibular PT, which patient declined at this time. Recommended OTC motion sickness medications as tolerated as well. Patient expressed understanding, and is in agreement with this plan.    Adirana Rodriguez, EMT

## 2020-02-18 RX ORDER — ESOMEPRAZOLE MAGNESIUM 40 MG/1
40 CAPSULE, DELAYED RELEASE ORAL
Qty: 90 CAPSULE | Refills: 3 | Status: SHIPPED | OUTPATIENT
Start: 2020-02-18 | End: 2021-04-27

## 2020-02-18 RX ORDER — ONDANSETRON 4 MG/1
4 TABLET, ORALLY DISINTEGRATING ORAL EVERY 6 HOURS
Qty: 120 TABLET | Refills: 1 | Status: SHIPPED | OUTPATIENT
Start: 2020-02-18 | End: 2021-03-12

## 2020-02-18 NOTE — TELEPHONE ENCOUNTER
Ondansetron - Prescription approved per [facility] Refill Protocol.     Contacted patient regarding Esomeprazole not being covered. Patient states that she was told by [facility] that it's the brand name Nexium that's not covered, she was told that generic Esomeprazole was covered by her plan. Patient states she was getting the brand name before, but she told Dr. Samuels she is okay with generic. Esomeprazole was ordered as no print on 12/10/19 with directions to dispense the generic to patient.    Prescription sent to [facility] for Esomeprazole. Will follow-up with pharmacy on Wednesday to confirm it is covered for patient.

## 2020-02-19 DIAGNOSIS — R42 DIZZINESS: Primary | ICD-10-CM

## 2020-02-19 NOTE — TELEPHONE ENCOUNTER
Contacted [facility] to confirm they received prescription for generic Esomeprazole and were able to fill this for patient. Katina states their system states this being filled as brand name Nexium due to patient preference for brand name. Advised [facility] that patient was told brand is not covered, but the generic would be and she is requesting to have the generic filled instead. New order was sent yesterday with direction to fill the generic. Katina updated the prescription to generic Esomeprazole for patient, prescription co-pay is $120-she does not see that it's not covered. She states patient could try filling through a local pharmacy to see if cost is any less. This RN will follow-up with patient and have her call [facility] if she wants to have prescription filled there.    Consent to communicate on file. Left voice message for patient that [facility] did receive updated prescription for generic Esomeprazole, but it has a co-pay of $120. Informed patient that the pharmacy tech did say patient could try to fill through a local pharmacy to see if there is a cost difference. Advised patient this RN told [facility] not to fill prescription until she call them. Asked patient to call the clinic to advise on how she wants to proceed with this.

## 2020-02-20 NOTE — TELEPHONE ENCOUNTER
Contacted [facility], advised pharmacist that patient does want to fill the generic Esomeprazole through their pharmacy. The pharmacy will process medication for patient.

## 2020-02-24 ENCOUNTER — PRE VISIT (OUTPATIENT)
Dept: OTOLARYNGOLOGY | Facility: CLINIC | Age: 75
End: 2020-02-24

## 2020-02-24 ENCOUNTER — TELEPHONE (OUTPATIENT)
Dept: OTOLARYNGOLOGY | Facility: CLINIC | Age: 75
End: 2020-02-24

## 2020-02-24 NOTE — TELEPHONE ENCOUNTER
Spoke with patient regarding her balance testing results. Her balance testing shows primarily central findings meaning her brain isn't coordinating the information coming into it to maintain her balance. 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 There does not seem to be evidence for Meniere's disease on the right. Dr. Juarez's recommendation is balance therapy (vestibular therapy) to help with continued compensation. Patient states understanding and will not come to her appointment today.

## 2020-02-27 ENCOUNTER — HOSPITAL ENCOUNTER (OUTPATIENT)
Dept: PHYSICAL THERAPY | Facility: CLINIC | Age: 75
Setting detail: THERAPIES SERIES
End: 2020-02-27
Attending: OTOLARYNGOLOGY
Payer: MEDICARE

## 2020-02-27 DIAGNOSIS — R42 DIZZINESS: ICD-10-CM

## 2020-02-27 PROCEDURE — 97161 PT EVAL LOW COMPLEX 20 MIN: CPT | Mod: GP | Performed by: PHYSICAL THERAPIST

## 2020-03-03 NOTE — PROGRESS NOTES
"   02/27/20 1133   Quick Adds   Quick Adds Certification;Vestibular Eval   Type of Visit Initial OP PT Evaluation   General Information   Start of Care Date 02/27/20   Referring Physician Heather Juarez MD    Orders Evaluate and Treat as Indicated   Order Date 02/19/20   Medical Diagnosis Dizziness R42    Onset of illness/injury or Date of Surgery 02/19/20  (order date)   Surgical/Medical history reviewed Yes   Pertinent history of current vestibular problem (include personal factors and/or comorbidities that impact the POC)  Anxiety;Depression;Hearing loss   Hearing Loss Comments L sided hearing loss s/p labrynthectomy, wears an aide. + tinnitus.   Pertinent history of current problem (include personal factors and/or comorbidities that impact the POC) Patient reports h/o meniere's disease. She had L labyrinthectomy summer of 2019 followed by vestibular rehab. She can't remember what she did in therapy but had over 20 visits. She continues to report each day, about an hour after being up, she has onset of lightheaded sensation, as if someone blew air into her head, and it lasts all day. She feels like a bobble head. She reports she doesnt have any sensations like this when laying down in bed and sleeps well. She feels it mostly when she is up and walking around. She also states \"I walk stupid.\" She finds herself veering while walking. She doesnt use an AD at home but has begun using a cane in the community so people dont wonder about her. Recent audiology testing revealed 100% loss of vestibular funciton on L, as expected following her procedure, as well as an \"uncompensated peripheral system.\" Patient reports she had a TKA in January of 2019. She also reports h/o depression and anxiety. She wonders if she should see a counselor. She also reports extreme fatigue during the day and increasing difficulty with her memory.    Pertinent Visual History  start of macular degeneration. Wears glasses for reading   Prior " level of function comment independent mobility without an AD   Previous/Current Treatment Physical Therapy  (vestibular rehab)   Improvement after PT No   Current Community Support Family/friend caregiver  (3 sons in area help prn)   Patient role/Employment history Retired   Living environment House/townhome  (lives alone)   Home/Community Accessibility Comments 1 level home, 1 step in from garage. no concerns with driving.   Current Assistive Devices Standard Cane  (and hurry cane)   ADL Devices Shower/Tub Grab Bar;Wall Grab Bar;Reacher   Patient/Family Goals Statement improve balance, walk better   Fall Risk Screen   Fall screen completed by PT   Have you fallen 2 or more times in the past year? No   Have you fallen and had an injury in the past year? No   Is patient a fall risk? Yes   Abuse Screen (yes response referral indicated)   Feels Unsafe at Home or Work/School no   Feels Threatened by Someone no   Does Anyone Try to Keep You From Having Contact with Others or Doing Things Outside Your Home? no   Physical Signs of Abuse Present no   Functional Scales   Functional Scales and Outcomes DHI 56   Pain   Patient currently in pain No   Cognitive Status Examination   Orientation orientation to person, place and time   Level of Consciousness alert;confused   Follows Commands and Answers Questions 75% of the time   Personal Safety and Judgment intact   Cognitive Comment pt reporting increasing memory deficits    Posture   Posture Forward head position   Posture Comments in standing pt noted to have normal/corrected alignment at her R knee with TKA with varus joint on L creating leg length discrepancy.   Strength   Strength Comments proximal LE weakness. Hip abd 4/5R, 5/5L and extension 3+/5B. functional weakness with trendelenburg on R.   Bed Mobility   Bed Mobility Comments independent   Transfer Skills   Transfer Comments independent   Gait   Gait Comments pt is ambulating with trendelenburg on R, circumduction of  lower leg on R and reduced heel strike, at times catching toes on R.   Gait Special Tests   Gait Special Tests DYNAMIC GAIT INDEX   Gait Special Tests Dynamic Gait Index   Score out of 24 15   Comments no AD. 19 or less indicates increased fall risk.   Balance Special Tests   Balance Special Tests Modified CTSIB Conditions   Balance Special Tests Modified CTSIB Conditions   Condition 1, seconds 30 Seconds   Condition 2, seconds 20 Seconds   Condition 4, seconds 20 Seconds   Condition 5, seconds 5 Seconds   Oculomotor Exam   Smooth Pursuit Abnormal   Smooth Pursuit Comment saccadic intrusions   Saccades Abnormal   VOR Abnormal   VOR Comments difficult to stay on target, reproduction of funny feeling in head   Rapid Head Thrust Corrective Saccade L head thrust   Planned Therapy Interventions   Planned Therapy Interventions balance training;gait training;neuromuscular re-education;other (see comments)   Clinical Impression   Criteria for Skilled Therapeutic Interventions Met yes, treatment indicated   PT Diagnosis gait instability   Influenced by the following impairments peripheral vestibular imbalance following L labrynthectomy, decompensated peripheral system, impaired VOR, reduced postural stability, proximal LE weakness R>L, altered LE alignment, impaired gait, dizziness   Functional limitations due to impairments transfers, ambulation, household chores   Clinical Presentation Stable/Uncomplicated   Clinical Decision Making (Complexity) Low complexity   Therapy Frequency 1 time/week   Predicted Duration of Therapy Intervention (days/wks) 8 weeks   Risk & Benefits of therapy have been explained Yes   Patient, Family & other staff in agreement with plan of care Yes   Education Assessment   Barriers to Learning Cognitive  (pt reporting memory deficits)   GOALS   PT Eval Goals 1;2;3;4   Goal 1   Goal Identifier LE strength   Goal Description Patient will have improved proximal LE strength by at least a half a grade  throughout B to reduce gait deviations and improve gait stability.   Target Date 04/23/20   Goal 2   Goal Identifier DGI   Goal Description Patient will score at least 19/24 on DGI indicating reduced fall risk for greater safety with community ambulation.   Target Date 04/23/20   Goal 3   Goal Identifier mCTSIB   Goal Description Patient will be  able to stand on non-compliant and compliant surfaces in narrow JACK with eyes closed for greater safety in the shower or walking over varied terraine in low/no light.   Target Date 04/23/20   Goal 4   Goal Identifier DHI   Goal Description Patient will have reduced score on DHI to <40 indicating mild perception of handicap related to dizziness and increased tolerance of daily activities such as housework.   Target Date 04/23/20   Total Evaluation Time   PT Eval, Low Complexity Minutes (67374) 60   Therapy Certification   Certification date from 02/27/20   Certification date to 04/23/20   Medical Diagnosis Dizziness R42

## 2020-03-03 NOTE — PROGRESS NOTES
[facility]        OUTPATIENT PHYSICAL THERAPY FUNCTIONAL EVALUATION  PLAN OF TREATMENT FOR OUTPATIENT REHABILITATION  (COMPLETE FOR INITIAL CLAIMS ONLY)  Patient's Last Name, First Name, M.I.  YOB: 1945  Cynthia Arita     Provider's Name   [facility]   Medical Record No.  [id number]     Start of Care Date:  02/27/20   Onset Date:  02/19/20(order date)   Type:     _X__PT   ____OT  ____SLP Medical Diagnosis:  Dizziness R42      PT Diagnosis:  gait instability Visits from SOC:  1                              __________________________________________________________________________________  Plan of Treatment/Functional Goals:  balance training, gait training, neuromuscular re-education, other (see comments) vestibular rehab           GOALS  LE strength  Patient will have improved proximal LE strength by at least a half a grade throughout B to reduce gait deviations and improve gait stability.  04/23/20    DGI  Patient will score at least 19/24 on DGI indicating reduced fall risk for greater safety with community ambulation.  04/23/20    mCTSIB  Patient will be  able to stand on non-compliant and compliant surfaces in narrow JACK with eyes closed for greater safety in the shower or walking over varied terraine in low/no light.  04/23/20    DHI  Patient will have reduced score on DHI to <40 indicating mild perception of handicap related to dizziness and increased tolerance of daily activities such as housework.  04/23/20      Therapy Frequency:  1 time/week   Predicted Duration of Therapy Intervention:  8 weeks    Anjelica Wright, PT                                    I CERTIFY THE NEED FOR THESE SERVICES FURNISHED UNDER        THIS PLAN OF TREATMENT AND WHILE UNDER MY CARE     (Physician co-signature of this document indicates review and certification of the therapy  plan).                Certification Date From:  02/27/20   Certification Date To:  04/23/20    Referring Provider:  Heather Juarez MD     Initial Assessment  See Epic Evaluation- Start of Care Date: 02/27/20

## 2020-03-04 ENCOUNTER — HOSPITAL ENCOUNTER (OUTPATIENT)
Dept: PHYSICAL THERAPY | Facility: CLINIC | Age: 75
Setting detail: THERAPIES SERIES
End: 2020-03-04
Attending: OTOLARYNGOLOGY
Payer: MEDICARE

## 2020-03-04 PROCEDURE — 97116 GAIT TRAINING THERAPY: CPT | Mod: GP | Performed by: PHYSICAL THERAPIST

## 2020-03-04 PROCEDURE — 97110 THERAPEUTIC EXERCISES: CPT | Mod: GP | Performed by: PHYSICAL THERAPIST

## 2020-03-05 DIAGNOSIS — F41.9 ANXIETY: ICD-10-CM

## 2020-03-05 NOTE — TELEPHONE ENCOUNTER
Requested Prescriptions   Pending Prescriptions Disp Refills     ALPRAZolam (XANAX) 0.5 MG tablet 15 tablet 0     Sig: Take 1 tablet (0.5 mg) by mouth 3 times daily as needed for anxiety       There is no refill protocol information for this order              Last Written Prescription Date:  12/10/19  Last Fill Quantity: 15,   # refills: 0  Last Office Visit: 01/20/20  Future Office visit:    Next 5 appointments (look out 90 days)    Mar 31, 2020 10:45 AM CDT  Return Visit with Suzette Holder PA-C  [facility] [address] [address] [phone]           Routing refill request to provider for review/approval because:  Drug not on the [facility], [facility] or [facility] refill protocol or controlled substance

## 2020-03-06 RX ORDER — ALPRAZOLAM 0.5 MG
0.5 TABLET ORAL 3 TIMES DAILY PRN
Qty: 15 TABLET | Refills: 0 | Status: SHIPPED | OUTPATIENT
Start: 2020-03-06 | End: 2020-11-18

## 2020-03-06 NOTE — TELEPHONE ENCOUNTER
Controlled Substance Refill Request for ALPRAZolam (XANAX) 0.5 MG tablet  Problem List Complete:  No     PROVIDER TO CONSIDER COMPLETION OF PROBLEM LIST AND OVERVIEW/CONTROLLED SUBSTANCE AGREEMENT    Last Written Prescription Date:  12/10/19  Last Fill Quantity: 15,   # refills: 0    Last Office Visit with [facility] primary care provider: 1/20/20    Future Office visit:   Next 5 appointments (look out 90 days)    Mar 31, 2020 10:45 AM CDT  Return Visit with Suzette Holder PA-C  [facility] ([facility]) [address] [address] [phone]          Controlled substance agreement:   Encounter-Level CSA:    There are no encounter-level csa.     Patient-Level CSA:    There are no patient-level csa.         Last Urine Drug Screen: No results found for: CDAUT, No results found for: COMDAT, No results found for: THC13, PCP13, COC13, MAMP13, OPI13, AMP13, BZO13, TCA13, MTD13, BAR13, OXY13, PPX13, BUP13     Processing:  Rx to be electronically transmitted to pharmacy by provider      [email or web address]    RX monitoring [place] ([place]) reviewed:  reviewed- no concerns

## 2020-03-10 ENCOUNTER — TELEPHONE (OUTPATIENT)
Dept: OTOLARYNGOLOGY | Facility: CLINIC | Age: 75
End: 2020-03-10

## 2020-03-10 NOTE — TELEPHONE ENCOUNTER
Spoke with patient who states when she eats she swallows clumps of mucus and her nose runs a clear liquid all the time. Suggested patient see a local ENT to get evaluation sooner. [facility] [place]. Patient was happy with this plan.

## 2020-03-10 NOTE — TELEPHONE ENCOUNTER
[facility] Call Center    Phone Message    May a detailed message be left on voicemail: yes     Reason for Call: Other:     Cynthia is calling in asking to get scheduled with Dr Juarez.  She states the therapy is not helping and she has tried everything over the counter and nothing seems to help.       Please call her back asap to discuss getting her scheduled.         Action Taken: Message routed to:  [facility] ([facility] ENT    Travel Screening: Not Applicable

## 2020-03-11 ENCOUNTER — HOSPITAL ENCOUNTER (OUTPATIENT)
Dept: PHYSICAL THERAPY | Facility: CLINIC | Age: 75
Setting detail: THERAPIES SERIES
End: 2020-03-11
Attending: OTOLARYNGOLOGY
Payer: MEDICARE

## 2020-03-11 PROCEDURE — 97112 NEUROMUSCULAR REEDUCATION: CPT | Mod: GP | Performed by: PHYSICAL THERAPIST

## 2020-03-11 PROCEDURE — 97110 THERAPEUTIC EXERCISES: CPT | Mod: GP | Performed by: PHYSICAL THERAPIST

## 2020-03-16 ENCOUNTER — TELEPHONE (OUTPATIENT)
Dept: INTERNAL MEDICINE | Facility: CLINIC | Age: 75
End: 2020-03-16

## 2020-03-16 NOTE — TELEPHONE ENCOUNTER
Patient is calling to request that Kylah give her a call back about some paperwork she was helping her with. Patient would not give any other further details.

## 2020-03-31 ENCOUNTER — VIRTUAL VISIT (OUTPATIENT)
Dept: URGENT CARE | Facility: CLINIC | Age: 75
End: 2020-03-31
Payer: MEDICARE

## 2020-03-31 ENCOUNTER — NURSE TRIAGE (OUTPATIENT)
Dept: NURSING | Facility: CLINIC | Age: 75
End: 2020-03-31

## 2020-03-31 DIAGNOSIS — N30.00 ACUTE CYSTITIS WITHOUT HEMATURIA: Primary | ICD-10-CM

## 2020-03-31 PROCEDURE — 99441 ZZC PHYSICIAN TELEPHONE EVALUATION 5-10 MIN: CPT | Performed by: EMERGENCY MEDICINE

## 2020-03-31 RX ORDER — SULFAMETHOXAZOLE/TRIMETHOPRIM 800-160 MG
1 TABLET ORAL 2 TIMES DAILY
Qty: 10 TABLET | Refills: 0 | Status: CANCELLED | OUTPATIENT
Start: 2020-03-31 | End: 2020-04-05

## 2020-03-31 RX ORDER — SULFAMETHOXAZOLE AND TRIMETHOPRIM 400; 80 MG/1; MG/1
1 TABLET ORAL 2 TIMES DAILY
Qty: 10 TABLET | Refills: 0 | Status: CANCELLED | OUTPATIENT
Start: 2020-03-31 | End: 2020-04-05

## 2020-03-31 RX ORDER — SULFAMETHOXAZOLE/TRIMETHOPRIM 800-160 MG
1 TABLET ORAL 2 TIMES DAILY
Qty: 10 TABLET | Refills: 0 | COMMUNITY
Start: 2020-03-31 | End: 2020-05-13

## 2020-04-01 NOTE — TELEPHONE ENCOUNTER
Cynthia reports urinary urgency, frequency and burning with urination    Her symptoms started about noon today.    She also feels pelvic floor pressure.    She had similar symptoms about 1.5 years ago and was treated for a UTI    She had a total right knee replacement last year.    Per protocol, advised to be seen/evaluated by a provider within 4 hours.    Warm transferred to scheduling for virtual visit with an urgent care provider.      Alexandra Alejandra RN  [facility]        Reason for Disposition    Artificial heart valve or artificial joint    Additional Information    Negative: Shock suspected (e.g., cold/pale/clammy skin, too weak to stand, low BP, rapid pulse)    Negative: Sounds like a life-threatening emergency to the triager    Negative: [1] Unable to urinate (or only a few drops) > 4 hours AND [2] bladder feels very full (e.g., palpable bladder or strong urge to urinate)    Negative: [1] SEVERE pain with urination  (e.g., excruciating) AND [2] not improved after 2 hours of pain medicine and Sitz bath    Negative: Fever > 100.5 F (38.1 C)    Negative: Side (flank) or lower back pain present    Negative: Diabetes mellitus or weak immune system (e.g., HIV positive, cancer chemotherapy, transplant patient)    Negative: Bedridden (e.g., nursing home patient, CVA, chronic illness, recovering from surgery)    Protocols used: URINATION PAIN - FEMALE-A-

## 2020-04-01 NOTE — PATIENT INSTRUCTIONS
Given to Patient by phone:    Bactrim until gone  Recheck if more ill  Recheck 2-3 days if symptoms persist.

## 2020-04-01 NOTE — PROGRESS NOTES
HPI:    Patient is a 73 y/o patient with the onset of dysuria and frequency this am. No fever, chills, or back pain. Does have urgency. Knows what UTI's feel like.    Appears in no acute distress over the phone.    Diagnosis: UTI    Plan: Bactrim DS, careful monitoring of symptoms    Time: 8 minutes

## 2020-05-13 ENCOUNTER — VIRTUAL VISIT (OUTPATIENT)
Dept: INTERNAL MEDICINE | Facility: CLINIC | Age: 75
End: 2020-05-13
Payer: MEDICARE

## 2020-05-13 DIAGNOSIS — J02.9 SORE THROAT: Primary | ICD-10-CM

## 2020-05-13 PROCEDURE — 99213 OFFICE O/P EST LOW 20 MIN: CPT | Mod: 95 | Performed by: INTERNAL MEDICINE

## 2020-05-13 NOTE — PROGRESS NOTES
"Cynthia Arita is a 74 year old female who is being evaluated via a billable video visit.      The patient has been notified of following:     \"This video visit will be conducted via a call between you and your physician/provider. We have found that certain health care needs can be provided without the need for an in-person physical exam.  This service lets us provide the care you need with a video conversation.  If a prescription is necessary we can send it directly to your pharmacy.  If lab work is needed we can place an order for that and you can then stop by our lab to have the test done at a later time.    Video visits are billed at different rates depending on your insurance coverage.  Please reach out to your insurance provider with any questions.    If during the course of the call the physician/provider feels a video visit is not appropriate, you will not be charged for this service.\"    Patient has given verbal consent for Video visit? Yes    How would you like to obtain your AVS? Mail a copy    Patient would like the video invitation sent by: Text to cell phone: [phone]    Will anyone else be joining your video visit? No      Subjective     Cynthia Arita is a 74 year old female who presents today via video visit for the following health issues:    HPI    Throat problems for 10 days.     Video Start Time: 1:55    HPI: '  She was started in a sinus rinse by ENT which did significantly help her head congestion. But she was getting up a lot of white to clear mucous so was started on Mucinex and now feels like she is hoarse and her throat is a little sore. The mucous is better. No shortness of breath or wheezing.    BP Readings from Last 3 Encounters:   01/20/20 138/70   12/18/19 (!) 142/80   12/10/19 120/70    Wt Readings from Last 3 Encounters:   01/20/20 59.3 kg (130 lb 11.2 oz)   12/18/19 58.2 kg (128 lb 4.8 oz)   12/10/19 58.1 kg (128 lb)                    Reviewed and updated as needed this visit by " "Provider         Review of Systems   Constitutional, HEENT, cardiovascular, pulmonary, gi and gu systems are negative, except as otherwise noted.      Objective    There were no vitals taken for this visit.  Estimated body mass index is 23.15 kg/m  as calculated from the following:    Height as of 1/20/20: 1.6 m (5' 3\").    Weight as of 1/20/20: 59.3 kg (130 lb 11.2 oz).  Physical Exam     GENERAL: Healthy, alert and no distress  EYES: Eyes grossly normal to inspection.  No discharge or erythema, or obvious scleral/conjunctival abnormalities.    RESP: No audible wheeze, cough, or visible cyanosis.  No visible retractions or increased work of breathing.    SKIN: Visible skin clear. No significant rash, abnormal pigmentation or lesions.  NEURO: Cranial nerves grossly intact.  Mentation and speech appropriate for age.  PSYCH: Mentation appears normal, affect normal/bright, judgement and insight intact, normal speech and appearance well-groomed.        Assessment & Plan     1. Sore throat  Will have her stop the mucinex as I think that is drying her out. She may need to live with some mucous which as long as she is not having a sore throat is ok. Follow up PRN.            No follow-ups on file.    Huyen Samuels MD  [facility]      Video-Visit Details    Type of service:  Video Visit    Video End Time:2:05    Originating Location (pt. Location): Home    Distant Location (provider location):  [facility]     Platform used for Video Visit: Doximity    No follow-ups on file.       Huyen Samuels MD        "

## 2020-06-02 ENCOUNTER — HOSPITAL ENCOUNTER (OUTPATIENT)
Dept: PHYSICAL THERAPY | Facility: CLINIC | Age: 75
Setting detail: THERAPIES SERIES
End: 2020-06-02
Attending: OTOLARYNGOLOGY
Payer: MEDICARE

## 2020-06-02 PROCEDURE — 97750 PHYSICAL PERFORMANCE TEST: CPT | Mod: GP | Performed by: PHYSICAL THERAPIST

## 2020-06-02 PROCEDURE — 97112 NEUROMUSCULAR REEDUCATION: CPT | Mod: GP | Performed by: PHYSICAL THERAPIST

## 2020-06-02 PROCEDURE — 97110 THERAPEUTIC EXERCISES: CPT | Mod: GP | Performed by: PHYSICAL THERAPIST

## 2020-06-02 NOTE — PROGRESS NOTES
06/02/20 1000   Signing Clinician's Name / Credentials   Signing clinician's name / credentials Anjelica Wright PT   Dynamic Gait Index (Shakir and Lema [place], 1995)   Gait Level Surface 1   Change in Gait Speed 3   Gait and Horizontal Head Turns 2   Gait with Vertical Head Turns 3   Gait and Pivot Turns 3   Step Over Obstacle 3   Step Around Obstacles 3   Steps 2   Total Dynamic Gait Index Score  (A score of 19 or less has been correlated to an increased risk of falls in community dwelling older adults, patients with vestibular disorders, and patients with MS.)   Total Score (out of 24) 20   Dynamic Gait Index (DGI):The DGI is a measure of balance during gait that is reliable and valid for the elderly and individuals with Parkinson's disease, MS, vestibular disorders, or s/p stroke. Gait assistive device used: None    Patient score: 20/24  Scores ?19/24 indicate an increased risk for falls according to Kat et al 2000  Minimal Detectable Change = 2.9 in community dwelling elderly according to Yared et al 2011    Assessment (rationale for performing, application to patient s function & care plan): Re-assessment completed today after patient has been away from therapy the past couple of months due to the COVID 19 pandemic. Purpose is to determine current status and assist in care planning. She has improved her score today to 20/24 from 15/24 at [facility] with reduced fall risk.     Minutes billed as physical performance test: 10

## 2020-06-02 NOTE — PROGRESS NOTES
"Outpatient Physical Therapy Progress Note     Patient: Cynthia Arita  : 1945    Beginning/End Dates of Reporting Period:  19 to 2020. Patient was seen for 3 visits between [facility] on  and 3/11/19. She then was away from therapy until her return today due to the COVID 19 pandemic.      Referring Provider: Heather Juarez MD     Therapy Diagnosis: gait instability     Client Self Report: Throat raw from coughing up so much phlem. On med now. Hasnt been as active. Has been doing laying HEP but not standing exercises, not sure how to do them. Balance is still off, feels like deviates to left while walking, especially on uneven surfaces ie grass. Pt denies falls. Using cane, \"when I have to\" ie long distances. Pt has noticed she cant get up off the floor. Having more L knee pain. MD suggested she may need a cortisone shot. She sees him next week.     Objective Measurements:  Objective Measure: mCTSIB  Details: 30\" condition 1, 2 and 4. condition 5 (foam EC) 3\". Condition 2 and 3 (on floor EC and on foam EO have both improved to WNL from 20\" each at [facility]),   Objective Measure: DGI  Details:  without AD, improved from 15/24 at [facility]. pt gait pattern is abnormal related to malalignment with R knee straight following TKA and L knee varus. pt also demo trendelenburg on L due to hip weakness impacting gait pattern and stability.  Objective Measure: strength  Details: hip flexion 5/5 R, 4/5 L in SLR, hip abd 4/5 R and 3+/5 L. HIp ext 3+/5 R, 4/5 L.  Able to bridge with max resistance.      Goals:  Goal Identifier LE strength   Goal Description Patient will have improved proximal LE strength by at least a half a grade throughout B to reduce gait deviations and improve gait stability.   Target Date 20   Date Met      Progress: goal not met, pt with loss of strength in hip ABd without PT these past 2 months.     Goal Identifier DGI   Goal Description Patient will score at least  on DGI indicating " reduced fall risk for greater safety with community ambulation.   Target Date 04/23/20   Date Met  06/02/20   Progress:      Goal Identifier mCTSIB   Goal Description Patient will be  able to stand on non-compliant and compliant surfaces in narrow JACK with eyes closed for greater safety in the shower or walking over varied terraine in low/no light.   Target Date 04/23/20   Date Met      Progress: goal partially met, met on non-compliant but not compliant surfaces. Standing in shower with EC is fine but waling over uneven terraine is still challenging ie in the grass, per pt     Goal Identifier DHI   Goal Description Patient will have reduced score on DHI to <40 indicating mild perception of handicap related to dizziness and increased tolerance of daily activities such as housework.   Target Date 04/23/20   Date Met      Progress: NT     Progress Toward Goals:   Progress this reporting period: Patient has made progress in regards to gait stability with reduced fall risk; however, she continues to struggle with balance on compliant surfaces, especially with eye closed. This is creating fall risk while walking over uneven terraine outdoors ie over grass or in the house at night. She will benefit from resuming skilled PT to be able to challenge balance in safe environment for greater progress. She also has experienced a reduction in proximal LE strength, specially hip abd, during this time away from therapy. This leads to impaired gait and reduced stability. Progress in strength was limited by inconsistent performance of HEP. She will benefit from review and progression of this program to achieve goals. All goals continue to be appropriate with extension of target date.      Plan:  Continue therapy per current plan of care, 2x/week x 6 weeks.    Discharge:  No

## 2020-06-02 NOTE — PROGRESS NOTES
[facility]      OUTPATIENT PHYSICAL THERAPY  PLAN OF TREATMENT FOR OUTPATIENT REHABILITATION    Patient's Last Name, First Name, M.I.                YOB: 1945  Cynthia Arita                        Provider's Name  [facility] Medical Record No.  [id number]                               Onset Date: 02/19/20   Start of Care Date:  02/27/20   Type:     _X_PT   ___OT   ___SLP Medical Diagnosis: Dizziness R42                        PT Diagnosis: gait instability   # visits: 4   _________________________________________________________________________________  Plan of Treatment: balance training, gait training, neuromuscular re-education, other (see comments) vestibular rehab, strengthening     Frequency/Duration: 0x/week x 5weeks due to COVID-19 then 2x/week (reduced frequency as indicated) x 7 weeks     Goals:  Goal Identifier LE strength   Goal Description Patient will have improved proximal LE strength by at least a half a grade throughout B to reduce gait deviations and improve gait stability.   Target Date 04/23/20   Date Met      Progress: goal not met, pt with loss of strength in hip ABd without PT these past 2 months.      Goal Identifier DGI   Goal Description Patient will score at least 19/24 on DGI indicating reduced fall risk for greater safety with community ambulation.   Target Date 04/23/20   Date Met  06/02/20   Progress:       Goal Identifier mCTSIB   Goal Description Patient will be  able to stand on non-compliant and compliant surfaces in narrow JACK with eyes closed for greater safety in the shower or walking over varied terraine in low/no light.   Target Date 04/23/20   Date Met      Progress: goal partially met, met on non-compliant but not compliant surfaces. Standing in shower with EC is fine but waling over uneven terraine is still challenging ie in the grass,  per pt      Goal Identifier DHI   Goal Description Patient will have reduced score on DHI to <40 indicating mild perception of handicap related to dizziness and increased tolerance of daily activities such as housework.   Target Date 04/23/20   Date Met      Progress: NT. Pt continues to complain of fairly consistent dizziness, mostly while standing or walking.       Progress Toward Goals:   Progress this reporting period: Patient has made progress in regards to gait stability with reduced fall risk; however, she continues to struggle with balance on compliant surfaces, especially with eye closed. This is creating fall risk while walking over uneven terraine outdoors ie over grass or in the house at night. She will benefit from resuming skilled PT to be able to challenge balance in safe environment for greater progress. She also has experienced a reduction in proximal LE strength, especially hip abd, during this time away from therapy. This leads to impaired gait and reduced stability. Progress in strength was limited by inconsistent performance of HEP. She will benefit from review and progression of this program to achieve goals. All goals continue to be appropriate with extension of target date to 7/17/20.    Certification date from 4/24/20 to 7/17/20.    Anjelica Wright, PT          I CERTIFY THE NEED FOR THESE SERVICES FURNISHED UNDER        THIS PLAN OF TREATMENT AND WHILE UNDER MY CARE     (Physician co-signature of this document indicates review and certification of the therapy plan).                Referring Provider: Heather Juarez MD

## 2020-06-09 ENCOUNTER — HOSPITAL ENCOUNTER (OUTPATIENT)
Dept: PHYSICAL THERAPY | Facility: CLINIC | Age: 75
Setting detail: THERAPIES SERIES
End: 2020-06-09
Attending: OTOLARYNGOLOGY
Payer: MEDICARE

## 2020-06-09 PROCEDURE — 97110 THERAPEUTIC EXERCISES: CPT | Mod: GP | Performed by: PHYSICAL THERAPIST

## 2020-06-09 PROCEDURE — 97112 NEUROMUSCULAR REEDUCATION: CPT | Mod: GP | Performed by: PHYSICAL THERAPIST

## 2020-06-10 ENCOUNTER — TRANSFERRED RECORDS (OUTPATIENT)
Dept: HEALTH INFORMATION MANAGEMENT | Facility: CLINIC | Age: 75
End: 2020-06-10

## 2020-06-21 ENCOUNTER — VIRTUAL VISIT (OUTPATIENT)
Dept: URGENT CARE | Facility: CLINIC | Age: 75
End: 2020-06-21
Payer: MEDICARE

## 2020-06-21 ENCOUNTER — NURSE TRIAGE (OUTPATIENT)
Dept: NURSING | Facility: CLINIC | Age: 75
End: 2020-06-21

## 2020-06-21 DIAGNOSIS — N30.00 ACUTE CYSTITIS WITHOUT HEMATURIA: Primary | ICD-10-CM

## 2020-06-21 PROCEDURE — 99441 ZZC PHYSICIAN TELEPHONE EVALUATION 5-10 MIN: CPT | Performed by: FAMILY MEDICINE

## 2020-06-21 RX ORDER — SULFAMETHOXAZOLE/TRIMETHOPRIM 800-160 MG
1 TABLET ORAL 2 TIMES DAILY
Qty: 10 TABLET | Refills: 0 | Status: SHIPPED | OUTPATIENT
Start: 2020-06-21 | End: 2020-07-24

## 2020-06-21 NOTE — PROGRESS NOTES
SUBJECTIVE:                                                    Cynthia Arita is a 74 year old female who presents to clinic today for the following health issues: concern about uti     Dysuria/ugency/frequency 2 days  Vaginal discharge or concerns: No  Fever chills: None  Nausea vomiting: None  Flank Pain: None    Problem list and histories reviewed & adjusted, as indicated.  Additional history: as documented    Problem list, Medication list, Allergies, and Medical/Social/Surgical histories reviewed in EPIC and updated as appropriate.    ROS:  Constitutional, HEENT, cardiovascular, pulmonary, gi and gu systems are negative, except as otherwise noted.    OBJECTIVE:                                                    There were no vitals taken for this visit.  There is no height or weight on file to calculate BMI.  GENERAL: healthy, alert and no distress  PSYCH: Alert and oriented times 3; coherent speech, normal   rate and volume, able to articulate logical thoughts, able   to abstract reason, no tangential thoughts, no hallucinations   or delusions  Her affect is normal  RESP: No cough, no audible wheezing, able to talk in full sentences  Additional exam:  none  Remainder of exam unable to be completed due to telephone visits    Diagnostic Test Results:  No results found for this or any previous visit (from the past 24 hour(s)).     ASSESSMENT/PLAN:                                                            ICD-10-CM    1. Acute cystitis without hematuria  N30.00 sulfamethoxazole-trimethoprim (BACTRIM DS) 800-160 MG tablet     Telephone call 7 minutes   Aware to go to ER or come in immediately if with any fever chills nausea vomiting or flank pain.  Adverse reactions of medications discussed.  Over the counter medications discussed.   Aware to come back in if with worsening symptoms or if no relief despite treatment plan  Patient voiced understanding and had no further questions.     Jayne Pizarro,  MD [facility] PROVIDER  [facility]

## 2020-06-21 NOTE — TELEPHONE ENCOUNTER
Patient says she has a possible bladder infection that started yesterday.  Patient reports burning and frequency.  Patient would like an antibiotic but does not want to go the clinic.  FNA advised will have her do a telephone visit with a virtual urgent provider who will evaluate and let her know if a UA/UC is needed.  Patient was transferred to schedule virtual urgent care via telephone.      Additional Information    Negative: Shock suspected (e.g., cold/pale/clammy skin, too weak to stand, low BP, rapid pulse)    Negative: Sounds like a life-threatening emergency to the triager    Negative: [1] Unable to urinate (or only a few drops) > 4 hours AND [2] bladder feels very full (e.g., palpable bladder or strong urge to urinate)    Negative: Vomiting    Negative: Patient sounds very sick or weak to the triager    Negative: [1] SEVERE pain with urination  (e.g., excruciating) AND [2] not improved after 2 hours of pain medicine and Sitz bath    Negative: Fever > 100.5 F (38.1 C)    Negative: Side (flank) or lower back pain present    Negative: Diabetes mellitus or weak immune system (e.g., HIV positive, cancer chemo, splenectomy, organ transplant, chronic steroids)    Negative: Bedridden (e.g., nursing home patient, CVA, chronic illness, recovering from surgery)    Artificial heart valve or artificial joint    Protocols used: URINATION PAIN - FEMALE-A-

## 2020-06-26 ENCOUNTER — HOSPITAL ENCOUNTER (OUTPATIENT)
Dept: PHYSICAL THERAPY | Facility: CLINIC | Age: 75
Setting detail: THERAPIES SERIES
End: 2020-06-26
Attending: OTOLARYNGOLOGY
Payer: MEDICARE

## 2020-06-26 PROCEDURE — 97110 THERAPEUTIC EXERCISES: CPT | Mod: GP | Performed by: PHYSICAL THERAPIST

## 2020-06-29 ENCOUNTER — HOSPITAL ENCOUNTER (OUTPATIENT)
Dept: PHYSICAL THERAPY | Facility: CLINIC | Age: 75
Setting detail: THERAPIES SERIES
End: 2020-06-29
Attending: OTOLARYNGOLOGY
Payer: MEDICARE

## 2020-06-29 PROCEDURE — 97112 NEUROMUSCULAR REEDUCATION: CPT | Mod: GP | Performed by: PHYSICAL THERAPIST

## 2020-07-01 ENCOUNTER — HOSPITAL ENCOUNTER (OUTPATIENT)
Dept: PHYSICAL THERAPY | Facility: CLINIC | Age: 75
Setting detail: THERAPIES SERIES
End: 2020-07-01
Attending: OTOLARYNGOLOGY
Payer: MEDICARE

## 2020-07-01 DIAGNOSIS — R11.0 NAUSEA: ICD-10-CM

## 2020-07-01 PROCEDURE — 97112 NEUROMUSCULAR REEDUCATION: CPT | Mod: GP | Performed by: PHYSICAL THERAPIST

## 2020-07-01 RX ORDER — PROMETHAZINE HYDROCHLORIDE 25 MG/1
TABLET ORAL
Qty: 30 TABLET | Refills: 0 | Status: SHIPPED | OUTPATIENT
Start: 2020-07-01 | End: 2020-08-07

## 2020-07-07 ENCOUNTER — HOSPITAL ENCOUNTER (OUTPATIENT)
Dept: PHYSICAL THERAPY | Facility: CLINIC | Age: 75
Setting detail: THERAPIES SERIES
End: 2020-07-07
Attending: OTOLARYNGOLOGY
Payer: MEDICARE

## 2020-07-07 PROCEDURE — 97116 GAIT TRAINING THERAPY: CPT | Mod: GP | Performed by: PHYSICAL THERAPIST

## 2020-07-07 PROCEDURE — 97110 THERAPEUTIC EXERCISES: CPT | Mod: GP | Performed by: PHYSICAL THERAPIST

## 2020-07-13 ENCOUNTER — HOSPITAL ENCOUNTER (OUTPATIENT)
Dept: MAMMOGRAPHY | Facility: CLINIC | Age: 75
Discharge: HOME | End: 2020-07-13
Attending: INTERNAL MEDICINE | Admitting: INTERNAL MEDICINE
Payer: MEDICARE

## 2020-07-13 DIAGNOSIS — Z12.31 VISIT FOR SCREENING MAMMOGRAM: ICD-10-CM

## 2020-07-13 PROCEDURE — 77063 BREAST TOMOSYNTHESIS BI: CPT

## 2020-07-14 ENCOUNTER — HOSPITAL ENCOUNTER (OUTPATIENT)
Dept: PHYSICAL THERAPY | Facility: CLINIC | Age: 75
Setting detail: THERAPIES SERIES
End: 2020-07-14
Attending: OTOLARYNGOLOGY
Payer: MEDICARE

## 2020-07-14 PROCEDURE — 97112 NEUROMUSCULAR REEDUCATION: CPT | Mod: GP | Performed by: PHYSICAL THERAPIST

## 2020-07-14 PROCEDURE — 97750 PHYSICAL PERFORMANCE TEST: CPT | Mod: GP | Performed by: PHYSICAL THERAPIST

## 2020-07-14 PROCEDURE — 97110 THERAPEUTIC EXERCISES: CPT | Mod: GP | Performed by: PHYSICAL THERAPIST

## 2020-07-14 NOTE — PROGRESS NOTES
07/14/20 1000   Signing Clinician's Name / Credentials   Signing clinician's name / credentials Anjelica Wright PT   Dynamic Gait Index (Shakir and Lema Dooly, 1995)   Gait Level Surface 1   Change in Gait Speed 3   Gait and Horizontal Head Turns 2   Gait with Vertical Head Turns 3   Gait and Pivot Turns 3   Step Over Obstacle 3   Step Around Obstacles 3   Steps 2   Total Dynamic Gait Index Score  (A score of 19 or less has been correlated to an increased risk of falls in community dwelling older adults, patients with vestibular disorders, and patients with MS.)   Total Score (out of 24) 20   Dynamic Gait Index (DGI):The DGI is a measure of balance during gait that is reliable and valid for the elderly and individuals with Parkinson's disease, MS, vestibular disorders, or s/p stroke. Gait assistive device used: None    Patient score: 20/24  Scores ?19/24 indicate an increased risk for falls according to Kat et al 2000  Minimal Detectable Change = 2.9 in community dwelling elderly according to Vail et al 2011    Assessment (rationale for performing, application to patient s function & care plan): Assessment to determine current fall risk and assist in care planning. Greatest deficit in testing was patients gait on level surface due to musculoskeletal abnormalities at knees and weakness. Low fall risk.    Minutes billed as physical performance test 10

## 2020-07-17 NOTE — PROGRESS NOTES
"Outpatient Physical Therapy Progress Note     Patient: Cynthia Arita  : 1945    Beginning/End Dates of Reporting Period:  6/3/20 to 2020. Patient has been seen an additional 6 visits in this period to progress strengthening program, balance and gait training.     Referring Provider: Heather Juarez MD    Therapy Diagnosis:  gait instability     Client Self Report: using cane today to PT, only outdoors. L knee is bothering more. Cortisone injection only seemed to be a temporary help. Sleeps well but after being up couple hours head starts to feels funny. Pt having alot of anxiety. Taking medication for anxiety, calms her to point of sleep. If she can keep busy, she doesn't notice the feeling in her head as much. She doesn't like how she walks, staggers.    Objective Measurements:    Objective Measure: DGI  Details:  without assistive device- improved from 15/24 at eval but unchanged since last assessment . Increased fall risk if score is 19 or less.     Objective Measure: Gait  Details: pt gait pattern is abnormal related to malalignment with R knee straight following TKA and L knee varus. pt also demo trendelenburg on L due to hip weakness impacting gait pattern and stability. Gait is improved with SEC but patient doesn't like to use it.     Objective Measure: mCTSIB  Details: 30\" conditions 1, 2 and 4. Inconsistent performance day to day on foam with EC (condition 5), only few seconds at a time typically    Objective Measure: DHI  Details: - significant improvement from last assessment 60    Goals:  Goal Identifier 1. LE strength   Goal Description Patient will have improved proximal LE strength by at least a half a grade throughout B to reduce gait deviations and improve gait stability.   Target Date 20   Date Met      Progress: goal not met, slow progress with strenth     Goal Identifier 2. DGI   Goal Description Patient will score at least 19/24 on DGI indicating reduced " fall risk for greater safety with community ambulation.   Target Date 04/23/20   Date Met  06/02/20   Progress:     Goal Identifier 3. mCTSIB   Goal Description Patient will be  able to stand on non-compliant and compliant surfaces in narrow JACK with eyes closed for greater safety in the shower or walking over varied terraine in low/no light.   Target Date 07/17/20   Date Met      Progress: partially met, met on non-compliant surfaces, not on compliant surfaces.      Goal Identifier 4. DHI   Goal Description Patient will have reduced score on DHI to <40 indicating mild perception of handicap related to dizziness and increased tolerance of daily activities such as housework.   Target Date 07/17/20   Date Met  07/14/20   Progress:       Progress Toward Goals:   Progress this reporting period: Patient continues to report sensation of dizziness. Each visit includes discussion around dizziness vs imbalance. Patient really at this point is dealing mostly with sense of instability and low balance confidence. She does not complain of any dizziness with vestibular and balance exercise. When she is encouraged in therapy and presented with her progress, she does better. She becomes anxious with more challenging tasks ie balance on compliant surfaces with her eyes closed, and then demonstrates less than effective balance reactions. Gait stability is relatively unchanged in past 6 weeks with limiting factors being more orthopedic, ie knee pain, malalignment and weakness. Score on DGI indicates she is not at an increased fall risk but her low confidence and fear of falling does increase her risk. She will benefit from continued skilled PT intervention for safe and effective progress with improved confidence with higher level balance challenges to reduce fall risk, as well as progress her strengthening program. Will continue with goals 1 and 3.    Plan:  Continue therapy per current plan of care.    Discharge:  No

## 2020-07-17 NOTE — PROGRESS NOTES
[facility]      OUTPATIENT PHYSICAL THERAPY  PLAN OF TREATMENT FOR OUTPATIENT REHABILITATION    Patient's Last Name, First Name, M.I.                YOB: 1945  Cynthia Arita                        Provider's Name  [facility] Medical Record No.  [id number]                               Onset Date: 02/19/20   Start of Care Date: 02/27/20   Type:     _X_PT   ___OT   ___SLP Medical Diagnosis: Dizziness R42                        PT Diagnosis: gait instability   # visits 10   _________________________________________________________________________________  Plan of Treatment: balance training, gait training, neuromuscular re-education, other (see comments) vestibular rehab, strengthening     Frequency/Duration: 1x/week x 4 weeks     Goals:  Goal Identifier 1. LE strength   Goal Description Patient will have improved proximal LE strength by at least a half a grade throughout B to reduce gait deviations and improve gait stability.   Target Date 07/17/20   Date Met      Progress: goal not met, slow progress with strenth      Goal Identifier 2. DGI   Goal Description Patient will score at least 19/24 on DGI indicating reduced fall risk for greater safety with community ambulation.   Target Date 04/23/20   Date Met  06/02/20   Progress:      Goal Identifier 3. mCTSIB   Goal Description Patient will be  able to stand on non-compliant and compliant surfaces in narrow JACK with eyes closed for greater safety in the shower or walking over varied terraine in low/no light.   Target Date 07/17/20   Date Met      Progress: partially met, met on non-compliant surfaces, not on compliant surfaces.       Goal Identifier 4. DHI   Goal Description Patient will have reduced score on DHI to <40 indicating mild perception of handicap related to dizziness and increased tolerance of daily activities such as  housework.   Target Date 07/17/20   Date Met  07/14/20   Progress:         Progress Toward Goals:   Progress this reporting period: Patient continues to report sensation of dizziness. Each visit includes discussion around dizziness vs imbalance. Patient really at this point is dealing mostly with sense of instability and low balance confidence. She does not complain of any dizziness with vestibular and balance exercise. When she is encouraged in therapy and presented with her progress, she does better. She becomes anxious with more challenging tasks ie balance on compliant surfaces with her eyes closed, and then demonstrates less than effective balance reactions. Gait stability is relatively unchanged in past 6 weeks with limiting factors being more orthopedic, ie knee pain, malalignment and weakness. Score on DGI indicates she is not at an increased fall risk but her low confidence and fear of falling does increase her risk. She will benefit from continued skilled PT intervention for safe and effective progress with improved confidence with higher level balance challenges to reduce fall risk, as well as progress her strengthening program. Will continue with goals 1 and 3.    Certification date from 7/18/20 to 8/15/20.    Anjelica Wright, PT          I CERTIFY THE NEED FOR THESE SERVICES FURNISHED UNDER        THIS PLAN OF TREATMENT AND WHILE UNDER MY CARE     (Physician co-signature of this document indicates review and certification of the therapy plan).                Referring Provider: Heather Juarez MD

## 2020-07-21 ENCOUNTER — HOSPITAL ENCOUNTER (OUTPATIENT)
Dept: PHYSICAL THERAPY | Facility: CLINIC | Age: 75
Setting detail: THERAPIES SERIES
End: 2020-07-21
Attending: OTOLARYNGOLOGY
Payer: MEDICARE

## 2020-07-21 PROCEDURE — 97110 THERAPEUTIC EXERCISES: CPT | Mod: GP | Performed by: PHYSICAL THERAPIST

## 2020-07-24 ENCOUNTER — TELEPHONE (OUTPATIENT)
Dept: INTERNAL MEDICINE | Facility: CLINIC | Age: 75
End: 2020-07-24

## 2020-07-24 DIAGNOSIS — N30.00 ACUTE CYSTITIS WITHOUT HEMATURIA: ICD-10-CM

## 2020-07-24 RX ORDER — SULFAMETHOXAZOLE/TRIMETHOPRIM 800-160 MG
1 TABLET ORAL 2 TIMES DAILY
Qty: 10 TABLET | Refills: 0 | Status: SHIPPED | OUTPATIENT
Start: 2020-07-24 | End: 2020-08-26

## 2020-07-24 NOTE — TELEPHONE ENCOUNTER
Contacted patient, she is aware  is out of the office today. She has had UTIs  before with same symptoms and states previous antibiotic worked for her. She asks if she can get refill of same antibiotic without an appointment. She was last treated for UTI in Virtual Urgent Care visit 6/21/2020 with Bactrim.

## 2020-07-24 NOTE — TELEPHONE ENCOUNTER
Contacted patient and advised her Dr. Shirley agreed to treat without an appointment and sent prescription for 5 days of Bactrim to the pharmacy. Patient advised to call the clinic next week if symptoms are not improving. Patient verbalizes understanding and very appreciative of prescription being sent.

## 2020-07-24 NOTE — TELEPHONE ENCOUNTER
Patient calls stating that she has another UTI.  Symptoms for three days.  Frequency, burning, darker urine last night.  Patient is asking for RX to be called in.  Please advise.

## 2020-07-28 ENCOUNTER — TRANSFERRED RECORDS (OUTPATIENT)
Dept: HEALTH INFORMATION MANAGEMENT | Facility: CLINIC | Age: 75
End: 2020-07-28

## 2020-08-04 ENCOUNTER — HOSPITAL ENCOUNTER (OUTPATIENT)
Dept: PHYSICAL THERAPY | Facility: CLINIC | Age: 75
Setting detail: THERAPIES SERIES
End: 2020-08-04
Attending: OTOLARYNGOLOGY
Payer: MEDICARE

## 2020-08-04 PROCEDURE — 97112 NEUROMUSCULAR REEDUCATION: CPT | Mod: GP | Performed by: PHYSICAL THERAPIST

## 2020-08-04 PROCEDURE — 97110 THERAPEUTIC EXERCISES: CPT | Mod: GP | Performed by: PHYSICAL THERAPIST

## 2020-08-06 DIAGNOSIS — R11.0 NAUSEA: ICD-10-CM

## 2020-08-07 RX ORDER — PROMETHAZINE HYDROCHLORIDE 25 MG/1
TABLET ORAL
Qty: 30 TABLET | Refills: 1 | Status: SHIPPED | OUTPATIENT
Start: 2020-08-07 | End: 2020-12-09

## 2020-08-07 NOTE — TELEPHONE ENCOUNTER
Pending Prescriptions:                       Disp   Refills    promethazine (PHENERGAN) 25 MG tablet [Ph*30 tab*1            Sig: TAKE 1 TABLET(25 MG) BY MOUTH EVERY 6 HOURS AS           NEEDED FOR NAUSEA    Prescription approved per [facility] Refill Protocol.

## 2020-08-11 ENCOUNTER — HOSPITAL ENCOUNTER (OUTPATIENT)
Dept: PHYSICAL THERAPY | Facility: CLINIC | Age: 75
Setting detail: THERAPIES SERIES
End: 2020-08-11
Attending: OTOLARYNGOLOGY
Payer: MEDICARE

## 2020-08-11 PROCEDURE — 97112 NEUROMUSCULAR REEDUCATION: CPT | Mod: GP | Performed by: PHYSICAL THERAPIST

## 2020-08-11 PROCEDURE — 97110 THERAPEUTIC EXERCISES: CPT | Mod: GP | Performed by: PHYSICAL THERAPIST

## 2020-08-14 NOTE — PROGRESS NOTES
"Outpatient Physical Therapy Discharge Note     Patient: Cynthia Arita  : 1945    Beginning/End Dates of Reporting Period:  20 to 2020. Patient was seen an additional 3 visits for balance and gait training, strengthening.    Referring Provider: Heather Juarez MD     Therapy Diagnosis: gait instability     Client Self Report: Doing well walking at grocery store with cart. Feels like legs are doing better but sometimes still feels funny in head. Balance is better if walks at a faster pace. Feels good with HEP but would like to review hip flexor stretch.     Objective Measurements:  Objective Measure: mCTSIB  Details: 30\" condition 1, 2 and 4. condition 5 (foam EC) 3\".    Objective Measure: DGI  Details:  without assistive device. 19 or less indicates fall risk (unchanged from last assessment in July but improved from eval 15/24).     Objective Measure: Gait  Details: antaglic due to L knee pain (DJD) and malignment (genu varus).  pt also demo trendelenburg on L due to hip weakness. Orthopedic impairments impact gait pattern and stability.     Objective Measure: strength  Details: hip flexor 4/5 B, hip ext 3+/5 B, hip abd 4/5 B, knee ext 5/5 B and knee flexion 3+/5 R and 4/5 L     Objective Measure: DHI  Details: - significant improvement from last assessment 60/100     Goals:  Goal Identifier LE strength   Goal Description Patient will have improved proximal LE strength by at least a half a grade throughout B to reduce gait deviations and improve gait stability.   Target Date 08/15/20   Date Met      Progress: improvement in certain muscle groups but not all. continued weakness noted in hips and knees. Pt indep in HEP to address.     Goal Identifier DGI   Goal Description Patient will score at least 19/24 on DGI indicating reduced fall risk for greater safety with community ambulation.   Target Date 20   Date Met  20   Progress:     Goal Identifier mCTSIB   Goal Description " Patient will be able to stand on non-compliant and compliant surfaces in narrow JACK with eyes closed for greater safety in the shower or walking over varied terraine in low/no light.   Target Date 08/15/20   Date Met  (not met)   Progress:     Goal Identifier DHI   Goal Description Patient will have reduced score on DHI to <40 indicating mild perception of handicap related to dizziness and increased tolerance of daily activities such as housework.   Target Date 07/17/20   Date Met  07/14/20   Progress:     Progress Toward Goals:   Progress this reporting period: Patient has made progress over this EOC with stability. Testing indicates a reduced fall risk. Her knee pain, malalignment and continued weakness seem to impact her gait stability more so than her vestibular deficits at this time. It is likely she will continue to note gait impairments related to this but is not interested in surgical intervention. Her dizziness is significantly reduced. She does continue to have reduced utilization of vestibular information on compliant surfaces with vision removed when in a narrow JACK. This varies day to day with patients balance confidence but overall has not significantly changed. Patients anxiety has been a limiting factor in her progress. See goal status above. Patient is indep in her HEP and at this time does not require additional skilled intervention.    Plan:  Discharge from therapy.    Discharge:    Reason for Discharge: No further expectation of progress.    Discharge Plan: Patient to continue home program.

## 2020-08-19 ENCOUNTER — TRANSFERRED RECORDS (OUTPATIENT)
Dept: HEALTH INFORMATION MANAGEMENT | Facility: CLINIC | Age: 75
End: 2020-08-19

## 2020-08-26 ENCOUNTER — OFFICE VISIT (OUTPATIENT)
Dept: INTERNAL MEDICINE | Facility: CLINIC | Age: 75
End: 2020-08-26
Payer: MEDICARE

## 2020-08-26 VITALS
WEIGHT: 138 LBS | SYSTOLIC BLOOD PRESSURE: 137 MMHG | RESPIRATION RATE: 16 BRPM | BODY MASS INDEX: 24.45 KG/M2 | TEMPERATURE: 95.7 F | OXYGEN SATURATION: 100 % | DIASTOLIC BLOOD PRESSURE: 75 MMHG | HEART RATE: 70 BPM | HEIGHT: 63 IN

## 2020-08-26 DIAGNOSIS — Z00.00 PREVENTATIVE HEALTH CARE: Primary | ICD-10-CM

## 2020-08-26 DIAGNOSIS — I10 ESSENTIAL HYPERTENSION: ICD-10-CM

## 2020-08-26 DIAGNOSIS — H81.09 MENIERE'S DISEASE, UNSPECIFIED LATERALITY: ICD-10-CM

## 2020-08-26 DIAGNOSIS — F41.9 ANXIETY: ICD-10-CM

## 2020-08-26 LAB
BASOPHILS # BLD AUTO: 0 10E9/L (ref 0–0.2)
BASOPHILS NFR BLD AUTO: 0.4 %
DIFFERENTIAL METHOD BLD: NORMAL
EOSINOPHIL # BLD AUTO: 0.1 10E9/L (ref 0–0.7)
EOSINOPHIL NFR BLD AUTO: 1.6 %
ERYTHROCYTE [DISTWIDTH] IN BLOOD BY AUTOMATED COUNT: 14.1 % (ref 10–15)
HCT VFR BLD AUTO: 44.8 % (ref 35–47)
HGB BLD-MCNC: 14.1 G/DL (ref 11.7–15.7)
LYMPHOCYTES # BLD AUTO: 1.4 10E9/L (ref 0.8–5.3)
LYMPHOCYTES NFR BLD AUTO: 26.2 %
MCH RBC QN AUTO: 29.7 PG (ref 26.5–33)
MCHC RBC AUTO-ENTMCNC: 31.5 G/DL (ref 31.5–36.5)
MCV RBC AUTO: 95 FL (ref 78–100)
MONOCYTES # BLD AUTO: 0.4 10E9/L (ref 0–1.3)
MONOCYTES NFR BLD AUTO: 8 %
NEUTROPHILS # BLD AUTO: 3.5 10E9/L (ref 1.6–8.3)
NEUTROPHILS NFR BLD AUTO: 63.8 %
PLATELET # BLD AUTO: 222 10E9/L (ref 150–450)
RBC # BLD AUTO: 4.74 10E12/L (ref 3.8–5.2)
WBC # BLD AUTO: 5.5 10E9/L (ref 4–11)

## 2020-08-26 PROCEDURE — 80053 COMPREHEN METABOLIC PANEL: CPT | Performed by: INTERNAL MEDICINE

## 2020-08-26 PROCEDURE — G0439 PPPS, SUBSEQ VISIT: HCPCS | Performed by: INTERNAL MEDICINE

## 2020-08-26 PROCEDURE — 84443 ASSAY THYROID STIM HORMONE: CPT | Performed by: INTERNAL MEDICINE

## 2020-08-26 PROCEDURE — 36415 COLL VENOUS BLD VENIPUNCTURE: CPT | Performed by: INTERNAL MEDICINE

## 2020-08-26 PROCEDURE — 80061 LIPID PANEL: CPT | Performed by: INTERNAL MEDICINE

## 2020-08-26 PROCEDURE — 85025 COMPLETE CBC W/AUTO DIFF WBC: CPT | Performed by: INTERNAL MEDICINE

## 2020-08-26 RX ORDER — VENLAFAXINE 37.5 MG/1
37.5 TABLET ORAL DAILY
COMMUNITY
Start: 2020-08-26 | End: 2021-09-27 | Stop reason: ALTCHOICE

## 2020-08-26 ASSESSMENT — ACTIVITIES OF DAILY LIVING (ADL): CURRENT_FUNCTION: NO ASSISTANCE NEEDED

## 2020-08-26 ASSESSMENT — ENCOUNTER SYMPTOMS
NERVOUS/ANXIOUS: 1
BREAST MASS: 1
CONSTIPATION: 1
DIZZINESS: 1
HEMATURIA: 0
ARTHRALGIAS: 1
FREQUENCY: 1
HEMATOCHEZIA: 0
ABDOMINAL PAIN: 0
NAUSEA: 1

## 2020-08-26 ASSESSMENT — MIFFLIN-ST. JEOR: SCORE: 1085.09

## 2020-08-26 NOTE — PROGRESS NOTES
"SUBJECTIVE:   Cynthia Arita is a 75 year old female who presents for Preventive Visit.    Non-fasting.    Are you in the first 12 months of your Medicare coverage?  No    Healthy Habits:     In general, how would you rate your overall health?  Fair    Frequency of exercise:  2-3 days/week    Duration of exercise:  30-45 minutes    Do you usually eat at least 4 servings of fruit and vegetables a day, include whole grains    & fiber and avoid regularly eating high fat or \"junk\" foods?  No    Taking medications regularly:  Yes    Medication side effects:  Muscle aches, Significant flushing, Lightheadedness and Other    Ability to successfully perform activities of daily living:  No assistance needed    Home Safety:  No safety concerns identified    Hearing Impairment:  Need to ask people to speak up or repeat themselves, find that men's voices are easier to understand than woman's and difficulty understanding soft or whispered speech    In the past 6 months, have you been bothered by leaking of urine? Yes    In general, how would you rate your overall mental or emotional health?  Good      PHQ-2 Total Score: 2    Additional concerns today:  No    Do you feel safe in your environment? Yes    Have you ever done Advance Care Planning? (For example, a Health Directive, POLST, or a discussion with a medical provider or your loved ones about your wishes): Yes, patient states has an Advance Care Planning document and will bring a copy to the clinic.    Has HAs.    Fall risk  Fallen 2 or more times in the past year?: No  Any fall with injury in the past year?: No    Cognitive Screening   1) Repeat 3 items (Leader, Season, Table)    2) Clock draw: NORMAL  3) 3 item recall: Recalls 1 object   Results: NORMAL clock, 1-2 items recalled: COGNITIVE IMPAIRMENT LESS LIKELY    Mini-CogTM Copyright S Tyler. Licensed by the author for use in [facility]; reprinted with permission (nani@.[facility]). All rights reserved.      Do " you have sleep apnea, excessive snoring or daytime drowsiness?: no    Reviewed and updated as needed this visit by clinical staff         Reviewed and updated as needed this visit by Provider        Social History     Tobacco Use     Smoking status: Never Smoker     Smokeless tobacco: Never Used   Substance Use Topics     Alcohol use: Yes     Comment: rare     If you drink alcohol do you typically have >3 drinks per day or >7 drinks per week? No    No flowsheet data found.      Current providers sharing in care for this patient include:   Patient Care Team:  Huyen Samuels MD as PCP - General (Internal Medicine)  Huyen Samuels MD as Assigned PCP    The following health maintenance items are reviewed in Epic and correct as of today:  Health Maintenance   Topic Date Due     ZOSTER IMMUNIZATION (1 of 2) 07/10/1994     MEDICARE ANNUAL WELLNESS VISIT  07/10/2009     PHQ-9  02/21/2020     INFLUENZA VACCINE (1) 09/01/2020     DUSTIN ASSESSMENT  10/02/2020     FALL RISK ASSESSMENT  11/18/2020     ADVANCE CARE PLANNING  01/22/2024     LIPID  01/24/2024     DTAP/TDAP/TD IMMUNIZATION (2 - Td) 08/28/2027     COLORECTAL CANCER SCREENING  10/18/2029     DEXA  Completed     PNEUMOCOCCAL IMMUNIZATION 65+ LOW/MEDIUM RISK  Completed     IPV IMMUNIZATION  Aged Out     MENINGITIS IMMUNIZATION  Aged Out     HEPATITIS B IMMUNIZATION  Aged Out     BP Readings from Last 3 Encounters:   08/26/20 137/75   01/20/20 138/70   12/18/19 (!) 142/80    Wt Readings from Last 3 Encounters:   08/26/20 62.6 kg (138 lb)   01/20/20 59.3 kg (130 lb 11.2 oz)   12/18/19 58.2 kg (128 lb 4.8 oz)                      Review of Systems   HENT: Positive for hearing loss. Negative for congestion.    Eyes: Positive for visual disturbance.   Cardiovascular: Positive for chest pain.   Gastrointestinal: Positive for constipation and nausea. Negative for abdominal pain and hematochezia.   Breasts:  Positive for tenderness and breast mass. Negative for  "discharge.   Genitourinary: Positive for frequency and urgency. Negative for hematuria, pelvic pain, vaginal bleeding and vaginal discharge.   Musculoskeletal: Positive for arthralgias.   Neurological: Positive for dizziness.   Psychiatric/Behavioral: The patient is nervous/anxious.          OBJECTIVE:   There were no vitals taken for this visit. Estimated body mass index is 23.15 kg/m  as calculated from the following:    Height as of 1/20/20: 1.6 m (5' 3\").    Weight as of 1/20/20: 59.3 kg (130 lb 11.2 oz).  Physical Exam  GENERAL: healthy, alert and no distress  EYES: Eyes grossly normal to inspection, PERRL and conjunctivae and sclerae normal  HENT: ear canals and TM's normal, nose and mouth without ulcers or lesions  NECK: no adenopathy, no asymmetry, masses, or scars and thyroid normal to palpation  RESP: lungs clear to auscultation - no rales, rhonchi or wheezes  CV: regular rate and rhythm, normal S1 S2, no S3 or S4, no murmur, click or rub, no peripheral edema and peripheral pulses strong  ABDOMEN: soft, nontender, no hepatosplenomegaly, no masses and bowel sounds normal  MS: no gross musculoskeletal defects noted, no edema  SKIN: no suspicious lesions or rashes  NEURO: Normal strength and tone, mentation intact and speech normal  PSYCH: mentation appears normal, affect normal/bright        ASSESSMENT / PLAN:   1. Preventative health care  willupdate labs  - TSH with free T4 reflex  - Comprehensive metabolic panel (BMP + Alb, Alk Phos, ALT, AST, Total. Bili, TP)  - CBC with platelets and differential  - Lipid Profile    2. Essential hypertension  under good control Continue current medications.   - TSH with free T4 reflex  - Comprehensive metabolic panel (BMP + Alb, Alk Phos, ALT, AST, Total. Bili, TP)  - CBC with platelets and differential  - Lipid Profile    3. Anxiety  stable    4. Meniere's disease, unspecified laterality  Balance is about the same      COUNSELING:  Reviewed preventive health " "counseling, as reflected in patient instructions       Regular exercise       Healthy diet/nutrition    Estimated body mass index is 23.15 kg/m  as calculated from the following:    Height as of 1/20/20: 1.6 m (5' 3\").    Weight as of 1/20/20: 59.3 kg (130 lb 11.2 oz).        She reports that she has never smoked. She has never used smokeless tobacco.      Appropriate preventive services were discussed with this patient, including applicable screening as appropriate for cardiovascular disease, diabetes, osteopenia/osteoporosis, and glaucoma.  As appropriate for age/gender, discussed screening for colorectal cancer, prostate cancer, breast cancer, and cervical cancer. Checklist reviewing preventive services available has been given to the patient.    Reviewed patients plan of care and provided an AVS. The Basic Care Plan (routine screening as documented in Health Maintenance) for Cynthia meets the Care Plan requirement. This Care Plan has been established and reviewed with the Patient.    Counseling Resources:  ATP IV Guidelines  Pooled Cohorts Equation Calculator  Breast Cancer Risk Calculator  Breast Cancer: Medication to Reduce Risk  FRAX Risk Assessment  ICSI Preventive Guidelines  Dietary Guidelines for Americans, 2010  USDA's MyPlate  ASA Prophylaxis  Lung CA Screening    Huyen Samuels MD  [facility]    Identified Health Risks:  "

## 2020-08-26 NOTE — LETTER
September 1, 2020      Cynthiasa SONDRA Arita  [address] [facility] [address]        Dear ,    We are writing to inform you of your test results.    labs within acceptable limits     Resulted Orders   TSH with free T4 reflex   Result Value Ref Range    TSH 2.66 0.40 - 4.00 mU/L   Comprehensive metabolic panel (BMP + Alb, Alk Phos, ALT, AST, Total. Bili, TP)   Result Value Ref Range    Sodium 138 133 - 144 mmol/L    Potassium 3.8 3.4 - 5.3 mmol/L    Chloride 106 94 - 109 mmol/L    Carbon Dioxide 28 20 - 32 mmol/L    Anion Gap 4 3 - 14 mmol/L    Glucose 90 70 - 99 mg/dL      Comment:      Non Fasting    Urea Nitrogen 21 7 - 30 mg/dL    Creatinine 0.58 0.52 - 1.04 mg/dL    GFR Estimate 90 >60 mL/min/[1.73_m2]      Comment:      Non  GFR Calc  Starting 12/18/2018, serum creatinine based estimated GFR (eGFR) will be   calculated using the Chronic Kidney Disease Epidemiology Collaboration   (CKD-EPI) equation.      GFR Estimate If Black >90 >60 mL/min/[1.73_m2]      Comment:       GFR Calc  Starting 12/18/2018, serum creatinine based estimated GFR (eGFR) will be   calculated using the Chronic Kidney Disease Epidemiology Collaboration   (CKD-EPI) equation.      Calcium 9.4 8.5 - 10.1 mg/dL    Bilirubin Total 0.4 0.2 - 1.3 mg/dL    Albumin 3.8 3.4 - 5.0 g/dL    Protein Total 7.2 6.8 - 8.8 g/dL    Alkaline Phosphatase 77 40 - 150 U/L    ALT 34 0 - 50 U/L    AST 25 0 - 45 U/L   CBC with platelets and differential   Result Value Ref Range    WBC 5.5 4.0 - 11.0 10e9/L    RBC Count 4.74 3.8 - 5.2 10e12/L    Hemoglobin 14.1 11.7 - 15.7 g/dL    Hematocrit 44.8 35.0 - 47.0 %    MCV 95 78 - 100 fl    MCH 29.7 26.5 - 33.0 pg    MCHC 31.5 31.5 - 36.5 g/dL    RDW 14.1 10.0 - 15.0 %    Platelet Count 222 150 - 450 10e9/L    % Neutrophils 63.8 %    % Lymphocytes 26.2 %    % Monocytes 8.0 %    % Eosinophils 1.6 %    % Basophils 0.4 %    Absolute Neutrophil 3.5 1.6 - 8.3 10e9/L    Absolute Lymphocytes 1.4  0.8 - 5.3 10e9/L    Absolute Monocytes 0.4 0.0 - 1.3 10e9/L    Absolute Eosinophils 0.1 0.0 - 0.7 10e9/L    Absolute Basophils 0.0 0.0 - 0.2 10e9/L    Diff Method Automated Method    Lipid Profile   Result Value Ref Range    Cholesterol 241 (H) <200 mg/dL      Comment:      Desirable:       <200 mg/dl    Triglycerides 204 (H) <150 mg/dL      Comment:      Borderline high:  150-199 mg/dl  High:             200-499 mg/dl  Very high:       >499 mg/dl  Non Fasting      HDL Cholesterol 63 >49 mg/dL    LDL Cholesterol Calculated 137 (H) <100 mg/dL      Comment:      Above desirable:  100-129 mg/dl  Borderline High:  130-159 mg/dL  High:             160-189 mg/dL  Very high:       >189 mg/dl      Non HDL Cholesterol 178 (H) <130 mg/dL      Comment:      Above Desirable:  130-159 mg/dl  Borderline high:  160-189 mg/dl  High:             190-219 mg/dl  Very high:       >219 mg/dl         If you have any questions or concerns, please call the clinic at the number listed above.       Sincerely,        Huyen Samuels MD

## 2020-08-27 LAB
ALBUMIN SERPL-MCNC: 3.8 G/DL (ref 3.4–5)
ALP SERPL-CCNC: 77 U/L (ref 40–150)
ALT SERPL W P-5'-P-CCNC: 34 U/L (ref 0–50)
ANION GAP SERPL CALCULATED.3IONS-SCNC: 4 MMOL/L (ref 3–14)
AST SERPL W P-5'-P-CCNC: 25 U/L (ref 0–45)
BILIRUB SERPL-MCNC: 0.4 MG/DL (ref 0.2–1.3)
BUN SERPL-MCNC: 21 MG/DL (ref 7–30)
CALCIUM SERPL-MCNC: 9.4 MG/DL (ref 8.5–10.1)
CHLORIDE SERPL-SCNC: 106 MMOL/L (ref 94–109)
CHOLEST SERPL-MCNC: 241 MG/DL
CO2 SERPL-SCNC: 28 MMOL/L (ref 20–32)
CREAT SERPL-MCNC: 0.58 MG/DL (ref 0.52–1.04)
GFR SERPL CREATININE-BSD FRML MDRD: 90 ML/MIN/{1.73_M2}
GLUCOSE SERPL-MCNC: 90 MG/DL (ref 70–99)
HDLC SERPL-MCNC: 63 MG/DL
LDLC SERPL CALC-MCNC: 137 MG/DL
NONHDLC SERPL-MCNC: 178 MG/DL
POTASSIUM SERPL-SCNC: 3.8 MMOL/L (ref 3.4–5.3)
PROT SERPL-MCNC: 7.2 G/DL (ref 6.8–8.8)
SODIUM SERPL-SCNC: 138 MMOL/L (ref 133–144)
TRIGL SERPL-MCNC: 204 MG/DL
TSH SERPL DL<=0.005 MIU/L-ACNC: 2.66 MU/L (ref 0.4–4)

## 2020-09-03 DIAGNOSIS — I10 ESSENTIAL HYPERTENSION: ICD-10-CM

## 2020-09-03 DIAGNOSIS — M81.0 AGE-RELATED OSTEOPOROSIS WITHOUT CURRENT PATHOLOGICAL FRACTURE: ICD-10-CM

## 2020-09-03 NOTE — TELEPHONE ENCOUNTER
Pending Prescriptions:                       Disp   Refills    triamterene-HCTZ (MAXZIDE-25) 37.5-25 MG t*90 tab*3        Sig: Take 1 tablet by mouth daily    calcitonin, salmon, (MIACALCIN) 200 UNIT/A*11.1 mL3        Sig: USE 1 SPRAY IN 1 NOSTRIL  DAILY (ALTERNATING NOSTRILS           DAILY)    Routing refill request to provider for review/approval because:  Patient fails protocol

## 2020-09-07 RX ORDER — CALCITONIN SALMON 200 [IU]/.09ML
SPRAY, METERED NASAL
Qty: 11.1 ML | Refills: 3 | Status: SHIPPED | OUTPATIENT
Start: 2020-09-07 | End: 2021-07-07

## 2020-09-07 RX ORDER — TRIAMTERENE/HYDROCHLOROTHIAZID 37.5-25 MG
1 TABLET ORAL DAILY
Qty: 90 TABLET | Refills: 3 | Status: SHIPPED | OUTPATIENT
Start: 2020-09-07 | End: 2020-12-02

## 2020-09-08 ENCOUNTER — OFFICE VISIT (OUTPATIENT)
Dept: DERMATOLOGY | Facility: CLINIC | Age: 75
End: 2020-09-08
Payer: MEDICARE

## 2020-09-08 VITALS — OXYGEN SATURATION: 99 % | DIASTOLIC BLOOD PRESSURE: 88 MMHG | SYSTOLIC BLOOD PRESSURE: 141 MMHG | HEART RATE: 74 BPM

## 2020-09-08 DIAGNOSIS — L81.4 LENTIGO: ICD-10-CM

## 2020-09-08 DIAGNOSIS — L82.1 SEBORRHEIC KERATOSIS: ICD-10-CM

## 2020-09-08 DIAGNOSIS — D22.9 NEVUS: ICD-10-CM

## 2020-09-08 DIAGNOSIS — D48.5 NEOPLASM OF UNCERTAIN BEHAVIOR OF SKIN: Primary | ICD-10-CM

## 2020-09-08 DIAGNOSIS — D18.01 ANGIOMA OF SKIN: ICD-10-CM

## 2020-09-08 PROCEDURE — 99214 OFFICE O/P EST MOD 30 MIN: CPT | Mod: 25 | Performed by: PHYSICIAN ASSISTANT

## 2020-09-08 PROCEDURE — 88305 TISSUE EXAM BY PATHOLOGIST: CPT | Mod: TC | Performed by: PHYSICIAN ASSISTANT

## 2020-09-08 PROCEDURE — 11102 TANGNTL BX SKIN SINGLE LES: CPT | Performed by: PHYSICIAN ASSISTANT

## 2020-09-08 NOTE — LETTER
9/8/2020         RE: Cynthia Arita  [address] [facility] [address]        Dear Colleague,    Thank you for referring your patient, Cynthia Arita, to the [facility]. Please see a copy of my visit note below.    HPI:   Chief complaints: Cynthia Airta is a 75 year old female who presents for Full skin cancer screening to rule out skin cancer   Last Skin Exam: 2018 1st Baseline: no  Personal HX of Skin Cancer: no   Personal HX of Malignant Melanoma: no   Family HX of Skin Cancer / Malignant Melanoma: no  Personal HX of Atypical Moles:   no  Risk factors: history of sun exposure and burns  New / Changing lesions:No  Social History: has 3 sons and 4 grandchildren. Lived in [place] previously. Has Maniere's disease and vertigo has been horrible lately.   On review of systems, there are no further skin complaints, patient is feeling otherwise well.  See patient intake sheet.  ROS of the following were done and are negative: Constitutional, Eyes, Ears, Nose,   Mouth, Throat, Cardiovascular, Respiratory, GI, Genitourinary, Musculoskeletal,   Psychiatric, Endocrine, Allergic/Immunologic.    PHYSICAL EXAM:   BP (!) 141/88   Pulse 74   SpO2 99%   Skin exam performed as follows: Type 2 skin. Mood appropriate  Alert and Oriented X 3. Well developed, well nourished in no distress.  General appearance: Normal  Head including face: Normal  Eyes: conjunctiva and lids: Normal  Mouth: Lips, teeth, gums: Normal  Neck: Normal  Chest-breast/axillae: Normal  Back: Normal  Spleen and liver: Normal  Cardiovascular: Exam of peripheral vascular system by observation for swelling, varicosities, edema: Normal  Genitalia: groin, buttocks: Normal  Extremities: digits/nails (clubbing): Normal  Eccrine and Apocrine glands: Normal  Right upper extremity: Normal  Left upper extremity: Normal  Right lower extremity: Normal  Left lower extremity: Normal  Skin: Scalp and body hair: See below    Pt deferred exam of breasts,  groin, buttocks: No    Other physical findings:  1. Multiple pigmented macules on extremities and trunk  2. Multiple pigmented macules on face, trunk and extremities  3. Multiple vascular papules on trunk, arms and legs  4. Multiple scattered keratotic plaques  5. 8 mm pink shiny papule on the left temple       Except as noted above, no other signs of skin cancer or melanoma.     ASSESSMENT/PLAN:   Benign Full skin cancer screening today. . Patient with history of none  Advised on monthly self exams and 1 year  Patient Education: Appropriate brochures given.    1. Multiple benign appearing nevi on arms, legs and trunk. Discussed ABCDEs of melanoma and sunscreen.   2. Multiple lentigos on arms, legs and trunk. Advised benign, no treatment needed.  3. Multiple scattered angiomas. Advised benign, no treatment needed.   4. Seborrheic keratosis on arms, legs and trunk. Advised benign, no treatment needed.  5. R/o BCC on the left temple. Shave biopsy performed.  Area cleaned and anesthetized with 1% lidocaine with epinephrine.  Dermablade used to remove the lesion and sent to pathology. Bleeding was cauterized. Pt tolerated procedure well with no complications.   6. Cynthia to follow up with Primary Care provider regarding elevated blood pressure.            Follow-up: yearly FSE/PRN sooner    1.) Patient was asked about new and changing moles. YES  2.) Patient received a complete physical skin examination: YES  3.) Patient was counseled to perform a monthly self skin examination: YES  Scribed By: Suzette Holder, MS, KATHARINE        Again, thank you for allowing me to participate in the care of your patient.        Sincerely,        Suzette Holder PA-C

## 2020-09-08 NOTE — PATIENT INSTRUCTIONS
Wound Care Instructions     FOR SUPERFICIAL WOUNDS     [facility] [phone]    [facility] [phone]                       AFTER 24 HOURS YOU SHOULD REMOVE THE BANDAGE AND BEGIN DAILY DRESSING CHANGES AS FOLLOWS:     1) Remove Dressing.     2) Clean and dry the area with tap water using a Q-tip or sterile gauze pad.     3) Apply Vaseline, Aquaphor, Polysporin ointment or Bacitracin ointment over entire wound.  Do NOT use Neosporin ointment.     4) Cover the wound with a band-aid, or a sterile non-stick gauze pad and micropore paper tape      REPEAT THESE INSTRUCTIONS AT LEAST ONCE A DAY UNTIL THE WOUND HAS COMPLETELY HEALED.    It is an old wives tale that a wound heals better when it is exposed to air and allowed to dry out. The wound will heal faster with a better cosmetic result if it is kept moist with ointment and covered with a bandage.    **Do not let the wound dry out.**      Supplies Needed:      *Cotton tipped applicators (Q-tips)    *Polysporin Ointment or Bacitracin Ointment (NOT NEOSPORIN)    *Band-aids or non-stick gauze pads and micropore paper tape.      PATIENT INFORMATION:    During the healing process you will notice a number of changes. All wounds develop a small halo of redness surrounding the wound.  This means healing is occurring. Severe itching with extensive redness usually indicates sensitivity to the ointment or bandage tape used to dress the wound.  You should call our office if this develops.      Swelling  and/or discoloration around your surgical site is common, particularly when performed around the eye.    All wounds normally drain.  The larger the wound the more drainage there will be.  After 7-10 days, you will notice the wound beginning to shrink and new skin will begin to grow.  The wound is healed when you can see skin has formed over the entire area.  A healed wound has a healthy, shiny look to the surface and is red to dark pink in color  to normalize.  Wounds may take approximately 4-6 weeks to heal.  Larger wounds may take 6-8 weeks.  After the wound is healed you may discontinue dressing changes.    You may experience a sensation of tightness as your wound heals. This is normal and will gradually subside.    Your healed wound may be sensitive to temperature changes. This sensitivity improves with time, but if you re having a lot of discomfort, try to avoid temperature extremes.    Patients frequently experience itching after their wound appears to have healed because of the continue healing under the skin.  Plain Vaseline will help relieve the itching.        POSSIBLE COMPLICATIONS    BLEEDIN. Leave the bandage in place.  2. Use tightly rolled up gauze or a cloth to apply direct pressure over the bandage for 30  minutes.  3. Reapply pressure for an additional 30 minutes if necessary  4. Use additional gauze and tape to maintain pressure once the bleeding has stopped.

## 2020-09-08 NOTE — PROGRESS NOTES
HPI:   Chief complaints: Cynthia Arita is a 75 year old female who presents for Full skin cancer screening to rule out skin cancer   Last Skin Exam: 2018 1st Baseline: no  Personal HX of Skin Cancer: no   Personal HX of Malignant Melanoma: no   Family HX of Skin Cancer / Malignant Melanoma: no  Personal HX of Atypical Moles:   no  Risk factors: history of sun exposure and burns  New / Changing lesions:No  Social History: has 3 sons and 4 grandchildren. Lived in [place] previously. Has Maniere's disease and vertigo has been horrible lately.   On review of systems, there are no further skin complaints, patient is feeling otherwise well.  See patient intake sheet.  ROS of the following were done and are negative: Constitutional, Eyes, Ears, Nose,   Mouth, Throat, Cardiovascular, Respiratory, GI, Genitourinary, Musculoskeletal,   Psychiatric, Endocrine, Allergic/Immunologic.    PHYSICAL EXAM:   BP (!) 141/88   Pulse 74   SpO2 99%   Skin exam performed as follows: Type 2 skin. Mood appropriate  Alert and Oriented X 3. Well developed, well nourished in no distress.  General appearance: Normal  Head including face: Normal  Eyes: conjunctiva and lids: Normal  Mouth: Lips, teeth, gums: Normal  Neck: Normal  Chest-breast/axillae: Normal  Back: Normal  Spleen and liver: Normal  Cardiovascular: Exam of peripheral vascular system by observation for swelling, varicosities, edema: Normal  Genitalia: groin, buttocks: Normal  Extremities: digits/nails (clubbing): Normal  Eccrine and Apocrine glands: Normal  Right upper extremity: Normal  Left upper extremity: Normal  Right lower extremity: Normal  Left lower extremity: Normal  Skin: Scalp and body hair: See below    Pt deferred exam of breasts, groin, buttocks: No    Other physical findings:  1. Multiple pigmented macules on extremities and trunk  2. Multiple pigmented macules on face, trunk and extremities  3. Multiple vascular papules on trunk, arms and legs  4. Multiple  scattered keratotic plaques  5. 8 mm pink shiny papule on the left temple       Except as noted above, no other signs of skin cancer or melanoma.     ASSESSMENT/PLAN:   Benign Full skin cancer screening today. . Patient with history of none  Advised on monthly self exams and 1 year  Patient Education: Appropriate brochures given.    1. Multiple benign appearing nevi on arms, legs and trunk. Discussed ABCDEs of melanoma and sunscreen.   2. Multiple lentigos on arms, legs and trunk. Advised benign, no treatment needed.  3. Multiple scattered angiomas. Advised benign, no treatment needed.   4. Seborrheic keratosis on arms, legs and trunk. Advised benign, no treatment needed.  5. R/o BCC on the left temple. Shave biopsy performed.  Area cleaned and anesthetized with 1% lidocaine with epinephrine.  Dermablade used to remove the lesion and sent to pathology. Bleeding was cauterized. Pt tolerated procedure well with no complications.   6. Cynthia to follow up with Primary Care provider regarding elevated blood pressure.            Follow-up: yearly FSE/PRN sooner    1.) Patient was asked about new and changing moles. YES  2.) Patient received a complete physical skin examination: YES  3.) Patient was counseled to perform a monthly self skin examination: YES  Scribed By: Suzette Holder MS, PA-C

## 2020-09-12 LAB — COPATH REPORT: NORMAL

## 2020-09-14 ENCOUNTER — TELEPHONE (OUTPATIENT)
Dept: DERMATOLOGY | Facility: CLINIC | Age: 75
End: 2020-09-14

## 2020-09-14 NOTE — TELEPHONE ENCOUNTER
----- Message from Suzette Holder PA-C sent at 9/14/2020  3:44 PM CDT -----  Left temple BCC please schedule for mohs

## 2020-09-14 NOTE — LETTER
[address]  [address]  [phone]        9/15/2020       Cynthia Airta  [address] [facility] [address]      Dear Cynthia:    You are scheduled for Mohs Surgery on: Thursday, October 8, 2020 at 9:15 am.    Please check in at [facility], [address].     In order to keep everyone safe and healthy in the clinic and to maintain social distancing, we ask that you:     Please wear a mask to the appointment-one will be provided if you do not have one     Please do not arrive earlier than 5 minutes prior to or later than your appointment- you will be asked to wait in your car if you are any earlier.     No visitors are alowed in the clinic- patients only     If you have an COVID symptoms (fever/cough/body aches/sore throat/temperature) or have been exposed to a COVID positive patient with in the last 14 days, please reschedule this appointment to a later date.    You don't need to arrive more than 5-10 minutes prior to your appointment time.     Be sure to eat a good breakfast and bathe and wash your hair prior to surgery.     If you are taking any anti-coagulants that are prescribed by your Doctor (such as Coumadin/Warfarin, Plavix, Aspirin, Ibuprofen), please continue taking them.     However, if you are taking anti-coagulants over the counter without a Doctor's order for a medical condition, please discontinue them 10 days prior to surgery.     Please wear loose comfortable clothing as it could possibly be 4-6 hours until your surgery is completed depending upon how many layers of tissue need to be removed.      Thank you,    PADMINI Cuba MD

## 2020-09-15 NOTE — TELEPHONE ENCOUNTER
patient returned the call-    patient notified of test results and mohs procedure explained- appointment scheduled- letter mailed.    Danitza HARTMANN RN BSN  [facility]  [phone]  [facility]   [phone]

## 2020-09-15 NOTE — TELEPHONE ENCOUNTER
"Called patient- she answered \"im on the road I will call you right back\" and disconnected the line.    Danitza HARTMANNRN BSN  [facility]  [phone]    "

## 2020-09-21 ENCOUNTER — VIRTUAL VISIT (OUTPATIENT)
Dept: INTERNAL MEDICINE | Facility: CLINIC | Age: 75
End: 2020-09-21
Payer: MEDICARE

## 2020-09-21 DIAGNOSIS — H81.09 MENIERE'S DISEASE, UNSPECIFIED LATERALITY: Primary | ICD-10-CM

## 2020-09-21 PROCEDURE — 99441 ZZC PHYSICIAN TELEPHONE EVALUATION 5-10 MIN: CPT | Mod: 95 | Performed by: INTERNAL MEDICINE

## 2020-09-21 RX ORDER — ACETAMINOPHEN 500 MG
500 TABLET ORAL 3 TIMES DAILY
Status: ON HOLD | COMMUNITY
End: 2023-10-02

## 2020-09-21 NOTE — PROGRESS NOTES
"Cynthia Arita is a 75 year old female who is being evaluated via a billable telephone visit.      The patient has been notified of following:     \"This telephone visit will be conducted via a call between you and your physician/provider. We have found that certain health care needs can be provided without the need for a physical exam.  This service lets us provide the care you need with a short phone conversation.  If a prescription is necessary we can send it directly to your pharmacy.  If lab work is needed we can place an order for that and you can then stop by our lab to have the test done at a later time.    Telephone visits are billed at different rates depending on your insurance coverage. During this emergency period, for some insurers they may be billed the same as an in-person visit.  Please reach out to your insurance provider with any questions.    If during the course of the call the physician/provider feels a telephone visit is not appropriate, you will not be charged for this service.\"  -  Patient has given verbal consent for Telephone visit?  Yes    What phone number would you like to be contacted at? [phone]    How would you like to obtain your AVS? Mail a copy    Subjective     Cynthia Arita is a 75 year old female who presents via phone visit today for the following health issues:    HPI    She again is having severe .dzzand nausea. She is using the Zofran but the dizziness is not good. They started her on Effexor but that has been making her loopy and wants to sleep all day. She wants to know what I recommend. She sees her ENT Dr in 2 days.       Review of Systems   Constitutional, HEENT, cardiovascular, pulmonary, GI, , musculoskeletal, neuro, skin, endocrine and psych systems are negative, except as otherwise noted.       Objective          Vitals:  No vitals were obtained today due to virtual visit.    healthy, alert and mild distress  PSYCH: Alert and oriented times 3; coherent " speech, normal   rate and volume, able to articulate logical thoughts, able   to abstract reason, no tangential thoughts, no hallucinations   or delusions  Her affect is anxious and tearful  RESP: No cough, no audible wheezing, able to talk in full sentences  Remainder of exam unable to be completed due to telephone visits          Assessment/Plan:    Assessment & Plan     Meniere's disease, unspecified laterality  At this point I recommended trying Lyrica but am not sure what ENT will think of that. She will talk it overwith them on wednesday         No follow-ups on file.    Huyen Samuels MD  [facility]    Phone call duration:  10 minutes

## 2020-09-23 ENCOUNTER — TRANSFERRED RECORDS (OUTPATIENT)
Dept: HEALTH INFORMATION MANAGEMENT | Facility: CLINIC | Age: 75
End: 2020-09-23

## 2020-10-08 ENCOUNTER — OFFICE VISIT (OUTPATIENT)
Dept: DERMATOLOGY | Facility: CLINIC | Age: 75
End: 2020-10-08
Payer: MEDICARE

## 2020-10-08 VITALS — HEART RATE: 67 BPM | DIASTOLIC BLOOD PRESSURE: 70 MMHG | OXYGEN SATURATION: 95 % | SYSTOLIC BLOOD PRESSURE: 141 MMHG

## 2020-10-08 DIAGNOSIS — L81.4 LENTIGO: ICD-10-CM

## 2020-10-08 DIAGNOSIS — L82.1 SEBORRHEIC KERATOSIS: ICD-10-CM

## 2020-10-08 DIAGNOSIS — L72.11 PILAR CYST: ICD-10-CM

## 2020-10-08 DIAGNOSIS — C44.319 BASAL CELL CARCINOMA (BCC) OF LEFT TEMPLE REGION: Primary | ICD-10-CM

## 2020-10-08 PROCEDURE — 17311 MOHS 1 STAGE H/N/HF/G: CPT | Performed by: DERMATOLOGY

## 2020-10-08 PROCEDURE — 99213 OFFICE O/P EST LOW 20 MIN: CPT | Mod: 25 | Performed by: DERMATOLOGY

## 2020-10-08 PROCEDURE — 13132 CMPLX RPR F/C/C/M/N/AX/G/H/F: CPT | Performed by: DERMATOLOGY

## 2020-10-08 NOTE — NURSING NOTE
"Initial BP (!) 141/70   Pulse 67   SpO2 95%  Estimated body mass index is 24.45 kg/m  as calculated from the following:    Height as of 8/26/20: 1.6 m (5' 3\").    Weight as of 8/26/20: 62.6 kg (138 lb). .    HUSAM Keenan-BSN-N  [facility]  [phone]  "

## 2020-10-08 NOTE — PROGRESS NOTES
Cynthia Arita is a 75 year old year old female patient here today for emo f basal cell carcinoma on lft temple.  Today she notes spot on scalp.   .  Patient states this has been present for a while.  Patient reports the following symptoms:  firm.  Patient reports the following previous treatments none.  These treatments did not work.  Patient reports the following modifying factors none.  Associated symptoms: none.  Patient has no other skin complaints today.  Remainder of the HPI, Meds, PMH, Allergies, FH, and SH was reviewed in chart.      Past Medical History:   Diagnosis Date     Anxiety      Basal cell carcinoma      Complication of anesthesia      Diverticulosis      GERD (gastroesophageal reflux disease)      HTN (hypertension)      Meniere's disease      Nephrolithiasis      Pain in right knee      PONV (postoperative nausea and vomiting)        Past Surgical History:   Procedure Laterality Date     ARTHROPLASTY KNEE Right 1/21/2019    Procedure: Right total knee arthroplasty;  Surgeon: Denton Amor MD;  Location: [facility] OR     C VAGINAL HYSTERECTOMY      with left oophorectomy     EYE SURGERY      cataract surgery both eyes        Family History   Problem Relation Age of Onset     Neurologic Disorder Mother         palsy     Esophageal Cancer Father      Cancer Maternal Grandmother         lymph     Diabetes Paternal Grandmother      Neurologic Disorder Sister         Parkinson's     Hypertension Brother      Bipolar Disorder Sister      Brain Cancer Son 17       Social History     Socioeconomic History     Marital status:      Spouse name: Not on file     Number of children: Not on file     Years of education: Not on file     Highest education level: Not on file   Occupational History     Not on file   Social Needs     Financial resource strain: Not on file     Food insecurity     Worry: Not on file     Inability: Not on file     Transportation needs     Medical: Not on file     Non-medical:  Not on file   Tobacco Use     Smoking status: Never Smoker     Smokeless tobacco: Never Used   Substance and Sexual Activity     Alcohol use: Yes     Comment: rare     Drug use: No     Sexual activity: Not Currently     Partners: Male   Lifestyle     Physical activity     Days per week: Not on file     Minutes per session: Not on file     Stress: Not on file   Relationships     Social connections     Talks on phone: Not on file     Gets together: Not on file     Attends [employer] service: Not on file     Active member of club or organization: Not on file     Attends meetings of clubs or organizations: Not on file     Relationship status: Not on file     Intimate partner violence     Fear of current or ex partner: Not on file     Emotionally abused: Not on file     Physically abused: Not on file     Forced sexual activity: Not on file   Other Topics Concern     Parent/sibling w/ CABG, MI or angioplasty before 65F 55M? Not Asked   Social History Narrative     Not on file       Outpatient Encounter Medications as of 10/8/2020   Medication Sig Dispense Refill     acetaminophen (TYLENOL) 500 MG tablet Take 1,000 mg by mouth every 6 hours as needed for mild pain BID       ALPRAZolam (XANAX) 0.5 MG tablet Take 1 tablet (0.5 mg) by mouth 3 times daily as needed for anxiety 15 tablet 0     calcitonin, salmon, (MIACALCIN) 200 UNIT/ACT nasal spray USE 1 SPRAY IN 1 NOSTRIL  DAILY (ALTERNATING NOSTRILS DAILY) 11.1 mL 3     diazepam (VALIUM) 2 MG tablet TAKE 1 TABLET BY MOUTH EVERY 6 HOURS AS NEEDED FOR ANXIETY OR VERTIGO 40 tablet 0     esomeprazole 40 MG PO DR capsule Take 1 capsule (40 mg) by mouth every morning (before breakfast) Take 30-60 minutes before eating. Dispense generic. 90 capsule 3     Multiple Vitamins-Minerals (OCUVITE PRESERVISION PO) Take 1 tablet by mouth 2 times daily        ondansetron (ZOFRAN ODT) 4 MG PO ODT tab Take 1 tablet (4 mg) by mouth every 6 hours 120 tablet 1     promethazine (PHENERGAN) 25 MG  tablet TAKE 1 TABLET(25 MG) BY MOUTH EVERY 6 HOURS AS NEEDED FOR NAUSEA 30 tablet 1     propranolol (INDERAL) 20 MG tablet TAKE 1 TABLET BY MOUTH  DAILY AS NEEDED FOR BLOOD  PRESSURE HIGHER THAN 170 30 tablet 0     triamterene-HCTZ (MAXZIDE-25) 37.5-25 MG tablet TAKE 1 TABLET BY MOUTH  DAILY 90 tablet 3     venlafaxine (EFFEXOR) 37.5 MG tablet Take 1 tablet (37.5 mg) by mouth 2 times daily       No facility-administered encounter medications on file as of 10/8/2020.              Review Of Systems  Skin: As above  Eyes: negative  Ears/Nose/Throat: negative  Respiratory: No shortness of breath, dyspnea on exertion, cough, or hemoptysis  Cardiovascular: negative  Gastrointestinal: negative  Genitourinary: negative  Musculoskeletal: negative  Neurologic: negative  Psychiatric: negative  Hematologic/Lymphatic/Immunologic: negative  Endocrine: negative      O:   NAD, WDWN, Alert & Oriented, Mood & Affect wnl, Vitals stable   Here today alone   BP (!) 141/70   Pulse 67   SpO2 95%    General appearance normal   Vitals stable   Alert, oriented and in no acute distress     L temple 8mm scaly papule    L frontal scalp firm nodule   Stuck on papules and brown macules on face and trunk     The remainder of expanded problem focused exam was normal; the following areas were examined:  scalp/hair, conjunctiva/lids, face, neck, lips, chest, digits/nails, RUE, LUE.      Eyes: Conjunctivae/lids:Normal     ENT: Lips, buccal mucosa, tongue: normal    MSK:Normal    Cardiovascular: peripheral edema none    Pulm: Breathing Normal    Lymph Nodes: No Head and Neck Lymphadenopathy     Neuro/Psych: Orientation:Alert and Orientedx3 ; Mood/Affect:normal       A/P:  1. L temple basal cell carcinoma   2. Seborrheic keratosis, letnigo, pilar cyst  BENIGN LESIONS DISCUSSED WITH PATIENT:  I discussed the specifics of tumor, prognosis, and genetics of benign lesions.  I explained that treatment of these lesions would be purely cosmetic and not  medically neccessary.  I discussed with patient different removal options including excision, cautery and /or laser.      Nature and genetics of benign skin lesions dicussed with patient.  Signs and Symptoms of skin cancer discussed with patient.  Patient encouraged to perform monthly skin exams.  UV precautions reviewed with patient.  Skin care regimen reviewed with patient: Eliminate harsh soaps, i.e. Dial, zest, irsih spring; Mild soaps such as Cetaphil or Dove sensitive skin, avoid hot or cold showers, aggressive use of emollients including vanicream, cetaphil or cerave discussed with patient.    Risks of non-melanoma skin cancer discussed with patient   Return to clinic 6 months  PROCEDURE NOTE  L temple basal cell carcinoma   MOHS:   Location    The rationale for Mohs surgery was discussed with the patient and consent was obtained.  The risks and benefits as well as alternatives to therapy were discussed, in detail.  Specifically, the risks of infection, scarring, bleeding, prolonged wound healing, incomplete removal, allergy to anesthesia, nerve injury and recurrence were addressed.  Indication for Mohs was Location. Prior to the procedure, the treatment site was clearly identified and, if available, confirmed with previous photos and confirmed by the patient   All components of the Universal Protocol/PAUSE rule were completed.  The Mohs surgeon operated in two distinct and integrated capacities as the surgeon and pathologist.      The area was prepped with Betasept.  A rim of normal appearing skin was marked circumferentially around the lesion.  The area was infiltrated with local anesthesia.  The tumor was first debulked to remove all clinically apparent tumor.  An incision following the standard Mohs approach was done and the specimen was oriented,mapped and placed in 1 block(s).  Each specimen was then chromacoded and processed in the Mohs laboratory using standard Mohs technique and submitted for frozen  section histology.  Frozen section analysis showed no residual tumor but CLEAR MARGINS.      The tumor was excised using standard Mohs technique in 1 stages(s).  CLEAR MARGINS OBTAINED and Final defect size was 1.3 x 1.2 cm.     We discussed the options for wound management in full with the patient including risks/benefits/ possible outcomes.        REPAIR COMPLEX: Because of the tightness of the surrounding skin and Because of the size and full thickness nature of the defect, a complex closure was planned. After LEC anesthesia and prep, Burow's triangles were excised in the relaxed skin tension lines. The wound edges were widely undermined by dissection in the subcutaneous plane until adequate tissue mobility was obtained. Hemostasis was obtained. The wound edges were closed in a layered fashion using Vicryl and Fast Absorbing Plain Gut sutures. Postoperative length was 4 cm.   EBL minimal; complications none; wound care routine.  The patient was discharged in good condition and will return in one week for wound evaluation.

## 2020-10-08 NOTE — PROGRESS NOTES
Surgical Office Location:  [facility]  [address] [place] [id number]

## 2020-10-08 NOTE — LETTER
10/8/2020         RE: Cynthia Arita  [address] [facility] [address] [address]        Dear Colleague,    Thank you for referring your patient, Cynthia Arita, to the [facility]. Please see a copy of my visit note below.    Surgical Office Location:  [facility]  [address] [place] [id number]      Cynthia Arita is a 75 year old year old female patient here today for emo f basal cell carcinoma on lft temple.  Today she notes spot on scalp.   .  Patient states this has been present for a while.  Patient reports the following symptoms:  firm.  Patient reports the following previous treatments none.  These treatments did not work.  Patient reports the following modifying factors none.  Associated symptoms: none.  Patient has no other skin complaints today.  Remainder of the HPI, Meds, PMH, Allergies, FH, and SH was reviewed in chart.      Past Medical History:   Diagnosis Date     Anxiety      Basal cell carcinoma      Complication of anesthesia      Diverticulosis      GERD (gastroesophageal reflux disease)      HTN (hypertension)      Meniere's disease      Nephrolithiasis      Pain in right knee      PONV (postoperative nausea and vomiting)        Past Surgical History:   Procedure Laterality Date     ARTHROPLASTY KNEE Right 1/21/2019    Procedure: Right total knee arthroplasty;  Surgeon: Denton Amor MD;  Location: [facility] OR     C VAGINAL HYSTERECTOMY      with left oophorectomy     EYE SURGERY      cataract surgery both eyes        Family History   Problem Relation Age of Onset     Neurologic Disorder Mother         palsy     Esophageal Cancer Father      Cancer Maternal Grandmother         lymph     Diabetes Paternal Grandmother      Neurologic Disorder Sister         Parkinson's     Hypertension Brother      Bipolar Disorder Sister      Brain Cancer Son 17       Social History     Socioeconomic History     Marital status:      Spouse name: Not on  file     Number of children: Not on file     Years of education: Not on file     Highest education level: Not on file   Occupational History     Not on file   Social Needs     Financial resource strain: Not on file     Food insecurity     Worry: Not on file     Inability: Not on file     Transportation needs     Medical: Not on file     Non-medical: Not on file   Tobacco Use     Smoking status: Never Smoker     Smokeless tobacco: Never Used   Substance and Sexual Activity     Alcohol use: Yes     Comment: rare     Drug use: No     Sexual activity: Not Currently     Partners: Male   Lifestyle     Physical activity     Days per week: Not on file     Minutes per session: Not on file     Stress: Not on file   Relationships     Social connections     Talks on phone: Not on file     Gets together: Not on file     Attends Temple service: Not on file     Active member of club or organization: Not on file     Attends meetings of clubs or organizations: Not on file     Relationship status: Not on file     Intimate partner violence     Fear of current or ex partner: Not on file     Emotionally abused: Not on file     Physically abused: Not on file     Forced sexual activity: Not on file   Other Topics Concern     Parent/sibling w/ CABG, MI or angioplasty before 65F 55M? Not Asked   Social History Narrative     Not on file       Outpatient Encounter Medications as of 10/8/2020   Medication Sig Dispense Refill     acetaminophen (TYLENOL) 500 MG tablet Take 1,000 mg by mouth every 6 hours as needed for mild pain BID       ALPRAZolam (XANAX) 0.5 MG tablet Take 1 tablet (0.5 mg) by mouth 3 times daily as needed for anxiety 15 tablet 0     calcitonin, salmon, (MIACALCIN) 200 UNIT/ACT nasal spray USE 1 SPRAY IN 1 NOSTRIL  DAILY (ALTERNATING NOSTRILS DAILY) 11.1 mL 3     diazepam (VALIUM) 2 MG tablet TAKE 1 TABLET BY MOUTH EVERY 6 HOURS AS NEEDED FOR ANXIETY OR VERTIGO 40 tablet 0     esomeprazole 40 MG PO DR capsule Take 1 capsule  (40 mg) by mouth every morning (before breakfast) Take 30-60 minutes before eating. Dispense generic. 90 capsule 3     Multiple Vitamins-Minerals (OCUVITE PRESERVISION PO) Take 1 tablet by mouth 2 times daily        ondansetron (ZOFRAN ODT) 4 MG PO ODT tab Take 1 tablet (4 mg) by mouth every 6 hours 120 tablet 1     promethazine (PHENERGAN) 25 MG tablet TAKE 1 TABLET(25 MG) BY MOUTH EVERY 6 HOURS AS NEEDED FOR NAUSEA 30 tablet 1     propranolol (INDERAL) 20 MG tablet TAKE 1 TABLET BY MOUTH  DAILY AS NEEDED FOR BLOOD  PRESSURE HIGHER THAN 170 30 tablet 0     triamterene-HCTZ (MAXZIDE-25) 37.5-25 MG tablet TAKE 1 TABLET BY MOUTH  DAILY 90 tablet 3     venlafaxine (EFFEXOR) 37.5 MG tablet Take 1 tablet (37.5 mg) by mouth 2 times daily       No facility-administered encounter medications on file as of 10/8/2020.              Review Of Systems  Skin: As above  Eyes: negative  Ears/Nose/Throat: negative  Respiratory: No shortness of breath, dyspnea on exertion, cough, or hemoptysis  Cardiovascular: negative  Gastrointestinal: negative  Genitourinary: negative  Musculoskeletal: negative  Neurologic: negative  Psychiatric: negative  Hematologic/Lymphatic/Immunologic: negative  Endocrine: negative      O:   NAD, WDWN, Alert & Oriented, Mood & Affect wnl, Vitals stable   Here today alone   BP (!) 141/70   Pulse 67   SpO2 95%    General appearance normal   Vitals stable   Alert, oriented and in no acute distress     L temple 8mm scaly papule    L frontal scalp firm nodule   Stuck on papules and brown macules on face and trunk     The remainder of expanded problem focused exam was normal; the following areas were examined:  scalp/hair, conjunctiva/lids, face, neck, lips, chest, digits/nails, RUE, LUE.      Eyes: Conjunctivae/lids:Normal     ENT: Lips, buccal mucosa, tongue: normal    MSK:Normal    Cardiovascular: peripheral edema none    Pulm: Breathing Normal    Lymph Nodes: No Head and Neck Lymphadenopathy     Neuro/Psych:  Orientation:Alert and Orientedx3 ; Mood/Affect:normal       A/P:  1. L temple basal cell carcinoma   2. Seborrheic keratosis, letnigo, pilar cyst  BENIGN LESIONS DISCUSSED WITH PATIENT:  I discussed the specifics of tumor, prognosis, and genetics of benign lesions.  I explained that treatment of these lesions would be purely cosmetic and not medically neccessary.  I discussed with patient different removal options including excision, cautery and /or laser.      Nature and genetics of benign skin lesions dicussed with patient.  Signs and Symptoms of skin cancer discussed with patient.  Patient encouraged to perform monthly skin exams.  UV precautions reviewed with patient.  Skin care regimen reviewed with patient: Eliminate harsh soaps, i.e. Dial, zest, irsih spring; Mild soaps such as Cetaphil or Dove sensitive skin, avoid hot or cold showers, aggressive use of emollients including vanicream, cetaphil or cerave discussed with patient.    Risks of non-melanoma skin cancer discussed with patient   Return to clinic 6 months  PROCEDURE NOTE  L temple basal cell carcinoma   MOHS:   Location    The rationale for Mohs surgery was discussed with the patient and consent was obtained.  The risks and benefits as well as alternatives to therapy were discussed, in detail.  Specifically, the risks of infection, scarring, bleeding, prolonged wound healing, incomplete removal, allergy to anesthesia, nerve injury and recurrence were addressed.  Indication for Mohs was Location. Prior to the procedure, the treatment site was clearly identified and, if available, confirmed with previous photos and confirmed by the patient   All components of the Universal Protocol/PAUSE rule were completed.  The Mohs surgeon operated in two distinct and integrated capacities as the surgeon and pathologist.      The area was prepped with Betasept.  A rim of normal appearing skin was marked circumferentially around the lesion.  The area was infiltrated  with local anesthesia.  The tumor was first debulked to remove all clinically apparent tumor.  An incision following the standard Mohs approach was done and the specimen was oriented,mapped and placed in 1 block(s).  Each specimen was then chromacoded and processed in the Mohs laboratory using standard Mohs technique and submitted for frozen section histology.  Frozen section analysis showed no residual tumor but CLEAR MARGINS.      The tumor was excised using standard Mohs technique in 1 stages(s).  CLEAR MARGINS OBTAINED and Final defect size was 1.3 x 1.2 cm.     We discussed the options for wound management in full with the patient including risks/benefits/ possible outcomes.        REPAIR COMPLEX: Because of the tightness of the surrounding skin and Because of the size and full thickness nature of the defect, a complex closure was planned. After LEC anesthesia and prep, Burow's triangles were excised in the relaxed skin tension lines. The wound edges were widely undermined by dissection in the subcutaneous plane until adequate tissue mobility was obtained. Hemostasis was obtained. The wound edges were closed in a layered fashion using Vicryl and Fast Absorbing Plain Gut sutures. Postoperative length was 4 cm.   EBL minimal; complications none; wound care routine.  The patient was discharged in good condition and will return in one week for wound evaluation.        Again, thank you for allowing me to participate in the care of your patient.        Sincerely,        Spencer Cuba MD

## 2020-10-08 NOTE — PATIENT INSTRUCTIONS
Sutured Wound Care     [facility]: [phone]          ? No strenuous activity for 48 hours. Resume moderate activity in 48 hours. No heavy exercising until you are seen for follow up in one week.     ? Take Tylenol as needed for discomfort.                         ? Do not drink alcoholic beverages for 48 hours.     ? Keep the pressure bandage in place for 24 hours. If the bandage becomes blood tinged or loose, reinforce it with gauze and tape.        (Refer to the reverse side of this page for management of bleeding).    ? Remove pressure bandage in 24 hours     ? Leave the flat bandage in place until your follow up appointment.    ? Keep the bandage dry. Wash around it carefully.    ? If the tape becomes soiled or starts to come off, reinforce it with additional paper tape.    ? Do not smoke for 3 weeks; smoking is detrimental to wound healing.    ? It is normal to have swelling and bruising around the surgical site. The bruising will fade in approximately 10-14 days. Elevate the area to reduce swelling.    ? Numbness, itchiness and sensitivity to temperature changes can occur after surgery and may take up to 18 months to normalize.      POSSIBLE COMPLICATIONS    BLEEDIN. Leave the bandage in place.  2. Use tightly rolled up gauze or a cloth to apply direct pressure over the bandage for 20   minutes.  3. Reapply pressure for an additional 20 minutes if necessary  4. Call the office or go to the nearest emergency room if pressure fails to stop the bleeding.  5. Use additional gauze and tape to maintain pressure once the bleeding has stopped.        PAIN:    1. Post operative pain should slowly get better, never worse.  2. A severe increase in pain may indicate a problem. Call the office if this occurs.    In case of emergency phone:Dr Cuba [phone]

## 2020-10-13 ENCOUNTER — TELEPHONE (OUTPATIENT)
Dept: DERMATOLOGY | Facility: CLINIC | Age: 75
End: 2020-10-13

## 2020-10-13 NOTE — TELEPHONE ENCOUNTER
Reason for Call:  Other call back    Detailed comments: patient would like to know if she can wash her face today.    Phone Number Patient can be reached at: Home number on file [phone] (home)    Best Time: Anytime    Can we leave a detailed message on this number? YES    Call taken on 10/13/2020 at 10:58 AM by Lorne Hernandez

## 2020-10-13 NOTE — TELEPHONE ENCOUNTER
Called and spoke to patient.    Informed patient she can wash her face, but needs to be careful the bandage does not get wet.     Educated patient on sutures placed:  -deep sutures take 4 months to fully dissolve  -top sutures take 2 weeks to fully dissolve, which is why it's important to keep the a/a dry and covered for a total of 2 weeks    Patient voiced understanding.    HUSAM Keenan-BSN-PHN  [facility]  [phone]

## 2020-10-15 ENCOUNTER — ALLIED HEALTH/NURSE VISIT (OUTPATIENT)
Dept: DERMATOLOGY | Facility: CLINIC | Age: 75
End: 2020-10-15
Payer: MEDICARE

## 2020-10-15 DIAGNOSIS — Z48.02 ATTENTION TO DRESSINGS AND SUTURES: ICD-10-CM

## 2020-10-15 DIAGNOSIS — Z48.00 ATTENTION TO DRESSINGS AND SUTURES: ICD-10-CM

## 2020-10-15 DIAGNOSIS — Z48.01 ENCOUNTER FOR CHANGE OR REMOVAL OF SURGICAL WOUND DRESSING: Primary | ICD-10-CM

## 2020-10-15 PROCEDURE — 99207 PR NO CHARGE NURSE ONLY: CPT

## 2020-10-15 NOTE — PROGRESS NOTES
Pt returned to clinic for post surgery 1 week follow up bandage change. Pt has no complaints, denies pain. Bandage removed left temple, area cleansed with normal saline. Site is healing and wound edges approximating well. Reapplied new steri strips and paper tape.    Advised to watch for signs/sx of infection; spreading redness, drainage, odor, fever. Call or report promptly to clinic. Pt given written instructions and informed to rtc as needed. Patient verbalized understanding.

## 2020-10-15 NOTE — PATIENT INSTRUCTIONS

## 2020-10-22 ENCOUNTER — OFFICE VISIT (OUTPATIENT)
Dept: INTERNAL MEDICINE | Facility: CLINIC | Age: 75
End: 2020-10-22
Payer: MEDICARE

## 2020-10-22 ENCOUNTER — TELEPHONE (OUTPATIENT)
Dept: INTERNAL MEDICINE | Facility: CLINIC | Age: 75
End: 2020-10-22

## 2020-10-22 VITALS
WEIGHT: 139.5 LBS | DIASTOLIC BLOOD PRESSURE: 84 MMHG | BODY MASS INDEX: 24.72 KG/M2 | HEIGHT: 63 IN | RESPIRATION RATE: 16 BRPM | TEMPERATURE: 97.6 F | OXYGEN SATURATION: 99 % | SYSTOLIC BLOOD PRESSURE: 139 MMHG | HEART RATE: 76 BPM

## 2020-10-22 DIAGNOSIS — R30.0 DYSURIA: Primary | ICD-10-CM

## 2020-10-22 DIAGNOSIS — R82.90 NONSPECIFIC FINDING ON EXAMINATION OF URINE: ICD-10-CM

## 2020-10-22 DIAGNOSIS — R19.7 DIARRHEA, UNSPECIFIED TYPE: ICD-10-CM

## 2020-10-22 LAB
ALBUMIN UR-MCNC: NEGATIVE MG/DL
APPEARANCE UR: CLEAR
BACTERIA #/AREA URNS HPF: ABNORMAL /HPF
BILIRUB UR QL STRIP: NEGATIVE
COLOR UR AUTO: YELLOW
GLUCOSE UR STRIP-MCNC: NEGATIVE MG/DL
HGB UR QL STRIP: NEGATIVE
KETONES UR STRIP-MCNC: NEGATIVE MG/DL
LEUKOCYTE ESTERASE UR QL STRIP: ABNORMAL
NITRATE UR QL: NEGATIVE
NON-SQ EPI CELLS #/AREA URNS LPF: ABNORMAL /LPF
PH UR STRIP: 5.5 PH (ref 5–7)
RBC #/AREA URNS AUTO: ABNORMAL /HPF
SOURCE: ABNORMAL
SP GR UR STRIP: 1.02 (ref 1–1.03)
UROBILINOGEN UR STRIP-ACNC: 0.2 EU/DL (ref 0.2–1)
WBC #/AREA URNS AUTO: ABNORMAL /HPF

## 2020-10-22 PROCEDURE — 87088 URINE BACTERIA CULTURE: CPT | Performed by: NURSE PRACTITIONER

## 2020-10-22 PROCEDURE — 81001 URINALYSIS AUTO W/SCOPE: CPT | Performed by: NURSE PRACTITIONER

## 2020-10-22 PROCEDURE — 87086 URINE CULTURE/COLONY COUNT: CPT | Performed by: NURSE PRACTITIONER

## 2020-10-22 PROCEDURE — 87186 SC STD MICRODIL/AGAR DIL: CPT | Performed by: NURSE PRACTITIONER

## 2020-10-22 PROCEDURE — 99214 OFFICE O/P EST MOD 30 MIN: CPT | Performed by: NURSE PRACTITIONER

## 2020-10-22 RX ORDER — CIPROFLOXACIN 500 MG/1
500 TABLET, FILM COATED ORAL 2 TIMES DAILY
Qty: 20 TABLET | Refills: 0 | Status: SHIPPED | OUTPATIENT
Start: 2020-10-22 | End: 2020-11-01

## 2020-10-22 ASSESSMENT — ANXIETY QUESTIONNAIRES
5. BEING SO RESTLESS THAT IT IS HARD TO SIT STILL: NEARLY EVERY DAY
IF YOU CHECKED OFF ANY PROBLEMS ON THIS QUESTIONNAIRE, HOW DIFFICULT HAVE THESE PROBLEMS MADE IT FOR YOU TO DO YOUR WORK, TAKE CARE OF THINGS AT HOME, OR GET ALONG WITH OTHER PEOPLE: SOMEWHAT DIFFICULT
1. FEELING NERVOUS, ANXIOUS, OR ON EDGE: MORE THAN HALF THE DAYS
7. FEELING AFRAID AS IF SOMETHING AWFUL MIGHT HAPPEN: MORE THAN HALF THE DAYS
2. NOT BEING ABLE TO STOP OR CONTROL WORRYING: MORE THAN HALF THE DAYS
6. BECOMING EASILY ANNOYED OR IRRITABLE: MORE THAN HALF THE DAYS
3. WORRYING TOO MUCH ABOUT DIFFERENT THINGS: MORE THAN HALF THE DAYS
GAD7 TOTAL SCORE: 15

## 2020-10-22 ASSESSMENT — PATIENT HEALTH QUESTIONNAIRE - PHQ9: 5. POOR APPETITE OR OVEREATING: MORE THAN HALF THE DAYS

## 2020-10-22 ASSESSMENT — MIFFLIN-ST. JEOR: SCORE: 1091.9

## 2020-10-22 NOTE — PROGRESS NOTES
"Subjective     Cynthia Arita is a 75 year old female who presents to clinic today for the following health issues:    HPI         Diarrhea  Onset/Duration: 1-2 weeks  Description:       Consistency of stool: watery, pasty, explosive and mucousy       Blood in stool: no       Number of loose stools past 24 hours: 3  Progression of Symptoms: same  Accompanying signs and symptoms:       Fever: no       Nausea/Vomiting: no       Abdominal pain: YES- generalized cramping       Weight loss: no       Episodes of constipation: no  History   Ill contacts: no  Recent use of antibiotics: no  Recent travels: no  Recent medication-new or changes(Rx or OTC): no  Precipitating or alleviating factors: None  Therapies tried and outcome: tums    Genitourinary - Female  Onset/Duration: 1-2 weeks  Description:   Painful urination (Dysuria): YES           Frequency: YES  Blood in urine (Hematuria): no  Delay in urine (Hesitency): no  Intensity: mild  Progression of Symptoms:  intermittent  Accompanying Signs & Symptoms:  Fever/chills: no  Flank pain: no  Nausea and vomiting: no  Vaginal symptoms: none  Abdominal/Pelvic Pain: no  History:   History of frequent UTI s: no  History of kidney stones: no  Sexually Active: no  Possibility of pregnancy: No  Precipitating or alleviating factors: None  Therapies tried and outcome: Increase fluid intake        Review of Systems   Constitutional, HEENT, cardiovascular, pulmonary, gi and gu systems are negative, except as otherwise noted.      Objective    /84 (BP Location: Right arm, Patient Position: Sitting, Cuff Size: Adult Regular)   Pulse 76   Temp 97.6  F (36.4  C) (Oral)   Resp 16   Ht 1.6 m (5' 3\")   Wt 63.3 kg (139 lb 8 oz)   SpO2 99%   BMI 24.71 kg/m    Body mass index is 24.71 kg/m .  Physical Exam   GENERAL: healthy, alert and no distress  RESP: lungs clear to auscultation - no rales, rhonchi or wheezes  CV: regular rate and rhythm, normal S1 S2, no S3 or S4, no murmur, " click or rub, no peripheral edema and peripheral pulses strong  ABDOMEN: tenderness upper midline and bowel sounds normal  PSYCH: tangential and anxious            Assessment & Plan     Dysuria    - UA reflex to Microscopic and Culture    Diarrhea, unspecified type    - Clostridium difficile toxin B PCR; Future  - Ova and Parasite Exam Routine; Future  - Cryptosporidium/Giardia Immunoassay; Future        immodium right ear prn,  F/u pending labs    Laurence Patel NP  [facility]

## 2020-10-22 NOTE — TELEPHONE ENCOUNTER
Pt returned call from RN   Relayed to her the message below from NP  She will start [place] RN  [facility]

## 2020-10-22 NOTE — NURSING NOTE
"possible UTI, pain 2-3 days also having some diarrhea.  Vital signs:  Temp: 97.6  F (36.4  C) Temp src: Oral BP: 139/84 Pulse: 76   Resp: 16 SpO2: 99 %     Height: 160 cm (5' 3\") Weight: 63.3 kg (139 lb 8 oz)  Estimated body mass index is 24.71 kg/m  as calculated from the following:    Height as of this encounter: 1.6 m (5' 3\").    Weight as of this encounter: 63.3 kg (139 lb 8 oz).          "

## 2020-10-22 NOTE — TELEPHONE ENCOUNTER
Please notify pt positive UA, eRx sent for Cipro BID.  Will extend to 10 day course as this adair be the treatment for some inflammatory bowel conditions  Laurence Patel CNP

## 2020-10-23 ASSESSMENT — ANXIETY QUESTIONNAIRES: GAD7 TOTAL SCORE: 15

## 2020-10-24 ENCOUNTER — TRANSFERRED RECORDS (OUTPATIENT)
Dept: HEALTH INFORMATION MANAGEMENT | Facility: CLINIC | Age: 75
End: 2020-10-24

## 2020-10-24 LAB
BACTERIA SPEC CULT: ABNORMAL
SPECIMEN SOURCE: ABNORMAL

## 2020-10-26 ENCOUNTER — TELEPHONE (OUTPATIENT)
Dept: INTERNAL MEDICINE | Facility: CLINIC | Age: 75
End: 2020-10-26

## 2020-10-26 NOTE — TELEPHONE ENCOUNTER
Call received from [facility] E[facility]. States Dr. Parker is requesting patient be referred to a psychologist for persistent postural perceptual dizziness. Please advise.

## 2020-10-29 DIAGNOSIS — R19.7 DIARRHEA, UNSPECIFIED TYPE: ICD-10-CM

## 2020-10-29 LAB
C DIFF TOX B STL QL: NEGATIVE
SPECIMEN SOURCE: NORMAL

## 2020-10-29 PROCEDURE — 87177 OVA AND PARASITES SMEARS: CPT | Performed by: NURSE PRACTITIONER

## 2020-10-29 PROCEDURE — 87209 SMEAR COMPLEX STAIN: CPT | Performed by: NURSE PRACTITIONER

## 2020-10-29 PROCEDURE — 87493 C DIFF AMPLIFIED PROBE: CPT | Performed by: NURSE PRACTITIONER

## 2020-10-30 ENCOUNTER — TELEPHONE (OUTPATIENT)
Dept: INTERNAL MEDICINE | Facility: CLINIC | Age: 75
End: 2020-10-30

## 2020-10-30 DIAGNOSIS — N39.0 URINARY TRACT INFECTION WITHOUT HEMATURIA, SITE UNSPECIFIED: Primary | ICD-10-CM

## 2020-10-30 LAB
O+P STL MICRO: NORMAL
O+P STL MICRO: NORMAL
SPECIMEN SOURCE: NORMAL

## 2020-10-30 NOTE — TELEPHONE ENCOUNTER
Patient states diarrhea, nasua and UTI symptoms have not improved since starting ciprofloxacin. Please call her back at [phone].

## 2020-10-30 NOTE — TELEPHONE ENCOUNTER
"Per chart patient was started on Cipro 10/22. Call to patient. States has had diarrhea 5 times today. States she would like to wait until stool tests come back and will not go to an urgent care or ED. Advised stool tests were negative. States when she urinates her hands and arms feel numb and tingly and when she is done this goes away. States she also had burning and pressure with urination. The burning and pressure with urination have improved but she still has \"tension\" down there. Symptoms of her hands and arms have not improved. Patient will continue to monitor over the weekend and call on Monday with update.   "

## 2020-11-03 NOTE — TELEPHONE ENCOUNTER
Contacted patient. She has not seen a counselor before. Patient states that she has told Dr. Parker that this is not her problem, she states her issues are UTIs and dizziness from medications.     Patient states she plans to make an appointment with Dr. Samuels to discuss this further, she did not want to set up appointment right now and states she will call back to do this.    detailed exam

## 2020-11-03 NOTE — TELEPHONE ENCOUNTER
Contacted patient. She states that she continues to have burning with urination and frequent urination. No longer having burning pain between trips to the bathroom. The loose stools have improved. Patient did not want another antibiotic at this time.     Patient states that when she gets home, she will call to schedule an appointment with Dr. Samuels.

## 2020-11-06 RX ORDER — CEFDINIR 300 MG/1
300 CAPSULE ORAL 2 TIMES DAILY
Qty: 14 CAPSULE | Refills: 0 | Status: SHIPPED | OUTPATIENT
Start: 2020-11-06 | End: 2020-12-02

## 2020-11-06 NOTE — TELEPHONE ENCOUNTER
Patient informed Olinda sent prescription for Omnicef to the pharmacy, if symptoms do not improve next week she would need to do another urine culture. Patient verbalizes understanding.

## 2020-11-06 NOTE — TELEPHONE ENCOUNTER
Patient is calling because she is having a lot of burning with urination and frequent also. She just finished and antibiotic for a UTI less than a week ago.

## 2020-11-16 ENCOUNTER — TRANSFERRED RECORDS (OUTPATIENT)
Dept: HEALTH INFORMATION MANAGEMENT | Facility: CLINIC | Age: 75
End: 2020-11-16

## 2020-11-18 DIAGNOSIS — F41.9 ANXIETY: ICD-10-CM

## 2020-11-18 RX ORDER — ALPRAZOLAM 0.5 MG
0.5 TABLET ORAL 3 TIMES DAILY PRN
Qty: 15 TABLET | Refills: 0 | Status: SHIPPED | OUTPATIENT
Start: 2020-11-18 | End: 2020-12-17

## 2020-12-02 ENCOUNTER — OFFICE VISIT (OUTPATIENT)
Dept: INTERNAL MEDICINE | Facility: CLINIC | Age: 75
End: 2020-12-02
Payer: MEDICARE

## 2020-12-02 VITALS
DIASTOLIC BLOOD PRESSURE: 83 MMHG | TEMPERATURE: 97.4 F | SYSTOLIC BLOOD PRESSURE: 134 MMHG | OXYGEN SATURATION: 96 % | RESPIRATION RATE: 20 BRPM | BODY MASS INDEX: 24.98 KG/M2 | HEART RATE: 78 BPM | WEIGHT: 141 LBS

## 2020-12-02 DIAGNOSIS — N39.0 FREQUENT UTI: Primary | ICD-10-CM

## 2020-12-02 DIAGNOSIS — H81.09 MENIERE'S DISEASE, UNSPECIFIED LATERALITY: ICD-10-CM

## 2020-12-02 LAB
ALBUMIN UR-MCNC: NEGATIVE MG/DL
APPEARANCE UR: CLEAR
BACTERIA #/AREA URNS HPF: ABNORMAL /HPF
BILIRUB UR QL STRIP: NEGATIVE
COLOR UR AUTO: YELLOW
GLUCOSE UR STRIP-MCNC: NEGATIVE MG/DL
HGB UR QL STRIP: NEGATIVE
KETONES UR STRIP-MCNC: NEGATIVE MG/DL
LEUKOCYTE ESTERASE UR QL STRIP: ABNORMAL
NITRATE UR QL: NEGATIVE
PH UR STRIP: 5.5 PH (ref 5–7)
RBC #/AREA URNS AUTO: ABNORMAL /HPF
SOURCE: ABNORMAL
SP GR UR STRIP: 1.02 (ref 1–1.03)
UROBILINOGEN UR STRIP-ACNC: 0.2 EU/DL (ref 0.2–1)
WBC #/AREA URNS AUTO: ABNORMAL /HPF

## 2020-12-02 PROCEDURE — 99214 OFFICE O/P EST MOD 30 MIN: CPT | Performed by: INTERNAL MEDICINE

## 2020-12-02 PROCEDURE — 81001 URINALYSIS AUTO W/SCOPE: CPT | Performed by: INTERNAL MEDICINE

## 2020-12-02 RX ORDER — DIMENHYDRINATE 50 MG
1 TABLET ORAL DAILY
COMMUNITY

## 2020-12-02 RX ORDER — CHOLECALCIFEROL (VITAMIN D3) 50 MCG
1 TABLET ORAL DAILY
COMMUNITY

## 2020-12-02 NOTE — PROGRESS NOTES
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 ENT recently suggested she see a counselor. She is not happy about this. She knows she has to live with her lack of balance and is adjusting.    She did have a severe UTI that required 2 courses of treatment. She feels fine now. But would like a repeat urine test      Review of Systems   Constitutional, HEENT, cardiovascular, pulmonary, GI, , musculoskeletal, neuro, skin, endocrine and psych systems are negative, except as otherwise noted.      Objective    /83 (BP Location: Right arm, Patient Position: Sitting, Cuff Size: Adult Regular)   Pulse 78   Temp 97.4  F (36.3  C) (Oral)   Resp 20   Wt 64 kg (141 lb)   SpO2 96%   BMI 24.98 kg/m    Body mass index is 24.98 kg/m .  Physical Exam   GENERAL: healthy, alert and no distress  NECK: no adenopathy, no asymmetry, masses, or scars and thyroid normal to palpation  RESP: lungs clear to auscultation - no rales, rhonchi or wheezes  CV: regular rate and rhythm, normal S1 S2, no S3 or S4, no murmur, click or rub, no peripheral edema and peripheral pulses strong  ABDOMEN: soft, nontender, no hepatosplenomegaly, no masses and bowel sounds normal  MS: no gross musculoskeletal defects noted, no edema  PSYCH: mentation appears normal, affect normal/bright            Assessment & Plan     Frequent UTI  Will check   - UA with Microscopic reflex to Culture    Meniere's disease, unspecified laterality  stable          No follow-ups on file.    Huyen Samuels MD  [facility]

## 2020-12-03 ENCOUNTER — HOSPITAL ENCOUNTER (OUTPATIENT)
Dept: MRI IMAGING | Facility: CLINIC | Age: 75
Discharge: HOME | End: 2020-12-03
Attending: INTERNAL MEDICINE | Admitting: INTERNAL MEDICINE
Payer: MEDICARE

## 2020-12-03 ENCOUNTER — TRANSFERRED RECORDS (OUTPATIENT)
Dept: HEALTH INFORMATION MANAGEMENT | Facility: CLINIC | Age: 75
End: 2020-12-03

## 2020-12-03 DIAGNOSIS — R42 DIZZINESS: ICD-10-CM

## 2020-12-03 LAB
CREAT BLD-MCNC: 0.6 MG/DL (ref 0.52–1.04)
GFR SERPL CREATININE-BSD FRML MDRD: >90 ML/MIN/{1.73_M2}

## 2020-12-03 PROCEDURE — 82565 ASSAY OF CREATININE: CPT

## 2020-12-03 PROCEDURE — 255N000002 HC RX 255 OP 636

## 2020-12-03 PROCEDURE — 70553 MRI BRAIN STEM W/O & W/DYE: CPT

## 2020-12-03 PROCEDURE — A9585 GADOBUTROL INJECTION: HCPCS

## 2020-12-03 RX ORDER — GADOBUTROL 604.72 MG/ML
7.5 INJECTION INTRAVENOUS ONCE
Status: COMPLETED | OUTPATIENT
Start: 2020-12-03 | End: 2020-12-03

## 2020-12-03 RX ADMIN — GADOBUTROL 6.5 ML: 604.72 INJECTION INTRAVENOUS at 08:45

## 2020-12-09 DIAGNOSIS — R11.0 NAUSEA: ICD-10-CM

## 2020-12-11 RX ORDER — PROMETHAZINE HYDROCHLORIDE 25 MG/1
TABLET ORAL
Qty: 30 TABLET | Refills: 1 | Status: SHIPPED | OUTPATIENT
Start: 2020-12-11 | End: 2022-05-04

## 2020-12-15 DIAGNOSIS — F41.9 ANXIETY: ICD-10-CM

## 2020-12-16 NOTE — PROGRESS NOTES
Dr Oconnor's note      Patient's instructions / PLAN:                                                        Plan:  1. Do not mix Alprazolam ( Xanax) with Diazepam ( Valium)   2. Continue the other meds, same doses for now.        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  I see no signs of cellulitis or DVT.   Plan: If no improvement, or swelling, we can consider a Doppler ultrasound       Chief complaint:                                                      Anxiety, vertigo  Right arm  ER follow-up      SUBJECTIVE:                                                    Cynthia Arita is a 72 year old female who presents to clinic today for the following health issues:      She went to emergency room on May 27 with severe vertigo.  She received a paper prescription for meclizine, she still has it today, so she did not turn it in.  She picked up a prescription at the pharmacy which she thought it was the meclizine.  She was taking it  3 times a day and she was feeling worse and worse.  The family looked up the bottle and noticed that she received lorazepam from the pharmacy.  She has had side effect from lorazepam in the past, so she stopped it.  She became very upset and frightened that the pharmacy gave wrong medication: Lorazepam instead of meclizine.  She is crying and looks very anxious in my office.  I reviewed the ER note and the prescriptions and on May 27 she received a prescription for lorazepam.  I assume that this prescription was faxed directly to the pharmacy,  so the pharmacy dispensed the right medication.  As I mentioned above she still has the paper prescription for meclizine.  I explained her my thoughts and she felt much  "better.    Because she was not feeling well, she called her ENT doctor, Dr. Parker, who prescribed 3 doses of material prednisolone IV and further vertigo tests.    She feels much better since she stopped the lorazepam.  She wonders if she needs to pursue the test scheduled in few days.  I advised her to call the ENT doctor, to explain that she feels better and postpone the tests for couple of weeks.  Maybe she will feel back to baseline and she does not need this test.        Had 3 methylprednisolone in jections iv in the R arm at [facility] center. She has pain in the lat part of the R arm. No swelling, no redness . Good pulse.  She has mild tenderness over the right arm.    Anxiety: I refilled the alprazolam few days ago.  She has no side effects from it.  Today she tells me that she has also diazepam at home.  I advised her not to combine down: In a 24-hour period  She will use one or the other    LOV: Plan:  1. Check the blood pressure at home   2. If the blood pressure stays constantly higher than 170 take 1 tablet propranolol 20 mg  3. If you resume the supplements do them one by one every 2-3 weeks   4. Continue same meds, same doses for now     Review of Systems:                                                      ROS: negative for fever, chills, cough, wheezes, chest pain, shortness of breath, vomiting, abdominal pain, leg swelling       OBJECTIVE:             Physical exam:   Blood pressure 130/74, pulse 64, temperature 98  F (36.7  C), temperature source Oral, height 5' 2.75\" (1.594 m), weight 131 lb (59.4 kg), not currently breastfeeding.     NAD, appears comfortable, but upset and crying for the first half of the appointment  Skin: no rashes   Chest: clear to auscultation bilaterally, good respiratory effort  Heart: S1 S2, RRR, no mgr appreciated  Abdomen: soft, not tender,   Extremities: no edema,   Neurologic: A, Ox3, no focal signs appreciated  R arm, as above     PMHx: reviewed  Past Medical " History:   Diagnosis Date     Diverticulosis      GERD (gastroesophageal reflux disease)      HTN (hypertension)      Meniere's disease      Nephrolithiasis       PSHx: reviewed  Past Surgical History:   Procedure Laterality Date     C VAGINAL HYSTERECTOMY      with left oophorectomy        Meds: reviewed  Current Outpatient Prescriptions   Medication Sig Dispense Refill     ALPRAZolam (XANAX) 0.25 MG tablet Take 1 tablet (0.25 mg) by mouth 3 times daily as needed for anxiety 15 tablet 0     calcitonin, salmon, (MIACALCIN) 200 UNIT/ACT nasal spray Spray 1 spray into one nostril alternating nostrils daily Alternate nostril each day. 3 Bottle 1     Cholecalciferol (VITAMIN D3) 2000 UNITS CAPS Take 2,000 Units by mouth every morning        DIAZEPAM PO Take 2 mg by mouth every 8 hours as needed for other (dizziness)       Glucosamine-Chondroitin (GLUCOSAMINE CHONDR COMPLEX PO) Take 1 capsule by mouth 2 times daily        meclizine (ANTIVERT) 25 MG tablet Take 1 tablet (25 mg) by mouth every 6 hours as needed for dizziness 30 tablet 1     Multiple Vitamins-Minerals (OCUVITE PRESERVISION PO) Take 1 tablet by mouth 2 times daily        ondansetron (ZOFRAN ODT) 4 MG ODT tab Take 1 tablet (4 mg) by mouth every 8 hours as needed for nausea 120 tablet 1     pantoprazole (PROTONIX) 40 MG EC tablet Take 1 tablet (40 mg) by mouth daily Take 30-60 minutes before a meal. 90 tablet 3     triamterene-hydrochlorothiazide (MAXZIDE-25) 37.5-25 MG per tablet Take 1 tablet by mouth daily 90 tablet 1     ALPRAZolam (XANAX) 0.5 MG tablet Take 1/2 tab tid prn 30 tablet 1     Flaxseed, Linseed, (FLAXSEED OIL) 1000 MG CAPS Take 1,000 mg by mouth every evening        HERBALS 2 times daily as needed       meclizine (ANTIVERT) 25 MG tablet Take 1 tablet (25 mg) by mouth every 6 hours as needed for dizziness 30 tablet 1     Multiple Vitamins-Minerals (CENTRUM SILVER) per tablet Take 1 tablet by mouth daily       Omega-3 Fatty Acids (OMEGA-3 FISH  OIL) 1000 MG CAPS Take 1,000 mg by mouth 2 times daily        propranolol (INDERAL) 20 MG tablet Take 1 tablet (20 mg) by mouth daily as needed For blood pressure higher than 170 90 tablet 0       Soc Hx: reviewed  Fam Hx: reviewed      Time spent with the patient  40 min, more than 50% in counseling and coordinating care, Re above medical problems anxiety, vertigo, right arm pain, most of the time trying to figure out what happened with the prescription in the emergency room  and pharmacy level and reassuring her    Senait Oconnor MD  Internal Medicine      - will continue to follow

## 2020-12-17 RX ORDER — ALPRAZOLAM 0.5 MG
0.5 TABLET ORAL 3 TIMES DAILY PRN
Qty: 15 TABLET | Refills: 0 | Status: SHIPPED | OUTPATIENT
Start: 2020-12-17 | End: 2022-02-28

## 2020-12-17 NOTE — TELEPHONE ENCOUNTER
Controlled Substance Refill Request for Xanax  Problem List Complete:  No     PROVIDER TO CONSIDER COMPLETION OF PROBLEM LIST AND OVERVIEW/CONTROLLED SUBSTANCE AGREEMENT    Last Written Prescription Date:  11/18/20  Last Fill Quantity: 15,   # refills: 0    THE MOST RECENT OFFICE VISIT MUST BE WITHIN THE PAST 3 MONTHS. AT LEAST ONE FACE TO FACE VISIT MUST OCCUR EVERY 6 MONTHS. ADDITIONAL VISITS CAN BE VIRTUAL.      [place] [facility] [place] 12/2/20    Future Office visit:     Controlled substance agreement:   Encounter-Level CSA:    There are no encounter-level csa.     Patient-Level CSA:    There are no patient-level csa.         Last Urine Drug Screen: No results found for: CDAUT, No results found for: COMDAT, No results found for: THC13, PCP13, COC13, MAMP13, OPI13, AMP13, BZO13, TCA13, MTD13, BAR13, OXY13, PPX13, BUP13     Processing:  Rx to be electronically transmitted to pharmacy by provider      [place] ([place]) writer unable to access  currently   [place] profile:  [email or web address]

## 2021-01-07 ENCOUNTER — TRANSFERRED RECORDS (OUTPATIENT)
Dept: HEALTH INFORMATION MANAGEMENT | Facility: CLINIC | Age: 76
End: 2021-01-07

## 2021-01-11 ENCOUNTER — TELEPHONE (OUTPATIENT)
Dept: INTERNAL MEDICINE | Facility: CLINIC | Age: 76
End: 2021-01-11

## 2021-01-11 NOTE — TELEPHONE ENCOUNTER
Patient calls stating that she needs PCP to order tests for her based on the ENT notes from visit on  11/03/20.  Patient was advised to have tests done prior to having follow up with ENT.  Please address and advise patient when ordered.

## 2021-01-11 NOTE — TELEPHONE ENCOUNTER
Patient states ENT nurse Halley called her last week with instructions but can not remember what she is supposed to do. Will need to call ENT 1- during business hours to to clarify.

## 2021-01-12 ENCOUNTER — MEDICAL CORRESPONDENCE (OUTPATIENT)
Dept: HEALTH INFORMATION MANAGEMENT | Facility: CLINIC | Age: 76
End: 2021-01-12

## 2021-01-12 NOTE — TELEPHONE ENCOUNTER
Spoke to nurse Halley. Confirmed labs fax number. Patient needs EKG and labs. Patient advised to make appointment.

## 2021-01-12 NOTE — TELEPHONE ENCOUNTER
ENT called and the only thing the patient needs to do is come in for lab work. They faxed the orders on 1/7/2021. Still not in EPIC

## 2021-01-13 ENCOUNTER — ALLIED HEALTH/NURSE VISIT (OUTPATIENT)
Dept: NURSING | Facility: CLINIC | Age: 76
End: 2021-01-13
Payer: MEDICARE

## 2021-01-13 DIAGNOSIS — R42 DIZZINESS AND GIDDINESS: Primary | ICD-10-CM

## 2021-01-13 DIAGNOSIS — R26.89 BALANCE PROBLEMS: Primary | ICD-10-CM

## 2021-01-13 PROCEDURE — 93000 ELECTROCARDIOGRAM COMPLETE: CPT

## 2021-01-13 PROCEDURE — 36415 COLL VENOUS BLD VENIPUNCTURE: CPT | Performed by: OTOLARYNGOLOGY

## 2021-01-13 PROCEDURE — 82565 ASSAY OF CREATININE: CPT | Performed by: OTOLARYNGOLOGY

## 2021-01-13 PROCEDURE — 99207 PR NO CHARGE NURSE ONLY: CPT

## 2021-01-13 PROCEDURE — 84520 ASSAY OF UREA NITROGEN: CPT | Performed by: OTOLARYNGOLOGY

## 2021-01-14 LAB
BUN SERPL-MCNC: 23 MG/DL (ref 7–30)
CREAT SERPL-MCNC: 0.54 MG/DL (ref 0.52–1.04)
GFR SERPL CREATININE-BSD FRML MDRD: >90 ML/MIN/{1.73_M2}

## 2021-01-20 ENCOUNTER — TRANSFERRED RECORDS (OUTPATIENT)
Dept: HEALTH INFORMATION MANAGEMENT | Facility: CLINIC | Age: 76
End: 2021-01-20

## 2021-01-28 ENCOUNTER — IMMUNIZATION (OUTPATIENT)
Dept: NURSING | Facility: CLINIC | Age: 76
End: 2021-01-28
Payer: MEDICARE

## 2021-01-28 PROCEDURE — 91300 PR COVID VAC PFIZER DIL RECON 30 MCG/0.3 ML IM: CPT

## 2021-01-28 PROCEDURE — 0001A PR COVID VAC PFIZER DIL RECON 30 MCG/0.3 ML IM: CPT

## 2021-02-17 ENCOUNTER — TRANSFERRED RECORDS (OUTPATIENT)
Dept: HEALTH INFORMATION MANAGEMENT | Facility: CLINIC | Age: 76
End: 2021-02-17

## 2021-02-18 ENCOUNTER — IMMUNIZATION (OUTPATIENT)
Dept: NURSING | Facility: CLINIC | Age: 76
End: 2021-02-18
Attending: SURGERY
Payer: MEDICARE

## 2021-02-18 PROCEDURE — 0002A PR COVID VAC PFIZER DIL RECON 30 MCG/0.3 ML IM: CPT

## 2021-02-18 PROCEDURE — 91300 PR COVID VAC PFIZER DIL RECON 30 MCG/0.3 ML IM: CPT

## 2021-02-24 DIAGNOSIS — H81.09 MENIERE'S DISEASE, UNSPECIFIED LATERALITY: ICD-10-CM

## 2021-02-24 DIAGNOSIS — F41.9 ANXIETY: ICD-10-CM

## 2021-02-25 ENCOUNTER — NURSE TRIAGE (OUTPATIENT)
Dept: NURSING | Facility: CLINIC | Age: 76
End: 2021-02-25

## 2021-02-25 RX ORDER — DIAZEPAM 2 MG
TABLET ORAL
Qty: 40 TABLET | Refills: 0 | Status: SHIPPED | OUTPATIENT
Start: 2021-02-25 | End: 2021-08-16

## 2021-02-25 NOTE — TELEPHONE ENCOUNTER
Routed to covering provider.    Please see RN's message below.    Please advise, thanks.  Routed to covering provider.

## 2021-02-25 NOTE — TELEPHONE ENCOUNTER
Spoke with Rebecca at [facility].  She is unsure if fluids will help if patient is not vomiting.  She advised patient be seen tomorrow in clinic.  And if necessary, can work patient in at [facility] for fluids tomorrow.    Spoke with patient.  States she is not vomiting--drinking plenty of fluids and urinating normal.  She is taking zofran.  Not interested in appointment tomorrow.    (She is having R ear surgery mid-March for vertigo--balance off.  Deaf in L ear.  Attributes dizziness to the vertigo.  Does have a preop appointment 3-8-21.)    Will call back as needed.

## 2021-02-25 NOTE — TELEPHONE ENCOUNTER
Clinic Action Needed: Yes, follow up with PCP. Please call  Cynthia at [phone]    FNA Triage Call  Presenting Problem:    Cynthia reports ongoing nausea since receiving 2nd COVID shot on 2/18. Has fatigue and dizziness.    Nausea has been getting worse.    She has been prescribed Zofran PRN before and she takes it as needed. FNA advised on use of Zofran to see if it helps. FNA also advised to have her seen within 24 hours since side effects should resolve 1-2 days after the vaccine.    No vomiting, still able to stand and walk without support. No fever.     FNA advised to drink 6-8 glasses of water per day.    Sending this to care team to follow up on patient (PCP triage). She states she will call tomorrow if her nausea still persists.        Routed to:  SHERI MEMBRENO [[id number]]    Carmencita Islas RN/[place] [facility]              Reason for Disposition    Sounds like a severe, unusual reaction to the triager    Additional Information    Negative: [1] Difficulty breathing or swallowing AND [2] starts within 2 hours after injection    Negative: Sounds like a life-threatening emergency to the triager    Negative: Shock suspected (e.g., cold/pale/clammy skin, too weak to stand, low BP, rapid pulse)    Negative: Sounds like a life-threatening emergency to the triager    Negative: Unable to walk, or can only walk with assistance (e.g., requires support)    Negative: Difficulty breathing    Negative: Insulin-dependent diabetes (Type I) and glucose > 400 mg/dL (22 mmol/L)    Negative: Drinking very little and dehydration suspected (e.g., no urine > 12 hours, very dry mouth, very lightheaded)    Negative: Patient sounds very sick or weak to the triager    Negative: Fever > 104 F (40 C)    Negative: Fever > 101 F (38.3 C) and age > 60    Negative: Fever > 100.0 F (37.8 C) and bedridden (e.g., nursing home patient, CVA, chronic illness, recovering from surgery)    Negative: Fever > 100.0 F (37.8 C) and diabetes mellitus or  weak immune system (e.g., HIV positive, cancer chemo, splenectomy, chronic steroids)    Negative: Taking any of the following medications: digoxin (Lanoxin), lithium, theophylline, phenytoin (Dilantin)    Negative: Yellowish color of the skin or white of the eye (i.e., jaundice)    Negative: Fever present > 3 days (72 hours)    Negative: Patient wants to be seen    Negative: Receiving cancer chemotherapy medication    Negative: Taking prescription medication that could cause nausea (e.g., narcotics/opiates, antibiotics, OCPs, many others)    Nausea lasts > 1 week    Negative: Fever > 104 F (40 C)    Protocols used: CORONAVIRUS (COVID-19) VACCINE QUESTIONS AND [id number]-XQ 1.3, NAUSEA-A-OH

## 2021-02-25 NOTE — TELEPHONE ENCOUNTER
Controlled Substance Refill Request for Diazepam   Problem List Complete:  No     PROVIDER TO CONSIDER COMPLETION OF PROBLEM LIST AND OVERVIEW/CONTROLLED SUBSTANCE AGREEMENT    Last Written Prescription Date:  4/22/20  Last Fill Quantity: 40,   # refills: 0    Last Office Visit with [facility] [place] [facility] primary care provider: 12/2/20    Future Office visit:   Next 5 appointments (look out 90 days)    Mar 08, 2021  9:40 AM  Pre-Op physical with Huyen Samuels MD  [facility] [address]  [facility] [address]  [phone]          Controlled substance agreement:   Encounter-Level CSA:    There are no encounter-level csa.     Patient-Level CSA:    There are no patient-level csa.         Last Urine Drug Screen: No results found for: CDAUT, No results found for: COMDAT, No results found for: THC13, PCP13, COC13, MAMP13, OPI13, AMP13, BZO13, TCA13, MTD13, BAR13, OXY13, PPX13, BUP13     [place] reviewed:  not reviewed/not due - last done on 12/17/20  [place] profile:  [email or web address]

## 2021-03-08 ENCOUNTER — OFFICE VISIT (OUTPATIENT)
Dept: INTERNAL MEDICINE | Facility: CLINIC | Age: 76
End: 2021-03-08
Payer: MEDICARE

## 2021-03-08 VITALS
BODY MASS INDEX: 25.09 KG/M2 | DIASTOLIC BLOOD PRESSURE: 81 MMHG | HEIGHT: 63 IN | RESPIRATION RATE: 16 BRPM | WEIGHT: 141.6 LBS | TEMPERATURE: 97.3 F | OXYGEN SATURATION: 95 % | HEART RATE: 76 BPM | SYSTOLIC BLOOD PRESSURE: 127 MMHG

## 2021-03-08 DIAGNOSIS — Z01.818 PREOP GENERAL PHYSICAL EXAM: Primary | ICD-10-CM

## 2021-03-08 DIAGNOSIS — R42 DIZZINESS: ICD-10-CM

## 2021-03-08 DIAGNOSIS — Z12.31 ENCOUNTER FOR SCREENING MAMMOGRAM FOR BREAST CANCER: ICD-10-CM

## 2021-03-08 DIAGNOSIS — I10 ESSENTIAL HYPERTENSION: ICD-10-CM

## 2021-03-08 LAB
BASOPHILS # BLD AUTO: 0 10E9/L (ref 0–0.2)
BASOPHILS NFR BLD AUTO: 0.4 %
DIFFERENTIAL METHOD BLD: ABNORMAL
EOSINOPHIL # BLD AUTO: 0.2 10E9/L (ref 0–0.7)
EOSINOPHIL NFR BLD AUTO: 2.9 %
ERYTHROCYTE [DISTWIDTH] IN BLOOD BY AUTOMATED COUNT: 14.1 % (ref 10–15)
HCT VFR BLD AUTO: 44.8 % (ref 35–47)
HGB BLD-MCNC: 13.4 G/DL (ref 11.7–15.7)
LYMPHOCYTES # BLD AUTO: 1.3 10E9/L (ref 0.8–5.3)
LYMPHOCYTES NFR BLD AUTO: 24.6 %
MCH RBC QN AUTO: 29.3 PG (ref 26.5–33)
MCHC RBC AUTO-ENTMCNC: 29.9 G/DL (ref 31.5–36.5)
MCV RBC AUTO: 98 FL (ref 78–100)
MONOCYTES # BLD AUTO: 0.5 10E9/L (ref 0–1.3)
MONOCYTES NFR BLD AUTO: 8.3 %
NEUTROPHILS # BLD AUTO: 3.5 10E9/L (ref 1.6–8.3)
NEUTROPHILS NFR BLD AUTO: 63.8 %
PLATELET # BLD AUTO: 271 10E9/L (ref 150–450)
RBC # BLD AUTO: 4.58 10E12/L (ref 3.8–5.2)
WBC # BLD AUTO: 5.5 10E9/L (ref 4–11)

## 2021-03-08 PROCEDURE — 99214 OFFICE O/P EST MOD 30 MIN: CPT | Mod: CS | Performed by: INTERNAL MEDICINE

## 2021-03-08 PROCEDURE — 80053 COMPREHEN METABOLIC PANEL: CPT | Performed by: INTERNAL MEDICINE

## 2021-03-08 PROCEDURE — 36415 COLL VENOUS BLD VENIPUNCTURE: CPT | Performed by: INTERNAL MEDICINE

## 2021-03-08 PROCEDURE — 85025 COMPLETE CBC W/AUTO DIFF WBC: CPT | Performed by: INTERNAL MEDICINE

## 2021-03-08 ASSESSMENT — PATIENT HEALTH QUESTIONNAIRE - PHQ9
10. IF YOU CHECKED OFF ANY PROBLEMS, HOW DIFFICULT HAVE THESE PROBLEMS MADE IT FOR YOU TO DO YOUR WORK, TAKE CARE OF THINGS AT HOME, OR GET ALONG WITH OTHER PEOPLE: NOT DIFFICULT AT ALL
SUM OF ALL RESPONSES TO PHQ QUESTIONS 1-9: 4
SUM OF ALL RESPONSES TO PHQ QUESTIONS 1-9: 4

## 2021-03-08 ASSESSMENT — ANXIETY QUESTIONNAIRES
1. FEELING NERVOUS, ANXIOUS, OR ON EDGE: SEVERAL DAYS
2. NOT BEING ABLE TO STOP OR CONTROL WORRYING: SEVERAL DAYS
6. BECOMING EASILY ANNOYED OR IRRITABLE: SEVERAL DAYS
GAD7 TOTAL SCORE: 5
4. TROUBLE RELAXING: NOT AT ALL
5. BEING SO RESTLESS THAT IT IS HARD TO SIT STILL: NOT AT ALL
GAD7 TOTAL SCORE: 5
GAD7 TOTAL SCORE: 5
7. FEELING AFRAID AS IF SOMETHING AWFUL MIGHT HAPPEN: SEVERAL DAYS
3. WORRYING TOO MUCH ABOUT DIFFERENT THINGS: SEVERAL DAYS
7. FEELING AFRAID AS IF SOMETHING AWFUL MIGHT HAPPEN: SEVERAL DAYS

## 2021-03-08 ASSESSMENT — MIFFLIN-ST. JEOR: SCORE: 1101.42

## 2021-03-08 NOTE — LETTER
March 9, 2021      Cynthiasa SONDRA Arita  [address] [facility] [address]        Dear ,    We are writing to inform you of your test results.    Labs within acceptable limits     Resulted Orders   CBC with platelets and differential   Result Value Ref Range    WBC 5.5 4.0 - 11.0 10e9/L    RBC Count 4.58 3.8 - 5.2 10e12/L    Hemoglobin 13.4 11.7 - 15.7 g/dL    Hematocrit 44.8 35.0 - 47.0 %    MCV 98 78 - 100 fl    MCH 29.3 26.5 - 33.0 pg    MCHC 29.9 (L) 31.5 - 36.5 g/dL      Comment:      Results confirmed by repeat test    RDW 14.1 10.0 - 15.0 %    Platelet Count 271 150 - 450 10e9/L    % Neutrophils 63.8 %    % Lymphocytes 24.6 %    % Monocytes 8.3 %    % Eosinophils 2.9 %    % Basophils 0.4 %    Absolute Neutrophil 3.5 1.6 - 8.3 10e9/L    Absolute Lymphocytes 1.3 0.8 - 5.3 10e9/L    Absolute Monocytes 0.5 0.0 - 1.3 10e9/L    Absolute Eosinophils 0.2 0.0 - 0.7 10e9/L    Absolute Basophils 0.0 0.0 - 0.2 10e9/L    Diff Method Automated Method    Comprehensive metabolic panel (BMP + Alb, Alk Phos, ALT, AST, Total. Bili, TP)   Result Value Ref Range    Sodium 138 133 - 144 mmol/L    Potassium 4.0 3.4 - 5.3 mmol/L    Chloride 105 94 - 109 mmol/L    Carbon Dioxide 28 20 - 32 mmol/L    Anion Gap 5 3 - 14 mmol/L    Glucose 92 70 - 99 mg/dL      Comment:      Non Fasting    Urea Nitrogen 20 7 - 30 mg/dL    Creatinine 0.64 0.52 - 1.04 mg/dL    GFR Estimate 86 >60 mL/min/[1.73_m2]      Comment:      Non  GFR Calc  Starting 12/18/2018, serum creatinine based estimated GFR (eGFR) will be   calculated using the Chronic Kidney Disease Epidemiology Collaboration   (CKD-EPI) equation.      GFR Estimate If Black >90 >60 mL/min/[1.73_m2]      Comment:       GFR Calc  Starting 12/18/2018, serum creatinine based estimated GFR (eGFR) will be   calculated using the Chronic Kidney Disease Epidemiology Collaboration   (CKD-EPI) equation.      Calcium 9.5 8.5 - 10.1 mg/dL    Bilirubin Total 0.5 0.2 - 1.3  mg/dL    Albumin 3.9 3.4 - 5.0 g/dL    Protein Total 6.9 6.8 - 8.8 g/dL    Alkaline Phosphatase 81 40 - 150 U/L    ALT 34 0 - 50 U/L    AST 26 0 - 45 U/L       If you have any questions or concerns, please call the clinic at the number listed above.       Sincerely,      Huyen Samuels MD           normal...

## 2021-03-08 NOTE — PROGRESS NOTES
[place] [address]  [facility] [address]  Phone: [phone]  Primary Provider: Tiana Sewlel  Pre-op Performing Provider: TIANA SEWELL      PREOPERATIVE EVALUATION:  Today's date: 3/8/2021    Cynthia Arita is a 75 year old female who presents for a preoperative evaluation.    Surgical Information:  Surgery/Procedure: Right ear  Surgery Location: [place]  Surgeon: Dr. Ralph Parker  Surgery Date: 3-  Time of Surgery: AM  Where patient plans to recover: At home with family  Fax number for surgical facility: [phone]  Type of Anesthesia Anticipated: General    Type of Anesthesia Anticipated: General    Assessment & Plan     The proposed surgical procedure is considered INTERMEDIATE risk.    Preop general physical exam     - Asymptomatic COVID-19 Virus (Coronavirus) by PCR; Future  - CBC with platelets and differential  - Comprehensive metabolic panel (BMP + Alb, Alk Phos, ALT, AST, Total. Bili, TP)    Dizziness     - Asymptomatic COVID-19 Virus (Coronavirus) by PCR; Future  - CBC with platelets and differential  - Comprehensive metabolic panel (BMP + Alb, Alk Phos, ALT, AST, Total. Bili, TP)    Essential hypertension  under good control   - CBC with platelets and differential  - Comprehensive metabolic panel (BMP + Alb, Alk Phos, ALT, AST, Total. Bili, TP)    Encounter for screening mammogram for breast cancer  Due for  - *MA Screening Digital Bilateral; Future         Risks and Recommendations:  The patient has the following additional risks and recommendations for perioperative complications:   - No identified additional risk factors other than previously addressed    Medication Instructions:  Hold her glucosamine from now till surgery    RECOMMENDATION:  APPROVAL GIVEN to proceed with proposed procedure, without further diagnostic evaluation.            Subjective     HPI related to upcoming procedure: ongoing vertigo going in for cochlear Facial  Dehiscence.       Preop Questions 3/8/2021   1. Have you ever had a heart attack or stroke? No   2. Have you ever had surgery on your heart or blood vessels, such as a stent placement, a coronary artery bypass, or surgery on an artery in your head, neck, heart, or legs? No   3. Do you have chest pain with activity? No   4. Do you have a history of  heart failure? No   5. Do you currently have a cold, bronchitis or symptoms of other infection? No   6. Do you have a cough, shortness of breath, or wheezing? No   7. Do you or anyone in your family have previous history of blood clots? No   8. Do you or does anyone in your family have a serious bleeding problem such as prolonged bleeding following surgeries or cuts? No   9. Have you ever had problems with anemia or been told to take iron pills? No   10. Have you had any abnormal blood loss such as black, tarry or bloody stools, or abnormal vaginal bleeding? No   11. Have you ever had a blood transfusion? No   12. Are you willing to have a blood transfusion if it is medically needed before, during, or after your surgery? Yes   13. Have you or any of your relatives ever had problems with anesthesia? YES - nausea   14. Do you have sleep apnea, excessive snoring or daytime drowsiness? No   15. Do you have any artifical heart valves or other implanted medical devices like a pacemaker, defibrillator, or continuous glucose monitor? No   16. Do you have artificial joints? YES - right knee    17. Are you allergic to latex? YES:  Rash and itchy   18. Is there any chance that you may be pregnant? -     Health Care Directive:  Patient does not have a Health Care Directive or Living Will: Advance Directive received and scanned. Click on Code in the patient header to view.    Preoperative Review of :   reviewed - controlled substances reflected in medication list.      Status of Chronic Conditions:  See problem list for active medical problems.  Problems all longstanding and  stable, except as noted/documented.  See ROS for pertinent symptoms related to these conditions.      Review of Systems  CONSTITUTIONAL: NEGATIVE for fever, chills, change in weight  INTEGUMENTARY/SKIN: NEGATIVE for worrisome rashes, moles or lesions  EYES: NEGATIVE for vision changes or irritation  ENT/MOUTH: NEGATIVE for ear, mouth and throat problems  RESP: NEGATIVE for significant cough or SOB  BREAST: NEGATIVE for masses, tenderness or discharge  CV: NEGATIVE for chest pain, palpitations or peripheral edema  GI: NEGATIVE for nausea, abdominal pain, heartburn, or change in bowel habits  : NEGATIVE for frequency, dysuria, or hematuria  MUSCULOSKELETAL: NEGATIVE for significant arthralgias or myalgia  NEURO: chronic dizziness and related nausea  ENDOCRINE: NEGATIVE for temperature intolerance, skin/hair changes  HEME: NEGATIVE for bleeding problems  PSYCHIATRIC: NEGATIVE for changes in mood or affect    Patient Active Problem List    Diagnosis Date Noted     Abdominal pain 02/03/2019     Priority: Medium     S/P total knee arthroplasty 01/21/2019     Priority: Medium     Pain of right thigh 10/01/2018     Priority: Medium     Tinnitus, unspecified laterality 05/23/2018     Priority: Medium     Gastroesophageal reflux disease without esophagitis 12/20/2017     Priority: Medium     Stress 07/31/2017     Priority: Medium     Essential hypertension 07/03/2017     Priority: Medium     Anxiety 04/19/2017     Priority: Medium     Pneumonia of right upper lobe due to infectious organism 04/05/2017     Priority: Medium     Calculus of right kidney 06/29/2015     Priority: Medium     Esophageal reflux 06/18/2015     Priority: Medium     Osteoporosis 06/18/2015     Priority: Medium     Meniere's disease 06/18/2015     Priority: Medium      Past Medical History:   Diagnosis Date     Anxiety      Basal cell carcinoma      Complication of anesthesia      Diverticulosis      GERD (gastroesophageal reflux disease)      HTN  (hypertension)      Meniere's disease      Nephrolithiasis      Pain in right knee      PONV (postoperative nausea and vomiting)      Past Surgical History:   Procedure Laterality Date     ARTHROPLASTY KNEE Right 1/21/2019    Procedure: Right total knee arthroplasty;  Surgeon: Denton Amor MD;  Location: [facility] OR     C VAGINAL HYSTERECTOMY      with left oophorectomy     EYE SURGERY      cataract surgery both eyes     Current Outpatient Medications   Medication Sig Dispense Refill     acetaminophen (TYLENOL) 500 MG tablet Take 500 mg by mouth 3 times daily 5oomg 2 tablets in the morning, 2 tablets noon, 2 tablets in the PM (6 tablets total)       ALPRAZolam (XANAX) 0.5 MG tablet Take 1 tablet (0.5 mg) by mouth 3 times daily as needed for anxiety 15 tablet 0     calcitonin, salmon, (MIACALCIN) 200 UNIT/ACT nasal spray USE 1 SPRAY IN 1 NOSTRIL  DAILY (ALTERNATING NOSTRILS DAILY) 11.1 mL 3     Calcium Carbonate Antacid (TUMS CHEWY BITES PO) Takes 2 a day       Cyanocobalamin (B-12 PO) B-12 Liquid, once in the morning       diazepam (VALIUM) 2 MG tablet TAKE 1 TABLET BY MOUTH EVERY 6 HOURS AS NEEDED FOR ANXIETY OR VERTIGO 40 tablet 0     esomeprazole 40 MG PO DR capsule Take 1 capsule (40 mg) by mouth every morning (before breakfast) Take 30-60 minutes before eating. Dispense generic. 90 capsule 3     Flaxseed, Linseed, (FLAX SEED OIL) 1000 MG capsule Take 1 capsule by mouth daily Takes one in the PM       Misc Natural Products (GLUCOSAMINE CHOND COMPLEX/MSM PO) Takes one in the morning       Multiple Vitamins-Minerals (OCUVITE PRESERVISION PO) Take 1 tablet by mouth daily        ondansetron (ZOFRAN ODT) 4 MG PO ODT tab Take 1 tablet (4 mg) by mouth every 6 hours 120 tablet 1     promethazine (PHENERGAN) 25 MG tablet TAKE 1 TABLET(25 MG) BY MOUTH EVERY 6 HOURS AS NEEDED FOR NAUSEA 30 tablet 1     UNABLE TO FIND 1,000 mg MEDICATION NAME: OMEGA 3 with Vitamin E. Takes one in the morning       UNABLE TO FIND  "MEDICATION NAME: \"BONINE\" for nausea. Takes PRN       venlafaxine (EFFEXOR) 37.5 MG tablet Take 37.5 mg by mouth daily Takes one tablet daily       vitamin D3 (CHOLECALCIFEROL) 50 mcg (2000 units) tablet Take 1 tablet by mouth daily Takes one in the morning 2,000 units         Allergies   Allergen Reactions     Ativan [Lorazepam]      Sick -      Gabapentin Nausea     Metoprolol      Nausea       Pantoprazole Other (See Comments), Nausea and Vomiting and GI Disturbance     Red in the face, sleepiness, face swelling, joint pain, dizziness     Oxycodone Anxiety        Social History     Tobacco Use     Smoking status: Never Smoker     Smokeless tobacco: Never Used   Substance Use Topics     Alcohol use: Yes     Comment: rare     Family History   Problem Relation Age of Onset     Neurologic Disorder Mother         palsy     Esophageal Cancer Father      Cancer Maternal Grandmother         lymph     Diabetes Paternal Grandmother      Neurologic Disorder Sister         Parkinson's     Hypertension Brother      Bipolar Disorder Sister      Brain Cancer Son 17     History   Drug Use No         Objective     There were no vitals taken for this visit.    Physical Exam    GENERAL APPEARANCE: healthy, alert and no distress     EYES: EOMI, PERRL     NECK: no adenopathy, no asymmetry, masses, or scars and thyroid normal to palpation     RESP: lungs clear to auscultation - no rales, rhonchi or wheezes     CV: regular rates and rhythm, normal S1 S2, no S3 or S4 and no murmur, click or rub     ABDOMEN:  soft, nontender, no HSM or masses and bowel sounds normal     MS: extremities normal- no gross deformities noted, no evidence of inflammation in joints, FROM in all extremities.     SKIN: no suspicious lesions or rashes     NEURO: gait unsteady     PSYCH: mentation appears normal. and affect normal/bright     LYMPHATICS: No cervical adenopathy    Recent Labs   Lab Test 01/13/21  1040 08/26/20  1045 08/22/19  1020   HGB  --  14.1 " 14.2   PLT  --  222 280   NA  --  138 140   POTASSIUM  --  3.8 3.9   CR 0.54 0.58 0.65        Diagnostics:  Labs pending at this time.  Results will be reviewed when available.   No EKG this visit, completed in the last 90 days.    Revised Cardiac Risk Index (RCRI):  The patient has the following serious cardiovascular risks for perioperative complications:   - No serious cardiac risks = 0 points     RCRI Interpretation: 0 points: Class I (very low risk - 0.4% complication rate)             Signed Electronically by: Huyen Samuels MD  Copy of this evaluation report is provided to requesting physician.    [facility] [employer]    [facility] Guidelines    Revised Cardiac Risk Index   Answers for HPI/ROS submitted by the patient on 3/8/2021   If you checked off any problems, how difficult have these problems made it for you to do your work, take care of things at home, or get along with other people?: Not difficult at all  PHQ9 TOTAL SCORE: 4  DUSTIN 7 TOTAL SCORE: 5

## 2021-03-09 LAB
ALBUMIN SERPL-MCNC: 3.9 G/DL (ref 3.4–5)
ALP SERPL-CCNC: 81 U/L (ref 40–150)
ALT SERPL W P-5'-P-CCNC: 34 U/L (ref 0–50)
ANION GAP SERPL CALCULATED.3IONS-SCNC: 5 MMOL/L (ref 3–14)
AST SERPL W P-5'-P-CCNC: 26 U/L (ref 0–45)
BILIRUB SERPL-MCNC: 0.5 MG/DL (ref 0.2–1.3)
BUN SERPL-MCNC: 20 MG/DL (ref 7–30)
CALCIUM SERPL-MCNC: 9.5 MG/DL (ref 8.5–10.1)
CHLORIDE SERPL-SCNC: 105 MMOL/L (ref 94–109)
CO2 SERPL-SCNC: 28 MMOL/L (ref 20–32)
CREAT SERPL-MCNC: 0.64 MG/DL (ref 0.52–1.04)
GFR SERPL CREATININE-BSD FRML MDRD: 86 ML/MIN/{1.73_M2}
GLUCOSE SERPL-MCNC: 92 MG/DL (ref 70–99)
POTASSIUM SERPL-SCNC: 4 MMOL/L (ref 3.4–5.3)
PROT SERPL-MCNC: 6.9 G/DL (ref 6.8–8.8)
SODIUM SERPL-SCNC: 138 MMOL/L (ref 133–144)

## 2021-03-09 ASSESSMENT — PATIENT HEALTH QUESTIONNAIRE - PHQ9: SUM OF ALL RESPONSES TO PHQ QUESTIONS 1-9: 4

## 2021-03-09 ASSESSMENT — ANXIETY QUESTIONNAIRES: GAD7 TOTAL SCORE: 5

## 2021-03-12 DIAGNOSIS — R11.0 NAUSEA: ICD-10-CM

## 2021-03-12 DIAGNOSIS — H81.02 MENIERE'S DISEASE OF LEFT EAR: ICD-10-CM

## 2021-03-12 RX ORDER — ONDANSETRON 4 MG/1
TABLET, ORALLY DISINTEGRATING ORAL
Qty: 240 TABLET | Refills: 0 | Status: SHIPPED | OUTPATIENT
Start: 2021-03-12 | End: 2022-01-04

## 2021-03-12 NOTE — TELEPHONE ENCOUNTER
Pending Prescriptions:                       Disp   Refills    ondansetron (ZOFRAN-ODT) 4 MG ODT tab [Ph*240 ta*0            Sig: DISSOLVE 1 TABLET ON THE  TONGUE EVERY 6 HOURS    Prescription approved per [facility] Refill Protocol.

## 2021-03-14 ENCOUNTER — HOSPITAL ENCOUNTER (OUTPATIENT)
Dept: LAB | Facility: CLINIC | Age: 76
Discharge: HOME | End: 2021-03-14
Attending: INTERNAL MEDICINE | Admitting: INTERNAL MEDICINE
Payer: MEDICARE

## 2021-03-14 DIAGNOSIS — Z01.818 PREOP GENERAL PHYSICAL EXAM: ICD-10-CM

## 2021-03-14 DIAGNOSIS — R42 DIZZINESS: ICD-10-CM

## 2021-03-14 LAB
SARS-COV-2 RNA RESP QL NAA+PROBE: NORMAL
SPECIMEN SOURCE: NORMAL

## 2021-03-14 PROCEDURE — U0003 INFECTIOUS AGENT DETECTION BY NUCLEIC ACID (DNA OR RNA); SEVERE ACUTE RESPIRATORY SYNDROME CORONAVIRUS 2 (SARS-COV-2) (CORONAVIRUS DISEASE [COVID-19]), AMPLIFIED PROBE TECHNIQUE, MAKING USE OF HIGH THROUGHPUT TECHNOLOGIES AS DESCRIBED BY CMS-2020-01-R: HCPCS | Performed by: INTERNAL MEDICINE

## 2021-03-14 PROCEDURE — U0005 INFEC AGEN DETEC AMPLI PROBE: HCPCS | Performed by: INTERNAL MEDICINE

## 2021-03-15 LAB
LABORATORY COMMENT REPORT: NORMAL
SARS-COV-2 RNA RESP QL NAA+PROBE: NEGATIVE
SPECIMEN SOURCE: NORMAL

## 2021-03-29 ENCOUNTER — TRANSFERRED RECORDS (OUTPATIENT)
Dept: HEALTH INFORMATION MANAGEMENT | Facility: CLINIC | Age: 76
End: 2021-03-29

## 2021-04-27 ENCOUNTER — TELEPHONE (OUTPATIENT)
Dept: INTERNAL MEDICINE | Facility: CLINIC | Age: 76
End: 2021-04-27

## 2021-04-27 ENCOUNTER — OFFICE VISIT (OUTPATIENT)
Dept: INTERNAL MEDICINE | Facility: CLINIC | Age: 76
End: 2021-04-27
Payer: MEDICARE

## 2021-04-27 VITALS
WEIGHT: 141 LBS | OXYGEN SATURATION: 99 % | SYSTOLIC BLOOD PRESSURE: 136 MMHG | RESPIRATION RATE: 20 BRPM | BODY MASS INDEX: 24.98 KG/M2 | HEART RATE: 74 BPM | TEMPERATURE: 97.4 F | DIASTOLIC BLOOD PRESSURE: 85 MMHG | HEIGHT: 63 IN

## 2021-04-27 DIAGNOSIS — K21.00 GASTROESOPHAGEAL REFLUX DISEASE WITH ESOPHAGITIS WITHOUT HEMORRHAGE: ICD-10-CM

## 2021-04-27 DIAGNOSIS — N39.0 URINARY TRACT INFECTION WITHOUT HEMATURIA, SITE UNSPECIFIED: ICD-10-CM

## 2021-04-27 DIAGNOSIS — I10 ESSENTIAL HYPERTENSION: Primary | ICD-10-CM

## 2021-04-27 DIAGNOSIS — R82.90 NONSPECIFIC FINDING ON EXAMINATION OF URINE: ICD-10-CM

## 2021-04-27 DIAGNOSIS — H53.9 VISION CHANGES: ICD-10-CM

## 2021-04-27 DIAGNOSIS — R30.0 DYSURIA: Primary | ICD-10-CM

## 2021-04-27 PROCEDURE — 99214 OFFICE O/P EST MOD 30 MIN: CPT | Performed by: INTERNAL MEDICINE

## 2021-04-27 RX ORDER — ESOMEPRAZOLE MAGNESIUM 40 MG/1
40 CAPSULE, DELAYED RELEASE ORAL 2 TIMES DAILY
Qty: 180 CAPSULE | Refills: 3 | COMMUNITY
Start: 2021-04-27 | End: 2024-08-12

## 2021-04-27 ASSESSMENT — MIFFLIN-ST. JEOR: SCORE: 1098.7

## 2021-04-27 NOTE — PROGRESS NOTES
Assessment & Plan     Essential hypertension  under reasonable control Continue current medications. Follow up in 6 months     Gastroesophageal reflux disease with esophagitis  under good control   - esomeprazole (NEXIUM) 40 MG DR capsule; Take 1 capsule (40 mg) by mouth 2 times daily Take 30-60 minutes before eating. Dispense generic.    Vision changes  Will refer to   - EYE ADULT REFERRAL; Future            Return in about 6 months (around 10/27/2021).    Huyen Samuels MD  [facility] 75 year [facility] who presents for the following health issues     HPI     Follow up dizziness and nausea. Had ear surgery. Muscle aches for one month.    Hypertension Follow-up      Do you check your blood pressure regularly outside of the clinic? No     Are you following a low salt diet? Yes    Are your blood pressures ever more than 140 on the top number (systolic) OR more   than 90 on the bottom number (diastolic), for example 140/90? No      How many servings of fruits and vegetables do you eat daily?  2-3    On average, how many sweetened beverages do you drink each day (Examples: soda, juice, sweet tea, etc.  Do NOT count diet or artificially sweetened beverages)?   0    How many days per week do you exercise enough to make your heart beat faster? 3 or less    How many minutes a day do you exercise enough to make your heart beat faster? 20 - 29    How many days per week do you miss taking your medication? 0    She had her ear surgery on the right and she is still dizzy. She does not know what to do. She can walk with a cane. She has had no falls. But is hard for her to walk a perfectly straight line. ENT is not sure what to do. They could try a similar decompression procedure. On the left but they are not sure if that would work. Had a discussion with her today that she likely will have to live with what she has which I have been trying to tell her for some time. She seemed  "to comprehend today. But that seems to come and go.       Review of Systems   Constitutional, HEENT, cardiovascular, pulmonary, GI, , musculoskeletal, neuro, skin, endocrine and psych systems are negative, except as otherwise noted.      Objective    /85   Pulse 74   Temp 97.4  F (36.3  C) (Oral)   Resp 20   Ht 1.6 m (5' 3\")   Wt 64 kg (141 lb)   SpO2 99%   Breastfeeding No   BMI 24.98 kg/m    Body mass index is 24.98 kg/m .  Physical Exam   GENERAL: healthy, alert and no distress  NECK: no adenopathy, no asymmetry, masses, or scars and thyroid normal to palpation  RESP: lungs clear to auscultation - no rales, rhonchi or wheezes  CV: regular rate and rhythm, normal S1 S2, no S3 or S4, no murmur, click or rub, no peripheral edema and peripheral pulses strong  ABDOMEN: soft, nontender, no hepatosplenomegaly, no masses and bowel sounds normal  MS: no gross musculoskeletal defects noted, no edema  PSYCH: mentation appears normal and anxious            "

## 2021-04-27 NOTE — TELEPHONE ENCOUNTER
Patient calls stating that since she was seen this morning she thinks she has developed a UTI.  Painful urination, frequency with little urine, and low back pain.  Please advise.

## 2021-04-28 DIAGNOSIS — R30.0 DYSURIA: ICD-10-CM

## 2021-04-28 DIAGNOSIS — R82.90 NONSPECIFIC FINDING ON EXAMINATION OF URINE: Primary | ICD-10-CM

## 2021-04-28 LAB
ALBUMIN UR-MCNC: NEGATIVE MG/DL
APPEARANCE UR: ABNORMAL
BACTERIA #/AREA URNS HPF: ABNORMAL /HPF
BILIRUB UR QL STRIP: NEGATIVE
COLOR UR AUTO: YELLOW
GLUCOSE UR STRIP-MCNC: NEGATIVE MG/DL
HGB UR QL STRIP: NEGATIVE
KETONES UR STRIP-MCNC: NEGATIVE MG/DL
LEUKOCYTE ESTERASE UR QL STRIP: ABNORMAL
NITRATE UR QL: POSITIVE
PH UR STRIP: 7 PH (ref 5–7)
RBC #/AREA URNS AUTO: ABNORMAL /HPF
SOURCE: ABNORMAL
SP GR UR STRIP: 1.02 (ref 1–1.03)
UROBILINOGEN UR STRIP-ACNC: 0.2 EU/DL (ref 0.2–1)
WBC #/AREA URNS AUTO: ABNORMAL /HPF

## 2021-04-28 PROCEDURE — 87086 URINE CULTURE/COLONY COUNT: CPT | Performed by: INTERNAL MEDICINE

## 2021-04-28 PROCEDURE — 81001 URINALYSIS AUTO W/SCOPE: CPT | Performed by: INTERNAL MEDICINE

## 2021-04-28 PROCEDURE — 87088 URINE BACTERIA CULTURE: CPT | Performed by: INTERNAL MEDICINE

## 2021-04-28 PROCEDURE — 87186 SC STD MICRODIL/AGAR DIL: CPT | Performed by: INTERNAL MEDICINE

## 2021-04-28 NOTE — TELEPHONE ENCOUNTER
Will route to primary care provider for review. It does not appear that urinary tract infection symptoms were discussed at visit.    Does patient need another appointment?

## 2021-04-28 NOTE — TELEPHONE ENCOUNTER
"Called patient and advised her of message below. Patient will call back to schedule a lab appointment.    Patient can't recall if she had mentioned to primary care provider that she is experiencing a \"tingling sensation\" from her wrists to her elbows in both arms when she sits on the toilet. Patient notes she has not had these symptoms prior with a urinary tract infection. Patient denies one-sided weakness, confusion, facial droops, or dizziness. Patient also notes pain and burning with urination.    Will route to primary care provider to review.   "

## 2021-04-29 RX ORDER — NITROFURANTOIN 25; 75 MG/1; MG/1
100 CAPSULE ORAL 2 TIMES DAILY
Qty: 14 CAPSULE | Refills: 0 | Status: SHIPPED | OUTPATIENT
Start: 2021-04-29 | End: 2021-08-16

## 2021-04-29 NOTE — TELEPHONE ENCOUNTER
Patient advised Dr. Samuels has no further recommendations for the tingling in her hands when using the toilet.     Patient asks about urine results. Informed her the initial UA does appear abnormal and a culture is pending. Patient states that if she needs antibiotic, she knows that Cefdinir and Ciprofloxacin have not worked in the past for her.

## 2021-04-29 NOTE — TELEPHONE ENCOUNTER
Advise her urine does suggest an infection, it is starting to grow some bacteria.  I sent a prescription for nitrofurantoin.  Her last urine suggested this antibiotic should work if it is the same bacterium.    Her tingling is probably related to the ulnar nerve around the elbow, she may need to pay attention to how she has her arms, frequently having the elbows bent for a period of time, especially if holding something up will tend to pinch that nerve and give the symptoms.  Straightening the arm outs of the elbow is completely straight will usually help prevent symptoms

## 2021-04-30 LAB
BACTERIA SPEC CULT: ABNORMAL
Lab: ABNORMAL
SPECIMEN SOURCE: ABNORMAL

## 2021-06-11 ENCOUNTER — TELEPHONE (OUTPATIENT)
Dept: INTERNAL MEDICINE | Facility: CLINIC | Age: 76
End: 2021-06-11

## 2021-06-11 DIAGNOSIS — N39.0 URINARY TRACT INFECTION WITHOUT HEMATURIA, SITE UNSPECIFIED: ICD-10-CM

## 2021-06-11 DIAGNOSIS — R82.90 NONSPECIFIC FINDING ON EXAMINATION OF URINE: Primary | ICD-10-CM

## 2021-06-11 DIAGNOSIS — N39.0 URINARY TRACT INFECTION WITHOUT HEMATURIA, SITE UNSPECIFIED: Primary | ICD-10-CM

## 2021-06-11 LAB
ALBUMIN UR-MCNC: NEGATIVE MG/DL
APPEARANCE UR: CLEAR
BACTERIA #/AREA URNS HPF: ABNORMAL /HPF
BILIRUB UR QL STRIP: NEGATIVE
COLOR UR AUTO: YELLOW
GLUCOSE UR STRIP-MCNC: NEGATIVE MG/DL
HGB UR QL STRIP: ABNORMAL
KETONES UR STRIP-MCNC: NEGATIVE MG/DL
LEUKOCYTE ESTERASE UR QL STRIP: ABNORMAL
NITRATE UR QL: NEGATIVE
PH UR STRIP: 5.5 PH (ref 5–7)
RBC #/AREA URNS AUTO: ABNORMAL /HPF
SOURCE: ABNORMAL
SP GR UR STRIP: 1.02 (ref 1–1.03)
UROBILINOGEN UR STRIP-ACNC: 0.2 EU/DL (ref 0.2–1)
WBC #/AREA URNS AUTO: ABNORMAL /HPF

## 2021-06-11 PROCEDURE — 87086 URINE CULTURE/COLONY COUNT: CPT | Performed by: NURSE PRACTITIONER

## 2021-06-11 PROCEDURE — 87088 URINE BACTERIA CULTURE: CPT | Performed by: NURSE PRACTITIONER

## 2021-06-11 PROCEDURE — 87186 SC STD MICRODIL/AGAR DIL: CPT | Performed by: NURSE PRACTITIONER

## 2021-06-11 PROCEDURE — 81001 URINALYSIS AUTO W/SCOPE: CPT | Performed by: NURSE PRACTITIONER

## 2021-06-11 RX ORDER — NITROFURANTOIN 25; 75 MG/1; MG/1
100 CAPSULE ORAL 2 TIMES DAILY
Qty: 14 CAPSULE | Refills: 0 | Status: SHIPPED | OUTPATIENT
Start: 2021-06-11 | End: 2021-08-16

## 2021-06-11 NOTE — TELEPHONE ENCOUNTER
Patient walks into the clinic after not hearing from our office.     Unable to add patient to nurse schedule. Patient having increased urinary frequency, pain with urination and urgency. Asking if antibiotic can be ordered. Last UA/UC was done in April. She states the arm pain and tingling have started to occur with her last few UTIs, the pain and tingling go away once she urinates. Per previous encounters, Cipro and Cefdinir do not work for her. Will discuss with partner, patient is waiting in clinic.

## 2021-06-11 NOTE — TELEPHONE ENCOUNTER
Pt calling requesting call to discuss urine frequency and pain she has been experiencing since yesterday. She also has tingling in her hands when she sits down to go to the bathroom. Pt can be reached at [phone]. Please advise. Thanks.

## 2021-06-12 LAB
BACTERIA SPEC CULT: ABNORMAL
Lab: ABNORMAL
SPECIMEN SOURCE: ABNORMAL

## 2021-07-06 DIAGNOSIS — M81.0 AGE-RELATED OSTEOPOROSIS WITHOUT CURRENT PATHOLOGICAL FRACTURE: ICD-10-CM

## 2021-07-07 RX ORDER — CALCITONIN SALMON 200 [IU]/.09ML
SPRAY, METERED NASAL
Qty: 11.1 ML | Refills: 3 | Status: SHIPPED | OUTPATIENT
Start: 2021-07-07 | End: 2022-06-29

## 2021-07-07 NOTE — TELEPHONE ENCOUNTER
Pending Prescriptions:                       Disp   Refills    calcitonin, salmon, (MIACALCIN) 200 UNIT/A*11.1 mL3        Sig: USE 1 SPRAY IN 1 NOSTRIL  DAILY (ALTERNATING NOSTRILS           DAILY)    Routing refill request to provider for review/approval because:  FAILS PROTOCOL

## 2021-07-14 ENCOUNTER — HOSPITAL ENCOUNTER (OUTPATIENT)
Dept: MAMMOGRAPHY | Facility: CLINIC | Age: 76
Discharge: HOME | End: 2021-07-14
Attending: INTERNAL MEDICINE | Admitting: INTERNAL MEDICINE
Payer: MEDICARE

## 2021-07-14 DIAGNOSIS — Z12.31 VISIT FOR SCREENING MAMMOGRAM: ICD-10-CM

## 2021-07-14 PROCEDURE — 77063 BREAST TOMOSYNTHESIS BI: CPT

## 2021-08-10 ENCOUNTER — OFFICE VISIT (OUTPATIENT)
Dept: DERMATOLOGY | Facility: CLINIC | Age: 76
End: 2021-08-10
Payer: MEDICARE

## 2021-08-10 VITALS — DIASTOLIC BLOOD PRESSURE: 86 MMHG | HEART RATE: 80 BPM | SYSTOLIC BLOOD PRESSURE: 146 MMHG | OXYGEN SATURATION: 99 %

## 2021-08-10 DIAGNOSIS — D22.9 NEVUS: Primary | ICD-10-CM

## 2021-08-10 DIAGNOSIS — L82.1 SEBORRHEIC KERATOSIS: ICD-10-CM

## 2021-08-10 DIAGNOSIS — L81.4 LENTIGO: ICD-10-CM

## 2021-08-10 DIAGNOSIS — Z85.828 HISTORY OF BASAL CELL CARCINOMA (BCC) OF SKIN: ICD-10-CM

## 2021-08-10 DIAGNOSIS — D18.01 ANGIOMA OF SKIN: ICD-10-CM

## 2021-08-10 DIAGNOSIS — L82.0 INFLAMED SEBORRHEIC KERATOSIS: ICD-10-CM

## 2021-08-10 PROCEDURE — 99213 OFFICE O/P EST LOW 20 MIN: CPT | Mod: 25 | Performed by: PHYSICIAN ASSISTANT

## 2021-08-10 PROCEDURE — 17110 DESTRUCTION B9 LES UP TO 14: CPT | Performed by: PHYSICIAN ASSISTANT

## 2021-08-10 NOTE — PROGRESS NOTES
HPI:   Chief complaints: Cynthia Arita is a 76 year old female who presents for Full skin cancer screening to rule out skin cancer   Last Skin Exam: 1 year ago    1st Baseline: no  Personal HX of Skin Cancer: Yes BCC on the left temple   Personal HX of Malignant Melanoma: no   Family HX of Skin Cancer / Malignant Melanoma: no  Personal HX of Atypical Moles:   no  Risk factors: history of sun exposure and burns  New / Changing lesions:No  Social History: has 3 sons and 8 grandchildren.   On review of systems, there are no further skin complaints, patient is feeling otherwise well.  See patient intake sheet.  ROS of the following were done and are negative: Constitutional, Eyes, Ears, Nose,   Mouth, Throat, Cardiovascular, Respiratory, GI, Genitourinary, Musculoskeletal,   Psychiatric, Endocrine, Allergic/Immunologic.    PHYSICAL EXAM:   BP (!) 146/86   Pulse 80   SpO2 99%   Skin exam performed as follows: Type 2 skin. Mood appropriate  Alert and Oriented X 3. Well developed, well nourished in no distress.  General appearance: Normal  Head including face: Normal  Eyes: conjunctiva and lids: Normal  Mouth: Lips, teeth, gums: Normal  Neck: Normal  Chest-breast/axillae: Normal  Back: Normal  Spleen and liver: Normal  Cardiovascular: Exam of peripheral vascular system by observation for swelling, varicosities, edema: Normal  Genitalia: groin, buttocks: Normal  Extremities: digits/nails (clubbing): Normal  Eccrine and Apocrine glands: Normal  Right upper extremity: Normal  Left upper extremity: Normal  Right lower extremity: Normal  Left lower extremity: Normal  Skin: Scalp and body hair: See below    Pt deferred exam of breasts, groin, buttocks: No    Other physical findings:  1. Multiple pigmented macules on extremities and trunk  2. Multiple pigmented macules on face, trunk and extremities  3. Multiple vascular papules on trunk, arms and legs  4. Multiple scattered keratotic plaques  5.inflamed keratotic papule on the  right temple x 1       Except as noted above, no other signs of skin cancer or melanoma.     ASSESSMENT/PLAN:   Benign Full skin cancer screening today. . Patient with history of BCC  Advised on monthly self exams and 1 year  Patient Education: Appropriate brochures given.    1. Multiple benign appearing nevi on arms, legs and trunk. Discussed ABCDEs of melanoma and sunscreen.   2. Multiple lentigos on arms, legs and trunk. Advised benign, no treatment needed.  3. Multiple scattered angiomas. Advised benign, no treatment needed.   4. Seborrheic keratosis on arms, legs and trunk. Advised benign, no treatment needed.  5. Inflamed seborrheic keratosis on the left temple x 1. As physically tender cryosurgery performed. Advised on post op care.   6. Cynthia to follow up with Primary Care provider regarding elevated blood pressure.            Follow-up: yearly FSE/PRN sooner    1.) Patient was asked about new and changing moles. YES  2.) Patient received a complete physical skin examination: YES  3.) Patient was counseled to perform a monthly self skin examination: YES  Scribed By: Suzette Holder MS, PA-C

## 2021-08-10 NOTE — LETTER
8/10/2021         RE: Cynthia Arita  [address] [facility] [address]        Dear Colleague,    Thank you for referring your patient, Cynthia Arita, to the [facility]. Please see a copy of my visit note below.    HPI:   Chief complaints: Cynthia Arita is a 76 year old female who presents for Full skin cancer screening to rule out skin cancer   Last Skin Exam: 1 year ago    1st Baseline: no  Personal HX of Skin Cancer: Yes BCC on the left temple   Personal HX of Malignant Melanoma: no   Family HX of Skin Cancer / Malignant Melanoma: no  Personal HX of Atypical Moles:   no  Risk factors: history of sun exposure and burns  New / Changing lesions:No  Social History: has 3 sons and 8 grandchildren.   On review of systems, there are no further skin complaints, patient is feeling otherwise well.  See patient intake sheet.  ROS of the following were done and are negative: Constitutional, Eyes, Ears, Nose,   Mouth, Throat, Cardiovascular, Respiratory, GI, Genitourinary, Musculoskeletal,   Psychiatric, Endocrine, Allergic/Immunologic.    PHYSICAL EXAM:   BP (!) 146/86   Pulse 80   SpO2 99%   Skin exam performed as follows: Type 2 skin. Mood appropriate  Alert and Oriented X 3. Well developed, well nourished in no distress.  General appearance: Normal  Head including face: Normal  Eyes: conjunctiva and lids: Normal  Mouth: Lips, teeth, gums: Normal  Neck: Normal  Chest-breast/axillae: Normal  Back: Normal  Spleen and liver: Normal  Cardiovascular: Exam of peripheral vascular system by observation for swelling, varicosities, edema: Normal  Genitalia: groin, buttocks: Normal  Extremities: digits/nails (clubbing): Normal  Eccrine and Apocrine glands: Normal  Right upper extremity: Normal  Left upper extremity: Normal  Right lower extremity: Normal  Left lower extremity: Normal  Skin: Scalp and body hair: See below    Pt deferred exam of breasts, groin, buttocks: No    Other physical  findings:  1. Multiple pigmented macules on extremities and trunk  2. Multiple pigmented macules on face, trunk and extremities  3. Multiple vascular papules on trunk, arms and legs  4. Multiple scattered keratotic plaques  5.inflamed keratotic papule on the right temple x 1       Except as noted above, no other signs of skin cancer or melanoma.     ASSESSMENT/PLAN:   Benign Full skin cancer screening today. . Patient with history of BCC  Advised on monthly self exams and 1 year  Patient Education: Appropriate brochures given.    1. Multiple benign appearing nevi on arms, legs and trunk. Discussed ABCDEs of melanoma and sunscreen.   2. Multiple lentigos on arms, legs and trunk. Advised benign, no treatment needed.  3. Multiple scattered angiomas. Advised benign, no treatment needed.   4. Seborrheic keratosis on arms, legs and trunk. Advised benign, no treatment needed.  5. Inflamed seborrheic keratosis on the left temple x 1. As physically tender cryosurgery performed. Advised on post op care.   6. Cynthia to follow up with Primary Care provider regarding elevated blood pressure.            Follow-up: yearly FSE/PRN sooner    1.) Patient was asked about new and changing moles. YES  2.) Patient received a complete physical skin examination: YES  3.) Patient was counseled to perform a monthly self skin examination: YES  Scribed By: Suzette Holder, MS, PASurekhaC          Again, thank you for allowing me to participate in the care of your patient.        Sincerely,        Suzette Holder PA-C

## 2021-08-16 ENCOUNTER — TELEPHONE (OUTPATIENT)
Dept: INTERNAL MEDICINE | Facility: CLINIC | Age: 76
End: 2021-08-16

## 2021-08-16 ENCOUNTER — OFFICE VISIT (OUTPATIENT)
Dept: INTERNAL MEDICINE | Facility: CLINIC | Age: 76
End: 2021-08-16
Payer: MEDICARE

## 2021-08-16 VITALS
DIASTOLIC BLOOD PRESSURE: 72 MMHG | BODY MASS INDEX: 24.62 KG/M2 | WEIGHT: 139 LBS | RESPIRATION RATE: 20 BRPM | SYSTOLIC BLOOD PRESSURE: 130 MMHG | HEART RATE: 66 BPM | TEMPERATURE: 97.8 F | OXYGEN SATURATION: 97 %

## 2021-08-16 DIAGNOSIS — R25.1 TREMOR: Primary | ICD-10-CM

## 2021-08-16 PROCEDURE — 99213 OFFICE O/P EST LOW 20 MIN: CPT | Performed by: INTERNAL MEDICINE

## 2021-08-16 NOTE — TELEPHONE ENCOUNTER
Patient calls clinic to let Dr. Samuels know that the doctor she saw for her tremors was Dr. Carli Schmitt.

## 2021-08-16 NOTE — PROGRESS NOTES
Assessment & Plan     Tremor  Will refer to Neurology   - Adult Neurology Referral; Future                 No follow-ups on file.    Huyen Samuels MD  [facility] KOLBY Marie is a 76 year old who presents for the following health issues     HPI   She has had ongoing problems with Vertigo. Her last procedure by ENT seems to have resolved this but she is having problems with a spastic gait. Gait is unsteady and suddenly her leg will just jerk a little bit. She does have a tremor. But this seems more than a tremor. She feels like she is at high risk of falling. She is on Indural 10 mg bid.       Review of Systems   Constitutional, HEENT, cardiovascular, pulmonary, GI, , musculoskeletal, neuro, skin, endocrine and psych systems are negative, except as otherwise noted.      Objective    /72   Pulse 66   Temp 97.8  F (36.6  C) (Oral)   Resp 20   Wt 63 kg (139 lb)   SpO2 97%   BMI 24.62 kg/m    Body mass index is 24.62 kg/m .  Physical Exam   GENERAL: healthy, alert and no distress  NECK: no adenopathy, no asymmetry, masses, or scars and thyroid normal to palpation  RESP: lungs clear to auscultation - no rales, rhonchi or wheezes  CV: regular rate and rhythm, normal S1 S2, no S3 or S4, no murmur, click or rub, no peripheral edema and peripheral pulses strong  ABDOMEN: soft, nontender, no hepatosplenomegaly, no masses and bowel sounds normal  MS: no gross musculoskeletal defects noted, no edema  NEURO: slight resting tremor of hand. Gait is slightly wide based and has a slightly psastic quality to it.

## 2021-08-17 ENCOUNTER — MEDICAL CORRESPONDENCE (OUTPATIENT)
Dept: HEALTH INFORMATION MANAGEMENT | Facility: CLINIC | Age: 76
End: 2021-08-17

## 2021-08-17 ENCOUNTER — TRANSFERRED RECORDS (OUTPATIENT)
Dept: HEALTH INFORMATION MANAGEMENT | Facility: CLINIC | Age: 76
End: 2021-08-17

## 2021-08-26 ENCOUNTER — TELEPHONE (OUTPATIENT)
Dept: INTERNAL MEDICINE | Facility: CLINIC | Age: 76
End: 2021-08-26

## 2021-08-26 DIAGNOSIS — R42 DIZZINESS: ICD-10-CM

## 2021-08-26 DIAGNOSIS — H81.03 MENIERE'S DISEASE OF BOTH EARS: Primary | ICD-10-CM

## 2021-08-26 NOTE — TELEPHONE ENCOUNTER
Patient calling wanting to discuss making an appt with Dr Samuels. Patient stated she would like to talk with Kylah Charles to call and  [phone]

## 2021-08-26 NOTE — TELEPHONE ENCOUNTER
Patient states she saw [facility] 2 weeks ago. Was told to start a new medication called Nurtec ($2000) & to start a limited diet. Patient states she called neuro to tell them she does not have HAs, she can not afford Nurtec and would like a different prescription.   Spoke with Melly at [facility]. Dictation from visit 2 weeks ago is not complete. They have a note to the provider from 8- regarding the medication concerns. Requested copy of dictation when completed.  Advised patient I will contact her next week after receiving dictation. Please keep chart open.

## 2021-09-02 ENCOUNTER — TRANSFERRED RECORDS (OUTPATIENT)
Dept: HEALTH INFORMATION MANAGEMENT | Facility: CLINIC | Age: 76
End: 2021-09-02

## 2021-09-09 ENCOUNTER — TRANSFERRED RECORDS (OUTPATIENT)
Dept: HEALTH INFORMATION MANAGEMENT | Facility: CLINIC | Age: 76
End: 2021-09-09

## 2021-09-14 NOTE — TELEPHONE ENCOUNTER
Patient not happy with care from Dr. Diallo. Planned to get second opinion from another [facility] provider. That appointment was cancelled by provider. Now patient requesting [facility] referral for dizziness and progressive neuro symptoms?

## 2021-09-16 ENCOUNTER — NURSE TRIAGE (OUTPATIENT)
Dept: NURSING | Facility: CLINIC | Age: 76
End: 2021-09-16

## 2021-09-16 NOTE — TELEPHONE ENCOUNTER
RN Triage:    Cynthia is calling to check on the status of a referral to [facility] that was supposed to have been sent 2 days ago.  [facility] states that they have not received it.  Writer told Cynthia that referral appears to have been sent to [facility] on 9/14/21.  Cynthia plans to call [facility] again tomorrow.    Flores Gauthier RN 09/16/21 5:45 PM  [facility] Nurse Advisor

## 2021-09-17 NOTE — TELEPHONE ENCOUNTER
[facility] referral faxed this morning.     Anjelica White RN  [facility]

## 2021-09-23 ENCOUNTER — TELEPHONE (OUTPATIENT)
Dept: INTERNAL MEDICINE | Facility: CLINIC | Age: 76
End: 2021-09-23

## 2021-09-23 ENCOUNTER — ALLIED HEALTH/NURSE VISIT (OUTPATIENT)
Dept: NURSING | Facility: CLINIC | Age: 76
End: 2021-09-23
Payer: MEDICARE

## 2021-09-23 DIAGNOSIS — Z87.440 PERSONAL HISTORY OF URINARY TRACT INFECTION: Primary | ICD-10-CM

## 2021-09-23 NOTE — TELEPHONE ENCOUNTER
S-(situation): patient walks in to clinic. Not expecting treatment but has appointment with Dr Samuels on Monday.  Is willing to wait if she has to.  History of UTI (see 6/11/2021)    B-(background): History of UTI    A-(assessment): frequency, some burning.  Patient just feels it's coming and will wait until Monday if she has to.    R-(recommendations): would prefer to leave at least a sample today if appropriate to get tested.    Patient does not want to go to . Will wait until Monday appointment if need to.  This writer sent her home with urine sample cup in case ok to order    Please advise

## 2021-09-23 NOTE — TELEPHONE ENCOUNTER
Called patient to tell her she will need to go to urgent care.   If not willing to go to UC she will see her pcp on Monday  Next 5 appointments (look out 90 days)    Sep 23, 2021  4:00 PM  Nurse Only with Ling Riley  [facility] [address] [place] [address]  [phone]   Sep 27, 2021  1:40 PM  SHORT with Huyen Samuels MD  [facility] [address] [facility] [address]  [phone]

## 2021-09-27 ENCOUNTER — OFFICE VISIT (OUTPATIENT)
Dept: INTERNAL MEDICINE | Facility: CLINIC | Age: 76
End: 2021-09-27
Payer: MEDICARE

## 2021-09-27 VITALS
OXYGEN SATURATION: 96 % | WEIGHT: 139 LBS | TEMPERATURE: 97.6 F | HEART RATE: 68 BPM | BODY MASS INDEX: 24.63 KG/M2 | DIASTOLIC BLOOD PRESSURE: 79 MMHG | SYSTOLIC BLOOD PRESSURE: 134 MMHG | HEIGHT: 63 IN

## 2021-09-27 DIAGNOSIS — Z23 NEED FOR PROPHYLACTIC VACCINATION AND INOCULATION AGAINST INFLUENZA: ICD-10-CM

## 2021-09-27 DIAGNOSIS — R20.2 TINGLING: Primary | ICD-10-CM

## 2021-09-27 PROCEDURE — 90662 IIV NO PRSV INCREASED AG IM: CPT | Performed by: INTERNAL MEDICINE

## 2021-09-27 PROCEDURE — G0008 ADMIN INFLUENZA VIRUS VAC: HCPCS | Performed by: INTERNAL MEDICINE

## 2021-09-27 PROCEDURE — 99213 OFFICE O/P EST LOW 20 MIN: CPT | Mod: 25 | Performed by: INTERNAL MEDICINE

## 2021-09-27 RX ORDER — GABAPENTIN 300 MG/1
300 CAPSULE ORAL SEE ADMIN INSTRUCTIONS
COMMUNITY
Start: 2021-09-22 | End: 2022-03-02

## 2021-09-27 RX ORDER — BUPROPION HYDROCHLORIDE 150 MG/1
150 TABLET, EXTENDED RELEASE ORAL 2 TIMES DAILY
COMMUNITY
Start: 2021-09-27 | End: 2022-06-29 | Stop reason: ALTCHOICE

## 2021-09-27 RX ORDER — PROPRANOLOL HYDROCHLORIDE 10 MG/1
TABLET ORAL
COMMUNITY
Start: 2021-09-27 | End: 2022-05-04

## 2021-09-27 ASSESSMENT — MIFFLIN-ST. JEOR: SCORE: 1084.63

## 2021-09-27 NOTE — PROGRESS NOTES
"    Assessment & Plan     Tingling  Will have her stop the Am Neurontin dose as this is new since we increased her Neurontin    Need for prophylactic vaccination and inoculation against influenza     - INFLUENZA, QUAD, HIGH DOSE, PF, 65YR + (FLUZONE HD)  - ADMIN INFLUENZA (For MEDICARE Patients ONLY) []           No follow-ups on file.    Huyen Samuels MD  [facility] KOLBY Marie is a 76 year old who presents for the following health issues     HPI     Follow up neuro issues.    Her chronic nausea persists but now she feel like her skin starts crawling about 2 hours after she gets up and starts to subside about 2 in the afternoon. She has never had this before.     Review of Systems   Constitutional, HEENT, cardiovascular, pulmonary, gi and gu systems are negative, except as otherwise noted.      Objective    /79   Pulse 68   Temp 97.6  F (36.4  C) (Oral)   Ht 1.6 m (5' 3\")   Wt 63 kg (139 lb)   SpO2 96%   BMI 24.62 kg/m    Body mass index is 24.62 kg/m .  Physical Exam   GENERAL: healthy, alert and no distress  NECK: no adenopathy, no asymmetry, masses, or scars and thyroid normal to palpation  RESP: lungs clear to auscultation - no rales, rhonchi or wheezes  CV: regular rate and rhythm, normal S1 S2, no S3 or S4, no murmur, click or rub, no peripheral edema and peripheral pulses strong  ABDOMEN: soft, nontender, no hepatosplenomegaly, no masses and bowel sounds normal  MS: no gross musculoskeletal defects noted, no edema          "

## 2021-09-29 ENCOUNTER — TRANSFERRED RECORDS (OUTPATIENT)
Dept: HEALTH INFORMATION MANAGEMENT | Facility: CLINIC | Age: 76
End: 2021-09-29

## 2021-10-27 ENCOUNTER — LAB (OUTPATIENT)
Dept: LAB | Facility: CLINIC | Age: 76
End: 2021-10-27
Payer: MEDICARE

## 2021-10-27 DIAGNOSIS — M79.10 MYALGIA: Primary | ICD-10-CM

## 2021-10-27 LAB — CK SERPL-CCNC: 90 U/L (ref 30–225)

## 2021-10-27 PROCEDURE — 82550 ASSAY OF CK (CPK): CPT

## 2021-10-27 PROCEDURE — 36415 COLL VENOUS BLD VENIPUNCTURE: CPT

## 2021-10-27 PROCEDURE — 82085 ASSAY OF ALDOLASE: CPT

## 2021-10-28 LAB — ALDOLASE SERPL-CCNC: 2.4 U/L

## 2022-01-17 ENCOUNTER — TELEPHONE (OUTPATIENT)
Dept: INTERNAL MEDICINE | Facility: CLINIC | Age: 77
End: 2022-01-17
Payer: MEDICARE

## 2022-01-17 NOTE — TELEPHONE ENCOUNTER
Patient calling  She has a fever, lightheaded, cough, running nose. Home test for COIVD is negative. Patient not sure what she should do. She doesn't want to go to the ED and wants to be seen in clinic. Please advise. Ok to call and [phone]

## 2022-01-17 NOTE — TELEPHONE ENCOUNTER
Called Patient.  Patients reports cough with white/yellow mucus production, sore throat, nasal drainage, and slight fever reported as face is warm to touch. Symptoms started 1/14/22. Denies pain or chills. No office appointments available today or tomorrow. Patient does not have video appointment access. Gave home care advice for viral cold management. Patient will follow up if symptoms do not resolve.

## 2022-01-24 ENCOUNTER — TELEPHONE (OUTPATIENT)
Dept: INTERNAL MEDICINE | Facility: CLINIC | Age: 77
End: 2022-01-24
Payer: MEDICARE

## 2022-01-24 DIAGNOSIS — R11.0 NAUSEA: Primary | ICD-10-CM

## 2022-01-24 NOTE — TELEPHONE ENCOUNTER
"Patient calling stating her [facility], Dr. Ralph Parker, is requesting a call from patient's primary care provider, Dr. Samuels. Please call [phone].     Patient thinks Dr. Parker wants to discuss with Dr. Samuels about next steps of care. Patient states she was told \"I've got some suggestions, but I need to talk to your primary.\" Patient unclear if Dr. Parker is requesting a provider to provider call or if nurse can take a message back to primary care provider.     Call to -[phone]. Message left for call back to clarify what ENT is needing from Dr. Samuels.  Is Dr. Parker requesting to speak with Dr. Samuels personally? Or can a message be sent back to Dr. Samuels?     Elba HARTMANN RN   [facility]              "

## 2022-01-25 NOTE — TELEPHONE ENCOUNTER
ENT calling back and said she needs to be worked up for severe depression. Dr Parker is asking for Dr Samuels to do this.

## 2022-01-26 RX ORDER — PROCHLORPERAZINE MALEATE 10 MG
10 TABLET ORAL EVERY 8 HOURS PRN
Qty: 120 TABLET | Refills: 0 | Status: SHIPPED | OUTPATIENT
Start: 2022-01-26 | End: 2022-05-10

## 2022-01-26 NOTE — TELEPHONE ENCOUNTER
This would be best to be deferred to Dr Samuels--will forward note to her.     Patient may request an appointment with another provider who has earlier availability if she would like.

## 2022-01-26 NOTE — TELEPHONE ENCOUNTER
"Called patient. She does not feel good on a daily basis and feels \"goofy.\" She said that she is tired of not feeling well and she has to force herself to do things each day. Much of this is related to the ongoing dizziness and nausea she is experiencing. She also states that when she walks it looks jerky due to dizziness and left leg being shorter, she does wear lift in her left shoe.   She mentioned this to Dr. Parker, ENT since she has seen him for many years for dizziness and he suggested she speak to Dr. Samuels about depression.     She also states she was previously seeing Dr. jOeda at [facility], he was ordering medications for acid reflux and medication for nausea, but she still has nausea. He has retired and when she asked about following up, he told her she could see any other provider in their office, but patient is uncomfortable with this-she doesn't like to doctor shop.    She has been working with [facility] as well, but has missed recent appointments due to nausea.     She sleeps very well at night and does not experience any dizzy or nausea symptoms, but symptoms come back again about 2 hours after she wakes up.     Anjelica White RN  [facility]   "

## 2022-01-26 NOTE — TELEPHONE ENCOUNTER
Patient is not scheduled to see Dr. Samuels until 3/2/22. Would Virtual Visit be appropriate for patient or can she be worked in sooner for in-person appointment?    Ajnelica White RN  [facility]

## 2022-02-07 ENCOUNTER — TELEPHONE (OUTPATIENT)
Dept: INTERNAL MEDICINE | Facility: CLINIC | Age: 77
End: 2022-02-07
Payer: MEDICARE

## 2022-02-07 NOTE — TELEPHONE ENCOUNTER
Patient calling  Since she started prochlorperazine (COMPAZINE) 10 MG tablet her nose has been running none stop. She is coughing at times as well. She wonders if this is a side effect. Please advise. Ok to call and  [phone]

## 2022-02-08 NOTE — TELEPHONE ENCOUNTER
"Call to patient. Patient informed of Dr. Samuels's below response.     Patient reports she feels better on Compazine. Patient states she takes it twice a day, once in the morning and once in the evening around 9 am and 5 pm.      \"At night I feel fine, but 2 hours after I get up, the nausea starts. I do see some relief with Compazine.   I can tell the difference with the nausea.\"     Patient reports she still has some dizziness. \"Since 2002 I've had vertigo and even saw [facility] and I don't see any difference. \"    Regarding her runny nose, patient states: \"It just runs out and I go through Kleenex more than a big family.\"    Patient wants to know if provider has any recommendations for her runny dose and dizziness, but mostly her runny nose.     Elba HARTMANN RN   [facility]       "

## 2022-02-08 NOTE — TELEPHONE ENCOUNTER
Routing to provider to please see message below and advise.     Per [employer], runny nose and coughing are not listed as side effects of Compazine.     Elba HARTMANN RN   [facility]

## 2022-02-08 NOTE — TELEPHONE ENCOUNTER
Might be a side effect. Is it helping her nausea?? Overall does she feel better on or off of the compazine?

## 2022-02-09 NOTE — TELEPHONE ENCOUNTER
Patient informed of Dr. Samuels's message. Right now the nausea is worse, will likely continue the Compazine and discuss further at upcoming appointment.     Anjelica White RN  [facility]

## 2022-02-09 NOTE — TELEPHONE ENCOUNTER
I dont think there is much more we can do for the dizziness. The runny nose is likely due to the compazine so its up to her which is worse the nausea or the runny nose whether she should stay on the compaziine or not.

## 2022-02-25 DIAGNOSIS — F41.9 ANXIETY: ICD-10-CM

## 2022-02-28 ENCOUNTER — TRANSFERRED RECORDS (OUTPATIENT)
Dept: HEALTH INFORMATION MANAGEMENT | Facility: CLINIC | Age: 77
End: 2022-02-28
Payer: MEDICARE

## 2022-02-28 RX ORDER — ALPRAZOLAM 0.5 MG
TABLET ORAL
Qty: 15 TABLET | Refills: 0 | Status: SHIPPED | OUTPATIENT
Start: 2022-02-28 | End: 2022-04-28

## 2022-03-02 ENCOUNTER — OFFICE VISIT (OUTPATIENT)
Dept: INTERNAL MEDICINE | Facility: CLINIC | Age: 77
End: 2022-03-02
Payer: MEDICARE

## 2022-03-02 VITALS
WEIGHT: 144 LBS | HEART RATE: 65 BPM | HEIGHT: 63 IN | BODY MASS INDEX: 25.52 KG/M2 | TEMPERATURE: 98.1 F | OXYGEN SATURATION: 98 % | DIASTOLIC BLOOD PRESSURE: 78 MMHG | RESPIRATION RATE: 20 BRPM | SYSTOLIC BLOOD PRESSURE: 134 MMHG

## 2022-03-02 DIAGNOSIS — I10 ESSENTIAL HYPERTENSION: ICD-10-CM

## 2022-03-02 DIAGNOSIS — H81.09 MENIERE'S DISEASE, UNSPECIFIED LATERALITY: Primary | ICD-10-CM

## 2022-03-02 DIAGNOSIS — F41.9 ANXIETY: ICD-10-CM

## 2022-03-02 PROCEDURE — 99214 OFFICE O/P EST MOD 30 MIN: CPT | Performed by: INTERNAL MEDICINE

## 2022-03-02 RX ORDER — VENLAFAXINE 37.5 MG/1
37.5 TABLET ORAL 2 TIMES DAILY
COMMUNITY
Start: 2022-03-02 | End: 2022-08-02

## 2022-03-02 NOTE — PROGRESS NOTES
"  Assessment & Plan     Meniere's disease, unspecified laterality  Seems like she is starting to accept that her balance is as good as it is going to get. recommended she be more open to using a walker.     Essential hypertension  under reasonable control Continue current medications.     Anxiety  Better with Venlafaxine.          BMI:   Estimated body mass index is 25.51 kg/m  as calculated from the following:    Height as of this encounter: 1.6 m (5' 3\").    Weight as of this encounter: 65.3 kg (144 lb).       No follow-ups on file.    Huyen Samuels MD  [facility] KOLBY Marie is a 76 year old who presents for the following health issues: dizziness, gait concerns.    HPI     Hypertension Follow-up      Do you check your blood pressure regularly outside of the clinic? No     Are you following a low salt diet? Yes    Are your blood pressures ever more than 140 on the top number (systolic) OR more   than 90 on the bottom number (diastolic), for example 140/90? No      How many servings of fruits and vegetables do you eat daily?  2-3    On average, how many sweetened beverages do you drink each day (Examples: soda, juice, sweet tea, etc.  Do NOT count diet or artificially sweetened beverages)?   0    How many days per week do you exercise enough to make your heart beat faster? 3 or less    How many minutes a day do you exercise enough to make your heart beat faster? 9 or less    How many days per week do you miss taking your medication? 0    For years she has suffered with Meniers disease and has had at least 2 surgeries for it. She is left with chronic gait instability and related recurrent nausea. She has seen multiple specialists and they all feel there is nothing left to try. She wants to know if I agree with that and I assured her that I do. She notes she is less anxious on the Effexor and she sleeps better on it as well.     Review of Systems   Constitutional, HEENT, " "cardiovascular, pulmonary, GI, , musculoskeletal, neuro, skin, endocrine and psych systems are negative, except as otherwise noted.      Objective    /78   Pulse 65   Temp 98.1  F (36.7  C)   Resp 20   Ht 1.6 m (5' 3\")   Wt 65.3 kg (144 lb)   SpO2 98%   Breastfeeding No   BMI 25.51 kg/m    Body mass index is 25.51 kg/m .  Physical Exam   GENERAL: healthy, alert and no distress  NECK: no adenopathy, no asymmetry, masses, or scars and thyroid normal to palpation  RESP: lungs clear to auscultation - no rales, rhonchi or wheezes  CV: regular rate and rhythm, normal S1 S2, no S3 or S4, no murmur, click or rub, no peripheral edema and peripheral pulses strong  ABDOMEN: soft, nontender, no hepatosplenomegaly, no masses and bowel sounds normal  MS: no gross musculoskeletal defects noted, no edema  NEURO: very wide based gate and staggers.           "

## 2022-03-15 ENCOUNTER — TELEPHONE (OUTPATIENT)
Dept: INTERNAL MEDICINE | Facility: CLINIC | Age: 77
End: 2022-03-15
Payer: MEDICARE

## 2022-03-15 DIAGNOSIS — R42 VERTIGO: Primary | ICD-10-CM

## 2022-03-15 RX ORDER — GABAPENTIN 100 MG/1
100 CAPSULE ORAL 2 TIMES DAILY
Qty: 270 CAPSULE | Refills: 1 | Status: SHIPPED | OUTPATIENT
Start: 2022-03-15 | End: 2023-01-30

## 2022-03-15 NOTE — TELEPHONE ENCOUNTER
Called patient. She states that symptoms have worsened since her appointment with Dr. Samuels. She states that between nausea and dizziness the symptoms are getting to her and she does not know what to do and, symptoms are not responding to her medications. She is still able to sleep well at night, symptoms start about 2 hours after she wakes up in the morning.     Patient asks if there is anything else Dr. Samuels can recommend for her.     Anjelica White RN  [facility]

## 2022-03-15 NOTE — TELEPHONE ENCOUNTER
Patient advised Dr. Samuels recommends restarting Neurontin 100 mg BID. Patient verbalizes understanding, but she does not have any Neurotin 100 mg capsules at home. Requesting to have prescription sent to [facility].     Anjelica White RN  [facility]

## 2022-03-15 NOTE — TELEPHONE ENCOUNTER
Patient calling  She is still dizzy. Medication have not helped. Please advise. Ok to call and sandi [phone]

## 2022-03-18 ENCOUNTER — TRANSFERRED RECORDS (OUTPATIENT)
Dept: HEALTH INFORMATION MANAGEMENT | Facility: CLINIC | Age: 77
End: 2022-03-18
Payer: MEDICARE

## 2022-04-13 ENCOUNTER — TELEPHONE (OUTPATIENT)
Dept: INTERNAL MEDICINE | Facility: CLINIC | Age: 77
End: 2022-04-13
Payer: MEDICARE

## 2022-04-13 NOTE — TELEPHONE ENCOUNTER
Spoke with patient.  C/o daily nausea x 1 1/2 wks from waking up until 4pm.  Compazine and Zofran are not helping with nausea.    Takes Gabapentin 100mg BID (takes between 8-10am and 4-6pm).    She is wondering if Gabapentin is causing the nausea.    If so, what is the recommendation?    Please advise, thanks.  Routed to covering provider(s).

## 2022-04-13 NOTE — TELEPHONE ENCOUNTER
Patient c/o a recent rx Dr Samuels put her on that is making her wobbly and her legs are tired. She is very nauseous.   Saritha and zofran are not helping.    pls call to advise      Best number to call back [phone]

## 2022-04-13 NOTE — TELEPHONE ENCOUNTER
Spoke with patient and informed of provider's message below.    Appointment scheduled.    Future Appointments 4/13/2022 - 10/10/2022      Date Visit Type Length Department Provider     5/19/2022  4:00 PM OFFICE VISIT 30 min Olinda Jorgensen APRN CNP              8/2/2022  9:00 AM ANNUAL WELLNESS 30 min Huyen Kenny MD

## 2022-04-13 NOTE — TELEPHONE ENCOUNTER
Recommend to make a follow up visit.   Why is she on the Neurontin?   She can try to taper it to once a day to see if symptoms will improve.

## 2022-04-26 ENCOUNTER — TRANSFERRED RECORDS (OUTPATIENT)
Dept: HEALTH INFORMATION MANAGEMENT | Facility: CLINIC | Age: 77
End: 2022-04-26
Payer: MEDICARE

## 2022-04-27 DIAGNOSIS — F41.9 ANXIETY: ICD-10-CM

## 2022-04-27 NOTE — TELEPHONE ENCOUNTER
Routing refill request to provider for review/approval because:  Drug not on the [facility] refill protocol     Routed to covering provider for review.

## 2022-04-28 RX ORDER — ALPRAZOLAM 0.5 MG
TABLET ORAL
Qty: 15 TABLET | Refills: 0 | Status: SHIPPED | OUTPATIENT
Start: 2022-04-28 | End: 2022-07-29

## 2022-05-03 ENCOUNTER — NURSE TRIAGE (OUTPATIENT)
Dept: INTERNAL MEDICINE | Facility: CLINIC | Age: 77
End: 2022-05-03
Payer: MEDICARE

## 2022-05-03 NOTE — TELEPHONE ENCOUNTER
"  Pt calls stating she has nausea, and feels dehydrated. This episode has been going on for 3 weeks.   She always feels thirsty. Feels dizzy sometimes. She has been evaluated for this. Secondary to Meniere's.   She sees [facility] for Nausea.  They told her to call Primary.   Takes zofran, and promethazine.   Urinating at least every 8 hours but not much urine coming out.   No fever.   She reports trying to drink 1/2 gallon of water in 24 hours.   Pt has seen Dr Samuels for her nausea and dizziness. Been to ENT as well.     SECOND LEVEL TRIAGE. Please advise. Pt states she \"feels dehydrated\", but doesn't want to go to ED. Has dry mouth even though she is drinking fluids.   Please advise if should go to Urgent Care? Could schedule next week with Dr Snider?       Reason for Disposition    Patient wants to be seen    Additional Information    Negative: Shock suspected (e.g., cold/pale/clammy skin, too weak to stand, low BP, rapid pulse)    Negative: Sounds like a life-threatening emergency to the triager    Negative: Nausea or vomiting and pregnancy < 20 weeks    Negative: Menstrual Period - Missed or Late (i.e., pregnancy suspected)    Negative: Heat exhaustion suspected (i.e., dehydration from heat exposure)    Negative: Anxiety or stress suspected (i.e., nausea with anxiety attacks or stressful situations)    Negative: Traumatic Brain Injury (TBI) suspected    Negative: Vomiting occurs    Negative: Other symptom is present, see that guideline.  (e.g., chest pain, headache, dizziness, abdominal pain, colds, sore throat, etc.).    Negative: Unable to walk, or can only walk with assistance (e.g., requires support)    Negative: Difficulty breathing    Negative: Insulin-dependent diabetes (Type I) and glucose > 400 mg/dL (22 mmol/L)    Negative: Drinking very little and dehydration suspected (e.g., no urine > 12 hours, very dry mouth, very lightheaded)    Negative: Patient sounds very sick or weak to the triager    Negative: Fever " > 104 F (40 C)    Negative: Fever > 101 F (38.3 C) and age > 60    Negative: Fever > 100.0 F (37.8 C) and bedridden (e.g., nursing home patient, CVA, chronic illness, recovering from surgery)    Negative: Fever > 100.0 F (37.8 C) and diabetes mellitus or weak immune system (e.g., HIV positive, cancer chemo, splenectomy, chronic steroids)    Negative: Taking any of the following medications: digoxin (Lanoxin), lithium, theophylline, phenytoin (Dilantin)    Negative: Yellowish color of the skin or white of the eye (i.e., jaundice)    Negative: Fever present > 3 days (72 hours)    Protocols used: NAUSEA-A-OH

## 2022-05-03 NOTE — TELEPHONE ENCOUNTER
Provider Response to 2nd Level Triage Request    I have reviewed the RN documentation. My recommendation is:  Face To Face Visit. Next Day: to be seen by another provider in same service line and keep hydrated, fluids encouraged.      OK to see Dr Snider. If unable to drink fluids , can be seen in ADM

## 2022-05-03 NOTE — TELEPHONE ENCOUNTER
Provider Recommendation Follow Up:   Reached patient/caregiver. Informed of provider's recommendations. Patient verbalized understanding and agrees with the plan.   Future Appointments 5/3/2022 - 10/30/2022              Date Visit Type Length Department Provider     5/4/2022  8:30 AM OFFICE VISIT 30 min Laurence Arcos NP              8/2/2022  9:00 AM ANNUAL WELLNESS 30 min Huyen Kenny MD Melissa Ropella, RN  [facility]

## 2022-05-04 ENCOUNTER — OFFICE VISIT (OUTPATIENT)
Dept: INTERNAL MEDICINE | Facility: CLINIC | Age: 77
End: 2022-05-04
Payer: MEDICARE

## 2022-05-04 VITALS
WEIGHT: 143.5 LBS | HEIGHT: 63 IN | OXYGEN SATURATION: 98 % | SYSTOLIC BLOOD PRESSURE: 144 MMHG | TEMPERATURE: 97.3 F | RESPIRATION RATE: 16 BRPM | BODY MASS INDEX: 25.43 KG/M2 | HEART RATE: 73 BPM | DIASTOLIC BLOOD PRESSURE: 82 MMHG

## 2022-05-04 DIAGNOSIS — R53.83 FATIGUE, UNSPECIFIED TYPE: Primary | ICD-10-CM

## 2022-05-04 DIAGNOSIS — R11.0 NAUSEA: ICD-10-CM

## 2022-05-04 DIAGNOSIS — F41.9 ANXIETY: ICD-10-CM

## 2022-05-04 LAB
ALBUMIN UR-MCNC: NEGATIVE MG/DL
APPEARANCE UR: CLEAR
BACTERIA #/AREA URNS HPF: ABNORMAL /HPF
BILIRUB UR QL STRIP: NEGATIVE
COLOR UR AUTO: YELLOW
ERYTHROCYTE [DISTWIDTH] IN BLOOD BY AUTOMATED COUNT: 13.7 % (ref 10–15)
GLUCOSE UR STRIP-MCNC: NEGATIVE MG/DL
HCT VFR BLD AUTO: 41.8 % (ref 35–47)
HGB BLD-MCNC: 13.4 G/DL (ref 11.7–15.7)
HGB UR QL STRIP: NEGATIVE
KETONES UR STRIP-MCNC: NEGATIVE MG/DL
LEUKOCYTE ESTERASE UR QL STRIP: ABNORMAL
MCH RBC QN AUTO: 30 PG (ref 26.5–33)
MCHC RBC AUTO-ENTMCNC: 32.1 G/DL (ref 31.5–36.5)
MCV RBC AUTO: 94 FL (ref 78–100)
NITRATE UR QL: NEGATIVE
PH UR STRIP: 7 [PH] (ref 5–7)
PLATELET # BLD AUTO: 234 10E3/UL (ref 150–450)
RBC # BLD AUTO: 4.47 10E6/UL (ref 3.8–5.2)
RBC #/AREA URNS AUTO: ABNORMAL /HPF
SP GR UR STRIP: 1.02 (ref 1–1.03)
SQUAMOUS #/AREA URNS AUTO: ABNORMAL /LPF
TRANS CELLS #/AREA URNS HPF: ABNORMAL /HPF
UROBILINOGEN UR STRIP-ACNC: 0.2 E.U./DL
WBC # BLD AUTO: 6.7 10E3/UL (ref 4–11)
WBC #/AREA URNS AUTO: ABNORMAL /HPF

## 2022-05-04 PROCEDURE — 81001 URINALYSIS AUTO W/SCOPE: CPT | Performed by: NURSE PRACTITIONER

## 2022-05-04 PROCEDURE — 80048 BASIC METABOLIC PNL TOTAL CA: CPT | Performed by: NURSE PRACTITIONER

## 2022-05-04 PROCEDURE — 84443 ASSAY THYROID STIM HORMONE: CPT | Performed by: NURSE PRACTITIONER

## 2022-05-04 PROCEDURE — 99214 OFFICE O/P EST MOD 30 MIN: CPT | Performed by: NURSE PRACTITIONER

## 2022-05-04 PROCEDURE — 36415 COLL VENOUS BLD VENIPUNCTURE: CPT | Performed by: NURSE PRACTITIONER

## 2022-05-04 PROCEDURE — 85027 COMPLETE CBC AUTOMATED: CPT | Performed by: NURSE PRACTITIONER

## 2022-05-04 RX ORDER — PROPRANOLOL HYDROCHLORIDE 10 MG/1
TABLET ORAL
Qty: 30 TABLET | Refills: 1 | Status: SHIPPED | OUTPATIENT
Start: 2022-05-04 | End: 2023-08-21

## 2022-05-04 RX ORDER — PROMETHAZINE HYDROCHLORIDE 25 MG/1
TABLET ORAL
Qty: 30 TABLET | Refills: 1 | Status: SHIPPED | OUTPATIENT
Start: 2022-05-04 | End: 2022-06-29

## 2022-05-04 RX ORDER — MECLIZINE HCL 12.5 MG 12.5 MG/1
TABLET ORAL
COMMUNITY
Start: 2021-06-18 | End: 2022-11-28

## 2022-05-04 ASSESSMENT — ENCOUNTER SYMPTOMS: NAUSEA: 1

## 2022-05-04 NOTE — PROGRESS NOTES
Assessment & Plan     Nausea    - promethazine (PHENERGAN) 25 MG tablet; TAKE 1 TABLET(25 MG) BY MOUTH EVERY 6 HOURS AS NEEDED FOR NAUSEA    Anxiety    - propranolol (INDERAL) 10 MG tablet; 1/2 to 1 tab twice daily as needed    Fatigue, unspecified type    - CBC with platelets; Future  - Basic metabolic panel  (Ca, Cl, CO2, Creat, Gluc, K, Na, BUN); Future  - TSH with free T4 reflex; Future  - UA with Microscopic reflex to Culture - lab collect; Future    F/u PCP pending labs  Consider dose adjustment effexor.  Add 1/2 10 mg propranolol BID             Laurence Patel NP  [facility] KOLBY Marie is a 76 year old who presents for the following health issues     Nausea  Associated symptoms include nausea.        Concern - nausea, dry mouth, fatigue  Onset: chronic, worse last 3 weeks  Description: nausea, dry mouth, fatigue  Intensity: moderate  Progression of Symptoms:  worsening  Accompanying Signs & Symptoms: afebrile, no v/d/c/UTI sx  Previous history of similar problem: h/o Meniere, restless legs, balance problems, anxiety  Precipitating factors:        Worsened by: none known  Alleviating factors:        Improved by: none known  Therapies tried and outcome:  none         Review of Systems   Gastrointestinal: Positive for nausea.      CONSTITUTIONAL: NEGATIVE for fever, chills, change in weight  RESP: NEGATIVE for significant cough or SOB  CV: NEGATIVE for chest pain, palpitations or peripheral edema  PSYCHIATRIC: POSITIVE foranxiety    Past Medical History:   Diagnosis Date     Anxiety      Basal cell carcinoma      Complication of anesthesia      Diverticulosis      GERD (gastroesophageal reflux disease)      HTN (hypertension)      Meniere's disease      Nephrolithiasis      Pain in right knee      PONV (postoperative nausea and vomiting)      Current Outpatient Medications   Medication     acetaminophen (TYLENOL) 500 MG tablet     ALPRAZolam (XANAX) 0.5 MG tablet      "buPROPion (WELLBUTRIN SR) 150 MG 12 hr tablet     calcitonin, salmon, (MIACALCIN) 200 UNIT/ACT nasal spray     Calcium Carbonate Antacid (TUMS CHEWY BITES PO)     Cyanocobalamin (B-12 PO)     esomeprazole (NEXIUM) 40 MG DR capsule     Flaxseed, Linseed, (FLAX SEED OIL) 1000 MG capsule     gabapentin (NEURONTIN) 100 MG capsule     meclizine (ANTIVERT) 12.5 MG tablet     Misc Natural Products (GLUCOSAMINE CHOND COMPLEX/MSM PO)     Multiple Vitamins-Minerals (EYE-VITES) TABS     Multiple Vitamins-Minerals (OCUVITE PRESERVISION PO)     ondansetron (ZOFRAN-ODT) 4 MG ODT tab     prochlorperazine (COMPAZINE) 10 MG tablet     promethazine (PHENERGAN) 25 MG tablet     propranolol (INDERAL) 10 MG tablet     UNABLE TO FIND     UNABLE TO FIND     venlafaxine (EFFEXOR) 37.5 MG tablet     vitamin D3 (CHOLECALCIFEROL) 50 mcg (2000 units) tablet     No current facility-administered medications for this visit.           Objective    BP (!) 144/82   Pulse 73   Temp 97.3  F (36.3  C) (Tympanic)   Resp 16   Ht 1.6 m (5' 3\")   Wt 65.1 kg (143 lb 8 oz)   LMP  (LMP Unknown)   SpO2 98%   BMI 25.42 kg/m    Body mass index is 25.42 kg/m .  Physical Exam   GENERAL: alert, no distress and fatigued  EYES: Eyes grossly normal to inspection, PERRL and conjunctivae and sclerae normal  NECK: no adenopathy, no asymmetry, masses, or scars and thyroid normal to palpation  RESP: lungs clear to auscultation - no rales, rhonchi or wheezes  CV: regular rate and rhythm, normal S1 S2, no S3 or S4, no murmur, click or rub, no peripheral edema and peripheral pulses strong  ABDOMEN: soft, nontender, no hepatosplenomegaly, no masses and bowel sounds normal  NEURO: mentation intact and gait abnormal: wide unsteady gait, no cane today  PSYCH: mentation appears normal, affect flat and anxious                "

## 2022-05-05 ENCOUNTER — TELEPHONE (OUTPATIENT)
Dept: INTERNAL MEDICINE | Facility: CLINIC | Age: 77
End: 2022-05-05
Payer: MEDICARE

## 2022-05-05 DIAGNOSIS — H81.02 MENIERE'S DISEASE OF LEFT EAR: ICD-10-CM

## 2022-05-05 DIAGNOSIS — R11.0 NAUSEA: ICD-10-CM

## 2022-05-05 LAB
ANION GAP SERPL CALCULATED.3IONS-SCNC: 8 MMOL/L (ref 3–14)
BUN SERPL-MCNC: 23 MG/DL (ref 7–30)
CALCIUM SERPL-MCNC: 8.9 MG/DL (ref 8.5–10.1)
CHLORIDE BLD-SCNC: 106 MMOL/L (ref 94–109)
CO2 SERPL-SCNC: 24 MMOL/L (ref 20–32)
CREAT SERPL-MCNC: 0.59 MG/DL (ref 0.52–1.04)
GFR SERPL CREATININE-BSD FRML MDRD: >90 ML/MIN/1.73M2
GLUCOSE BLD-MCNC: 104 MG/DL (ref 70–99)
POTASSIUM BLD-SCNC: 4.2 MMOL/L (ref 3.4–5.3)
SODIUM SERPL-SCNC: 138 MMOL/L (ref 133–144)
TSH SERPL DL<=0.005 MIU/L-ACNC: 2.88 MU/L (ref 0.4–4)

## 2022-05-05 NOTE — TELEPHONE ENCOUNTER
Requested Prescriptions   Pending Prescriptions Disp Refills     ondansetron (ZOFRAN ODT) 4 MG ODT tab  Last Written Prescription Date:  05/04/22  Last Fill Quantity: 01/04/22,  # refills: 240   Last office visit: 5/4/2022 with prescribing provider:  0   Future Office Visit:   Next 5 appointments (look out 90 days)    Aug 02, 2022  9:00 AM  (Arrive by 8:40 AM)  Annual Wellness Visit with Huyen Samuels MD  [facility] ([facility] ) [address] [facility] [address]  [phone]                240 tablet 0       There is no refill protocol information for this order

## 2022-05-05 NOTE — TELEPHONE ENCOUNTER
Please advise pt labs WNL.  UA was concentrated, needs to increase fluid intake.  Take the propanolol BID and f/u PCP if not improving  Laurence Patel CNP

## 2022-05-05 NOTE — TELEPHONE ENCOUNTER
"Call to patient. Patient informed of Laurence's below response. Patient verbalized understanding.     Patient reports \"I am definitely not improving. I'm so sick and nauseous.\"    Patient reports very dry lips and mouth and an \"aweful taste in my mouth and no moisture in my mouth\"; however, she is still voiding. Patient reports she drinks half a gallon of water a day.   Patient reports she has tried the Phenergan and it has not helped. Last emesis 2 weeks ago.     Patient wants to know if Laurence has any further recommendations. Patient's son is getting  in 2 weeks and she worries she will not be feeling well enough for his wedding.     Elba HARTMANN RN   [facility]            "

## 2022-05-05 NOTE — TELEPHONE ENCOUNTER
Call to patient. Patient informed of Laurence's below response. Patient verbalized understanding.     Elba HARTMANN RN   [facility]

## 2022-05-08 ENCOUNTER — HOSPITAL ENCOUNTER (EMERGENCY)
Facility: CLINIC | Age: 77
Discharge: HOME | End: 2022-05-08
Attending: EMERGENCY MEDICINE | Admitting: EMERGENCY MEDICINE
Payer: MEDICARE

## 2022-05-08 VITALS
RESPIRATION RATE: 16 BRPM | DIASTOLIC BLOOD PRESSURE: 76 MMHG | TEMPERATURE: 98.9 F | SYSTOLIC BLOOD PRESSURE: 157 MMHG | OXYGEN SATURATION: 96 % | HEART RATE: 76 BPM

## 2022-05-08 DIAGNOSIS — R19.7 NAUSEA VOMITING AND DIARRHEA: ICD-10-CM

## 2022-05-08 DIAGNOSIS — E86.0 DEHYDRATION: ICD-10-CM

## 2022-05-08 DIAGNOSIS — R11.2 NAUSEA VOMITING AND DIARRHEA: ICD-10-CM

## 2022-05-08 LAB
ANION GAP SERPL CALCULATED.3IONS-SCNC: 5 MMOL/L (ref 3–14)
BASOPHILS # BLD AUTO: 0 10E3/UL (ref 0–0.2)
BASOPHILS NFR BLD AUTO: 0 %
BUN SERPL-MCNC: 26 MG/DL (ref 7–30)
CALCIUM SERPL-MCNC: 9.6 MG/DL (ref 8.5–10.1)
CHLORIDE BLD-SCNC: 108 MMOL/L (ref 94–109)
CO2 SERPL-SCNC: 24 MMOL/L (ref 20–32)
CREAT SERPL-MCNC: 0.48 MG/DL (ref 0.52–1.04)
EOSINOPHIL # BLD AUTO: 0.1 10E3/UL (ref 0–0.7)
EOSINOPHIL NFR BLD AUTO: 1 %
ERYTHROCYTE [DISTWIDTH] IN BLOOD BY AUTOMATED COUNT: 13.6 % (ref 10–15)
GFR SERPL CREATININE-BSD FRML MDRD: >90 ML/MIN/1.73M2
GLUCOSE BLD-MCNC: 102 MG/DL (ref 70–99)
HCT VFR BLD AUTO: 41 % (ref 35–47)
HGB BLD-MCNC: 12.8 G/DL (ref 11.7–15.7)
IMM GRANULOCYTES # BLD: 0 10E3/UL
IMM GRANULOCYTES NFR BLD: 0 %
LYMPHOCYTES # BLD AUTO: 0.5 10E3/UL (ref 0.8–5.3)
LYMPHOCYTES NFR BLD AUTO: 9 %
MCH RBC QN AUTO: 29.6 PG (ref 26.5–33)
MCHC RBC AUTO-ENTMCNC: 31.2 G/DL (ref 31.5–36.5)
MCV RBC AUTO: 95 FL (ref 78–100)
MONOCYTES # BLD AUTO: 0.4 10E3/UL (ref 0–1.3)
MONOCYTES NFR BLD AUTO: 6 %
NEUTROPHILS # BLD AUTO: 5.2 10E3/UL (ref 1.6–8.3)
NEUTROPHILS NFR BLD AUTO: 84 %
NRBC # BLD AUTO: 0 10E3/UL
NRBC BLD AUTO-RTO: 0 /100
PLATELET # BLD AUTO: 251 10E3/UL (ref 150–450)
POTASSIUM BLD-SCNC: 4.1 MMOL/L (ref 3.4–5.3)
RBC # BLD AUTO: 4.32 10E6/UL (ref 3.8–5.2)
SODIUM SERPL-SCNC: 137 MMOL/L (ref 133–144)
WBC # BLD AUTO: 6.2 10E3/UL (ref 4–11)

## 2022-05-08 PROCEDURE — 36415 COLL VENOUS BLD VENIPUNCTURE: CPT | Performed by: EMERGENCY MEDICINE

## 2022-05-08 PROCEDURE — 96375 TX/PRO/DX INJ NEW DRUG ADDON: CPT

## 2022-05-08 PROCEDURE — 96374 THER/PROPH/DIAG INJ IV PUSH: CPT

## 2022-05-08 PROCEDURE — 96361 HYDRATE IV INFUSION ADD-ON: CPT

## 2022-05-08 PROCEDURE — 99284 EMERGENCY DEPT VISIT MOD MDM: CPT | Mod: 25

## 2022-05-08 PROCEDURE — 250N000011 HC RX IP 250 OP 636: Performed by: EMERGENCY MEDICINE

## 2022-05-08 PROCEDURE — 250N000009 HC RX 250: Performed by: EMERGENCY MEDICINE

## 2022-05-08 PROCEDURE — 85025 COMPLETE CBC W/AUTO DIFF WBC: CPT | Performed by: EMERGENCY MEDICINE

## 2022-05-08 PROCEDURE — 82310 ASSAY OF CALCIUM: CPT | Performed by: EMERGENCY MEDICINE

## 2022-05-08 PROCEDURE — 258N000003 HC RX IP 258 OP 636: Performed by: EMERGENCY MEDICINE

## 2022-05-08 RX ORDER — ONDANSETRON 2 MG/ML
4 INJECTION INTRAMUSCULAR; INTRAVENOUS EVERY 30 MIN PRN
Status: DISCONTINUED | OUTPATIENT
Start: 2022-05-08 | End: 2022-05-08 | Stop reason: HOSPADM

## 2022-05-08 RX ORDER — MORPHINE SULFATE 2 MG/ML
2 INJECTION, SOLUTION INTRAMUSCULAR; INTRAVENOUS ONCE
Status: DISCONTINUED | OUTPATIENT
Start: 2022-05-08 | End: 2022-05-08 | Stop reason: HOSPADM

## 2022-05-08 RX ORDER — SODIUM CHLORIDE 9 MG/ML
INJECTION, SOLUTION INTRAVENOUS CONTINUOUS
Status: DISCONTINUED | OUTPATIENT
Start: 2022-05-08 | End: 2022-05-08 | Stop reason: HOSPADM

## 2022-05-08 RX ADMIN — FAMOTIDINE 20 MG: 10 INJECTION, SOLUTION INTRAVENOUS at 17:08

## 2022-05-08 RX ADMIN — ONDANSETRON 4 MG: 2 INJECTION INTRAMUSCULAR; INTRAVENOUS at 17:07

## 2022-05-08 RX ADMIN — SODIUM CHLORIDE 1000 ML: 9 INJECTION, SOLUTION INTRAVENOUS at 16:57

## 2022-05-08 ASSESSMENT — ENCOUNTER SYMPTOMS
VOMITING: 1
FEVER: 0
BLOOD IN STOOL: 0
DIARRHEA: 1
ABDOMINAL PAIN: 1
DIZZINESS: 0
DYSURIA: 0
SORE THROAT: 0
COUGH: 0

## 2022-05-08 NOTE — ED NOTES
Patient discharged home with son. Vital signs stable at discharge. Education provided regarding avs reviewed. Pt verbalized understanding. IV removed. Catheter intact. All questions answered.

## 2022-05-08 NOTE — ED PROVIDER NOTES
History     Chief Complaint:  Vomiting and diarrhea    HPI   Cynthia Arita is a 76 year old female who presents with vomiting and diarrhea that started overnight.  She has tried nausea meds including zofran, phenergan, and meclizine at home without relief.  She notes mild generalized abdominal discomfort. No fevers.  No blood in stools or vomit.  He son was recently sick with the same symptoms.  She denies any chest pain, cough, sore throat, rhinorrhea.  She has history of Meniere's disease and has chronic nausea.      Review of Systems   Constitutional: Negative for fever.   HENT: Negative for congestion and sore throat.    Respiratory: Negative for cough.    Cardiovascular: Negative for chest pain.   Gastrointestinal: Positive for abdominal pain, diarrhea and vomiting. Negative for blood in stool.   Genitourinary: Negative for dysuria.   Neurological: Negative for dizziness.   All other systems reviewed and are negative.        Allergies:  Ativan [Lorazepam]  Gabapentin  Metoprolol  Pantoprazole  Oxycodone    Medications:    acetaminophen (TYLENOL) 500 MG tablet  ALPRAZolam (XANAX) 0.5 MG tablet  buPROPion (WELLBUTRIN SR) 150 MG 12 hr tablet  calcitonin, salmon, (MIACALCIN) 200 UNIT/ACT nasal spray  Calcium Carbonate Antacid (TUMS CHEWY BITES PO)  Cyanocobalamin (B-12 PO)  esomeprazole (NEXIUM) 40 MG DR capsule  Flaxseed, Linseed, (FLAX SEED OIL) 1000 MG capsule  gabapentin (NEURONTIN) 100 MG capsule  meclizine (ANTIVERT) 12.5 MG tablet  Misc Natural Products (GLUCOSAMINE CHOND COMPLEX/MSM PO)  Multiple Vitamins-Minerals (EYE-VITES) TABS  Multiple Vitamins-Minerals (OCUVITE PRESERVISION PO)  ondansetron (ZOFRAN-ODT) 4 MG ODT tab  prochlorperazine (COMPAZINE) 10 MG tablet  promethazine (PHENERGAN) 25 MG tablet  propranolol (INDERAL) 10 MG tablet  UNABLE TO FIND  UNABLE TO FIND  venlafaxine (EFFEXOR) 37.5 MG tablet  vitamin D3 (CHOLECALCIFEROL) 50 mcg (2000 units) tablet         Past Medical History:   Past  Surgical History:     Past Medical History:   Diagnosis Date     Anxiety      Basal cell carcinoma      Complication of anesthesia      Diverticulosis      GERD (gastroesophageal reflux disease)      HTN (hypertension)      Meniere's disease      Nephrolithiasis      Pain in right knee      PONV (postoperative nausea and vomiting)     Past Surgical History:   Procedure Laterality Date     ARTHROPLASTY KNEE Right 1/21/2019    Procedure: Right total knee arthroplasty;  Surgeon: Denton Amor MD;  Location: [facility] OR     EYE SURGERY      cataract surgery both eyes     ZZC VAGINAL HYSTERECTOMY      with left oophorectomy      Patient Active Problem List    Diagnosis Date Noted     Abdominal pain 02/03/2019     Priority: Medium     S/P total knee arthroplasty 01/21/2019     Priority: Medium     Pain of right thigh 10/01/2018     Priority: Medium     Tinnitus, unspecified laterality 05/23/2018     Priority: Medium     Gastroesophageal reflux disease without esophagitis 12/20/2017     Priority: Medium     Stress 07/31/2017     Priority: Medium     Essential hypertension 07/03/2017     Priority: Medium     Anxiety 04/19/2017     Priority: Medium     Pneumonia of right upper lobe due to infectious organism 04/05/2017     Priority: Medium     Calculus of right kidney 06/29/2015     Priority: Medium     Esophageal reflux 06/18/2015     Priority: Medium     Osteoporosis 06/18/2015     Priority: Medium     Meniere's disease 06/18/2015     Priority: Medium          Family History  Family History   Problem Relation Age of Onset     Neurologic Disorder Mother         palsy     Esophageal Cancer Father      Cancer Maternal Grandmother         lymph     Diabetes Paternal Grandmother      Neurologic Disorder Sister         Parkinson's     Hypertension Brother      Bipolar Disorder Sister      Brain Cancer Son 17       Social History:  Presents to the ED with her son by private vehicle.     Physical Exam     Patient Vitals for the  past 24 hrs:   BP Temp Temp src Pulse Resp SpO2   05/08/22 1745 (!) 157/76 -- -- 76 -- 96 %   05/08/22 1730 (!) 159/82 -- -- 77 -- 97 %   05/08/22 1715 (!) 147/79 -- -- 74 -- 95 %   05/08/22 1700 (!) 154/94 -- -- 81 -- 95 %   05/08/22 1538 -- -- -- 79 -- --   05/08/22 1537 (!) 148/90 98.9  F (37.2  C) Oral -- 16 100 %       Physical Exam  Vitals and nursing note reviewed.   Constitutional:       Appearance: Normal appearance.   HENT:      Head: Normocephalic and atraumatic.      Right Ear: External ear normal.      Left Ear: External ear normal.      Nose: Nose normal.      Mouth/Throat:      Mouth: Mucous membranes are dry.   Eyes:      Extraocular Movements: Extraocular movements intact.      Conjunctiva/sclera: Conjunctivae normal.   Cardiovascular:      Rate and Rhythm: Normal rate and regular rhythm.      Heart sounds: No murmur heard.  Pulmonary:      Effort: Pulmonary effort is normal. No respiratory distress.      Breath sounds: Normal breath sounds. No wheezing, rhonchi or rales.   Abdominal:      General: Abdomen is flat. Bowel sounds are normal. There is no distension.      Palpations: Abdomen is soft.      Tenderness: There is abdominal tenderness (very mild generalized). There is no guarding or rebound.   Musculoskeletal:         General: No deformity or signs of injury.      Cervical back: Normal range of motion and neck supple.   Skin:     General: Skin is warm and dry.      Findings: No rash.   Neurological:      Mental Status: She is alert and oriented to person, place, and time.   Psychiatric:         Mood and Affect: Mood normal.         Behavior: Behavior normal.           Emergency Department Course     Imaging:  No orders to display       Laboratory:  Labs Ordered and Resulted from Time of ED Arrival to Time of ED Departure   BASIC METABOLIC PANEL - Abnormal       Result Value    Sodium 137      Potassium 4.1      Chloride 108      Carbon Dioxide (CO2) 24      Anion Gap 5      Urea Nitrogen 26       Creatinine 0.48 (*)     Calcium 9.6      Glucose 102 (*)     GFR Estimate >90     CBC WITH PLATELETS AND DIFFERENTIAL - Abnormal    WBC Count 6.2      RBC Count 4.32      Hemoglobin 12.8      Hematocrit 41.0      MCV 95      MCH 29.6      MCHC 31.2 (*)     RDW 13.6      Platelet Count 251      % Neutrophils 84      % Lymphocytes 9      % Monocytes 6      % Eosinophils 1      % Basophils 0      % Immature Granulocytes 0      NRBCs per 100 WBC 0      Absolute Neutrophils 5.2      Absolute Lymphocytes 0.5 (*)     Absolute Monocytes 0.4      Absolute Eosinophils 0.1      Absolute Basophils 0.0      Absolute Immature Granulocytes 0.0      Absolute NRBCs 0.0             Emergency Department Course:           Reviewed:  I reviewed nursing notes, vitals, past history and care everywhere      Interventions:  Medications   0.9% sodium chloride BOLUS (0 mLs Intravenous Stopped 22)     Followed by   sodium chloride 0.9% infusion (has no administration in time range)   ondansetron (ZOFRAN) injection 4 mg (4 mg Intravenous Given 22)   morphine (PF) injection 2 mg (2 mg Intravenous Not Given 22)   famotidine (PEPCID) injection 20 mg (20 mg Intravenous Given 22)       Disposition:  The patient was discharged to home.    Impression & Plan        Medical Decision Makin yo F with n/v/d.  Suspect viral AGE given her son just had same symptoms.  Her abd exam is benign.  Does not appear severely dehydrated on exam.  Given she is failing her home anti-emetics, will plan IV and give fluid bolus and zofran, morphine for pain/cramping, and pepcid.       Rechecked patient.  Labs are unremarkable.  Pt reports sig improvement after meds and IVF.  Will discharge home.  She has multiple anti-emetics to use at home.  Discussed return precautions.      Covid-19  Cynthia Arita was evaluated during a global COVID-19 pandemic, which necessitated consideration that the patient might be at risk for  infection with the SARS-CoV-2 virus that causes COVID-19.  Applicable protocols for evaluation were followed during the patient's care. COVID-19 was considered as part of the patient's evaluation.       Diagnosis:    ICD-10-CM    1. Nausea vomiting and diarrhea  R11.2     R19.7    2. Dehydration  E86.0        Discharge Medications:  Discharge Medication List as of 5/8/2022  5:57 PM                 Rosales Forte MD  05/08/22 7082

## 2022-05-08 NOTE — ED TRIAGE NOTES
Pt has been vomiting and had diarrhea since 1am today. Pt has not been able to keep fluids down. Also feeling lightheaded. Pt states that she felt fine yesterday. ABC intact.

## 2022-05-09 ENCOUNTER — TELEPHONE (OUTPATIENT)
Dept: INTERNAL MEDICINE | Facility: CLINIC | Age: 77
End: 2022-05-09
Payer: MEDICARE

## 2022-05-09 RX ORDER — ONDANSETRON 4 MG/1
TABLET, ORALLY DISINTEGRATING ORAL
Qty: 240 TABLET | Refills: 0 | Status: SHIPPED | OUTPATIENT
Start: 2022-05-09 | End: 2022-08-22

## 2022-05-09 NOTE — TELEPHONE ENCOUNTER
Patient has received Ondansetron ODT in the past without needing PA (last ordered by Dr. Samuels on 1/4/22).     Called [facility] and spoke to [facility], she states prescription went through without needing PA for patient. Nothing further needed.    Anjelica White RN  [facility]

## 2022-05-09 NOTE — TELEPHONE ENCOUNTER
Prescription approved per [facility] Refill Protocol.    Next 5 appointments (look out 90 days)    Aug 02, 2022  9:00 AM  (Arrive by 8:40 AM)  Annual Wellness Visit with Huyen Samuels MD  [facility] ([facility] [place] ) [facility] [address] [facility] [id number] [phone]

## 2022-05-09 NOTE — TELEPHONE ENCOUNTER
"Central Prior Authorization Team   Phone: [phone]      PRIOR AUTHORIZATION DENIED    Medication: Ondansetron Tab 4mg Odt    Denial Date:  05/09/2022    Denial Rational:   Medicare allows us to cover a drug only when it is a Part D drug. A Part D drug is one that is used  for a \"medically accepted indication.\" A medically accepted indication means the use is approved  by the Food and Drug Administration (FDA) OR the use is supported by one of the following   accepted references:    (1) [facility] Formulary Service Drug Information.  (2) [employer] DRUGDEX Information System.    Ondansetron ODT is not FDA approved for your medical condition(s): Nausea unspecified. These  condition(s) are not supported by one of the accepted references. Therefore your drug is denied  because it is not being used for a \"medically accepted indication.\"          Appeal Information: If the Provider/Patient would like to appeal this denial, please provide a letter of medical necessity explaining why Preferred Formulary medications are not appropriate in the treatment of the patient's condition; along with any previous therapies tried/failed.    Once completed, please route back to me directly: Poppy Manuel          "

## 2022-05-10 DIAGNOSIS — R11.0 NAUSEA: ICD-10-CM

## 2022-05-10 RX ORDER — PROCHLORPERAZINE MALEATE 10 MG
10 TABLET ORAL EVERY 8 HOURS PRN
Qty: 120 TABLET | Refills: 4 | Status: SHIPPED | OUTPATIENT
Start: 2022-05-10 | End: 2022-06-29

## 2022-05-10 NOTE — TELEPHONE ENCOUNTER
Routing refill request to provider for review/approval because:  High dose warning     Routed to covering provider - Dr. Holloway, to review.    Anjelica Wihte RN  [facility]

## 2022-05-10 NOTE — TELEPHONE ENCOUNTER
Patient was seen by Laurence Patel for nausea on 5/4/22.   Nausea has been much worse the past few days, she has prescriptions at home for Zofran and Promethazine. She is struggling to eat due to nausea, but pushing herself to sip on liquids throughout the day. Patient asks for any recommendations on what she can do for the nausea as her home medications are not helping.     Her mouth does feel dry-she looked in her mouth and said her mouth is appearing dry as well. Last urination was this morning.     Patient does have Compazine on medication list, however, she does not see this at home. She thinks there may have been her insurance won't cover this and cost was $188/month. No notes on file about a PA for Compazine, this Writer will follow-up with pharmacy and call patient back.     Spoke to Rosetta at [facility], she states patient was able to fill Compazine on 1/26/22 and cost was around $40. She states there was a different medication that was not covered for patient. Called patient back, informed her that prescription for Compazine was covered, but pharmacy doesn't have any refills. Patient would like to try this again.     Anjelica White RN  [facility]

## 2022-05-10 NOTE — TELEPHONE ENCOUNTER
Patient calling  She was seen in the ED. She is not longer vomiting or have loose stools but is having nausea. She is wondering what she can do to help. Ok to call and  [phone]

## 2022-05-11 NOTE — TELEPHONE ENCOUNTER
Patient is calling to ask if she should continue to take any of her other nausea medications along with the prochlorperazine.  She has several prescribed to her.

## 2022-05-12 NOTE — TELEPHONE ENCOUNTER
I don't know the patient.   Should discuss with her PCP.   In general if symptoms are improved, she can take the medications as needed.

## 2022-05-13 ENCOUNTER — OFFICE VISIT (OUTPATIENT)
Dept: PEDIATRICS | Facility: CLINIC | Age: 77
End: 2022-05-13
Payer: MEDICARE

## 2022-05-13 ENCOUNTER — NURSE TRIAGE (OUTPATIENT)
Dept: INTERNAL MEDICINE | Facility: CLINIC | Age: 77
End: 2022-05-13
Payer: MEDICARE

## 2022-05-13 VITALS
TEMPERATURE: 97.8 F | BODY MASS INDEX: 24.45 KG/M2 | SYSTOLIC BLOOD PRESSURE: 160 MMHG | OXYGEN SATURATION: 97 % | HEART RATE: 64 BPM | DIASTOLIC BLOOD PRESSURE: 83 MMHG | WEIGHT: 138 LBS | RESPIRATION RATE: 18 BRPM

## 2022-05-13 DIAGNOSIS — H81.02 MENIERE'S DISEASE, LEFT: ICD-10-CM

## 2022-05-13 DIAGNOSIS — R11.0 NAUSEA: Primary | ICD-10-CM

## 2022-05-13 DIAGNOSIS — R11.2 NAUSEA AND VOMITING, INTRACTABILITY OF VOMITING NOT SPECIFIED, UNSPECIFIED VOMITING TYPE: Primary | ICD-10-CM

## 2022-05-13 LAB
ALBUMIN SERPL-MCNC: 3.6 G/DL (ref 3.4–5)
ALP SERPL-CCNC: 84 U/L (ref 40–150)
ALT SERPL W P-5'-P-CCNC: 37 U/L (ref 0–50)
ANION GAP SERPL CALCULATED.3IONS-SCNC: 6 MMOL/L (ref 3–14)
AST SERPL W P-5'-P-CCNC: 25 U/L (ref 0–45)
BASOPHILS # BLD AUTO: 0 10E3/UL (ref 0–0.2)
BASOPHILS NFR BLD AUTO: 0 %
BILIRUB SERPL-MCNC: 0.4 MG/DL (ref 0.2–1.3)
BUN SERPL-MCNC: 15 MG/DL (ref 7–30)
CALCIUM SERPL-MCNC: 9.1 MG/DL (ref 8.5–10.1)
CHLORIDE BLD-SCNC: 107 MMOL/L (ref 94–109)
CO2 SERPL-SCNC: 25 MMOL/L (ref 20–32)
CREAT SERPL-MCNC: 0.44 MG/DL (ref 0.52–1.04)
EOSINOPHIL # BLD AUTO: 0.2 10E3/UL (ref 0–0.7)
EOSINOPHIL NFR BLD AUTO: 2 %
ERYTHROCYTE [DISTWIDTH] IN BLOOD BY AUTOMATED COUNT: 13 % (ref 10–15)
GFR SERPL CREATININE-BSD FRML MDRD: >90 ML/MIN/1.73M2
GLUCOSE BLD-MCNC: 115 MG/DL (ref 70–99)
HCT VFR BLD AUTO: 42.6 % (ref 35–47)
HGB BLD-MCNC: 13.4 G/DL (ref 11.7–15.7)
IMM GRANULOCYTES # BLD: 0 10E3/UL
IMM GRANULOCYTES NFR BLD: 0 %
LYMPHOCYTES # BLD AUTO: 1.4 10E3/UL (ref 0.8–5.3)
LYMPHOCYTES NFR BLD AUTO: 18 %
MCH RBC QN AUTO: 29.1 PG (ref 26.5–33)
MCHC RBC AUTO-ENTMCNC: 31.5 G/DL (ref 31.5–36.5)
MCV RBC AUTO: 93 FL (ref 78–100)
MONOCYTES # BLD AUTO: 0.6 10E3/UL (ref 0–1.3)
MONOCYTES NFR BLD AUTO: 8 %
NEUTROPHILS # BLD AUTO: 5.5 10E3/UL (ref 1.6–8.3)
NEUTROPHILS NFR BLD AUTO: 72 %
NRBC # BLD AUTO: 0 10E3/UL
NRBC BLD AUTO-RTO: 0 /100
PLATELET # BLD AUTO: 319 10E3/UL (ref 150–450)
POTASSIUM BLD-SCNC: 3.8 MMOL/L (ref 3.4–5.3)
PROT SERPL-MCNC: 7.2 G/DL (ref 6.8–8.8)
RBC # BLD AUTO: 4.6 10E6/UL (ref 3.8–5.2)
SODIUM SERPL-SCNC: 138 MMOL/L (ref 133–144)
TROPONIN I SERPL HS-MCNC: 4 NG/L
WBC # BLD AUTO: 7.8 10E3/UL (ref 4–11)

## 2022-05-13 PROCEDURE — 99215 OFFICE O/P EST HI 40 MIN: CPT | Mod: 25 | Performed by: PREVENTIVE MEDICINE

## 2022-05-13 PROCEDURE — 80053 COMPREHEN METABOLIC PANEL: CPT | Performed by: PREVENTIVE MEDICINE

## 2022-05-13 PROCEDURE — 96374 THER/PROPH/DIAG INJ IV PUSH: CPT | Performed by: PREVENTIVE MEDICINE

## 2022-05-13 PROCEDURE — 99417 PROLNG OP E/M EACH 15 MIN: CPT | Performed by: PREVENTIVE MEDICINE

## 2022-05-13 PROCEDURE — 36415 COLL VENOUS BLD VENIPUNCTURE: CPT | Performed by: PREVENTIVE MEDICINE

## 2022-05-13 PROCEDURE — 99207 REFERRAL TO ACUTE AND DIAGNOSTIC SERVICES: CPT | Performed by: INTERNAL MEDICINE

## 2022-05-13 PROCEDURE — 85025 COMPLETE CBC W/AUTO DIFF WBC: CPT | Performed by: PREVENTIVE MEDICINE

## 2022-05-13 PROCEDURE — 96361 HYDRATE IV INFUSION ADD-ON: CPT | Performed by: PREVENTIVE MEDICINE

## 2022-05-13 PROCEDURE — 84484 ASSAY OF TROPONIN QUANT: CPT | Performed by: PREVENTIVE MEDICINE

## 2022-05-13 RX ORDER — ONDANSETRON 2 MG/ML
4 INJECTION INTRAMUSCULAR; INTRAVENOUS ONCE
Status: COMPLETED | OUTPATIENT
Start: 2022-05-13 | End: 2022-05-13

## 2022-05-13 RX ORDER — PREDNISONE 20 MG/1
TABLET ORAL
Qty: 20 TABLET | Refills: 0 | Status: SHIPPED | OUTPATIENT
Start: 2022-05-13 | End: 2022-06-29

## 2022-05-13 RX ADMIN — ONDANSETRON 4 MG: 2 INJECTION INTRAMUSCULAR; INTRAVENOUS at 13:02

## 2022-05-13 NOTE — TELEPHONE ENCOUNTER
Primary care provider is out of clinic on MELO.  Patient going to go to the ADS.  See triage from today

## 2022-05-13 NOTE — TELEPHONE ENCOUNTER
Nurse Triage SBAR    Is this a 2nd Level Triage? YES, LICENSED PRACTITIONER REVIEW IS REQUIRED    Situation: very nauseated, not eating or drinking much    Background: She was seen in ER on 5/8/22 for nausea, vomiting and diarrhea. She is not having any more diarrhea or vomiting, but still extremely nauseated. She has ongoing problems with nausea, but they have never been this severe. Patient has prescriptions for several anti-nausea medications, reports nausea does not improve with any of them. She is not vomiting now, but struggling to eat due to nausea - had to force herself to eat half of a bagel today.     Assessment: Mouth is dry and feeling more lightheaded with decreased urination - urinating smaller amounts and only about 3 times a day. She is trying to sip fluids - water, 7-up and Gatorade, but she thinks she only got in 8-10 oz yesterday.     Protocol Recommended Disposition:   Go To ED/UCC Now (Or To Office With PCP Approval)    Recommendation: Symptoms are concerning for dehydration, may need IV fluids. Will discuss with covering provider for recommendations and call back.     Routed to covering provider - Dr. Holloway to review.    Does the patient meet one of the following criteria for ADS visit consideration? 16+ years old, with an [facility] PCP     TIP  Providers, please consider if this condition is appropriate for management at one of our Acute and Diagnostic Services sites.     If patient is a good candidate, please use dotphrase <dot>triageresponse and select Refer to ADS to document.    Anjelica White RN  [facility]     Reason for Disposition    Drinking very little and dehydration suspected (e.g., no urine > 12 hours, very dry mouth, very lightheaded)    Additional Information    Negative: Insulin-dependent diabetes (Type I) and glucose > 400 mg/dL (22 mmol/L)    Negative: Unable to walk, or can only walk with assistance (e.g., requires support)    Negative: Difficulty  breathing    Negative: Nausea or vomiting and pregnancy < 20 weeks    Negative: Menstrual Period - Missed or Late (i.e., pregnancy suspected)    Negative: Heat exhaustion suspected (i.e., dehydration from heat exposure)    Negative: Anxiety or stress suspected (i.e., nausea with anxiety attacks or stressful situations)    Negative: Traumatic Brain Injury (TBI) suspected    Negative: Vomiting occurs    Negative: Other symptom is present, see that guideline.  (e.g., chest pain, headache, dizziness, abdominal pain, colds, sore throat, etc.).    Negative: Shock suspected (e.g., cold/pale/clammy skin, too weak to stand, low BP, rapid pulse)    Negative: Sounds like a life-threatening emergency to the triager    Protocols used: NAUSEA-A-OH

## 2022-05-13 NOTE — TELEPHONE ENCOUNTER
Provider Response to 2nd Level Triage Request    I have reviewed the RN documentation. My recommendation is:  Refer to Acute and Diagnostic Services (ADS) clinic.     Referral to Acute and Diagnostic Services          Patient qualifies for First Order Acute services at the ADS clinic.    Patient diagnoses/treatments needed:  lab work and IVF  ADS Referral placed: Yes    Nursing staff to contact patient and confirm willingness to receive next level of care at the ADS site in [place] ([phone], internal use only) or [place] ([phone], internal use only). Confirm the following are true:      Patient has transportation to travel to [facility] without delay    Patient understands that evaluation/treatment at [facility] typically takes significantly longer than in clinic/urgent care (>2 hours)    If patient is in agreement, contact the ADS to confirm patient acceptance and provide necessary information to ADS provider.       For patients going to the [place] [facility], instruct patients to enter the east entrance of the building ([address]) and go to Check In C    For patients going to the [place] AD[facility], have them enter building ([address] attached to [facility]) and go to the 2nd floor, [address]

## 2022-05-13 NOTE — PATIENT INSTRUCTIONS
Continue Zofran 8 mg three times per day  Nexium 40 mg two times per day  Meclizine 25 mg two times per day  Prednisone taper - 60 mg for three days then 40 mg for 3 days then 20 mg for 3 days then 10 mg for 3 days  Stop promethazine and prochlorperazine  Consider lasix or other diuretic for help with Meniere's symptoms in follow up with ENT and PCP.  Follow up in one week for recheck

## 2022-05-13 NOTE — PROGRESS NOTES
Assessment & Plan     (H81.02) Meniere's disease, left  (R11.2) Nausea and vomiting, intractability of vomiting not specified, unspecified vomiting type  (primary encounter diagnosis)  Plan: sodium chloride (PF) 0.9% PF flush 3 mL,         lactated ringers BOLUS 1,000 mL, CBC with         platelets differential, Comprehensive metabolic        panel, Troponin I, ondansetron (ZOFRAN)         injection 4 mg, predniSONE (DELTASONE) 20 MG   Patient given one liter of LR IV with improvement in her symptoms    Continue Zofran 8 mg three times per day  Nexium 40 mg two times per day  Meclizine 25 mg two times per day  Prednisone taper - 60 mg for three days then 40 mg for 3 days then 20 mg for 3 days then 10 mg for 3 days  Stop promethazine and prochlorperazine  Consider lasix or other diuretic for help with Meniere's symptoms in follow up with ENT and PCP.  Follow up in one week for recheck      103 minutes spent on the date of the encounter doing chart review, review of test results, interpretation of tests, patient visit, documentation and discussion with other provider(s)        See Patient Instructions    Return in about 1 week (around 5/20/2022).    Kirill Rodriguez MD, MD  [facility] is a 76 year old who presents for the following health issues     HPI     Concern - Dehydration and Nausea  Onset: X 1-2 weeks  Description: Feeling dry mouth, fatigued for the last 1-2 weeks.  She takes Zofran regularly 8 mg BID directed by GI provider. Vertigo and Veneers are the cause for her symptoms.  Was in ER 5/8/22 for these same symptoms    Intensity: severe  Progression of Symptoms:  worsening  Accompanying Signs & Symptoms: none, notes vertigo, but not dizzy at the moment.  Previous history of similar problem: Yes  Precipitating factors:        Worsened by: none  Alleviating factors:        Improved by: Laying down  Therapies tried and outcome: Multiple Anti  Nausea Meds, sucking ice, candies, gum.  Gatorade, 7up, water, trying to get as much fluids in as possible.  Notes loss of appetite as well.    This is  A 75 yo female wh ohas a history of Meniere's disease and has persistent vertigo that flares and causes persistent nausea and vomiting.  She is dealing with this now.  No headache or head trauma.  No loss of vision, cp, sob, change in speech, sensation, strength or arms or legs.  This flare has been ongoing for 2 weeks and she has tried numerous treatments.     Review of Systems   Constitutional, HEENT, cardiovascular, pulmonary, GI, , musculoskeletal, neuro, skin, endocrine and psych systems are negative, except as otherwise noted.      Objective    Wt 62.6 kg (138 lb)   LMP  (LMP Unknown)   BMI 24.45 kg/m    Body mass index is 24.45 kg/m .  Physical Exam   GENERAL: healthy, alert and no distress  EYES: Eyes grossly normal to inspection, PERRL and conjunctivae and sclerae normal  HENT: ear canals and TM's normal, nose and mouth without ulcers or lesions  NECK: no adenopathy, no asymmetry, masses, or scars and thyroid normal to palpation  RESP: lungs clear to auscultation - no rales, rhonchi or wheezes  BREAST: normal without masses, tenderness or nipple discharge and no palpable axillary masses or adenopathy  CV: regular rate and rhythm, normal S1 S2, no S3 or S4, no murmur, click or rub, no peripheral edema and peripheral pulses strong  ABDOMEN: soft, nontender, no hepatosplenomegaly, no masses and bowel sounds normal  MS: no gross musculoskeletal defects noted, no edema  SKIN: no suspicious lesions or rashes  NEURO: Normal strength and tone, mentation intact and speech normal  PSYCH: mentation appears normal, affect normal/bright    Results for orders placed or performed in visit on 05/13/22   Comprehensive metabolic panel     Status: Abnormal   Result Value Ref Range    Sodium 138 133 - 144 mmol/L    Potassium 3.8 3.4 - 5.3 mmol/L    Chloride 107 94 - 109  mmol/L    Carbon Dioxide (CO2) 25 20 - 32 mmol/L    Anion Gap 6 3 - 14 mmol/L    Urea Nitrogen 15 7 - 30 mg/dL    Creatinine 0.44 (L) 0.52 - 1.04 mg/dL    Calcium 9.1 8.5 - 10.1 mg/dL    Glucose 115 (H) 70 - 99 mg/dL    Alkaline Phosphatase 84 40 - 150 U/L    AST 25 0 - 45 U/L    ALT 37 0 - 50 U/L    Protein Total 7.2 6.8 - 8.8 g/dL    Albumin 3.6 3.4 - 5.0 g/dL    Bilirubin Total 0.4 0.2 - 1.3 mg/dL    GFR Estimate >90 >60 mL/min/1.73m2   Troponin I     Status: Normal   Result Value Ref Range    Troponin I High Sensitivity 4 <54 ng/L   CBC with platelets and differential     Status: None   Result Value Ref Range    WBC Count 7.8 4.0 - 11.0 10e3/uL    RBC Count 4.60 3.80 - 5.20 10e6/uL    Hemoglobin 13.4 11.7 - 15.7 g/dL    Hematocrit 42.6 35.0 - 47.0 %    MCV 93 78 - 100 fL    MCH 29.1 26.5 - 33.0 pg    MCHC 31.5 31.5 - 36.5 g/dL    RDW 13.0 10.0 - 15.0 %    Platelet Count 319 150 - 450 10e3/uL    % Neutrophils 72 %    % Lymphocytes 18 %    % Monocytes 8 %    % Eosinophils 2 %    % Basophils 0 %    % Immature Granulocytes 0 %    NRBCs per 100 WBC 0 <1 /100    Absolute Neutrophils 5.5 1.6 - 8.3 10e3/uL    Absolute Lymphocytes 1.4 0.8 - 5.3 10e3/uL    Absolute Monocytes 0.6 0.0 - 1.3 10e3/uL    Absolute Eosinophils 0.2 0.0 - 0.7 10e3/uL    Absolute Basophils 0.0 0.0 - 0.2 10e3/uL    Absolute Immature Granulocytes 0.0 <=0.4 10e3/uL    Absolute NRBCs 0.0 10e3/uL   CBC with platelets differential     Status: None    Narrative    The following orders were created for panel order CBC with platelets differential.  Procedure                               Abnormality         Status                     ---------                               -----------         ------                     CBC with platelets and d...[[id number]]                      Final result                 Please view results for these tests on the individual orders.

## 2022-05-13 NOTE — TELEPHONE ENCOUNTER
Patient is calling because her nausea is no better at all. She even took two ondansetron this morning and it didn't help at all.

## 2022-05-13 NOTE — TELEPHONE ENCOUNTER
Referral order signed per direction from Dr. Holloway. Called patient, she is able to come to [facility] today. Called [facility] and spoke to Dr. Rodriguez, he reviewed information and they can accept patient. Patient to arrive at 12pm. Patient advised of arrival time and given directions to [facility] office.    Anjelica White RN  [facility]

## 2022-05-23 ENCOUNTER — VIRTUAL VISIT (OUTPATIENT)
Dept: INTERNAL MEDICINE | Facility: CLINIC | Age: 77
End: 2022-05-23
Payer: MEDICARE

## 2022-05-23 ENCOUNTER — TELEPHONE (OUTPATIENT)
Dept: INTERNAL MEDICINE | Facility: CLINIC | Age: 77
End: 2022-05-23

## 2022-05-23 DIAGNOSIS — N39.0 URINARY TRACT INFECTION WITHOUT HEMATURIA, SITE UNSPECIFIED: Primary | ICD-10-CM

## 2022-05-23 DIAGNOSIS — R30.0 DYSURIA: ICD-10-CM

## 2022-05-23 LAB
ALBUMIN UR-MCNC: >=300 MG/DL
APPEARANCE UR: ABNORMAL
BACTERIA #/AREA URNS HPF: ABNORMAL /HPF
BILIRUB UR QL STRIP: ABNORMAL
COLOR UR AUTO: YELLOW
GLUCOSE UR STRIP-MCNC: NEGATIVE MG/DL
HGB UR QL STRIP: ABNORMAL
KETONES UR STRIP-MCNC: ABNORMAL MG/DL
LEUKOCYTE ESTERASE UR QL STRIP: ABNORMAL
NITRATE UR QL: POSITIVE
PH UR STRIP: 6 [PH] (ref 5–7)
RBC #/AREA URNS AUTO: ABNORMAL /HPF
SP GR UR STRIP: 1.02 (ref 1–1.03)
SQUAMOUS #/AREA URNS AUTO: ABNORMAL /LPF
UROBILINOGEN UR STRIP-ACNC: 1 E.U./DL
WBC #/AREA URNS AUTO: ABNORMAL /HPF

## 2022-05-23 PROCEDURE — 99442 PR PHYSICIAN TELEPHONE EVALUATION 11-20 MIN: CPT | Mod: 95 | Performed by: PHYSICIAN ASSISTANT

## 2022-05-23 PROCEDURE — 87186 SC STD MICRODIL/AGAR DIL: CPT | Performed by: PHYSICIAN ASSISTANT

## 2022-05-23 PROCEDURE — 87086 URINE CULTURE/COLONY COUNT: CPT | Performed by: PHYSICIAN ASSISTANT

## 2022-05-23 PROCEDURE — 81001 URINALYSIS AUTO W/SCOPE: CPT | Performed by: PHYSICIAN ASSISTANT

## 2022-05-23 RX ORDER — NITROFURANTOIN 25; 75 MG/1; MG/1
100 CAPSULE ORAL 2 TIMES DAILY
Qty: 14 CAPSULE | Refills: 0 | Status: SHIPPED | OUTPATIENT
Start: 2022-05-23 | End: 2022-05-30

## 2022-05-23 NOTE — TELEPHONE ENCOUNTER
UA was ordered through Virtual Visit for today and results are in process.    Anjelica White RN  [facility]

## 2022-05-23 NOTE — TELEPHONE ENCOUNTER
Patient has a future appointment for a UTI. Patient requesting to drop off UA sample prior to appointment.    Order pended. Please sign if applicable.    Appointments in Next Year    May 23, 2022  4:00 PM  (Arrive by 3:40 PM)  Provider Visit with Kaykay Madden PA-C  [facility] ([facility] ) [phone]   Aug 02, 2022  9:00 AM  (Arrive by 8:40 AM)  Annual Wellness Visit with Huyen Samuels MD  [facility] ([facility] ) [phone]

## 2022-05-23 NOTE — PROGRESS NOTES
"Cynthia is a 76 year old who is being evaluated via a billable video visit.      How would you like to obtain your AVS? Mail a copy  If the video visit is dropped, the invitation should be resent by: Text to cell phone: [phone]  Will anyone else be joining your video visit? No        Assessment & Plan     Urinary tract infection without hematuria, site unspecified  UA consistent with infection. UC pending. Will start Macrobid. Push fluids. Reviewed red flag symptoms for which to monitor.  - nitroFURantoin macrocrystal-monohydrate (MACROBID) 100 MG capsule; Take 1 capsule (100 mg) by mouth 2 times daily for 7 days    Dysuria  - UA macro with reflex to Microscopic and Culture - Clinc Collect  - Urine Microscopic Exam  - Urine Culture    Ordering of each unique test  Prescription drug management  15 minutes spent on the date of the encounter doing chart review, history and exam, documentation and further activities per the note      No follow-ups on file.    KATHARINE [facility]    Aida Marie is a 76 year old who presents for the following health issues: complains of urinary symptoms. Urine is already collected.    HPI     Genitourinary - Female  Onset/Duration: about 3 days ago  Description:   Painful urination (Dysuria): YES at times           Frequency: YES  Blood in urine (Hematuria): darker urine  Delay in urine (Hesitency): no  Intensity: moderate  Progression of Symptoms:  Worsening, involving \"hand discomfort\".   Accompanying Signs & Symptoms:  Fever/chills: feels warm at times, no fever.  Flank pain: YES, abdominal discomfort  Nausea and vomiting: no, nausea at times  Vaginal symptoms: none  Abdominal/Pelvic Pain: no  History:   History of frequent UTI s: YES  History of kidney stones: no  Sexually Active: not applicable  Possibility of pregnancy: No  Precipitating or alleviating factors: None  Therapies tried and outcome: Increase fluid intake      Came to " clinic this morning and left a sample    Previous history of UTIs  Similar symptoms as past episodes    Past cultures have grown pansusceptible E coli  Usually treated with Macrobid or Bactrim      Review of Systems   Constitutional, HEENT, cardiovascular, pulmonary, gi and gu systems are negative, except as otherwise noted.      Objective           Vitals:  No vitals were obtained today due to virtual visit.    Physical Exam   GENERAL: Healthy, alert and no distress  RESP: No audible wheeze or cough  PSYCH: Mentation appears normal, affect normal/bright, judgement and insight intact, normal speech and appearance well-groomed.    Results for orders placed or performed in visit on 05/23/22 (from the past 24 hour(s))   UA macro with reflex to Microscopic and Culture - Clinc Collect    Specimen: Urine, Midstream   Result Value Ref Range    Color Urine Yellow Colorless, Straw, Light Yellow, Yellow    Appearance Urine Cloudy (A) Clear    Glucose Urine Negative Negative mg/dL    Bilirubin Urine Small (A) Negative    Ketones Urine Trace (A) Negative mg/dL    Specific Gravity Urine 1.020 1.003 - 1.035    Blood Urine Moderate (A) Negative    pH Urine 6.0 5.0 - 7.0    Protein Albumin Urine >=300 (A) Negative mg/dL    Urobilinogen Urine 1.0 0.2, 1.0 E.U./dL    Nitrite Urine Positive (A) Negative    Leukocyte Esterase Urine Moderate (A) Negative   Urine Microscopic Exam   Result Value Ref Range    Bacteria Urine Many (A) None Seen /HPF    RBC Urine 2-5 (A) 0-2 /HPF /HPF    WBC Urine  (A) 0-5 /HPF /HPF    Squamous Epithelials Urine Few (A) None Seen /LPF           Video-Visit Details    Type of service:  Video Visit    Originating Location (pt. Location): Home    Distant Location (provider location):  [facility]     Platform used for Video Visit: Unable to complete video visit. Switched to telephone appointment per patient request.    Length of telephone call: 6 minutes

## 2022-05-24 LAB — BACTERIA UR CULT: ABNORMAL

## 2022-05-25 ENCOUNTER — TELEPHONE (OUTPATIENT)
Dept: INTERNAL MEDICINE | Facility: CLINIC | Age: 77
End: 2022-05-25
Payer: MEDICARE

## 2022-05-25 NOTE — TELEPHONE ENCOUNTER
Called patient and discussed that her dry mouth is not a side effect of the antibiotic she is currently taking.  Patient continues to be nauseated and is trying to keep up on her fluid intake to prevent further episodes of dehydration. Advised sucking on ice chips, lozenges, hard candy to keep her mouth moist.  Patient also uses Biotin products for dry mouth.

## 2022-05-25 NOTE — TELEPHONE ENCOUNTER
Patient is calling because she has a very dry mouth. She is wondering if the UTI medication is causing this for her.

## 2022-05-26 ENCOUNTER — APPOINTMENT (OUTPATIENT)
Dept: CT IMAGING | Facility: CLINIC | Age: 77
End: 2022-05-26
Attending: EMERGENCY MEDICINE
Payer: MEDICARE

## 2022-05-26 ENCOUNTER — APPOINTMENT (OUTPATIENT)
Dept: GENERAL RADIOLOGY | Facility: CLINIC | Age: 77
End: 2022-05-26
Attending: EMERGENCY MEDICINE
Payer: MEDICARE

## 2022-05-26 ENCOUNTER — HOSPITAL ENCOUNTER (EMERGENCY)
Facility: CLINIC | Age: 77
Discharge: HOME | End: 2022-05-26
Attending: EMERGENCY MEDICINE | Admitting: EMERGENCY MEDICINE
Payer: MEDICARE

## 2022-05-26 VITALS
WEIGHT: 140 LBS | BODY MASS INDEX: 24.8 KG/M2 | RESPIRATION RATE: 26 BRPM | OXYGEN SATURATION: 94 % | TEMPERATURE: 98 F | SYSTOLIC BLOOD PRESSURE: 124 MMHG | HEART RATE: 73 BPM | DIASTOLIC BLOOD PRESSURE: 59 MMHG

## 2022-05-26 DIAGNOSIS — J18.9 PNEUMONIA OF RIGHT LOWER LOBE DUE TO INFECTIOUS ORGANISM: ICD-10-CM

## 2022-05-26 LAB
ANION GAP SERPL CALCULATED.3IONS-SCNC: 4 MMOL/L (ref 3–14)
BASOPHILS # BLD AUTO: 0 10E3/UL (ref 0–0.2)
BASOPHILS NFR BLD AUTO: 0 %
BUN SERPL-MCNC: 17 MG/DL (ref 7–30)
CALCIUM SERPL-MCNC: 9.7 MG/DL (ref 8.5–10.1)
CHLORIDE BLD-SCNC: 105 MMOL/L (ref 94–109)
CO2 SERPL-SCNC: 26 MMOL/L (ref 20–32)
CREAT SERPL-MCNC: 0.52 MG/DL (ref 0.52–1.04)
D DIMER PPP FEU-MCNC: 2.99 UG/ML FEU (ref 0–0.5)
EOSINOPHIL # BLD AUTO: 0.3 10E3/UL (ref 0–0.7)
EOSINOPHIL NFR BLD AUTO: 2 %
ERYTHROCYTE [DISTWIDTH] IN BLOOD BY AUTOMATED COUNT: 14 % (ref 10–15)
FLUAV RNA SPEC QL NAA+PROBE: NEGATIVE
FLUBV RNA RESP QL NAA+PROBE: NEGATIVE
GFR SERPL CREATININE-BSD FRML MDRD: >90 ML/MIN/1.73M2
GLUCOSE BLD-MCNC: 114 MG/DL (ref 70–99)
HCT VFR BLD AUTO: 35.3 % (ref 35–47)
HGB BLD-MCNC: 11.4 G/DL (ref 11.7–15.7)
IMM GRANULOCYTES # BLD: 0.2 10E3/UL
IMM GRANULOCYTES NFR BLD: 1 %
LYMPHOCYTES # BLD AUTO: 1.5 10E3/UL (ref 0.8–5.3)
LYMPHOCYTES NFR BLD AUTO: 11 %
MCH RBC QN AUTO: 29.4 PG (ref 26.5–33)
MCHC RBC AUTO-ENTMCNC: 32.3 G/DL (ref 31.5–36.5)
MCV RBC AUTO: 91 FL (ref 78–100)
MONOCYTES # BLD AUTO: 1.5 10E3/UL (ref 0–1.3)
MONOCYTES NFR BLD AUTO: 10 %
NEUTROPHILS # BLD AUTO: 10.8 10E3/UL (ref 1.6–8.3)
NEUTROPHILS NFR BLD AUTO: 76 %
NRBC # BLD AUTO: 0 10E3/UL
NRBC BLD AUTO-RTO: 0 /100
NT-PROBNP SERPL-MCNC: 460 PG/ML (ref 0–1800)
PLATELET # BLD AUTO: 387 10E3/UL (ref 150–450)
POTASSIUM BLD-SCNC: 3.6 MMOL/L (ref 3.4–5.3)
RBC # BLD AUTO: 3.88 10E6/UL (ref 3.8–5.2)
RSV RNA SPEC NAA+PROBE: NEGATIVE
SARS-COV-2 RNA RESP QL NAA+PROBE: NEGATIVE
SODIUM SERPL-SCNC: 135 MMOL/L (ref 133–144)
TROPONIN I SERPL HS-MCNC: 4 NG/L
WBC # BLD AUTO: 14.3 10E3/UL (ref 4–11)

## 2022-05-26 PROCEDURE — 96374 THER/PROPH/DIAG INJ IV PUSH: CPT | Mod: 59

## 2022-05-26 PROCEDURE — 36415 COLL VENOUS BLD VENIPUNCTURE: CPT | Performed by: EMERGENCY MEDICINE

## 2022-05-26 PROCEDURE — 84484 ASSAY OF TROPONIN QUANT: CPT | Performed by: EMERGENCY MEDICINE

## 2022-05-26 PROCEDURE — 85379 FIBRIN DEGRADATION QUANT: CPT | Performed by: EMERGENCY MEDICINE

## 2022-05-26 PROCEDURE — 74177 CT ABD & PELVIS W/CONTRAST: CPT | Mod: MG

## 2022-05-26 PROCEDURE — C9803 HOPD COVID-19 SPEC COLLECT: HCPCS

## 2022-05-26 PROCEDURE — 71045 X-RAY EXAM CHEST 1 VIEW: CPT

## 2022-05-26 PROCEDURE — 80048 BASIC METABOLIC PNL TOTAL CA: CPT | Performed by: EMERGENCY MEDICINE

## 2022-05-26 PROCEDURE — 83880 ASSAY OF NATRIURETIC PEPTIDE: CPT | Performed by: EMERGENCY MEDICINE

## 2022-05-26 PROCEDURE — 93005 ELECTROCARDIOGRAM TRACING: CPT

## 2022-05-26 PROCEDURE — 85025 COMPLETE CBC W/AUTO DIFF WBC: CPT | Performed by: EMERGENCY MEDICINE

## 2022-05-26 PROCEDURE — 87637 SARSCOV2&INF A&B&RSV AMP PRB: CPT | Performed by: EMERGENCY MEDICINE

## 2022-05-26 PROCEDURE — 250N000013 HC RX MED GY IP 250 OP 250 PS 637: Performed by: EMERGENCY MEDICINE

## 2022-05-26 PROCEDURE — 250N000011 HC RX IP 250 OP 636: Performed by: EMERGENCY MEDICINE

## 2022-05-26 PROCEDURE — 99285 EMERGENCY DEPT VISIT HI MDM: CPT | Mod: 25

## 2022-05-26 RX ORDER — IOPAMIDOL 755 MG/ML
500 INJECTION, SOLUTION INTRAVASCULAR ONCE
Status: COMPLETED | OUTPATIENT
Start: 2022-05-26 | End: 2022-05-26

## 2022-05-26 RX ORDER — AZITHROMYCIN 250 MG/1
500 TABLET, FILM COATED ORAL ONCE
Status: COMPLETED | OUTPATIENT
Start: 2022-05-26 | End: 2022-05-26

## 2022-05-26 RX ORDER — ACETAMINOPHEN 500 MG
1000 TABLET ORAL ONCE
Status: COMPLETED | OUTPATIENT
Start: 2022-05-26 | End: 2022-05-26

## 2022-05-26 RX ORDER — BENZONATATE 100 MG/1
100 CAPSULE ORAL ONCE
Status: COMPLETED | OUTPATIENT
Start: 2022-05-26 | End: 2022-05-26

## 2022-05-26 RX ORDER — KETOROLAC TROMETHAMINE 15 MG/ML
15 INJECTION, SOLUTION INTRAMUSCULAR; INTRAVENOUS
Status: COMPLETED | OUTPATIENT
Start: 2022-05-26 | End: 2022-05-26

## 2022-05-26 RX ORDER — AZITHROMYCIN 250 MG/1
TABLET, FILM COATED ORAL
Qty: 4 TABLET | Refills: 0 | Status: SHIPPED | OUTPATIENT
Start: 2022-05-27 | End: 2022-06-29 | Stop reason: ALTCHOICE

## 2022-05-26 RX ADMIN — AMOXICILLIN AND CLAVULANATE POTASSIUM 1 TABLET: 875; 125 TABLET, FILM COATED ORAL at 22:39

## 2022-05-26 RX ADMIN — BENZONATATE 100 MG: 100 CAPSULE ORAL at 18:47

## 2022-05-26 RX ADMIN — AZITHROMYCIN MONOHYDRATE 500 MG: 250 TABLET ORAL at 22:39

## 2022-05-26 RX ADMIN — IOPAMIDOL 54 ML: 755 INJECTION, SOLUTION INTRAVENOUS at 20:23

## 2022-05-26 RX ADMIN — KETOROLAC TROMETHAMINE 15 MG: 15 INJECTION, SOLUTION INTRAMUSCULAR; INTRAVENOUS at 19:20

## 2022-05-26 RX ADMIN — ACETAMINOPHEN 1000 MG: 500 TABLET, FILM COATED ORAL at 18:47

## 2022-05-26 ASSESSMENT — ENCOUNTER SYMPTOMS
MYALGIAS: 1
ABDOMINAL PAIN: 1
FREQUENCY: 1
COUGH: 1
WEAKNESS: 1
DIARRHEA: 1
NAUSEA: 1
FEVER: 1
CHILLS: 0
SHORTNESS OF BREATH: 1
DYSURIA: 0
VOMITING: 0

## 2022-05-26 NOTE — ED PROVIDER NOTES
"  History     Chief Complaint:  Cough, chest pain, body aches, diarrhea    HPI   Cynthia Arita is a 76 year old female with a history of Meniere's disease and recent UTI tx with macrobid who presents with concerns for body aches, cough, subjective fever, shortness of breath, and right sided chest pain only present with coughing. The cough is productive of \"white chunks\". She also report nausea, not new for her, without current vomiting. She reports mucousy white diarrhea for two days. She denies dysuria, notes she has chronic urinary frequency, but feels better with regards to her urinary symptoms. She denies known exposure to COVID but was at a wedding last week. She denies new leg swelling or pain. She denies rash.     ROS:  Review of Systems   Constitutional: Positive for fever (subjective). Negative for chills.   Respiratory: Positive for cough and shortness of breath.    Cardiovascular: Positive for chest pain. Negative for leg swelling.   Gastrointestinal: Positive for abdominal pain (with coughing), diarrhea and nausea. Negative for vomiting.   Genitourinary: Positive for frequency. Negative for dysuria.   Musculoskeletal: Positive for myalgias.   Skin: Negative.    Neurological: Positive for weakness (generalized).   All other systems reviewed and are negative.      Allergies:  Ativan [Lorazepam]  Gabapentin  Metoprolol  Pantoprazole  Latex  Oxycodone     Medications:    acetaminophen (TYLENOL) 500 MG tablet  ALPRAZolam (XANAX) 0.5 MG tablet  buPROPion (WELLBUTRIN SR) 150 MG 12 hr tablet  calcitonin, salmon, (MIACALCIN) 200 UNIT/ACT nasal spray  Calcium Carbonate Antacid (TUMS CHEWY BITES PO)  Cyanocobalamin (B-12 PO)  esomeprazole (NEXIUM) 40 MG DR capsule  Flaxseed, Linseed, (FLAX SEED OIL) 1000 MG capsule  gabapentin (NEURONTIN) 100 MG capsule  meclizine (ANTIVERT) 12.5 MG tablet  Misc Natural Products (GLUCOSAMINE CHOND COMPLEX/MSM PO)  Multiple Vitamins-Minerals (EYE-VITES) TABS  Multiple " Vitamins-Minerals (OCUVITE PRESERVISION PO)  nitroFURantoin macrocrystal-monohydrate (MACROBID) 100 MG capsule  ondansetron (ZOFRAN ODT) 4 MG ODT tab  predniSONE (DELTASONE) 20 MG tablet  prochlorperazine (COMPAZINE) 10 MG tablet  promethazine (PHENERGAN) 25 MG tablet  propranolol (INDERAL) 10 MG tablet  UNABLE TO FIND  UNABLE TO FIND  venlafaxine (EFFEXOR) 37.5 MG tablet  vitamin D3 (CHOLECALCIFEROL) 50 mcg (2000 units) tablet        Past Medical History:    Past Medical History:   Diagnosis Date     Anxiety      Basal cell carcinoma      Complication of anesthesia      Diverticulosis      GERD (gastroesophageal reflux disease)      HTN (hypertension)      Meniere's disease      Nephrolithiasis      Pain in right knee      PONV (postoperative nausea and vomiting)        Past Surgical History:    Past Surgical History:   Procedure Laterality Date     ARTHROPLASTY KNEE Right 1/21/2019    Procedure: Right total knee arthroplasty;  Surgeon: Denton Amor MD;  Location: [facility] OR     EYE SURGERY      cataract surgery both eyes     ZZC VAGINAL HYSTERECTOMY      with left oophorectomy        Family History:    family history includes Bipolar Disorder in her sister; Brain Cancer (age of onset: 17) in her son; Cancer in her maternal grandmother; Diabetes in her paternal grandmother; Esophageal Cancer in her father; Hypertension in her brother; Neurologic Disorder in her mother and sister.    Social History:   reports that she has never smoked. She has never used smokeless tobacco. She reports current alcohol use. She reports that she does not use drugs.  PCP: Huyen Samuels     Physical Exam     Patient Vitals for the past 24 hrs:   BP Temp Temp src Pulse Resp SpO2 Weight   05/26/22 1809 (!) 143/77 98.9  F (37.2  C) Oral 82 18 99 % 63.5 kg (140 lb)        Physical Exam  General: Elderly female sitting upright  Eyes: PERRL, Conjunctive within normal limits. No scleral icterus.   ENT: Moist mucous membranes,  oropharynx clear.   CV: Normal S1S2, no murmur, rub or gallop. Regular rate and rhythm  Resp: Clear to auscultation bilaterally, no wheezes, rales or rhonchi. Normal respiratory effort.  GI: Abdomen is soft, nontender and nondistended. No palpable masses. No rebound or guarding.  MSK: Tender over the right lateral rib margin without crepitus or bony deformity. No edema. Nontender. Normal active range of motion.  Skin: Warm and dry. No rashes or lesions or ecchymoses on visible skin.  Neuro: Alert and oriented. Responds appropriately to all questions and commands. No focal findings appreciated. Normal muscle tone.  Psych: Normal mood and affect.     Emergency Department Course   ECG:  A 12-lead ECG was performed on May 26, 2022 at 1817 due to chest pain  Sinus rhythm with occasional premature ventricular complexes.  Otherwise normal ECG.  Ventricular rate 76 bpm, SC interval 130 ms, QRS duration 82 ms, QT/QTc 380/427 ms PRT axes 42 -19 23  Preliminary read per Dr. Naz Finch at 1818 on 5/26/2022    Imaging:  CT Chest (PE) Abdomen Pelvis w Contrast   Final Result   IMPRESSION:   1.  Rounded consolidative opacity in the right middle lobe could represent pneumonia. Associated small right pleural effusion. Recommend a follow-up chest CT in about 8-12 weeks to ensure resolution and exclude a mass.   2.  Few mildly enlarged mediastinal and right hilar lymph nodes are indeterminate. Attention at follow-up.   3.  No pulmonary emboli.   4.  No acute findings in the abdomen and pelvis.   5.  Nonobstructing punctate bilateral renal calculi.   6.  Colonic diverticulosis.   7.  Right lobe thyroid nodule measuring 2.1 cm. Recommend a follow-up nonemergent thyroid ultrasound for further evaluation.      XR Chest Port 1 View   Final Result   IMPRESSION: Right lower lobe airspace opacity, may reflect pneumonia in the proper clinical context, recommend follow-up to resolution. Small right pleural effusion. No pneumothorax. Cardiac  silhouette within normal limits. Tortuous atherosclerotic    aorta. No acute osseous abnormality.         Report per radiology    Laboratory:  Labs Ordered and Resulted from Time of ED Arrival to Time of ED Departure   BASIC METABOLIC PANEL - Abnormal       Result Value    Sodium 135      Potassium 3.6      Chloride 105      Carbon Dioxide (CO2) 26      Anion Gap 4      Urea Nitrogen 17      Creatinine 0.52      Calcium 9.7      Glucose 114 (*)     GFR Estimate >90     CBC WITH PLATELETS AND DIFFERENTIAL - Abnormal    WBC Count 14.3 (*)     RBC Count 3.88      Hemoglobin 11.4 (*)     Hematocrit 35.3      MCV 91      MCH 29.4      MCHC 32.3      RDW 14.0      Platelet Count 387      % Neutrophils 76      % Lymphocytes 11      % Monocytes 10      % Eosinophils 2      % Basophils 0      % Immature Granulocytes 1      NRBCs per 100 WBC 0      Absolute Neutrophils 10.8 (*)     Absolute Lymphocytes 1.5      Absolute Monocytes 1.5 (*)     Absolute Eosinophils 0.3      Absolute Basophils 0.0      Absolute Immature Granulocytes 0.2      Absolute NRBCs 0.0     D DIMER QUANTITATIVE - Abnormal    D-Dimer Quantitative 2.99 (*)    INFLUENZA A/B & SARS-COV2 PCR MULTIPLEX - Normal    Influenza A PCR Negative      Influenza B PCR Negative      RSV PCR Negative      SARS CoV2 PCR Negative     TROPONIN I - Normal    Troponin I High Sensitivity 4     NT PROBNP INPATIENT - Normal    N terminal Pro BNP Inpatient 460            Emergency Department Course:    Reviewed:  I reviewed nursing notes, vitals, past medical history and Care Everywhere    Assessments:   I obtained history and examined the patient as noted above.    I rechecked the patient and explained findings.  All questions were answered.  Awaiting CT scan chest.   I reassessed the patient.  She is ambulated to the bathroom and had a formed stool.  She notes she overall is feeling improved   I discussed CT findings with the patient.  She feels comfortable the plan of outpatient  antibiotics and close follow-up with her primary care provider, returning with worsening.    Interventions:  Medications   acetaminophen (TYLENOL) tablet 1,000 mg (1,000 mg Oral Given 5/26/22 1847)   benzonatate (TESSALON) capsule 100 mg (100 mg Oral Given 5/26/22 1847)   ketorolac (TORADOL) injection 15 mg (15 mg Intravenous Given 5/26/22 1920)   sodium chloride (PF) 0.9% PF flush 100 mL (75 mLs Intravenous Given 5/26/22 2023)   iopamidol (ISOVUE-370) solution 500 mL (54 mLs Intravenous Given 5/26/22 2023)   azithromycin (ZITHROMAX) tablet 500 mg (500 mg Oral Given 5/26/22 2239)   amoxicillin-clavulanate (AUGMENTIN) 875-125 MG per tablet 1 tablet (1 tablet Oral Given 5/26/22 2239)        Disposition:  The patient was discharged to home.     Impression & Plan        Medical Decision Making:  Cynthia Arita is a 76-year-old female who presents emergency department with concerns for cough and subjective fevers with right-sided pleuritic type chest pain.  She is afebrile here but does have a leukocytosis.  Chest x-ray was concerning for possible infiltrate.  D-dimer was elevated therefore CT scan was obtained to exclude pulmonary embolism.  This continued to show this opacification which was somewhat masslike.  We will treat for community-acquired pneumonia in a high risk patient.  Given lack of hypoxia or any respiratory distress, no tachycardia or signs of sepsis, outpatient trial of antibiotics seems reasonable.  She is agreeable with this plan as well.  Recommended Augmentin and azithromycin with close follow-up with PCP on Monday.  She understands the importance of repeat imaging at some time in the future to ensure resolution and/or further evaluate if the abnormality is still present.  Return immediate to the emergency department worsening.  All questions were answered prior to discharge.      Diagnosis:    ICD-10-CM    1. Pneumonia of right lower lobe due to infectious organism  J18.9         Discharge  Medications:  Discharge Medication List as of 5/26/2022 10:42 PM      START taking these medications    Details   amoxicillin-clavulanate (AUGMENTIN) 875-125 MG tablet Take 1 tablet by mouth 2 times daily for 7 days, Disp-14 tablet, R-0, E-Prescribe      azithromycin (ZITHROMAX) 250 MG tablet Take 250mg (one tablet) by mouth daily for 4 days beginning 5/27/22, Disp-4 tablet, R-0, E-Prescribe              5/26/2022   Naz Finch MD Jonkman, Tracy Dianne, MD  05/27/22 0006

## 2022-05-27 ENCOUNTER — PATIENT OUTREACH (OUTPATIENT)
Dept: CARE COORDINATION | Facility: CLINIC | Age: 77
End: 2022-05-27
Payer: MEDICARE

## 2022-05-27 DIAGNOSIS — Z71.89 OTHER SPECIFIED COUNSELING: ICD-10-CM

## 2022-05-27 LAB
ATRIAL RATE - MUSE: 76 BPM
DIASTOLIC BLOOD PRESSURE - MUSE: NORMAL MMHG
INTERPRETATION ECG - MUSE: NORMAL
P AXIS - MUSE: 42 DEGREES
PR INTERVAL - MUSE: 130 MS
QRS DURATION - MUSE: 82 MS
QT - MUSE: 380 MS
QTC - MUSE: 427 MS
R AXIS - MUSE: -19 DEGREES
SYSTOLIC BLOOD PRESSURE - MUSE: NORMAL MMHG
T AXIS - MUSE: 23 DEGREES
VENTRICULAR RATE- MUSE: 76 BPM

## 2022-05-27 NOTE — PROGRESS NOTES
"Clinic Care Coordination Contact  [facility]: Post-Discharge Note  SITUATION                                                      Admission:    Admission Date: 05/26/22   Reason for Admission: Pneumonia of right lower lobe  Discharge:   Discharge Date: 05/26/22  Discharge Diagnosis: Pneumonia of right lower lobe    BACKGROUND                                                      Per hospital discharge summary and inpatient provider notes:    Cynthia Arita is a 76 year old female with a history of Meniere's disease and recent UTI tx with macrobid who presents with concerns for body aches, cough, subjective fever, shortness of breath, and right sided chest pain only present with coughing. The cough is productive of \"white chunks\". She also report nausea, not new for her, without current vomiting. She reports mucousy white diarrhea for two days. She denies dysuria, notes she has chronic urinary frequency, but feels better with regards to her urinary symptoms. She denies known exposure to COVID but was at a wedding last week. She denies new leg swelling or pain. She denies rash.     ASSESSMENT      Enrollment  Primary Care Care Coordination Status: Declined    Discharge Assessment  How are you doing now that you are home?: I am not doing good. I have been sleeping a lot and still having some symptoms.  How are your symptoms? (Red Flag symptoms escalate to triage hotline per guidelines): Unchanged  Do you feel your condition is stable enough to be safe at home until your provider visit?: Yes  Does the patient have their discharge instructions? : No - Review discharge instructions (Patient did not recieve a copy but she called someone this morning and they are going to mail them to her.)  Does the patient have questions regarding their discharge instructions? : No  Were you started on any new medications or were there changes to any of your previous medications? : Yes  Does the patient have all of their " medications?: Yes  Do you have questions regarding any of your medications? : No  Discharge follow-up appointment scheduled within 14 calendar days? : No (Patient plans on calling her PCP to schedule follow up)  Is patient agreeable to assistance with scheduling? : No                PLAN                                                      Outpatient Plan: Go to Huyen Samuels MD for reassessment on Monday    Future Appointments   Date Time Provider Department Center   8/2/2022  9:00 AM Huyen Samuels MD [facility]         For any urgent concerns, please contact our 24 hour nurse triage line: [phone] ([phone])         LINN Hoffmann  [phone]  [facility]

## 2022-05-27 NOTE — DISCHARGE INSTRUCTIONS
Because of the appearance of the lung abnormality, you should have repeat imaging to ensure resolution.

## 2022-05-28 ENCOUNTER — NURSE TRIAGE (OUTPATIENT)
Dept: NURSING | Facility: CLINIC | Age: 77
End: 2022-05-28
Payer: MEDICARE

## 2022-05-28 NOTE — CONFIDENTIAL NOTE
Diarrhea episodes while taking antibiotic therapy for pneumonia for 2 days now.  Patient says she noted a small streak of blood a few times within stools.  Poor appetite nothing sounds good to her and is having coughing fits and they have improved.  Patient loses her voice intermittently  Patient says breathing has not worsened, but it remains the same.  Triage guidelines to see pcp within 24 hours  Caller verbalized and understands directives.  COVID 19 Nurse Triage Plan/Patient Instructions    Please be aware that novel coronavirus (COVID-19) may be circulating in the community. If you develop symptoms such as fever, cough, or SOB or if you have concerns about the presence of another infection including coronavirus (COVID-19), please contact your health care provider or visit [email or web address].     Disposition/Instructions    In-Person Visit with provider recommended. Reference Visit Selection Guide.    Thank you for taking steps to prevent the spread of this virus.  o Limit your contact with others.  o Wear a simple mask to cover your cough.  o Wash your hands well and often.    Resources    [facility]: About COVID-19: [email or web address]    CDC: What to Do If You're Sick: www.cdc.gov/coronavirus/2019-ncov/about/steps-when-sick.html    CDC: Ending Home Isolation: www.cdc.gov/coronavirus/2019-ncov/hcp/disposition-in-home-patients.html     CDC: Caring for Someone: www.cdc.gov/coronavirus/2019-ncov/if-you-are-sick/care-for-someone.html     [facility]: Interim Guidance for Hospital Discharge to Home: www.health.[facility].mn.us/diseases/coronavirus/hcp/hospdischarge.pdf    [facility] clinical trials (COVID-19 research studies): [email or web address].[facility]/umn-clinical-trials     Below are the COVID-19 hotlines at the [place] Department of Health ([facility]). Interpreters are available.   o For health questions: Call 190-013-1545 or 1-984.438.4405 (7 a.m. to 7 p.m.)  o For questions about  schools and childcare: Call 323-575-1941 or 1-727.571.3541 (7 a.m. to 7 p.m.)

## 2022-05-31 ENCOUNTER — TELEPHONE (OUTPATIENT)
Dept: NURSING | Facility: CLINIC | Age: 77
End: 2022-05-31
Payer: MEDICARE

## 2022-05-31 NOTE — TELEPHONE ENCOUNTER
UTI, dehydration & pneumonia in past month. Patient agrees to keep Dr. Snider appointment 6-1-2022.

## 2022-05-31 NOTE — TELEPHONE ENCOUNTER
Patient is requesting to speak with clinic direct, HUSAM Montero from Huyen Castro team.  Patient did not discuss details with FNA .  Please phone patient back.    COVID 19 Nurse Triage Plan/Patient Instructions    Please be aware that novel coronavirus (COVID-19) may be circulating in the community. If you develop symptoms such as fever, cough, or SOB or if you have concerns about the presence of another infection including coronavirus (COVID-19), please contact your health care provider or visit [email or web address].     Disposition/Instructions    Home care recommended. Follow home care protocol based instructions.    Thank you for taking steps to prevent the spread of this virus.  o Limit your contact with others.  o Wear a simple mask to cover your cough.  o Wash your hands well and often.    Resources    [facility]: About COVID-19: [email or web address]    CDC: What to Do If You're Sick: www.cdc.gov/coronavirus/2019-ncov/about/steps-when-sick.html    CDC: Ending Home Isolation: www.cdc.gov/coronavirus/2019-ncov/hcp/disposition-in-home-patients.html     CDC: Caring for Someone: www.cdc.gov/coronavirus/2019-ncov/if-you-are-sick/care-for-someone.html     [facility]: Interim Guidance for Hospital Discharge to Home: www.health.[facility].mn.us/diseases/coronavirus/hcp/hospdischarge.pdf    [facility] clinical trials (COVID-19 research studies): clinicalaffairs.[facility]-clinical-trials     Below are the COVID-19 hotlines at the [place] Department of Health ([facility]). Interpreters are available.   o For health questions: Call 275-128-0422 or 1-856.170.4703 (7 a.m. to 7 p.m.)  o For questions about schools and childcare: Call 435-301-2221 or 1-862.839.1455 (7 a.m. to 7 p.m.)

## 2022-06-01 ENCOUNTER — OFFICE VISIT (OUTPATIENT)
Dept: INTERNAL MEDICINE | Facility: CLINIC | Age: 77
End: 2022-06-01
Payer: MEDICARE

## 2022-06-01 VITALS
SYSTOLIC BLOOD PRESSURE: 137 MMHG | OXYGEN SATURATION: 98 % | TEMPERATURE: 98.3 F | HEART RATE: 75 BPM | DIASTOLIC BLOOD PRESSURE: 76 MMHG | HEIGHT: 63 IN | RESPIRATION RATE: 16 BRPM | WEIGHT: 138.3 LBS | BODY MASS INDEX: 24.5 KG/M2

## 2022-06-01 DIAGNOSIS — J18.9 PNEUMONIA OF RIGHT LUNG DUE TO INFECTIOUS ORGANISM, UNSPECIFIED PART OF LUNG: Primary | ICD-10-CM

## 2022-06-01 PROCEDURE — 99213 OFFICE O/P EST LOW 20 MIN: CPT | Performed by: FAMILY MEDICINE

## 2022-06-01 NOTE — PROGRESS NOTES
"  (J18.9) Pneumonia of right lung due to infectious organism, unspecified part of lung  (primary encounter diagnosis)  Comment:   Appears to have modest improvement.  Plan: XR Chest 2 Views        Finish antibiotics.  Recheck in about 3 weeks with repeat x-ray at that point.  Indications to return such as worsening fever, worsening cough, chest pain were also discussed.          Subjective       HPI     This patient is seen in follow-up of an ER visit for pneumonia.  She is finishing her Augmentin.  Her recent history is a bit more complex:    She had vomiting and diarrhea illness 5-8 and was seen in the ER and received IV fluids.  She then had additional IV fluid treatment 5-13 in [facility].  Then on 5-23 she was seen for a urinary infection and was treated with Macrobid.  Then on 5-26 she was seen in the ER for pneumonia and treated with Augmentin and azithromycin.    Today she is still has fatigue and some cough.  Has some chest ache with cough.  She is not having fever.      Review of Systems     No fever.  No wheezing or short of breath.  No exertional chest pain.    No vomiting or diarrhea.  Has noticed leg swelling.        Objective    /76 (BP Location: Right arm, Patient Position: Sitting, Cuff Size: Adult Regular)   Pulse 75   Temp 98.3  F (36.8  C) (Tympanic)   Resp 16   Ht 1.6 m (5' 3\")   Wt 62.7 kg (138 lb 4.8 oz)   LMP  (LMP Unknown)   SpO2 98%   BMI 24.50 kg/m    Body mass index is 24.5 kg/m .  Physical Exam     HEENT shows pharynx to appear WNL.  Neck without thyromegaly.  Chest somewhat diminished breath sounds lower right lung field.  Cardiac RSR without murmur.  Extremities trace lower extremity edema.      "

## 2022-06-27 ENCOUNTER — TRANSFERRED RECORDS (OUTPATIENT)
Dept: HEALTH INFORMATION MANAGEMENT | Facility: CLINIC | Age: 77
End: 2022-06-27

## 2022-06-29 ENCOUNTER — OFFICE VISIT (OUTPATIENT)
Dept: INTERNAL MEDICINE | Facility: CLINIC | Age: 77
End: 2022-06-29
Payer: MEDICARE

## 2022-06-29 VITALS
HEIGHT: 63 IN | SYSTOLIC BLOOD PRESSURE: 149 MMHG | OXYGEN SATURATION: 98 % | BODY MASS INDEX: 24.57 KG/M2 | TEMPERATURE: 97.3 F | WEIGHT: 138.7 LBS | RESPIRATION RATE: 16 BRPM | DIASTOLIC BLOOD PRESSURE: 81 MMHG | HEART RATE: 62 BPM

## 2022-06-29 DIAGNOSIS — J18.9 PNEUMONIA DUE TO INFECTIOUS ORGANISM, UNSPECIFIED LATERALITY, UNSPECIFIED PART OF LUNG: Primary | ICD-10-CM

## 2022-06-29 PROCEDURE — 99213 OFFICE O/P EST LOW 20 MIN: CPT | Performed by: INTERNAL MEDICINE

## 2022-06-29 RX ORDER — CALCIUM CARBONATE 500 MG/1
1 TABLET, CHEWABLE ORAL 2 TIMES DAILY
Status: ON HOLD | COMMUNITY
Start: 2022-06-29 | End: 2023-10-04

## 2022-06-29 RX ORDER — ANTIOX #8/OM3/DHA/EPA/LUT/ZEAX 250-2.5 MG
1 CAPSULE ORAL 2 TIMES DAILY
COMMUNITY
Start: 2022-06-29

## 2022-06-29 ASSESSMENT — ANXIETY QUESTIONNAIRES
2. NOT BEING ABLE TO STOP OR CONTROL WORRYING: NOT AT ALL
7. FEELING AFRAID AS IF SOMETHING AWFUL MIGHT HAPPEN: NOT AT ALL
5. BEING SO RESTLESS THAT IT IS HARD TO SIT STILL: MORE THAN HALF THE DAYS
GAD7 TOTAL SCORE: 8
GAD7 TOTAL SCORE: 8
6. BECOMING EASILY ANNOYED OR IRRITABLE: MORE THAN HALF THE DAYS
7. FEELING AFRAID AS IF SOMETHING AWFUL MIGHT HAPPEN: NOT AT ALL
1. FEELING NERVOUS, ANXIOUS, OR ON EDGE: NOT AT ALL
4. TROUBLE RELAXING: MORE THAN HALF THE DAYS
3. WORRYING TOO MUCH ABOUT DIFFERENT THINGS: MORE THAN HALF THE DAYS
GAD7 TOTAL SCORE: 8
8. IF YOU CHECKED OFF ANY PROBLEMS, HOW DIFFICULT HAVE THESE MADE IT FOR YOU TO DO YOUR WORK, TAKE CARE OF THINGS AT HOME, OR GET ALONG WITH OTHER PEOPLE?: SOMEWHAT DIFFICULT

## 2022-06-29 NOTE — PROGRESS NOTES
Assessment & Plan     Pneumonia due to infectious organism, unspecified laterality, unspecified part of lung  Resolved and doing well. Follow up PRN.              No follow-ups on file.    Huyen Samuels MD  [facility] KOLBY Marie is a 76 year old, presenting for the following health issues:    RECHECK and Dizziness      History of Present Illness       Reason for visit:  Pnemonia    She eats 0-1 servings of fruits and vegetables daily.She consumes 1 sweetened beverage(s) daily.She exercises with enough effort to increase her heart rate 20 to 29 minutes per day.  She exercises with enough effort to increase her heart rate 3 or less days per week.   She is taking medications regularly.  Today's DUSTIN-7 Score: 8       Her cough is gone. She is getting more energy but is still not back to baseline. Taking a deep breath is still uncomfortable on the left but getting better. She has  Had no further nausea of vomting. Appetite is good..       Review of Systems   Constitutional, HEENT, cardiovascular, pulmonary, GI, , musculoskeletal, neuro, skin, endocrine and psych systems are negative, except as otherwise noted.      Objective    LMP  (LMP Unknown)   There is no height or weight on file to calculate BMI.  Physical Exam   GENERAL: healthy, alert and no distress  NECK: no adenopathy, no asymmetry, masses, or scars and thyroid normal to palpation  RESP: lungs clear to auscultation - no rales, rhonchi or wheezes  CV: regular rate and rhythm, normal S1 S2, no S3 or S4, no murmur, click or rub, no peripheral edema and peripheral pulses strong  ABDOMEN: soft, nontender, no hepatosplenomegaly, no masses and bowel sounds normal  MS: no gross musculoskeletal defects noted, no edema              .  ..

## 2022-07-14 ENCOUNTER — TRANSFERRED RECORDS (OUTPATIENT)
Dept: INTERNAL MEDICINE | Facility: CLINIC | Age: 77
End: 2022-07-14

## 2022-07-18 ENCOUNTER — HOSPITAL ENCOUNTER (OUTPATIENT)
Dept: MAMMOGRAPHY | Facility: CLINIC | Age: 77
Discharge: HOME | End: 2022-07-18
Attending: INTERNAL MEDICINE | Admitting: INTERNAL MEDICINE
Payer: MEDICARE

## 2022-07-18 ENCOUNTER — TELEPHONE (OUTPATIENT)
Dept: INTERNAL MEDICINE | Facility: CLINIC | Age: 77
End: 2022-07-18

## 2022-07-18 DIAGNOSIS — R11.0 NAUSEA: Primary | ICD-10-CM

## 2022-07-18 DIAGNOSIS — Z12.31 VISIT FOR SCREENING MAMMOGRAM: ICD-10-CM

## 2022-07-18 PROCEDURE — 77067 SCR MAMMO BI INCL CAD: CPT

## 2022-07-19 ENCOUNTER — NURSE TRIAGE (OUTPATIENT)
Dept: INTERNAL MEDICINE | Facility: CLINIC | Age: 77
End: 2022-07-19
Payer: MEDICARE

## 2022-07-19 PROCEDURE — 99207 REFERRAL TO ACUTE AND DIAGNOSTIC SERVICES: CPT | Performed by: INTERNAL MEDICINE

## 2022-07-19 NOTE — TELEPHONE ENCOUNTER
Per 7/18/22 telephone encounter:   Bijal Aviles at 7/19/2022  4:14 PM    Status: Signed      Patient calling  She stated she is now dehydrated and wants to know where she can go get a IV ([facility])   Please advise.  Ok to call and  [phone]

## 2022-07-19 NOTE — TELEPHONE ENCOUNTER
"Nurse Triage SBAR    Is this a 2nd Level Triage? YES, LICENSED PRACTITIONER REVIEW IS REQUIRED    Situation: Patient was previously seen at [facility] on 5/13/22 for nausea and vomiting, wanting to be seen there again for concerns of dehydration. Called patient to discuss further    Background:  She has needed IV fluids 3 times this year for dehydration - last time was done at [facility] and she states the [facility] visit helped a lot. She has tried to eat TUMS to try to settle her stomach without much success. She has Meniere's disease and has dizziness from this.     Assessment: Ongoing nausea - takes Zofran TID, but this is not helping as much the past few days. Only vomited twice this week. Does not feel like she is going to faint. Mouth does appear slightly dry. She is trying to sip water when her mouth feels dry to keep it moistened. Patient also states that she has bad taste in her mouth as well that does not go away regardless of what she does. Tried Biotene to help with this, but only getting temporary relief. Slight decrease in urination, but urinating 3-4 times a day and 1-2 times at night. Drinking about 1/2 gallon of water each day. She is drinking Ensure and Gatorade as recommended. She states that she feels \"out of it\" and her son has noticed a change in her speech both are similar to how she felt prior to last [facility] visit.     Mouth dryness does not seem to bother her once she goes to bed around 9:30 pm, but comes back around 6 am and feels like she slept with her mouth open all night    Protocol Recommended Disposition:   See Today Or Tomorrow In Office    Recommendation: will have provider review if [facility] referral is appropriate, but patient aware response will not be received until tomorrow. She will go to ER overnight if feeling faint     Routed to provider    Does the patient meet one of the following criteria for [facility] visit consideration? 16+ years old, with an [facility] PCP     TIP  Providers, please consider if this " Patient in bed resting comfortably. Respirations steady and unlabored. No signs of respiratory or cardiac distress. No complaints of pain at this time. No needs at this time. Call light in reach. Will continue to monitor.   Electronically signed by Juanita Rosario RN on 2/10/2021 at 1:33 AM condition is appropriate for management at one of our Acute and Diagnostic Services sites.     If patient is a good candidate, please use dotphrase <dot>triageresponse and select Refer to [facility] to document.    Anjelica White RN  [facility]     Reason for Disposition    Patient wants to be seen    Additional Information    Negative: Shock suspected (e.g., cold/pale/clammy skin, too weak to stand, low BP, rapid pulse)    Negative: Sounds like a life-threatening emergency to the triager    Negative: Nausea or vomiting and pregnancy < 20 weeks    Negative: Menstrual Period - Missed or Late (i.e., pregnancy suspected)    Negative: Heat exhaustion suspected (i.e., dehydration from heat exposure)    Negative: Anxiety or stress suspected (i.e., nausea with anxiety attacks or stressful situations)    Negative: Traumatic Brain Injury (TBI) suspected    Negative: Vomiting occurs    Negative: Other symptom is present, see that guideline.  (e.g., chest pain, headache, dizziness, abdominal pain, colds, sore throat, etc.).    Negative: Unable to walk, or can only walk with assistance (e.g., requires support)    Negative: Difficulty breathing    Negative: Insulin-dependent diabetes (Type I) and glucose > 400 mg/dL (22 mmol/L)    Negative: Yellowish color of the skin or white of the eye (i.e., jaundice)    Negative: Fever present > 3 days (72 hours)    Negative: Fever > 104 F (40 C)    Negative: Fever > 101 F (38.3 C) and age > 60    Negative: Fever > 100.0 F (37.8 C) and bedridden (e.g., nursing home patient, CVA, chronic illness, recovering from surgery)    Negative: Fever > 100.0 F (37.8 C) and diabetes mellitus or weak immune system (e.g., HIV positive, cancer chemo, splenectomy, chronic steroids)    Negative: Taking any of the following medications: digoxin (Lanoxin), lithium, theophylline, phenytoin (Dilantin)    Negative: Drinking very little and dehydration suspected (e.g., no urine > 12 hours,  very dry mouth, very lightheaded)    Negative: Patient sounds very sick or weak to the triager    Protocols used: NAUSEA-A-OH

## 2022-07-19 NOTE — TELEPHONE ENCOUNTER
Message copied to new encounter for patient - see 7/19/22 Nurse Triage encounter.     Anjelica White RN  [facility]

## 2022-07-19 NOTE — TELEPHONE ENCOUNTER
Patient calling  She stated she is now dehydrated and wants to know where she can go get a IV ([facility])   Please advise.  Ok to call and sandi [phone]

## 2022-07-20 NOTE — TELEPHONE ENCOUNTER
Spoke with Dora--provider @ [facility].  States they are full today for appointments but patient can be seen 7/21/22 @ 9:30a--[address].    Patient informed of appointment day/time for the [facility].  States she will be there.    [facility] referral signed by Dr. Lebron today @ 12:09p.

## 2022-07-21 ENCOUNTER — OFFICE VISIT (OUTPATIENT)
Dept: PEDIATRICS | Facility: CLINIC | Age: 77
End: 2022-07-21
Attending: INTERNAL MEDICINE
Payer: MEDICARE

## 2022-07-21 VITALS
RESPIRATION RATE: 18 BRPM | DIASTOLIC BLOOD PRESSURE: 87 MMHG | OXYGEN SATURATION: 97 % | HEART RATE: 63 BPM | BODY MASS INDEX: 24.66 KG/M2 | TEMPERATURE: 97.6 F | SYSTOLIC BLOOD PRESSURE: 145 MMHG | WEIGHT: 139.2 LBS

## 2022-07-21 DIAGNOSIS — E86.0 DEHYDRATION: ICD-10-CM

## 2022-07-21 DIAGNOSIS — H81.09 MENIERE'S DISEASE, UNSPECIFIED LATERALITY: Primary | ICD-10-CM

## 2022-07-21 DIAGNOSIS — R11.0 NAUSEA: ICD-10-CM

## 2022-07-21 LAB
ALBUMIN SERPL-MCNC: 3.8 G/DL (ref 3.4–5)
ALP SERPL-CCNC: 92 U/L (ref 40–150)
ALT SERPL W P-5'-P-CCNC: 27 U/L (ref 0–50)
ANION GAP SERPL CALCULATED.3IONS-SCNC: 4 MMOL/L (ref 3–14)
AST SERPL W P-5'-P-CCNC: 21 U/L (ref 0–45)
BASOPHILS # BLD AUTO: 0 10E3/UL (ref 0–0.2)
BASOPHILS NFR BLD AUTO: 1 %
BILIRUB SERPL-MCNC: 0.4 MG/DL (ref 0.2–1.3)
BUN SERPL-MCNC: 23 MG/DL (ref 7–30)
CALCIUM SERPL-MCNC: 9.1 MG/DL (ref 8.5–10.1)
CHLORIDE BLD-SCNC: 107 MMOL/L (ref 94–109)
CO2 SERPL-SCNC: 27 MMOL/L (ref 20–32)
CREAT SERPL-MCNC: 0.52 MG/DL (ref 0.52–1.04)
EOSINOPHIL # BLD AUTO: 0.1 10E3/UL (ref 0–0.7)
EOSINOPHIL NFR BLD AUTO: 2 %
ERYTHROCYTE [DISTWIDTH] IN BLOOD BY AUTOMATED COUNT: 13.4 % (ref 10–15)
GFR SERPL CREATININE-BSD FRML MDRD: >90 ML/MIN/1.73M2
GLUCOSE BLD-MCNC: 102 MG/DL (ref 70–99)
HCT VFR BLD AUTO: 42.7 % (ref 35–47)
HGB BLD-MCNC: 13.2 G/DL (ref 11.7–15.7)
IMM GRANULOCYTES # BLD: 0 10E3/UL
IMM GRANULOCYTES NFR BLD: 0 %
LYMPHOCYTES # BLD AUTO: 1.8 10E3/UL (ref 0.8–5.3)
LYMPHOCYTES NFR BLD AUTO: 28 %
MCH RBC QN AUTO: 29.7 PG (ref 26.5–33)
MCHC RBC AUTO-ENTMCNC: 30.9 G/DL (ref 31.5–36.5)
MCV RBC AUTO: 96 FL (ref 78–100)
MONOCYTES # BLD AUTO: 0.5 10E3/UL (ref 0–1.3)
MONOCYTES NFR BLD AUTO: 7 %
NEUTROPHILS # BLD AUTO: 4 10E3/UL (ref 1.6–8.3)
NEUTROPHILS NFR BLD AUTO: 62 %
NRBC # BLD AUTO: 0 10E3/UL
NRBC BLD AUTO-RTO: 0 /100
PLATELET # BLD AUTO: 270 10E3/UL (ref 150–450)
POTASSIUM BLD-SCNC: 4.4 MMOL/L (ref 3.4–5.3)
PROT SERPL-MCNC: 7.4 G/DL (ref 6.8–8.8)
RBC # BLD AUTO: 4.44 10E6/UL (ref 3.8–5.2)
SODIUM SERPL-SCNC: 138 MMOL/L (ref 133–144)
WBC # BLD AUTO: 6.5 10E3/UL (ref 4–11)

## 2022-07-21 PROCEDURE — 85025 COMPLETE CBC W/AUTO DIFF WBC: CPT | Performed by: PHYSICIAN ASSISTANT

## 2022-07-21 PROCEDURE — 99215 OFFICE O/P EST HI 40 MIN: CPT | Mod: 25 | Performed by: PHYSICIAN ASSISTANT

## 2022-07-21 PROCEDURE — 80053 COMPREHEN METABOLIC PANEL: CPT | Performed by: PHYSICIAN ASSISTANT

## 2022-07-21 PROCEDURE — 96374 THER/PROPH/DIAG INJ IV PUSH: CPT | Performed by: PHYSICIAN ASSISTANT

## 2022-07-21 PROCEDURE — 99417 PROLNG OP E/M EACH 15 MIN: CPT | Performed by: PHYSICIAN ASSISTANT

## 2022-07-21 PROCEDURE — 36415 COLL VENOUS BLD VENIPUNCTURE: CPT | Performed by: PHYSICIAN ASSISTANT

## 2022-07-21 PROCEDURE — 96361 HYDRATE IV INFUSION ADD-ON: CPT | Performed by: PHYSICIAN ASSISTANT

## 2022-07-21 RX ORDER — ONDANSETRON 2 MG/ML
4 INJECTION INTRAMUSCULAR; INTRAVENOUS ONCE
Status: COMPLETED | OUTPATIENT
Start: 2022-07-21 | End: 2022-07-21

## 2022-07-21 RX ADMIN — ONDANSETRON 4 MG: 2 INJECTION INTRAMUSCULAR; INTRAVENOUS at 12:17

## 2022-07-21 NOTE — PROGRESS NOTES
Assessment & Plan     Meniere's disease, unspecified laterality    Certain changes may help you manage Ménière s disease. Some of these changes are minor. Others require more dedication, such as cutting down on stress.   Reduce your stress  Stress doesn t cause Ménière s disease, but it may cause symptoms or make them worse. Ask your healthcare provider how to deal with stress. These tips can help you get started:     Pay attention to what makes you feel tense. Note how your body responds to tension.  Listen  to your body for signs such as stomach upsets, tensed muscles, clenched teeth, or other symptoms.    Talk with your healthcare provider about starting a regular exercise program. Exercise is a great way to reduce stress. Include an aerobic activity such as walking, jogging, bicycling, or swimming in your exercise program. Also include exercises to strengthen muscles You may also need to make some changes to your diet.    Take time out from your daily errands and chores to do things you enjoy and find relaxing. Don t look at relaxation time as wasted time. Instead, think of it as an investment in your health.    Ask your healthcare provider about visualization techniques, deep-breathing exercises, progressive muscle relaxation, stretching, yoga, prayer, meditation, and biofeedback. These are all ways to help reduce stress.        Nausea    Nausea is secondary to menieres and dizziness  Given IV zofran which improved her symptoms    CMP neg for electrolyte abnormalities  CBC neg for anemia  - Comprehensive metabolic panel (BMP + Alb, Alk Phos, ALT, AST, Total. Bili, TP); Future  - CBC with platelets and differential; Future  - IV access  - Comprehensive metabolic panel (BMP + Alb, Alk Phos, ALT, AST, Total. Bili, TP)  - CBC with platelets and differential    Dehydration    Patient given IV fluids, 1 liter and given zofran for nausea  She feels a lot better and is discharged home in good condition    -  Comprehensive metabolic panel (BMP + Alb, Alk Phos, ALT, AST, Total. Bili, TP); Future  - lactated ringers BOLUS 1,000 mL  - sodium chloride (PF) 0.9% PF flush 3 mL  - IV access  - Comprehensive metabolic panel (BMP + Alb, Alk Phos, ALT, AST, Total. Bili, TP)    Review of external notes as documented elsewhere in note  90 minutes spent on the date of the encounter doing chart review, history and exam, documentation and further activities per the note     At today's visit with Cynthia Arita , we discussed results, diagnosis, medications and formulated a plan.  We also discussed red flags for immediate return to clinic/ER, as well as indications for follow up with PCP if not improved in 3 days. Patient understood and agreed to plan. Cynthia Arita was discharged with stable vitals and has no further questions.       No follow-ups on file.    Nathan Chow, [facility], PA-C  [facility] is a 77 year old, presenting for the following health issues:  Nausea & Vomiting  Has Menieres disease and is nauseated a lot and hasnt been eating or drinking well lately    HPI     Concern - Dehydration and Nausea  Onset: 2-3 weeks  Description: Feels some vertigo, fatigue, nausea, intermittent vomiting. Health hx: Meniere's Disease  Intensity: severe  Progression of Symptoms:  worsening  Previous history of similar problem: Yes, seen in [facility] 5/13/22 for same symptoms.   Precipitating factors:        Worsened by: None  Alleviating factors:        Improved by: Drinking water  Therapies tried and outcome: Anti-nausea meds, ice, candies, gum, water, other methods. Going to see [facility] this afternoon.             Review of Systems   Constitutional, HEENT, cardiovascular, pulmonary, GI, , musculoskeletal, neuro, skin, endocrine and psych systems are negative, except as otherwise noted.      Objective    LMP  (LMP Unknown)   There is no height or weight on file to calculate  BMI.  Physical Exam   GENERAL: healthy, alert and no distress  EYES: Eyes grossly normal to inspection, PERRL and conjunctivae and sclerae normal  HENT: ear canals and TM's normal, nose and mouth without ulcers or lesions  NECK: no adenopathy, no asymmetry, masses, or scars and thyroid normal to palpation  RESP: lungs clear to auscultation - no rales, rhonchi or wheezes  BREAST: normal without masses, tenderness or nipple discharge and no palpable axillary masses or adenopathy  CV: regular rate and rhythm, normal S1 S2, no S3 or S4, no murmur, click or rub, no peripheral edema and peripheral pulses strong  ABDOMEN: soft, nontender, no hepatosplenomegaly, no masses and bowel sounds normal  MS: no gross musculoskeletal defects noted, no edema  SKIN: no suspicious lesions or rashes  NEURO: Normal strength and tone, mentation intact and speech normal  PSYCH: mentation appears normal, affect normal/bright    Results for orders placed or performed in visit on 07/21/22   Comprehensive metabolic panel (BMP + Alb, Alk Phos, ALT, AST, Total. Bili, TP)     Status: Abnormal   Result Value Ref Range    Sodium 138 133 - 144 mmol/L    Potassium 4.4 3.4 - 5.3 mmol/L    Chloride 107 94 - 109 mmol/L    Carbon Dioxide (CO2) 27 20 - 32 mmol/L    Anion Gap 4 3 - 14 mmol/L    Urea Nitrogen 23 7 - 30 mg/dL    Creatinine 0.52 0.52 - 1.04 mg/dL    Calcium 9.1 8.5 - 10.1 mg/dL    Glucose 102 (H) 70 - 99 mg/dL    Alkaline Phosphatase 92 40 - 150 U/L    AST 21 0 - 45 U/L    ALT 27 0 - 50 U/L    Protein Total 7.4 6.8 - 8.8 g/dL    Albumin 3.8 3.4 - 5.0 g/dL    Bilirubin Total 0.4 0.2 - 1.3 mg/dL    GFR Estimate >90 >60 mL/min/1.73m2   CBC with platelets and differential     Status: Abnormal   Result Value Ref Range    WBC Count 6.5 4.0 - 11.0 10e3/uL    RBC Count 4.44 3.80 - 5.20 10e6/uL    Hemoglobin 13.2 11.7 - 15.7 g/dL    Hematocrit 42.7 35.0 - 47.0 %    MCV 96 78 - 100 fL    MCH 29.7 26.5 - 33.0 pg    MCHC 30.9 (L) 31.5 - 36.5 g/dL    RDW  13.4 10.0 - 15.0 %    Platelet Count 270 150 - 450 10e3/uL    % Neutrophils 62 %    % Lymphocytes 28 %    % Monocytes 7 %    % Eosinophils 2 %    % Basophils 1 %    % Immature Granulocytes 0 %    NRBCs per 100 WBC 0 <1 /100    Absolute Neutrophils 4.0 1.6 - 8.3 10e3/uL    Absolute Lymphocytes 1.8 0.8 - 5.3 10e3/uL    Absolute Monocytes 0.5 0.0 - 1.3 10e3/uL    Absolute Eosinophils 0.1 0.0 - 0.7 10e3/uL    Absolute Basophils 0.0 0.0 - 0.2 10e3/uL    Absolute Immature Granulocytes 0.0 <=0.4 10e3/uL    Absolute NRBCs 0.0 10e3/uL   CBC with platelets and differential     Status: Abnormal    Narrative    The following orders were created for panel order CBC with platelets and differential.  Procedure                               Abnormality         Status                     ---------                               -----------         ------                     CBC with platelets and d...[[id number]]  Abnormal            Final result                 Please view results for these tests on the individual orders.                 .  ..

## 2022-07-22 ENCOUNTER — NURSE TRIAGE (OUTPATIENT)
Dept: INTERNAL MEDICINE | Facility: CLINIC | Age: 77
End: 2022-07-22

## 2022-07-22 NOTE — TELEPHONE ENCOUNTER
I would recommend that she try to arrange a clinic visit for further evaluation of her blood pressure and anxiety.

## 2022-07-22 NOTE — TELEPHONE ENCOUNTER
Called patient and advised of recommendations from Dr. Lebron. Patient does feel that she has calmed down a lot since calling the morning and has Wellness appointment with Dr. Samuels on 8/2/22. Does not have BP monitor at home, just feels like she does when her BP is high.     Will have Dr. Samuels review on Monday if okay to wait for appointment on 8/2 or if patient needs to be worked in sooner for appointment. Patient would also like to discuss getting standing order for [facility] at [facility] in the future for when she is getting dehydrated instead of going to the [facility].    Anjelica White RN  [facility]

## 2022-07-22 NOTE — TELEPHONE ENCOUNTER
"S-(situation): hypertension and anxiety         B-(background): hx of anxiety and takes propranolol for anxiety bid.  Doesn't take medication for her blood pressure.  Doesn't have a home blood pressure monitor.      A-(assessment): Patient calling and stated her blood pressure was elevated at clinic yesterday.  Patient was at the [facility] yesterday and they couldn't find a vein, so believes her blood pressure elevated due to that.   Eventually got an iv and  received a fluid bolus at [facility] and blood pressure was elevated after getting a fluid bolus. Patient concerned that her blood pressure continues to be elevated and is quite anxious about it.  Denies any headache or any other symptoms of high blood pressure.  Took 0.5 mg of alprazolam this am to help her anxiety and hopefully help her blood pressure.  Patient stated \"if you tell me to just sit down and relax, I will.\"        R-(recommendations): per protocol, see patient within 2 weeks.  Writer unsure what to do with patient.  Will route to provider and covering provider for review for any recommendations and if patient ok to take alprazolam a couple of times today to control her anxiety and decrease her blood pressure.      Should patient schedule an appointment with primary care provider to evaluate her blood pressure and anxiety?  Please advise, thanks.              Reason for Disposition    Systolic BP >= 130 OR Diastolic >= 80, and is not taking BP medications    Additional Information    Negative: Sounds like a life-threatening emergency to the triager    Negative: Pregnant > 20 weeks or postpartum (< 6 weeks after delivery) and new hand or face swelling    Negative: Pregnant > 20 weeks and BP > 140/90    Negative: Systolic BP >= 160 OR Diastolic >= 100, and any cardiac or neurologic symptoms (e.g., chest pain, difficulty breathing, unsteady gait, blurred vision)    Negative: Patient sounds very sick or weak to the triager    Negative: BP Systolic BP >= 140 OR " "Diastolic >= 90 and postpartum (from 0 to 6 weeks after delivery)    Negative: Systolic BP >= 180 OR Diastolic >= 110, and missed most recent dose of blood pressure medication    Negative: Systolic BP >= 180 OR Diastolic >= 110    Negative: Patient wants to be seen    Negative: Ran out of BP medications    Negative: Taking BP medications and feels is having side effects (e.g., impotence, cough, dizziness)    Answer Assessment - Initial Assessment Questions  1. BLOOD PRESSURE: \"What is the blood pressure?\" \"Did you take at least two measurements 5 minutes apart?\"      Unsure today.  Was 148/90  2. ONSET: \"When did you take your blood pressure?\"      *No Answer*  3. HOW: \"How did you obtain the blood pressure?\" (e.g., visiting nurse, automatic home BP monitor)      *No Answer*  4. HISTORY: \"Do you have a history of high blood pressure?\"      *No Answer*  5. MEDICATIONS: \"Are you taking any medications for blood pressure?\" \"Have you missed any doses recently?\"      *No Answer*  6. OTHER SYMPTOMS: \"Do you have any symptoms?\" (e.g., headache, chest pain, blurred vision, difficulty breathing, weakness)      *No Answer*  7. PREGNANCY: \"Is there any chance you are pregnant?\" \"When was your last menstrual period?\"      *No Answer*    Protocols used: HIGH BLOOD PRESSURE-A-OH      "

## 2022-07-25 ENCOUNTER — TELEPHONE (OUTPATIENT)
Dept: INTERNAL MEDICINE | Facility: CLINIC | Age: 77
End: 2022-07-25

## 2022-07-25 NOTE — TELEPHONE ENCOUNTER
Patient Quality Outreach    Patient is due for the following:   Hypertension -  BP check  Physical  - anytime    NEXT STEPS:   Patient has upcoming appointment, these items will be addressed at that time.    Type of outreach:    Chart review performed, no outreach needed.      Questions for provider review:    None     Kylah Gray LPN

## 2022-07-27 ENCOUNTER — TRANSFERRED RECORDS (OUTPATIENT)
Dept: HEALTH INFORMATION MANAGEMENT | Facility: CLINIC | Age: 77
End: 2022-07-27

## 2022-07-28 ENCOUNTER — TELEPHONE (OUTPATIENT)
Dept: INTERNAL MEDICINE | Facility: CLINIC | Age: 77
End: 2022-07-28

## 2022-07-28 NOTE — TELEPHONE ENCOUNTER
Dr Cardenas, From [facility], calling to request to speak with Dr Samuels. Regarding the patients symptoms.     Please give Dr Cardenas a call.   Cell Phone#: [phone]

## 2022-08-02 ENCOUNTER — OFFICE VISIT (OUTPATIENT)
Dept: INTERNAL MEDICINE | Facility: CLINIC | Age: 77
End: 2022-08-02
Payer: MEDICARE

## 2022-08-02 VITALS
SYSTOLIC BLOOD PRESSURE: 144 MMHG | DIASTOLIC BLOOD PRESSURE: 77 MMHG | TEMPERATURE: 96.3 F | HEART RATE: 60 BPM | WEIGHT: 140 LBS | BODY MASS INDEX: 24.8 KG/M2 | HEIGHT: 63 IN | OXYGEN SATURATION: 99 % | RESPIRATION RATE: 16 BRPM

## 2022-08-02 DIAGNOSIS — Z00.00 PREVENTATIVE HEALTH CARE: Primary | ICD-10-CM

## 2022-08-02 DIAGNOSIS — Z11.59 NEED FOR HEPATITIS C SCREENING TEST: ICD-10-CM

## 2022-08-02 DIAGNOSIS — R68.2 DRY MOUTH: ICD-10-CM

## 2022-08-02 PROCEDURE — 99213 OFFICE O/P EST LOW 20 MIN: CPT | Mod: 25 | Performed by: INTERNAL MEDICINE

## 2022-08-02 PROCEDURE — 36415 COLL VENOUS BLD VENIPUNCTURE: CPT | Performed by: INTERNAL MEDICINE

## 2022-08-02 PROCEDURE — 86431 RHEUMATOID FACTOR QUANT: CPT | Performed by: INTERNAL MEDICINE

## 2022-08-02 PROCEDURE — 86235 NUCLEAR ANTIGEN ANTIBODY: CPT | Mod: 59 | Performed by: INTERNAL MEDICINE

## 2022-08-02 PROCEDURE — G0439 PPPS, SUBSEQ VISIT: HCPCS | Performed by: INTERNAL MEDICINE

## 2022-08-02 PROCEDURE — 86803 HEPATITIS C AB TEST: CPT | Performed by: INTERNAL MEDICINE

## 2022-08-02 PROCEDURE — 86038 ANTINUCLEAR ANTIBODIES: CPT | Performed by: INTERNAL MEDICINE

## 2022-08-02 PROCEDURE — 86235 NUCLEAR ANTIGEN ANTIBODY: CPT | Performed by: INTERNAL MEDICINE

## 2022-08-02 PROCEDURE — 80061 LIPID PANEL: CPT | Performed by: INTERNAL MEDICINE

## 2022-08-02 ASSESSMENT — ACTIVITIES OF DAILY LIVING (ADL): CURRENT_FUNCTION: NO ASSISTANCE NEEDED

## 2022-08-02 ASSESSMENT — ENCOUNTER SYMPTOMS
PARESTHESIAS: 0
COUGH: 0
BREAST MASS: 0
WEAKNESS: 1
SHORTNESS OF BREATH: 0
HEMATOCHEZIA: 0
SORE THROAT: 1
DYSURIA: 0
FEVER: 0
DIZZINESS: 1
HEARTBURN: 1
JOINT SWELLING: 0
DIARRHEA: 0
ABDOMINAL PAIN: 1
MYALGIAS: 0
HEMATURIA: 1
NERVOUS/ANXIOUS: 1
HEADACHES: 0
CHILLS: 0
FREQUENCY: 0
ARTHRALGIAS: 0
EYE PAIN: 0
NAUSEA: 1
CONSTIPATION: 0
PALPITATIONS: 0

## 2022-08-02 ASSESSMENT — ANXIETY QUESTIONNAIRES
6. BECOMING EASILY ANNOYED OR IRRITABLE: SEVERAL DAYS
4. TROUBLE RELAXING: MORE THAN HALF THE DAYS
2. NOT BEING ABLE TO STOP OR CONTROL WORRYING: SEVERAL DAYS
8. IF YOU CHECKED OFF ANY PROBLEMS, HOW DIFFICULT HAVE THESE MADE IT FOR YOU TO DO YOUR WORK, TAKE CARE OF THINGS AT HOME, OR GET ALONG WITH OTHER PEOPLE?: SOMEWHAT DIFFICULT
GAD7 TOTAL SCORE: 9
GAD7 TOTAL SCORE: 9
3. WORRYING TOO MUCH ABOUT DIFFERENT THINGS: SEVERAL DAYS
IF YOU CHECKED OFF ANY PROBLEMS ON THIS QUESTIONNAIRE, HOW DIFFICULT HAVE THESE PROBLEMS MADE IT FOR YOU TO DO YOUR WORK, TAKE CARE OF THINGS AT HOME, OR GET ALONG WITH OTHER PEOPLE: SOMEWHAT DIFFICULT
7. FEELING AFRAID AS IF SOMETHING AWFUL MIGHT HAPPEN: NOT AT ALL
7. FEELING AFRAID AS IF SOMETHING AWFUL MIGHT HAPPEN: NOT AT ALL
1. FEELING NERVOUS, ANXIOUS, OR ON EDGE: NEARLY EVERY DAY
5. BEING SO RESTLESS THAT IT IS HARD TO SIT STILL: SEVERAL DAYS

## 2022-08-02 ASSESSMENT — PATIENT HEALTH QUESTIONNAIRE - PHQ9
10. IF YOU CHECKED OFF ANY PROBLEMS, HOW DIFFICULT HAVE THESE PROBLEMS MADE IT FOR YOU TO DO YOUR WORK, TAKE CARE OF THINGS AT HOME, OR GET ALONG WITH OTHER PEOPLE: VERY DIFFICULT
SUM OF ALL RESPONSES TO PHQ QUESTIONS 1-9: 7
SUM OF ALL RESPONSES TO PHQ QUESTIONS 1-9: 7

## 2022-08-02 NOTE — PROGRESS NOTES
"SUBJECTIVE:   Cynthia Arita is a 77 year old female who presents for Preventive Visit.    Non-fasting.      Patient has been advised of split billing requirements and indicates understanding: Yes  Are you in the first 12 months of your Medicare coverage?  No    Healthy Habits:     In general, how would you rate your overall health?  Good    Frequency of exercise:  1 day/week    Duration of exercise:  30-45 minutes    Do you usually eat at least 4 servings of fruit and vegetables a day, include whole grains    & fiber and avoid regularly eating high fat or \"junk\" foods?  No    Taking medications regularly:  Yes    Medication side effects:  Not applicable and Lightheadedness    Ability to successfully perform activities of daily living:  No assistance needed    Home Safety:  No safety concerns identified    Hearing Impairment:  No hearing concerns    In the past 6 months, have you been bothered by leaking of urine? Yes    In general, how would you rate your overall mental or emotional health?  Fair      PHQ-2 Total Score: 4    Additional concerns today:  No    Do you feel safe in your environment? Yes    Have you ever done Advance Care Planning? (For example, a Health Directive, POLST, or a discussion with a medical provider or your loved ones about your wishes): Yes, patient states has an Advance Care Planning document and will bring a copy to the clinic.    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 Licensed by the author for use in [facility]; reprinted with permission (nani@.[facility]). All rights reserved.      Do you have sleep apnea, excessive snoring or daytime drowsiness?: " no    Reviewed and updated as needed this visit by clinical staff   Tobacco  Allergies  Meds  Problems  Med Hx  Surg Hx  Fam Hx            Reviewed and updated as needed this visit by Provider                   Social History     Tobacco Use     Smoking status: Never Smoker     Smokeless tobacco: Never Used   Substance Use Topics     Alcohol use: Yes     Comment: rare     If you drink alcohol do you typically have >3 drinks per day or >7 drinks per week? No    Alcohol Use 8/2/2022   Prescreen: >3 drinks/day or >7 drinks/week? Not Applicable   Prescreen: >3 drinks/day or >7 drinks/week? -       Current providers sharing in care for this patient include:   Patient Care Team:  Huyen Samuels MD as PCP - General (Internal Medicine)  Huyen Samuels MD as Assigned PCP  Suzette Holder PA-C as Assigned Surgical Provider    The following health maintenance items are reviewed in Epic and correct as of today:  Health Maintenance Due   Topic Date Due     ANNUAL REVIEW OF  ORDERS  Never done     HEPATITIS C SCREENING  Never done     ZOSTER IMMUNIZATION (1 of 2) Never done     MEDICARE ANNUAL WELLNESS VISIT  08/26/2021     COVID-19 Vaccine (4 - Booster for Pfizer series) 02/19/2022     BP Readings from Last 3 Encounters:   08/02/22 (!) 144/77   07/21/22 (!) 145/87   06/29/22 (!) 149/81    Wt Readings from Last 3 Encounters:   08/02/22 63.5 kg (140 lb)   07/21/22 63.1 kg (139 lb 3.2 oz)   06/29/22 62.9 kg (138 lb 11.2 oz)                 Mammogram Screening - Patient over age 75, has elected to continue with screening.  Pertinent mammograms are reviewed under the imaging tab.    Review of Systems   Constitutional: Negative for chills and fever.   HENT: Positive for hearing loss and sore throat. Negative for congestion and ear pain.    Eyes: Positive for visual disturbance. Negative for pain.   Respiratory: Negative for cough and shortness of breath.    Cardiovascular: Positive for chest pain. Negative for  "palpitations and peripheral edema.   Gastrointestinal: Positive for abdominal pain, heartburn and nausea. Negative for constipation, diarrhea and hematochezia.   Breasts:  Negative for breast mass and discharge.   Genitourinary: Positive for hematuria. Negative for dysuria, frequency, genital sores, pelvic pain and urgency.   Musculoskeletal: Negative for arthralgias, joint swelling and myalgias.   Skin: Negative for rash.   Neurological: Positive for dizziness and weakness. Negative for headaches and paresthesias.   Psychiatric/Behavioral: Positive for mood changes. The patient is nervous/anxious.      She has developed severe dry mouth the last month    OBJECTIVE:   Pulse 60   Temp (!) 96.3  F (35.7  C) (Oral)   Resp 16   Ht 1.6 m (5' 3\")   Wt 63.5 kg (140 lb)   LMP  (LMP Unknown)   SpO2 99%   BMI 24.80 kg/m   Estimated body mass index is 24.8 kg/m  as calculated from the following:    Height as of this encounter: 1.6 m (5' 3\").    Weight as of this encounter: 63.5 kg (140 lb).  Physical Exam  GENERAL: healthy, alert and no distress  EYES: Eyes grossly normal to inspection, PERRL and conjunctivae and sclerae normal  HENT: ear canals and TM's normal, nose and mouth without ulcers or lesions  NECK: no adenopathy, no asymmetry, masses, or scars and thyroid normal to palpation  RESP: lungs clear to auscultation - no rales, rhonchi or wheezes  CV: regular rate and rhythm, normal S1 S2, no S3 or S4, no murmur, click or rub, no peripheral edema and peripheral pulses strong  ABDOMEN: soft, nontender, no hepatosplenomegaly, no masses and bowel sounds normal  MS: no gross musculoskeletal defects noted, no edema  SKIN: no suspicious lesions or rashes  NEURO: Normal strength and tone, mentation intact and speech normal  PSYCH: mentation appears normal, affect normal/bright      ASSESSMENT / PLAN:   (Z00.00) Preventative health care  (primary encounter diagnosis)  Comment:    Plan: Lipid Profile (Chol, Trig, HDL, LDL " "calc)             (R68.2) Dry mouth  Comment:    Plan: SSA Ro DESTIN Antibody IgG, SSB La DESTIN Antibody         IgG, Rheumatoid factor, Anti Nuclear Jenny IgG by        IFA with Reflex             (Z11.59) Need for hepatitis C screening test  Comment:    Plan: Hepatitis C Screen Reflex to HCV RNA Quant and         Genotype               Patient has been advised of split billing requirements and indicates understanding: Yes    COUNSELING:  Reviewed preventive health counseling, as reflected in patient instructions       Regular exercise       Healthy diet/nutrition    Estimated body mass index is 24.8 kg/m  as calculated from the following:    Height as of this encounter: 1.6 m (5' 3\").    Weight as of this encounter: 63.5 kg (140 lb).        She reports that she has never smoked. She has never used smokeless tobacco.      Appropriate preventive services were discussed with this patient, including applicable screening as appropriate for cardiovascular disease, diabetes, osteopenia/osteoporosis, and glaucoma.  As appropriate for age/gender, discussed screening for colorectal cancer, prostate cancer, breast cancer, and cervical cancer. Checklist reviewing preventive services available has been given to the patient.    Reviewed patients plan of care and provided an AVS. The Basic Care Plan (routine screening as documented in Health Maintenance) for Cynthia meets the Care Plan requirement. This Care Plan has been established and reviewed with the Patient.    Counseling Resources:  ATP IV Guidelines  Pooled Cohorts Equation Calculator  Breast Cancer Risk Calculator  Breast Cancer: Medication to Reduce Risk  FRAX Risk Assessment  ICSI Preventive Guidelines  Dietary Guidelines for Americans, 2010  [employer] MyPlate  ASA Prophylaxis  Lung CA Screening    Huyen Samuels MD  [facility]    Identified Health Risks:  Answers for HPI/ROS submitted by the patient on 8/2/2022  If you checked off any problems, how " difficult have these problems made it for you to do your work, take care of things at home, or get along with other people?: Very difficult  PHQ9 TOTAL SCORE: 7  DUSTIN 7 TOTAL SCORE: 9

## 2022-08-02 NOTE — LETTER
August 9, 2022      Cynthia Arita  [address] [address]        Dear ,    We are writing to inform you of your test results.    Your test results fall within the expected range(s) or remain unchanged from previous results.  Please continue with current treatment plan.    Resulted Orders   Hepatitis C Screen Reflex to HCV RNA Quant and Genotype   Result Value Ref Range    Hepatitis C Antibody Nonreactive Nonreactive    Narrative    Assay performance characteristics have not been established for newborns, infants, and children.   SSA Ro DESTIN Antibody IgG   Result Value Ref Range    SSA Jenny IgG Instrument Value <0.5 <7.0 U/mL    SSA (Ro) Antibody IgG Negative Negative   SSB La DESTIN Antibody IgG   Result Value Ref Range    SSB Jenny IgG Instrument Value <0.6 <7.0 U/mL    SSB (La) Antibody IgG Negative Negative   Rheumatoid factor   Result Value Ref Range    Rheumatoid Factor <6 <12 IU/mL   Anti Nuclear Jenny IgG by IFA with Reflex   Result Value Ref Range    NATALIA interpretation Negative Negative      Comment:        Negative:              <1:40  Borderline Positive:   1:40 - 1:80  Positive:              >1:80   Lipid Profile (Chol, Trig, HDL, LDL calc)   Result Value Ref Range    Cholesterol 192 <200 mg/dL    Triglycerides 257 (H) <150 mg/dL    Direct Measure HDL 50 >=50 mg/dL    LDL Cholesterol Calculated 91 <=100 mg/dL    Non HDL Cholesterol 142 (H) <130 mg/dL    Patient Fasting > 8hrs? No     Narrative    Cholesterol  Desirable:  <200 mg/dL    Triglycerides  Normal:  Less than 150 mg/dL  Borderline High:  150-199 mg/dL  High:  200-499 mg/dL  Very High:  Greater than or equal to 500 mg/dL    Direct Measure HDL  Female:  Greater than or equal to 50 mg/dL   Male:  Greater than or equal to 40 mg/dL    LDL Cholesterol  Desirable:  <100mg/dL  Above Desirable:  100-129 mg/dL   Borderline High:  130-159 mg/dL   High:  160-189 mg/dL   Very High:  >= 190 mg/dL    Non HDL Cholesterol  Desirable:  130 mg/dL  Above  Desirable:  130-159 mg/dL  Borderline High:  160-189 mg/dL  High:  190-219 mg/dL  Very High:  Greater than or equal to 220 mg/dL       If you have any questions or concerns, please call the clinic at the number listed above.       Sincerely,      Huyen Samuels MD        [facility]

## 2022-08-03 LAB
ANA SER QL IF: NEGATIVE
CHOLEST SERPL-MCNC: 192 MG/DL
ENA SS-A AB SER IA-ACNC: <0.5 U/ML
ENA SS-A AB SER IA-ACNC: NEGATIVE
ENA SS-B IGG SER IA-ACNC: <0.6 U/ML
ENA SS-B IGG SER IA-ACNC: NEGATIVE
FASTING STATUS PATIENT QL REPORTED: NO
HCV AB SERPL QL IA: NONREACTIVE
HDLC SERPL-MCNC: 50 MG/DL
LDLC SERPL CALC-MCNC: 91 MG/DL
NONHDLC SERPL-MCNC: 142 MG/DL
RHEUMATOID FACT SER NEPH-ACNC: <6 IU/ML
TRIGL SERPL-MCNC: 257 MG/DL

## 2022-08-04 ENCOUNTER — TELEPHONE (OUTPATIENT)
Dept: INTERNAL MEDICINE | Facility: CLINIC | Age: 77
End: 2022-08-04

## 2022-08-04 NOTE — TELEPHONE ENCOUNTER
Patient is calling to get her recent lab results. She wants to know if there is any explanation as to why she feels so crummy and she has a very dry mouth.

## 2022-08-08 ENCOUNTER — OFFICE VISIT (OUTPATIENT)
Dept: INTERNAL MEDICINE | Facility: CLINIC | Age: 77
End: 2022-08-08
Payer: MEDICARE

## 2022-08-08 VITALS
HEIGHT: 63 IN | OXYGEN SATURATION: 97 % | HEART RATE: 63 BPM | DIASTOLIC BLOOD PRESSURE: 77 MMHG | TEMPERATURE: 98.7 F | BODY MASS INDEX: 24.66 KG/M2 | WEIGHT: 139.2 LBS | RESPIRATION RATE: 16 BRPM | SYSTOLIC BLOOD PRESSURE: 134 MMHG

## 2022-08-08 DIAGNOSIS — R30.0 DYSURIA: Primary | ICD-10-CM

## 2022-08-08 DIAGNOSIS — K11.7 XEROSTOMIA: ICD-10-CM

## 2022-08-08 LAB
ALBUMIN UR-MCNC: NEGATIVE MG/DL
APPEARANCE UR: CLEAR
BILIRUB UR QL STRIP: NEGATIVE
COLOR UR AUTO: YELLOW
GLUCOSE UR STRIP-MCNC: NEGATIVE MG/DL
HGB UR QL STRIP: NEGATIVE
KETONES UR STRIP-MCNC: NEGATIVE MG/DL
LEUKOCYTE ESTERASE UR QL STRIP: NEGATIVE
NITRATE UR QL: NEGATIVE
PH UR STRIP: 5.5 [PH] (ref 5–7)
SP GR UR STRIP: 1.02 (ref 1–1.03)
UROBILINOGEN UR STRIP-ACNC: 0.2 E.U./DL

## 2022-08-08 PROCEDURE — 99213 OFFICE O/P EST LOW 20 MIN: CPT | Performed by: PHYSICIAN ASSISTANT

## 2022-08-08 PROCEDURE — 81003 URINALYSIS AUTO W/O SCOPE: CPT | Performed by: PHYSICIAN ASSISTANT

## 2022-08-08 RX ORDER — SCOLOPAMINE TRANSDERMAL SYSTEM 1 MG/1
PATCH, EXTENDED RELEASE TRANSDERMAL
COMMUNITY
Start: 2022-08-01 | End: 2023-01-30 | Stop reason: SINTOL

## 2022-08-08 RX ORDER — VENLAFAXINE HYDROCHLORIDE 37.5 MG/1
CAPSULE, EXTENDED RELEASE ORAL
COMMUNITY
Start: 2022-08-08 | End: 2023-01-30

## 2022-08-08 NOTE — PROGRESS NOTES
Assessment & Plan     Dysuria  UA is normal. Also discussed that urine is clear and specific gravity is normal, so she does not appear dehydrated. Continue to drink plenty of fluids.  - UA macro with reflex to Microscopic and Culture - [facility] Collect    Xerostomia  Dry mouth very likely to be stemming from scopolamine patch. Recommend contacting prescriber to discuss alternative medications. I advised lemon drops and Biotene to increase salivary flow. Continue to drink water.      Ordering of each unique test  20 minutes spent on the date of the encounter doing chart review, history and exam, documentation and further activities per the note      No follow-ups on file.    KATHARINE Gerardo [facility] [address] KOLBY Marie is a 77 year old, presenting for the following health issues:  Urinary Problem      HPI     Genitourinary - Female  Onset/Duration: 1 week  Description:   Painful urination (Dysuria): YES- burning (bad this morning)           Frequency: YES  Blood in urine (Hematuria): No  Delay in urine (Hesitency): No  Intensity: moderate  Progression of Symptoms:  worsening  Accompanying Signs & Symptoms:  Fever/chills: feeling warm  Flank pain: No  Nausea and vomiting: YES- nausea (always nauseated from vertigo/Meniere's)  Vaginal symptoms: none  Abdominal/Pelvic Pain: YES  History:   History of frequent UTI s: No  History of kidney stones: No  Sexually Active: No  Possibility of pregnancy: No  Precipitating or alleviating factors: dizzy, severe dry mouth  Therapies tried and outcome: none    Backache: bilateral, low    Having significant dry mouth. Had labs checked last week, including antibodies for Sjogren's. These were normal  Using scopolamine patch (wearing one right now) for vertigo. Dry mouth started after starting scopolamine.    Recently started vestibular rehab    Has been drinking a lot of water, but mouth still feels really dry    Often feels unsteady related to  "vertigo    Review of Systems   Constitutional, HEENT, cardiovascular, pulmonary, gi and gu systems are negative, except as otherwise noted.      Objective    /77 (BP Location: Left arm, Patient Position: Chair, Cuff Size: Adult Regular)   Pulse 63   Temp 98.7  F (37.1  C) (Tympanic)   Resp 16   Ht 1.6 m (5' 3\")   Wt 63.1 kg (139 lb 3.2 oz)   LMP  (LMP Unknown)   SpO2 97%   Breastfeeding No   BMI 24.66 kg/m    Body mass index is 24.66 kg/m .  Physical Exam   GENERAL: healthy, alert and no distress  EYES: Eyes grossly normal to inspection, PERRL and conjunctivae and sclerae normal  HENT: dry mucous membranes  RESP: lungs clear to auscultation - no rales, rhonchi or wheezes  CV: regular rate and rhythm, normal S1 S2, no S3 or S4, no murmur, click or rub, no peripheral edema and peripheral pulses strong  MS: no gross musculoskeletal defects noted, no edema  SKIN: no suspicious lesions or rashes    Results for orders placed or performed in visit on 08/08/22 (from the past 24 hour(s))   UA macro with reflex to Microscopic and Culture - Clinc Collect    Specimen: Urine, Midstream   Result Value Ref Range    Color Urine Yellow Colorless, Straw, Light Yellow, Yellow    Appearance Urine Clear Clear    Glucose Urine Negative Negative mg/dL    Bilirubin Urine Negative Negative    Ketones Urine Negative Negative mg/dL    Specific Gravity Urine 1.025 1.003 - 1.035    Blood Urine Negative Negative    pH Urine 5.5 5.0 - 7.0    Protein Albumin Urine Negative Negative mg/dL    Urobilinogen Urine 0.2 0.2, 1.0 E.U./dL    Nitrite Urine Negative Negative    Leukocyte Esterase Urine Negative Negative    Narrative    Microscopic not indicated                 .  ..  "

## 2022-08-09 ENCOUNTER — OFFICE VISIT (OUTPATIENT)
Dept: PEDIATRICS | Facility: CLINIC | Age: 77
End: 2022-08-09
Payer: MEDICARE

## 2022-08-09 ENCOUNTER — TELEPHONE (OUTPATIENT)
Dept: INTERNAL MEDICINE | Facility: CLINIC | Age: 77
End: 2022-08-09

## 2022-08-09 ENCOUNTER — ALLIED HEALTH/NURSE VISIT (OUTPATIENT)
Dept: NURSING | Facility: CLINIC | Age: 77
End: 2022-08-09
Payer: MEDICARE

## 2022-08-09 VITALS
OXYGEN SATURATION: 99 % | WEIGHT: 138.2 LBS | SYSTOLIC BLOOD PRESSURE: 160 MMHG | HEART RATE: 66 BPM | TEMPERATURE: 97.6 F | RESPIRATION RATE: 18 BRPM | BODY MASS INDEX: 24.48 KG/M2 | DIASTOLIC BLOOD PRESSURE: 85 MMHG

## 2022-08-09 DIAGNOSIS — R11.0 NAUSEA: ICD-10-CM

## 2022-08-09 DIAGNOSIS — E86.0 DEHYDRATION: ICD-10-CM

## 2022-08-09 DIAGNOSIS — R42 DIZZINESS: Primary | ICD-10-CM

## 2022-08-09 DIAGNOSIS — R68.2 DRY MOUTH: ICD-10-CM

## 2022-08-09 DIAGNOSIS — R42 DIZZINESS: ICD-10-CM

## 2022-08-09 DIAGNOSIS — R42 LIGHTHEADEDNESS: Primary | ICD-10-CM

## 2022-08-09 DIAGNOSIS — R42 LIGHTHEADEDNESS: ICD-10-CM

## 2022-08-09 LAB
ALBUMIN SERPL BCG-MCNC: 4.1 G/DL (ref 3.5–5.2)
ALP SERPL-CCNC: 90 U/L (ref 35–104)
ALT SERPL W P-5'-P-CCNC: 18 U/L (ref 10–35)
ANION GAP SERPL CALCULATED.3IONS-SCNC: 9 MMOL/L (ref 7–15)
AST SERPL W P-5'-P-CCNC: 21 U/L (ref 10–35)
BASOPHILS # BLD AUTO: 0 10E3/UL (ref 0–0.2)
BASOPHILS NFR BLD AUTO: 1 %
BILIRUB SERPL-MCNC: 0.2 MG/DL
BUN SERPL-MCNC: 21 MG/DL (ref 8–23)
CALCIUM SERPL-MCNC: 9.4 MG/DL (ref 8.8–10.2)
CHLORIDE SERPL-SCNC: 102 MMOL/L (ref 98–107)
CREAT SERPL-MCNC: 0.53 MG/DL (ref 0.51–0.95)
DEPRECATED HCO3 PLAS-SCNC: 26 MMOL/L (ref 22–29)
EOSINOPHIL # BLD AUTO: 0.2 10E3/UL (ref 0–0.7)
EOSINOPHIL NFR BLD AUTO: 3 %
ERYTHROCYTE [DISTWIDTH] IN BLOOD BY AUTOMATED COUNT: 12.9 % (ref 10–15)
GFR SERPL CREATININE-BSD FRML MDRD: >90 ML/MIN/1.73M2
GLUCOSE SERPL-MCNC: 89 MG/DL (ref 70–99)
HCT VFR BLD AUTO: 42.3 % (ref 35–47)
HGB BLD-MCNC: 13.1 G/DL (ref 11.7–15.7)
IMM GRANULOCYTES # BLD: 0 10E3/UL
IMM GRANULOCYTES NFR BLD: 0 %
LYMPHOCYTES # BLD AUTO: 1.8 10E3/UL (ref 0.8–5.3)
LYMPHOCYTES NFR BLD AUTO: 30 %
MCH RBC QN AUTO: 29.6 PG (ref 26.5–33)
MCHC RBC AUTO-ENTMCNC: 31 G/DL (ref 31.5–36.5)
MCV RBC AUTO: 96 FL (ref 78–100)
MONOCYTES # BLD AUTO: 0.5 10E3/UL (ref 0–1.3)
MONOCYTES NFR BLD AUTO: 8 %
NEUTROPHILS # BLD AUTO: 3.6 10E3/UL (ref 1.6–8.3)
NEUTROPHILS NFR BLD AUTO: 58 %
NRBC # BLD AUTO: 0 10E3/UL
NRBC BLD AUTO-RTO: 0 /100
PLATELET # BLD AUTO: 302 10E3/UL (ref 150–450)
POTASSIUM SERPL-SCNC: 4.3 MMOL/L (ref 3.4–5.3)
PROT SERPL-MCNC: 7 G/DL (ref 6.4–8.3)
RBC # BLD AUTO: 4.43 10E6/UL (ref 3.8–5.2)
SODIUM SERPL-SCNC: 137 MMOL/L (ref 136–145)
WBC # BLD AUTO: 6.1 10E3/UL (ref 4–11)

## 2022-08-09 PROCEDURE — 80053 COMPREHEN METABOLIC PANEL: CPT | Performed by: NURSE PRACTITIONER

## 2022-08-09 PROCEDURE — 85025 COMPLETE CBC W/AUTO DIFF WBC: CPT | Performed by: NURSE PRACTITIONER

## 2022-08-09 PROCEDURE — 36415 COLL VENOUS BLD VENIPUNCTURE: CPT | Performed by: NURSE PRACTITIONER

## 2022-08-09 PROCEDURE — 99207 PR NO CHARGE NURSE ONLY: CPT

## 2022-08-09 PROCEDURE — 99214 OFFICE O/P EST MOD 30 MIN: CPT | Performed by: NURSE PRACTITIONER

## 2022-08-09 NOTE — PATIENT INSTRUCTIONS
Results for orders placed or performed in visit on 08/09/22   Comprehensive metabolic panel     Status: Normal   Result Value Ref Range    Sodium 137 136 - 145 mmol/L    Potassium 4.3 3.4 - 5.3 mmol/L    Creatinine 0.53 0.51 - 0.95 mg/dL    Urea Nitrogen 21.0 8.0 - 23.0 mg/dL    Chloride 102 98 - 107 mmol/L    Carbon Dioxide (CO2) 26 22 - 29 mmol/L    Anion Gap 9 7 - 15 mmol/L    Glucose 89 70 - 99 mg/dL    Calcium 9.4 8.8 - 10.2 mg/dL    Protein Total 7.0 6.4 - 8.3 g/dL    Albumin 4.1 3.5 - 5.2 g/dL    Bilirubin Total 0.2 <=1.2 mg/dL    Alkaline Phosphatase 90 35 - 104 U/L    AST 21 10 - 35 U/L    ALT 18 10 - 35 U/L    GFR Estimate >90 >60 mL/min/1.73m2   CBC with platelets and differential     Status: Abnormal   Result Value Ref Range    WBC Count 6.1 4.0 - 11.0 10e3/uL    RBC Count 4.43 3.80 - 5.20 10e6/uL    Hemoglobin 13.1 11.7 - 15.7 g/dL    Hematocrit 42.3 35.0 - 47.0 %    MCV 96 78 - 100 fL    MCH 29.6 26.5 - 33.0 pg    MCHC 31.0 (L) 31.5 - 36.5 g/dL    RDW 12.9 10.0 - 15.0 %    Platelet Count 302 150 - 450 10e3/uL    % Neutrophils 58 %    % Lymphocytes 30 %    % Monocytes 8 %    % Eosinophils 3 %    % Basophils 1 %    % Immature Granulocytes 0 %    NRBCs per 100 WBC 0 <1 /100    Absolute Neutrophils 3.6 1.6 - 8.3 10e3/uL    Absolute Lymphocytes 1.8 0.8 - 5.3 10e3/uL    Absolute Monocytes 0.5 0.0 - 1.3 10e3/uL    Absolute Eosinophils 0.2 0.0 - 0.7 10e3/uL    Absolute Basophils 0.0 0.0 - 0.2 10e3/uL    Absolute Immature Granulocytes 0.0 <=0.4 10e3/uL    Absolute NRBCs 0.0 10e3/uL   CBC with platelets differential     Status: Abnormal    Narrative    The following orders were created for panel order CBC with platelets differential.  Procedure                               Abnormality         Status                     ---------                               -----------         ------                     CBC with platelets and d...[[id number]]  Abnormal            Final result                 Please view results  for these tests on the individual orders.

## 2022-08-09 NOTE — TELEPHONE ENCOUNTER
Patient walked into clinic today for symptoms and referred to [facility] by Dr. Samuels.     Anjelica White RN  [facility]

## 2022-08-09 NOTE — PROGRESS NOTES
"Patient walks into clinic with complaints of lightheadedness, dizziness, nausea, feeling like she \"cant walk\" and dry mouth. Patient also reported blood in her stool this morning. States stool was soft. Denies hemmorroids. Blood was a substantial smear on the toilet paper when she wiped x 3 and was not in the water. Reports feeling like her \"head is in a vice\". Patient was seen yesterday by Kaykay Madden and a urine was done which was normal. Patient saw Dr. Samuels 8/2/22 for physical and additional labs were done to assess dry mouth. Labs were normal. Patient was started on a Scopolamine patch by another provider about 10 days ago. Kaykay Madden advised this may be the cause of the dry mouth. Patient removed patch this morning. States she wakes up during the night with difficulty breathing due to dry mouth. Discussed with Dr. Samuels. Patient will be seen in the [facility].   "

## 2022-08-09 NOTE — TELEPHONE ENCOUNTER
Patient is calling because she had blood in her stool this morning. She is wondering if she needs to be worried.  Also did her urine results get reviewed yesterday?

## 2022-08-09 NOTE — PROGRESS NOTES
Assessment & Plan     Dizziness  - IV access  - CBC with platelets differential; Future  - Comprehensive metabolic panel; Future  - Referral to Acute and Diagnostic Services (Day of diagnostic / First order acute)  - CBC with platelets differential  - Comprehensive metabolic panel    Dehydration  - IV access  - CBC with platelets differential; Future  - Comprehensive metabolic panel; Future  - CBC with platelets differential  - Comprehensive metabolic panel    Nausea  - IV access  - CBC with platelets differential; Future  - Comprehensive metabolic panel; Future  - Referral to Acute and Diagnostic Services (Day of diagnostic / First order acute)  - CBC with platelets differential  - Comprehensive metabolic panel    Lightheadedness  - Referral to Acute and Diagnostic Services (Day of diagnostic / First order acute)    Dry mouth  - Referral to Acute and Diagnostic Services (Day of diagnostic / First order acute)    Offered fluids pt declined.      Labs normal/per baseline  Reassured dizziness and dry mouth may be related to recent scopalomine patch.    Pt was advised this and removed patch 6 hours ago  Will await and see if symptoms persist or not.    Will f/u with PCP or prescribing provider ([facility])  if persists or worsens.        Return in about 2 days (around 8/11/2022) for with regular provider if symptoms persist.    CASSIDY Beavers [facility] is a 77 year old, presenting  urgently to [facility] by referral from Dr Samuels for the following health issues:  Dizziness, dry mouth, and Fatigue      HPI     Concern - Rectal Bleeding  Onset: This morning  Description: when wiping, noticed pink on toilet paper. Has happened 2x today. Dark pink color mixed in stool this morning. Pink on toilet paper 2x. BM at [facility] did not have noticeable blood in stool.  Intensity: mild  Progression of Symptoms:  same  Previous history of similar problem:  No  Precipitating factors:        Worsened by: No  Alleviating factors:        Improved by: None  Therapies tried and outcome: None  Denies diarrhea or constipation. Denies abdominal cramping.  Has had 3 bms today, all formed. Normal pattern is BID.        Concern - Dehydration and Nausea  Onset: Persistent  Description: Feels vertigo, fatigue, nausea Health Hx: Meniere's Disease  Intensity: moderate  Progression of Symptoms:  constant  Previous history of similar problem: Yes, seen in [facility] 2x for same symptom  Precipitating factors:        Worsened by: None  Alleviating factors:        Improved by: Drinking water, biotene mouth spray,   Therapies tried and outcome: Anti-nausea medication, ice, candies, gum, mouth spray. Gastro gave a scopalomine patch 10 days previous, made dry mouth worse. [facility]- to see another clinic at the end of the month and go back. No notes in chart from [facility].      Review of Systems   Constitutional, HEENT, cardiovascular, pulmonary, GI, , musculoskeletal, neuro, skin, endocrine and psych systems are negative, except as otherwise noted.      Objective    BP (!) 162/76 (BP Location: Right arm, Patient Position: Sitting, Cuff Size: Adult Regular)   Pulse 62   Temp 97.6  F (36.4  C) (Oral)   Resp 18   Wt 62.7 kg (138 lb 3.2 oz)   LMP  (LMP Unknown)   SpO2 99%   BMI 24.48 kg/m    Body mass index is 24.48 kg/m .  Physical Exam   GENERAL: healthy, alert and no distress  EYES: Eyes grossly normal to inspection, PERRL and conjunctivae and sclerae normal  HENT: ear canals and TM's normal, nose and mouth without ulcers or lesions  NECK: no adenopathy, no asymmetry, masses, or scars and thyroid normal to palpation  RESP: lungs clear to auscultation - no rales, rhonchi or wheezes  CV: regular rate and rhythm, normal S1 S2, no S3 or S4, no murmur, click or rub, no peripheral edema and peripheral pulses strong  ABDOMEN: soft, nontender, no hepatosplenomegaly,  no masses and bowel sounds normal  MS: no gross musculoskeletal defects noted, no edema  SKIN: no suspicious lesions or rashes  NEURO: Normal strength and tone, sensory exam grossly normal, mentation intact and cranial nerves 2-12 intact                .  ..

## 2022-08-10 ENCOUNTER — HOSPITAL ENCOUNTER (EMERGENCY)
Facility: CLINIC | Age: 77
Discharge: HOME | End: 2022-08-10
Attending: EMERGENCY MEDICINE | Admitting: EMERGENCY MEDICINE
Payer: MEDICARE

## 2022-08-10 ENCOUNTER — TELEPHONE (OUTPATIENT)
Dept: INTERNAL MEDICINE | Facility: CLINIC | Age: 77
End: 2022-08-10

## 2022-08-10 ENCOUNTER — APPOINTMENT (OUTPATIENT)
Dept: GENERAL RADIOLOGY | Facility: CLINIC | Age: 77
End: 2022-08-10
Attending: EMERGENCY MEDICINE
Payer: MEDICARE

## 2022-08-10 VITALS
HEIGHT: 64 IN | HEART RATE: 76 BPM | WEIGHT: 140 LBS | OXYGEN SATURATION: 99 % | TEMPERATURE: 98 F | SYSTOLIC BLOOD PRESSURE: 166 MMHG | RESPIRATION RATE: 16 BRPM | DIASTOLIC BLOOD PRESSURE: 89 MMHG | BODY MASS INDEX: 23.9 KG/M2

## 2022-08-10 DIAGNOSIS — H81.09 MENIERE'S DISEASE, UNSPECIFIED LATERALITY: ICD-10-CM

## 2022-08-10 DIAGNOSIS — R07.9 CHEST PAIN, UNSPECIFIED TYPE: ICD-10-CM

## 2022-08-10 DIAGNOSIS — R07.89 RIGHT-SIDED CHEST WALL PAIN: ICD-10-CM

## 2022-08-10 LAB
ALBUMIN SERPL BCG-MCNC: 4.1 G/DL (ref 3.5–5.2)
ALP SERPL-CCNC: 85 U/L (ref 35–104)
ALT SERPL W P-5'-P-CCNC: 18 U/L (ref 10–35)
ANION GAP SERPL CALCULATED.3IONS-SCNC: 10 MMOL/L (ref 7–15)
AST SERPL W P-5'-P-CCNC: 25 U/L (ref 10–35)
BASOPHILS # BLD AUTO: 0 10E3/UL (ref 0–0.2)
BASOPHILS NFR BLD AUTO: 1 %
BILIRUB SERPL-MCNC: 0.3 MG/DL
BUN SERPL-MCNC: 18 MG/DL (ref 8–23)
CALCIUM SERPL-MCNC: 9.8 MG/DL (ref 8.8–10.2)
CHLORIDE SERPL-SCNC: 103 MMOL/L (ref 98–107)
CREAT SERPL-MCNC: 0.63 MG/DL (ref 0.51–0.95)
DEPRECATED HCO3 PLAS-SCNC: 26 MMOL/L (ref 22–29)
EOSINOPHIL # BLD AUTO: 0.2 10E3/UL (ref 0–0.7)
EOSINOPHIL NFR BLD AUTO: 3 %
ERYTHROCYTE [DISTWIDTH] IN BLOOD BY AUTOMATED COUNT: 13.2 % (ref 10–15)
GFR SERPL CREATININE-BSD FRML MDRD: 90 ML/MIN/1.73M2
GLUCOSE SERPL-MCNC: 97 MG/DL (ref 70–99)
HCT VFR BLD AUTO: 44.1 % (ref 35–47)
HGB BLD-MCNC: 13.4 G/DL (ref 11.7–15.7)
HOLD SPECIMEN: NORMAL
IMM GRANULOCYTES # BLD: 0 10E3/UL
IMM GRANULOCYTES NFR BLD: 0 %
LYMPHOCYTES # BLD AUTO: 2.1 10E3/UL (ref 0.8–5.3)
LYMPHOCYTES NFR BLD AUTO: 28 %
MCH RBC QN AUTO: 28.8 PG (ref 26.5–33)
MCHC RBC AUTO-ENTMCNC: 30.4 G/DL (ref 31.5–36.5)
MCV RBC AUTO: 95 FL (ref 78–100)
MONOCYTES # BLD AUTO: 0.5 10E3/UL (ref 0–1.3)
MONOCYTES NFR BLD AUTO: 7 %
NEUTROPHILS # BLD AUTO: 4.6 10E3/UL (ref 1.6–8.3)
NEUTROPHILS NFR BLD AUTO: 61 %
NRBC # BLD AUTO: 0 10E3/UL
NRBC BLD AUTO-RTO: 0 /100
PLATELET # BLD AUTO: 280 10E3/UL (ref 150–450)
POTASSIUM SERPL-SCNC: 4.2 MMOL/L (ref 3.4–5.3)
PROT SERPL-MCNC: 7.1 G/DL (ref 6.4–8.3)
RBC # BLD AUTO: 4.65 10E6/UL (ref 3.8–5.2)
SODIUM SERPL-SCNC: 139 MMOL/L (ref 136–145)
TROPONIN T SERPL HS-MCNC: 8 NG/L
TSH SERPL DL<=0.005 MIU/L-ACNC: 2.46 UIU/ML (ref 0.3–4.2)
WBC # BLD AUTO: 7.4 10E3/UL (ref 4–11)

## 2022-08-10 PROCEDURE — 85025 COMPLETE CBC W/AUTO DIFF WBC: CPT | Performed by: EMERGENCY MEDICINE

## 2022-08-10 PROCEDURE — 80053 COMPREHEN METABOLIC PANEL: CPT | Performed by: EMERGENCY MEDICINE

## 2022-08-10 PROCEDURE — 99285 EMERGENCY DEPT VISIT HI MDM: CPT | Mod: 25

## 2022-08-10 PROCEDURE — 96360 HYDRATION IV INFUSION INIT: CPT

## 2022-08-10 PROCEDURE — 96361 HYDRATE IV INFUSION ADD-ON: CPT

## 2022-08-10 PROCEDURE — 71046 X-RAY EXAM CHEST 2 VIEWS: CPT

## 2022-08-10 PROCEDURE — 93005 ELECTROCARDIOGRAM TRACING: CPT

## 2022-08-10 PROCEDURE — 36415 COLL VENOUS BLD VENIPUNCTURE: CPT | Performed by: EMERGENCY MEDICINE

## 2022-08-10 PROCEDURE — 84443 ASSAY THYROID STIM HORMONE: CPT | Performed by: EMERGENCY MEDICINE

## 2022-08-10 PROCEDURE — 84484 ASSAY OF TROPONIN QUANT: CPT | Performed by: EMERGENCY MEDICINE

## 2022-08-10 PROCEDURE — 258N000003 HC RX IP 258 OP 636: Performed by: EMERGENCY MEDICINE

## 2022-08-10 PROCEDURE — 250N000013 HC RX MED GY IP 250 OP 250 PS 637: Performed by: EMERGENCY MEDICINE

## 2022-08-10 RX ORDER — ACETAMINOPHEN 325 MG/1
650 TABLET ORAL ONCE
Status: COMPLETED | OUTPATIENT
Start: 2022-08-10 | End: 2022-08-10

## 2022-08-10 RX ORDER — IBUPROFEN 600 MG/1
600 TABLET, FILM COATED ORAL ONCE
Status: COMPLETED | OUTPATIENT
Start: 2022-08-10 | End: 2022-08-10

## 2022-08-10 RX ADMIN — SODIUM CHLORIDE 1000 ML: 9 INJECTION, SOLUTION INTRAVENOUS at 16:56

## 2022-08-10 RX ADMIN — ACETAMINOPHEN 650 MG: 325 TABLET, FILM COATED ORAL at 16:56

## 2022-08-10 RX ADMIN — IBUPROFEN 600 MG: 600 TABLET ORAL at 16:56

## 2022-08-10 ASSESSMENT — ENCOUNTER SYMPTOMS
NAUSEA: 1
ABDOMINAL PAIN: 0
DIZZINESS: 1
PALPITATIONS: 1
SHORTNESS OF BREATH: 0

## 2022-08-10 ASSESSMENT — ACTIVITIES OF DAILY LIVING (ADL)
ADLS_ACUITY_SCORE: 35
ADLS_ACUITY_SCORE: 35

## 2022-08-10 NOTE — ED TRIAGE NOTES
Pt. Presents to ED with complaints of persistent and worsening dizziness, nausea, and chest pain with palpation that started yesterday. Pt. Reports she was seen at her PCP yesterday for the same thing and was medically cleared. AVSS on RA. A & O x 4, independent. Pt. Reports hx of vertigo, meniere's.  Pt. Reports her mouth is dry and shes been sleeping more.

## 2022-08-10 NOTE — TELEPHONE ENCOUNTER
"S-(situation): dizziness, upper legs feel tired and sore as if she had \"just run a marathon\", nausea, head feels tight and numb and has sensitive tender spot between breasts all since about 7:30 a.m. today.  States she has felt poorly the past 2 weeks and has been unable to sleep.      B-(background): Was seen in [facility] yesterday for dizziness, dry mouth, fatigue. Labs normal there. Has a history of Meniere's, GERD, .    A-(assessment): Patient speaking rapidly and repeated self frequently then asked to come in to clinic for just a BP check.  States she has had these symptoms before but is most worried about her blood pressure.    R-(recommendations): Patient advised to be seen in UC for symptoms today where her BP will also be checked.  Patient agrees and has someone to drive her.   YUE Avalos R.N.    "

## 2022-08-11 LAB
ATRIAL RATE - MUSE: 61 BPM
DIASTOLIC BLOOD PRESSURE - MUSE: NORMAL MMHG
INTERPRETATION ECG - MUSE: NORMAL
P AXIS - MUSE: 65 DEGREES
PR INTERVAL - MUSE: 158 MS
QRS DURATION - MUSE: 86 MS
QT - MUSE: 444 MS
QTC - MUSE: 446 MS
R AXIS - MUSE: -52 DEGREES
SYSTOLIC BLOOD PRESSURE - MUSE: NORMAL MMHG
T AXIS - MUSE: 60 DEGREES
VENTRICULAR RATE- MUSE: 61 BPM

## 2022-08-18 ENCOUNTER — MEDICAL CORRESPONDENCE (OUTPATIENT)
Dept: HEALTH INFORMATION MANAGEMENT | Facility: CLINIC | Age: 77
End: 2022-08-18

## 2022-08-19 DIAGNOSIS — H81.02 MENIERE'S DISEASE OF LEFT EAR: ICD-10-CM

## 2022-08-19 DIAGNOSIS — R11.0 NAUSEA: ICD-10-CM

## 2022-08-22 RX ORDER — ONDANSETRON 4 MG/1
TABLET, ORALLY DISINTEGRATING ORAL
Qty: 240 TABLET | Refills: 1 | Status: SHIPPED | OUTPATIENT
Start: 2022-08-22 | End: 2022-12-19

## 2022-08-23 NOTE — TELEPHONE ENCOUNTER
Pending Prescriptions:                       Disp   Refills    ondansetron (ZOFRAN ODT) 4 MG ODT tab     240 ta*1            Sig: DISSOLVE 1 TABLET ON TOP OF THE TONGUE EVERY 6           HOURS    Prescription approved per [facility] Refill Protocol.

## 2022-09-13 NOTE — TELEPHONE ENCOUNTER
Patient:   Kymberly Burnette 61 year old female    MRN:  [id number]    Date of Procedure:   9/13/2022     Surgeon:  Sae Stauffer DDS  Phone Number: [phone]    Assisting Surgeon:  Surgeon(s) and Role:     * Sae Stauffer DDS - Primary     Pre op Diagnosis:    1. Degenerative Joint Disease left TMJ  2. Failed infected previous TMJ hardware     Post operative diagnosis:   Same     Procedures:  1. Application IMF screw for MMF  2. Removal of spacer  3. Left Temporomandibular Joint Arthroplasty with Prosthesis    Complications:  None     Indication for procedure:   Patient is a 61yoF who is familiar to our practice.  Patient is at end-stage TMJ disorder with progressive degeneration of her Left TMJ.  Pt has had several surgical procedures done to help correct her issues including orthognathic surgery with Left joint replacement.  The joint was found to have an infection several months from surgery and required ID intervention.  She has completed the Marlin/Mecuri protocol for an infected joint and had a spacer placed.  She is ready for the arthroplasty replacement surgery again.  A pre-op clinical exam was completed in office, TMJ concepts total joints were custom fabricated to patient specific needs, & instructions given on procedure.  Risks,benefits, and alternatives discussed including risks to facial nerve function.  Questions were sought and answered, consent obtained.     Anesthesia:   General anesthesia via Nasoendotracheal intubation.     Procedure:   The patient was identified in the preoperative holding area by Oral Surgery,  Anesthesia, and Nursing. Risks, benefits, and alternatives of the  procedure were discussed with the patient and the patient's family,  questions were sought and answered. History and physical were reviewed,  preoperative labs and test were reviewed, and an informed consent was  obtained.     The patient was transferred to the operating room table and  safety belt secured. Standard  Reason for Call:  Request for results:    Name of test or procedure: stool testing    Date of test of procedure: 8/29    Location of the test or procedure: [place]    OK to leave the result message on voice mail or with a family member? YES    Phone number Patient can be reached at:  Cell number on file:    Telephone Information:   Mobile [phone]       Additional comments: Patient would like results once Dr. Samuels has had a chance to review them.     Call taken on 8/30/2019 at 1:55 PM by Karlee Topete       ASA monitors were attached and the patient  was noted to have stable vital signs. Pressure points were padded by  Nursing. IV induction by Anesthesia with nasoendotracheal intubation x1  with a GlideScope without complication. Nasotracheal tube was secured  in a standard head wrap fashion by Oral Surgery. A timeout was performed and the patient's oral cavity was suctioned out.  Pt was noted to be opened >40mm and deemed that the coronoidectomy would not be indicated due to ability to open.  An IMF screw was placed between 30,31 due to poor orthodontic bracket strength.  IMF wires were then placed with the patient in maxillomandibular fixation in the most stable occlusion.The patient was then prepped and draped in a sterile fashion and mineral oil soaked cotton  pellets were placed in the left EAC, an opsite was placed over the  patient's oral cavity. Approximately, 7 mL of  1:100,000 epinephrine solution was infiltrated into the leftl pretragal, submandibular,  and left superior temporomandibular joint spaces.    Initiation of the total joint procedure began on the patient's left side with an  Pre-tragal incision followed by subcutaneous dissection and undermining of  the flap to expose the underlying connective tissue. Iris scissors were  used in a combination of blunt and sharp dissection through the connective  tissue layer verifying to stay within 8 mm of external auditory canal to  protect the facial nerve. Nerve testing was performed in each layer of the  dissection to help with identification of the facial nerve. There was mostly scar banded  Tissue due to re-entry at the site. Bipolar cautery  was used to control hemorrhage when encountered. The superficial layer  Of the fibrous capsule was appreciated and incised with electrocautery.    There was a clean layer of dissection and capsule around the antibiotic spacer  Which was easily removed.  Periosteal elevator was used to removed scar tissue around  glenoid  Fossa and zygomatic arch.  Bone noted to be of good quality.  Site was packed with Raytec   And attention taken to the submandibular dissection.    The mandibular angle was marked and an incision line  was marked approximately 2 cm inferior to the angle of the mandible.   The incision was then made with a  15-blade through skin and subcutaneous tissue with also removing previous skin scar. Blunt dissection was  performed to expose the platysma muscle. This layer was dissected,  incised, and retracted to expose a thick scar band layer that was easily dissected with hemostats. A nerve stimulator was used to verify each layer of the dissection  to ensure protection of the facial nerve. Following dissection, the pterygo-masseteric sling was  exposed and incised with Bovie cautery. A subperiosteal dissection was  then performed along the angle of the mandible and lateral aspect of the  ramus to establish communication with the previously performed pre-tragal  dissection.     We then placed the fossa component of the total  joint prosthesis and fixated it to the zygomatic arch with four 6-mm  screws. Photographs from the TMJ concepts were used to ensure the  fossa component was placed in the preplanned position as well as  confirmation of allowing appropriate space between the sigmoid notch and  fossa component of prosthesis. A reduction bur was used at this time to  remove bony prominences along the inferior border and angle of the  mandible. We then secured the right ramus/condylar component of the  total joint prosthesis using a combination of 12,10, and 8-mm  screws as directed by the plans received by TMJ concepts. It was noted  that after fixation, the condylar portion of the prosthesis was seated  appropriately into the fossa component.     We then copiously irrigated the pre-tragal and  submandibular wounds with Betadine followed by copious irrigation with  normal saline and then gentamycin/ancef  antibiotic rinse. There was no true room for fat grafting   And so it was decided to not perform a derma fascia fat graft for this procedure.  The pretragal incision  were then closed in a layered fashion with 5-0 Monocryl deep in 2 planes  followed by 5-0 Monocryl in the subcuticular plane and a 6-0 prolene horizontal running subcuticular suture.  The excess that was left was then mastisol and steristriped to skin for easy removal in 2 weeks. The submandibular incision were closed with a  2-0 Monocryl to reapproximate the pterygo-masseteric sling followed by  watertight closure of the superficial cervical fascia with a running 5-0 Monocryl  suture, and a 5-0 Monocryl subcuticular in a horizontal fashion followed by 6-0  Prolene running horizontal subcuticular to close the skin with ends secured to skin with mastisol and steristrips.  Skin glue then used on both sites and steri-strips applied.  Bacitracin used to cover incisions.    Left ear cotton was removed.    Opsite removed  Patient's MMF wires removed along with IMF screw.  Patient was noted to be in stable and repeatable occlusion.  Oral cavity suctioned. Jobst placed.    Patient cleaned and handed back over to anesthesia care.        Fluids:   Please get from anesthesia record     EBL:   50cc     Urinary output:   Please get from anesthesia record    Implants:   Implant Name Type Inv. Item Serial No.  Lot No. LRB No. Used Action   Glenoid Fossa Compenont     [id number] N/A 1 Implanted   Madibular Component     [id number] N/A 1 Implanted   2.0 mm X 6mm Screw      N/A 4 Implanted   2.0mm x 8mm Screw      N/A 3 Implanted   2.0mm x 10mm Screw      N/A 4 Implanted   2.0mm x 12 mm Screw      N/A 1 Implanted       Specimens:   No specimens collected during this procedure.     Complications:   None       Needle and Sponge:   Count correct and verified x2    Patient tolerated procedure well and was extubated in operating room and transferred to the PACU in stable  condition. Ok to transfer back to room.

## 2022-09-17 DIAGNOSIS — M81.0 AGE-RELATED OSTEOPOROSIS WITHOUT CURRENT PATHOLOGICAL FRACTURE: ICD-10-CM

## 2022-09-20 RX ORDER — CALCITONIN SALMON 200 [IU]/.09ML
SPRAY, METERED NASAL
Qty: 11.1 ML | Refills: 3 | Status: SHIPPED | OUTPATIENT
Start: 2022-09-20 | End: 2023-11-13

## 2022-09-21 ENCOUNTER — TRANSFERRED RECORDS (OUTPATIENT)
Dept: HEALTH INFORMATION MANAGEMENT | Facility: CLINIC | Age: 77
End: 2022-09-21

## 2022-10-06 ENCOUNTER — TRANSFERRED RECORDS (OUTPATIENT)
Dept: HEALTH INFORMATION MANAGEMENT | Facility: CLINIC | Age: 77
End: 2022-10-06

## 2022-10-10 ENCOUNTER — TELEPHONE (OUTPATIENT)
Dept: INTERNAL MEDICINE | Facility: CLINIC | Age: 77
End: 2022-10-10

## 2022-10-10 NOTE — TELEPHONE ENCOUNTER
I do recommend Zofran for nausea but patient is already taking Zofran, can also try over-the-counter Pepcid if reflux is causing nausea,   taking Nexium already, please advise patient to schedule appointment with the PCP when  is back for any further recommendation, advised urgent care or ER if symptoms worsen

## 2022-10-10 NOTE — TELEPHONE ENCOUNTER
SBAR    Situation: Called patient to discuss dizziness and nausea    Background: Ongoing issues with nausea and dizziness - she has history of vertigo and Meniere's disease. Symptoms have been worsening over the 1.5 weeks, but her main concern is the nausea. She has been following with ENT and called them regarding worsening symptoms. They told her to speak to her PCP to see what recommendations they have to help with her nausea symptoms. She asks if there is anything else she can try in the meantime.     Assessment: She has Meclizine and Zofran at home and has taken OTC Tums, states she doesn't like to try any medication unless a provider says she should. She has been able to force herself to eat meals and drink fluids, but states nothing tastes good. No symptoms of dehydration.    She is also trying physical therapy exercises she was given by [facility] to see if this helps.   Recommendation: Patient has tried many different medications in the past for similar symptoms. Will forward to covering provider-Dr. Baxter to review for any further recommendations.     Anjelica White RN  [facility]

## 2022-10-10 NOTE — TELEPHONE ENCOUNTER
Reason for call:  Patient reporting a symptom    Symptom or request: Dizziness, nauseous     Duration (how long have symptoms been present): 1 1/2 weeks    Have you been treated for this before? Yes    Additional comments: Patient is dealing with feeling dizzy and nauseous     Phone Number patient can be reached at:  Home number on file [phone] (home)    Best Time:  anytime    Can we leave a detailed message on this number:  YES    Call taken on 10/10/2022 at 1:30 PM by Aislinn Arnold

## 2022-10-11 ENCOUNTER — LAB (OUTPATIENT)
Dept: LAB | Facility: CLINIC | Age: 77
End: 2022-10-11
Payer: MEDICARE

## 2022-10-11 DIAGNOSIS — R42 DIZZINESS: ICD-10-CM

## 2022-10-11 DIAGNOSIS — H81.8X9 UNILATERAL VESTIBULAR WEAKNESS: ICD-10-CM

## 2022-10-11 DIAGNOSIS — H81.02 MENIERE'S DISEASE OF LEFT EAR: ICD-10-CM

## 2022-10-11 LAB
ANION GAP SERPL CALCULATED.3IONS-SCNC: 10 MMOL/L (ref 7–15)
CHLORIDE SERPL-SCNC: 103 MMOL/L (ref 98–107)
DEPRECATED HCO3 PLAS-SCNC: 26 MMOL/L (ref 22–29)
POTASSIUM SERPL-SCNC: 4.7 MMOL/L (ref 3.4–5.3)
SODIUM SERPL-SCNC: 139 MMOL/L (ref 136–145)
TSH SERPL DL<=0.005 MIU/L-ACNC: 3.4 UIU/ML (ref 0.3–4.2)

## 2022-10-11 PROCEDURE — 36415 COLL VENOUS BLD VENIPUNCTURE: CPT

## 2022-10-11 PROCEDURE — 80051 ELECTROLYTE PANEL: CPT

## 2022-10-11 PROCEDURE — 84443 ASSAY THYROID STIM HORMONE: CPT | Mod: GZ

## 2022-10-11 NOTE — TELEPHONE ENCOUNTER
Provider Recommendation Follow Up:   Reached patient/caregiver. Informed of provider's recommendations. Patient verbalized understanding and agrees with the plan.     Patient has future appointment with Dr. Samuels.   Appointments in Next Year    Oct 21, 2022  1:15 PM  Flu [facility]ER[phone]   Nov 28, 2022  2:30 PM  (Arrive by 2:10 PM)  Provider Visit with H[facility] [phone]         Anjelica White RN  [facility]

## 2022-10-17 ENCOUNTER — TELEPHONE (OUTPATIENT)
Dept: INTERNAL MEDICINE | Facility: CLINIC | Age: 77
End: 2022-10-17

## 2022-10-17 DIAGNOSIS — R11.0 CHRONIC NAUSEA: Primary | ICD-10-CM

## 2022-10-17 NOTE — TELEPHONE ENCOUNTER
Patient advised, she would like to get referral to [facility] so she does not have to travel as far. Referral pended for [facility], routed to provider. Patient has their phone number, does not need call back once referral signed.    Anjelica White RN  [facility]

## 2022-10-17 NOTE — TELEPHONE ENCOUNTER
No I dont. If she prefers to continue with [facility] doc that is ok too.

## 2022-10-17 NOTE — TELEPHONE ENCOUNTER
Patient informed referral placed through [facility] and she will receive a call to schedule.  Patient asks if there is any specific providers Dr. Samuels recommends seeing to hep with her nausea.     Anjelica White RN  [facility]

## 2022-10-21 ENCOUNTER — IMMUNIZATION (OUTPATIENT)
Dept: INTERNAL MEDICINE | Facility: CLINIC | Age: 77
End: 2022-10-21
Payer: MEDICARE

## 2022-10-21 DIAGNOSIS — Z23 HIGH PRIORITY FOR 2019-NCOV VACCINE: ICD-10-CM

## 2022-10-21 DIAGNOSIS — Z23 NEED FOR PROPHYLACTIC VACCINATION AND INOCULATION AGAINST INFLUENZA: ICD-10-CM

## 2022-10-21 PROCEDURE — 90662 IIV NO PRSV INCREASED AG IM: CPT

## 2022-10-21 PROCEDURE — G0008 ADMIN INFLUENZA VIRUS VAC: HCPCS

## 2022-10-21 PROCEDURE — 0124A COVID-19,PF,PFIZER BOOSTER BIVALENT: CPT

## 2022-10-21 PROCEDURE — 91312 COVID-19,PF,PFIZER BOOSTER BIVALENT: CPT

## 2022-10-22 ENCOUNTER — NURSE TRIAGE (OUTPATIENT)
Dept: NURSING | Facility: CLINIC | Age: 77
End: 2022-10-22

## 2022-10-22 ENCOUNTER — TELEPHONE (OUTPATIENT)
Dept: NURSING | Facility: CLINIC | Age: 77
End: 2022-10-22

## 2022-10-22 DIAGNOSIS — N39.0 URINARY TRACT INFECTION WITHOUT HEMATURIA, SITE UNSPECIFIED: Primary | ICD-10-CM

## 2022-10-22 RX ORDER — NITROFURANTOIN 25; 75 MG/1; MG/1
100 CAPSULE ORAL 2 TIMES DAILY
Qty: 14 CAPSULE | Refills: 0 | Status: SHIPPED | OUTPATIENT
Start: 2022-10-22 | End: 2022-10-29

## 2022-10-22 NOTE — TELEPHONE ENCOUNTER
Pt calling noticed in the last 24 hours, possibly having bladder infection  was given nitrofurantoin in the past and helped  Shoulders down to hand have tingly feeling when using the bathroom, irritable, had these symptoms in the past when having UTI  Peeing frequently, face feels warm, tired      Triage to see PCP within 24 hours. Care advice given. Pt wants to see if provider will write Rx for antibiotics. Might not be able to wait until Monday. Advise nearest , pt states she does not drive and does not have anyone to take her. Disconnected call before advising page. Called pt back, advise paging on-call. Pt verbalize understanding.  [address], [employer]      Page sent out 240pm to on call provider Chandler Baxter MD, gave verbal order for antibiotic Macrobid. Sent to preferred [facility] pharmacy    Ashley Burnett RN, BSN  10/22/2022 at 2:31 PM  [facility]          Reason for Disposition    Urinating more frequently than usual (i.e., frequency)    Additional Information    Negative: Shock suspected (e.g., cold/pale/clammy skin, too weak to stand, low BP, rapid pulse)    Negative: Sounds like a life-threatening emergency to the triager    Negative: [1] Unable to urinate (or only a few drops) > 4 hours AND [2] bladder feels very full (e.g., palpable bladder or strong urge to urinate)    Negative: [1] Decreased urination and [2] drinking very little AND [2] dehydration suspected (e.g., dark urine, no urine > 12 hours, very dry mouth, very lightheaded)    Negative: Patient sounds very sick or weak to the triager    Negative: Fever > 100.4 F (38.0 C)    Negative: Side (flank) or lower back pain present    Negative: [1] Can't control passage of urine (i.e., urinary incontinence) AND [2] new-onset (< 2 weeks) or worsening    Protocols used: URINARY SYMPTOMS-A-AH

## 2022-10-22 NOTE — TELEPHONE ENCOUNTER
Pt phoned stating that she is returning a call from [facility] - [facility] will call pt     No triage     Gali Escobar RN  [facility]  2:39 PM 10/22/2022

## 2022-10-25 ENCOUNTER — ALLIED HEALTH/NURSE VISIT (OUTPATIENT)
Dept: NURSING | Facility: CLINIC | Age: 77
End: 2022-10-25
Payer: MEDICARE

## 2022-10-25 ENCOUNTER — TELEPHONE (OUTPATIENT)
Dept: INTERNAL MEDICINE | Facility: CLINIC | Age: 77
End: 2022-10-25

## 2022-10-25 DIAGNOSIS — R35.0 URINARY FREQUENCY: Primary | ICD-10-CM

## 2022-10-25 DIAGNOSIS — R30.0 DYSURIA: ICD-10-CM

## 2022-10-25 DIAGNOSIS — R30.0 DYSURIA: Primary | ICD-10-CM

## 2022-10-25 LAB
ALBUMIN UR-MCNC: NEGATIVE MG/DL
APPEARANCE UR: CLEAR
BACTERIA #/AREA URNS HPF: ABNORMAL /HPF
BILIRUB UR QL STRIP: NEGATIVE
COLOR UR AUTO: YELLOW
GLUCOSE UR STRIP-MCNC: NEGATIVE MG/DL
HGB UR QL STRIP: NEGATIVE
KETONES UR STRIP-MCNC: NEGATIVE MG/DL
LEUKOCYTE ESTERASE UR QL STRIP: NEGATIVE
NITRATE UR QL: NEGATIVE
PH UR STRIP: 7 [PH] (ref 5–7)
RBC #/AREA URNS AUTO: ABNORMAL /HPF
SP GR UR STRIP: 1.01 (ref 1–1.03)
SQUAMOUS #/AREA URNS AUTO: ABNORMAL /LPF
UROBILINOGEN UR STRIP-ACNC: 0.2 E.U./DL
WBC #/AREA URNS AUTO: ABNORMAL /HPF

## 2022-10-25 PROCEDURE — 87086 URINE CULTURE/COLONY COUNT: CPT

## 2022-10-25 PROCEDURE — 81001 URINALYSIS AUTO W/SCOPE: CPT

## 2022-10-25 NOTE — LETTER
[place] [address], [address]  [place] [address]  [phone]        October 26, 2022    Cynthia Arita  [address] [address]            Dear Ms. Cynthia Arita:      The results of your recent Urinalysis were NORMAL.  If you have any further questions or problems, please contact our office.    Sincerely,        Huyen Samuels M.D.    Results for orders placed or performed in visit on 10/25/22   UA with Microscopic reflex to Culture - lab collect     Status: Normal    Specimen: Urine, Midstream   Result Value Ref Range    Color Urine Yellow Colorless, Straw, Light Yellow, Yellow    Appearance Urine Clear Clear    Glucose Urine Negative Negative, 1000 , >=2000 mg/dL    Bilirubin Urine Negative Negative    Ketones Urine Negative Negative, 160  mg/dL    Specific Gravity Urine 1.015 1.003 - 1.035    Blood Urine Negative Negative    pH Urine 7.0 5.0 - 7.0    Protein Albumin Urine Negative Negative, 300 , >=2000 mg/dL    Urobilinogen Urine 0.2 0.2, 1.0 E.U./dL    Nitrite Urine Negative Negative    Leukocyte Esterase Urine Negative Negative   Urine Microscopic     Status: Abnormal   Result Value Ref Range    Bacteria Urine Few (A) None Seen /HPF    RBC Urine 0-2 0-2 /HPF /HPF    WBC Urine 0-5 0-5 /HPF /HPF    Squamous Epithelials Urine Few (A) None Seen /LPF    Narrative    Urine Culture not indicated

## 2022-10-25 NOTE — TELEPHONE ENCOUNTER
"Pt calls again. Advised her that UA results are negative.     She was taking the Macrobid but then stopped it.   She is not going to restart since UA is negative. Currently,   is not having any more burning. She did have some \"tingling\" in her arms when she urinates, but this seems better now.     Advised her that she can try OTC Azo or OTC external yeast products if has more vaginal irritation.   She verbalized understanding and was thankful for everyone helping her.         "

## 2022-10-25 NOTE — TELEPHONE ENCOUNTER
Per HUSAM Jennings, patient was aware UA ordered. Per chart review, lab is in process.     Anjelica White RN  [facility]

## 2022-10-25 NOTE — TELEPHONE ENCOUNTER
Patient came to walk in clinic with a full bladder wanting to leave a urine sample.  No order in chart.  Patient was taking Nitrofurantoin and stated have gotten worse. Specimen was not given with this last prescription as patient called triage line and spoke with [facility]..   Stated feels urge to urinate.  Patient wanting to give a urine specimen.      Advised patient she should go to  and patient became tearful and stood up to leave.

## 2022-10-25 NOTE — PROGRESS NOTES
Patient came to walk in clinic to see if she could give a urine specimen.  Patient was given an antibiotic for her symptoms on 10/22/22 and stated her symptoms are now worse.      Spoke with primary care provider and got ok for a UA/UC.  Patient gave specimen and this RN brought it down to the lab.

## 2022-10-25 NOTE — TELEPHONE ENCOUNTER
Patient call wanting results of her UA.  Patient is aware Dr. Samuels has left for the day and would like a covering provider to review and contact her asap.

## 2022-10-27 LAB — BACTERIA UR CULT: NORMAL

## 2022-11-01 ENCOUNTER — TRANSFERRED RECORDS (OUTPATIENT)
Dept: HEALTH INFORMATION MANAGEMENT | Facility: CLINIC | Age: 77
End: 2022-11-01

## 2022-11-07 ENCOUNTER — TELEPHONE (OUTPATIENT)
Dept: INTERNAL MEDICINE | Facility: CLINIC | Age: 77
End: 2022-11-07

## 2022-11-07 DIAGNOSIS — R30.0 DYSURIA: Primary | ICD-10-CM

## 2022-11-07 NOTE — TELEPHONE ENCOUNTER
Reason for call:  Patient reporting a symptom    Symptom or request: feel like someone took a hammer to me, burning sensation in vaginal area    Duration (how long have symptoms been present): on going since the 25th Nurse appointment    Have you been treated for this before? No    Additional comments: Patient was seen by a nurse on 10/25/22 and had lab  Ruling out UTI, but patient states she just not feeling good and is having burning sensation when she uses the bathroom.  Patient doesn't want to use Urgent care, just hoping to get in sooner then 11/28 with Dr. Samuels    Phone Number patient can be reached at:  Home number on file [phone] (home)    Best Time:  anytime    Can we leave a detailed message on this number:  YES    Call taken on 11/7/2022 at 11:18 AM by Aislinn Arnold

## 2022-11-08 NOTE — TELEPHONE ENCOUNTER
Patient informed of recommendation from [place] and that Dr. Samuels is out of the office this week. Patient will wait or Dr. Samuels to review when she returns.    Anjelica White RN  [facility]

## 2022-11-11 ENCOUNTER — TRANSFERRED RECORDS (OUTPATIENT)
Dept: HEALTH INFORMATION MANAGEMENT | Facility: CLINIC | Age: 77
End: 2022-11-11

## 2022-11-14 ENCOUNTER — LAB (OUTPATIENT)
Dept: LAB | Facility: CLINIC | Age: 77
End: 2022-11-14
Payer: MEDICARE

## 2022-11-14 DIAGNOSIS — R30.0 DYSURIA: ICD-10-CM

## 2022-11-14 LAB
ALBUMIN UR-MCNC: NEGATIVE MG/DL
APPEARANCE UR: CLEAR
BACTERIA #/AREA URNS HPF: ABNORMAL /HPF
BILIRUB UR QL STRIP: NEGATIVE
COLOR UR AUTO: YELLOW
GLUCOSE UR STRIP-MCNC: NEGATIVE MG/DL
HGB UR QL STRIP: NEGATIVE
KETONES UR STRIP-MCNC: NEGATIVE MG/DL
LEUKOCYTE ESTERASE UR QL STRIP: NEGATIVE
MUCOUS THREADS #/AREA URNS LPF: PRESENT /LPF
NITRATE UR QL: NEGATIVE
PH UR STRIP: 7 [PH] (ref 5–7)
RBC #/AREA URNS AUTO: ABNORMAL /HPF
SP GR UR STRIP: 1.01 (ref 1–1.03)
UROBILINOGEN UR STRIP-ACNC: 0.2 E.U./DL
WBC #/AREA URNS AUTO: ABNORMAL /HPF

## 2022-11-14 PROCEDURE — 81001 URINALYSIS AUTO W/SCOPE: CPT

## 2022-11-14 PROCEDURE — 87086 URINE CULTURE/COLONY COUNT: CPT

## 2022-11-14 PROCEDURE — 87088 URINE BACTERIA CULTURE: CPT

## 2022-11-14 PROCEDURE — 87186 SC STD MICRODIL/AGAR DIL: CPT

## 2022-11-14 NOTE — TELEPHONE ENCOUNTER
Patient advised of recommendation from Dr. Samuels. Patient states she may wait until tomorrow to do urine test due to the weather.     Anjelica White RN  [facility]

## 2022-11-16 ENCOUNTER — ALLIED HEALTH/NURSE VISIT (OUTPATIENT)
Dept: NURSING | Facility: CLINIC | Age: 77
End: 2022-11-16
Payer: MEDICARE

## 2022-11-16 ENCOUNTER — TELEPHONE (OUTPATIENT)
Dept: INTERNAL MEDICINE | Facility: CLINIC | Age: 77
End: 2022-11-16

## 2022-11-16 DIAGNOSIS — N39.0 URINARY TRACT INFECTION WITHOUT HEMATURIA, SITE UNSPECIFIED: Primary | ICD-10-CM

## 2022-11-16 DIAGNOSIS — Z53.9 DIAGNOSIS FOR ++++ WALK IN CLINIC ++++: Primary | ICD-10-CM

## 2022-11-16 LAB — BACTERIA UR CULT: ABNORMAL

## 2022-11-16 RX ORDER — AMPICILLIN TRIHYDRATE 500 MG
500 CAPSULE ORAL 4 TIMES DAILY
Qty: 20 CAPSULE | Refills: 0 | Status: SHIPPED | OUTPATIENT
Start: 2022-11-16 | End: 2022-11-28

## 2022-11-16 NOTE — TELEPHONE ENCOUNTER
Patient advised of recommendations from [place]. Patient states her dentist started her on Methylprednisolone today and asks if this will interfere with the antibiotic. Informed patient steroid should not interfere with antibiotic.     Anjelica White RN  [facility]

## 2022-11-16 NOTE — TELEPHONE ENCOUNTER
Patient walked in to clinic regarding UA Culture results.     Preliminary shows 50,000-100,000 CFU/ml Enterococcus faecalis    Please advise if treatment is needed.

## 2022-11-16 NOTE — PROGRESS NOTES
"Patient walks in to clinic following 11/14/2022 Urinalysis.     Patient has complaints of dizziness, lightheadedness, and dry mouth. Health hx: meniere's. UA given 10/25 and 11/14. UA for 11/14 shows normal urine. Microscopic shows few bacteria and mucus. Culture has not yet resulted. Patient advised that culture has not resulted yet.    Patient has had chronic complaints of dry mouth. Patient was seen in [facility] 07/21/2022 and given a liter of lactated ringer's inj. Patient was again referred to [facility] on 08/09/2022 for dry mouth and dizziness. Patient did not receive fluids as lab work came back without signs of dehydration. Patient states she felt fine after received \"boost\" in the summer. Patient was seen for same symptoms 3 weeks after infusion.     Patient advised that there were no in office visits and no available appointments at [facility] at that time. Patient stated that she \"cannot continue on this way\". Patient had an appointment with PCP between [facility] visits and did not mention dry mouth. Patient has a future appointment with PCP.     Huddle with PCP. Urine collected at 11/14/22 lab appointment did not show signs of dehydration. PCP does not advise IV hydration at this time. Call placed to patient. Advised of IV hydration message from provider. Advised that patient will be contacted once UA culture has returned.  "

## 2022-11-18 RX ORDER — NITROFURANTOIN 25; 75 MG/1; MG/1
100 CAPSULE ORAL 2 TIMES DAILY
Qty: 14 CAPSULE | Refills: 0 | Status: SHIPPED | OUTPATIENT
Start: 2022-11-18 | End: 2023-01-30

## 2022-11-28 ENCOUNTER — OFFICE VISIT (OUTPATIENT)
Dept: INTERNAL MEDICINE | Facility: CLINIC | Age: 77
End: 2022-11-28
Payer: MEDICARE

## 2022-11-28 VITALS
DIASTOLIC BLOOD PRESSURE: 80 MMHG | OXYGEN SATURATION: 98 % | RESPIRATION RATE: 16 BRPM | SYSTOLIC BLOOD PRESSURE: 140 MMHG | WEIGHT: 134 LBS | BODY MASS INDEX: 23.74 KG/M2 | TEMPERATURE: 97.8 F | HEIGHT: 63 IN | HEART RATE: 72 BPM

## 2022-11-28 DIAGNOSIS — R11.0 NAUSEA: ICD-10-CM

## 2022-11-28 DIAGNOSIS — R42 VERTIGO: Primary | ICD-10-CM

## 2022-11-28 PROCEDURE — 99214 OFFICE O/P EST MOD 30 MIN: CPT | Performed by: INTERNAL MEDICINE

## 2022-11-28 RX ORDER — DIAZEPAM 2 MG
2 TABLET ORAL EVERY 6 HOURS PRN
COMMUNITY
Start: 2022-11-22 | End: 2023-01-30

## 2022-11-28 NOTE — PROGRESS NOTES
Assessment & Plan     Vertigo  Worse most likely due to stopping the meclizine. She will restart and try stopping the Valium.     Nausea  As above reviewed with her that with her inner ear issues she is chronically motion sick. Also reviewed with her that at this point there is no cure. All we can try to do is take the edge off her symptoms.       {Provider  Link to [facility] Help Grid :[id number]}       No follow-ups on file.    Huyen Samuels MD  [facility] KOLBY Marie is a 77 year old, presenting for the following health issues:  Dizziness (Go over results )      HPI     Go over scans and results     She has a long history of vertigo that was due to an inner ear problems . She has had seral surgeries and there is nothing more we can do. She has ongoing issues with nausea only rare vomiting. In the last 2 weeks her symptoms have worsened. After much discussion is comes at that one of her docs stopped her meclizine because of dry mouth and dry skin. Timing is about right when her nausea worsened. The dry skin has gotten better. She was placed on Valium last week which does help her nausea but makes her far too tired. She has been taking 1 mg even though 2 mg was rxed. She would like to try going back on the meclizine and get off the valium.     Review of Systems   Constitutional, HEENT, cardiovascular, pulmonary, gi and gu systems are negative, except as otherwise noted.      Objective    LMP  (LMP Unknown)   There is no height or weight on file to calculate BMI.  Physical Exam   GENERAL: healthy, alert and no distress  PSYCH: memory is slightly poor, affect is frustration.

## 2022-12-02 ENCOUNTER — TELEPHONE (OUTPATIENT)
Dept: INTERNAL MEDICINE | Facility: CLINIC | Age: 77
End: 2022-12-02

## 2022-12-02 DIAGNOSIS — R42 VERTIGO: Primary | ICD-10-CM

## 2022-12-02 NOTE — TELEPHONE ENCOUNTER
S-(situation): Patient c/o of increasingly uncomfortable dry mouth and feelings of dehydration.    B-(background): Patient seen by PCP 11/28, Seen in [facility] 07/22 and 08/22 for complaints of dehydration. Health hx of meniere's disease    A-(assessment): Patient calls requesting IV fluids.    Patient has had persistent complaints of dry mouth and nausea for past year. Patient has walked into IM clinic multiple times asking for IV fluids. Patient was given IV fluids 07/22. Labs pulled at 08/22 [facility] visit did not show signs of dehydration and fluids were not given.     Patient states that they drink water all of the time but also stated that they only drank enough water to take medication this morning. Patient stated that they had yogurt for breakfast. Patient is very persistent that they need IV fluids. Patient specifically requested the clinic in the same building at Dr. Samuels's office ([facility]) as they want to avoid the ER.     Patient has persistent nausea and dizziness related to meniere's. Patient has been evaluated by [facility] and [facility].     R-(recommendations): Please advise on how best to help patient.    Patient states that they have an appointment at 11 but is free this afternoon.

## 2022-12-02 NOTE — TELEPHONE ENCOUNTER
Attempted to contact patient. Left voice message to call back.     Anjelica White RN  [facility]

## 2022-12-05 NOTE — TELEPHONE ENCOUNTER
She is not dehydrated her mouth is simply dry. If she cannot stand it then she should stop the Meclizine and understand her dizziness will get worse.

## 2022-12-05 NOTE — TELEPHONE ENCOUNTER
She did stop the meclizine last week and has tried the biotene. The nausea is the worst part and brings on the dizziness .  She has been drinking water and Gatorade and she has tried Biotene for the dry mouth. Takes 2 Zofran in the morning, in the afternoon, and at night every day.     Please advise    Pavithra HARTMANN RN, BSN

## 2022-12-05 NOTE — TELEPHONE ENCOUNTER
Spoke with patient and she is still feeling as bad as she was today. Do you want to send her to the [facility] for fluids?    Pavithra HARTMANN RN, BSN

## 2022-12-05 NOTE — TELEPHONE ENCOUNTER
I dont know what else to do for her. Pain clinic? Health psychologist. She is not crazy but maybe health psychologist could help her mange the symptoms.

## 2022-12-06 NOTE — TELEPHONE ENCOUNTER
"Patient informed of recommendations. She does not think psychologist would be able to help her. She is trying to get appointment with [facility], but told it's a 3-4 month wait.     She is feeling worse since stopping Meclizine, recommended she try to restart this. Patient asks about scopolamine patches, however, she states it was a little \"spendy.\" She has a script left for this and asks if Dr. Samuels thinks it would be helpful.     Anjelica White RN  [facility]   "

## 2022-12-07 NOTE — TELEPHONE ENCOUNTER
Attempted to contact patient. Left voice message to call back.     Please inform patient that Dr. Samuels said she could try the scopolamine patches to see if they help.     Anjelica White RN  [facility]

## 2022-12-07 NOTE — TELEPHONE ENCOUNTER
Patient returned call and said she already has a script for this and did fill it. It will be enough for two weeks and then no more refills for that scripts. If Dr Samuels wants to send a script too that would be good.   She stopped taking them because she said she didn't know if they helped but is willing to try again.      Patient Best number to call back [phone]

## 2022-12-18 ENCOUNTER — HOSPITAL ENCOUNTER (EMERGENCY)
Facility: CLINIC | Age: 77
Discharge: HOME | End: 2022-12-18
Attending: EMERGENCY MEDICINE | Admitting: EMERGENCY MEDICINE
Payer: MEDICARE

## 2022-12-18 VITALS
SYSTOLIC BLOOD PRESSURE: 150 MMHG | HEIGHT: 64 IN | RESPIRATION RATE: 16 BRPM | HEART RATE: 64 BPM | TEMPERATURE: 97.6 F | BODY MASS INDEX: 23.05 KG/M2 | WEIGHT: 135 LBS | DIASTOLIC BLOOD PRESSURE: 77 MMHG | OXYGEN SATURATION: 99 %

## 2022-12-18 DIAGNOSIS — R68.2 DRY MOUTH: ICD-10-CM

## 2022-12-18 DIAGNOSIS — L85.3 DRY SKIN: ICD-10-CM

## 2022-12-18 LAB
ALBUMIN SERPL BCG-MCNC: 4.4 G/DL (ref 3.5–5.2)
ALBUMIN UR-MCNC: NEGATIVE MG/DL
ALP SERPL-CCNC: 89 U/L (ref 35–104)
ALT SERPL W P-5'-P-CCNC: 19 U/L (ref 10–35)
ANION GAP SERPL CALCULATED.3IONS-SCNC: 10 MMOL/L (ref 7–15)
APPEARANCE UR: CLEAR
AST SERPL W P-5'-P-CCNC: 31 U/L (ref 10–35)
BACTERIA #/AREA URNS HPF: ABNORMAL /HPF
BASOPHILS # BLD AUTO: 0 10E3/UL (ref 0–0.2)
BASOPHILS NFR BLD AUTO: 1 %
BILIRUB SERPL-MCNC: 0.2 MG/DL
BILIRUB UR QL STRIP: NEGATIVE
BUN SERPL-MCNC: 20.8 MG/DL (ref 8–23)
CALCIUM SERPL-MCNC: 9.9 MG/DL (ref 8.8–10.2)
CHLORIDE SERPL-SCNC: 103 MMOL/L (ref 98–107)
COLOR UR AUTO: ABNORMAL
CREAT SERPL-MCNC: 0.54 MG/DL (ref 0.51–0.95)
DEPRECATED HCO3 PLAS-SCNC: 27 MMOL/L (ref 22–29)
EOSINOPHIL # BLD AUTO: 0.2 10E3/UL (ref 0–0.7)
EOSINOPHIL NFR BLD AUTO: 2 %
ERYTHROCYTE [DISTWIDTH] IN BLOOD BY AUTOMATED COUNT: 14.3 % (ref 10–15)
GFR SERPL CREATININE-BSD FRML MDRD: >90 ML/MIN/1.73M2
GLUCOSE BLDC GLUCOMTR-MCNC: 97 MG/DL (ref 70–99)
GLUCOSE SERPL-MCNC: 91 MG/DL (ref 70–99)
GLUCOSE UR STRIP-MCNC: NEGATIVE MG/DL
HCT VFR BLD AUTO: 44 % (ref 35–47)
HGB BLD-MCNC: 13.5 G/DL (ref 11.7–15.7)
HGB UR QL STRIP: NEGATIVE
HOLD SPECIMEN: NORMAL
IMM GRANULOCYTES # BLD: 0 10E3/UL
IMM GRANULOCYTES NFR BLD: 0 %
KETONES UR STRIP-MCNC: NEGATIVE MG/DL
LEUKOCYTE ESTERASE UR QL STRIP: ABNORMAL
LIPASE SERPL-CCNC: 53 U/L (ref 13–60)
LYMPHOCYTES # BLD AUTO: 2.1 10E3/UL (ref 0.8–5.3)
LYMPHOCYTES NFR BLD AUTO: 28 %
MCH RBC QN AUTO: 29.9 PG (ref 26.5–33)
MCHC RBC AUTO-ENTMCNC: 30.7 G/DL (ref 31.5–36.5)
MCV RBC AUTO: 97 FL (ref 78–100)
MONOCYTES # BLD AUTO: 0.6 10E3/UL (ref 0–1.3)
MONOCYTES NFR BLD AUTO: 8 %
NEUTROPHILS # BLD AUTO: 4.5 10E3/UL (ref 1.6–8.3)
NEUTROPHILS NFR BLD AUTO: 61 %
NITRATE UR QL: NEGATIVE
NRBC # BLD AUTO: 0 10E3/UL
NRBC BLD AUTO-RTO: 0 /100
PH UR STRIP: 6 [PH] (ref 5–7)
PLATELET # BLD AUTO: 304 10E3/UL (ref 150–450)
POTASSIUM SERPL-SCNC: 4.7 MMOL/L (ref 3.4–5.3)
PROT SERPL-MCNC: 7 G/DL (ref 6.4–8.3)
RBC # BLD AUTO: 4.52 10E6/UL (ref 3.8–5.2)
RBC URINE: 0 /HPF
SODIUM SERPL-SCNC: 140 MMOL/L (ref 136–145)
SP GR UR STRIP: 1.01 (ref 1–1.03)
SQUAMOUS EPITHELIAL: <1 /HPF
UROBILINOGEN UR STRIP-MCNC: NORMAL MG/DL
WBC # BLD AUTO: 7.3 10E3/UL (ref 4–11)
WBC URINE: 2 /HPF

## 2022-12-18 PROCEDURE — 96361 HYDRATE IV INFUSION ADD-ON: CPT

## 2022-12-18 PROCEDURE — 96374 THER/PROPH/DIAG INJ IV PUSH: CPT

## 2022-12-18 PROCEDURE — 99284 EMERGENCY DEPT VISIT MOD MDM: CPT | Mod: 25

## 2022-12-18 PROCEDURE — 81001 URINALYSIS AUTO W/SCOPE: CPT | Performed by: EMERGENCY MEDICINE

## 2022-12-18 PROCEDURE — 83690 ASSAY OF LIPASE: CPT | Performed by: EMERGENCY MEDICINE

## 2022-12-18 PROCEDURE — 85025 COMPLETE CBC W/AUTO DIFF WBC: CPT | Performed by: EMERGENCY MEDICINE

## 2022-12-18 PROCEDURE — 36415 COLL VENOUS BLD VENIPUNCTURE: CPT | Performed by: EMERGENCY MEDICINE

## 2022-12-18 PROCEDURE — 250N000011 HC RX IP 250 OP 636: Performed by: EMERGENCY MEDICINE

## 2022-12-18 PROCEDURE — 258N000003 HC RX IP 258 OP 636: Performed by: EMERGENCY MEDICINE

## 2022-12-18 PROCEDURE — 80053 COMPREHEN METABOLIC PANEL: CPT | Performed by: EMERGENCY MEDICINE

## 2022-12-18 RX ORDER — ONDANSETRON 2 MG/ML
4 INJECTION INTRAMUSCULAR; INTRAVENOUS EVERY 30 MIN PRN
Status: DISCONTINUED | OUTPATIENT
Start: 2022-12-18 | End: 2022-12-18 | Stop reason: HOSPADM

## 2022-12-18 RX ADMIN — SODIUM CHLORIDE 1000 ML: 9 INJECTION, SOLUTION INTRAVENOUS at 13:30

## 2022-12-18 RX ADMIN — ONDANSETRON 4 MG: 2 INJECTION INTRAMUSCULAR; INTRAVENOUS at 13:33

## 2022-12-18 ASSESSMENT — ENCOUNTER SYMPTOMS
BLOOD IN STOOL: 0
DIARRHEA: 0
COUGH: 1
CONSTIPATION: 0
FREQUENCY: 1
POLYDIPSIA: 1
HEMATURIA: 0
NAUSEA: 1

## 2022-12-18 ASSESSMENT — ACTIVITIES OF DAILY LIVING (ADL): ADLS_ACUITY_SCORE: 33

## 2022-12-18 NOTE — ED TRIAGE NOTES
Pt comes in for dehydration. Pt states her mouth is so dry she can't even eat. Pt has been drinking a lot of water but it is not helping. Pt urinating a little more frequently than usual. Pt also feeling dizzy sometimes and her face feels warm. Symptoms started yesterday. Pt states she had this once before, unsure what caused it then.

## 2022-12-18 NOTE — ED PROVIDER NOTES
History   Chief Complaint:  Dehydration       The history is provided by the patient.      Cynthia Arita is a 77 year old female with history of hypertension and GERD who presents with dehydration. Patient reports feeling dehydrated and nausea for about a week. The symptoms have worsened in the past few days. She shares that she almost choked in her sleep due to the dryness. Also complains of a mild intermittent cough. Nurse reports that patient reports of polydipsia and polyuria. Patient has not had a significant change in her eating pattern, but has been drinking a lot more water. Denies diarrhea, black/bloody stool, constipation, congestion, and hematuria. Denies new medications or changes to current medications. Not diabetic.     Review of Systems   HENT: Negative for congestion.    Respiratory: Positive for cough.    Cardiovascular: Negative for chest pain.   Gastrointestinal: Positive for nausea. Negative for blood in stool, constipation and diarrhea.   Endocrine: Positive for polydipsia and polyuria.   Genitourinary: Positive for frequency. Negative for hematuria.   All other systems reviewed and are negative.    Allergies:  Ativan [Lorazepam]  Gabapentin  Metoprolol  Pantoprazole  Latex  Oxycodone    Medications:  Alprazolam  Calcitonin  Calcium carbonate  Cyanocobalamin  Diazepam  Esomeprazole  Gabapentin  Nitrofurantoin macrocrystal-monohydrate  Ondansetron  Propranolol  Venlafaxine    Past Medical History:     GERD  Osteoporosis  Meniere's disease  Calculus of right kidney  Pneumonia  Anxiety  Hypertension  Tinnitus  S/p total knee arthroplasty  Basal cell carcinoma     Past Surgical History:    Arthroplasty knee  Eye surgery  Vaginal hysterectomy      Family History:    Mother - palsy  Father - esophageal cancer    Social History:  Presents alone.  Presents via private vehicle.  PCP: Huyen Samuels     Physical Exam     Patient Vitals for the past 24 hrs:   BP Temp Temp src Pulse Resp SpO2 Height  "Weight   12/18/22 1455 -- -- -- -- -- 99 % -- --   12/18/22 1450 (!) 150/77 -- -- 64 -- 98 % -- --   12/18/22 1117 (!) 145/78 97.6  F (36.4  C) Oral 65 16 99 % 1.626 m (5' 4\") 61.2 kg (135 lb)       Physical Exam  Vitals and nursing note reviewed.   Constitutional:       General: She is not in acute distress.     Appearance: She is not ill-appearing.   HENT:      Head: Normocephalic and atraumatic.      Right Ear: External ear normal.      Left Ear: External ear normal.      Nose: Nose normal.      Mouth/Throat:      Mouth: Mucous membranes are moist.      Pharynx: Oropharynx is clear.   Eyes:      Extraocular Movements: Extraocular movements intact.      Conjunctiva/sclera: Conjunctivae normal.   Cardiovascular:      Rate and Rhythm: Normal rate and regular rhythm.      Heart sounds: No murmur heard.  Pulmonary:      Effort: Pulmonary effort is normal. No respiratory distress.      Breath sounds: Normal breath sounds. No wheezing, rhonchi or rales.   Abdominal:      General: Abdomen is flat. Bowel sounds are normal. There is no distension.      Palpations: Abdomen is soft.      Tenderness: There is no abdominal tenderness. There is no guarding or rebound.   Musculoskeletal:         General: No deformity or signs of injury.      Cervical back: Normal range of motion and neck supple.   Skin:     General: Skin is warm and dry.      Findings: No rash.   Neurological:      Mental Status: She is alert and oriented to person, place, and time.   Psychiatric:         Mood and Affect: Mood is anxious.         Behavior: Behavior normal.           Emergency Department Course   ECG  ECG results from 08/10/22   EKG 12-lead, tracing only     Value    Systolic Blood Pressure     Diastolic Blood Pressure     Ventricular Rate 61    Atrial Rate 61    NH Interval 158    QRS Duration 86        QTc 446    P Axis 65    R AXIS -52    T Axis 60    Interpretation ECG      Sinus rhythm  Left anterior fascicular block  Abnormal " ECG  When compared with ECG of 26-MAY-2022 18:17,  Premature ventricular complexes are no longer Present  Confirmed by - EMERGENCY ROOM, PHYSICIAN ([id number]),  MITZI FISHER ([id number]) on 8/11/2022 6:36:52 AM       Laboratory:  Labs Ordered and Resulted from Time of ED Arrival to Time of ED Departure   ROUTINE UA WITH MICROSCOPIC REFLEX TO CULTURE - Abnormal       Result Value    Color Urine Light Yellow      Appearance Urine Clear      Glucose Urine Negative      Bilirubin Urine Negative      Ketones Urine Negative      Specific Gravity Urine 1.006      Blood Urine Negative      pH Urine 6.0      Protein Albumin Urine Negative      Urobilinogen Urine Normal      Nitrite Urine Negative      Leukocyte Esterase Urine Small (*)     Bacteria Urine Few (*)     RBC Urine 0      WBC Urine 2      Squamous Epithelials Urine <1     CBC WITH PLATELETS AND DIFFERENTIAL - Abnormal    WBC Count 7.3      RBC Count 4.52      Hemoglobin 13.5      Hematocrit 44.0      MCV 97      MCH 29.9      MCHC 30.7 (*)     RDW 14.3      Platelet Count 304      % Neutrophils 61      % Lymphocytes 28      % Monocytes 8      % Eosinophils 2      % Basophils 1      % Immature Granulocytes 0      NRBCs per 100 WBC 0      Absolute Neutrophils 4.5      Absolute Lymphocytes 2.1      Absolute Monocytes 0.6      Absolute Eosinophils 0.2      Absolute Basophils 0.0      Absolute Immature Granulocytes 0.0      Absolute NRBCs 0.0     GLUCOSE BY METER - Normal    GLUCOSE BY METER POCT 97     COMPREHENSIVE METABOLIC PANEL - Normal    Sodium 140      Potassium 4.7      Chloride 103      Carbon Dioxide (CO2) 27      Anion Gap 10      Urea Nitrogen 20.8      Creatinine 0.54      Calcium 9.9      Glucose 91      Alkaline Phosphatase 89      AST 31      ALT 19      Protein Total 7.0      Albumin 4.4      Bilirubin Total 0.2      GFR Estimate >90     LIPASE - Normal    Lipase 53          Emergency Department Course:     Reviewed:  I reviewed nursing notes, vitals,  past medical history and Care Everywhere    Assessments:  1310 I obtained history and examined the patient as noted above.   1433 I rechecked the patient and explained findings. I am comfortable with discharge.    Interventions:  Medications   0.9% sodium chloride BOLUS (0 mLs Intravenous Stopped 22 1447)     Disposition:  The patient was discharged to home.     Impression & Plan   Medical Decision Makin-year-old female presenting with concerns about dehydration.  She is primarily concerned because she has dry mouth.  She does not appear significantly dehydrated on exam.  There is no history to suggest that she would be dehydrated.  She has been eating and drinking adequately and has not been vomiting or having diarrhea.  Her lab work is unremarkable.  She was given IV fluid bolus.  On review of her medications, she is on meclizine which she takes twice a day.  Is possible that this antihistamine could be causing some dry mouth.  I advised discussing this with her primary care physician whether she should be continue this medication or adjusting the dose.  Otherwise I recommend continued p.o. hydration and follow-up with primary care.      Diagnosis:    ICD-10-CM    1. Dry mouth  R68.2       2. Dry skin  L85.3           Discharge Medications:  Discharge Medication List as of 2022  2:50 PM          Scribe Disclosure:  I, Ene Kaur, am serving as a scribe at 12:54 PM on 2022 to document services personally performed by Rosales Forte MD based on my observations and the provider's statements to me.          Rosales Forte MD  22 [id number]

## 2022-12-19 RX ORDER — SCOLOPAMINE TRANSDERMAL SYSTEM 1 MG/1
1 PATCH, EXTENDED RELEASE TRANSDERMAL
Qty: 10 PATCH | Refills: 3 | Status: SHIPPED | OUTPATIENT
Start: 2022-12-19 | End: 2023-01-30

## 2023-01-21 ENCOUNTER — HOSPITAL ENCOUNTER (EMERGENCY)
Facility: CLINIC | Age: 78
Discharge: HOME | End: 2023-01-21
Attending: EMERGENCY MEDICINE | Admitting: EMERGENCY MEDICINE
Payer: MEDICARE

## 2023-01-21 VITALS
OXYGEN SATURATION: 99 % | DIASTOLIC BLOOD PRESSURE: 89 MMHG | HEART RATE: 73 BPM | RESPIRATION RATE: 17 BRPM | TEMPERATURE: 98.1 F | SYSTOLIC BLOOD PRESSURE: 169 MMHG

## 2023-01-21 DIAGNOSIS — R68.2 DRY MOUTH: Primary | ICD-10-CM

## 2023-01-21 DIAGNOSIS — R30.0 DYSURIA: ICD-10-CM

## 2023-01-21 LAB
ALBUMIN SERPL BCG-MCNC: 4.3 G/DL (ref 3.5–5.2)
ALBUMIN UR-MCNC: NEGATIVE MG/DL
ALP SERPL-CCNC: 75 U/L (ref 35–104)
ALT SERPL W P-5'-P-CCNC: 17 U/L (ref 10–35)
ANION GAP SERPL CALCULATED.3IONS-SCNC: 12 MMOL/L (ref 7–15)
APPEARANCE UR: CLEAR
AST SERPL W P-5'-P-CCNC: 26 U/L (ref 10–35)
BASOPHILS # BLD AUTO: 0 10E3/UL (ref 0–0.2)
BASOPHILS NFR BLD AUTO: 1 %
BILIRUB SERPL-MCNC: 0.3 MG/DL
BILIRUB UR QL STRIP: NEGATIVE
BUN SERPL-MCNC: 17.6 MG/DL (ref 8–23)
CALCIUM SERPL-MCNC: 9.7 MG/DL (ref 8.8–10.2)
CHLORIDE SERPL-SCNC: 103 MMOL/L (ref 98–107)
COLOR UR AUTO: ABNORMAL
CREAT SERPL-MCNC: 0.51 MG/DL (ref 0.51–0.95)
DEPRECATED HCO3 PLAS-SCNC: 25 MMOL/L (ref 22–29)
EOSINOPHIL # BLD AUTO: 0.1 10E3/UL (ref 0–0.7)
EOSINOPHIL NFR BLD AUTO: 2 %
ERYTHROCYTE [DISTWIDTH] IN BLOOD BY AUTOMATED COUNT: 13.1 % (ref 10–15)
GFR SERPL CREATININE-BSD FRML MDRD: >90 ML/MIN/1.73M2
GLUCOSE SERPL-MCNC: 92 MG/DL (ref 70–99)
GLUCOSE UR STRIP-MCNC: NEGATIVE MG/DL
HCT VFR BLD AUTO: 43.3 % (ref 35–47)
HGB BLD-MCNC: 13.7 G/DL (ref 11.7–15.7)
HGB UR QL STRIP: NEGATIVE
IMM GRANULOCYTES # BLD: 0 10E3/UL
IMM GRANULOCYTES NFR BLD: 0 %
KETONES UR STRIP-MCNC: ABNORMAL MG/DL
LEUKOCYTE ESTERASE UR QL STRIP: ABNORMAL
LYMPHOCYTES # BLD AUTO: 2.1 10E3/UL (ref 0.8–5.3)
LYMPHOCYTES NFR BLD AUTO: 31 %
MCH RBC QN AUTO: 29.5 PG (ref 26.5–33)
MCHC RBC AUTO-ENTMCNC: 31.6 G/DL (ref 31.5–36.5)
MCV RBC AUTO: 93 FL (ref 78–100)
MONOCYTES # BLD AUTO: 0.5 10E3/UL (ref 0–1.3)
MONOCYTES NFR BLD AUTO: 7 %
NEUTROPHILS # BLD AUTO: 4 10E3/UL (ref 1.6–8.3)
NEUTROPHILS NFR BLD AUTO: 59 %
NITRATE UR QL: NEGATIVE
NRBC # BLD AUTO: 0 10E3/UL
NRBC BLD AUTO-RTO: 0 /100
PH UR STRIP: 7 [PH] (ref 5–7)
PLATELET # BLD AUTO: 305 10E3/UL (ref 150–450)
POTASSIUM SERPL-SCNC: 3.9 MMOL/L (ref 3.4–5.3)
PROT SERPL-MCNC: 7.1 G/DL (ref 6.4–8.3)
RBC # BLD AUTO: 4.65 10E6/UL (ref 3.8–5.2)
RBC URINE: <1 /HPF
SODIUM SERPL-SCNC: 140 MMOL/L (ref 136–145)
SP GR UR STRIP: 1.01 (ref 1–1.03)
SQUAMOUS EPITHELIAL: <1 /HPF
UROBILINOGEN UR STRIP-MCNC: NORMAL MG/DL
WBC # BLD AUTO: 6.7 10E3/UL (ref 4–11)
WBC URINE: 1 /HPF

## 2023-01-21 PROCEDURE — 80053 COMPREHEN METABOLIC PANEL: CPT | Performed by: EMERGENCY MEDICINE

## 2023-01-21 PROCEDURE — 81001 URINALYSIS AUTO W/SCOPE: CPT | Performed by: STUDENT IN AN ORGANIZED HEALTH CARE EDUCATION/TRAINING PROGRAM

## 2023-01-21 PROCEDURE — 93005 ELECTROCARDIOGRAM TRACING: CPT

## 2023-01-21 PROCEDURE — 85025 COMPLETE CBC W/AUTO DIFF WBC: CPT | Performed by: EMERGENCY MEDICINE

## 2023-01-21 PROCEDURE — 258N000003 HC RX IP 258 OP 636: Performed by: EMERGENCY MEDICINE

## 2023-01-21 PROCEDURE — 96360 HYDRATION IV INFUSION INIT: CPT

## 2023-01-21 PROCEDURE — 36415 COLL VENOUS BLD VENIPUNCTURE: CPT | Performed by: EMERGENCY MEDICINE

## 2023-01-21 PROCEDURE — 99284 EMERGENCY DEPT VISIT MOD MDM: CPT | Mod: 25

## 2023-01-21 RX ADMIN — SODIUM CHLORIDE 1000 ML: 9 INJECTION, SOLUTION INTRAVENOUS at 16:40

## 2023-01-21 ASSESSMENT — ACTIVITIES OF DAILY LIVING (ADL): ADLS_ACUITY_SCORE: 35

## 2023-01-21 NOTE — ED PROVIDER NOTES
"    History     Chief Complaint:  Dehydration       HPI   Cynthia Arita is a 77 year old female who presents with dehydration x1 day. States she feels dry and dizziness/lightheadedness. Says she has had this in the past and has needed IVF to feel better. Denies fever, chest pain. Has had some associated SOB and nausea. Reports some dysuria as \"burning\", no hematuria. States she has dry mouth, dry eyes frequently.    Independent Historian: ANDRE  Review of External Notes: ED visit, 12/18/22,     Allergies:  Ativan [Lorazepam]  Dicyclomine  Gabapentin  Metoprolol  Pantoprazole  Tramadol  Latex  Oxycodone     Medications:    acetaminophen (TYLENOL) 500 MG tablet  ALPRAZolam (XANAX) 0.5 MG tablet  calcitonin, salmon, (MIACALCIN) 200 UNIT/ACT nasal spray  calcium carbonate (TUMS) 500 MG chewable tablet  Cyanocobalamin (B-12 PO)  diazepam (VALIUM) 2 MG tablet  esomeprazole (NEXIUM) 40 MG DR capsule  Flaxseed, Linseed, (FLAX SEED OIL) 1000 MG capsule  gabapentin (NEURONTIN) 100 MG capsule  meclizine (ANTIVERT) 25 MG tablet  Misc Natural Products (GLUCOSAMINE CHOND COMPLEX/MSM PO)  Multiple Vitamins-Minerals (EYE-VITES) TABS  Multiple Vitamins-Minerals (OCUVITE PRESERVISION PO)  Multiple Vitamins-Minerals (PRESERVISION AREDS 2) CAPS  nitroFURantoin macrocrystal-monohydrate (MACROBID) 100 MG capsule  ondansetron (ZOFRAN ODT) 4 MG ODT tab  propranolol (INDERAL) 10 MG tablet  scopolamine (TRANSDERM) 1 MG/3DAYS 72 hr patch  scopolamine (TRANSDERM) 1 MG/3DAYS 72 hr patch  UNABLE TO FIND  venlafaxine (EFFEXOR XR) 37.5 MG 24 hr capsule  vitamin D3 (CHOLECALCIFEROL) 50 mcg (2000 units) tablet        Past Medical History:    Past Medical History:   Diagnosis Date     Anxiety      Basal cell carcinoma      Complication of anesthesia      Diverticulosis      GERD (gastroesophageal reflux disease)      HTN (hypertension)      Meniere's disease      Nephrolithiasis      Pain in right knee      PONV (postoperative nausea and vomiting)  "       Past Surgical History:    Past Surgical History:   Procedure Laterality Date     ARTHROPLASTY KNEE Right 1/21/2019    Procedure: Right total knee arthroplasty;  Surgeon: Denton Amor MD;  Location: [facility] OR     EYE SURGERY      cataract surgery both eyes     ZZC VAGINAL HYSTERECTOMY      with left oophorectomy        Family History:    family history includes Bipolar Disorder in her sister; Brain Cancer (age of onset: 17) in her son; Cancer in her maternal grandmother; Diabetes in her paternal grandmother; Esophageal Cancer in her father; Hypertension in her brother; Neurologic Disorder in her mother and sister.    Social History:   reports that she has never smoked. She has never used smokeless tobacco. She reports current alcohol use. She reports that she does not use drugs.  PCP: Huyen Samuels     Physical Exam     Patient Vitals for the past 24 hrs:   BP Temp Temp src Pulse Resp SpO2   01/21/23 1817 (!) 169/89 -- -- -- -- 99 %   01/21/23 1559 (!) 190/98 98.1  F (36.7  C) Temporal 73 17 99 %        Physical Exam  Constitutional:       Appearance: Normal appearance.   HENT:      Mouth/Throat:      Mouth: Mucous membranes are moist.   Eyes:      Extraocular Movements: Extraocular movements intact.   Cardiovascular:      Rate and Rhythm: Normal rate and regular rhythm.      Heart sounds: Normal heart sounds.   Pulmonary:      Effort: Pulmonary effort is normal. No respiratory distress.      Breath sounds: Normal breath sounds.   Abdominal:      General: Abdomen is flat. Bowel sounds are normal.      Palpations: Abdomen is soft.   Musculoskeletal:         General: Normal range of motion.      Cervical back: Normal range of motion.   Skin:     General: Skin is warm and dry.      Coloration: Skin is not pale.   Neurological:      Mental Status: She is alert and oriented to person, place, and time.   Psychiatric:         Mood and Affect: Mood normal.         Behavior: Behavior normal.         Thought  Content: Thought content normal.         Judgment: Judgment normal.            Emergency Department Course   ECG:  ECG results from 01/21/23   EKG 12-lead, tracing only     Value    Systolic Blood Pressure     Diastolic Blood Pressure     Ventricular Rate 69    Atrial Rate 69    CT Interval 162    QRS Duration 84        QTc 465    P Axis 35    R AXIS -36    T Axis 5    Interpretation ECG      Sinus rhythm  Left axis deviation  Nonspecific ST abnormality  Abnormal ECG  When compared with ECG of 10-AUG-2022 14:37,  No significant change was found         Imaging:  No orders to display        Laboratory:  Labs Ordered and Resulted from Time of ED Arrival to Time of ED Departure   ROUTINE UA WITH MICROSCOPIC REFLEX TO CULTURE - Abnormal       Result Value    Color Urine Straw      Appearance Urine Clear      Glucose Urine Negative      Bilirubin Urine Negative      Ketones Urine Trace (*)     Specific Gravity Urine 1.006      Blood Urine Negative      pH Urine 7.0      Protein Albumin Urine Negative      Urobilinogen Urine Normal      Nitrite Urine Negative      Leukocyte Esterase Urine Trace (*)     RBC Urine <1      WBC Urine 1      Squamous Epithelials Urine <1     COMPREHENSIVE METABOLIC PANEL - Normal    Sodium 140      Potassium 3.9      Chloride 103      Carbon Dioxide (CO2) 25      Anion Gap 12      Urea Nitrogen 17.6      Creatinine 0.51      Calcium 9.7      Glucose 92      Alkaline Phosphatase 75      AST 26      ALT 17      Protein Total 7.1      Albumin 4.3      Bilirubin Total 0.3      GFR Estimate >90     CBC WITH PLATELETS AND DIFFERENTIAL    WBC Count 6.7      RBC Count 4.65      Hemoglobin 13.7      Hematocrit 43.3      MCV 93      MCH 29.5      MCHC 31.6      RDW 13.1      Platelet Count 305      % Neutrophils 59      % Lymphocytes 31      % Monocytes 7      % Eosinophils 2      % Basophils 1      % Immature Granulocytes 0      NRBCs per 100 WBC 0      Absolute Neutrophils 4.0      Absolute  "Lymphocytes 2.1      Absolute Monocytes 0.5      Absolute Eosinophils 0.1      Absolute Basophils 0.0      Absolute Immature Granulocytes 0.0      Absolute NRBCs 0.0          Procedures   NA    Emergency Department Course & Assessments:             Interventions:  Medications   0.9% sodium chloride BOLUS (0 mLs Intravenous Stopped 1/21/23 1751)        Independent Interpretation (X-rays, CTs, rhythm strip):  NA    Consultations/Discussion of Management or Tests:    ED Course as of 01/21/23 1843   Sat Jan 21, 2023   1817 Evaluated patient after 1L NS bolus, feeling much better. Ordered UA with symptom of dysuria.   1842 Spoke with patient about UA and follow ups after discharge.       Social Determinants of Health affecting care:  NA    Disposition:  The patient was discharged to home.     Impression & Plan      Medical Decision Making:  Cynthia Arita is a  77 year old she with a history of reflux, meniere's, HTN, tinnitus, who presents with feeling \"dry\" and mild burning with urination. The patient appears well and nontoxic. There is no clinical evidence of dehydration, though patient reports feeling much improved after 1L NS. There is no evidence of any respiratory distress. The patient has normal oxygen saturations with normal work of breathing.  Patient has lack of tachycardia, tachypnea or respiratory distress, fevers, no hypoxia, rales or wheezing. I discussed preventative hydration to avoid these instances.  I discussed the need to return for signs of loss of consciousness, nausea/vomiting, respiratory distress, significant shortness of breath, confusion, high fevers or for any other questions or concerns. Primary clinic follow up in 1 week is recommended for persistent symptoms.      Critical Care time:  was 0 minutes for this patient excluding procedures.    Diagnosis:    ICD-10-CM    1. Dry mouth  R68.2       2. Dysuria  R30.0            Discharge Medications:  New Prescriptions    No medications on file    "     Resident/Scribe Disclosure:  I, Salima Thayer DO, am serving as a resident/scribe at 5:55 PM on 1/21/2023 to document services personally performed by myself and/or Munir Cavanaugh MD based on my observations and the provider's statements to me.              Salima Thayer DO  Resident  01/21/23 1843

## 2023-01-21 NOTE — ED NOTES
Rapid Assessment Note    History:   Cynthia Arita is a 77 year old female who presents with dehydration since earlier today. The patient reports that she feels extremely dry and complains of dizziness/light-headedness. She says that this has happened multiple times in the past, noting that she has needed IV fluids in order to feel better. She denies fever, chest pain. She also reports some shortness of breath and nausea. She denies dysuria or hematuria.     Exam:   General:  Alert, interactive  Cardiovascular:  Well perfused  Lungs:  No respiratory distress, no accessory muscle use  Neuro:  Moving all 4 extremities  Skin:  Warm, dry      Plan of Care:   I evaluated the patient and developed an initial plan of care. I discussed this plan and explained that I, or one of my partners, would be returning to complete the evaluation.         I, Pasquale Rodriguez, am serving as a scribe to document services personally performed by Hussein Siddiqi MD , based on my observations and the provider's statements to me.    1/21/2023  [employer] [facility]    Portions of this medical record were completed by a scribe. UPON MY REVIEW AND AUTHENTICATION BY ELECTRONIC SIGNATURE, this confirms (a) I performed the applicable clinical services, and (b) the record is accurate.        Hussein Siddiqi MD  01/21/23 2892

## 2023-01-21 NOTE — ED TRIAGE NOTES
"Pt complains of dehydration since earlier today. Pt complains of dizziness, and lightheadedness. Pt states \"I'm putting everything in my mouth to cause moisture & it is just so dry.\" Pt complains of SOB. Pt states she is frequently urinating due to drinking so much. Pt is not a diabetic.      Triage Assessment     Row Name 01/21/23 4939       Triage Assessment (Adult)    Airway WDL WDL       Respiratory WDL    Respiratory WDL WDL       Skin Circulation/Temperature WDL    Skin Circulation/Temperature WDL WDL       Cardiac WDL    Cardiac WDL WDL       Peripheral/Neurovascular WDL    Peripheral Neurovascular WDL WDL       Cognitive/Neuro/Behavioral WDL    Cognitive/Neuro/Behavioral WDL WDL              "

## 2023-01-22 NOTE — ED PROVIDER NOTES
"Emergency Department Attending Supervision Note  1/21/2023  6:29 PM      I evaluated this patient in conjunction with Resident Physician, Salima Thayer DO.     Briefly, the patient presented with dehydration. The patent says she feels very dry and light-headed, with associated shortness of breath and nausea. She has been drinking lots of water, but feels like it is \"going right through\" her since she is going to the bathroom so frequently, but remains feeling very dry. This has happened before in the past, and has required IV fluids. Endorses some urinary urgency during this time. Denies any fever, chest pain, or hematuria.     On my exam,   General: Sitting on the ED bed, no distress  HEENT: Normocephalic, atraumatic, moist mucous membranes  Cardiac: Radial pulses 2+, regular rate and rhythm  Pulm: Breathing comfortably, no accessory muscle usage, no conversational dyspnea, and lungs clear bilaterally  GI: Abdomen soft, nontender, no rigidity or guarding  MSK: No deformities  Skin: Warm and dry  Neuro: Moves all extremities  Psych: Normal mood and affect     Results: No acute kidney injury, no acute electrolyte disturbance, CBC within normal range, urinalysis without signs of UTI    EKG normal sinus rhythm with a rate of 69 bpm, left axis, normal intervals, no acute ST-T changes.  When compared to EKG dated 8/10/2022, no significant change.    MDM  77-year-old female presents with symptoms of dehydration as above.  No signs of acute dehydration on exam or lab work here.  She was given an IV fluid bolus with resolution of her symptoms consistent with her multiple prior episodes.  No indication for further labs or imaging or admission here.  Plan at this time to discharge home with PCP follow-up, also recommending further testing/evaluation for the underlying etiology of the patient's recurrent episodes above over the last several years.      Diagnosis    ICD-10-CM    1. Dry mouth  R68.2       2. Dysuria  R30.0     "     Munir Cavanaugh MD King, Colin, MD  01/21/23 2111       Munir Cavanaugh MD  01/21/23 2206

## 2023-01-22 NOTE — DISCHARGE INSTRUCTIONS
When to seek medical advice  Call your healthcare provider right away if any of these occur:   Continued vomiting  Frequent diarrhea (more than 5 times a day); blood (red or black color) or mucus in diarrhea  Swollen abdomen or increasing abdominal pain  Reduced urine output or extreme thirst  Fever of 100.4 F (38 C) or higher  Call 911  Call 911 or get medical care right away if you have any of the following:   Weakness, dizziness, or fainting  Unusual drowsiness or confusion  Blood in vomit or stool    Home IV services: [email or web address]

## 2023-01-23 ENCOUNTER — TELEPHONE (OUTPATIENT)
Dept: INTERNAL MEDICINE | Facility: CLINIC | Age: 78
End: 2023-01-23
Payer: MEDICARE

## 2023-01-23 LAB
ATRIAL RATE - MUSE: 69 BPM
DIASTOLIC BLOOD PRESSURE - MUSE: NORMAL MMHG
INTERPRETATION ECG - MUSE: NORMAL
P AXIS - MUSE: 35 DEGREES
PR INTERVAL - MUSE: 162 MS
QRS DURATION - MUSE: 84 MS
QT - MUSE: 434 MS
QTC - MUSE: 465 MS
R AXIS - MUSE: -36 DEGREES
SYSTOLIC BLOOD PRESSURE - MUSE: NORMAL MMHG
T AXIS - MUSE: 5 DEGREES
VENTRICULAR RATE- MUSE: 69 BPM

## 2023-01-23 NOTE — TELEPHONE ENCOUNTER
Called patient and informed her Dr. Samuels is not able to see her. Scheduled follow-up appointment with another provider.   Appointments in Next Year    Jan 30, 2023  1:00 PM  (Arrive by 12:45 PM)  ED/Hospital Follow Up with Fernando Hutchison NP  [facility] ([facility] ) [phone]         Anjelica White RN  [facility]

## 2023-01-23 NOTE — TELEPHONE ENCOUNTER
Patient needs a ED Follow up. She was dehydrated and was given fluids. Patient has called a ENT and waiting to hear back. Patient also is very dizzy. Please advise. Ok to call and sandi [phone]

## 2023-01-30 ENCOUNTER — OFFICE VISIT (OUTPATIENT)
Dept: INTERNAL MEDICINE | Facility: CLINIC | Age: 78
End: 2023-01-30
Payer: MEDICARE

## 2023-01-30 ENCOUNTER — TELEPHONE (OUTPATIENT)
Dept: INTERNAL MEDICINE | Facility: CLINIC | Age: 78
End: 2023-01-30

## 2023-01-30 VITALS
WEIGHT: 134 LBS | DIASTOLIC BLOOD PRESSURE: 82 MMHG | OXYGEN SATURATION: 98 % | SYSTOLIC BLOOD PRESSURE: 155 MMHG | BODY MASS INDEX: 22.88 KG/M2 | HEART RATE: 70 BPM | TEMPERATURE: 98.1 F | HEIGHT: 64 IN | RESPIRATION RATE: 18 BRPM

## 2023-01-30 DIAGNOSIS — K11.7 XEROSTOMIA: Primary | ICD-10-CM

## 2023-01-30 DIAGNOSIS — R35.0 URINARY FREQUENCY: ICD-10-CM

## 2023-01-30 LAB
ANION GAP SERPL CALCULATED.3IONS-SCNC: 12 MMOL/L (ref 7–15)
BUN SERPL-MCNC: 17.4 MG/DL (ref 8–23)
CALCIUM SERPL-MCNC: 9.9 MG/DL (ref 8.8–10.2)
CHLORIDE SERPL-SCNC: 104 MMOL/L (ref 98–107)
CREAT SERPL-MCNC: 0.52 MG/DL (ref 0.51–0.95)
DEPRECATED HCO3 PLAS-SCNC: 24 MMOL/L (ref 22–29)
GFR SERPL CREATININE-BSD FRML MDRD: >90 ML/MIN/1.73M2
GLUCOSE SERPL-MCNC: 102 MG/DL (ref 70–99)
POTASSIUM SERPL-SCNC: 4.6 MMOL/L (ref 3.4–5.3)
SODIUM SERPL-SCNC: 140 MMOL/L (ref 136–145)

## 2023-01-30 PROCEDURE — 80048 BASIC METABOLIC PNL TOTAL CA: CPT

## 2023-01-30 PROCEDURE — 36415 COLL VENOUS BLD VENIPUNCTURE: CPT

## 2023-01-30 PROCEDURE — 99213 OFFICE O/P EST LOW 20 MIN: CPT

## 2023-01-30 ASSESSMENT — PATIENT HEALTH QUESTIONNAIRE - PHQ9
10. IF YOU CHECKED OFF ANY PROBLEMS, HOW DIFFICULT HAVE THESE PROBLEMS MADE IT FOR YOU TO DO YOUR WORK, TAKE CARE OF THINGS AT HOME, OR GET ALONG WITH OTHER PEOPLE: NOT DIFFICULT AT ALL
SUM OF ALL RESPONSES TO PHQ QUESTIONS 1-9: 3
SUM OF ALL RESPONSES TO PHQ QUESTIONS 1-9: 3

## 2023-01-30 NOTE — TELEPHONE ENCOUNTER
Please call patient and let her know that there was miscommunication with urine specimen and it was dumped out. The order is in if she would like to check for UTI in clinic.

## 2023-01-30 NOTE — PROGRESS NOTES
"  Assessment & Plan     Xerostomia  Advised patient that it may be related to scopolamine patches.  She has elected to take scopolamine patch, and developed nausea as it comes.  Advised that the time she is having return she is dehydrated.  If she does feel that she is dehydrated she can return to the clinic for potential ADS evaluation.  - Basic metabolic panel  (Ca, Cl, CO2, Creat, Gluc, K, Na, BUN); Future  - Adult ENT  Referral; Future  - Basic metabolic panel  (Ca, Cl, CO2, Creat, Gluc, K, Na, BUN)    Urinary frequency  Urine sample was not collected in clinic. Telephone encounter placed that will ask patient to come back to check for infection  - UA with Microscopic reflex to Culture - lab collect; Future  - Urine Culture Aerobic Bacterial - lab collect; Future  - UA with Microscopic reflex to Culture; Future  - Urine Culture; Future       MED REC REQUIRED  Post Medication Reconciliation Status:  Discharge medications reconciled and changed, see notes/orders        Return if symptoms worsen or fail to improve.    [facility]ernando Hutchison NP  [facility] is [facility] 77 [facility], presenting for the following health issues:  ER F/U (dehydration)      HPI     ED/UC Followup:    Facility:  [facility]  Date of visit: 1/21/23  Reason for visit: dehydration  Current Status: about the same    Patient has chronic dry mouth- she has seen ENT for vertigo- she has stopped taking meclizine due to vertigo    Patient has had scopolamine patch and that has side effect of dry mouth- has been using scopolamine     Review of Systems   Constitutional, HEENT, cardiovascular, pulmonary, GI, , musculoskeletal, neuro, skin, endocrine and psych systems are negative, except as otherwise noted.      Objective    BP (!) 155/82   Pulse 70   Temp 98.1  F (36.7  C)   Resp 18   Ht 1.626 m (5' 4\")   Wt 60.8 kg (134 lb)   LMP  (LMP Unknown)   SpO2 98%   Breastfeeding No   BMI 23.00 kg/m    Body " mass index is 23 kg/m .  Physical Exam   GENERAL: alert and no distress  EYES: Eyes grossly normal to inspection, PERRL and conjunctivae and sclerae normal  HENT: ear canals and TM's normal, nose and mouth without ulcers or lesions  NECK: no adenopathy, no asymmetry, masses, or scars and thyroid normal to palpation  RESP: lungs clear to auscultation - no rales, rhonchi or wheezes  CV: regular rate and rhythm, normal S1 S2, no S3 or S4, no murmur, click or rub, no peripheral edema and peripheral pulses strong  ABDOMEN: soft, nontender, no hepatosplenomegaly, no masses and bowel sounds normal  MS: no gross musculoskeletal defects noted, no edema  SKIN: no suspicious lesions or rashes  NEURO: Normal strength and tone, mentation intact and speech normal  PSYCH: mentation appears normal, affect normal/bright      Answers for HPI/ROS submitted by the patient on 1/30/2023  If you checked off any problems, how difficult have these problems made it for you to do your work, take care of things at home, or get along with other people?: Not difficult at all  PHQ9 TOTAL SCORE: 3       negative...

## 2023-01-30 NOTE — PATIENT INSTRUCTIONS
Stopped using scopolamine patch as this can contribute to dry mouth.    ENT consult is in for dry mouth.     If feeling very dehydrated contact clinic to see if we are able to do IV fluids

## 2023-01-31 ENCOUNTER — LAB (OUTPATIENT)
Dept: LAB | Facility: CLINIC | Age: 78
End: 2023-01-31
Payer: MEDICARE

## 2023-01-31 DIAGNOSIS — R35.0 URINARY FREQUENCY: ICD-10-CM

## 2023-01-31 LAB
ALBUMIN UR-MCNC: NEGATIVE MG/DL
APPEARANCE UR: ABNORMAL
BACTERIA #/AREA URNS HPF: ABNORMAL /HPF
BILIRUB UR QL STRIP: NEGATIVE
COLOR UR AUTO: YELLOW
GLUCOSE UR STRIP-MCNC: NEGATIVE MG/DL
HGB UR QL STRIP: NEGATIVE
KETONES UR STRIP-MCNC: NEGATIVE MG/DL
LEUKOCYTE ESTERASE UR QL STRIP: ABNORMAL
NITRATE UR QL: NEGATIVE
PH UR STRIP: 7 [PH] (ref 5–7)
RBC #/AREA URNS AUTO: ABNORMAL /HPF
SP GR UR STRIP: 1.02 (ref 1–1.03)
SQUAMOUS #/AREA URNS AUTO: ABNORMAL /LPF
UROBILINOGEN UR STRIP-ACNC: 0.2 E.U./DL
WBC #/AREA URNS AUTO: ABNORMAL /HPF

## 2023-01-31 PROCEDURE — 87086 URINE CULTURE/COLONY COUNT: CPT

## 2023-01-31 PROCEDURE — 81001 URINALYSIS AUTO W/SCOPE: CPT

## 2023-01-31 NOTE — TELEPHONE ENCOUNTER
We did discuss that stopping scopolamine would like increase her nausea but at the appointment that was a compromise that she was willing to make.     One option is that if she uses [facility] than the zofran would be more affordable- she stated that she has seen GI previously, but I do not see notes from them, would she like a referral?    Her labs did not show signs of dehydration    Fernando Hutchison NP

## 2023-01-31 NOTE — TELEPHONE ENCOUNTER
"Called and informed patient that urine specimen was dumped due to a miscommunication. Order was placed for her to provide new sample.     Patient states she is very nauseated today with dry mouth. She stopped scopolamine patches due to dry mouth. Patient states she was not given any new medications at appointment and asks if there is anything that can be done to help.     Patient has prescription for ondansetron, but not covered by insurance and [place] and appeal for coverage were denied. Per 12/19/22 telephone encounter, Dr. Samuels stated, \"She can pay for it out of pocket or we can try Compazine or phenergan.\"    Anjelica White RN  [facility]   "

## 2023-01-31 NOTE — TELEPHONE ENCOUNTER
Patient informed of recommendations from Fernando. She wants to think about options for nausea medication and will notify our office of her decision. She asks about urine test, informed patient the UA was abnormal, culture pending to check for UTI.     Anjelica White RN  [facility]

## 2023-02-01 ENCOUNTER — TELEPHONE (OUTPATIENT)
Dept: INTERNAL MEDICINE | Facility: CLINIC | Age: 78
End: 2023-02-01
Payer: MEDICARE

## 2023-02-01 NOTE — TELEPHONE ENCOUNTER
Patient Returning Call    Reason for call:  Future appt    Information relayed to patient:  Informed patient message would be sent for a call back    Patient has additional questions:  No    What are your questions/concerns:  Patient stated she was going to be contacted to schedule an appt- unknown what type of appt. Please return patient call     Okay to leave a detailed message?: Yes at Home number on file [phone] (home)

## 2023-02-02 ENCOUNTER — TELEPHONE (OUTPATIENT)
Dept: INTERNAL MEDICINE | Facility: CLINIC | Age: 78
End: 2023-02-02
Payer: MEDICARE

## 2023-02-02 DIAGNOSIS — R11.0 NAUSEA: Primary | ICD-10-CM

## 2023-02-02 LAB — BACTERIA UR CULT: NORMAL

## 2023-02-02 NOTE — TELEPHONE ENCOUNTER
Patient was called and reminded the appointment is with ENT for her dry mouth she talked about at her 1- appointment. She was also provided with the number to ENT referral dept.

## 2023-02-02 NOTE — TELEPHONE ENCOUNTER
"Call received from patient stating she saw Fernando on 1/30/23 and was advised to call back if symptoms did not improve. States this morning she has extreme nausea and dry mouth. Patient also reports dizziness and lightheadedness. Patient found some Promethazine 25 mg at home and has tried this with no relief. Patient does use Zofran that is somewhat effective. Patient takes 2 Zofran every 6-7 hours. Patient has previously been on Meclizine but this gives her dry mouth.    Patient is no longer using the scopolamine patches. Patient says the issue with Zofran not being covered by insurance was \"taken care of\". Patient has not scheduled ENT appointment. Number given to patient to call to schedule this appointment.     Patient states she used to have a gastroenterology provider but that provider retired. When asked if she would like a referral for GI patient states she doesn't know what to do, she just wants to feel better.     States one of the last times she had an IV infusion they put something \"extra\" in the IV to \"perk her up\" and it really worked. Patient states she discussed this with Fernando.   "

## 2023-02-03 NOTE — TELEPHONE ENCOUNTER
"During chart review the \"pick me up\" that they gave her was IV Zofran which she has been taking orally.     I put in a GI referral- if she wanted to go somewhere besides [place] that is alright with me just let me know so I can adjust the referral.     She is still able to use scopolamine to help with dizziness, and nausea but she should be aware dry mouth is a side effect.    Fernando Hutchison, ESTEVAN    "

## 2023-02-03 NOTE — TELEPHONE ENCOUNTER
Again, scopolamine would likely help but I do not have anything else.    Message from Panfilo YO states that she is already established for [facility] and that she should see them. She shouldn't need a referral, but can let me know if she does.     Fernando Hutchison, NP

## 2023-02-03 NOTE — TELEPHONE ENCOUNTER
Called patient and relayed message from provider.  Patient verbalized understanding.      Patient wondering what else she can do for her nausea and dizziness.

## 2023-02-03 NOTE — TELEPHONE ENCOUNTER
Called patient and relayed message.  Patient will try the scopalamine patch.  Gave her the number to call [facility] to get scheduled.

## 2023-02-09 ENCOUNTER — TRANSFERRED RECORDS (OUTPATIENT)
Dept: HEALTH INFORMATION MANAGEMENT | Facility: CLINIC | Age: 78
End: 2023-02-09
Payer: MEDICARE

## 2023-02-09 NOTE — TELEPHONE ENCOUNTER
Patient calling and she has no memory of talking with the RN or of having a ENT appt. Patient states she has been trying to call to schedule an appt but no one answers. Patient asking for a different referral for ENT where she can call and schedule an appt  Ok to call and  [phone]

## 2023-02-10 NOTE — TELEPHONE ENCOUNTER
Called patient, asked what phone number we gave her to call. Patient not at home and does not have the information with her. Provided patient phone number for [facility] - [phone] and advised her to call them for appointment. Patient verbalizes understanding.    Anjelica White RN  [facility]

## 2023-02-21 ENCOUNTER — TELEPHONE (OUTPATIENT)
Dept: INTERNAL MEDICINE | Facility: CLINIC | Age: 78
End: 2023-02-21
Payer: MEDICARE

## 2023-02-21 NOTE — TELEPHONE ENCOUNTER
Patient is calling because she had lab work done at [facility] and she cannot get on her computer to get the results. When she called [facility], they told her to call her primary.

## 2023-02-21 NOTE — TELEPHONE ENCOUNTER
We did receive records from 2/9/23 visit with [facility].   Called patient, she states that she never received results from labs drawn at [facility] and they told her to speak to her PCP. Informed patient that [facility] should be relaying the results to her. They did send information to our office regarding results and this should have been mailed to her as well. Informed patient that according to their letter the only abnormal lab was Zinc, but unlikely to be cause of symptoms. They recommended if she was taking Zinc to stop this.    Anjelica White RN  [facility]

## 2023-03-07 ENCOUNTER — TELEPHONE (OUTPATIENT)
Dept: INTERNAL MEDICINE | Facility: CLINIC | Age: 78
End: 2023-03-07
Payer: MEDICARE

## 2023-03-07 DIAGNOSIS — K11.7 XEROSTOMIA: Primary | ICD-10-CM

## 2023-03-07 NOTE — TELEPHONE ENCOUNTER
"PT thinks she is dehydrated. She is urinating regularly. She drinks a lot of water and eating well.   This is a chronic issue with her. She saw Fernando in January for this and saw Dr Samuels in the past. She has severe dry mouth.   Feels dry, and nauseated and dizzy. Coughs up clear mucus in the AM. .   She has been to the ED for this twice, in December and in January.   She stopped the patch for nausea. Didn't change the dryness.  She tried the lemon drops. She uses Biotene dry mouth lozenges.   She saw GI, but she states they just did lab work and didn't do anything for her.     She is asking about IV fluids. She states this helped her a long time ago.     January OV notes from Fernando states:  \"If she does feel that she is dehydrated she can return to the clinic for potential ADS evaluation.\"    Please advise if needs OV? Or if can go to ADS?     She is OK waiting for Fernando tomorrow, Wednesday.       "

## 2023-03-08 ENCOUNTER — TRANSCRIBE ORDERS (OUTPATIENT)
Dept: OTHER | Age: 78
End: 2023-03-08

## 2023-03-08 ENCOUNTER — TELEPHONE (OUTPATIENT)
Dept: OTOLARYNGOLOGY | Facility: CLINIC | Age: 78
End: 2023-03-08
Payer: MEDICARE

## 2023-03-08 DIAGNOSIS — R26.89 BALANCE PROBLEM: Primary | ICD-10-CM

## 2023-03-08 DIAGNOSIS — R42 DIZZINESS: ICD-10-CM

## 2023-03-08 DIAGNOSIS — R11.0 NAUSEA: ICD-10-CM

## 2023-03-08 DIAGNOSIS — R68.2 DRY MOUTH: ICD-10-CM

## 2023-03-08 NOTE — TELEPHONE ENCOUNTER
The most recent lab work we did was normal and I do not recommend IV fluids unless she has clincal dehydration (most recent labs do not show this). I can place more lab orders to make sure she is not dehydrated that she can get done but if she is not dehydrated than I do not recommend ADS.    I did place a referral for ENT at our last appointment which would be the next step for her as far as dry mouth symptoms. I do not know what else I can do for her and recommend ENT.     Fernando Hutchison NP

## 2023-03-08 NOTE — TELEPHONE ENCOUNTER
Patient informed of recommendations from Fernando. She will to try to get to the hospital lab today or tomorrow.     Anjelica White RN  [facility]

## 2023-03-08 NOTE — TELEPHONE ENCOUNTER
[facility] Call Center    Phone Message    May a detailed message be left on voicemail: no     Reason for Call: Question regarding specialist protocol  Please follow protocols- only utilize this documentation for questions or concerns that are not clear in the protocol.  Contact clinic directly to clarify question(s) via phone or Novel message.     Was Clinic Available: no      Question regarding protocol:  Balance problem [R26.89]  Dizziness [R42]  Dry mouth [R68.2]  Nausea / Vestibular specialist Dr. Zuniga within 1 month     Is there a referral for the requested specialist/specialty? Yes    Name of referring provider: Dr. Kacie Carter @ [facility]  Location of referring provider: see above      Action Taken: Message routed to:  [facility] joe ent    Travel Screening: Not Applicable

## 2023-03-09 ENCOUNTER — LAB (OUTPATIENT)
Dept: LAB | Facility: CLINIC | Age: 78
End: 2023-03-09
Payer: MEDICARE

## 2023-03-09 DIAGNOSIS — K11.7 XEROSTOMIA: ICD-10-CM

## 2023-03-09 LAB
ALBUMIN UR-MCNC: NEGATIVE MG/DL
ANION GAP SERPL CALCULATED.3IONS-SCNC: 9 MMOL/L (ref 7–15)
APPEARANCE UR: CLEAR
BACTERIA #/AREA URNS HPF: ABNORMAL /HPF
BILIRUB UR QL STRIP: NEGATIVE
BUN SERPL-MCNC: 24.3 MG/DL (ref 8–23)
CALCIUM SERPL-MCNC: 10 MG/DL (ref 8.8–10.2)
CHLORIDE SERPL-SCNC: 104 MMOL/L (ref 98–107)
COLOR UR AUTO: YELLOW
CREAT SERPL-MCNC: 0.59 MG/DL (ref 0.51–0.95)
DEPRECATED HCO3 PLAS-SCNC: 27 MMOL/L (ref 22–29)
GFR SERPL CREATININE-BSD FRML MDRD: >90 ML/MIN/1.73M2
GLUCOSE SERPL-MCNC: 105 MG/DL (ref 70–99)
GLUCOSE UR STRIP-MCNC: NEGATIVE MG/DL
HGB UR QL STRIP: NEGATIVE
KETONES UR STRIP-MCNC: NEGATIVE MG/DL
LEUKOCYTE ESTERASE UR QL STRIP: ABNORMAL
MUCOUS THREADS #/AREA URNS LPF: PRESENT /LPF
NITRATE UR QL: NEGATIVE
PH UR STRIP: 6.5 [PH] (ref 5–7)
POTASSIUM SERPL-SCNC: 5.3 MMOL/L (ref 3.4–5.3)
RBC #/AREA URNS AUTO: ABNORMAL /HPF
SODIUM SERPL-SCNC: 140 MMOL/L (ref 136–145)
SP GR UR STRIP: 1.01 (ref 1–1.03)
SQUAMOUS #/AREA URNS AUTO: ABNORMAL /LPF
UROBILINOGEN UR STRIP-ACNC: 0.2 E.U./DL
WBC #/AREA URNS AUTO: ABNORMAL /HPF

## 2023-03-09 PROCEDURE — 81001 URINALYSIS AUTO W/SCOPE: CPT

## 2023-03-09 PROCEDURE — 36415 COLL VENOUS BLD VENIPUNCTURE: CPT

## 2023-03-09 PROCEDURE — 80048 BASIC METABOLIC PNL TOTAL CA: CPT

## 2023-03-21 ENCOUNTER — TELEPHONE (OUTPATIENT)
Dept: INTERNAL MEDICINE | Facility: CLINIC | Age: 78
End: 2023-03-21
Payer: MEDICARE

## 2023-03-21 NOTE — TELEPHONE ENCOUNTER
Called patient. Informed her Dr Samuels is out until mid June, does she want to see any other provider? edith said she would call back after she asks her friend who she sees.

## 2023-03-21 NOTE — TELEPHONE ENCOUNTER
Reason for Call:  Appointment Request    Patient requesting this type of appt:  Preventive     Requested provider: Huyen Samuels    Reason patient unable to be scheduled: Available after 8am appt isn't until July    When does patient want to be seen/preferred time: 1-2 weeks after 8am    Comments: Pt needs an appt after 8am for a ride    Okay to leave a detailed message?: Yes at Home number on file [phone] (home)    Call taken on 3/21/2023 at 12:06 PM by Heaven Bello

## 2023-03-27 ENCOUNTER — TRANSFERRED RECORDS (OUTPATIENT)
Dept: HEALTH INFORMATION MANAGEMENT | Facility: CLINIC | Age: 78
End: 2023-03-27

## 2023-03-27 ENCOUNTER — LAB (OUTPATIENT)
Dept: LAB | Facility: CLINIC | Age: 78
End: 2023-03-27
Payer: MEDICARE

## 2023-03-27 DIAGNOSIS — R42 DIZZINESSES: Primary | ICD-10-CM

## 2023-03-27 DIAGNOSIS — R68.2 DRY MOUTH: ICD-10-CM

## 2023-03-27 LAB
T4 FREE SERPL-MCNC: 1.05 NG/DL (ref 0.9–1.7)
TSH SERPL DL<=0.005 MIU/L-ACNC: 2.94 UIU/ML (ref 0.3–4.2)

## 2023-03-27 PROCEDURE — 84439 ASSAY OF FREE THYROXINE: CPT | Mod: GA

## 2023-03-27 PROCEDURE — 86235 NUCLEAR ANTIGEN ANTIBODY: CPT

## 2023-03-27 PROCEDURE — 84443 ASSAY THYROID STIM HORMONE: CPT | Mod: GA

## 2023-03-27 PROCEDURE — 36415 COLL VENOUS BLD VENIPUNCTURE: CPT

## 2023-03-29 ENCOUNTER — TRANSFERRED RECORDS (OUTPATIENT)
Dept: HEALTH INFORMATION MANAGEMENT | Facility: CLINIC | Age: 78
End: 2023-03-29
Payer: MEDICARE

## 2023-03-30 LAB
ENA SS-A AB SER IA-ACNC: <0.5 U/ML
ENA SS-A AB SER IA-ACNC: NEGATIVE
ENA SS-B IGG SER IA-ACNC: <0.6 U/ML
ENA SS-B IGG SER IA-ACNC: NEGATIVE

## 2023-04-02 ENCOUNTER — NURSE TRIAGE (OUTPATIENT)
Dept: NURSING | Facility: CLINIC | Age: 78
End: 2023-04-02
Payer: MEDICARE

## 2023-04-02 DIAGNOSIS — N39.0 UTI (URINARY TRACT INFECTION): Primary | ICD-10-CM

## 2023-04-02 RX ORDER — SULFAMETHOXAZOLE/TRIMETHOPRIM 800-160 MG
1 TABLET ORAL 2 TIMES DAILY
Qty: 6 TABLET | Refills: 0 | Status: SHIPPED | OUTPATIENT
Start: 2023-04-02 | End: 2023-04-26

## 2023-04-02 NOTE — TELEPHONE ENCOUNTER
Nurse Triage SBAR    Is this a 2nd Level Triage? YES, LICENSED PRACTITIONER REVIEW IS REQUIRED    Situation: Patient calling with UTI Sx.  Consent: not needed    Background: Within last day - pt states she has worst UTI she ever had.  Hands and wrist tingle when she sits down to urinate.  States she has gone to the bathroom several times.  Dry mouth.  Dr Alejandro has given her some medication and that's helped.     Assessment:   Frequent urination today.   Hurts/burns today -  States fingers are dry and tingly too and dry mouth - however writer notes the dry mouth has been a sx previous to these UTI sx as there is a note dated 3/7 and 2/9/23 discussing dry mouth sx. . This may be due to taking a scopolamine patch to treat her dizziness (2/2/23 note).  Pt also taking Zofran. Seen for micturation on 1/31/2023 and Xerostomia as well as urinary Frequency on 1/30/2023..      Going about 3x/hour to urinate.    Light Headed too.    Denies blood in urine.    Urine is slightly cloudy.   Feels pressure - urgency - States she doesn't have a full stream - it comes and then trickles per pt.   * Pt states she has had UTI's before - but her hands are dry and causing pain in hands/fingers.       Protocol Recommended Disposition:   2nd Level Triage     Recommendation: Advised patient to wait for call-back after speaking with on-call provider . Reviewed concerning symptoms and when to call back.     Paged to provider on call for Dr Gia Oconnor was paged  Advised she will call out Bactrim for this pt.  Did review previous/recent visits to treat urinary concerns and dry mouth.  Provider did pend order for Bactrim for writer to add pharmacy to and sign.  Writer called pt back at 2:56 and advised pt that the provider did send a Rx to [facility]    Does the patient meet one of the following criteria for ADS visit consideration? 16+ years old, with an [facility] PCP     TIP  Providers, please consider if this condition is appropriate for  management at one of our Acute and Diagnostic Services sites.     If patient is a good candidate, please use dotphrase <dot>triageresponse and select Refer to ADS to document.       Yin Sims RN [facility] 4/2/2023 2:38 PM                              Reason for Disposition    [1] Pain or burning with passing urine (urination) AND [2] female    Side (flank) or lower back pain present    Additional Information    Negative: Shock suspected (e.g., cold/pale/clammy skin, too weak to stand, low BP, rapid pulse)    Negative: Sounds like a life-threatening emergency to the triager    Negative: Followed a female genital area injury (e.g., vagina, vulva)    Negative: Followed a male genital area injury (e.g., penis, scrotum)    Negative: Vaginal discharge    Negative: Pus (white, yellow) or bloody discharge from end of penis    Negative: [1] Taking antibiotic for urinary tract infection (UTI) AND [2] female    Negative: [1] Taking antibiotic for urinary tract infection (UTI) AND [2] male    Negative: [1] Pain or burning with passing urine (urination) AND [2] pregnant    Negative: [1] Pain or burning with passing urine (urination) AND [2] postpartum (from 0 to 6 weeks after delivery)    Negative: [1] Pain or burning with passing urine (urination) AND [2] male    Negative: Pain or itching in the vulvar area    Negative: Pain in scrotum is main symptom    Negative: Blood in the urine is main symptom    Negative: Symptoms arising from use of a urinary catheter (e.g., coude, Dyer)    Negative: Shock suspected (e.g., cold/pale/clammy skin, too weak to stand, low BP, rapid pulse)    Negative: Sounds like a life-threatening emergency to the triager    Negative: Followed a genital area injury (e.g., vagina, vulva)    Negative: Taking antibiotic for urinary tract infection (UTI)    Negative: Pregnant    Negative: Postpartum (from 0 to 6 weeks after delivery)    Negative: [1] Unable to urinate (or only a few  drops) > 4 hours AND [2] bladder feels very full (e.g., palpable bladder or strong urge to urinate)    Negative: Vomiting    Negative: Patient sounds very sick or weak to the triager    Negative: [1] SEVERE pain with urination (e.g., excruciating) AND [2] not improved after 2 hours of pain medicine and Sitz bath    Negative: Fever > 100.4 F (38.0 C)    Protocols used: URINARY SYMPTOMS-A-AH, URINATION PAIN - FEMALE-A-AH

## 2023-04-08 ENCOUNTER — HOSPITAL ENCOUNTER (EMERGENCY)
Facility: CLINIC | Age: 78
Discharge: HOME | End: 2023-04-08
Attending: EMERGENCY MEDICINE | Admitting: EMERGENCY MEDICINE
Payer: MEDICARE

## 2023-04-08 ENCOUNTER — APPOINTMENT (OUTPATIENT)
Dept: MRI IMAGING | Facility: CLINIC | Age: 78
End: 2023-04-08
Attending: EMERGENCY MEDICINE
Payer: MEDICARE

## 2023-04-08 VITALS
TEMPERATURE: 97.1 F | HEART RATE: 69 BPM | RESPIRATION RATE: 14 BRPM | DIASTOLIC BLOOD PRESSURE: 82 MMHG | OXYGEN SATURATION: 100 % | SYSTOLIC BLOOD PRESSURE: 152 MMHG

## 2023-04-08 DIAGNOSIS — R42 DIZZINESS: ICD-10-CM

## 2023-04-08 LAB
ALBUMIN SERPL BCG-MCNC: 4.4 G/DL (ref 3.5–5.2)
ALBUMIN UR-MCNC: NEGATIVE MG/DL
ALP SERPL-CCNC: 92 U/L (ref 35–104)
ALT SERPL W P-5'-P-CCNC: 21 U/L (ref 10–35)
ANION GAP SERPL CALCULATED.3IONS-SCNC: 12 MMOL/L (ref 7–15)
APPEARANCE UR: CLEAR
AST SERPL W P-5'-P-CCNC: 27 U/L (ref 10–35)
BACTERIA #/AREA URNS HPF: ABNORMAL /HPF
BASOPHILS # BLD AUTO: 0.1 10E3/UL (ref 0–0.2)
BASOPHILS NFR BLD AUTO: 1 %
BILIRUB SERPL-MCNC: 0.2 MG/DL
BILIRUB UR QL STRIP: NEGATIVE
BUN SERPL-MCNC: 23.2 MG/DL (ref 8–23)
CALCIUM SERPL-MCNC: 9.7 MG/DL (ref 8.8–10.2)
CHLORIDE SERPL-SCNC: 102 MMOL/L (ref 98–107)
COLOR UR AUTO: ABNORMAL
CREAT SERPL-MCNC: 0.6 MG/DL (ref 0.51–0.95)
DEPRECATED HCO3 PLAS-SCNC: 25 MMOL/L (ref 22–29)
EOSINOPHIL # BLD AUTO: 0.2 10E3/UL (ref 0–0.7)
EOSINOPHIL NFR BLD AUTO: 3 %
ERYTHROCYTE [DISTWIDTH] IN BLOOD BY AUTOMATED COUNT: 13.8 % (ref 10–15)
GFR SERPL CREATININE-BSD FRML MDRD: >90 ML/MIN/1.73M2
GLUCOSE SERPL-MCNC: 87 MG/DL (ref 70–99)
GLUCOSE UR STRIP-MCNC: NEGATIVE MG/DL
HCT VFR BLD AUTO: 42.7 % (ref 35–47)
HGB BLD-MCNC: 13.5 G/DL (ref 11.7–15.7)
HGB UR QL STRIP: NEGATIVE
HOLD SPECIMEN: NORMAL
HOLD SPECIMEN: NORMAL
IMM GRANULOCYTES # BLD: 0 10E3/UL
IMM GRANULOCYTES NFR BLD: 0 %
KETONES UR STRIP-MCNC: NEGATIVE MG/DL
LEUKOCYTE ESTERASE UR QL STRIP: ABNORMAL
LIPASE SERPL-CCNC: 52 U/L (ref 13–60)
LYMPHOCYTES # BLD AUTO: 2.2 10E3/UL (ref 0.8–5.3)
LYMPHOCYTES NFR BLD AUTO: 31 %
MCH RBC QN AUTO: 29 PG (ref 26.5–33)
MCHC RBC AUTO-ENTMCNC: 31.6 G/DL (ref 31.5–36.5)
MCV RBC AUTO: 92 FL (ref 78–100)
MONOCYTES # BLD AUTO: 0.5 10E3/UL (ref 0–1.3)
MONOCYTES NFR BLD AUTO: 7 %
MUCOUS THREADS #/AREA URNS LPF: PRESENT /LPF
NEUTROPHILS # BLD AUTO: 4.1 10E3/UL (ref 1.6–8.3)
NEUTROPHILS NFR BLD AUTO: 58 %
NITRATE UR QL: NEGATIVE
NRBC # BLD AUTO: 0 10E3/UL
NRBC BLD AUTO-RTO: 0 /100
PH UR STRIP: 6 [PH] (ref 5–7)
PLATELET # BLD AUTO: 301 10E3/UL (ref 150–450)
POTASSIUM SERPL-SCNC: 4.1 MMOL/L (ref 3.4–5.3)
PROT SERPL-MCNC: 7.2 G/DL (ref 6.4–8.3)
RBC # BLD AUTO: 4.65 10E6/UL (ref 3.8–5.2)
RBC URINE: 3 /HPF
SODIUM SERPL-SCNC: 139 MMOL/L (ref 136–145)
SP GR UR STRIP: 1.01 (ref 1–1.03)
SQUAMOUS EPITHELIAL: 2 /HPF
TROPONIN T SERPL HS-MCNC: 7 NG/L
UROBILINOGEN UR STRIP-MCNC: NORMAL MG/DL
WBC # BLD AUTO: 7 10E3/UL (ref 4–11)
WBC URINE: 6 /HPF

## 2023-04-08 PROCEDURE — 36415 COLL VENOUS BLD VENIPUNCTURE: CPT | Performed by: EMERGENCY MEDICINE

## 2023-04-08 PROCEDURE — 255N000002 HC RX 255 OP 636: Performed by: EMERGENCY MEDICINE

## 2023-04-08 PROCEDURE — 93005 ELECTROCARDIOGRAM TRACING: CPT

## 2023-04-08 PROCEDURE — 96361 HYDRATE IV INFUSION ADD-ON: CPT

## 2023-04-08 PROCEDURE — 81001 URINALYSIS AUTO W/SCOPE: CPT | Performed by: EMERGENCY MEDICINE

## 2023-04-08 PROCEDURE — 258N000003 HC RX IP 258 OP 636: Performed by: EMERGENCY MEDICINE

## 2023-04-08 PROCEDURE — G1010 CDSM STANSON: HCPCS

## 2023-04-08 PROCEDURE — 99285 EMERGENCY DEPT VISIT HI MDM: CPT | Mod: 25

## 2023-04-08 PROCEDURE — 84484 ASSAY OF TROPONIN QUANT: CPT | Performed by: EMERGENCY MEDICINE

## 2023-04-08 PROCEDURE — 83690 ASSAY OF LIPASE: CPT | Performed by: EMERGENCY MEDICINE

## 2023-04-08 PROCEDURE — 96360 HYDRATION IV INFUSION INIT: CPT | Mod: 59

## 2023-04-08 PROCEDURE — A9585 GADOBUTROL INJECTION: HCPCS | Performed by: EMERGENCY MEDICINE

## 2023-04-08 PROCEDURE — 80053 COMPREHEN METABOLIC PANEL: CPT | Performed by: EMERGENCY MEDICINE

## 2023-04-08 PROCEDURE — 85025 COMPLETE CBC W/AUTO DIFF WBC: CPT | Performed by: EMERGENCY MEDICINE

## 2023-04-08 RX ORDER — GADOBUTROL 604.72 MG/ML
10 INJECTION INTRAVENOUS ONCE
Status: COMPLETED | OUTPATIENT
Start: 2023-04-08 | End: 2023-04-08

## 2023-04-08 RX ADMIN — SODIUM CHLORIDE 1000 ML: 9 INJECTION, SOLUTION INTRAVENOUS at 16:44

## 2023-04-08 RX ADMIN — GADOBUTROL 10 ML: 604.72 INJECTION INTRAVENOUS at 17:21

## 2023-04-08 ASSESSMENT — ACTIVITIES OF DAILY LIVING (ADL)
ADLS_ACUITY_SCORE: 37
ADLS_ACUITY_SCORE: 37

## 2023-04-08 ASSESSMENT — ENCOUNTER SYMPTOMS
DIZZINESS: 1
NAUSEA: 1
HEADACHES: 0

## 2023-04-08 NOTE — ED PROVIDER NOTES
History     Chief Complaint:  Dizziness and Nausea       The history is provided by the patient.      Cynthia Arita is a 77 year old female with a history of vertigo and meniere's who presents with dizziness and nausea. The patient states that she has been experiencing dizziness for a long time, but it has worsened significantly in the past month  to the point where she is having more difficulty walking.  She does have some intermittent blurry vision.. She notes that she also has a dry mouth and achyness. Laying still helps the dizziness. She also notes that she has some left chest wall tenderness as well as some vision change within the last month. She denies headache, any falls, and any head injury.  Symptoms are much worse than her baseline dizziness.  She called ENT and does have plan for outpatient consultation with them.     Independent Historian:   None    Review of External Notes: I reviewed the patient's phone call with ENT for dizziness    ROS:  Review of Systems   Gastrointestinal: Positive for nausea.   Neurological: Positive for dizziness. Negative for headaches.   All other systems reviewed and are negative.    Allergies:  Ativan [Lorazepam]  Dicyclomine  Gabapentin  Metoprolol  Pantoprazole  Tramadol  Latex  Oxycodone     Medications:    Xanax  Niacalcin  Nexium  Antivert  Zofran  Inderal  Propanolol  meclazine     Past Medical History:    Anxiety  Basal cell carcinoma  Breast cancer  Diverticulosis  GERD  Hypertension  Meniere's  Nephrolithiasis  Right knee pain  Recurrent UTIs  Vertigo    Past Surgical History:     Knee arthroplasty  Bilateral cataract removal  Left oophorectomy  Hysterectomy    Family History:     Sister: bipolar disorder, neurological disorder  Father: esophageal cancer  Brother: hypertension  Mother: neurological disorder    Social History:  The patient presents alone  PCP: Huyen Samuels     Physical Exam     Patient Vitals for the past 24 hrs:   BP Temp Temp src Pulse  Resp SpO2   04/08/23 1850 -- -- -- -- -- 100 %   04/08/23 1848 (!) 152/82 -- -- 69 -- --   04/08/23 1714 -- -- -- 65 -- --   04/08/23 1712 -- -- -- 67 -- --   04/08/23 1711 -- -- -- 68 -- --   04/08/23 1710 -- -- -- 64 -- --   04/08/23 1709 -- -- -- 64 -- --   04/08/23 1707 -- -- -- 63 -- --   04/08/23 1706 -- -- -- 66 -- --   04/08/23 1704 -- -- -- 62 -- --   04/08/23 1626 -- -- -- 64 -- 99 %   04/08/23 1625 -- -- -- -- -- 98 %   04/08/23 1624 -- -- -- 63 -- 100 %   04/08/23 1623 -- -- -- 62 -- 99 %   04/08/23 1622 -- -- -- 63 -- 99 %   04/08/23 1621 -- -- -- 61 -- 100 %   04/08/23 1528 -- -- -- 60 14 99 %   04/08/23 1527 -- -- -- 59 22 99 %   04/08/23 1526 (!) 157/85 -- -- 66 -- --   04/08/23 1226 125/75 97.1  F (36.2  C) Temporal 63 18 95 %      Physical Exam  Gen: alert  HEENT: PERRL, oropharynx clear, no intraoral laceration or dental trauma, no mandibular tenderness, no trismus  Neck: Full AROM, no paraspinous tenderness, no midline tenderness  CV: RRR, no murmurs, 2+ pulses in all extremities  Chest wall: no crepitus, no tenderness  Pulm: breath sounds equal, lungs clear  Abd: Soft, no tenderness  Back: no thoracic midline tenderness, no lumbar midline tenderness, no paraspinous tenderness  RUE: no tenderness, full AROM  LUE: no tenderness, full AROM  RLE: no tenderness, full AROM  LLE: no tenderness, full AROM  Neuro: GCS 15, moves all extremities without focal weakness, sensation grossly intact over all distal extremities, no facial droop, PERRL, EOMI    Emergency Department Course   ECG  ECG results from 04/08/23   EKG 12-lead, tracing only     Value    Systolic Blood Pressure     Diastolic Blood Pressure     Ventricular Rate 58    Atrial Rate 58    ME Interval 150    QRS Duration 84        QTc 428    P Axis 50    R AXIS -20    T Axis 16    Interpretation ECG      Sinus bradycardia  Otherwise normal ECG  When compared with ECG of 21-JAN-2023 17:09,  No significant change was found       Imaging:  MRA  Neck (Carotids) wo & w Contrast   Final Result   IMPRESSION:   HEAD MRI:    1.  No acute or subacute ischemic change. No abnormal enhancement.   2.  Probable mild sequela of chronic small vessel ischemic disease and moderate brain parenchymal volume loss.   3.  Complete opacification of the left mastoid air cells, visualized nasopharynx is unremarkable. Appearance is similar to 2015      HEAD MRA:    1.  No aneurysm, high flow AVM or significant stenosis identified.      NECK MRA:   1.  Normal neck MRA.      MRA Brain ([place]) wo Contrast   Final Result   IMPRESSION:   HEAD MRI:    1.  No acute or subacute ischemic change. No abnormal enhancement.   2.  Probable mild sequela of chronic small vessel ischemic disease and moderate brain parenchymal volume loss.   3.  Complete opacification of the left mastoid air cells, visualized nasopharynx is unremarkable. Appearance is similar to 2015      HEAD MRA:    1.  No aneurysm, high flow AVM or significant stenosis identified.      NECK MRA:   1.  Normal neck MRA.      MR Brain w/o & w Contrast   Final Result   IMPRESSION:   HEAD MRI:    1.  No acute or subacute ischemic change. No abnormal enhancement.   2.  Probable mild sequela of chronic small vessel ischemic disease and moderate brain parenchymal volume loss.   3.  Complete opacification of the left mastoid air cells, visualized nasopharynx is unremarkable. Appearance is similar to 2015      HEAD MRA:    1.  No aneurysm, high flow AVM or significant stenosis identified.      NECK MRA:   1.  Normal neck MRA.         Report per radiology    Laboratory:  Labs Ordered and Resulted from Time of ED Arrival to Time of ED Departure   COMPREHENSIVE METABOLIC PANEL - Abnormal       Result Value    Sodium 139      Potassium 4.1      Chloride 102      Carbon Dioxide (CO2) 25      Anion Gap 12      Urea Nitrogen 23.2 (*)     Creatinine 0.60      Calcium 9.7      Glucose 87      Alkaline Phosphatase 92      AST 27      ALT  21      Protein Total 7.2      Albumin 4.4      Bilirubin Total 0.2      GFR Estimate >90     ROUTINE UA WITH MICROSCOPIC - Abnormal    Color Urine Light Yellow      Appearance Urine Clear      Glucose Urine Negative      Bilirubin Urine Negative      Ketones Urine Negative      Specific Gravity Urine 1.015      Blood Urine Negative      pH Urine 6.0      Protein Albumin Urine Negative      Urobilinogen Urine Normal      Nitrite Urine Negative      Leukocyte Esterase Urine Moderate (*)     Bacteria Urine Few (*)     Mucus Urine Present (*)     RBC Urine 3 (*)     WBC Urine 6 (*)     Squamous Epithelials Urine 2 (*)    LIPASE - Normal    Lipase 52     TROPONIN T, HIGH SENSITIVITY - Normal    Troponin T, High Sensitivity 7     CBC WITH PLATELETS AND DIFFERENTIAL    WBC Count 7.0      RBC Count 4.65      Hemoglobin 13.5      Hematocrit 42.7      MCV 92      MCH 29.0      MCHC 31.6      RDW 13.8      Platelet Count 301      % Neutrophils 58      % Lymphocytes 31      % Monocytes 7      % Eosinophils 3      % Basophils 1      % Immature Granulocytes 0      NRBCs per 100 WBC 0      Absolute Neutrophils 4.1      Absolute Lymphocytes 2.2      Absolute Monocytes 0.5      Absolute Eosinophils 0.2      Absolute Basophils 0.1      Absolute Immature Granulocytes 0.0      Absolute NRBCs 0.0        Emergency Department Course & Assessments:     Interventions:  Medications   0.9% sodium chloride BOLUS (0 mLs Intravenous Stopped 4/8/23 1854)   sodium chloride (PF) 0.9% PF flush 60 mL (100 mLs Intravenous $Given 4/8/23 1720)   gadobutrol (GADAVIST) injection 10 mL (10 mLs Intravenous $Given 4/8/23 1721)        Independent Interpretation (X-rays, CTs, rhythm strip):  I reviewed the MRI's    Consultations/Discussion of Management or Tests:  ED Course as of 04/08/23 1856   Sat Apr 08, 2023   1540 I greeted the patient and obtained relevant information   1627 Ambulated on pulse ox SPO2 97, HR 74 walking   1856 I rechecked the patient,  explained findings, and prepared for discharge     Social Determinants of Health affecting care:   None    Disposition:  The patient was discharged to home.     Impression & Plan    Medical Decision Making:  Patient presents for dizziness.  I feel this is likely an exacerbation of her chronic dizziness.  She already has Zofran and meclizine at home for her symptoms.  Feels better after IV fluids here.  MRI negative for mass CVA obstruction dissection vascular stenosis or other acute process.  Discussed with patient to push fluids and call follow-up closely with her ENT physician.    Regarding patient's chest pain, she is quite tender over the left chest wall.  No fall or trauma.  EKG was obtained which showed sinus rhythm without evidence of ischemia.  Troponin was negative in the setting of several days of ongoing symptoms.  When I went to reevaluate the patient, I did discussed with her the recommendation for chest x-ray.  However, patient declines any further imaging today.  She wishes to discharge home.    Diagnosis:    ICD-10-CM    1. Dizziness  R42          Discharge Medications: No new prescriptions  New Prescriptions    No medications on file      Scribe Disclosure:  I, Agustín Odonnell, am serving as a scribe at 6:57 PM on 4/8/2023 to document services personally performed by Latisha Etienne MD based on my observations and the provider's statements to me.     4/8/2023   Latisha Etienne MD Trussell, Kristi Jo Schneider, MD  04/08/23 2052

## 2023-04-08 NOTE — ED TRIAGE NOTES
Pt arrives with c/o dizziness and nausea for about 1 week. Pt reports some intermittent chest discomfort. Pt reports hx of vertigo many years ago.     Triage Assessment     Row Name 04/08/23 1227       Triage Assessment (Adult)    Airway WDL WDL       Respiratory WDL    Respiratory WDL WDL       Skin Circulation/Temperature WDL    Skin Circulation/Temperature WDL WDL       Cardiac WDL    Cardiac WDL WDL       Peripheral/Neurovascular WDL    Peripheral Neurovascular WDL WDL       Cognitive/Neuro/Behavioral WDL    Cognitive/Neuro/Behavioral WDL X  dizziness

## 2023-04-10 LAB
ATRIAL RATE - MUSE: 58 BPM
DIASTOLIC BLOOD PRESSURE - MUSE: NORMAL MMHG
INTERPRETATION ECG - MUSE: NORMAL
P AXIS - MUSE: 50 DEGREES
PR INTERVAL - MUSE: 150 MS
QRS DURATION - MUSE: 84 MS
QT - MUSE: 436 MS
QTC - MUSE: 428 MS
R AXIS - MUSE: -20 DEGREES
SYSTOLIC BLOOD PRESSURE - MUSE: NORMAL MMHG
T AXIS - MUSE: 16 DEGREES
VENTRICULAR RATE- MUSE: 58 BPM

## 2023-04-14 ENCOUNTER — TELEPHONE (OUTPATIENT)
Dept: INTERNAL MEDICINE | Facility: CLINIC | Age: 78
End: 2023-04-14
Payer: MEDICARE

## 2023-04-14 NOTE — TELEPHONE ENCOUNTER
Call received from patient stating she was in the ER on 4/8/23 and was advised to follow up with primary care provider in 1 week. Patient states she sees both Dr. Samuels and Dr. Oconnor. Patient requesting appointment. No appointments available. Patient saw Fernando for last appointment and states she could also see him. Scheduled appointment 4/26/23.

## 2023-04-26 ENCOUNTER — OFFICE VISIT (OUTPATIENT)
Dept: INTERNAL MEDICINE | Facility: CLINIC | Age: 78
End: 2023-04-26
Payer: MEDICARE

## 2023-04-26 VITALS
SYSTOLIC BLOOD PRESSURE: 157 MMHG | BODY MASS INDEX: 22.84 KG/M2 | WEIGHT: 133.8 LBS | HEIGHT: 64 IN | TEMPERATURE: 99.4 F | OXYGEN SATURATION: 97 % | DIASTOLIC BLOOD PRESSURE: 81 MMHG | RESPIRATION RATE: 22 BRPM | HEART RATE: 69 BPM

## 2023-04-26 DIAGNOSIS — Z09 HOSPITAL DISCHARGE FOLLOW-UP: Primary | ICD-10-CM

## 2023-04-26 DIAGNOSIS — R42 LIGHTHEADEDNESS: ICD-10-CM

## 2023-04-26 DIAGNOSIS — R42 DIZZINESS: ICD-10-CM

## 2023-04-26 DIAGNOSIS — R35.0 URINARY FREQUENCY: ICD-10-CM

## 2023-04-26 LAB
ALBUMIN UR-MCNC: NEGATIVE MG/DL
APPEARANCE UR: CLEAR
BACTERIA #/AREA URNS HPF: ABNORMAL /HPF
BILIRUB UR QL STRIP: NEGATIVE
COLOR UR AUTO: YELLOW
GLUCOSE UR STRIP-MCNC: NEGATIVE MG/DL
HGB UR QL STRIP: NEGATIVE
KETONES UR STRIP-MCNC: NEGATIVE MG/DL
LEUKOCYTE ESTERASE UR QL STRIP: ABNORMAL
NITRATE UR QL: NEGATIVE
PH UR STRIP: 7 [PH] (ref 5–7)
RBC #/AREA URNS AUTO: ABNORMAL /HPF
SP GR UR STRIP: 1.01 (ref 1–1.03)
SQUAMOUS #/AREA URNS AUTO: ABNORMAL /LPF
UROBILINOGEN UR STRIP-ACNC: 0.2 E.U./DL
WBC #/AREA URNS AUTO: ABNORMAL /HPF

## 2023-04-26 PROCEDURE — 81001 URINALYSIS AUTO W/SCOPE: CPT

## 2023-04-26 PROCEDURE — 99213 OFFICE O/P EST LOW 20 MIN: CPT

## 2023-04-26 RX ORDER — VENLAFAXINE 37.5 MG/1
18.75 TABLET ORAL DAILY
COMMUNITY
Start: 2023-04-20 | End: 2023-08-21

## 2023-04-26 NOTE — PROGRESS NOTES
Assessment & Plan     Hospital discharge follow-up  Patient is feeling the same since her discharge.  She is frustrated that she has nausea and dizziness that have been ongoing for a long time.  She has seen ENT, and GI.  GI pointed her back to ENT, and her ENT recommend that she get in with [facility] for which she is currently awaiting appointment.    I do not believe patient is ever seen a neurologist.  I will refer her to neurology in case there is something that we have been missing that they could add    MRI was negative for abnormality    Lightheadedness    - Adult Neurology  Referral; Future    Urinary frequency  Patient complains of significant urinary frequency the past 2 weeks.  This is not a new complaint for her.  I will refer to urology for management.    UA negative for infection  - UA with Microscopic reflex to Culture - lab collect; Future  - UA with Microscopic reflex to Culture - lab collect  - UA Microscopic with Reflex to Culture  - Adult Urology  Referral; Future    Dizziness  Chronic problem. I do not see that patient has seen neurologist   - Adult Neurology  Referral; Future       MED REC REQUIRED  Post Medication Reconciliation Status:  Discharge medications reconciled, continue medications without change    Fernando Hutchison NP  [facility] KOLBY Marie is a 77 year old, presenting for the following health issues:  RECHECK ([facility] ER follow up.)        4/26/2023     2:18 PM   Additional Questions   Roomed by Isela Carter MA   Accompanied by Herself     HPI     ED/ Followup:    Facility:  [facility]  Date of visit: 4/8/23  Reason for visit: dizziness  Current Status: Still not improving. Frequent urination. Still feel dizziness. Dry mouth. Nausea. Lightheadedness    Urinary frequency has been ongoing for about two weeks. The last two days have been worse.     Is still feeling lightheaded, dizziness, dry  "mouth nausea and lightheadness    Review of Systems   Constitutional, HEENT, cardiovascular, pulmonary, GI, , musculoskeletal, neuro, skin, endocrine and psych systems are negative, except as otherwise noted.      Objective    BP (!) 157/81 (BP Location: Right arm, Patient Position: Sitting, Cuff Size: Adult Regular)   Pulse 69   Temp 99.4  F (37.4  C) (Oral)   Resp 22   Ht 1.626 m (5' 4\")   Wt 60.7 kg (133 lb 12.8 oz)   LMP  (LMP Unknown)   SpO2 97%   BMI 22.97 kg/m    Body mass index is 22.97 kg/m .  Physical Exam   GENERAL: alert and no distress  EYES: Eyes grossly normal to inspection  CV: regular rate and rhythm, normal S1 S2, no S3 or S4, no murmur, click or rub, no peripheral edema and peripheral pulses strong  ABDOMEN: soft, nontender, no hepatosplenomegaly, no masses and bowel sounds normal  MS: no gross musculoskeletal defects noted, no edema, unsteady gait   SKIN: no suspicious lesions or rashes  NEURO: Normal strength and tone, mentation intact and speech normal  PSYCH: mentation appears normal, affect normal/bright        "

## 2023-04-27 ENCOUNTER — TELEPHONE (OUTPATIENT)
Dept: INTERNAL MEDICINE | Facility: CLINIC | Age: 78
End: 2023-04-27
Payer: MEDICARE

## 2023-04-27 NOTE — TELEPHONE ENCOUNTER
Patient calls to get the referral information for Neurology.  Telephone number given to call  for both Neurology and Urology. Patient became very emotional because she wants a name of who to see that is very good.  Patient states that the last two Neurologist she saw her were horrible and she ended up in the ED because of one of them.  Patient confirmed that this was not within [place].  Explained to patient that we usually refer to a Practice and not a particular Provider.  Patient stated she will talk to her son but is very upset.

## 2023-04-27 NOTE — TELEPHONE ENCOUNTER
Unfortunately I do not personally know any providers and [facility] clinic of neurology    If we can get notes from previous neurologist, or she has seen them recently she may not need to see [facility] of neurology

## 2023-04-27 NOTE — TELEPHONE ENCOUNTER
Patient informed of provider's message below.  Reports she has an appointment scheduled at [facility] clinic of neuro in 2 weeks.

## 2023-05-15 NOTE — TELEPHONE ENCOUNTER
Pt went and saw neurology and was advised that there isn't anything they could do for her and that the neurologist wasn't sure why JL referred her to them. Pt isn't feeling well and is looking for guidance.     Pt call back: [phone] Detailed Vm OK    Luz Young

## 2023-05-17 NOTE — TELEPHONE ENCOUNTER
Called patient. Informed her of message from Fernando. Patient states that does have appointment for 2nd opinion from ENT, but not able to get in for another 3 weeks. Patient will keep appointment with them to discuss if their is anything they can do for her symptoms.     Anjelica White RN  [facility]

## 2023-05-17 NOTE — TELEPHONE ENCOUNTER
I am unsure of how to best help patient as we have referred to several specialist regarding her symptoms such as ENT and neurology. I believe she is waiting to get second opinion from another ENT from out last appointment.

## 2023-05-30 ENCOUNTER — TELEPHONE (OUTPATIENT)
Dept: INTERNAL MEDICINE | Facility: CLINIC | Age: 78
End: 2023-05-30
Payer: MEDICARE

## 2023-05-30 NOTE — TELEPHONE ENCOUNTER
Patient Quality Outreach    Patient is due for the following:   Hypertension -  BP check    Next Steps:   Schedule a office visit for BP.    Type of outreach:    Phone, spoke to patient/parent. Has 8-7-2023 appt. Agrees to check own BP a few times on her own before appt.       Questions for provider review:    None           Kylah Gray LPN

## 2023-06-12 ENCOUNTER — NURSE TRIAGE (OUTPATIENT)
Dept: NURSING | Facility: CLINIC | Age: 78
End: 2023-06-12
Payer: MEDICARE

## 2023-06-12 DIAGNOSIS — R11.0 NAUSEA: ICD-10-CM

## 2023-06-12 DIAGNOSIS — H81.02 MENIERE'S DISEASE OF LEFT EAR: ICD-10-CM

## 2023-06-12 RX ORDER — ONDANSETRON 4 MG/1
TABLET, ORALLY DISINTEGRATING ORAL
Qty: 240 TABLET | Refills: 0 | Status: SHIPPED | OUTPATIENT
Start: 2023-06-12 | End: 2023-08-21

## 2023-06-12 NOTE — TELEPHONE ENCOUNTER
Medication is being filled for 1 time refill only due to:  Future appointment scheduled.   Appointments in Next Year    Aug 07, 2023 11:00 AM  (Arrive by 10:40 AM)  Annual Wellness Visit with Huyen Samuels MD  [facility] ([facility] ) [phone]          Anjelica White RN  [facility]

## 2023-06-12 NOTE — TELEPHONE ENCOUNTER
Cynthia calls and says that she needs more Zofran . Pt. Says that a  Has to call it directly to [phone]. RN checked [facility] and saw that pt. Has 1 refill left. Pt. Says that she takes this every 6 hours. Pt. Says that the DrDrew Has to call this directly to the 800 number. RN then left this message in [facility], for pt's [facility].    Reason for Disposition    Health Information question, no triage required and triager able to answer question    Additional Information    Negative: [1] Caller is not with the adult (patient) AND [2] reporting urgent symptoms    Negative: Lab result questions    Negative: Medication questions    Negative: Caller can't be reached by phone    Negative: Caller has already spoken to PCP or another triager    Negative: RN needs further essential information from caller in order to complete triage    Negative: Requesting regular office appointment    Negative: [1] Caller requesting NON-URGENT health information AND [2] PCP's office is the best resource    Protocols used: INFORMATION ONLY CALL - NO TRIAGE-[facility]

## 2023-06-12 NOTE — TELEPHONE ENCOUNTER
Clinic Action Needed:Yes  Reason for Call: Cynthia calls and says that she needs more Zofran . Pt. Says that a Dr. Has to call it directly to [phone]. RN checked [facility] and saw that pt. Has 1 refill left. Pt. Says that she takes this every 6 hours. Pt. Says that the Dr. Has to call this directly to the 800 number. RN then left this message in [facility], for pt's [facility].  Routed to: SHERI Ledezma RN   [facility]  [phone]

## 2023-07-03 ENCOUNTER — TRANSFERRED RECORDS (OUTPATIENT)
Dept: HEALTH INFORMATION MANAGEMENT | Facility: CLINIC | Age: 78
End: 2023-07-03
Payer: MEDICARE

## 2023-07-13 ENCOUNTER — PATIENT OUTREACH (OUTPATIENT)
Dept: CARE COORDINATION | Facility: CLINIC | Age: 78
End: 2023-07-13
Payer: MEDICARE

## 2023-07-19 ENCOUNTER — TRANSFERRED RECORDS (OUTPATIENT)
Dept: HEALTH INFORMATION MANAGEMENT | Facility: CLINIC | Age: 78
End: 2023-07-19
Payer: MEDICARE

## 2023-07-27 ENCOUNTER — PATIENT OUTREACH (OUTPATIENT)
Dept: CARE COORDINATION | Facility: CLINIC | Age: 78
End: 2023-07-27
Payer: MEDICARE

## 2023-07-31 ENCOUNTER — HOSPITAL ENCOUNTER (OUTPATIENT)
Dept: MAMMOGRAPHY | Facility: CLINIC | Age: 78
Discharge: HOME | End: 2023-07-31
Attending: INTERNAL MEDICINE | Admitting: INTERNAL MEDICINE
Payer: MEDICARE

## 2023-07-31 DIAGNOSIS — Z12.31 VISIT FOR SCREENING MAMMOGRAM: ICD-10-CM

## 2023-07-31 PROCEDURE — 77067 SCR MAMMO BI INCL CAD: CPT

## 2023-08-15 ENCOUNTER — CARE COORDINATION (OUTPATIENT)
Dept: OTHER | Facility: CLINIC | Age: 78
End: 2023-08-15

## 2023-08-15 DIAGNOSIS — Z96.652 STATUS POST TOTAL LEFT KNEE REPLACEMENT: Primary | ICD-10-CM

## 2023-08-20 NOTE — ED TRIAGE NOTES
"    Pt reports that she has had N/V/D for the past 2 wks, pt reports that she was seen for this at this time. PT states that a wk ago she was diagnosed with a bladder infection and is having \"slimy\" stool. PT also reports that she has a cough and \"having a hard time breathing\" PT reports fevers at home but no documented amount. PT states that due to her coughing she has right sided rib pain. PT VSS and ABC's intact.  "
No

## 2023-08-21 ENCOUNTER — TELEPHONE (OUTPATIENT)
Dept: INTERNAL MEDICINE | Facility: CLINIC | Age: 78
End: 2023-08-21

## 2023-08-21 ENCOUNTER — OFFICE VISIT (OUTPATIENT)
Dept: INTERNAL MEDICINE | Facility: CLINIC | Age: 78
End: 2023-08-21
Payer: MEDICARE

## 2023-08-21 ENCOUNTER — TRANSFERRED RECORDS (OUTPATIENT)
Dept: HEALTH INFORMATION MANAGEMENT | Facility: CLINIC | Age: 78
End: 2023-08-21

## 2023-08-21 VITALS
SYSTOLIC BLOOD PRESSURE: 134 MMHG | BODY MASS INDEX: 23.74 KG/M2 | WEIGHT: 134 LBS | HEIGHT: 63 IN | DIASTOLIC BLOOD PRESSURE: 75 MMHG | RESPIRATION RATE: 16 BRPM | OXYGEN SATURATION: 99 % | TEMPERATURE: 98.5 F | HEART RATE: 60 BPM

## 2023-08-21 DIAGNOSIS — Z13.820 ENCOUNTER FOR OSTEOPOROSIS SCREENING IN ASYMPTOMATIC POSTMENOPAUSAL PATIENT: ICD-10-CM

## 2023-08-21 DIAGNOSIS — I10 ESSENTIAL HYPERTENSION: ICD-10-CM

## 2023-08-21 DIAGNOSIS — F41.9 ANXIETY: ICD-10-CM

## 2023-08-21 DIAGNOSIS — R11.0 NAUSEA: ICD-10-CM

## 2023-08-21 DIAGNOSIS — K21.9 GASTROESOPHAGEAL REFLUX DISEASE WITHOUT ESOPHAGITIS: ICD-10-CM

## 2023-08-21 DIAGNOSIS — Z78.0 ENCOUNTER FOR OSTEOPOROSIS SCREENING IN ASYMPTOMATIC POSTMENOPAUSAL PATIENT: ICD-10-CM

## 2023-08-21 DIAGNOSIS — Z00.00 ENCOUNTER FOR MEDICARE ANNUAL WELLNESS EXAM: Primary | ICD-10-CM

## 2023-08-21 DIAGNOSIS — H81.02 MENIERE'S DISEASE OF LEFT EAR: ICD-10-CM

## 2023-08-21 PROCEDURE — 99214 OFFICE O/P EST MOD 30 MIN: CPT | Mod: 25

## 2023-08-21 PROCEDURE — G0439 PPPS, SUBSEQ VISIT: HCPCS

## 2023-08-21 RX ORDER — VENLAFAXINE 37.5 MG/1
18.75 TABLET ORAL DAILY
Qty: 45 TABLET | Refills: 0 | Status: SHIPPED | OUTPATIENT
Start: 2023-08-21 | End: 2023-08-22

## 2023-08-21 RX ORDER — ONDANSETRON 4 MG/1
TABLET, ORALLY DISINTEGRATING ORAL
Qty: 240 TABLET | Refills: 0 | Status: SHIPPED | OUTPATIENT
Start: 2023-08-21 | End: 2023-09-12

## 2023-08-21 RX ORDER — PROPRANOLOL HYDROCHLORIDE 10 MG/1
TABLET ORAL
Qty: 30 TABLET | Refills: 1 | Status: SHIPPED | OUTPATIENT
Start: 2023-08-21 | End: 2023-10-05

## 2023-08-21 ASSESSMENT — ENCOUNTER SYMPTOMS
CHILLS: 0
FREQUENCY: 0
NERVOUS/ANXIOUS: 1
ABDOMINAL PAIN: 0
DIARRHEA: 0
HEMATURIA: 0
CONSTIPATION: 1
PALPITATIONS: 0
JOINT SWELLING: 1
HEARTBURN: 0
NAUSEA: 1
SORE THROAT: 0
HEADACHES: 0

## 2023-08-21 ASSESSMENT — ANXIETY QUESTIONNAIRES
4. TROUBLE RELAXING: SEVERAL DAYS
IF YOU CHECKED OFF ANY PROBLEMS ON THIS QUESTIONNAIRE, HOW DIFFICULT HAVE THESE PROBLEMS MADE IT FOR YOU TO DO YOUR WORK, TAKE CARE OF THINGS AT HOME, OR GET ALONG WITH OTHER PEOPLE: SOMEWHAT DIFFICULT
6. BECOMING EASILY ANNOYED OR IRRITABLE: SEVERAL DAYS
GAD7 TOTAL SCORE: 5
1. FEELING NERVOUS, ANXIOUS, OR ON EDGE: SEVERAL DAYS
3. WORRYING TOO MUCH ABOUT DIFFERENT THINGS: SEVERAL DAYS
7. FEELING AFRAID AS IF SOMETHING AWFUL MIGHT HAPPEN: NOT AT ALL
2. NOT BEING ABLE TO STOP OR CONTROL WORRYING: SEVERAL DAYS
5. BEING SO RESTLESS THAT IT IS HARD TO SIT STILL: NOT AT ALL
GAD7 TOTAL SCORE: 5

## 2023-08-21 ASSESSMENT — ACTIVITIES OF DAILY LIVING (ADL): CURRENT_FUNCTION: NO ASSISTANCE NEEDED

## 2023-08-21 NOTE — TELEPHONE ENCOUNTER
Patient called back and stated she had a problem one other time and she stated she called Arvind and she said it went through after that, but is unsure what she did    Patient has enough for a couple weeks.

## 2023-08-21 NOTE — TELEPHONE ENCOUNTER
"See note below PA denied.     Please advise.     Prior Authorization Denied    Medication: ondansetron (ZOFRAN ODT) 4 MG ODT tab - EPA DENIED    See documentation for denial rationale/coverage criteria/appeal process.    Please note: Providers/Clinics are to notify the patients of the denial outcomes and steps going forward.       Please advise if appeal is necessary and place a letter of medical necessity under the \"letters\" tab, or close the encounter when finished.    Thank you,  Central Prior Authorization Team  [phone]    "

## 2023-08-21 NOTE — PROGRESS NOTES
"SUBJECTIVE:   Cynthia is a 78 year old who presents for Preventive Visit.      Are you in the first 12 months of your Medicare coverage?  No    Healthy Habits:     In general, how would you rate your overall health?  Fair    Frequency of exercise:  2-3 days/week    Duration of exercise:  15-30 minutes    Do you usually eat at least 4 servings of fruit and vegetables a day, include whole grains    & fiber and avoid regularly eating high fat or \"junk\" foods?  No    Taking medications regularly:  Yes    Medication side effects:  Muscle aches    Ability to successfully perform activities of daily living:  No assistance needed    Home Safety:  No safety concerns identified    Hearing Impairment:  Need to ask people to speak up or repeat themselves and difficulty understanding soft or whispered speech    In the past 6 months, have you been bothered by leaking of urine?  No    In general, how would you rate your overall mental or emotional health?  Fair    Additional concerns today:  No        Have you ever done Advance Care Planning? (For example, a Health Directive, POLST, or a discussion with a medical provider or your loved ones about your wishes): No, advance care planning information given to patient to review.  Patient declined advance care planning discussion at this time.       Fall risk  Fallen 2 or more times in the past year?: Yes  Any fall with injury in the past year?: No    Cognitive Screening   1) Repeat 3 items (Leader, Season, Table)    2) Clock draw: ABNORMAL    3) 3 item recall: Recalls NO objects   Results: 0 items recalled: PROBABLE COGNITIVE IMPAIRMENT, **INFORM PROVIDER**    Mini-CogTM Copyright AYDEN Scott. Licensed by the author for use in [facility]; reprinted with permission (nani@.[facility]). All rights reserved.      Do you have sleep apnea, excessive snoring or daytime drowsiness? : no    Reviewed and updated as needed this visit by clinical staff   Tobacco  Allergies  Meds   Med Hx  " Surg Hx  Fam Hx  Soc Hx        Reviewed and updated as needed this visit by Provider                 Social History     Tobacco Use    Smoking status: Never    Smokeless tobacco: Never   Substance Use Topics    Alcohol use: Yes     Comment: rare             8/21/2023     9:36 AM   Alcohol Use   Prescreen: >3 drinks/day or >7 drinks/week? Not Applicable     Do you have a current opioid prescription? No  Do you use any other controlled substances or medications that are not prescribed by a provider? None              Current providers sharing in care for this patient include:   Patient Care Team:  Huyen Samuels MD as PCP - General (Internal Medicine)  Huyen Samuels MD as Assigned PCP    The following health maintenance items are reviewed in Epic and correct as of today:  Health Maintenance   Topic Date Due    ZOSTER IMMUNIZATION (1 of 2) Never done    COVID-19 Vaccine (5 - Pfizer series) 02/21/2023    MEDICARE ANNUAL WELLNESS VISIT  08/02/2023    INFLUENZA VACCINE (1) 09/01/2023    DUSTIN ASSESSMENT  08/21/2024    ANNUAL REVIEW OF HM ORDERS  08/21/2024    FALL RISK ASSESSMENT  08/21/2024    LIPID  08/02/2027    DTAP/TDAP/TD IMMUNIZATION (2 - Td or Tdap) 08/28/2027    ADVANCE CARE PLANNING  08/21/2028    DEXA  03/31/2029    HEPATITIS C SCREENING  Completed    PHQ-2 (once per calendar year)  Completed    Pneumococcal Vaccine: 65+ Years  Completed    IPV IMMUNIZATION  Aged Out    MENINGITIS IMMUNIZATION  Aged Out    MAMMO SCREENING  Discontinued    COLORECTAL CANCER SCREENING  Discontinued           Mammogram Screening - Patient over age 75, has elected to continue with screening.  Pertinent mammograms are reviewed under the imaging tab.    Review of Systems   Constitutional:  Negative for chills.   HENT:  Negative for sore throat.    Eyes:  Positive for visual disturbance.   Cardiovascular:  Negative for palpitations.   Gastrointestinal:  Positive for constipation and nausea. Negative for abdominal pain,  "diarrhea and heartburn.   Genitourinary:  Negative for frequency, hematuria, pelvic pain and vaginal bleeding.   Musculoskeletal:  Positive for joint swelling.   Skin:  Negative for rash.   Neurological:  Negative for headaches.   Psychiatric/Behavioral:  Negative for mood changes. The patient is nervous/anxious.          OBJECTIVE:   /75   Pulse 60   Temp 98.5  F (36.9  C) (Oral)   Resp 16   Ht 1.594 m (5' 2.75\")   Wt 60.8 kg (134 lb)   LMP  (LMP Unknown)   SpO2 99%   BMI 23.93 kg/m   Estimated body mass index is 23.93 kg/m  as calculated from the following:    Height as of this encounter: 1.594 m (5' 2.75\").    Weight as of this encounter: 60.8 kg (134 lb).    Physical Exam  Constitutional:       General: She is not in acute distress.     Appearance: Normal appearance. She is not ill-appearing, toxic-appearing or diaphoretic.   HENT:      Head: Normocephalic and atraumatic.   Eyes:      Conjunctiva/sclera: Conjunctivae normal.   Cardiovascular:      Rate and Rhythm: Normal rate and regular rhythm.      Heart sounds: Normal heart sounds.   Pulmonary:      Effort: Pulmonary effort is normal.      Breath sounds: Normal breath sounds.   Skin:     General: Skin is warm and dry.   Neurological:      Mental Status: She is alert and oriented to person, place, and time.   Psychiatric:         Mood and Affect: Mood normal.         Behavior: Behavior normal.         Thought Content: Thought content normal.         Judgment: Judgment normal.       Diagnostic Test Results:  Labs reviewed in Epic    ASSESSMENT / PLAN:   (Z00.00) Encounter for Medicare annual wellness exam  (primary encounter diagnosis)  Comment: Pt presents for annual visit    (I10) Essential hypertension  Comment: BP at goal at 134/75  Plan:     (K21.9) Gastroesophageal reflux disease without esophagitis  Comment: Stable. Continue Esomeprazole 40MG  Plan:     (F41.9) Anxiety  Comment: Stable.   She endorses significant xerostomia. The only " medication which could be contributing to this could be her Meclizine although frequency is not defined per up to date. She denies any mouth pain or burning. She has seen ENT.   We discussed the Effexor may also be playing a role as Xerostomia is reported in 15% of patients per up to date. Her Effexor dose is very low, we discussed she could try to hold this for a few weeks to see if symptoms improve at all. She has tried using biotene and lozenges without any improvement.        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 She has never used smokeless tobacco.      Appropriate preventive services were discussed with this patient, including applicable screening as appropriate for cardiovascular disease, diabetes, osteopenia/osteoporosis, and glaucoma.  As appropriate for age/gender, discussed screening for colorectal cancer, prostate cancer, breast cancer, and cervical cancer. Checklist reviewing preventive services available has been given to the patient.    Reviewed patients plan of care and provided an AVS. The Basic Care Plan (routine screening as documented in Health Maintenance) for Cynthia meets the Care Plan requirement. This Care Plan has been established and reviewed with the Patient.          CASSIDY Dhillon [facility]  KOLBY    Identified Health Risks:  I have reviewed Opioid Use Disorder and Substance Use Disorder risk factors and made any needed referrals. Answers submitted by the patient for this visit:  DUSTIN-7 (Submitted on 8/21/2023)  DUSTIN 7 TOTAL SCORE: 5  She is at risk for falling and has been provided with information to reduce the risk of falling at home.

## 2023-08-21 NOTE — PATIENT INSTRUCTIONS
Your Health Risk Assessment indicates you feel you are not in good emotional health.    Recreation   Recreation is not limited to sports and team events. It includes any activity that provides relaxation, interest, enjoyment, and exercise. Recreation provides an outlet for physical, mental, and social energy. It can give a sense of worth and achievement. It can help you stay healthy.    Mental Exercise and Social Involvement  Mental and emotional health is as important as physical health. Keep in touch with friends and family. Stay as active as possible. Continue to learn and challenge yourself.   Things you can do to stay mentally active are:  Learn something new, like a foreign language or musical instrument.   Play SCRABBLE or do crossword puzzles. If you cannot find people to play these games with you at home, you can play them with others on your computer through the Internet.   Join a games club--anything from card games to chess or checkers or lawn bowling.   Start a new hobby.   Go back to school.   Volunteer.   Read.   Keep up with world events.  Preventing Falls: Care Instructions    Talk to your doctor about the medicines you take. Ask if any of them increase the risk of falls and whether they can be changed or stopped.   Try to exercise regularly. It can help improve your strength and balance. This can help lower your risk of falling.     Practice fall safety and prevention.    Wear low-heeled shoes that fit well and give your feet good support. Talk to your doctor if you have foot problems that make this hard.  Carry a cellphone or wear a medical alert device that you can use to call for help.  Use stepladders instead of chairs to reach high objects. Don't climb if you're at risk for falls. Ask for help, if needed.  Wear the correct eyeglasses, if you need them.    Make your home safer.    Remove rugs, cords, clutter, and furniture from walkways.  Keep your house well lit. Use night-lights in hallways  "and bathrooms.  Install and use sturdy handrails on stairways.  Wear nonskid footwear, even inside. Don't walk barefoot or in socks without shoes.    Be safe outside.    Use handrails, curb cuts, and ramps whenever possible.  Keep your hands free by using a shoulder bag or backpack.  Try to walk in well-lit areas. Watch out for uneven ground, changes in pavement, and debris.  Be careful in the winter. Walk on the grass or gravel when sidewalks are slippery. Use de-icer on steps and walkways. Add non-slip devices to shoes.    Put grab bars and nonskid mats in your shower or tub and near the toilet. Try to use a shower chair or bath bench when bathing.   Get into a tub or shower by putting in your weaker leg first. Get out with your strong side first. Have a phone or medical alert device in the bathroom with you.   Where can you learn more?  Go to https://www.[employer].net/patiented  Enter G117 in the search box to learn more about \"Preventing Falls: Care Instructions.\"  Current as of: November 9, 2022               Content Version: 13.7    0888-4901 [employer].   Care instructions adapted under license by your healthcare professional. If you have questions about a medical condition or this instruction, always ask your healthcare professional. [employer] disclaims any warranty or liability for your use of this information.      How to Get Up Safely After a Fall: Care Instructions  Overview     If you have injuries, health problems, or other reasons that may make it easy for you to fall at home, it is a good idea to learn how to get up safely after a fall. Learning how to get up correctly can help you avoid making an injury worse.  Also, knowing what to do if you cannot get up can help you stay safe until help arrives.  Follow-up care is a key part of your treatment and safety. Be sure to make and go to all appointments, and call your doctor if you are having problems. It's also a good idea " to know your test results and keep a list of the medicines you take.  How can you care for yourself after a fall?  If you think you can get up  First lie still for a few minutes and think about how you feel. If your body feels okay and you think you can get up safely, follow the rest of the steps below:  Look for a chair or other piece of furniture that is close to you.  Roll onto your side and rest. Roll by turning your head in the direction you want to roll, move your shoulder and arm, then hip and leg in the same direction.  Lie still for a moment to let your blood pressure adjust.  Slowly push your upper body up, lift your head, and take a moment to rest.  Slowly get up on your hands and knees, and crawl to the chair or other stable piece of furniture.  Put your hands on the chair.  Move one foot forward, and place it flat on the floor. Your other leg should be bent with the knee on the floor.  Rise slowly, turn your body, and sit in the chair. Stay seated for a bit and think about how you feel. Call for help. Even if you feel okay, let someone know what happened to you. You might not know that you have a serious injury.  If you cannot get up  If you think you are injured after a fall or you cannot get up, try not to panic.  Call out for help.  If you have a phone within reach or you have an emergency call device, use it to call for help.  If you do not have a phone within reach, try to slide yourself toward it. If you cannot get to the phone, try to slide toward a door or window or a place where you think you can be heard.  [place] or use an object to make noise so someone might hear you.  If you can reach something that you can use for a pillow, place it under your head. Try to stay warm by covering yourself with a blanket or clothing while you wait for help.  When should you call for help?   Call 911 anytime you think you may need emergency care. For example, call if:    You passed out (lost consciousness).      "You cannot get up after a fall.     You have severe pain.   Call your doctor now or seek immediate medical care if:    You have new or worse pain.     You are dizzy or lightheaded.     You hit your head.   Watch closely for changes in your health, and be sure to contact your doctor if:    You do not get better as expected.   Where can you learn more?  Go to https://www.[employer].net/patiented  Enter G513 in the search box to learn more about \"How to Get Up Safely After a Fall: Care Instructions.\"  Current as of: November 14, 2022               Content Version: 13.7    9901-2132 [employer].   Care instructions adapted under license by your healthcare professional. If you have questions about a medical condition or this instruction, always ask your healthcare professional. [employer] disclaims any warranty or liability for your use of this information.         "

## 2023-08-21 NOTE — TELEPHONE ENCOUNTER
Central Prior Authorization Team   Phone: [phone]        PRIOR AUTHORIZATION DENIED    Medication: ONDANSETRON HCL 4 MG PO TABS  Insurance Company: [employer] ([facility]) - Phone [phone] Fax [phone]  Denial Date: 8/21/2023  Denial Rational:                 Appeal Information:         Patient Notified: No  Please note: Providers/Clinics are to notify the patients of the denial outcomes and steps going forward.

## 2023-08-21 NOTE — TELEPHONE ENCOUNTER
Would recommend asking pt how this has been covered in the past? She has been on this for awhile I believe.. so, I am not sure how she has been getting it. This is the first time I have prescribed it for her

## 2023-08-22 ENCOUNTER — TELEPHONE (OUTPATIENT)
Dept: INTERNAL MEDICINE | Facility: CLINIC | Age: 78
End: 2023-08-22
Payer: MEDICARE

## 2023-08-22 RX ORDER — VENLAFAXINE HYDROCHLORIDE 37.5 MG/1
37.5 CAPSULE, EXTENDED RELEASE ORAL DAILY
COMMUNITY
Start: 2023-08-22

## 2023-08-22 NOTE — TELEPHONE ENCOUNTER
Would recommend pt continues to get this from original provider who prescribed this then. I do not write for 18.75MG dose.   Also, regarding her zofran not being covered, she could try compazine if she wanted? Let me know her thoughts

## 2023-08-22 NOTE — TELEPHONE ENCOUNTER
Call to pt. She has a call out to Dr Parker for her Venlafaxine.     For the Zofran, she is going to call her insurance first to discuss. She is going to hold off on the Compazine.   Advised her that she can call back if she would like the Compazine. She agrees.

## 2023-08-22 NOTE — TELEPHONE ENCOUNTER
Fax received from \[facility]\"" [facility].  Please verify the ATYPICAL directions for the venlafaxine (EFFEXOR) 37.5 MG tablet.  Immediate Release dosage form is typically dosed 2 to 3 times daily, not once daily.

## 2023-08-22 NOTE — TELEPHONE ENCOUNTER
Dr Samuels has never prescribed this. It is always historical or pt reported.     Per Outside Meds tab, Dr Parker prescribes Venlafaxine 37.5 mg ER capsule, one daily. That she has received at [facility], in [place].

## 2023-08-29 ENCOUNTER — TELEPHONE (OUTPATIENT)
Dept: INTERNAL MEDICINE | Facility: CLINIC | Age: 78
End: 2023-08-29
Payer: MEDICARE

## 2023-08-29 NOTE — TELEPHONE ENCOUNTER
[employer] medical clarification request received via fax    Forms in Dr. mail box for review and signature.

## 2023-08-31 ENCOUNTER — TRANSFERRED RECORDS (OUTPATIENT)
Dept: HEALTH INFORMATION MANAGEMENT | Facility: CLINIC | Age: 78
End: 2023-08-31
Payer: MEDICARE

## 2023-09-05 NOTE — PROVIDER NOTIFICATION
09/05/23 1612   Discharge Planning   Patient/Family Anticipates Transition to home with help/services   Concerns to be Addressed all concerns addressed in this encounter   Living Arrangements   People in Home alone   Type of Residence Private Residence   Is your private residence a single family home or apartment? Apartment   Number of Stairs, Within Home, Primary one   Stair Railings, Within Home, Primary none   Once home, are you able to live on one level? Yes   Which level? Main Level   Bathroom Shower/Tub Walk-in shower   Equipment Currently Used at Home cane, quad;raised toilet seat;grab bar, toilet;walker, rolling   Support System   Support Systems Children   Do you have someone available to stay with you one or two nights once you are home? Yes   Medical Clearance   Date of Physical 09/18/23   It is recommended that you call and check with any specialty providers before surgery to see if you need surgical clearance.  Do you see any specialty providers outside of your primary care provider? No   Blood   Known Bleeding Disorder or Coagulopathy No   Does the patient have any Orthodox/cultural preferences related to blood products? No   Education   Has the patient scheduled or completed pre-op total joint education, either in class or online, in the last 12 months? Yes   Patient attended total joint pre-op class/received pre-op teaching  online

## 2023-09-06 ENCOUNTER — LAB (OUTPATIENT)
Dept: LAB | Facility: CLINIC | Age: 78
End: 2023-09-06
Payer: MEDICARE

## 2023-09-06 ENCOUNTER — VIRTUAL VISIT (OUTPATIENT)
Dept: FAMILY MEDICINE | Facility: CLINIC | Age: 78
End: 2023-09-06
Payer: MEDICARE

## 2023-09-06 DIAGNOSIS — R30.0 DYSURIA: Primary | ICD-10-CM

## 2023-09-06 DIAGNOSIS — N39.0 URINARY TRACT INFECTION WITHOUT HEMATURIA, SITE UNSPECIFIED: ICD-10-CM

## 2023-09-06 DIAGNOSIS — R30.0 DYSURIA: ICD-10-CM

## 2023-09-06 LAB
ALBUMIN UR-MCNC: 30 MG/DL
APPEARANCE UR: ABNORMAL
BACTERIA #/AREA URNS HPF: ABNORMAL /HPF
BILIRUB UR QL STRIP: NEGATIVE
COLOR UR AUTO: YELLOW
GLUCOSE UR STRIP-MCNC: NEGATIVE MG/DL
HGB UR QL STRIP: ABNORMAL
KETONES UR STRIP-MCNC: NEGATIVE MG/DL
LEUKOCYTE ESTERASE UR QL STRIP: ABNORMAL
MUCOUS THREADS #/AREA URNS LPF: PRESENT /LPF
NITRATE UR QL: NEGATIVE
PH UR STRIP: 6 [PH] (ref 5–7)
RBC URINE: 159 /HPF
SP GR UR STRIP: 1.03 (ref 1–1.03)
SQUAMOUS EPITHELIAL: 1 /HPF
UROBILINOGEN UR STRIP-MCNC: NORMAL MG/DL
WBC URINE: >182 /HPF

## 2023-09-06 PROCEDURE — 87186 SC STD MICRODIL/AGAR DIL: CPT

## 2023-09-06 PROCEDURE — 99213 OFFICE O/P EST LOW 20 MIN: CPT | Mod: 95 | Performed by: STUDENT IN AN ORGANIZED HEALTH CARE EDUCATION/TRAINING PROGRAM

## 2023-09-06 PROCEDURE — 81001 URINALYSIS AUTO W/SCOPE: CPT

## 2023-09-06 RX ORDER — NITROFURANTOIN 25; 75 MG/1; MG/1
100 CAPSULE ORAL 2 TIMES DAILY
Qty: 14 CAPSULE | Refills: 0 | Status: SHIPPED | OUTPATIENT
Start: 2023-09-06 | End: 2023-09-13

## 2023-09-06 NOTE — PROGRESS NOTES
Assessment & Plan   78 year old femal who presents via virtual visit for dysuria and polyuria for the last 3 days. Concerning for UTI. We will empirically treat. She will a urine sample for testing as well    1. Dysuria  - UA Macroscopic with reflex to Microscopic and Culture - Lab Collect; Future  - Urine Culture Aerobic Bacterial - lab collect; Future    2. Urinary tract infection without hematuria, site unspecified  - nitroFURantoin macrocrystal-monohydrate (MACROBID) 100 MG capsule; Take 1 capsule (100 mg) by mouth 2 times daily for 7 days  Dispense: 14 capsule; Refill: 0    Subjective   Chief Complaint   Patient presents with    UTI     Possible UTI     HPI   Patient tells me she is having burning when she pees, frequency. Frequent bowel movements. Some vaginal discharge     Objective      Vitals:  No vitals were obtained today due to virtual visit.    Physical Exam       Video-Visit Details    Type of service:  Video Visit     Start time: 1:57 PM  End time: 2:04 PM    Originating Location (pt. Location): Home  Distant Location (provider location):  On-site  Platform used for Video Visit: [employer]

## 2023-09-07 LAB — BACTERIA UR CULT: ABNORMAL

## 2023-09-08 NOTE — RESULT ENCOUNTER NOTE
Please call patient and inform her that her urine culture grew out bacteria but the antibiotic I put her on for the UTI should work.  Let me know if her symptoms are not improving.    Brian Ann MD

## 2023-09-10 DIAGNOSIS — H81.02 MENIERE'S DISEASE OF LEFT EAR: ICD-10-CM

## 2023-09-10 DIAGNOSIS — R11.0 NAUSEA: ICD-10-CM

## 2023-09-12 RX ORDER — ONDANSETRON 4 MG/1
TABLET, ORALLY DISINTEGRATING ORAL
Qty: 240 TABLET | Refills: 0 | Status: SHIPPED | OUTPATIENT
Start: 2023-09-12 | End: 2023-10-05

## 2023-09-18 ENCOUNTER — OFFICE VISIT (OUTPATIENT)
Dept: INTERNAL MEDICINE | Facility: CLINIC | Age: 78
End: 2023-09-18
Payer: MEDICARE

## 2023-09-18 VITALS
TEMPERATURE: 97.8 F | HEART RATE: 78 BPM | BODY MASS INDEX: 23.92 KG/M2 | SYSTOLIC BLOOD PRESSURE: 135 MMHG | WEIGHT: 135 LBS | HEIGHT: 63 IN | DIASTOLIC BLOOD PRESSURE: 68 MMHG | RESPIRATION RATE: 16 BRPM | OXYGEN SATURATION: 97 %

## 2023-09-18 DIAGNOSIS — K11.7 XEROSTOMIA: ICD-10-CM

## 2023-09-18 DIAGNOSIS — M17.12 PRIMARY OSTEOARTHRITIS OF LEFT KNEE: ICD-10-CM

## 2023-09-18 DIAGNOSIS — M81.0 OSTEOPOROSIS, UNSPECIFIED OSTEOPOROSIS TYPE, UNSPECIFIED PATHOLOGICAL FRACTURE PRESENCE: ICD-10-CM

## 2023-09-18 DIAGNOSIS — Z01.818 PREOP GENERAL PHYSICAL EXAM: Primary | ICD-10-CM

## 2023-09-18 DIAGNOSIS — I10 ESSENTIAL HYPERTENSION: ICD-10-CM

## 2023-09-18 DIAGNOSIS — R42 LIGHTHEADEDNESS: ICD-10-CM

## 2023-09-18 LAB
ANION GAP SERPL CALCULATED.3IONS-SCNC: 9 MMOL/L (ref 7–15)
BUN SERPL-MCNC: 26.9 MG/DL (ref 8–23)
CALCIUM SERPL-MCNC: 9.7 MG/DL (ref 8.8–10.2)
CHLORIDE SERPL-SCNC: 105 MMOL/L (ref 98–107)
CREAT SERPL-MCNC: 0.59 MG/DL (ref 0.51–0.95)
DEPRECATED HCO3 PLAS-SCNC: 27 MMOL/L (ref 22–29)
EGFRCR SERPLBLD CKD-EPI 2021: >90 ML/MIN/1.73M2
ERYTHROCYTE [DISTWIDTH] IN BLOOD BY AUTOMATED COUNT: 14.2 % (ref 10–15)
GLUCOSE SERPL-MCNC: 82 MG/DL (ref 70–99)
HCT VFR BLD AUTO: 41.8 % (ref 35–47)
HGB BLD-MCNC: 13.2 G/DL (ref 11.7–15.7)
MCH RBC QN AUTO: 28.8 PG (ref 26.5–33)
MCHC RBC AUTO-ENTMCNC: 31.6 G/DL (ref 31.5–36.5)
MCV RBC AUTO: 91 FL (ref 78–100)
PLATELET # BLD AUTO: 243 10E3/UL (ref 150–450)
POTASSIUM SERPL-SCNC: 4 MMOL/L (ref 3.4–5.3)
RBC # BLD AUTO: 4.58 10E6/UL (ref 3.8–5.2)
SODIUM SERPL-SCNC: 141 MMOL/L (ref 136–145)
WBC # BLD AUTO: 5.5 10E3/UL (ref 4–11)

## 2023-09-18 PROCEDURE — 80048 BASIC METABOLIC PNL TOTAL CA: CPT

## 2023-09-18 PROCEDURE — 93000 ELECTROCARDIOGRAM COMPLETE: CPT

## 2023-09-18 PROCEDURE — 36415 COLL VENOUS BLD VENIPUNCTURE: CPT

## 2023-09-18 PROCEDURE — 85027 COMPLETE CBC AUTOMATED: CPT

## 2023-09-18 PROCEDURE — 99215 OFFICE O/P EST HI 40 MIN: CPT

## 2023-09-18 ASSESSMENT — ANXIETY QUESTIONNAIRES
IF YOU CHECKED OFF ANY PROBLEMS ON THIS QUESTIONNAIRE, HOW DIFFICULT HAVE THESE PROBLEMS MADE IT FOR YOU TO DO YOUR WORK, TAKE CARE OF THINGS AT HOME, OR GET ALONG WITH OTHER PEOPLE: SOMEWHAT DIFFICULT
4. TROUBLE RELAXING: SEVERAL DAYS
GAD7 TOTAL SCORE: 4
3. WORRYING TOO MUCH ABOUT DIFFERENT THINGS: SEVERAL DAYS
2. NOT BEING ABLE TO STOP OR CONTROL WORRYING: SEVERAL DAYS
6. BECOMING EASILY ANNOYED OR IRRITABLE: NOT AT ALL
5. BEING SO RESTLESS THAT IT IS HARD TO SIT STILL: NOT AT ALL
GAD7 TOTAL SCORE: 4
1. FEELING NERVOUS, ANXIOUS, OR ON EDGE: SEVERAL DAYS
7. FEELING AFRAID AS IF SOMETHING AWFUL MIGHT HAPPEN: NOT AT ALL

## 2023-09-18 NOTE — PROGRESS NOTES
[place] [address] [facility] [facility]  [address]  [facility] [address]  Phone: [phone]  Primary Provider: Huyen Samuels  Pre-op Performing Provider: CATHY CENTENO      PREOPERATIVE EVALUATION:  Today's date: 9/18/2023    Cynthia Arita is a 78 year old female who presents for a preoperative evaluation.      9/18/2023     9:05 AM   Additional Questions   Roomed by matthias       Surgical Information:  Surgery/Procedure:   LEFT TOTAL KNEE ARTHROPLASTY    Surgery Location: [facility]   Surgeon: SARA Amor   Surgery Date: 10/2/23  Time of Surgery: TBD  Where patient plans to recover: At home with family, overnight x 1 night   Fax number for surgical facility: Note does not need to be faxed, will be available electronically in Epic.      Assessment & Plan     The proposed surgical procedure is considered INTERMEDIATE risk.    (Z01.818) Preop general physical exam  (primary encounter diagnosis)  Comment: Okay to proceed with procedure as planned.    I do have slight concern for post op recovery as patient struggles with minor cognitive deficits and struggles with frequent dizziness and lightheadedness d/t Meniere's disease. I do think she could benefit significantly from short stay in TCU post operatively. She would be at high risk for fall in the post-operative period due to her pre-operative balance disturbance and cognitive deficits. She lives alone currently. We discussed this at today's visit. These concerns are also shared by the orthopedics provider whom patient recently saw.       We went over medications to hold and which mediations to continue taking on day of procedure. Will hold all supplements, and OTC analgesics 7 days prior to procedure.    Plan: EKG 12-lead complete w/read - Clinics, Basic         metabolic panel  (Ca, Cl, CO2, Creat, Gluc, K,         Na, BUN), CBC with platelets            (M17.12) Primary osteoarthritis of left knee  Comment: Pt with severe OA of left knee.    (I10)  Essential hypertension  Comment: BP at goal today at 135/68.   Plan:     (M81.0) Osteoporosis, unspecified osteoporosis type, unspecified pathological fracture presence  Comment: Being treated with calcitonin nasal spray by her PCP. I recently ordered DXA as this is not up to date. She has this scheduled for October.  Plan:     (R42) Lightheadedness  Comment: see details above.    (K11.7) Xerostomia  Comment: Significant history of Xerostomia and also complains of bilateral dry eyes-- I think it is worthwhile to have her see Rheumatology to rule out any possibility of Sjogren's disease.  Plan: Adult Rheumatology  Referral             - No identified additional risk factors other than previously addressed      RECOMMENDATION:  APPROVAL GIVEN to proceed with proposed procedure, without further diagnostic evaluation.            Subjective       HPI related to upcoming procedure: Hx of severe left knee osteoarthritis. She has generalized discomfort with ambulation. Plan for total left knee arthroplasty on 10/2/23.         9/18/2023     9:07 AM   Preop Questions   1. Have you ever had a heart attack or stroke? No   2. Have you ever had surgery on your heart or blood vessels, such as a stent placement, a coronary artery bypass, or surgery on an artery in your head, neck, heart, or legs? No   3. Do you have chest pain with activity? No   4. Do you have a history of  heart failure? No   5. Do you currently have a cold, bronchitis or symptoms of other infection? No   6. Do you have a cough, shortness of breath, or wheezing? No   7. Do you or anyone in your family have previous history of blood clots? No   8. Do you or does anyone in your family have a serious bleeding problem such as prolonged bleeding following surgeries or cuts? No   9. Have you ever had problems with anemia or been told to take iron pills? No   10. Have you had any abnormal blood loss such as black, tarry or bloody stools, or abnormal vaginal  bleeding? No   11. Have you ever had a blood transfusion? YES - right knee arthoplasty about 4 years ago   11a. Have you ever had a transfusion reaction? No   12. Are you willing to have a blood transfusion if it is medically needed before, during, or after your surgery? Yes   13. Have you or any of your relatives ever had problems with anesthesia? No   14. Do you have sleep apnea, excessive snoring or daytime drowsiness? No   15. Do you have any artifical heart valves or other implanted medical devices like a pacemaker, defibrillator, or continuous glucose monitor? No   16. Do you have artificial joints? YES -    17. Are you allergic to latex? No       Health Care Directive:  Patient does not have a Health Care Directive or Living Will: Discussed advance care planning with patient; however, patient declined at this time.    Preoperative Review of :   reviewed - Last prescriptions prescribed in November of 2022. Not currently on any controlled substances           Review of Systems  CONSTITUTIONAL: NEGATIVE for fever, chills, change in weight  ENT/MOUTH: NEGATIVE for ear, mouth and throat problems  RESP: NEGATIVE for significant cough or SOB  CV: NEGATIVE for chest pain, palpitations or peripheral edema    Patient Active Problem List    Diagnosis Date Noted    Abdominal pain 02/03/2019     Priority: Medium    S/P total knee arthroplasty 01/21/2019     Priority: Medium    Pain of right thigh 10/01/2018     Priority: Medium    Tinnitus, unspecified laterality 05/23/2018     Priority: Medium    Gastroesophageal reflux disease without esophagitis 12/20/2017     Priority: Medium    Stress 07/31/2017     Priority: Medium    Essential hypertension 07/03/2017     Priority: Medium    Anxiety 04/19/2017     Priority: Medium    Pneumonia of right upper lobe due to infectious organism 04/05/2017     Priority: Medium    Calculus of right kidney 06/29/2015     Priority: Medium    Esophageal reflux 06/18/2015     Priority:  Medium    Osteoporosis 06/18/2015     Priority: Medium    Meniere's disease 06/18/2015     Priority: Medium      Past Medical History:   Diagnosis Date    Anxiety     Basal cell carcinoma     Complication of anesthesia     Diverticulosis     GERD (gastroesophageal reflux disease)     HTN (hypertension)     Meniere's disease     Nephrolithiasis     Pain in right knee     PONV (postoperative nausea and vomiting)      Past Surgical History:   Procedure Laterality Date    ARTHROPLASTY KNEE Right 1/21/2019    Procedure: Right total knee arthroplasty;  Surgeon: Denton Amor MD;  Location: [facility] OR    EYE SURGERY      cataract surgery both eyes    ZZC VAGINAL HYSTERECTOMY      with left oophorectomy     Current Outpatient Medications   Medication Sig Dispense Refill    acetaminophen (TYLENOL) 500 MG tablet Take 500 mg by mouth 3 times daily 5oomg 2 tablets in the morning, 2 tablets noon, 2 tablets in the PM (6 tablets total)      calcitonin, salmon, (MIACALCIN) 200 UNIT/ACT nasal spray USE 1 SPRAY IN 1 NOSTRIL  DAILY (ALTERNATING NOSTRILS DAILY) 11.1 mL 3    calcium carbonate (TUMS) 500 MG chewable tablet Take 1 tablet (500 mg) by mouth 2 times daily      Cyanocobalamin (B-12 PO) B-12 Liquid, once in the morning      esomeprazole (NEXIUM) 40 MG DR capsule Take 1 capsule (40 mg) by mouth 2 times daily Take 30-60 minutes before eating. Dispense generic. 180 capsule 3    Flaxseed, Linseed, (FLAX SEED OIL) 1000 MG capsule Take 1 capsule by mouth daily Takes one in the PM      meclizine (ANTIVERT) 25 MG tablet Take 25 mg by mouth 3 times daily      Misc Natural Products (GLUCOSAMINE CHOND COMPLEX/MSM PO) Takes one in the morning      Multiple Vitamins-Minerals (EYE-VITES) TABS Take by mouth 2 times daily      Multiple Vitamins-Minerals (OCUVITE PRESERVISION PO) Take 1 tablet by mouth daily       Multiple Vitamins-Minerals (PRESERVISION AREDS 2) CAPS       ondansetron (ZOFRAN ODT) 4 MG ODT tab DISSOLVE 1 TABLET ON THE  "TONGUE  EVERY 6 HOURS 240 tablet 0    propranolol (INDERAL) 10 MG tablet 1/2 to 1 tab twice daily as needed 30 tablet 1    UNABLE TO FIND 1,000 mg MEDICATION NAME: OMEGA 3 with Vitamin E. Takes one in the morning      venlafaxine (EFFEXOR XR) 37.5 MG 24 hr capsule Take 1 capsule (37.5 mg) by mouth daily      vitamin D3 (CHOLECALCIFEROL) 50 mcg (2000 units) tablet Take 1 tablet by mouth daily Takes one in the morning 2,000 units         Allergies   Allergen Reactions    Ativan [Lorazepam]      Sick -     Dicyclomine      Other reaction(s): dry mouth    Gabapentin Nausea    Metoprolol      Nausea      Pantoprazole Other (See Comments), Nausea and Vomiting and GI Disturbance     Red in the face, sleepiness, face swelling, joint pain, dizziness    Tramadol Nausea and Vomiting    Latex Itching and Rash    Oxycodone Anxiety        Social History     Tobacco Use    Smoking status: Never    Smokeless tobacco: Never   Substance Use Topics    Alcohol use: Yes     Comment: rare       History   Drug Use No         Objective     /68   Pulse 78   Temp 97.8  F (36.6  C) (Oral)   Resp 16   Ht 1.594 m (5' 2.75\")   Wt 61.2 kg (135 lb)   LMP  (LMP Unknown)   SpO2 97%   BMI 24.11 kg/m        Physical Exam  Constitutional:       General: She is not in acute distress.     Appearance: Normal appearance. She is not ill-appearing, toxic-appearing or diaphoretic.   HENT:      Head: Normocephalic and atraumatic.   Eyes:      Conjunctiva/sclera: Conjunctivae normal.   Cardiovascular:      Rate and Rhythm: Normal rate and regular rhythm.      Heart sounds: Normal heart sounds.   Pulmonary:      Effort: Pulmonary effort is normal.      Breath sounds: Normal breath sounds.   Skin:     General: Skin is warm and dry.   Neurological:      Mental Status: She is alert and oriented to person, place, and time.   Psychiatric:         Mood and Affect: Mood normal.         Behavior: Behavior normal.         Thought Content: Thought content " normal.         Judgment: Judgment normal.     Recent Labs   Lab Test 04/08/23  1525 03/09/23  0914 01/30/23  1344 01/21/23  1639   HGB 13.5  --   --  13.7     --   --  305    140   < > 140   POTASSIUM 4.1 5.3   < > 3.9   CR 0.60 0.59   < > 0.51    < > = values in this interval not displayed.        Diagnostics:  Labs pending at this time.  Results will be reviewed when available.   EKG: appears normal, NSR, normal axis, normal intervals, no acute ST/T changes c/w ischemia, no LVH by voltage criteria, unchanged from previous tracings    Revised Cardiac Risk Index (RCRI):  The patient has the following serious cardiovascular risks for perioperative complications:   - No serious cardiac risks = 0 points     RCRI Interpretation: 0 points: Class I (very low risk - 0.4% complication rate)         Signed Electronically by: CASSIDY Dhillon CNP  Copy of this evaluation report is provided to requesting physician.    Answers submitted by the patient for this visit:  DUSTIN-7 (Submitted on 9/18/2023)  DUSTIN 7 TOTAL SCORE: 4

## 2023-09-18 NOTE — LETTER
September 21, 2023      Cynthia Arita  [address]  [place] [address]        Dear ,    We are writing to inform you of your test results.    Your ab results are stable. Okay to proceed with procedure as planned.     Resulted Orders   Basic metabolic panel  (Ca, Cl, CO2, Creat, Gluc, K, Na, BUN)   Result Value Ref Range    Sodium 141 136 - 145 mmol/L    Potassium 4.0 3.4 - 5.3 mmol/L    Chloride 105 98 - 107 mmol/L    Carbon Dioxide (CO2) 27 22 - 29 mmol/L    Anion Gap 9 7 - 15 mmol/L    Urea Nitrogen 26.9 (H) 8.0 - 23.0 mg/dL    Creatinine 0.59 0.51 - 0.95 mg/dL    Calcium 9.7 8.8 - 10.2 mg/dL    Glucose 82 70 - 99 mg/dL    GFR Estimate >90 >60 mL/min/1.73m2   CBC with platelets   Result Value Ref Range    WBC Count 5.5 4.0 - 11.0 10e3/uL    RBC Count 4.58 3.80 - 5.20 10e6/uL    Hemoglobin 13.2 11.7 - 15.7 g/dL    Hematocrit 41.8 35.0 - 47.0 %    MCV 91 78 - 100 fL    MCH 28.8 26.5 - 33.0 pg    MCHC 31.6 31.5 - 36.5 g/dL    RDW 14.2 10.0 - 15.0 %    Platelet Count 243 150 - 450 10e3/uL     If you have any questions or concerns, please call the clinic at the number listed above.       Sincerely,    CASSIDY Goodwin CNP

## 2023-09-18 NOTE — PATIENT INSTRUCTIONS
7 days prior to surgery, STOP centrum silver multivitamin, vitamin b-12 liquid, glucosamine and chondroitin, preservision, omega fish oil, aleve, ibuprofen.    Tylenol is okay as well as Venlafaxine, Ondansetron, Propranolol, flax seed oil, nexium, vitamin D and Calcitonin nasal spray.     Please let me know if you have any questions.    Thanks!  Betsey Magaña, APRN, CNP   [facility]

## 2023-09-21 NOTE — RESULT ENCOUNTER NOTE
Letter and lab results mailed to patient.      Wilma Alvarez MA     [Time Spent: ___ minutes] : I have spent [unfilled] minutes of time on the encounter.

## 2023-10-02 ENCOUNTER — ANESTHESIA (OUTPATIENT)
Dept: SURGERY | Facility: CLINIC | Age: 78
End: 2023-10-02
Payer: MEDICARE

## 2023-10-02 ENCOUNTER — HOSPITAL ENCOUNTER (OUTPATIENT)
Facility: CLINIC | Age: 78
Discharge: NURSING, REHAB OR LONG-TERM CARE | End: 2023-10-04
Attending: ORTHOPAEDIC SURGERY | Admitting: ORTHOPAEDIC SURGERY
Payer: MEDICARE

## 2023-10-02 ENCOUNTER — APPOINTMENT (OUTPATIENT)
Dept: GENERAL RADIOLOGY | Facility: CLINIC | Age: 78
End: 2023-10-02
Attending: PHYSICIAN ASSISTANT
Payer: MEDICARE

## 2023-10-02 ENCOUNTER — ANESTHESIA EVENT (OUTPATIENT)
Dept: SURGERY | Facility: CLINIC | Age: 78
End: 2023-10-02
Payer: MEDICARE

## 2023-10-02 ENCOUNTER — APPOINTMENT (OUTPATIENT)
Dept: PHYSICAL THERAPY | Facility: CLINIC | Age: 78
End: 2023-10-02
Attending: ORTHOPAEDIC SURGERY
Payer: MEDICARE

## 2023-10-02 DIAGNOSIS — Z96.652 TOTAL KNEE REPLACEMENT STATUS, LEFT: Primary | ICD-10-CM

## 2023-10-02 DIAGNOSIS — N20.0 CALCULUS OF RIGHT KIDNEY: ICD-10-CM

## 2023-10-02 DIAGNOSIS — I10 ESSENTIAL HYPERTENSION: ICD-10-CM

## 2023-10-02 DIAGNOSIS — J18.9 PNEUMONIA OF RIGHT UPPER LOBE DUE TO INFECTIOUS ORGANISM: ICD-10-CM

## 2023-10-02 DIAGNOSIS — F41.9 ANXIETY: ICD-10-CM

## 2023-10-02 DIAGNOSIS — F43.9 STRESS: ICD-10-CM

## 2023-10-02 DIAGNOSIS — M79.651 PAIN OF RIGHT THIGH: ICD-10-CM

## 2023-10-02 DIAGNOSIS — K21.9 GASTROESOPHAGEAL REFLUX DISEASE WITHOUT ESOPHAGITIS: ICD-10-CM

## 2023-10-02 DIAGNOSIS — H93.19 TINNITUS, UNSPECIFIED LATERALITY: ICD-10-CM

## 2023-10-02 DIAGNOSIS — Z96.652 STATUS POST TOTAL LEFT KNEE REPLACEMENT: ICD-10-CM

## 2023-10-02 PROCEDURE — 360N000077 HC SURGERY LEVEL 4, PER MIN: Performed by: ORTHOPAEDIC SURGERY

## 2023-10-02 PROCEDURE — C1713 ANCHOR/SCREW BN/BN,TIS/BN: HCPCS | Performed by: ORTHOPAEDIC SURGERY

## 2023-10-02 PROCEDURE — 250N000013 HC RX MED GY IP 250 OP 250 PS 637: Performed by: INTERNAL MEDICINE

## 2023-10-02 PROCEDURE — 250N000009 HC RX 250: Performed by: ANESTHESIOLOGY

## 2023-10-02 PROCEDURE — 97161 PT EVAL LOW COMPLEX 20 MIN: CPT | Mod: GP

## 2023-10-02 PROCEDURE — 250N000011 HC RX IP 250 OP 636: Mod: JZ | Performed by: ANESTHESIOLOGY

## 2023-10-02 PROCEDURE — 258N000001 HC RX 258: Performed by: ORTHOPAEDIC SURGERY

## 2023-10-02 PROCEDURE — 999N000141 HC STATISTIC PRE-PROCEDURE NURSING ASSESSMENT: Performed by: ORTHOPAEDIC SURGERY

## 2023-10-02 PROCEDURE — 258N000003 HC RX IP 258 OP 636: Performed by: NURSE ANESTHETIST, CERTIFIED REGISTERED

## 2023-10-02 PROCEDURE — 99214 OFFICE O/P EST MOD 30 MIN: CPT | Performed by: INTERNAL MEDICINE

## 2023-10-02 PROCEDURE — 250N000011 HC RX IP 250 OP 636: Mod: JZ | Performed by: PHYSICIAN ASSISTANT

## 2023-10-02 PROCEDURE — 370N000017 HC ANESTHESIA TECHNICAL FEE, PER MIN: Performed by: ORTHOPAEDIC SURGERY

## 2023-10-02 PROCEDURE — 250N000011 HC RX IP 250 OP 636: Performed by: NURSE ANESTHETIST, CERTIFIED REGISTERED

## 2023-10-02 PROCEDURE — 258N000003 HC RX IP 258 OP 636: Performed by: ANESTHESIOLOGY

## 2023-10-02 PROCEDURE — 250N000013 HC RX MED GY IP 250 OP 250 PS 637: Performed by: PHYSICIAN ASSISTANT

## 2023-10-02 PROCEDURE — 97110 THERAPEUTIC EXERCISES: CPT | Mod: GP

## 2023-10-02 PROCEDURE — 250N000009 HC RX 250: Performed by: ORTHOPAEDIC SURGERY

## 2023-10-02 PROCEDURE — 250N000009 HC RX 250: Performed by: NURSE ANESTHETIST, CERTIFIED REGISTERED

## 2023-10-02 PROCEDURE — 710N000009 HC RECOVERY PHASE 1, LEVEL 1, PER MIN: Performed by: ORTHOPAEDIC SURGERY

## 2023-10-02 PROCEDURE — 258N000003 HC RX IP 258 OP 636: Performed by: PHYSICIAN ASSISTANT

## 2023-10-02 PROCEDURE — 250N000011 HC RX IP 250 OP 636: Performed by: PHYSICIAN ASSISTANT

## 2023-10-02 PROCEDURE — C1776 JOINT DEVICE (IMPLANTABLE): HCPCS | Performed by: ORTHOPAEDIC SURGERY

## 2023-10-02 PROCEDURE — 272N000001 HC OR GENERAL SUPPLY STERILE: Performed by: ORTHOPAEDIC SURGERY

## 2023-10-02 PROCEDURE — 97530 THERAPEUTIC ACTIVITIES: CPT | Mod: GP

## 2023-10-02 PROCEDURE — 999N000065 XR KNEE PORT LEFT 1/2 VIEWS: Mod: LT

## 2023-10-02 DEVICE — FULL DOSE BONE CEMENT, 10 PACK CATALOG NUMBER IS 6191-1-010
Type: IMPLANTABLE DEVICE | Site: KNEE | Status: FUNCTIONAL
Brand: SIMPLEX

## 2023-10-02 DEVICE — IMPLANTABLE DEVICE
Type: IMPLANTABLE DEVICE | Site: KNEE | Status: FUNCTIONAL
Brand: PERSONA®

## 2023-10-02 DEVICE — IMPLANTABLE DEVICE
Type: IMPLANTABLE DEVICE | Site: KNEE | Status: FUNCTIONAL
Brand: PERSONA® NATURAL TIBIA®

## 2023-10-02 DEVICE — IMPLANTABLE DEVICE
Type: IMPLANTABLE DEVICE | Site: KNEE | Status: FUNCTIONAL
Brand: PERSONA® VIVACIT-E®

## 2023-10-02 RX ORDER — BUPIVACAINE HYDROCHLORIDE AND EPINEPHRINE 2.5; 5 MG/ML; UG/ML
INJECTION, SOLUTION INFILTRATION; PERINEURAL
Status: COMPLETED | OUTPATIENT
Start: 2023-10-02 | End: 2023-10-02

## 2023-10-02 RX ORDER — ONDANSETRON 2 MG/ML
INJECTION INTRAMUSCULAR; INTRAVENOUS PRN
Status: DISCONTINUED | OUTPATIENT
Start: 2023-10-02 | End: 2023-10-02

## 2023-10-02 RX ORDER — TRANEXAMIC ACID 10 MG/ML
1 INJECTION, SOLUTION INTRAVENOUS ONCE
Status: DISCONTINUED | OUTPATIENT
Start: 2023-10-02 | End: 2023-10-02 | Stop reason: HOSPADM

## 2023-10-02 RX ORDER — IBUPROFEN 600 MG/1
600 TABLET, FILM COATED ORAL EVERY 6 HOURS PRN
Status: DISCONTINUED | OUTPATIENT
Start: 2023-10-02 | End: 2023-10-04 | Stop reason: HOSPADM

## 2023-10-02 RX ORDER — HYDROMORPHONE HCL IN WATER/PF 6 MG/30 ML
0.2 PATIENT CONTROLLED ANALGESIA SYRINGE INTRAVENOUS EVERY 5 MIN PRN
Status: DISCONTINUED | OUTPATIENT
Start: 2023-10-02 | End: 2023-10-02 | Stop reason: HOSPADM

## 2023-10-02 RX ORDER — CEFAZOLIN SODIUM 1 G/3ML
1 INJECTION, POWDER, FOR SOLUTION INTRAMUSCULAR; INTRAVENOUS EVERY 8 HOURS
Status: COMPLETED | OUTPATIENT
Start: 2023-10-02 | End: 2023-10-02

## 2023-10-02 RX ORDER — HYDROMORPHONE HYDROCHLORIDE 4 MG/1
4 TABLET ORAL EVERY 4 HOURS PRN
Status: DISCONTINUED | OUTPATIENT
Start: 2023-10-02 | End: 2023-10-04 | Stop reason: HOSPADM

## 2023-10-02 RX ORDER — ONDANSETRON 2 MG/ML
4 INJECTION INTRAMUSCULAR; INTRAVENOUS EVERY 6 HOURS PRN
Status: DISCONTINUED | OUTPATIENT
Start: 2023-10-02 | End: 2023-10-04 | Stop reason: HOSPADM

## 2023-10-02 RX ORDER — FENTANYL CITRATE 50 UG/ML
50 INJECTION, SOLUTION INTRAMUSCULAR; INTRAVENOUS EVERY 5 MIN PRN
Status: DISCONTINUED | OUTPATIENT
Start: 2023-10-02 | End: 2023-10-02 | Stop reason: HOSPADM

## 2023-10-02 RX ORDER — NALOXONE HYDROCHLORIDE 0.4 MG/ML
0.4 INJECTION, SOLUTION INTRAMUSCULAR; INTRAVENOUS; SUBCUTANEOUS
Status: DISCONTINUED | OUTPATIENT
Start: 2023-10-02 | End: 2023-10-04 | Stop reason: HOSPADM

## 2023-10-02 RX ORDER — ONDANSETRON 4 MG/1
4 TABLET, ORALLY DISINTEGRATING ORAL EVERY 6 HOURS PRN
Status: DISCONTINUED | OUTPATIENT
Start: 2023-10-02 | End: 2023-10-02

## 2023-10-02 RX ORDER — SODIUM CHLORIDE, SODIUM LACTATE, POTASSIUM CHLORIDE, CALCIUM CHLORIDE 600; 310; 30; 20 MG/100ML; MG/100ML; MG/100ML; MG/100ML
INJECTION, SOLUTION INTRAVENOUS CONTINUOUS
Status: DISCONTINUED | OUTPATIENT
Start: 2023-10-02 | End: 2023-10-02 | Stop reason: HOSPADM

## 2023-10-02 RX ORDER — CEFAZOLIN SODIUM/WATER 2 G/20 ML
2 SYRINGE (ML) INTRAVENOUS
Status: COMPLETED | OUTPATIENT
Start: 2023-10-02 | End: 2023-10-02

## 2023-10-02 RX ORDER — POLYETHYLENE GLYCOL 3350 17 G/17G
17 POWDER, FOR SOLUTION ORAL DAILY
Status: DISCONTINUED | OUTPATIENT
Start: 2023-10-03 | End: 2023-10-04 | Stop reason: HOSPADM

## 2023-10-02 RX ORDER — ASPIRIN 325 MG
325 TABLET, DELAYED RELEASE (ENTERIC COATED) ORAL DAILY
Qty: 30 TABLET | Refills: 0 | Status: SHIPPED | OUTPATIENT
Start: 2023-10-02 | End: 2023-10-17

## 2023-10-02 RX ORDER — LIDOCAINE 40 MG/G
CREAM TOPICAL
Status: DISCONTINUED | OUTPATIENT
Start: 2023-10-02 | End: 2023-10-02 | Stop reason: HOSPADM

## 2023-10-02 RX ORDER — VENLAFAXINE HYDROCHLORIDE 37.5 MG/1
37.5 CAPSULE, EXTENDED RELEASE ORAL DAILY
Status: DISCONTINUED | OUTPATIENT
Start: 2023-10-02 | End: 2023-10-04 | Stop reason: HOSPADM

## 2023-10-02 RX ORDER — ACETAMINOPHEN 325 MG/1
650 TABLET ORAL EVERY 4 HOURS PRN
Qty: 100 TABLET | Refills: 0 | Status: SHIPPED | OUTPATIENT
Start: 2023-10-02 | End: 2023-10-05

## 2023-10-02 RX ORDER — SODIUM CHLORIDE, SODIUM LACTATE, POTASSIUM CHLORIDE, CALCIUM CHLORIDE 600; 310; 30; 20 MG/100ML; MG/100ML; MG/100ML; MG/100ML
INJECTION, SOLUTION INTRAVENOUS CONTINUOUS PRN
Status: DISCONTINUED | OUTPATIENT
Start: 2023-10-02 | End: 2023-10-02

## 2023-10-02 RX ORDER — SODIUM CHLORIDE, SODIUM LACTATE, POTASSIUM CHLORIDE, CALCIUM CHLORIDE 600; 310; 30; 20 MG/100ML; MG/100ML; MG/100ML; MG/100ML
INJECTION, SOLUTION INTRAVENOUS CONTINUOUS
Status: DISCONTINUED | OUTPATIENT
Start: 2023-10-02 | End: 2023-10-03

## 2023-10-02 RX ORDER — METHOCARBAMOL 500 MG/1
500 TABLET, FILM COATED ORAL EVERY 6 HOURS PRN
Status: DISCONTINUED | OUTPATIENT
Start: 2023-10-02 | End: 2023-10-04 | Stop reason: HOSPADM

## 2023-10-02 RX ORDER — HYDROMORPHONE HCL IN WATER/PF 6 MG/30 ML
0.2 PATIENT CONTROLLED ANALGESIA SYRINGE INTRAVENOUS
Status: DISCONTINUED | OUTPATIENT
Start: 2023-10-02 | End: 2023-10-04 | Stop reason: HOSPADM

## 2023-10-02 RX ORDER — HYDROMORPHONE HCL IN WATER/PF 6 MG/30 ML
0.4 PATIENT CONTROLLED ANALGESIA SYRINGE INTRAVENOUS EVERY 5 MIN PRN
Status: DISCONTINUED | OUTPATIENT
Start: 2023-10-02 | End: 2023-10-02 | Stop reason: HOSPADM

## 2023-10-02 RX ORDER — AMOXICILLIN 250 MG
1-2 CAPSULE ORAL 2 TIMES DAILY
Qty: 30 TABLET | Refills: 0 | Status: SHIPPED | OUTPATIENT
Start: 2023-10-02 | End: 2023-10-17

## 2023-10-02 RX ORDER — HYDROMORPHONE HCL IN WATER/PF 6 MG/30 ML
0.4 PATIENT CONTROLLED ANALGESIA SYRINGE INTRAVENOUS
Status: DISCONTINUED | OUTPATIENT
Start: 2023-10-02 | End: 2023-10-04 | Stop reason: HOSPADM

## 2023-10-02 RX ORDER — ONDANSETRON 4 MG/1
4 TABLET, ORALLY DISINTEGRATING ORAL EVERY 30 MIN PRN
Status: DISCONTINUED | OUTPATIENT
Start: 2023-10-02 | End: 2023-10-02 | Stop reason: HOSPADM

## 2023-10-02 RX ORDER — ACETAMINOPHEN 325 MG/1
975 TABLET ORAL ONCE
Status: COMPLETED | OUTPATIENT
Start: 2023-10-02 | End: 2023-10-02

## 2023-10-02 RX ORDER — HYDROMORPHONE HYDROCHLORIDE 2 MG/1
2 TABLET ORAL EVERY 4 HOURS PRN
Status: DISCONTINUED | OUTPATIENT
Start: 2023-10-02 | End: 2023-10-04 | Stop reason: HOSPADM

## 2023-10-02 RX ORDER — BUPIVACAINE HYDROCHLORIDE 7.5 MG/ML
INJECTION, SOLUTION INTRASPINAL
Status: DISCONTINUED | OUTPATIENT
Start: 2023-10-02 | End: 2023-10-02

## 2023-10-02 RX ORDER — HYDROXYZINE HYDROCHLORIDE 10 MG/1
10 TABLET, FILM COATED ORAL EVERY 6 HOURS PRN
Status: DISCONTINUED | OUTPATIENT
Start: 2023-10-02 | End: 2023-10-04 | Stop reason: HOSPADM

## 2023-10-02 RX ORDER — ACETAMINOPHEN 325 MG/1
975 TABLET ORAL EVERY 8 HOURS
Status: DISCONTINUED | OUTPATIENT
Start: 2023-10-02 | End: 2023-10-04 | Stop reason: HOSPADM

## 2023-10-02 RX ORDER — CALCIUM CARBONATE 500 MG/1
500 TABLET, CHEWABLE ORAL 4 TIMES DAILY PRN
Status: DISCONTINUED | OUTPATIENT
Start: 2023-10-02 | End: 2023-10-04 | Stop reason: HOSPADM

## 2023-10-02 RX ORDER — POLYETHYLENE GLYCOL 3350 17 G/17G
1 POWDER, FOR SOLUTION ORAL DAILY
Qty: 7 PACKET | Refills: 0 | Status: SHIPPED | OUTPATIENT
Start: 2023-10-02

## 2023-10-02 RX ORDER — TRANEXAMIC ACID 650 MG/1
1950 TABLET ORAL ONCE
Status: COMPLETED | OUTPATIENT
Start: 2023-10-02 | End: 2023-10-02

## 2023-10-02 RX ORDER — AMOXICILLIN 250 MG
1 CAPSULE ORAL 2 TIMES DAILY
Status: DISCONTINUED | OUTPATIENT
Start: 2023-10-02 | End: 2023-10-04 | Stop reason: HOSPADM

## 2023-10-02 RX ORDER — LIDOCAINE 40 MG/G
CREAM TOPICAL
Status: DISCONTINUED | OUTPATIENT
Start: 2023-10-02 | End: 2023-10-04 | Stop reason: HOSPADM

## 2023-10-02 RX ORDER — FENTANYL CITRATE 50 UG/ML
25 INJECTION, SOLUTION INTRAMUSCULAR; INTRAVENOUS EVERY 5 MIN PRN
Status: DISCONTINUED | OUTPATIENT
Start: 2023-10-02 | End: 2023-10-02 | Stop reason: HOSPADM

## 2023-10-02 RX ORDER — PROCHLORPERAZINE MALEATE 5 MG
5 TABLET ORAL EVERY 6 HOURS PRN
Status: DISCONTINUED | OUTPATIENT
Start: 2023-10-02 | End: 2023-10-04 | Stop reason: HOSPADM

## 2023-10-02 RX ORDER — NALOXONE HYDROCHLORIDE 0.4 MG/ML
0.2 INJECTION, SOLUTION INTRAMUSCULAR; INTRAVENOUS; SUBCUTANEOUS
Status: DISCONTINUED | OUTPATIENT
Start: 2023-10-02 | End: 2023-10-04 | Stop reason: HOSPADM

## 2023-10-02 RX ORDER — CEFAZOLIN SODIUM/WATER 2 G/20 ML
2 SYRINGE (ML) INTRAVENOUS SEE ADMIN INSTRUCTIONS
Status: DISCONTINUED | OUTPATIENT
Start: 2023-10-02 | End: 2023-10-02 | Stop reason: HOSPADM

## 2023-10-02 RX ORDER — MECLIZINE HYDROCHLORIDE 25 MG/1
25 TABLET ORAL 3 TIMES DAILY
Status: DISCONTINUED | OUTPATIENT
Start: 2023-10-02 | End: 2023-10-04 | Stop reason: HOSPADM

## 2023-10-02 RX ORDER — PROPRANOLOL HYDROCHLORIDE 10 MG/1
10 TABLET ORAL DAILY PRN
Status: DISCONTINUED | OUTPATIENT
Start: 2023-10-02 | End: 2023-10-03

## 2023-10-02 RX ORDER — ONDANSETRON 2 MG/ML
4 INJECTION INTRAMUSCULAR; INTRAVENOUS EVERY 30 MIN PRN
Status: DISCONTINUED | OUTPATIENT
Start: 2023-10-02 | End: 2023-10-02 | Stop reason: HOSPADM

## 2023-10-02 RX ORDER — ONDANSETRON 4 MG/1
4 TABLET, ORALLY DISINTEGRATING ORAL EVERY 6 HOURS PRN
Status: DISCONTINUED | OUTPATIENT
Start: 2023-10-02 | End: 2023-10-04 | Stop reason: HOSPADM

## 2023-10-02 RX ORDER — ASPIRIN 325 MG
325 TABLET, DELAYED RELEASE (ENTERIC COATED) ORAL DAILY
Status: DISCONTINUED | OUTPATIENT
Start: 2023-10-03 | End: 2023-10-04 | Stop reason: HOSPADM

## 2023-10-02 RX ORDER — HYDROMORPHONE HYDROCHLORIDE 2 MG/1
2-4 TABLET ORAL EVERY 4 HOURS PRN
Qty: 6 TABLET | Refills: 0 | Status: SHIPPED | OUTPATIENT
Start: 2023-10-02 | End: 2023-10-06

## 2023-10-02 RX ORDER — BISACODYL 10 MG
10 SUPPOSITORY, RECTAL RECTAL DAILY PRN
Status: DISCONTINUED | OUTPATIENT
Start: 2023-10-02 | End: 2023-10-04 | Stop reason: HOSPADM

## 2023-10-02 RX ORDER — ESOMEPRAZOLE MAGNESIUM 40 MG/1
40 CAPSULE, DELAYED RELEASE ORAL
Status: DISCONTINUED | OUTPATIENT
Start: 2023-10-03 | End: 2023-10-04 | Stop reason: HOSPADM

## 2023-10-02 RX ORDER — HYDROXYZINE HYDROCHLORIDE 10 MG/1
10 TABLET, FILM COATED ORAL EVERY 6 HOURS PRN
Qty: 30 TABLET | Refills: 0 | Status: SHIPPED | OUTPATIENT
Start: 2023-10-02 | End: 2023-10-17

## 2023-10-02 RX ORDER — PROPOFOL 10 MG/ML
INJECTION, EMULSION INTRAVENOUS CONTINUOUS PRN
Status: DISCONTINUED | OUTPATIENT
Start: 2023-10-02 | End: 2023-10-02

## 2023-10-02 RX ADMIN — SODIUM CHLORIDE, POTASSIUM CHLORIDE, SODIUM LACTATE AND CALCIUM CHLORIDE: 600; 310; 30; 20 INJECTION, SOLUTION INTRAVENOUS at 22:36

## 2023-10-02 RX ADMIN — TRANEXAMIC ACID 1 G: 1 INJECTION, SOLUTION INTRAVENOUS at 07:30

## 2023-10-02 RX ADMIN — HYDROMORPHONE HYDROCHLORIDE 0.4 MG: 0.2 INJECTION, SOLUTION INTRAMUSCULAR; INTRAVENOUS; SUBCUTANEOUS at 23:43

## 2023-10-02 RX ADMIN — MIDAZOLAM 2 MG: 1 INJECTION INTRAMUSCULAR; INTRAVENOUS at 07:22

## 2023-10-02 RX ADMIN — SODIUM CHLORIDE, POTASSIUM CHLORIDE, SODIUM LACTATE AND CALCIUM CHLORIDE: 600; 310; 30; 20 INJECTION, SOLUTION INTRAVENOUS at 07:18

## 2023-10-02 RX ADMIN — CEFAZOLIN 1 G: 1 INJECTION, POWDER, FOR SOLUTION INTRAMUSCULAR; INTRAVENOUS at 22:32

## 2023-10-02 RX ADMIN — HYDROMORPHONE HYDROCHLORIDE 2 MG: 2 TABLET ORAL at 16:29

## 2023-10-02 RX ADMIN — VENLAFAXINE HYDROCHLORIDE 37.5 MG: 37.5 CAPSULE, EXTENDED RELEASE ORAL at 14:19

## 2023-10-02 RX ADMIN — ACETAMINOPHEN 975 MG: 325 TABLET, FILM COATED ORAL at 14:20

## 2023-10-02 RX ADMIN — ACETAMINOPHEN 975 MG: 325 TABLET, FILM COATED ORAL at 21:39

## 2023-10-02 RX ADMIN — SODIUM CHLORIDE, POTASSIUM CHLORIDE, SODIUM LACTATE AND CALCIUM CHLORIDE: 600; 310; 30; 20 INJECTION, SOLUTION INTRAVENOUS at 06:36

## 2023-10-02 RX ADMIN — HYDROXYZINE HYDROCHLORIDE 10 MG: 10 TABLET ORAL at 16:29

## 2023-10-02 RX ADMIN — SODIUM CHLORIDE, POTASSIUM CHLORIDE, SODIUM LACTATE AND CALCIUM CHLORIDE: 600; 310; 30; 20 INJECTION, SOLUTION INTRAVENOUS at 07:56

## 2023-10-02 RX ADMIN — BUPIVACAINE HYDROCHLORIDE AND EPINEPHRINE BITARTRATE 20 ML: 2.5; .005 INJECTION, SOLUTION INFILTRATION; PERINEURAL at 07:12

## 2023-10-02 RX ADMIN — SENNOSIDES AND DOCUSATE SODIUM 1 TABLET: 50; 8.6 TABLET ORAL at 21:39

## 2023-10-02 RX ADMIN — MECLIZINE HYDROCHLORIDE 25 MG: 25 TABLET ORAL at 21:40

## 2023-10-02 RX ADMIN — Medication 2 G: at 07:18

## 2023-10-02 RX ADMIN — ONDANSETRON 4 MG: 2 INJECTION INTRAMUSCULAR; INTRAVENOUS at 08:56

## 2023-10-02 RX ADMIN — MECLIZINE HYDROCHLORIDE 25 MG: 25 TABLET ORAL at 14:20

## 2023-10-02 RX ADMIN — ACETAMINOPHEN 975 MG: 325 TABLET, FILM COATED ORAL at 06:10

## 2023-10-02 RX ADMIN — HYDROMORPHONE HYDROCHLORIDE 4 MG: 4 TABLET ORAL at 21:41

## 2023-10-02 RX ADMIN — TRANEXAMIC ACID 1950 MG: 650 TABLET ORAL at 06:10

## 2023-10-02 RX ADMIN — PROPOFOL 50 MCG/KG/MIN: 10 INJECTION, EMULSION INTRAVENOUS at 07:31

## 2023-10-02 RX ADMIN — BUPIVACAINE HYDROCHLORIDE IN DEXTROSE 2 ML: 7.5 INJECTION, SOLUTION SUBARACHNOID at 07:26

## 2023-10-02 RX ADMIN — CEFAZOLIN 1 G: 1 INJECTION, POWDER, FOR SOLUTION INTRAMUSCULAR; INTRAVENOUS at 14:42

## 2023-10-02 ASSESSMENT — ACTIVITIES OF DAILY LIVING (ADL)
ADLS_ACUITY_SCORE: 23

## 2023-10-02 NOTE — BRIEF OP NOTE
TKR for Pre/Post OA knee  Mt  TT est 35 w  (see dictation for full)  Spec none  No anticipated complications

## 2023-10-02 NOTE — CONSULTS
Hospitalist Consultation      Cynthia Arita MRN# [id number]   YOB: 1945 Age: 78 year old   Date of Admission: 10/2/2023     Requesting Physician:  Dr. Amor  Reason for consult: Medical Management           Assessment and Plan:   Ms. Arita is a 78-year-old female with past medical history significant for hypertension, anxiety, Ménière's disease and vertigo, presented to the emergency room for elective left total knee arthroplasty due to osteoarthritis.  Medicine team was consulted for comanagement     Status post total left knee arthroplasty  -Tolerated procedure well.  Vital stable.  pain is well controlled  -Postop pain management PT and anticoagulation per primary team.    Hypertension  -Prior to admission patient on propranolol we will continue (patient was intolerant to metoprolol)    Anxiety and depression  -Continue prior to admission Effexor    GERD  -Continue esomeprazole patient did not tolerate omeprazole.    Ménière's disease and vertigo  -Continue prior to admission meclizine    Thank you for the consult will continue to follow along.                 History of Present Illness:   This patient is a 78 year old female with past medical history significant for hypertension, anxiety, men's disease and vertigo presented to the emergency room for elective left total knee arthroplasty due to osteoarthritis.    Prior to surgery patient did have difficulty walking with change in gait due to arthritis.  She has had prior surgery involving right knee.  Prior to surgery also did have significant knee pain and that affected her ADLs.  Elected to undergo knee replacement.  Tolerated procedure well pain is well controlled however does feel some tingling and intermittent numbness of the lower extremity.  She denies any chest pain shortness of breath lightheadedness dizziness.  Has not had any PT yet.  She did receive spinal anesthesia..  She is able to move her toes.    More than 10 point review of  systems was carried out was otherwise negative.                Past Medical History:     Past Medical History:   Diagnosis Date    Anxiety     Basal cell carcinoma     Complication of anesthesia     Diverticulosis     GERD (gastroesophageal reflux disease)     HTN (hypertension)     Meniere's disease     Nephrolithiasis     Pain in right knee     PONV (postoperative nausea and vomiting)              Past Surgical History:     Past Surgical History:   Procedure Laterality Date    ARTHROPLASTY KNEE Right 1/21/2019    Procedure: Right total knee arthroplasty;  Surgeon: Denton Amor MD;  Location: [facility] OR    EYE SURGERY      cataract surgery both eyes    ZZC VAGINAL HYSTERECTOMY      with left oophorectomy               Social History:     Social History     Tobacco Use    Smoking status: Never    Smokeless tobacco: Never   Substance Use Topics    Alcohol use: Yes     Comment: rare             Family History:     Family History   Problem Relation Age of Onset    Neurologic Disorder Mother         palsy    Esophageal Cancer Father     Cancer Maternal Grandmother         lymph    Diabetes Paternal Grandmother     Neurologic Disorder Sister         Parkinson's    Hypertension Brother     Bipolar Disorder Sister     Brain Cancer Son 17             Allergies:     Allergies   Allergen Reactions    Ativan [Lorazepam]      Sick -     Dicyclomine      Other reaction(s): dry mouth    Gabapentin Nausea    Metoprolol      Nausea      Pantoprazole Other (See Comments), Nausea and Vomiting and GI Disturbance     Red in the face, sleepiness, face swelling, joint pain, dizziness    Tramadol Nausea and Vomiting    Oxycodone Anxiety             Medications:     Facility-Administered Medications Prior to Admission   Medication Dose Route Frequency Provider Last Rate Last Admin    sodium chloride (PF) 0.9% PF flush 3 mL  3 mL Intravenous q1 min prn Kirill Rodriguez MD   3 mL at 05/13/22 1302     Medications Prior  to Admission   Medication Sig Dispense Refill Last Dose    calcitonin, salmon, (MIACALCIN) 200 UNIT/ACT nasal spray USE 1 SPRAY IN 1 NOSTRIL  DAILY (ALTERNATING NOSTRILS DAILY) 11.1 mL 3 9/25/2023    calcium carbonate (TUMS) 500 MG chewable tablet Take 1 tablet (500 mg) by mouth 2 times daily   10/1/2023    Cyanocobalamin (B-12 PO) B-12 Liquid, once in the morning   9/25/2023    esomeprazole (NEXIUM) 40 MG DR capsule Take 1 capsule (40 mg) by mouth 2 times daily Take 30-60 minutes before eating. Dispense generic. 180 capsule 3 10/1/2023    Flaxseed, Linseed, (FLAX SEED OIL) 1000 MG capsule Take 1 capsule by mouth daily Takes one in the PM   10/1/2023    meclizine (ANTIVERT) 25 MG tablet Take 25 mg by mouth 3 times daily   10/1/2023    Misc Natural Products (GLUCOSAMINE CHOND COMPLEX/MSM PO) Takes one in the morning   9/25/2023    Multiple Vitamins-Minerals (EYE-VITES) TABS Take by mouth 2 times daily   9/25/2023    Multiple Vitamins-Minerals (OCUVITE PRESERVISION PO) Take 1 tablet by mouth daily    9/25/2023    Multiple Vitamins-Minerals (PRESERVISION AREDS 2) CAPS    9/25/2023    ondansetron (ZOFRAN ODT) 4 MG ODT tab DISSOLVE 1 TABLET ON THE TONGUE  EVERY 6 HOURS 240 tablet 0 10/1/2023    propranolol (INDERAL) 10 MG tablet 1/2 to 1 tab twice daily as needed 30 tablet 1 10/1/2023    UNABLE TO FIND 1,000 mg MEDICATION NAME: OMEGA 3 with Vitamin E. Takes one in the morning   9/25/2023    venlafaxine (EFFEXOR XR) 37.5 MG 24 hr capsule Take 1 capsule (37.5 mg) by mouth daily       vitamin D3 (CHOLECALCIFEROL) 50 mcg (2000 units) tablet Take 1 tablet by mouth daily Takes one in the morning 2,000 units   10/1/2023    [DISCONTINUED] acetaminophen (TYLENOL) 500 MG tablet Take 500 mg by mouth 3 times daily 5oomg 2 tablets in the morning, 2 tablets noon, 2 tablets in the PM (6 tablets total)   10/1/2023               Review of Systems:     A comprehensive greater than 10 system review of systems was carried out.  Pertinent  "positives and negatives are noted above.  Otherwise negative for contributory info.            Physical Exam:   Vitals were reviewed  Blood pressure (!) 144/60, pulse 66, temperature 96.9  F (36.1  C), temperature source Temporal, resp. rate 18, height 1.594 m (5' 2.76\"), weight 61.1 kg (134 lb 12.8 oz), SpO2 98 %, not currently breastfeeding.  Exam:   GENERAL:  Comfortable.  PSYCH: pleasant, oriented, No acute distress.  EYES: PERRLA, Normal conjunctiva.  HEART:  Normal S1, S2 with no edema.  LUNGS:  Clear to auscultation, normal Respiratory effort.  ABDOMEN:  Soft, no hepatosplenomegaly, normal bowel sounds.  SKIN:  Dry to touch, No rash.  Neuro: Non focal with normal motor power, sensation, CN's and Reflexes.  Extremity: Left knee arthroplasty Ace bandage covering surgical site.  Patient is able to move toes.  Sensation distally seems to be intact         Data:   Past 24 hours labs, studies, and imaging were reviewed.  All laboratory data reviewed  All laboratory and imaging data in the past 24 hours reviewed   "

## 2023-10-02 NOTE — ANESTHESIA PREPROCEDURE EVALUATION
Anesthesia Pre-Procedure Evaluation    Patient: Cynthia Arita   MRN: [id number] : 1945        Procedure : Procedure(s):  Left total knee arthorplasty          Past Medical History:   Diagnosis Date    Anxiety     Basal cell carcinoma     Complication of anesthesia     Diverticulosis     GERD (gastroesophageal reflux disease)     HTN (hypertension)     Meniere's disease     Nephrolithiasis     Pain in right knee     PONV (postoperative nausea and vomiting)       Past Surgical History:   Procedure Laterality Date    ARTHROPLASTY KNEE Right 2019    Procedure: Right total knee arthroplasty;  Surgeon: Denton Amor MD;  Location: [facility] OR    EYE SURGERY      cataract surgery both eyes    ZZC VAGINAL HYSTERECTOMY      with left oophorectomy      Allergies   Allergen Reactions    Ativan [Lorazepam]      Sick -     Dicyclomine      Other reaction(s): dry mouth    Gabapentin Nausea    Metoprolol      Nausea      Pantoprazole Other (See Comments), Nausea and Vomiting and GI Disturbance     Red in the face, sleepiness, face swelling, joint pain, dizziness    Tramadol Nausea and Vomiting    Oxycodone Anxiety      Social History     Tobacco Use    Smoking status: Never    Smokeless tobacco: Never   Substance Use Topics    Alcohol use: Yes     Comment: rare      Wt Readings from Last 1 Encounters:   10/02/23 61.1 kg (134 lb 12.8 oz)        Anesthesia Evaluation   Pt has had prior anesthetic. Type: General.    History of anesthetic complications  - PONV.      ROS/MED HX  ENT/Pulmonary:  - neg pulmonary ROS     Neurologic: Comment: Meniere's disease    Vertigo/nausea      Cardiovascular:     (+)  hypertension- -   -  - -                                      METS/Exercise Tolerance:     Hematologic:  - neg hematologic  ROS     Musculoskeletal:   (+)  arthritis,             GI/Hepatic:     (+) GERD, Asymptomatic on medication,                  Renal/Genitourinary:     (+) renal disease,      Nephrolithiasis ,        Endo:  - neg endo ROS     Psychiatric/Substance Use:     (+) psychiatric history anxiety       Infectious Disease:  - neg infectious disease ROS     Malignancy:       Other:            Physical Exam    Airway        Mallampati: II   TM distance: > 3 FB   Neck ROM: full   Mouth opening: > 3 cm    Respiratory Devices and Support         Dental       (+) Modest Abnormalities - crowns, retainers, 1 or 2 missing teeth      Cardiovascular   cardiovascular exam normal          Pulmonary   pulmonary exam normal                OUTSIDE LABS:  CBC:   Lab Results   Component Value Date    WBC 5.5 09/18/2023    WBC 7.0 04/08/2023    HGB 13.2 09/18/2023    HGB 13.5 04/08/2023    HCT 41.8 09/18/2023    HCT 42.7 04/08/2023     09/18/2023     04/08/2023     BMP:   Lab Results   Component Value Date     09/18/2023     04/08/2023    POTASSIUM 4.0 09/18/2023    POTASSIUM 4.1 04/08/2023    CHLORIDE 105 09/18/2023    CHLORIDE 102 04/08/2023    CO2 27 09/18/2023    CO2 25 04/08/2023    BUN 26.9 (H) 09/18/2023    BUN 23.2 (H) 04/08/2023    CR 0.59 09/18/2023    CR 0.60 04/08/2023    GLC 82 09/18/2023    GLC 87 04/08/2023     COAGS: No results found for: PTT, INR, FIBR  POC:   Lab Results   Component Value Date     (H) 01/23/2019     HEPATIC:   Lab Results   Component Value Date    ALBUMIN 4.4 04/08/2023    PROTTOTAL 7.2 04/08/2023    ALT 21 04/08/2023    AST 27 04/08/2023    ALKPHOS 92 04/08/2023    BILITOTAL 0.2 04/08/2023     OTHER:   Lab Results   Component Value Date    LACT 1.1 02/03/2019    ANDERS 9.7 09/18/2023    MAG 1.8 01/25/2019    LIPASE 52 04/08/2023    TSH 2.94 03/27/2023    T4 1.05 03/27/2023    CRP <2.9 01/20/2020    SED 6 01/20/2020       Anesthesia Plan    ASA Status:  3       Anesthesia Type: Spinal.              Consents    Anesthesia Plan(s) and associated risks, benefits, and realistic alternatives discussed. Questions answered and patient/representative(s) expressed understanding.      - Discussed:     - Discussed with:  Patient      - Extended Intubation/Ventilatory Support Discussed: No.      - Patient is DNR/DNI Status: No          Postoperative Care    Pain management: IV analgesics, Oral pain medications, Multi-modal analgesia, Peripheral nerve block (Single Shot).   PONV prophylaxis: Ondansetron (or other 5HT-3), Dexamethasone or Solumedrol     Comments:                Pasha Madden MD

## 2023-10-02 NOTE — ANESTHESIA CARE TRANSFER NOTE
Patient: Cynthia Arita    Procedure: Procedure(s):  Left total knee arthorplasty       Diagnosis: Degenerative arthritis of left knee [M17.12]  Diagnosis Additional Information: No value filed.    Anesthesia Type:   Spinal     Note:    Oropharynx: spontaneously breathing  Level of Consciousness: awake  Oxygen Supplementation: room air    Independent Airway: airway patency satisfactory and stable  Dentition: dentition unchanged  Vital Signs Stable: post-procedure vital signs reviewed and stable  Report to RN Given: handoff report given  Patient transferred to: PACU    Handoff Report: Identifed the Patient, Identified the Reponsible Provider, Reviewed the pertinent medical history, Discussed the surgical course, Reviewed Intra-OP anesthesia mangement and issues during anesthesia, Set expectations for post-procedure period and Allowed opportunity for questions and acknowledgement of understanding      Vitals:  Vitals Value Taken Time   BP     Temp     Pulse     Resp     SpO2         Electronically Signed By: CASSIDY Roman CRNA  October 2, 2023  8:47 AM

## 2023-10-02 NOTE — ANESTHESIA POSTPROCEDURE EVALUATION
Patient: Cynthia Arita    Procedure: Procedure(s):  Left total knee arthorplasty       Anesthesia Type:  Spinal    Note:  Disposition: Admission   Postop Pain Control: Uneventful            Sign Out: Well controlled pain   PONV: No   Neuro/Psych: Uneventful            Sign Out: Acceptable/Baseline neuro status   Airway/Respiratory: Uneventful            Sign Out: Acceptable/Baseline resp. status   CV/Hemodynamics: Uneventful            Sign Out: Acceptable CV status; No obvious hypovolemia; No obvious fluid overload   Other NRE: NONE   DID A NON-ROUTINE EVENT OCCUR? No           Last vitals:  Vitals Value Taken Time   /75 10/02/23 0935   Temp 96.7  F (35.9  C) 10/02/23 0852   Pulse 74 10/02/23 0941   Resp 31 10/02/23 0941   SpO2 94 % 10/02/23 0947   Vitals shown include unvalidated device data.    Electronically Signed By: Pasha Madden MD  October 2, 2023  2:43 PM

## 2023-10-02 NOTE — ANESTHESIA PROCEDURE NOTES
"Intrathecal injection Procedure Note    Pre-Procedure   Staff -        Anesthesiologist:  Pasha Madden MD       Performed By: anesthesiologist       Referred By: Mt       Location: OR       Pre-Anesthestic Checklist: patient identified, IV checked, risks and benefits discussed, informed consent, monitors and equipment checked, pre-op evaluation, at physician/surgeon's request and post-op pain management  Timeout:       Correct Patient: Yes        Correct Procedure: Yes        Correct Site: Yes        Correct Position: Yes   Procedure Documentation  Procedure: intrathecal injection       Patient Position: sitting       Patient Prep/Sterile Barriers: sterile gloves, mask, patient draped       Skin prep: Betadine (midline approach).       Needle Gauge: 24.        Needle Length (Inches): 3.5        Spinal Needle Type: Pencan       Introducer used       # of attempts: 1 and  # of redirects:     Assessment/Narrative         Paresthesias: No.       CSF fluid: clear.    Medication(s) Administered   0.75% Hyperbaric Bupivacaine (Intrathecal) - Intrathecal   2 mL - 10/2/2023 7:26:00 AM    FOR [facility] ([facility]) ONLY:   Pain Team Contact information: please page the Pain Team Via [employer]. Search \"Pain\". During daytime hours, please page the attending first. At night please page the resident first.      "

## 2023-10-02 NOTE — CONSULTS
Care Management Initial Consult    General Information  Assessment completed with: Patient, Children, Patient and son Jose Luis  Type of CM/SW Visit: Initial Assessment    Primary Care Provider verified and updated as needed:     Readmission within the last 30 days: no previous admission in last 30 days      Reason for Consult: discharge planning  Advance Care Planning:            Communication Assessment  Patient's communication style: spoken language (English or Bilingual)    Hearing Difficulty or Deaf: yes   Wear Glasses or Blind: yes    Cognitive  Cognitive/Neuro/Behavioral: WDL        Orientation: oriented x 4             Living Environment:   People in home: alone     Current living Arrangements: house (1 level)      Able to return to prior arrangements: no       Family/Social Support:  Care provided by: self  Provides care for:       Children          Description of Support System: Supportive, Involved    Support Assessment: Adequate family and caregiver support    Current Resources:   Patient receiving home care services: No     Community Resources: None  Equipment currently used at home: cane, quad, raised toilet seat, grab bar, toilet, walker, rolling  Supplies currently used at home:      Employment/Financial:  Employment Status:          Financial Concerns: No concerns identified   Referral to Financial Worker: No       Does the patient's insurance plan have a 3 day qualifying hospital stay waiver?  Yes     Which insurance plan 3 day waiver is available? [employer]    Will the waiver be used for post-acute placement? Yes    Lifestyle & Psychosocial Needs:  Social Determinants of Health     Food Insecurity: Not on file   Depression: Not at risk (8/21/2023)    PHQ-2     PHQ-2 Score: 2   Housing Stability: Not on file   Tobacco Use: Low Risk  (10/2/2023)    Patient History     Smoking Tobacco Use: Never     Smokeless Tobacco Use: Never     Passive Exposure: Not on file   Financial Resource Strain: Not on file    Alcohol Use: Not on file   Transportation Needs: Not on file   Physical Activity: Not on file   Interpersonal Safety: Not on file   Stress: Not on file   Social Connections: Not on file       Functional Status:  Prior to admission patient needed assistance:   Dependent ADLs:: Ambulation-walker, Ambulation-cane     Assesssment of Functional Status: Needs placement in a SNF/TCF for rehabilitation, Not at  functional baseline, Not at baseline with mobility, Not at baseline with ADL Functioning    Mental Health Status:  Mental Health Status: No Current Concerns       Chemical Dependency Status:  Chemical Dependency Status: No Current Concerns             Values/Beliefs:  Spiritual, Cultural Beliefs, Sikh Practices, Values that affect care:                 Additional Information:  Patient presents for a TKA. SW met with patient and son Jose Luis at bedside for discharge planning.     Patient lives at home alone. She lives in a 1 story home.     Patient has children nearby but they are unable to stay with her. Patient does not have anyone at home until Thursday to assist.     Patient is interested in TCU. SW provided [facility] list. Patient would prefer [facility]. BALJIT sent referral without PT notes. Explained that we need PT  to see for coverage.     Family can likely transport tomorrow.     PRIMITIVO Bhatia, LGSW  Emergency Room   Please contact the SW on the floor in which the patient is staying for any questions or concerns

## 2023-10-02 NOTE — PHARMACY-ADMISSION MEDICATION HISTORY
Med rec completed by preadmitting RN     Reviewed by Lorne Young RN (Registered Nurse) on 09/05/23 at 1551   No further clarifications noted      Medication History Completed By: Mervat Noriega [facility] 10/2/2023 11:10 AM    Prior to Admission medications    Medication Sig Last Dose Taking? Auth Provider Long Term End Date                     calcitonin, salmon, (MIACALCIN) 200 UNIT/ACT nasal spray USE 1 SPRAY IN 1 NOSTRIL  DAILY (ALTERNATING NOSTRILS DAILY) 9/25/2023 Yes Huyen Samuels MD Yes    calcium carbonate (TUMS) 500 MG chewable tablet Take 1 tablet (500 mg) by mouth 2 times daily 10/1/2023 Yes Huyen Samuels MD     Cyanocobalamin (B-12 PO) B-12 Liquid, once in the morning 9/25/2023 Yes Reported, Patient     esomeprazole (NEXIUM) 40 MG DR capsule Take 1 capsule (40 mg) by mouth 2 times daily Take 30-60 minutes before eating. Dispense generic. 10/1/2023 Yes Huyen Samuels MD     Flaxseed, Linseed, (FLAX SEED OIL) 1000 MG capsule Take 1 capsule by mouth daily Takes one in the PM 10/1/2023 Yes Reported, Patient                       meclizine (ANTIVERT) 25 MG tablet Take 25 mg by mouth 3 times daily 10/1/2023 Yes Reported, Patient     Misc Natural Products (GLUCOSAMINE CHOND COMPLEX/MSM PO) Takes one in the morning 9/25/2023 Yes Reported, Patient     Multiple Vitamins-Minerals (EYE-VITES) TABS Take by mouth 2 times daily 9/25/2023 Yes Huyen Samuels MD     Multiple Vitamins-Minerals (OCUVITE PRESERVISION PO) Take 1 tablet by mouth daily  9/25/2023 Yes Reported, Patient     Multiple Vitamins-Minerals (PRESERVISION AREDS 2) CAPS  9/25/2023 Yes Huyen Samuels MD     ondansetron (ZOFRAN ODT) 4 MG ODT tab DISSOLVE 1 TABLET ON THE TONGUE  EVERY 6 HOURS 10/1/2023 Yes Betsey Magaña APRN CNP              propranolol (INDERAL) 10 MG tablet 1/2 to 1 tab twice daily as needed 10/1/2023 Yes Magaña, Betsey R, APRN CNP Yes             UNABLE TO FIND 1,000 mg MEDICATION NAME: OMEGA 3 with Vitamin E.  Takes one in the morning 9/25/2023 Yes Reported, Patient     venlafaxine (EFFEXOR XR) 37.5 MG 24 hr capsule Take 1 capsule (37.5 mg) by mouth daily  Yes Betsey Magaña, APRN CNP Yes    vitamin D3 (CHOLECALCIFEROL) 50 mcg (2000 units) tablet Take 1 tablet by mouth daily Takes one in the morning 2,000 units 10/1/2023 Yes Reported, Patient

## 2023-10-02 NOTE — PLAN OF CARE
Goal Outcome Evaluation:      Plan of Care Reviewed With: patient (Son)    Pt arrived from PACU 0940 to floor. EBL 50 ml. Pt had spinal block.     Patient vital signs are at baseline: No,  Reason:  hypertensive BP other VSS RA afebrile  Patient able to ambulate as they were prior to admission or with assist devices provided by therapies during their stay:  No,  Reason:  up with assist of 1 GB and walker.  Patient MUST void prior to discharge:  Yes pt had to be Straight Cath at 1415 for 600 ml. Pt had PVR before the for 712ML after voiding only 50 ml. At a time and was incontinent. Pt voided another 400 ml at 1630  Patient able to tolerate oral intake:  Yes  Pain has adequate pain control using Oral analgesics:  Yes pain managed with scheduled Tylenol. Prn PO Dilaudid and Atarax.  Does patient have an identified :  Yes Son   Has goal D/C date and time been discussed with patient:  No,  Reason:  pt would like to go to TCU at discharge. PT assessing pt this afternoon and tomorrow am.  IV Ancef. IV fluids LR at 100 ml/hr. Nausea improved from PACU. Tolerated her diet. Good sensation and pulses BLE now.

## 2023-10-02 NOTE — OP NOTE
PrePreoperative diagnosis:  Knee Osteoarthritis  Postoperative Diagnosis:  same  Procedure:Left Total Knee Arthroplasty  Surgeon: Denton Amor MD  Assistant: BREE Sim  Anesthesia:  Choice (see MDA notes)  EBL: 100 ml  TT: approximately 35 minutes  Drains: none  Specimens: none  Complication: none  Dictation of Operation:  The patient was taken to the operating room, where after TXA and antibiotic prophylaxis, the leg was prepped and draped. An anterior incision and medial arthrotomy were done with medial, lateral and later posterior osteophyte resection. Minimal medial release was done initially for exposure. An IM guide at 5 degrees with anterior referencing was used for the cuts at 5 degrees of ER from the PCA which appeared to best match the epicondylar axis. The PCL was retained. The NV structures were carefully protected and an extra medullary device was used to make a perpendicular cut of 1-2 mm from the more deficient tibial compartment. The knee was balanced in full extension, protecting the peroneal nerve.The tibia was sized then prepared with external rotation to match the junction of the medial and middle thirds of the tubercle. Nine to 10 mm was resected from the patella and sized and prepared. Gaps were equal. Simplex was used for the below components and dried in full extension. All excess cement was removed.  Balance and motion were assessed and acceptable. Extensive Betadine soak was done. A capsular closure was accomplished. After extensive lavage, the remaining layered anatomic closure was accomplished.    Implants:   Femur Olena Persona CR size: 8 Narrow  Tibia: E  MC Vit E Insert(mm): 12  Patella: 32     Notable Findings and Technical Aspects of procedure:  Releases: none except osteophyte resection  Abx cement:N  Vanco powder:N  Stability in extension and 30 degrees: grade 1 plus  Flexion Stability: perceived to be stable coronal, grade 1 sagittal  ROM extension: full  ROM flex  (gravity): 120

## 2023-10-03 ENCOUNTER — APPOINTMENT (OUTPATIENT)
Dept: PHYSICAL THERAPY | Facility: CLINIC | Age: 78
End: 2023-10-03
Attending: ORTHOPAEDIC SURGERY
Payer: MEDICARE

## 2023-10-03 LAB
ALBUMIN UR-MCNC: NEGATIVE MG/DL
ANION GAP SERPL CALCULATED.3IONS-SCNC: 11 MMOL/L (ref 7–15)
APPEARANCE UR: CLEAR
BILIRUB UR QL STRIP: NEGATIVE
BUN SERPL-MCNC: 11.6 MG/DL (ref 8–23)
CALCIUM SERPL-MCNC: 9.1 MG/DL (ref 8.8–10.2)
CHLORIDE SERPL-SCNC: 98 MMOL/L (ref 98–107)
COLOR UR AUTO: ABNORMAL
CREAT SERPL-MCNC: 0.46 MG/DL (ref 0.51–0.95)
DEPRECATED HCO3 PLAS-SCNC: 26 MMOL/L (ref 22–29)
EGFRCR SERPLBLD CKD-EPI 2021: >90 ML/MIN/1.73M2
GLUCOSE SERPL-MCNC: 140 MG/DL (ref 70–99)
GLUCOSE UR STRIP-MCNC: NEGATIVE MG/DL
HGB BLD-MCNC: 11.7 G/DL (ref 11.7–15.7)
HGB UR QL STRIP: NEGATIVE
KETONES UR STRIP-MCNC: NEGATIVE MG/DL
LACTATE SERPL-SCNC: 1.8 MMOL/L (ref 0.7–2)
LEUKOCYTE ESTERASE UR QL STRIP: NEGATIVE
MUCOUS THREADS #/AREA URNS LPF: PRESENT /LPF
NITRATE UR QL: NEGATIVE
PH UR STRIP: 7 [PH] (ref 5–7)
POTASSIUM SERPL-SCNC: 4.5 MMOL/L (ref 3.4–5.3)
RBC URINE: <1 /HPF
SODIUM SERPL-SCNC: 135 MMOL/L (ref 135–145)
SP GR UR STRIP: 1.02 (ref 1–1.03)
SQUAMOUS EPITHELIAL: <1 /HPF
UROBILINOGEN UR STRIP-MCNC: NORMAL MG/DL
WBC URINE: 1 /HPF

## 2023-10-03 PROCEDURE — 97110 THERAPEUTIC EXERCISES: CPT | Mod: GP | Performed by: PHYSICAL THERAPIST

## 2023-10-03 PROCEDURE — 250N000013 HC RX MED GY IP 250 OP 250 PS 637: Performed by: STUDENT IN AN ORGANIZED HEALTH CARE EDUCATION/TRAINING PROGRAM

## 2023-10-03 PROCEDURE — 97530 THERAPEUTIC ACTIVITIES: CPT | Mod: GP

## 2023-10-03 PROCEDURE — 83605 ASSAY OF LACTIC ACID: CPT | Performed by: INTERNAL MEDICINE

## 2023-10-03 PROCEDURE — 250N000013 HC RX MED GY IP 250 OP 250 PS 637: Performed by: PHYSICIAN ASSISTANT

## 2023-10-03 PROCEDURE — 36415 COLL VENOUS BLD VENIPUNCTURE: CPT | Performed by: STUDENT IN AN ORGANIZED HEALTH CARE EDUCATION/TRAINING PROGRAM

## 2023-10-03 PROCEDURE — 258N000003 HC RX IP 258 OP 636: Performed by: STUDENT IN AN ORGANIZED HEALTH CARE EDUCATION/TRAINING PROGRAM

## 2023-10-03 PROCEDURE — 99215 OFFICE O/P EST HI 40 MIN: CPT | Performed by: STUDENT IN AN ORGANIZED HEALTH CARE EDUCATION/TRAINING PROGRAM

## 2023-10-03 PROCEDURE — 80048 BASIC METABOLIC PNL TOTAL CA: CPT | Performed by: STUDENT IN AN ORGANIZED HEALTH CARE EDUCATION/TRAINING PROGRAM

## 2023-10-03 PROCEDURE — 250N000013 HC RX MED GY IP 250 OP 250 PS 637: Performed by: INTERNAL MEDICINE

## 2023-10-03 PROCEDURE — 99207 PR NO BILLABLE SERVICE THIS VISIT: CPT | Performed by: STUDENT IN AN ORGANIZED HEALTH CARE EDUCATION/TRAINING PROGRAM

## 2023-10-03 PROCEDURE — 85018 HEMOGLOBIN: CPT | Performed by: PHYSICIAN ASSISTANT

## 2023-10-03 PROCEDURE — 97116 GAIT TRAINING THERAPY: CPT | Mod: GP | Performed by: PHYSICAL THERAPIST

## 2023-10-03 PROCEDURE — 250N000011 HC RX IP 250 OP 636: Mod: JZ | Performed by: PHYSICIAN ASSISTANT

## 2023-10-03 PROCEDURE — 97110 THERAPEUTIC EXERCISES: CPT | Mod: GP

## 2023-10-03 PROCEDURE — 36415 COLL VENOUS BLD VENIPUNCTURE: CPT | Performed by: PHYSICIAN ASSISTANT

## 2023-10-03 PROCEDURE — 81001 URINALYSIS AUTO W/SCOPE: CPT | Performed by: STUDENT IN AN ORGANIZED HEALTH CARE EDUCATION/TRAINING PROGRAM

## 2023-10-03 RX ORDER — PROPRANOLOL HYDROCHLORIDE 10 MG/1
10 TABLET ORAL DAILY PRN
Status: DISCONTINUED | OUTPATIENT
Start: 2023-10-03 | End: 2023-10-04

## 2023-10-03 RX ORDER — SODIUM CHLORIDE, SODIUM LACTATE, POTASSIUM CHLORIDE, CALCIUM CHLORIDE 600; 310; 30; 20 MG/100ML; MG/100ML; MG/100ML; MG/100ML
INJECTION, SOLUTION INTRAVENOUS CONTINUOUS
Status: ACTIVE | OUTPATIENT
Start: 2023-10-03 | End: 2023-10-03

## 2023-10-03 RX ORDER — MECLIZINE HYDROCHLORIDE 25 MG/1
25 TABLET ORAL ONCE
Status: COMPLETED | OUTPATIENT
Start: 2023-10-03 | End: 2023-10-03

## 2023-10-03 RX ORDER — SALIVA STIMULANT COMB. NO.3
2 SPRAY, NON-AEROSOL (ML) MUCOUS MEMBRANE 4 TIMES DAILY
Status: DISCONTINUED | OUTPATIENT
Start: 2023-10-03 | End: 2023-10-04 | Stop reason: HOSPADM

## 2023-10-03 RX ADMIN — HYDROMORPHONE HYDROCHLORIDE 2 MG: 2 TABLET ORAL at 13:10

## 2023-10-03 RX ADMIN — ASPIRIN 325 MG: 325 TABLET, COATED ORAL at 11:45

## 2023-10-03 RX ADMIN — ACETAMINOPHEN 975 MG: 325 TABLET, FILM COATED ORAL at 07:57

## 2023-10-03 RX ADMIN — SODIUM CHLORIDE, POTASSIUM CHLORIDE, SODIUM LACTATE AND CALCIUM CHLORIDE: 600; 310; 30; 20 INJECTION, SOLUTION INTRAVENOUS at 10:48

## 2023-10-03 RX ADMIN — HYDROXYZINE HYDROCHLORIDE 10 MG: 10 TABLET ORAL at 02:23

## 2023-10-03 RX ADMIN — ONDANSETRON 4 MG: 2 INJECTION INTRAMUSCULAR; INTRAVENOUS at 15:28

## 2023-10-03 RX ADMIN — HYDROMORPHONE HYDROCHLORIDE 2 MG: 2 TABLET ORAL at 22:06

## 2023-10-03 RX ADMIN — MECLIZINE HYDROCHLORIDE 25 MG: 25 TABLET ORAL at 22:07

## 2023-10-03 RX ADMIN — HYDROXYZINE HYDROCHLORIDE 10 MG: 10 TABLET ORAL at 11:45

## 2023-10-03 RX ADMIN — HYDROMORPHONE HYDROCHLORIDE 4 MG: 4 TABLET ORAL at 02:23

## 2023-10-03 RX ADMIN — MECLIZINE HYDROCHLORIDE 25 MG: 25 TABLET ORAL at 10:37

## 2023-10-03 RX ADMIN — SENNOSIDES AND DOCUSATE SODIUM 1 TABLET: 50; 8.6 TABLET ORAL at 20:50

## 2023-10-03 RX ADMIN — IBUPROFEN 600 MG: 600 TABLET ORAL at 14:09

## 2023-10-03 RX ADMIN — MECLIZINE HYDROCHLORIDE 25 MG: 25 TABLET ORAL at 16:36

## 2023-10-03 RX ADMIN — VENLAFAXINE HYDROCHLORIDE 37.5 MG: 37.5 CAPSULE, EXTENDED RELEASE ORAL at 11:45

## 2023-10-03 RX ADMIN — PROCHLORPERAZINE EDISYLATE 5 MG: 5 INJECTION INTRAMUSCULAR; INTRAVENOUS at 09:22

## 2023-10-03 RX ADMIN — HYDROMORPHONE HYDROCHLORIDE 0.2 MG: 0.2 INJECTION, SOLUTION INTRAMUSCULAR; INTRAVENOUS; SUBCUTANEOUS at 09:25

## 2023-10-03 RX ADMIN — Medication 2 SPRAY: at 09:32

## 2023-10-03 RX ADMIN — Medication 2 SPRAY: at 13:10

## 2023-10-03 RX ADMIN — MECLIZINE HYDROCHLORIDE 25 MG: 25 TABLET ORAL at 07:57

## 2023-10-03 RX ADMIN — Medication 2 SPRAY: at 16:37

## 2023-10-03 RX ADMIN — ACETAMINOPHEN 975 MG: 325 TABLET, FILM COATED ORAL at 16:35

## 2023-10-03 RX ADMIN — Medication 2 SPRAY: at 20:50

## 2023-10-03 RX ADMIN — HYDROMORPHONE HYDROCHLORIDE 2 MG: 2 TABLET ORAL at 14:04

## 2023-10-03 RX ADMIN — ONDANSETRON 4 MG: 2 INJECTION INTRAMUSCULAR; INTRAVENOUS at 06:21

## 2023-10-03 ASSESSMENT — ACTIVITIES OF DAILY LIVING (ADL)
ADLS_ACUITY_SCORE: 23
ADLS_ACUITY_SCORE: 23
ADLS_ACUITY_SCORE: 24
ADLS_ACUITY_SCORE: 23
ADLS_ACUITY_SCORE: 24
ADLS_ACUITY_SCORE: 23
ADLS_ACUITY_SCORE: 23
ADLS_ACUITY_SCORE: 24
ADLS_ACUITY_SCORE: 23

## 2023-10-03 NOTE — PHARMACY-ADMISSION MEDICATION HISTORY
Med rec completed by pre-admitting RNSONDRA.    Prior to Admission medications    Medication Sig Last Dose Taking? Auth Provider Long Term End Date   acetaminophen (TYLENOL) 500 MG tablet Take 1000 mg by mouth three times daily  Yes  No    calcitonin, salmon, (MIACALCIN) 200 UNIT/ACT nasal spray USE 1 SPRAY IN 1 NOSTRIL  DAILY (ALTERNATING NOSTRILS DAILY) 9/25/2023 Yes Huyen Samuels MD Yes    calcium carbonate (TUMS) 500 MG chewable tablet Take 1 tablet (500 mg) by mouth 2 times daily 10/1/2023 Yes Huyen Samuels MD     Cyanocobalamin (B-12 PO) B-12 Liquid, once in the morning 9/25/2023 Yes Reported, Patient     esomeprazole (NEXIUM) 40 MG DR capsule Take 1 capsule (40 mg) by mouth 2 times daily Take 30-60 minutes before eating. Dispense generic. 10/1/2023 Yes Huyen Samuels MD     Flaxseed, Linseed, (FLAX SEED OIL) 1000 MG capsule Take 1 capsule by mouth daily Takes one in the PM 10/1/2023 Yes Reported, Patient     meclizine (ANTIVERT) 25 MG tablet Take 25 mg by mouth 3 times daily 10/1/2023 Yes Reported, Patient     Misc Natural Products (GLUCOSAMINE CHOND COMPLEX/MSM PO) Takes one in the morning 9/25/2023 Yes Reported, Patient     Multiple Vitamins-Minerals (EYE-VITES) TABS Take by mouth 2 times daily 9/25/2023 Yes Huyen Samuels MD     Multiple Vitamins-Minerals (OCUVITE PRESERVISION PO) Take 1 tablet by mouth daily  9/25/2023 Yes Reported, Patient     Multiple Vitamins-Minerals (PRESERVISION AREDS 2) CAPS Take 1 tablet by mouth daily 9/25/2023 Yes Huyen Samuels MD     ondansetron (ZOFRAN ODT) 4 MG ODT tab DISSOLVE 1 TABLET ON THE TONGUE  EVERY 6 HOURS 10/1/2023 Yes Betsey Magaña APRN CNP     propranolol (INDERAL) 10 MG tablet 1/2 to 1 tab twice daily as needed 10/1/2023 Yes Betsey Magaña APRN CNP Yes    UNABLE TO FIND 1,000 mg MEDICATION NAME: OMEGA 3 with Vitamin E. Takes one in the morning 9/25/2023 Yes Reported, Patient     venlafaxine (EFFEXOR XR) 37.5 MG 24 hr capsule Take 1  capsule (37.5 mg) by mouth daily  Yes Betsey Magaña, APRN CNP Yes    vitamin D3 (CHOLECALCIFEROL) 50 mcg (2000 units) tablet Take 1 tablet by mouth daily Takes one in the morning 2,000 units 10/1/2023 Yes Reported, Patient

## 2023-10-03 NOTE — PROGRESS NOTES
Care Management Discharge Note    Discharge Date: 10/03/2023       Discharge Disposition: Transitional Care    Discharge Services: None    Discharge DME: None    Discharge Transportation: family or friend will provide    Private pay costs discussed: insurance costs out of pocket expenses    Does the patient's insurance plan have a 3 day qualifying hospital stay waiver?  Yes     Which insurance plan 3 day waiver is available? [employer]    Will the waiver be used for post-acute placement? Yes    PAS Confirmation Code:  [id number]  Patient/family educated on Medicare website which has current facility and service quality ratings: yes    Education Provided on the Discharge Plan:    Persons Notified of Discharge Plans: Patient, son, RN  Patient/Family in Agreement with the Plan: yes    Handoff Referral Completed: No    Additional Information:  Patient accepted at [facility] for today. Patient prefers a shared room.     Patient was updated. She placed her son Jose Luis on the phone and SW updated Jose Luis. Patient's son Ferdinand will transport at 1400.     BALJIT appreciates orders completed by 1200.     Addendum 1018: Patient will be kept overnight due to pain. SW updated TCU and son. Family will still transport tomorrow.     Addendum 1404: TCU requests patient comes at 1600 tomorrow. Family will be here around that time for a discharge. BALJIT appreciates orders completed by 1400 on 10/4.     PRIMITIVO Bhatia, LGSW  Emergency Room   Please contact the SW on the floor in which the patient is staying for any questions or concerns

## 2023-10-03 NOTE — PROGRESS NOTES
OT: Orders received. Chart reviewed and discussed with care team.  IP OT not indicated as patient is expected to discharge to TCU. Per ortho team, patient has  baseline balance and memory issues and will require assist with mobilization and medications.  Defer discharge recommendations to ortho team.  Will complete orders.

## 2023-10-03 NOTE — PROVIDER NOTIFICATION
"Spoke with Dr. Suarez regarding patient's increased dizziness feeling.  Pt does have baseline dizziness, but states \"I've never felt like this.\" Pt drowsy, but afraid to sleep.  Encouraged pt to try to take a nap as that might improve some of her symptoms. Also discussed that she did receive IV medications that might make her sleepy or make the dizziness worse.  Remains on capnography. Additional dose of Meclizine ordered.   "

## 2023-10-03 NOTE — PROVIDER NOTIFICATION
Pt has been very nauseous, dizzy and painful this AM.  Received IV compazine along with IV Dilaudid as unable to eat much of anything this AM or take PO meds. Updated CHRISTIANO [place] with ortho-ok for pt to stay another night.  updated as well.

## 2023-10-03 NOTE — PROGRESS NOTES
[facility]    Medicine Progress Note - Hospitalist Service    Date of Admission:  10/2/2023    Assessment & Plan   Ms. Arita is a 78-year-old female with past medical history significant for hypertension, anxiety, Ménière's disease and vertigo, presented to the emergency room for elective left total knee arthroplasty due to osteoarthritis. Medicine team was consulted for comanagement.     Status post total left knee arthroplasty  -Tolerated procedure well.  Vital stable. No significant blood loss.   -Postop pain management PT and anticoagulation per primary team.    Ménière's disease and vertigo  Reported worsening dizziness associated with nausea. Feels that her symptoms are very similar to prior episodes.  Reports experiencing dizziness on a daily basis 1 to 2-hour after waking up in the morning.  No focal deficit noted on exam.     -Continue prior to admission meclizine 25 mg 3 times a day  -Meclizine 25 mg once for worsening dizziness  -IV Compazine 5 to 10 mg every 6 hours as needed for nausea/dizziness     Dysuria  Reported burning with urination. UA wnl.  -Encourage PO hydration.     Hypertension  -Prior to admission patient on propranolol PRN.   -ID propranolol 10 mg daily PRN if SBP>160     Anxiety and depression  -Continue prior to admission Effexor     GERD  -Continue esomeprazole patient did not tolerate omeprazole.        Diet: Advance Diet as Tolerated: Regular Diet Adult  Discharge Instruction - Regular Diet Adult    DVT Prophylaxis: Aspirin per primary   Dyer Catheter: Not present  Lines: None     Cardiac Monitoring: None  Code Status: Full Code      Clinically Significant Risk Factors Present on Admission                  # Hypertension: Noted on problem list                 Disposition Plan   Per primary      Expected Discharge Date: 10/03/2023      Destination: inpatient rehabilitation facility            Amanda Suarez MD  Hospitalist Service  [facility]  Hospital  Securely message with [employer] (more info)  Text page via [facility] Paging/Directory   ______________________________________________________________________    Interval History     No acute events overnight.  Patient thinks that her pain is not well controlled despite getting frequent pain medication.  This morning she reports having worsening dizziness associated with nausea.  She experiences dizziness on a daily basis related to her diagnosis of Ménière's disease.  She denied any focal deficit or weakness.  She thinks that her symptoms are very similar to prior dizziness episodes though its more severe in intensity.     10 point review of system is negative.     Physical Exam   Vital Signs: Temp: 98  F (36.7  C) Temp src: Temporal BP: (!) 156/71 Pulse: 84   Resp: 20 SpO2: 94 % O2 Device: None (Room air)    Weight: 134 lbs 12.8 oz  GENERAL: Appears uncomfortable due to dizziness  PSYCH: pleasant, oriented, No acute distress.  EYES: PERRLA, Normal conjunctiva, no nystagmus  HEART:  Normal S1, S2 with no edema.  LUNGS:  Clear to auscultation, normal Respiratory effort.  ABDOMEN:  Soft, no hepatosplenomegaly, normal bowel sounds.  SKIN:  Dry to touch, No rash.  Neuro: Alert and oriented x3, no focal deficit.  Extremity: Left knee arthroplasty Ace bandage covering surgical site.  Cold pack in place.  Patient is able to move toes.  Sensation distally seems to be intact    Medical Decision Making       40 MINUTES SPENT BY ME on the date of service doing chart review, history, exam, documentation & further activities per the note.        Data     I have personally reviewed the following data over the past 24 hrs:    N/A  \   11.7   / N/A     135 98 11.6 /  140 (H)   4.5 26 0.46 (L) \     Procal: N/A CRP: N/A Lactic Acid: 1.8

## 2023-10-03 NOTE — PLAN OF CARE
Pt in moderate amount of pain overnight. PO & IV dilaudid were given overnight with some pain improvement but in AM pt c/o nausea & dry mouth. Am tylenol held, zofran and aromatherapy given. CMS intact. Dressing CDI. IV infusing until PO better tolerated.

## 2023-10-03 NOTE — PROGRESS NOTES
"Orthopedic Surgery  10/3/2023  POD 1    S: Patient did struggle with pain last sol. Did not walk the halls and has required assist for commode per RN note Denies chest pain or shortness of breath.     O: Blood pressure (!) 165/65, pulse 83, temperature 97.6  F (36.4  C), temperature source Temporal, resp. rate 18, height 1.594 m (5' 2.76\"), weight 61.1 kg (134 lb 12.8 oz), SpO2 94 %, not currently breastfeeding.  Lab Results   Component Value Date    HGB 13.2 09/18/2023    HGB 13.4 03/08/2021     No results found for: INR  I/O last 3 completed shifts:  In: 2241 [P.O.:300; I.V.:1941]  Out: 2180 [Urine:2130; Blood:50]  Distal extremity CMSI bilaterally.  Calves are negative bilaterally, both soft and nontender.  The dressing is C/D/I.      A: Ms. Arita is doing well status post Procedure(s):  Left total knee arthorplasty.    P: Continue physical therapy. Continue DVT pphx with ASA due to mobility and low risk factors for DVT. Anticipate discharge to TCU as has baseline balance and memory issues and will require assist with mobilization and medications.    Chanelle Hua PA-C  [phone]   "

## 2023-10-03 NOTE — PROVIDER NOTIFICATION
Page sent out to CHRISTIANO [place] with ortho to discuss pt and possibly changing pain medications.       Addendum 1144: Spoke with CHRISTIANO [place]-updated on pt condition-instructed to try Hydroxyzine and encourage rest. May change to Tramadol later if feeling like Dilaudid is too much for pt.

## 2023-10-03 NOTE — PROGRESS NOTES
"Patient vital signs are at baseline: Yes  Patient able to ambulate as they were prior to admission or with assist devices provided by therapies during their stay:  Yes  Patient MUST void prior to discharge:  Yes  Patient able to tolerate oral intake:  Yes  Pain has adequate pain control using Oral analgesics:  No,  Reason:  Required IV Dilaudid  Does patient have an identified :  No,  Reason: Pt going to TCU  Has goal D/C date and time been discussed with patient:  Yes        A/Ox4. CMS intact. Aquacel intact to left knee. Pt has been dizzy/nauseous throughout the day. IV Compazine given along with additional dose of Meclizine. Hard to assess at times as pt will complain about being very nauseous or dizzy, but yet wants to take PO meds or eat. This afternoon stated \"I am just so dizzy, I can't stand it. I need to get out of this bed.\"  Asked pt if she was having pain, pt stated \"No\", but then when directly asked about pain in her knee, states \"Oh yeah my knee is hurting.\"  Did ambulate in room this afternoon with RN and in hallway with PT. Assist x1 et walker. Trying to avoid IV narcotics if able, otherwise taking Hydroxyzine, PO Dilaudid, and Ibuprofen. Eating small amounts at meals-encouraged PO intake as able, especially with meds. Voiding small amounts with some retention-straight cath x1 this afternoon. Plans to discharge to TCU tomorrow at 1600-son to transport.   "

## 2023-10-03 NOTE — PROGRESS NOTES
10/02/23 3533   Appointment Info   Signing Clinician's Name / Credentials (PT) Genaronani Duarte DPT   Living Environment   People in Home alone   Current Living Arrangements condominium   Home Accessibility stairs to enter home   Number of Stairs, Main Entrance 1   Transportation Anticipated family or friend will provide   Living Environment Comments Pt lives in condo, [address]. Pt has plans to have assistance from son(s) but not until Thursday.   Self-Care   Usual Activity Tolerance moderate   Current Activity Tolerance moderate   Equipment Currently Used at Home cane, straight;grab bar, toilet;raised toilet seat   Fall history within last six months yes   Number of times patient has fallen within last six months 1   Activity/Exercise/Self-Care Comment Pt is IND with functional mobility at baseline, uses SEC for ambulation over past few months due to pain in L knee. Pt owns hurry cane and FWW.   General Information   Onset of Illness/Injury or Date of Surgery 10/02/23   Referring Physician Chanelle Hua PA-C   Patient/Family Therapy Goals Statement (PT) return home.   Pertinent History of Current Problem (include personal factors and/or comorbidities that impact the POC) Pt is 79 yo female who is s/p L TKA, POD #0.   Existing Precautions/Restrictions fall   Weight-Bearing Status - LLE weight-bearing as tolerated   Cognition   Affect/Mental Status (Cognition) WFL   Orientation Status (Cognition) oriented x 4   Pain Assessment   Patient Currently in Pain   (0/10 at rest, slight movement increase pain 5/10.)   Integumentary/Edema   Integumentary/Edema Comments ACE wrap covering L LE.   Posture    Posture Forward head position;Protracted shoulders   Range of Motion (ROM)   Range of Motion ROM deficits secondary to surgical procedure   Strength (Manual Muscle Testing)   Strength (Manual Muscle Testing) Able to perform R SLR;Able to perform L SLR;Deficits observed during functional mobility   Bed Mobility    Bed Mobility supine-sit   Comment, (Bed Mobility) SBA, slow to progress to EOB.   Transfers   Transfers sit-stand transfer   Comment, (Transfers) FWW and [place], from slightly elevated bed height.   Gait/Stairs (Locomotion)   Distance in Feet (Gait) 20' + 45'   Comment, (Gait/Stairs) Pt ambulated 10' with FWW and CGA, slow speed, antalgic gait pattern, decreased L LE stance time, no overt LOB.   Balance   Balance Comments needs walker for standing and ambulation, no overt LOB.   Sensory Examination   Sensory Perception patient reports no sensory changes   Clinical Impression   Criteria for Skilled Therapeutic Intervention Yes, treatment indicated   PT Diagnosis (PT) impaired functional mobility   Influenced by the following impairments pain, decreased ROM and strength.   Functional limitations due to impairments difficulty with bed mobility, transfers and ambulation.   Clinical Presentation (PT Evaluation Complexity) Stable/Uncomplicated   Clinical Presentation Rationale clinical judgement.   Clinical Decision Making (Complexity) low complexity   Planned Therapy Interventions (PT) balance training;bed mobility training;gait training;home exercise program;neuromuscular re-education;ROM (range of motion);stair training;strengthening;stretching;transfer training;progressive activity/exercise   Risk & Benefits of therapy have been explained evaluation/treatment results reviewed;care plan/treatment goals reviewed;risks/benefits reviewed;current/potential barriers reviewed;participants voiced agreement with care plan;participants included;patient   PT Total Evaluation Time   PT Eval, Low Complexity Minutes (69250) 10   Plan of Care Review   Plan of Care Reviewed With patient   Physical Therapy Goals   PT Frequency Daily   PT Predicted Duration/Target Date for Goal Attainment 10/09/23   PT Goals Bed Mobility;Transfers;Gait;Stairs   PT: Bed Mobility Supervision/stand-by assist;Supine to/from sit   PT: Transfers  Supervision/stand-by assist;Sit to/from stand;Assistive device   PT: Gait Supervision/stand-by assist;Rolling walker;50 feet   PT: Stairs 1 stair;Minimal assist;Assistive device   Interventions   Interventions Quick Adds Gait Training;Therapeutic Activity;Therapeutic Procedure   Therapeutic Procedure/Exercise   Ther. Procedure: strength, endurance, ROM, flexibillity Minutes (23736) 12   Symptoms Noted During/After Treatment fatigue;increased pain   Treatment Detail/Skilled Intervention Pt performed x10 of the following TKA exercises for circulation, ROM, and strength: ankle pumps, quad sets, heel slides, SAQ, SLR and knee extension hold for 2 minutes. Pt provided handout and educated on HEP performance 2-3 x/day x10 reps each, exception with ankle pumps x10-20 per hour. Pt given verbal and tactile cues throughout for form and technique. Also, pt educated on importance of knee elevation, extension and icing (20 min ON, 20 min OFF) to help with pain management, inflammation, and ROM.   Therapeutic Activity   Therapeutic Activities: dynamic activities to improve functional performance Minutes (31405) 11   Symptoms Noted During/After Treatment Fatigue;Increased pain   Treatment Detail/Skilled Intervention Pt supine in bed upon PT arrival. Time taken for line management. Pt at EOB, then performed STS x2 with FWW and [place], progressed to CGA, cues on UE placement. Pt demonstrates. Pt performed additional STS x1 with FWW and CGA, cues on UE placement. Pt sitting in recliner at end of session, alarm on, all needs within reach.   Gait Training   Gait Training Minutes (61197) 9   Symptoms Noted During/After Treatment (Gait Training) fatigue;increased pain   Treatment Detail/Skilled Intervention Pt ambulated 20' + 45' with FWW and CGA, cues on step to pattern and sequencing (walker, L LE, R LE). Pt demonstrates. Pt with increased pain reported during gait.   PT Discharge Planning   PT Plan review HEP, progress IND with bed mob,  transfers, and gait. Trial 1 platform step   PT Discharge Recommendation (DC Rec)   (defer to ortho rec.)   PT Rationale for DC Rec Pt at baseline is IND with functional mobility, uses SEC for gait prior to surgery. Lives in condo alone, no assistance in condo until Thursday (appears that staff and MD are aware). Pt is below baseline, limited by pain and weakness. Pt able to progress to be CGA with FWW for transfers and gait. Pending MD orders, pt may go to [facility] given lack of assistance at home and deficits with mobility. Will follow up tomorrow.   PT Brief overview of current status bed mob SBA, STS CGA, gait FWW and CGA.   Total Session Time   Timed Code Treatment Minutes 32   Total Session Time (sum of timed and untimed services) 42

## 2023-10-03 NOTE — PLAN OF CARE
/72  Pulse 70   Temp 97.3  F   Resp 18   SpO2 97%      Patient is alert and oriented x4, assist of 1 with gait belt and walker, continent of bowel and bladder, uses a commode. Pain was 10/10 and dilaudid was given which was effective. Patient did not want to walk in the hallway but did dangle her feet on the edge of the bed and transferred to commode well. LR at 100 mL/hr, on capno, RA. Plan is for patient to go to TCU or home tomorrow depending what family decides and what insurance can cover.

## 2023-10-04 ENCOUNTER — APPOINTMENT (OUTPATIENT)
Dept: PHYSICAL THERAPY | Facility: CLINIC | Age: 78
End: 2023-10-04
Attending: ORTHOPAEDIC SURGERY
Payer: MEDICARE

## 2023-10-04 ENCOUNTER — LAB REQUISITION (OUTPATIENT)
Dept: LAB | Facility: CLINIC | Age: 78
End: 2023-10-04
Payer: MEDICARE

## 2023-10-04 VITALS
TEMPERATURE: 97.7 F | OXYGEN SATURATION: 94 % | SYSTOLIC BLOOD PRESSURE: 155 MMHG | BODY MASS INDEX: 23.88 KG/M2 | WEIGHT: 134.8 LBS | HEIGHT: 63 IN | HEART RATE: 87 BPM | DIASTOLIC BLOOD PRESSURE: 70 MMHG | RESPIRATION RATE: 18 BRPM

## 2023-10-04 DIAGNOSIS — Z11.1 ENCOUNTER FOR SCREENING FOR RESPIRATORY TUBERCULOSIS: ICD-10-CM

## 2023-10-04 LAB
ALBUMIN UR-MCNC: NEGATIVE MG/DL
ANION GAP SERPL CALCULATED.3IONS-SCNC: 11 MMOL/L (ref 7–15)
APPEARANCE UR: CLEAR
BACTERIA #/AREA URNS HPF: ABNORMAL /HPF
BILIRUB UR QL STRIP: NEGATIVE
BUN SERPL-MCNC: 9.7 MG/DL (ref 8–23)
CALCIUM SERPL-MCNC: 8.8 MG/DL (ref 8.8–10.2)
CHLORIDE SERPL-SCNC: 99 MMOL/L (ref 98–107)
COLOR UR AUTO: ABNORMAL
CREAT SERPL-MCNC: 0.49 MG/DL (ref 0.51–0.95)
DEPRECATED HCO3 PLAS-SCNC: 24 MMOL/L (ref 22–29)
EGFRCR SERPLBLD CKD-EPI 2021: >90 ML/MIN/1.73M2
ERYTHROCYTE [DISTWIDTH] IN BLOOD BY AUTOMATED COUNT: 14.1 % (ref 10–15)
GLUCOSE SERPL-MCNC: 128 MG/DL (ref 70–99)
GLUCOSE UR STRIP-MCNC: NEGATIVE MG/DL
HCT VFR BLD AUTO: 31.9 % (ref 35–47)
HGB BLD-MCNC: 10.5 G/DL (ref 11.7–15.7)
HGB UR QL STRIP: NEGATIVE
KETONES UR STRIP-MCNC: 20 MG/DL
LEUKOCYTE ESTERASE UR QL STRIP: NEGATIVE
MCH RBC QN AUTO: 29.6 PG (ref 26.5–33)
MCHC RBC AUTO-ENTMCNC: 32.9 G/DL (ref 31.5–36.5)
MCV RBC AUTO: 90 FL (ref 78–100)
MUCOUS THREADS #/AREA URNS LPF: PRESENT /LPF
NITRATE UR QL: NEGATIVE
PH UR STRIP: 6.5 [PH] (ref 5–7)
PLATELET # BLD AUTO: 196 10E3/UL (ref 150–450)
POTASSIUM SERPL-SCNC: 3.6 MMOL/L (ref 3.4–5.3)
RBC # BLD AUTO: 3.55 10E6/UL (ref 3.8–5.2)
RBC URINE: <1 /HPF
SODIUM SERPL-SCNC: 134 MMOL/L (ref 135–145)
SP GR UR STRIP: 1.01 (ref 1–1.03)
UROBILINOGEN UR STRIP-MCNC: NORMAL MG/DL
WBC # BLD AUTO: 8.9 10E3/UL (ref 4–11)
WBC URINE: 1 /HPF

## 2023-10-04 PROCEDURE — 36415 COLL VENOUS BLD VENIPUNCTURE: CPT | Performed by: STUDENT IN AN ORGANIZED HEALTH CARE EDUCATION/TRAINING PROGRAM

## 2023-10-04 PROCEDURE — 80048 BASIC METABOLIC PNL TOTAL CA: CPT | Performed by: STUDENT IN AN ORGANIZED HEALTH CARE EDUCATION/TRAINING PROGRAM

## 2023-10-04 PROCEDURE — 85027 COMPLETE CBC AUTOMATED: CPT | Performed by: STUDENT IN AN ORGANIZED HEALTH CARE EDUCATION/TRAINING PROGRAM

## 2023-10-04 PROCEDURE — 99214 OFFICE O/P EST MOD 30 MIN: CPT | Performed by: STUDENT IN AN ORGANIZED HEALTH CARE EDUCATION/TRAINING PROGRAM

## 2023-10-04 PROCEDURE — 250N000013 HC RX MED GY IP 250 OP 250 PS 637: Performed by: PHYSICIAN ASSISTANT

## 2023-10-04 PROCEDURE — 97116 GAIT TRAINING THERAPY: CPT | Mod: GP | Performed by: PHYSICAL THERAPIST

## 2023-10-04 PROCEDURE — 250N000013 HC RX MED GY IP 250 OP 250 PS 637: Performed by: INTERNAL MEDICINE

## 2023-10-04 PROCEDURE — 97110 THERAPEUTIC EXERCISES: CPT | Mod: GP | Performed by: PHYSICAL THERAPIST

## 2023-10-04 PROCEDURE — 81001 URINALYSIS AUTO W/SCOPE: CPT | Performed by: PHYSICIAN ASSISTANT

## 2023-10-04 PROCEDURE — 250N000013 HC RX MED GY IP 250 OP 250 PS 637: Performed by: STUDENT IN AN ORGANIZED HEALTH CARE EDUCATION/TRAINING PROGRAM

## 2023-10-04 PROCEDURE — 99207 PR NO BILLABLE SERVICE THIS VISIT: CPT | Performed by: STUDENT IN AN ORGANIZED HEALTH CARE EDUCATION/TRAINING PROGRAM

## 2023-10-04 RX ORDER — PROPRANOLOL HYDROCHLORIDE 10 MG/1
10 TABLET ORAL DAILY
Status: DISCONTINUED | OUTPATIENT
Start: 2023-10-04 | End: 2023-10-04 | Stop reason: HOSPADM

## 2023-10-04 RX ORDER — IBUPROFEN 200 MG
1 CAPSULE ORAL 2 TIMES DAILY
Qty: 30 TABLET | Refills: 0 | Status: SHIPPED | OUTPATIENT
Start: 2023-10-04

## 2023-10-04 RX ADMIN — MECLIZINE HYDROCHLORIDE 25 MG: 25 TABLET ORAL at 08:38

## 2023-10-04 RX ADMIN — ACETAMINOPHEN 975 MG: 325 TABLET, FILM COATED ORAL at 06:17

## 2023-10-04 RX ADMIN — PROPRANOLOL HYDROCHLORIDE 10 MG: 10 TABLET ORAL at 08:38

## 2023-10-04 RX ADMIN — HYDROMORPHONE HYDROCHLORIDE 2 MG: 2 TABLET ORAL at 10:01

## 2023-10-04 RX ADMIN — HYDROMORPHONE HYDROCHLORIDE 2 MG: 2 TABLET ORAL at 06:16

## 2023-10-04 RX ADMIN — Medication 2 SPRAY: at 06:17

## 2023-10-04 RX ADMIN — SENNOSIDES AND DOCUSATE SODIUM 1 TABLET: 50; 8.6 TABLET ORAL at 08:38

## 2023-10-04 RX ADMIN — VENLAFAXINE HYDROCHLORIDE 37.5 MG: 37.5 CAPSULE, EXTENDED RELEASE ORAL at 08:38

## 2023-10-04 RX ADMIN — ASPIRIN 325 MG: 325 TABLET, COATED ORAL at 08:38

## 2023-10-04 ASSESSMENT — ACTIVITIES OF DAILY LIVING (ADL)
ADLS_ACUITY_SCORE: 23
ADLS_ACUITY_SCORE: 24
ADLS_ACUITY_SCORE: 24
ADLS_ACUITY_SCORE: 23
ADLS_ACUITY_SCORE: 24
ADLS_ACUITY_SCORE: 23

## 2023-10-04 NOTE — PROGRESS NOTES
[facility]    Medicine Progress Note - Hospitalist Service    Date of Admission:  10/2/2023    Assessment & Plan   Ms. Arita is a 78-year-old female with past medical history significant for hypertension, anxiety, Ménière's disease and vertigo, presented to the emergency room for elective left total knee arthroplasty due to osteoarthritis. Medicine team was consulted for comanagement.     Status post total left knee arthroplasty  -Tolerated procedure well.  Vital stable. No significant blood loss.   -Postop pain management PT and anticoagulation per primary team.    Ménière's disease and vertigo  Reported worsening dizziness associated with nausea after the surgery which is improved now.  Reports experiencing dizziness on a daily basis 1 to 2-hour after waking up in the morning.  No focal deficit noted on exam.     -Continue prior to admission meclizine 25 mg 3 times a day  -IV Compazine 5 to 10 mg every 6 hours as needed for nausea/dizziness     Acute normocytic anemia  No overt bleeding.  No intraoperative significant blood loss. On admission hemoglobin was 11.7 now 10.5.  Could be hemodilutional as patient has been receiving IV fluids.    -repeat lab after discharge per TCU    Dysuria  Reported burning with urination. UA wnl.  -Encourage PO hydration.     Hypertension  -Prior to admission patient on propranolol PRN.  Patient said that she has been taking propranolol 10 mg tablet twice daily scheduled.    -continue propranolol 10 mg BID     Anxiety and depression  -Continue prior to admission Effexor     GERD  -Continue esomeprazole patient did not tolerate omeprazole.        Diet: Advance Diet as Tolerated: Regular Diet Adult  Discharge Instruction - Regular Diet Adult    DVT Prophylaxis: Aspirin per primary   Dyer Catheter: PRESENT, indication: Retention  Lines: None     Cardiac Monitoring: None  Code Status: Full Code      Clinically Significant Risk Factors Present on Admission                     # Hypertension: Noted on problem list                 Disposition Plan   Per primary      Expected Discharge Date: 10/04/2023      Destination: inpatient rehabilitation facility  Discharge Comments: TCU with Gomez Suarez MD  Hospitalist Service  [facility]  Securely message with [employer] (more info)  Text page via [employer] Paging/Directory   ______________________________________________________________________    Interval History     No acute events overnight.  This morning reported feeling well with improved dizziness and nausea.  Tolerating p.o. okay.  Reported her pain is improved from prior.  No bowel movement however she thinks that it is because she is not eating as much.  No abdominal pain or distention.    10 point review of system is negative.     Physical Exam   Vital Signs: Temp: 97.7  F (36.5  C) Temp src: Temporal BP: (!) 155/70 Pulse: 87   Resp: 18 SpO2: 94 % O2 Device: None (Room air)    Weight: 134 lbs 12.8 oz    GENERAL: Appears comfortable  PSYCH: pleasant, oriented, No acute distress.  EYES: PERRLA, Normal conjunctiva, no nystagmus  HEART:  Normal S1, S2 with no edema.  LUNGS:  Clear to auscultation, normal Respiratory effort.  ABDOMEN:  Soft, no hepatosplenomegaly, normal bowel sounds.  SKIN:  Dry to touch, No rash.  Neuro: Alert and oriented x3, no focal deficit.  Extremity: Left knee arthroplasty Ace bandage covering surgical site.  Cold pack in place.  Patient is able to move toes.  Sensation distally seems to be intact    Medical Decision Making       40 MINUTES SPENT BY ME on the date of service doing chart review, history, exam, documentation & further activities per the note.        Data     I have personally reviewed the following data over the past 24 hrs:    8.9  \   10.5 (L)   / 196     134 (L) 99 9.7 /  128 (H)   3.6 24 0.49 (L) \

## 2023-10-04 NOTE — PROGRESS NOTES
"Orthopedic Surgery  10/4/2023  POD 1    S: Patient voices no unexpected ortho complaints today. Denies chest pain or shortness of breath. Has fullness and abdominal discomfort. States she feels like she needs to pee and very little is coming out.    O: Blood pressure (!) 166/64, pulse 87, temperature 97.9  F (36.6  C), temperature source Temporal, resp. rate 20, height 1.594 m (5' 2.76\"), weight 61.1 kg (134 lb 12.8 oz), SpO2 91 %, not currently breastfeeding.  Lab Results   Component Value Date    HGB 11.7 10/03/2023    HGB 13.4 03/08/2021     No results found for: INR  I/O last 3 completed shifts:  In: 240 [P.O.:240]  Out: 1600 [Urine:1600]  Distal extremity CMSI bilaterally.  Calves are negative bilaterally, both soft and nontender.  The dressing is C/D/I.      A: Ms. Arita is doing well status post Procedure(s):  Left total knee arthroplasty.    P: Continue physical therapy. Continue DVT pphx with ASA due to low risk factors for DVT. Anticipate discharge to TCU with Dyer for 4 days of bladder rest. Dyer may be removed on 10/8/23.  She is in agreement with this.    Chanelle Hua PA-C  [phone]   "

## 2023-10-04 NOTE — PLAN OF CARE
Physical Therapy Discharge Summary    Reason for therapy discharge:    Discharged to transitional care facility.    Progress towards therapy goal(s). See goals on Care Plan in [facility] electronic health record for goal details.  Goals met  Pt met goals of being SBA with mobility; however, pt doesn't have any assist at home for a few days.  Therapy recommendation(s):    Continued therapy is recommended.  Rationale/Recommendations:   .  Continue home exercise program.  Continued PT to increase LE strength, ROM and progress independence with gait.  Goal Outcome Evaluation:

## 2023-10-04 NOTE — PLAN OF CARE
Continues to have intermittent nausea, but pain is better controlled with scheduled tylenol. CMS intact. Dressing CDI. Up with 1, voiding well. Tolerating diet. Anticipate home TCU today.

## 2023-10-04 NOTE — PLAN OF CARE
Goal Outcome Evaluation:                      Patient vital signs are at baseline: Yes  Patient able to ambulate as they were prior to admission or with assist devices provided by therapies during their stay: yes, pt is A-1 with walker gate belt. Ambulated to bathroom.  Patient MUST void prior to discharge:  Yes  Patient able to tolerate oral intake:  Yes, c/o nausea, said related to her dizziness.   Pain has adequate pain control using Oral analgesics:  Yes  Does patient have an identified :  No,  Reason: pt  is going to TCU.   Has goal D/C date and time been discussed with patient:  Yes.       Pt is A&OX3, forgetful. A-1 with walker and gate belt. Voiding. Ambulated to bathroom X2. Apatite poor. LS CTA.  Aqua cell dressing intact to L/knee.pt said her nausea is better now. IV to LUE CDI, LR infusion done  per order    PRN Dilaudid  2mg  PO given for pain.

## 2023-10-04 NOTE — PROGRESS NOTES
Discharge instructions reviewed with pt.  Verbalizes understanding. Orders and scripts in discharge packet sent with pt to take to TCU. Personal belongings sent with pt including glasses/case, hearing aids/case, cane, clothes, cell phone, and purse. Escorted via w/c accompanied by NST to front entrance. Leaves for [facility] with son, Ferdinand.

## 2023-10-04 NOTE — PROGRESS NOTES
[facility] GERIATRICS    PRIMARY CARE PROVIDER AND CLINIC:  Huyen Samuels MD, [address]  / [facility] [address]  Chief Complaint   Patient presents with    Hospital F/U      [place] Medical Record Number:  [id number]  Place of Service where encounter took place:  [facility]  ([facility]) [[id number]]    Cynthia Arita  is a 78 year old  (1945), admitted to the above facility from  [facility]. Hospital stay 10/2/23 through 10/4/23..   HPI:      PMH significant for HTN, Meniere's disease, vertigo, GERD, osteoarthritis, osteoporosis    Summary of recent hospitalization:   Patient was hospitalized 10/2/2023-10/4/2023 for elective left knee total arthroplasty for osteoarthritis. Patient is s/p left total knee arthroplasty on 10/2/2023. Hemoglobin postoperatively 10.5 on 10/4/2023. With worsening dizziness with nausea postoperatively. Propanolol changed to BID for hypertension. Discharged to TCU for physical rehabilitation and medical management.     Reviewed notes, imaging, labs from recent hospitalization.    Today patient was seen for admission visit in the TCU.  She is oriented x3 today.  Patient reports severe pain at time of visit.  She rates her pain 10/10 to her left knee.  She also reports having some dizziness and nausea.  She does feel like the meclizine helps with her symptoms.  She would like the Zofran scheduled at 8 and 2 for her nausea, this is what she does at home as well.  She denies any shortness of breath, chest pain, dysuria/trouble voiding.  She reports feeling constipated, but did have a bowel movement yesterday, this is her second bowel movement since surgery.  We discussed what to expect in the TCU.  Patient lives in a townhouse alone.    Reviewed facility EMR including medications, recent nursing notes, and progress notes. Nursing staff requesting some clarification on her medications today.     CODE STATUS/ADVANCE DIRECTIVES  DISCUSSION: CPR/Full code   ALLERGIES:   Allergies   Allergen Reactions    Ativan [Lorazepam]      Sick -     Dicyclomine      Other reaction(s): dry mouth    Gabapentin Nausea    Metoprolol      Nausea      Pantoprazole Other (See Comments), Nausea and Vomiting and GI Disturbance     Red in the face, sleepiness, face swelling, joint pain, dizziness    Tramadol Nausea and Vomiting    Oxycodone Anxiety      PAST MEDICAL HISTORY:   Past Medical History:   Diagnosis Date    Anxiety     Basal cell carcinoma     Complication of anesthesia     Diverticulosis     GERD (gastroesophageal reflux disease)     HTN (hypertension)     Meniere's disease     Nephrolithiasis     Pain in right knee     PONV (postoperative nausea and vomiting)       PAST SURGICAL HISTORY:   has a past surgical history that includes VAGINAL HYSTERECTOMY; Eye surgery; Arthroplasty knee (Right, 1/21/2019); and Arthroplasty knee (Left, 10/2/2023).  FAMILY HISTORY: family history includes Bipolar Disorder in her sister; Brain Cancer (age of onset: 17) in her son; Cancer in her maternal grandmother; Diabetes in her paternal grandmother; Esophageal Cancer in her father; Hypertension in her brother; Neurologic Disorder in her mother and sister.  SOCIAL HISTORY:   reports that she has never smoked. She has never used smokeless tobacco. She reports current alcohol use. She reports that she does not use drugs.  Patient's living condition: lives alone    Post Discharge Medication Reconciliation Status:   MED REC REQUIRED  Post Medication Reconciliation Status:  Discharge medications reconciled and changed, see notes/orders     Current Outpatient Medications   Medication Sig    acetaminophen (TYLENOL) 500 MG tablet Take 2 tablets (1,000 mg) by mouth 3 times daily May use regular strength (325mg) acetaminophen over the counter medication when Rx runs out.    aspirin (ASA) 325 MG EC tablet Take 1 tablet (325 mg) by mouth daily    calcitonin, salmon, (MIACALCIN) 200  UNIT/ACT nasal spray USE 1 SPRAY IN 1 NOSTRIL  DAILY (ALTERNATING NOSTRILS DAILY)    calcium citrate (CITRACAL) 950 (200 Ca) MG tablet Take 1 tablet (950 mg) by mouth 2 times daily    Cyanocobalamin (B-12 PO) B-12 Liquid, once in the morning    esomeprazole (NEXIUM) 40 MG DR capsule Take 1 capsule (40 mg) by mouth 2 times daily Take 30-60 minutes before eating. Dispense generic.    HYDROmorphone (DILAUDID) 2 MG tablet Take 1-2 tablets (2-4 mg) by mouth every 4 hours as needed for moderate to severe pain    hydrOXYzine (ATARAX) 10 MG tablet Take 1 tablet (10 mg) by mouth every 6 hours as needed for itching or anxiety (with pain, moderate pain)    meclizine (ANTIVERT) 25 MG tablet Take 25 mg by mouth 3 times daily    Multiple Vitamins-Minerals (PRESERVISION AREDS 2) CAPS Take 1 tablet by mouth daily    ondansetron (ZOFRAN ODT) 4 MG ODT tab Take 1 tablet (4 mg) by mouth 2 times daily And BID PRN    polyethylene glycol (MIRALAX) 17 g packet Take 17 g by mouth daily    propranolol (INDERAL) 10 MG tablet Take 1 tablet (10 mg) by mouth 2 times daily    senna-docusate (SENOKOT-S/PERICOLACE) 8.6-50 MG tablet Take 1-2 tablets by mouth 2 times daily Take while on oral narcotics to prevent or treat constipation. (Patient taking differently: Take 2 tablets by mouth 2 times daily Take while on oral narcotics to prevent or treat constipation.)    venlafaxine (EFFEXOR XR) 37.5 MG 24 hr capsule Take 1 capsule (37.5 mg) by mouth daily    vitamin D3 (CHOLECALCIFEROL) 50 mcg (2000 units) tablet Take 1 tablet by mouth daily Takes one in the morning 2,000 units    Flaxseed, Linseed, (FLAX SEED OIL) 1000 MG capsule Take 1 capsule by mouth daily Takes one in the PM (Patient not taking: Reported on 10/5/2023)    Misc Natural Products (GLUCOSAMINE CHOND COMPLEX/MSM PO) Takes one in the morning (Patient not taking: Reported on 10/5/2023)     No current facility-administered medications for this visit.       ROS:  10 point ROS of systems  "including Constitutional, Eyes, Respiratory, Cardiovascular, Gastroenterology, Genitourinary, Integumentary, Musculoskeletal, Psychiatric were all negative except for pertinent positives noted in my HPI.    Vitals:  BP (!) 155/70   Pulse 87   Temp 97.7  F (36.5  C)   Resp 18   Ht 1.575 m (5' 2\")   Wt 60.8 kg (134 lb)   LMP  (LMP Unknown)   SpO2 94%   BMI 24.51 kg/m    Exam:  GENERAL APPEARANCE:  Alert, in NAD  HEENT: normocephalic, moist mucous membranes, nose without drainage or crusting  RESP:  respiratory effort normal, no respiratory distress, Lung sounds clear, patient is on RA  CV: auscultation of heart done, rate and rhythm regular.   ABDOMEN: + bowel sounds, soft, nontender, no grimacing or guarding with palpation.  M/S: generalized left lower extremity edema; + pedal pulse  SKIN:  Inspection and palpation of skin and subcutaneous tissue: skin warm, dry without rashes  NEURO: cranial nerves 2-12 grossly intact and at patient's baseline;  moves extremities freely  PSYCH: oriented x 3,  affect and mood normal      Lab/Diagnostic data:  Labs done in SNF are in [facility]. Please refer to them using [employer] Everywhere. and Recent labs in [facility] reviewed by me today.     ASSESSMENT/PLAN:  Osteoarthritis  S/p left total knee arthroplasty on 10/2/2023  Pain 10/10 this morning  Plan: Start tylenol 1000 mg TID for pain. Discontinue PRN tylenol. Continue hydroxyzine 10 mg q6h PRN and dilaudid 2-4 mg q4h PRN- discussed pain with nursing today and asked them to administer her medications right away. Continue aspirin 325 mg daily for 4 weeks for DVT prophy. WBAT. Therapy. Follow up with ortho in 7-14 days.    Acute blood loss anemia  Secondary to surgery  Hemoglobin baseline 13  Hemoglobin 10.5 on 10/4/2023  Plan: CBC 10/9.     Hyponatremia  Mild  Sodium 134 on 10/4/2023  Plan: BMP 10/9 to ensure improvement    Essential hypertension  Propanolol changed to BID inpatient  BP fair control since admission  Plan: " Continue propanolol 10 mg BID. Monitor BP.    Meniere's disease and vertigo  Nausea  With worsening dizziness and nausea postoperatively  Continues to report symptoms today  Plan: Continue meclizine 25 mg three times daily and change zofran 4 mg BID at 8AM and 2PM and BID PRN.- discussed with nursing today. Monitor symptoms.    GERD  Plan: Esomeprazole 40 mg BID. Monitor symptoms.    Osteoporosis  Plan: Continue calcitonin 1 spray in 1 nostril daily, calcium citrate 950 mg BID, vitamin d3 daily.     Anxiety  PTA was on propanolol as needed, now taking it scheduled  Plan: Continue venlafaxine 37.5 mg daily, propanolol 10 mg BID, and hydroxyzine 10 mg q6h PRN.  Monitor mood and symptoms.  Consider ACP referral as needed.    Slow transit constipation  Bowels moving, but feels constipated  Plan: Continue MiraLAX 17 g daily and senna S 2 tab twice daily.  Monitor bowels.    Physical deconditioning  Secondary to acute medical conditions as above  Plan: Encourage participation in physical therapy/occupational therapy for strengthening and deconditioning. Discharge planning per their recommendation. Social work to assist with d/c planning.      Disclaimer: This note may contain text created using speech-recognition software and may contain unintended word substitutions.       Electronically signed by:  CASSIDY Rollins CNP

## 2023-10-05 ENCOUNTER — TRANSITIONAL CARE UNIT VISIT (OUTPATIENT)
Dept: GERIATRICS | Facility: CLINIC | Age: 78
End: 2023-10-05
Payer: MEDICARE

## 2023-10-05 VITALS
RESPIRATION RATE: 18 BRPM | HEIGHT: 62 IN | HEART RATE: 87 BPM | BODY MASS INDEX: 24.66 KG/M2 | SYSTOLIC BLOOD PRESSURE: 155 MMHG | OXYGEN SATURATION: 94 % | TEMPERATURE: 97.7 F | DIASTOLIC BLOOD PRESSURE: 70 MMHG | WEIGHT: 134 LBS

## 2023-10-05 DIAGNOSIS — K59.01 SLOW TRANSIT CONSTIPATION: ICD-10-CM

## 2023-10-05 DIAGNOSIS — M17.12 PRIMARY OSTEOARTHRITIS OF LEFT KNEE: Primary | ICD-10-CM

## 2023-10-05 DIAGNOSIS — Z96.652 S/P TOTAL KNEE ARTHROPLASTY, LEFT: ICD-10-CM

## 2023-10-05 DIAGNOSIS — E87.1 HYPONATREMIA: ICD-10-CM

## 2023-10-05 DIAGNOSIS — H81.02 MENIERE'S DISEASE OF LEFT EAR: ICD-10-CM

## 2023-10-05 DIAGNOSIS — K21.9 GASTROESOPHAGEAL REFLUX DISEASE, UNSPECIFIED WHETHER ESOPHAGITIS PRESENT: ICD-10-CM

## 2023-10-05 DIAGNOSIS — R11.0 NAUSEA: ICD-10-CM

## 2023-10-05 DIAGNOSIS — Z96.652 TOTAL KNEE REPLACEMENT STATUS, LEFT: ICD-10-CM

## 2023-10-05 DIAGNOSIS — D62 ANEMIA DUE TO BLOOD LOSS, ACUTE: ICD-10-CM

## 2023-10-05 DIAGNOSIS — H81.09 MENIERE'S DISEASE, UNSPECIFIED LATERALITY: ICD-10-CM

## 2023-10-05 DIAGNOSIS — R53.81 PHYSICAL DECONDITIONING: ICD-10-CM

## 2023-10-05 DIAGNOSIS — F41.9 ANXIETY: ICD-10-CM

## 2023-10-05 DIAGNOSIS — I10 ESSENTIAL HYPERTENSION: ICD-10-CM

## 2023-10-05 PROCEDURE — 99310 SBSQ NF CARE HIGH MDM 45: CPT | Performed by: NURSE PRACTITIONER

## 2023-10-05 PROCEDURE — 36415 COLL VENOUS BLD VENIPUNCTURE: CPT | Performed by: NURSE PRACTITIONER

## 2023-10-05 PROCEDURE — 86481 TB AG RESPONSE T-CELL SUSP: CPT | Performed by: NURSE PRACTITIONER

## 2023-10-05 PROCEDURE — P9604 ONE-WAY ALLOW PRORATED TRIP: HCPCS | Performed by: NURSE PRACTITIONER

## 2023-10-05 RX ORDER — PROPRANOLOL HYDROCHLORIDE 10 MG/1
10 TABLET ORAL 2 TIMES DAILY
Qty: 30 TABLET | Refills: 1
Start: 2023-10-05

## 2023-10-05 RX ORDER — ACETAMINOPHEN 500 MG
1000 TABLET ORAL 3 TIMES DAILY
Start: 2023-10-05 | End: 2023-10-17

## 2023-10-05 RX ORDER — ONDANSETRON 4 MG/1
4 TABLET, ORALLY DISINTEGRATING ORAL 2 TIMES DAILY
Qty: 240 TABLET | Refills: 0
Start: 2023-10-05 | End: 2023-11-28

## 2023-10-05 NOTE — PATIENT INSTRUCTIONS
Orders  Cynthia Arita  1945  1) Hold flaxseed oil and glucosamine in the TCU  2) Start AREDS 2 1 tab daily. Diagnosis: multivitamin  3) Discontinue PRN propanolol  4) Start propanolol to 10 mg BID for HTN  5) BMP, CBC 10/9. Diagnosis: anemia, hyponatremia  6) Discontinue ocuvite and eye vites.  7) Change zofran 4 mg BID at 8AM and 2PM and BID PRN.    8) Discontinue tylenol  9) Start tylenol 1000 mg TID for pain.   Katina Mallory, CASSIDY CNP on 10/5/2023 at 8:28 AM

## 2023-10-05 NOTE — LETTER
10/5/2023        RE: Cynthia Arita  [address] [place] [address]        [facility] GERIATRICS    PRIMARY CARE PROVIDER AND CLINIC:  Huyen Samuels MD, [address]  / [facility] [address]  Chief Complaint   Patient presents with     Hospital F/U      [place] Medical Record Number:  [id number]  Place of Service where encounter took place:  [facility]  ([facility]) [[id number]]    Cynthia Arita  is a 78 year old  (1945), admitted to the above facility from  [facility]. Hospital stay 10/2/23 through 10/4/23..   HPI:      PMH significant for HTN, Meniere's disease, vertigo, GERD, osteoarthritis, osteoporosis    Summary of recent hospitalization:   Patient was hospitalized 10/2/2023-10/4/2023 for elective left knee total arthroplasty for osteoarthritis. Patient is s/p left total knee arthroplasty on 10/2/2023. Hemoglobin postoperatively 10.5 on 10/4/2023. With worsening dizziness with nausea postoperatively. Propanolol changed to BID for hypertension. Discharged to TCU for physical rehabilitation and medical management.     Reviewed notes, imaging, labs from recent hospitalization.    Today patient was seen for admission visit in the TCU.  She is oriented x3 today.  Patient reports severe pain at time of visit.  She rates her pain 10/10 to her left knee.  She also reports having some dizziness and nausea.  She does feel like the meclizine helps with her symptoms.  She would like the Zofran scheduled at 8 and 2 for her nausea, this is what she does at home as well.  She denies any shortness of breath, chest pain, dysuria/trouble voiding.  She reports feeling constipated, but did have a bowel movement yesterday, this is her second bowel movement since surgery.  We discussed what to expect in the TCU.  Patient lives in a townhouse alone.    Reviewed facility EMR including medications, recent nursing notes, and progress notes. Nursing staff requesting some  clarification on her medications today.     CODE STATUS/ADVANCE DIRECTIVES DISCUSSION: CPR/Full code   ALLERGIES:   Allergies   Allergen Reactions     Ativan [Lorazepam]      Sick -      Dicyclomine      Other reaction(s): dry mouth     Gabapentin Nausea     Metoprolol      Nausea       Pantoprazole Other (See Comments), Nausea and Vomiting and GI Disturbance     Red in the face, sleepiness, face swelling, joint pain, dizziness     Tramadol Nausea and Vomiting     Oxycodone Anxiety      PAST MEDICAL HISTORY:   Past Medical History:   Diagnosis Date     Anxiety      Basal cell carcinoma      Complication of anesthesia      Diverticulosis      GERD (gastroesophageal reflux disease)      HTN (hypertension)      Meniere's disease      Nephrolithiasis      Pain in right knee      PONV (postoperative nausea and vomiting)       PAST SURGICAL HISTORY:   has a past surgical history that includes VAGINAL HYSTERECTOMY; Eye surgery; Arthroplasty knee (Right, 1/21/2019); and Arthroplasty knee (Left, 10/2/2023).  FAMILY HISTORY: family history includes Bipolar Disorder in her sister; Brain Cancer (age of onset: 17) in her son; Cancer in her maternal grandmother; Diabetes in her paternal grandmother; Esophageal Cancer in her father; Hypertension in her brother; Neurologic Disorder in her mother and sister.  SOCIAL HISTORY:   reports that she has never smoked. She has never used smokeless tobacco. She reports current alcohol use. She reports that she does not use drugs.  Patient's living condition: lives alone    Post Discharge Medication Reconciliation Status:   MED REC REQUIRED  Post Medication Reconciliation Status:  Discharge medications reconciled and changed, see notes/orders     Current Outpatient Medications   Medication Sig     acetaminophen (TYLENOL) 500 MG tablet Take 2 tablets (1,000 mg) by mouth 3 times daily May use regular strength (325mg) acetaminophen over the counter medication when Rx runs out.     aspirin (ASA)  325 MG EC tablet Take 1 tablet (325 mg) by mouth daily     calcitonin, salmon, (MIACALCIN) 200 UNIT/ACT nasal spray USE 1 SPRAY IN 1 NOSTRIL  DAILY (ALTERNATING NOSTRILS DAILY)     calcium citrate (CITRACAL) 950 (200 Ca) MG tablet Take 1 tablet (950 mg) by mouth 2 times daily     Cyanocobalamin (B-12 PO) B-12 Liquid, once in the morning     esomeprazole (NEXIUM) 40 MG DR capsule Take 1 capsule (40 mg) by mouth 2 times daily Take 30-60 minutes before eating. Dispense generic.     HYDROmorphone (DILAUDID) 2 MG tablet Take 1-2 tablets (2-4 mg) by mouth every 4 hours as needed for moderate to severe pain     hydrOXYzine (ATARAX) 10 MG tablet Take 1 tablet (10 mg) by mouth every 6 hours as needed for itching or anxiety (with pain, moderate pain)     meclizine (ANTIVERT) 25 MG tablet Take 25 mg by mouth 3 times daily     Multiple Vitamins-Minerals (PRESERVISION AREDS 2) CAPS Take 1 tablet by mouth daily     ondansetron (ZOFRAN ODT) 4 MG ODT tab Take 1 tablet (4 mg) by mouth 2 times daily And BID PRN     polyethylene glycol (MIRALAX) 17 g packet Take 17 g by mouth daily     propranolol (INDERAL) 10 MG tablet Take 1 tablet (10 mg) by mouth 2 times daily     senna-docusate (SENOKOT-S/PERICOLACE) 8.6-50 MG tablet Take 1-2 tablets by mouth 2 times daily Take while on oral narcotics to prevent or treat constipation. (Patient taking differently: Take 2 tablets by mouth 2 times daily Take while on oral narcotics to prevent or treat constipation.)     venlafaxine (EFFEXOR XR) 37.5 MG 24 hr capsule Take 1 capsule (37.5 mg) by mouth daily     vitamin D3 (CHOLECALCIFEROL) 50 mcg (2000 units) tablet Take 1 tablet by mouth daily Takes one in the morning 2,000 units     Flaxseed, Linseed, (FLAX SEED OIL) 1000 MG capsule Take 1 capsule by mouth daily Takes one in the PM (Patient not taking: Reported on 10/5/2023)     Misc Natural Products (GLUCOSAMINE CHOND COMPLEX/MSM PO) Takes one in the morning (Patient not taking: Reported on  "10/5/2023)     No current facility-administered medications for this visit.       ROS:  10 point ROS of systems including Constitutional, Eyes, Respiratory, Cardiovascular, Gastroenterology, Genitourinary, Integumentary, Musculoskeletal, Psychiatric were all negative except for pertinent positives noted in my HPI.    Vitals:  BP (!) 155/70   Pulse 87   Temp 97.7  F (36.5  C)   Resp 18   Ht 1.575 m (5' 2\")   Wt 60.8 kg (134 lb)   LMP  (LMP Unknown)   SpO2 94%   BMI 24.51 kg/m    Exam:  GENERAL APPEARANCE:  Alert, in NAD  HEENT: normocephalic, moist mucous membranes, nose without drainage or crusting  RESP:  respiratory effort normal, no respiratory distress, Lung sounds clear, patient is on RA  CV: auscultation of heart done, rate and rhythm regular.   ABDOMEN: + bowel sounds, soft, nontender, no grimacing or guarding with palpation.  M/S: generalized left lower extremity edema; + pedal pulse  SKIN:  Inspection and palpation of skin and subcutaneous tissue: skin warm, dry without rashes  NEURO: cranial nerves 2-12 grossly intact and at patient's baseline;  moves extremities freely  PSYCH: oriented x 3,  affect and mood normal      Lab/Diagnostic data:  Labs done in SNF are in [facility]. Please refer to them using [employer] Everywhere. and Recent labs in [facility] reviewed by me today.     ASSESSMENT/PLAN:  Osteoarthritis  S/p left total knee arthroplasty on 10/2/2023  Pain 10/10 this morning  Plan: Start tylenol 1000 mg TID for pain. Discontinue PRN tylenol. Continue hydroxyzine 10 mg q6h PRN and dilaudid 2-4 mg q4h PRN- discussed pain with nursing today and asked them to administer her medications right away. Continue aspirin 325 mg daily for 4 weeks for DVT prophy. WBAT. Therapy. Follow up with ortho in 7-14 days.    Acute blood loss anemia  Secondary to surgery  Hemoglobin baseline 13  Hemoglobin 10.5 on 10/4/2023  Plan: CBC 10/9.     Hyponatremia  Mild  Sodium 134 on 10/4/2023  Plan: BMP 10/9 to ensure " improvement    Essential hypertension  Propanolol changed to BID inpatient  BP fair control since admission  Plan: Continue propanolol 10 mg BID. Monitor BP.    Meniere's disease and vertigo  Nausea  With worsening dizziness and nausea postoperatively  Continues to report symptoms today  Plan: Continue meclizine 25 mg three times daily and change zofran 4 mg BID at 8AM and 2PM and BID PRN.- discussed with nursing today. Monitor symptoms.    GERD  Plan: Esomeprazole 40 mg BID. Monitor symptoms.    Osteoporosis  Plan: Continue calcitonin 1 spray in 1 nostril daily, calcium citrate 950 mg BID, vitamin d3 daily.     Anxiety  PTA was on propanolol as needed, now taking it scheduled  Plan: Continue venlafaxine 37.5 mg daily, propanolol 10 mg BID, and hydroxyzine 10 mg q6h PRN.  Monitor mood and symptoms.  Consider ACP referral as needed.    Slow transit constipation  Bowels moving, but feels constipated  Plan: Continue MiraLAX 17 g daily and senna S 2 tab twice daily.  Monitor bowels.    Physical deconditioning  Secondary to acute medical conditions as above  Plan: Encourage participation in physical therapy/occupational therapy for strengthening and deconditioning. Discharge planning per their recommendation. Social work to assist with d/c planning.      Disclaimer: This note may contain text created using speech-recognition software and may contain unintended word substitutions.       Electronically signed by:  CASSIDY Rollins CNP               Sincerely,        CASSIDY Rollins CNP

## 2023-10-06 ENCOUNTER — LAB REQUISITION (OUTPATIENT)
Dept: LAB | Facility: CLINIC | Age: 78
End: 2023-10-06
Payer: MEDICARE

## 2023-10-06 DIAGNOSIS — Z96.652 TOTAL KNEE REPLACEMENT STATUS, LEFT: ICD-10-CM

## 2023-10-06 DIAGNOSIS — R30.0 DYSURIA: ICD-10-CM

## 2023-10-06 DIAGNOSIS — R82.90 UNSPECIFIED ABNORMAL FINDINGS IN URINE: ICD-10-CM

## 2023-10-06 LAB
ALBUMIN UR-MCNC: 100 MG/DL
APPEARANCE UR: ABNORMAL
BACTERIA #/AREA URNS HPF: ABNORMAL /HPF
BILIRUB UR QL STRIP: NEGATIVE
CAOX CRY #/AREA URNS HPF: ABNORMAL /HPF
COLOR UR AUTO: YELLOW
GAMMA INTERFERON BACKGROUND BLD IA-ACNC: 0 IU/ML
GLUCOSE UR STRIP-MCNC: NEGATIVE MG/DL
HGB UR QL STRIP: ABNORMAL
KETONES UR STRIP-MCNC: ABNORMAL MG/DL
LEUKOCYTE ESTERASE UR QL STRIP: ABNORMAL
M TB IFN-G BLD-IMP: NEGATIVE
M TB IFN-G CD4+ BCKGRND COR BLD-ACNC: 0.64 IU/ML
MITOGEN IGNF BCKGRD COR BLD-ACNC: 0 IU/ML
MITOGEN IGNF BCKGRD COR BLD-ACNC: 0 IU/ML
MUCOUS THREADS #/AREA URNS LPF: PRESENT /LPF
NITRATE UR QL: POSITIVE
PH UR STRIP: 6 [PH] (ref 5–7)
QUANTIFERON MITOGEN: 0.64 IU/ML
QUANTIFERON NIL TUBE: 0 IU/ML
QUANTIFERON TB1 TUBE: 0 IU/ML
QUANTIFERON TB2 TUBE: 0
RBC URINE: >182 /HPF
SP GR UR STRIP: 1.02 (ref 1–1.03)
SQUAMOUS EPITHELIAL: 3 /HPF
UROBILINOGEN UR STRIP-MCNC: NORMAL MG/DL
WBC URINE: >182 /HPF

## 2023-10-06 PROCEDURE — 81001 URINALYSIS AUTO W/SCOPE: CPT | Performed by: NURSE PRACTITIONER

## 2023-10-06 PROCEDURE — 87086 URINE CULTURE/COLONY COUNT: CPT | Performed by: NURSE PRACTITIONER

## 2023-10-06 RX ORDER — HYDROMORPHONE HYDROCHLORIDE 2 MG/1
2-4 TABLET ORAL EVERY 4 HOURS PRN
Qty: 45 TABLET | Refills: 0 | Status: SHIPPED | OUTPATIENT
Start: 2023-10-06 | End: 2023-10-20

## 2023-10-08 ENCOUNTER — LAB REQUISITION (OUTPATIENT)
Dept: LAB | Facility: CLINIC | Age: 78
End: 2023-10-08
Payer: MEDICARE

## 2023-10-08 DIAGNOSIS — D64.9 ANEMIA, UNSPECIFIED: ICD-10-CM

## 2023-10-09 ENCOUNTER — LAB REQUISITION (OUTPATIENT)
Dept: LAB | Facility: CLINIC | Age: 78
End: 2023-10-09
Payer: MEDICARE

## 2023-10-09 ENCOUNTER — TRANSITIONAL CARE UNIT VISIT (OUTPATIENT)
Dept: GERIATRICS | Facility: CLINIC | Age: 78
End: 2023-10-09
Payer: MEDICARE

## 2023-10-09 VITALS
SYSTOLIC BLOOD PRESSURE: 138 MMHG | TEMPERATURE: 97.3 F | RESPIRATION RATE: 18 BRPM | HEIGHT: 62 IN | BODY MASS INDEX: 25.78 KG/M2 | HEART RATE: 78 BPM | OXYGEN SATURATION: 92 % | WEIGHT: 140.1 LBS | DIASTOLIC BLOOD PRESSURE: 75 MMHG

## 2023-10-09 DIAGNOSIS — F33.0 MILD RECURRENT MAJOR DEPRESSION (H): ICD-10-CM

## 2023-10-09 DIAGNOSIS — U07.1 COVID-19: ICD-10-CM

## 2023-10-09 DIAGNOSIS — F41.9 ANXIETY: ICD-10-CM

## 2023-10-09 DIAGNOSIS — R41.89 COGNITIVE IMPAIRMENT: ICD-10-CM

## 2023-10-09 DIAGNOSIS — Z96.652 S/P TOTAL KNEE ARTHROPLASTY, LEFT: ICD-10-CM

## 2023-10-09 DIAGNOSIS — Z47.89 AFTERCARE FOLLOWING SURGERY OF THE MUSCULOSKELETAL SYSTEM: ICD-10-CM

## 2023-10-09 DIAGNOSIS — B96.5 PSEUDOMONAS URINARY TRACT INFECTION: Primary | ICD-10-CM

## 2023-10-09 DIAGNOSIS — R53.81 PHYSICAL DECONDITIONING: ICD-10-CM

## 2023-10-09 DIAGNOSIS — K59.01 SLOW TRANSIT CONSTIPATION: ICD-10-CM

## 2023-10-09 DIAGNOSIS — K21.9 GASTROESOPHAGEAL REFLUX DISEASE, UNSPECIFIED WHETHER ESOPHAGITIS PRESENT: ICD-10-CM

## 2023-10-09 DIAGNOSIS — N39.0 PSEUDOMONAS URINARY TRACT INFECTION: Primary | ICD-10-CM

## 2023-10-09 DIAGNOSIS — H81.09 MENIERE'S DISEASE, UNSPECIFIED LATERALITY: ICD-10-CM

## 2023-10-09 DIAGNOSIS — D62 ANEMIA DUE TO BLOOD LOSS, ACUTE: ICD-10-CM

## 2023-10-09 LAB
ANION GAP SERPL CALCULATED.3IONS-SCNC: 12 MMOL/L (ref 7–15)
BACTERIA UR CULT: ABNORMAL
BUN SERPL-MCNC: 12.5 MG/DL (ref 8–23)
CALCIUM SERPL-MCNC: 9.1 MG/DL (ref 8.8–10.2)
CHLORIDE SERPL-SCNC: 101 MMOL/L (ref 98–107)
CREAT SERPL-MCNC: 0.49 MG/DL (ref 0.51–0.95)
DEPRECATED HCO3 PLAS-SCNC: 26 MMOL/L (ref 22–29)
EGFRCR SERPLBLD CKD-EPI 2021: >90 ML/MIN/1.73M2
ERYTHROCYTE [DISTWIDTH] IN BLOOD BY AUTOMATED COUNT: 14.4 % (ref 10–15)
GLUCOSE SERPL-MCNC: 91 MG/DL (ref 70–99)
HCT VFR BLD AUTO: 34.1 % (ref 35–47)
HGB BLD-MCNC: 10.7 G/DL (ref 11.7–15.7)
MCH RBC QN AUTO: 29.4 PG (ref 26.5–33)
MCHC RBC AUTO-ENTMCNC: 31.4 G/DL (ref 31.5–36.5)
MCV RBC AUTO: 94 FL (ref 78–100)
PLATELET # BLD AUTO: 410 10E3/UL (ref 150–450)
POTASSIUM SERPL-SCNC: 4 MMOL/L (ref 3.4–5.3)
RBC # BLD AUTO: 3.64 10E6/UL (ref 3.8–5.2)
SODIUM SERPL-SCNC: 139 MMOL/L (ref 135–145)
WBC # BLD AUTO: 7.4 10E3/UL (ref 4–11)

## 2023-10-09 PROCEDURE — 85027 COMPLETE CBC AUTOMATED: CPT | Performed by: NURSE PRACTITIONER

## 2023-10-09 PROCEDURE — 87635 SARS-COV-2 COVID-19 AMP PRB: CPT | Performed by: NURSE PRACTITIONER

## 2023-10-09 PROCEDURE — 82310 ASSAY OF CALCIUM: CPT | Performed by: NURSE PRACTITIONER

## 2023-10-09 PROCEDURE — 36415 COLL VENOUS BLD VENIPUNCTURE: CPT | Performed by: NURSE PRACTITIONER

## 2023-10-09 PROCEDURE — 99305 1ST NF CARE MODERATE MDM 35: CPT | Performed by: INTERNAL MEDICINE

## 2023-10-09 PROCEDURE — P9604 ONE-WAY ALLOW PRORATED TRIP: HCPCS | Performed by: NURSE PRACTITIONER

## 2023-10-09 RX ORDER — LANOLIN ALCOHOL/MO/W.PET/CERES
1000 CREAM (GRAM) TOPICAL DAILY
COMMUNITY

## 2023-10-09 RX ORDER — CIPROFLOXACIN 250 MG/1
250 TABLET, FILM COATED ORAL 2 TIMES DAILY
Qty: 6 TABLET | Refills: 0 | Status: SHIPPED | OUTPATIENT
Start: 2023-10-09 | End: 2023-10-12

## 2023-10-09 RX ORDER — HYDROMORPHONE HYDROCHLORIDE 2 MG/1
2 TABLET ORAL 2 TIMES DAILY
Qty: 10 TABLET | Refills: 0 | Status: SHIPPED | OUTPATIENT
Start: 2023-10-09 | End: 2023-10-17

## 2023-10-09 NOTE — PROGRESS NOTES
[facility] GERIATRICS  INITIAL VISIT NOTE  October 9, 2023    PRIMARY CARE PROVIDER AND CLINIC:  Huyen Samuels [address] / [facility] [address]    [facility] Medical Record Number:  [id number]  Place of Service where encounter took place:  [facility]  ([facility]) [[id number]]    Chief Complaint   Patient presents with    Hospital F/U       HPI:    Cynthia Arita is a 78 year old  (1945) female seen today at [facility]. Medical history is notable for HTN, Meniere's disease and cognitive impairment. She was hospitalized at [facility] from 10/2/23 to 10/4/23 where she is s/p elective left total knee arthroplasty. Surgery without complication. She developed post op acute blood loss anemia (Hgb 13.2 --> 10.7). Also with dizziness in setting of Meniere's. She was admitted to this facility for  rehab, medical management, and nursing care.      History obtained from: facility chart records, facility staff, patient report, [facility] chart review, and family/first contact (son was present) report.      Today, Ms. Arita is seen in her room with her son. She is a limited historian, at times tangential and forgetful. She is worried about her pain, has a sister who is a nurse who told her dilaudid is the best. Reviewed it is shorter acting than oxycodone - both note she has an allergy to oxycodone (listed as anxiety per Epic). She is doing ice, though not regularly. Nursing suggested a patch and she and son are thinking it's a dilaudid patch - discussed it's a lidoderm patch. She's had the R knee replaced, this one is more painful. In the end, we decided to schedule some dilaudid and ice and follow her pain closely, not wanting to cause any delirium from narcotics but her pain is a bit self limiting at this juncture and she needs to work on her ROM post op. Reviewed urine results and starting antibiotics as well     CODE STATUS: CPR/Full code      ALLERGIES:  Allergies   Allergen Reactions    Ativan [Lorazepam]      Sick -     Dicyclomine      Other reaction(s): dry mouth    Gabapentin Nausea    Metoprolol      Nausea      Pantoprazole Other (See Comments), Nausea and Vomiting and GI Disturbance     Red in the face, sleepiness, face swelling, joint pain, dizziness    Tramadol Nausea and Vomiting    Oxycodone Anxiety       PAST MEDICAL HISTORY:   Past Medical History:   Diagnosis Date    Anxiety     Basal cell carcinoma     Complication of anesthesia     Diverticulosis     GERD (gastroesophageal reflux disease)     HTN (hypertension)     Meniere's disease     Nephrolithiasis     Pain in right knee     PONV (postoperative nausea and vomiting)        PAST SURGICAL HISTORY:   Past Surgical History:   Procedure Laterality Date    ARTHROPLASTY KNEE Right 1/21/2019    Procedure: Right total knee arthroplasty;  Surgeon: Denton Amor MD;  Location: [facility] OR    ARTHROPLASTY KNEE Left 10/2/2023    Procedure: Left total knee arthroplasty;  Surgeon: Denton Amor MD;  Location: [facility] OR    EYE SURGERY      cataract surgery both eyes    ZZC VAGINAL HYSTERECTOMY      with left oophorectomy       FAMILY HISTORY:   Family History   Problem Relation Age of Onset    Neurologic Disorder Mother         palsy    Esophageal Cancer Father     Cancer Maternal Grandmother         lymph    Diabetes Paternal Grandmother     Neurologic Disorder Sister         Parkinson's    Hypertension Brother     Bipolar Disorder Sister     Brain Cancer Son 17       SOCIAL HISTORY:   Patient's living condition:  lives alone in a condo with steps    MEDICATIONS:  Post Discharge Medication Reconciliation Status: discharge medications reconciled and changed, per note/orders.  Current Outpatient Medications   Medication Sig Dispense Refill    acetaminophen (TYLENOL) 500 MG tablet Take 2 tablets (1,000 mg) by mouth 3 times daily May use regular strength (325mg) acetaminophen over the  counter medication when Rx runs out.      aspirin (ASA) 325 MG EC tablet Take 1 tablet (325 mg) by mouth daily 30 tablet 0    calcitonin, salmon, (MIACALCIN) 200 UNIT/ACT nasal spray USE 1 SPRAY IN 1 NOSTRIL  DAILY (ALTERNATING NOSTRILS DAILY) 11.1 mL 3    calcium citrate (CITRACAL) 950 (200 Ca) MG tablet Take 1 tablet (950 mg) by mouth 2 times daily 30 tablet 0    ciprofloxacin (CIPRO) 250 MG tablet Take 1 tablet (250 mg) by mouth 2 times daily for 3 days 6 tablet 0    cyanocobalamin (VITAMIN B-12) 1000 MCG tablet Take 1,000 mcg by mouth daily      esomeprazole (NEXIUM) 40 MG DR capsule Take 1 capsule (40 mg) by mouth 2 times daily Take 30-60 minutes before eating. Dispense generic. 180 capsule 3    Flaxseed, Linseed, (FLAX SEED OIL) 1000 MG capsule Take 1 capsule by mouth daily Takes one in the PM      HYDROmorphone (DILAUDID) 2 MG tablet Take 1 tablet (2 mg) by mouth 2 times daily At 0800 and 1200. Hold for sedation or confusion. No change to PRN 10 tablet 0    HYDROmorphone (DILAUDID) 2 MG tablet Take 1-2 tablets (2-4 mg) by mouth every 4 hours as needed for moderate to severe pain 45 tablet 0    hydrOXYzine (ATARAX) 10 MG tablet Take 1 tablet (10 mg) by mouth every 6 hours as needed for itching or anxiety (with pain, moderate pain) 30 tablet 0    Lidocaine (LIDOCARE) 4 % Patch Place 1 patch onto the skin every 24 hours To prevent lidocaine toxicity, patient should be patch free for 12 hrs daily.      meclizine (ANTIVERT) 25 MG tablet Take 25 mg by mouth 3 times daily      Misc Natural Products (GLUCOSAMINE CHOND COMPLEX/MSM PO) Takes one in the morning      ondansetron (ZOFRAN ODT) 4 MG ODT tab Take 1 tablet (4 mg) by mouth 2 times daily And BID  tablet 0    polyethylene glycol (MIRALAX) 17 g packet Take 17 g by mouth daily 7 packet 0    propranolol (INDERAL) 10 MG tablet Take 1 tablet (10 mg) by mouth 2 times daily 30 tablet 1    senna-docusate (SENOKOT-S/PERICOLACE) 8.6-50 MG tablet Take 1-2 tablets by  "mouth 2 times daily Take while on oral narcotics to prevent or treat constipation. (Patient taking differently: Take 2 tablets by mouth 2 times daily Take while on oral narcotics to prevent or treat constipation.) 30 tablet 0    venlafaxine (EFFEXOR XR) 37.5 MG 24 hr capsule Take 1 capsule (37.5 mg) by mouth daily      vitamin D3 (CHOLECALCIFEROL) 50 mcg (2000 units) tablet Take 1 tablet by mouth daily Takes one in the morning 2,000 units      Multiple Vitamins-Minerals (PRESERVISION AREDS 2) CAPS Take 1 tablet by mouth daily         ROS:  6 point ROS neg other than the symptoms noted above in the HPI.    PHYSICAL EXAM:  /75   Pulse 78   Temp 97.3  F (36.3  C)   Resp 18   Ht 1.575 m (5' 2\")   Wt 63.5 kg (140 lb 1.6 oz)   LMP  (LMP Unknown)   SpO2 92%   BMI 25.62 kg/m    Gen: sitting up in bed, alert, cooperative and in no acute distress  Card: RRR, S1, S2, no murmurs  Resp: lungs clear to auscultation bilaterally, no crackles or wheezes and moving good air  Ext: no LE edema  Neuro: CX II-XII grossly in tact; ROM in all four extremities grossly in tact  Psych: alert and oriented to self and general situation; anxious affect  Skin: Aquacel dressing in place over L knee incision; swelling but really no ecchymoses, no warmth      LABORATORY/IMAGING DATA:  Reviewed as per [facility] and/or [facility]    ASSESSMENT/PLAN:    DJD s/p Left Total Knee Arthroplasty (10/2/23)  Surgery without complication. Swelling as expected, minimal ecchymoses.   -- WBAT  -- analgesia with APAP 1000 mg TID, hydromorphone 2-4 mg q4h PRN  -- will schedule hydromorphone 2 mg BID at 0800 and noon in addition to above PRN  -- add lidoderm patch on q12h/off q12h   -- ice after therapy as well as once/shift and PRN  -- DVT prophylaxis with  mg per ortho  -- ongoing PT/OT  -- follow up with ortho as scheduled    Acute Blood Loss Anemia  Secondary to above. No evidence of ongoing bleeding. Hgb 13.2 --> 10.7  -- follow clinically "     Pseudomonas UTI  Had a [facility] inpatient. Had E Coli on 9/6 urine culture. No dysuria, belly pain or fever  -- cipro 250 mg BID x3 days - will connect with ortho as curbside - do we need longer duration given recent TKA/hardware    HTN  SBPs 110s. HR 70s.  -- propranolol 10 mg BID  -- follow BPs and adjust medications as needed    Mild Major Recurrent Depression  Anxiety  Cognitive Deficits  Mood and spirits were OK today - she was anxious about pain control and plan.   -- OT following for cog eval  -- venlafaxine 37.5 mg daily  -- supportive cares    Meniere's Disease  -- meclizine 25 mg TID and ondansetron 4 mg BID and BID PRN     GERD  -- esomeprazole 40 mg BID    Slow Transit Constipation  -- Miralax 17g daily and Senna-S 2 tabs BID  -- adjust bowel regimen as needed    Physical Deconditioning  In setting of hospitalization and underlying medical conditions  -- ongoing PT/OT      Electronically signed by:  Dora Dhillon MD

## 2023-10-09 NOTE — LETTER
10/9/2023        RE: Cynthia Arita  [address]  [place] [address]        [facility] GERIATRICS  INITIAL VISIT NOTE  October 9, 2023    PRIMARY CARE PROVIDER AND CLINIC:  Huyen Samuels [address] / [place] [address]    [facility] Medical Record Number:  [id number]  Place of Service where encounter took place:  [facility]  ([facility]) [[id number]]    Chief Complaint   Patient presents with     Hospital F/U       HPI:    Cynthia Arita is a 78 year old  (1945) female seen today at [facility]. Medical history is notable for HTN, Meniere's disease and cognitive impairment. She was hospitalized at [facility] from 10/2/23 to 10/4/23 where she is s/p elective left total knee arthroplasty. Surgery without complication. She developed post op acute blood loss anemia (Hgb 13.2 --> 10.7). Also with dizziness in setting of Meniere's. She was admitted to this facility for  rehab, medical management, and nursing care.      History obtained from: facility chart records, facility staff, patient report, [facility] chart review, and family/first contact (son was present) report.      Today, Ms. Arita is seen in her room with her son. She is a limited historian, at times tangential and forgetful. She is worried about her pain, has a sister who is a nurse who told her dilaudid is the best. Reviewed it is shorter acting than oxycodone - both note she has an allergy to oxycodone (listed as anxiety per Epic). She is doing ice, though not regularly. Nursing suggested a patch and she and son are thinking it's a dilaudid patch - discussed it's a lidoderm patch. She's had the R knee replaced, this one is more painful. In the end, we decided to schedule some dilaudid and ice and follow her pain closely, not wanting to cause any delirium from narcotics but her pain is a bit self limiting at this juncture and she needs to work on her ROM post op. Reviewed urine results and  starting antibiotics as well     CODE STATUS: CPR/Full code     ALLERGIES:  Allergies   Allergen Reactions     Ativan [Lorazepam]      Sick -      Dicyclomine      Other reaction(s): dry mouth     Gabapentin Nausea     Metoprolol      Nausea       Pantoprazole Other (See Comments), Nausea and Vomiting and GI Disturbance     Red in the face, sleepiness, face swelling, joint pain, dizziness     Tramadol Nausea and Vomiting     Oxycodone Anxiety       PAST MEDICAL HISTORY:   Past Medical History:   Diagnosis Date     Anxiety      Basal cell carcinoma      Complication of anesthesia      Diverticulosis      GERD (gastroesophageal reflux disease)      HTN (hypertension)      Meniere's disease      Nephrolithiasis      Pain in right knee      PONV (postoperative nausea and vomiting)        PAST SURGICAL HISTORY:   Past Surgical History:   Procedure Laterality Date     ARTHROPLASTY KNEE Right 1/21/2019    Procedure: Right total knee arthroplasty;  Surgeon: Denton Amor MD;  Location: [facility] OR     ARTHROPLASTY KNEE Left 10/2/2023    Procedure: Left total knee arthroplasty;  Surgeon: Denton Amor MD;  Location: [facility] OR     EYE SURGERY      cataract surgery both eyes     ZZC VAGINAL HYSTERECTOMY      with left oophorectomy       FAMILY HISTORY:   Family History   Problem Relation Age of Onset     Neurologic Disorder Mother         palsy     Esophageal Cancer Father      Cancer Maternal Grandmother         lymph     Diabetes Paternal Grandmother      Neurologic Disorder Sister         Parkinson's     Hypertension Brother      Bipolar Disorder Sister      Brain Cancer Son 17       SOCIAL HISTORY:   Patient's living condition:  lives alone in a condo with steps    MEDICATIONS:  Post Discharge Medication Reconciliation Status: discharge medications reconciled and changed, per note/orders.  Current Outpatient Medications   Medication Sig Dispense Refill     acetaminophen (TYLENOL) 500 MG tablet Take 2 tablets (1,000  mg) by mouth 3 times daily May use regular strength (325mg) acetaminophen over the counter medication when Rx runs out.       aspirin (ASA) 325 MG EC tablet Take 1 tablet (325 mg) by mouth daily 30 tablet 0     calcitonin, salmon, (MIACALCIN) 200 UNIT/ACT nasal spray USE 1 SPRAY IN 1 NOSTRIL  DAILY (ALTERNATING NOSTRILS DAILY) 11.1 mL 3     calcium citrate (CITRACAL) 950 (200 Ca) MG tablet Take 1 tablet (950 mg) by mouth 2 times daily 30 tablet 0     ciprofloxacin (CIPRO) 250 MG tablet Take 1 tablet (250 mg) by mouth 2 times daily for 3 days 6 tablet 0     cyanocobalamin (VITAMIN B-12) 1000 MCG tablet Take 1,000 mcg by mouth daily       esomeprazole (NEXIUM) 40 MG DR capsule Take 1 capsule (40 mg) by mouth 2 times daily Take 30-60 minutes before eating. Dispense generic. 180 capsule 3     Flaxseed, Linseed, (FLAX SEED OIL) 1000 MG capsule Take 1 capsule by mouth daily Takes one in the PM       HYDROmorphone (DILAUDID) 2 MG tablet Take 1 tablet (2 mg) by mouth 2 times daily At 0800 and 1200. Hold for sedation or confusion. No change to PRN 10 tablet 0     HYDROmorphone (DILAUDID) 2 MG tablet Take 1-2 tablets (2-4 mg) by mouth every 4 hours as needed for moderate to severe pain 45 tablet 0     hydrOXYzine (ATARAX) 10 MG tablet Take 1 tablet (10 mg) by mouth every 6 hours as needed for itching or anxiety (with pain, moderate pain) 30 tablet 0     Lidocaine (LIDOCARE) 4 % Patch Place 1 patch onto the skin every 24 hours To prevent lidocaine toxicity, patient should be patch free for 12 hrs daily.       meclizine (ANTIVERT) 25 MG tablet Take 25 mg by mouth 3 times daily       Misc Natural Products (GLUCOSAMINE CHOND COMPLEX/MSM PO) Takes one in the morning       ondansetron (ZOFRAN ODT) 4 MG ODT tab Take 1 tablet (4 mg) by mouth 2 times daily And BID  tablet 0     polyethylene glycol (MIRALAX) 17 g packet Take 17 g by mouth daily 7 packet 0     propranolol (INDERAL) 10 MG tablet Take 1 tablet (10 mg) by mouth 2  "times daily 30 tablet 1     senna-docusate (SENOKOT-S/PERICOLACE) 8.6-50 MG tablet Take 1-2 tablets by mouth 2 times daily Take while on oral narcotics to prevent or treat constipation. (Patient taking differently: Take 2 tablets by mouth 2 times daily Take while on oral narcotics to prevent or treat constipation.) 30 tablet 0     venlafaxine (EFFEXOR XR) 37.5 MG 24 hr capsule Take 1 capsule (37.5 mg) by mouth daily       vitamin D3 (CHOLECALCIFEROL) 50 mcg (2000 units) tablet Take 1 tablet by mouth daily Takes one in the morning 2,000 units       Multiple Vitamins-Minerals (PRESERVISION AREDS 2) CAPS Take 1 tablet by mouth daily         ROS:  6 point ROS neg other than the symptoms noted above in the HPI.    PHYSICAL EXAM:  /75   Pulse 78   Temp 97.3  F (36.3  C)   Resp 18   Ht 1.575 m (5' 2\")   Wt 63.5 kg (140 lb 1.6 oz)   LMP  (LMP Unknown)   SpO2 92%   BMI 25.62 kg/m    Gen: sitting up in bed, alert, cooperative and in no acute distress  Card: RRR, S1, S2, no murmurs  Resp: lungs clear to auscultation bilaterally, no crackles or wheezes and moving good air  Ext: no LE edema  Neuro: CX II-XII grossly in tact; ROM in all four extremities grossly in tact  Psych: alert and oriented to self and general situation; anxious affect  Skin: Aquacel dressing in place over L knee incision; swelling but really no ecchymoses, no warmth      LABORATORY/IMAGING DATA:  Reviewed as per [facility] and/or [facility]    ASSESSMENT/PLAN:    MICHELE s/p Left Total Knee Arthroplasty (10/2/23)  Surgery without complication. Swelling as expected, minimal ecchymoses.   -- WBAT  -- analgesia with APAP 1000 mg TID, hydromorphone 2-4 mg q4h PRN  -- will schedule hydromorphone 2 mg BID at 0800 and noon in addition to above PRN  -- add lidoderm patch on q12h/off q12h   -- ice after therapy as well as once/shift and PRN  -- DVT prophylaxis with  mg per ortho  -- ongoing PT/OT  -- follow up with ortho as scheduled    Acute Blood " Loss Anemia  Secondary to above. No evidence of ongoing bleeding. Hgb 13.2 --> 10.7  -- follow clinically     Pseudomonas UTI  Had a [facility] inpatient. Had E Coli on 9/6 urine culture. No dysuria, belly pain or fever  -- cipro 250 mg BID x3 days - will connect with ortho as curbside - do we need longer duration given recent TKA/hardware    HTN  SBPs 110s. HR 70s.  -- propranolol 10 mg BID  -- follow BPs and adjust medications as needed    Mild Major Recurrent Depression  Anxiety  Cognitive Deficits  Mood and spirits were OK today - she was anxious about pain control and plan.   -- OT following for cog eval  -- venlafaxine 37.5 mg daily  -- supportive cares    Meniere's Disease  -- meclizine 25 mg TID and ondansetron 4 mg BID and BID PRN     GERD  -- esomeprazole 40 mg BID    Slow Transit Constipation  -- Miralax 17g daily and Senna-S 2 tabs BID  -- adjust bowel regimen as needed    Physical Deconditioning  In setting of hospitalization and underlying medical conditions  -- ongoing PT/OT      Electronically signed by:  Dora Dhillon MD                          Sincerely,        Dora Dhillon MD

## 2023-10-10 LAB — SARS-COV-2 RNA RESP QL NAA+PROBE: NEGATIVE

## 2023-10-10 RX ORDER — LIDOCAINE 4 G/G
1 PATCH TOPICAL EVERY 24 HOURS
COMMUNITY
End: 2024-08-22

## 2023-10-11 ENCOUNTER — TELEPHONE (OUTPATIENT)
Dept: GERIATRICS | Facility: CLINIC | Age: 78
End: 2023-10-11
Payer: MEDICARE

## 2023-10-12 NOTE — TELEPHONE ENCOUNTER
"Staff paged this evening requesting \"clarify orders antibiotics\" - no answer with return call.    Dr Cynthia Prasad APRN DNP    "

## 2023-10-16 ENCOUNTER — LAB REQUISITION (OUTPATIENT)
Dept: LAB | Facility: CLINIC | Age: 78
End: 2023-10-16
Payer: MEDICARE

## 2023-10-16 VITALS
SYSTOLIC BLOOD PRESSURE: 127 MMHG | BODY MASS INDEX: 24.8 KG/M2 | RESPIRATION RATE: 18 BRPM | HEART RATE: 78 BPM | OXYGEN SATURATION: 93 % | WEIGHT: 140 LBS | DIASTOLIC BLOOD PRESSURE: 73 MMHG | HEIGHT: 63 IN | TEMPERATURE: 97.6 F

## 2023-10-16 DIAGNOSIS — U07.1 COVID-19: ICD-10-CM

## 2023-10-16 PROCEDURE — 87635 SARS-COV-2 COVID-19 AMP PRB: CPT | Performed by: NURSE PRACTITIONER

## 2023-10-17 ENCOUNTER — DISCHARGE SUMMARY NURSING HOME (OUTPATIENT)
Dept: GERIATRICS | Facility: CLINIC | Age: 78
End: 2023-10-17
Payer: MEDICARE

## 2023-10-17 DIAGNOSIS — K59.01 SLOW TRANSIT CONSTIPATION: ICD-10-CM

## 2023-10-17 DIAGNOSIS — Z96.652 S/P TOTAL KNEE ARTHROPLASTY, LEFT: Primary | ICD-10-CM

## 2023-10-17 DIAGNOSIS — R53.81 PHYSICAL DECONDITIONING: ICD-10-CM

## 2023-10-17 DIAGNOSIS — B96.5 PSEUDOMONAS URINARY TRACT INFECTION: ICD-10-CM

## 2023-10-17 DIAGNOSIS — R11.0 NAUSEA: ICD-10-CM

## 2023-10-17 DIAGNOSIS — K21.9 GASTROESOPHAGEAL REFLUX DISEASE, UNSPECIFIED WHETHER ESOPHAGITIS PRESENT: ICD-10-CM

## 2023-10-17 DIAGNOSIS — F41.9 ANXIETY: ICD-10-CM

## 2023-10-17 DIAGNOSIS — N39.0 PSEUDOMONAS URINARY TRACT INFECTION: ICD-10-CM

## 2023-10-17 DIAGNOSIS — R41.89 COGNITIVE IMPAIRMENT: ICD-10-CM

## 2023-10-17 DIAGNOSIS — H81.09 MENIERE'S DISEASE, UNSPECIFIED LATERALITY: ICD-10-CM

## 2023-10-17 DIAGNOSIS — D62 ANEMIA DUE TO BLOOD LOSS, ACUTE: ICD-10-CM

## 2023-10-17 DIAGNOSIS — I10 ESSENTIAL HYPERTENSION: ICD-10-CM

## 2023-10-17 LAB — SARS-COV-2 RNA RESP QL NAA+PROBE: NEGATIVE

## 2023-10-17 PROCEDURE — 99316 NF DSCHRG MGMT 30 MIN+: CPT | Performed by: NURSE PRACTITIONER

## 2023-10-17 RX ORDER — ASPIRIN 325 MG
325 TABLET, DELAYED RELEASE (ENTERIC COATED) ORAL DAILY
Start: 2023-10-17 | End: 2024-09-05

## 2023-10-17 RX ORDER — ACETAMINOPHEN 500 MG
1000 TABLET ORAL 3 TIMES DAILY
Start: 2023-10-17 | End: 2024-07-12

## 2023-10-17 RX ORDER — AMOXICILLIN 250 MG
2 CAPSULE ORAL 2 TIMES DAILY
Start: 2023-10-17

## 2023-10-17 RX ORDER — GABAPENTIN 100 MG/1
100 CAPSULE ORAL 3 TIMES DAILY
Start: 2023-10-17 | End: 2023-12-13

## 2023-10-17 RX ORDER — IBUPROFEN 600 MG/1
600 TABLET, FILM COATED ORAL 2 TIMES DAILY
Start: 2023-10-17 | End: 2023-10-17

## 2023-10-17 RX ORDER — HYDROXYZINE HYDROCHLORIDE 10 MG/1
10 TABLET, FILM COATED ORAL 4 TIMES DAILY PRN
Start: 2023-10-17 | End: 2023-12-13

## 2023-10-17 NOTE — LETTER
10/17/2023        RE: Cynthia Arita  [address]  [place] [address]        [facility] GERIATRICS DISCHARGE SUMMARY  PATIENT'S NAME: Cynthia Arita  YOB: 1945  MEDICAL RECORD NUMBER:  [id number]  Place of Service where encounter took place:  [facility]  ([facility]) [[id number]]    PRIMARY CARE PROVIDER AND CLINIC RESPONSIBLE AFTER TRANSFER:   Huyen Samuels MD, [address] / [place] [address]    [facility] Provider     Transferring providers: CASSIDY Rollins CNP, Lady Gomez MD  Recent Hospitalization/ED:  [facility] stay 10/2/23 to 10/4/23.  Date of SNF Admission: October 04, 2023  Date of SNF (anticipated) Discharge: October 20, 2023  Discharged to: home  Cognitive Scores: SLUMS 17/30  Physical Function: Ambulating 200 feet with front wheel walker standby assist, transfers and bed mobility standby assist, set up for eating, grooming/hygiene and upper body dressing, mod assist for lower body dressing, standby assist for toileting      CODE STATUS/ADVANCE DIRECTIVES DISCUSSION:  Full Code   ALLERGIES: Ativan [lorazepam], Dicyclomine, Gabapentin, Metoprolol, Pantoprazole, Tramadol, and Oxycodone    NURSING FACILITY COURSE   Medication Changes/Rationale:   Pain regimen adjusted    PMH significant for HTN, Meniere's disease, vertigo, GERD, osteoarthritis, osteoporosis     Summary of recent hospitalization:   Patient was hospitalized 10/2/2023-10/4/2023 for elective left knee total arthroplasty for osteoarthritis. Patient is s/p left total knee arthroplasty on 10/2/2023. Hemoglobin postoperatively 10.5 on 10/4/2023. With worsening dizziness with nausea postoperatively. Propanolol changed to BID for hypertension. Discharged to TCU for physical rehabilitation and medical management.     Summary of nursing facility stay:   Today, patient denies any SOB, CP, reports chronic dizziness. Reports bowels moving regularly, denies dysuria/  trouble voiding. Continues to have severe medial knee pain that is ongoing. Has follow up with ortho scheduled per patient report.       Osteoarthritis  S/p left total knee arthroplasty on 10/2/2023  Ortho started gabapentin 100 mg TID, BID ibuprofen scheduled and hydroxyzine QID scheduled on 10/13 for pain  Continues to have severe pain at times.   Plan: Ok to continue scheduled tylenol 1000 mg TID, gabapentin 100 mg TID, lidocaine patch, and dilaudid PRN at discharge- with notes to change tylenol and to work with ortho to taper gabapentin. Discontinue ibuprofen at discharge. Change hydroxyzine to QID PRN at discharge.  Continue aspirin 325 mg daily for 4 weeks through 11/2 for DVT prophy. WBAT. Therapy. Follow up with ortho per their recommendations     Acute blood loss anemia  Secondary to surgery  Hemoglobin baseline 13  Hemoglobin 10.7 on 10/9/2023  Plan: CBC PRN     Hyponatremia, resolved  Mild  Sodium 139 on 10/9/2023  Plan: BMP PRN     Pseudomonas UTI  UC >100,000 CFU/mL pseudomonas aeruginosa  Received 3 day course of cipro  Denies dysuria, urinary frequency today  Plan: Monitor for recurrence    Essential hypertension  Propanolol changed to BID inpatient  BP elevated at times, suspect due to pain and possibly ibuprofen contributing- ok for short course of this only  Plan: Continue propanolol 10 mg BID. Monitor BP.     Meniere's disease and vertigo  Nausea  With worsening dizziness and nausea postoperatively  Reports chronic symptoms today  Plan: Continue meclizine 25 mg three times daily and change zofran 4 mg BID at 8AM and 2PM and BID PRN. Monitor symptoms.     GERD  Plan: Esomeprazole 40 mg BID. Monitor symptoms.     Osteoporosis  Plan: Continue calcitonin 1 spray in 1 nostril daily, calcium citrate 950 mg BID, vitamin d3 daily.      Anxiety  PTA was on propanolol as needed, now taking it scheduled  Plan: Continue venlafaxine 37.5 mg daily, propanolol 10 mg BID, and hydroxyzine 10 mg QID PRN.  Monitor  mood and symptoms.  Consider ACP referral as needed.     Slow transit constipation  Bowels moving regularly  Plan: Continue MiraLAX 17 g daily and senna S 2 tab twice daily.  Monitor bowels.     Cognitive impairment  SLUMS 17/30- indicate dementia level impairment  Plan: Follow up with PCP to discuss if further testing/ follow up is recommended    Physical deconditioning  Secondary to acute medical conditions as above  Completed course of therapy in the TCU  Plan: Continue therapy through home care       Discharge Medications:  MED REC REQUIRED  Post Medication Reconciliation Status:  Discharge medications reconciled and changed, see notes/orders    Current Outpatient Medications   Medication Sig Dispense Refill     acetaminophen (TYLENOL) 500 MG tablet Take 2 tablets (1,000 mg) by mouth 3 times daily May use regular strength (325mg) acetaminophen over the counter medication when Rx runs out. Once pain improves take only as needed       aspirin (ASA) 325 MG EC tablet Take 1 tablet (325 mg) by mouth daily Through 11/2 then STOP       calcitonin, salmon, (MIACALCIN) 200 UNIT/ACT nasal spray USE 1 SPRAY IN 1 NOSTRIL  DAILY (ALTERNATING NOSTRILS DAILY) 11.1 mL 3     calcium citrate (CITRACAL) 950 (200 Ca) MG tablet Take 1 tablet (950 mg) by mouth 2 times daily 30 tablet 0     cyanocobalamin (VITAMIN B-12) 1000 MCG tablet Take 1,000 mcg by mouth daily       esomeprazole (NEXIUM) 40 MG DR capsule Take 1 capsule (40 mg) by mouth 2 times daily Take 30-60 minutes before eating. Dispense generic. 180 capsule 3     Flaxseed, Linseed, (FLAX SEED OIL) 1000 MG capsule Take 1 capsule by mouth daily Takes one in the PM       gabapentin (NEURONTIN) 100 MG capsule Take 1 capsule (100 mg) by mouth 3 times daily       HYDROmorphone (DILAUDID) 2 MG tablet Take 1-2 tablets (2-4 mg) by mouth every 4 hours as needed for moderate to severe pain 45 tablet 0     hydrOXYzine (ATARAX) 10 MG tablet Take 1 tablet (10 mg) by mouth 4 times daily as  "needed for itching or anxiety (with pain, moderate pain)       Lidocaine (LIDOCARE) 4 % Patch Place 1 patch onto the skin every 24 hours To prevent lidocaine toxicity, patient should be patch free for 12 hrs daily.       meclizine (ANTIVERT) 25 MG tablet Take 25 mg by mouth 3 times daily       Misc Natural Products (GLUCOSAMINE CHOND COMPLEX/MSM PO) Takes one in the morning       Multiple Vitamins-Minerals (PRESERVISION AREDS 2) CAPS Take 1 tablet by mouth daily       ondansetron (ZOFRAN ODT) 4 MG ODT tab Take 1 tablet (4 mg) by mouth 2 times daily And BID  tablet 0     polyethylene glycol (MIRALAX) 17 g packet Take 17 g by mouth daily 7 packet 0     propranolol (INDERAL) 10 MG tablet Take 1 tablet (10 mg) by mouth 2 times daily 30 tablet 1     senna-docusate (SENOKOT-S/PERICOLACE) 8.6-50 MG tablet Take 2 tablets by mouth 2 times daily Take while on oral narcotics to prevent or treat constipation.       venlafaxine (EFFEXOR XR) 37.5 MG 24 hr capsule Take 1 capsule (37.5 mg) by mouth daily       vitamin D3 (CHOLECALCIFEROL) 50 mcg (2000 units) tablet Take 1 tablet by mouth daily Takes one in the morning 2,000 units          Controlled medications:   Medication: dilaudid , 15 tabs given to patient at the time of discharge to take home     Past Medical History:   Past Medical History:   Diagnosis Date     Anxiety      Basal cell carcinoma      Complication of anesthesia      Diverticulosis      GERD (gastroesophageal reflux disease)      HTN (hypertension)      Meniere's disease      Nephrolithiasis      Pain in right knee      PONV (postoperative nausea and vomiting)      Physical Exam:   Vitals: /73   Pulse 78   Temp 97.6  F (36.4  C)   Resp 18   Ht 1.6 m (5' 3\")   Wt 63.5 kg (140 lb)   LMP  (LMP Unknown)   SpO2 93%   BMI 24.80 kg/m    BMI: Body mass index is 24.8 kg/m .  GENERAL APPEARANCE:  Alert, in NAD  HEENT: normocephalic, moist mucous membranes, nose without drainage or crusting  RESP:  " respiratory effort normal, no respiratory distress, Lung sounds clear, patient is on RA  CV: auscultation of heart done, rate and rhythm regular.   ABDOMEN: + bowel sounds, soft, nontender, no grimacing or guarding with palpation.  M/S: generalized lower extremity edema, left medial knee pain present today  SKIN: dressing to left knee is C/D/I  NEURO: cranial nerves 2-12 grossly intact and at patient's baseline; moves extremities freely  PSYCH:affect and mood normal      SNF labs: Labs done in SNF are in [facility]. Please refer to them using [employer] Everywhere. and Recent labs in [facility] reviewed by me today.     DISCHARGE PLAN:  Medical Follow Up:     Follow up with primary care provider in 1 week  Follow up with specialist: ortho per recommendations  Appointments in Next Year      Oct 26, 2023 10:30 AM  (Arrive by 10:15 AM)  DX HIP/PELVIS/SPINE with RIDX1  [facility] ([facility] ) [phone]          Discharge Services: Home Care:  Occupational Therapy, Physical Therapy, Registered Nurse, and Home Health Aide  Discharge Instructions:   WBAT to LLE  Continue ice knee 20 minutes on as needed for pain  Nursing to ensure orders are updated with any changes by ortho before discharge  Monitor left knee incision for any signs of infection- drainage, redness, warmth, increased pain and update ortho with concerns    TOTAL DISCHARGE TIME:   Greater than 30 minutes  Electronically signed by:  CASSIDY Rollins CNP     Documentation of Face to Face and Certification for Home Health Services    I certify that patient: Cynthia Arita is under my care and that I, or a nurse practitioner or physician's assistant working with me, had a face-to-face encounter that meets the physician face-to-face encounter requirements with this patient on: 10/17/2023.    This encounter with the patient was in whole, or in part, for the following medical condition, which is the primary  reason for home health care:   1. S/P total knee arthroplasty, left    2. Anemia due to blood loss, acute    3. Pseudomonas urinary tract infection    4. Essential hypertension    5. Anxiety    6. Meniere's disease, unspecified laterality    7. Nausea    8. Gastroesophageal reflux disease, unspecified whether esophagitis present    9. Slow transit constipation    10. Cognitive impairment    11. Physical deconditioning      .    I certify that, based on my findings, the following services are medically necessary home health services: Nursing, Occupational Therapy, Physical Therapy, and HHA .    My clinical findings support the need for the above services because: Nurse is needed: to provide medication management, Occupational Therapy Services are needed to assess and treat cognitive ability and address ADL safety due to impairment in completing ADLs safely and independently as she returns home from TCU stay. and Physical Therapy Services are needed to assess and treat the following functional impairments: generalized weakness and deconditioning.      Further, I certify that my clinical findings support that this patient is homebound (i.e. absences from home require considerable and taxing effort and are for medical reasons or [employer] services or infrequently or of short duration when for other reasons) because: Requires assistance of another person or specialized equipment to access medical services because patient: Requires supervision of another for safe transfer...    Based on the above findings. I certify that this patient is confined to the home and needs intermittent skilled nursing care, physical therapy and/or speech therapy.  The patient is under my care, and I have initiated the establishment of the plan of care.  This patient will be followed by a physician who will periodically review the plan of care.  Physician/Provider to provide follow up care: Huyen Samuels APRN CNP on  10/17/2023 at 8:56 AM              Sincerely,        CASSIDY Rollins CNP

## 2023-10-17 NOTE — PROGRESS NOTES
[facility] GERIATRICS DISCHARGE SUMMARY  PATIENT'S NAME: Cynthia Arita  YOB: 1945  MEDICAL RECORD NUMBER:  [id number]  Place of Service where encounter took place:  [facility]  ([facility]) [[id number]]    PRIMARY CARE PROVIDER AND CLINIC RESPONSIBLE AFTER TRANSFER:   Huyen Samuels MD, [address] / [place] [address]    [facility] Provider     Transferring providers: Katina Mallory, CASSIDY FRANKLIN, Lady Gomez MD  Recent Hospitalization/ED:  [facility] stay 10/2/23 to 10/4/23.  Date of SNF Admission: October 04, 2023  Date of SNF (anticipated) Discharge: October 20, 2023  Discharged to: home  Cognitive Scores: SLUMS 17/30  Physical Function: Ambulating 200 feet with front wheel walker standby assist, transfers and bed mobility standby assist, set up for eating, grooming/hygiene and upper body dressing, mod assist for lower body dressing, standby assist for toileting      CODE STATUS/ADVANCE DIRECTIVES DISCUSSION:  Full Code   ALLERGIES: Ativan [lorazepam], Dicyclomine, Gabapentin, Metoprolol, Pantoprazole, Tramadol, and Oxycodone    NURSING FACILITY COURSE   Medication Changes/Rationale:   Pain regimen adjusted    PMH significant for HTN, Meniere's disease, vertigo, GERD, osteoarthritis, osteoporosis     Summary of recent hospitalization:   Patient was hospitalized 10/2/2023-10/4/2023 for elective left knee total arthroplasty for osteoarthritis. Patient is s/p left total knee arthroplasty on 10/2/2023. Hemoglobin postoperatively 10.5 on 10/4/2023. With worsening dizziness with nausea postoperatively. Propanolol changed to BID for hypertension. Discharged to TCU for physical rehabilitation and medical management.     Summary of nursing facility stay:   Today, patient denies any SOB, CP, reports chronic dizziness. Reports bowels moving regularly, denies dysuria/ trouble voiding. Continues to have severe medial knee pain that is ongoing. Has  follow up with ortho scheduled per patient report.       Osteoarthritis  S/p left total knee arthroplasty on 10/2/2023  Ortho started gabapentin 100 mg TID, BID ibuprofen scheduled and hydroxyzine QID scheduled on 10/13 for pain  Continues to have severe pain at times.   Plan: Ok to continue scheduled tylenol 1000 mg TID, gabapentin 100 mg TID, lidocaine patch, and dilaudid PRN at discharge- with notes to change tylenol and to work with ortho to taper gabapentin. Discontinue ibuprofen at discharge. Change hydroxyzine to QID PRN at discharge.  Continue aspirin 325 mg daily for 4 weeks through 11/2 for DVT prophy. WBAT. Therapy. Follow up with ortho per their recommendations     Acute blood loss anemia  Secondary to surgery  Hemoglobin baseline 13  Hemoglobin 10.7 on 10/9/2023  Plan: CBC PRN     Hyponatremia, resolved  Mild  Sodium 139 on 10/9/2023  Plan: BMP PRN     Pseudomonas UTI  UC >100,000 CFU/mL pseudomonas aeruginosa  Received 3 day course of cipro  Denies dysuria, urinary frequency today  Plan: Monitor for recurrence    Essential hypertension  Propanolol changed to BID inpatient  BP elevated at times, suspect due to pain and possibly ibuprofen contributing- ok for short course of this only  Plan: Continue propanolol 10 mg BID. Monitor BP.     Meniere's disease and vertigo  Nausea  With worsening dizziness and nausea postoperatively  Reports chronic symptoms today  Plan: Continue meclizine 25 mg three times daily and change zofran 4 mg BID at 8AM and 2PM and BID PRN. Monitor symptoms.     GERD  Plan: Esomeprazole 40 mg BID. Monitor symptoms.     Osteoporosis  Plan: Continue calcitonin 1 spray in 1 nostril daily, calcium citrate 950 mg BID, vitamin d3 daily.      Anxiety  PTA was on propanolol as needed, now taking it scheduled  Plan: Continue venlafaxine 37.5 mg daily, propanolol 10 mg BID, and hydroxyzine 10 mg QID PRN.  Monitor mood and symptoms.  Consider ACP referral as needed.     Slow transit  constipation  Bowels moving regularly  Plan: Continue MiraLAX 17 g daily and senna S 2 tab twice daily.  Monitor bowels.     Cognitive impairment  SLUMS 17/30- indicate dementia level impairment  Plan: Follow up with PCP to discuss if further testing/ follow up is recommended    Physical deconditioning  Secondary to acute medical conditions as above  Completed course of therapy in the TCU  Plan: Continue therapy through home care       Discharge Medications:  MED REC REQUIRED  Post Medication Reconciliation Status:  Discharge medications reconciled and changed, see notes/orders    Current Outpatient Medications   Medication Sig Dispense Refill    acetaminophen (TYLENOL) 500 MG tablet Take 2 tablets (1,000 mg) by mouth 3 times daily May use regular strength (325mg) acetaminophen over the counter medication when Rx runs out. Once pain improves take only as needed      aspirin (ASA) 325 MG EC tablet Take 1 tablet (325 mg) by mouth daily Through 11/2 then STOP      calcitonin, salmon, (MIACALCIN) 200 UNIT/ACT nasal spray USE 1 SPRAY IN 1 NOSTRIL  DAILY (ALTERNATING NOSTRILS DAILY) 11.1 mL 3    calcium citrate (CITRACAL) 950 (200 Ca) MG tablet Take 1 tablet (950 mg) by mouth 2 times daily 30 tablet 0    cyanocobalamin (VITAMIN B-12) 1000 MCG tablet Take 1,000 mcg by mouth daily      esomeprazole (NEXIUM) 40 MG DR capsule Take 1 capsule (40 mg) by mouth 2 times daily Take 30-60 minutes before eating. Dispense generic. 180 capsule 3    Flaxseed, Linseed, (FLAX SEED OIL) 1000 MG capsule Take 1 capsule by mouth daily Takes one in the PM      gabapentin (NEURONTIN) 100 MG capsule Take 1 capsule (100 mg) by mouth 3 times daily      HYDROmorphone (DILAUDID) 2 MG tablet Take 1-2 tablets (2-4 mg) by mouth every 4 hours as needed for moderate to severe pain 45 tablet 0    hydrOXYzine (ATARAX) 10 MG tablet Take 1 tablet (10 mg) by mouth 4 times daily as needed for itching or anxiety (with pain, moderate pain)      Lidocaine  "(LIDOCARE) 4 % Patch Place 1 patch onto the skin every 24 hours To prevent lidocaine toxicity, patient should be patch free for 12 hrs daily.      meclizine (ANTIVERT) 25 MG tablet Take 25 mg by mouth 3 times daily      Misc Natural Products (GLUCOSAMINE CHOND COMPLEX/MSM PO) Takes one in the morning      Multiple Vitamins-Minerals (PRESERVISION AREDS 2) CAPS Take 1 tablet by mouth daily      ondansetron (ZOFRAN ODT) 4 MG ODT tab Take 1 tablet (4 mg) by mouth 2 times daily And BID  tablet 0    polyethylene glycol (MIRALAX) 17 g packet Take 17 g by mouth daily 7 packet 0    propranolol (INDERAL) 10 MG tablet Take 1 tablet (10 mg) by mouth 2 times daily 30 tablet 1    senna-docusate (SENOKOT-S/PERICOLACE) 8.6-50 MG tablet Take 2 tablets by mouth 2 times daily Take while on oral narcotics to prevent or treat constipation.      venlafaxine (EFFEXOR XR) 37.5 MG 24 hr capsule Take 1 capsule (37.5 mg) by mouth daily      vitamin D3 (CHOLECALCIFEROL) 50 mcg (2000 units) tablet Take 1 tablet by mouth daily Takes one in the morning 2,000 units          Controlled medications:   Medication: dilaudid , 15 tabs given to patient at the time of discharge to take home     Past Medical History:   Past Medical History:   Diagnosis Date    Anxiety     Basal cell carcinoma     Complication of anesthesia     Diverticulosis     GERD (gastroesophageal reflux disease)     HTN (hypertension)     Meniere's disease     Nephrolithiasis     Pain in right knee     PONV (postoperative nausea and vomiting)      Physical Exam:   Vitals: /73   Pulse 78   Temp 97.6  F (36.4  C)   Resp 18   Ht 1.6 m (5' 3\")   Wt 63.5 kg (140 lb)   LMP  (LMP Unknown)   SpO2 93%   BMI 24.80 kg/m    BMI: Body mass index is 24.8 kg/m .  GENERAL APPEARANCE:  Alert, in NAD  HEENT: normocephalic, moist mucous membranes, nose without drainage or crusting  RESP:  respiratory effort normal, no respiratory distress, Lung sounds clear, patient is on RA  CV: " auscultation of heart done, rate and rhythm regular.   ABDOMEN: + bowel sounds, soft, nontender, no grimacing or guarding with palpation.  M/S: generalized lower extremity edema, left medial knee pain present today  SKIN: dressing to left knee is C/D/I  NEURO: cranial nerves 2-12 grossly intact and at patient's baseline; moves extremities freely  PSYCH:affect and mood normal      SNF labs: Labs done in SNF are in [facility]. Please refer to them using [employer] Everywhere. and Recent labs in [facility] reviewed by me today.     DISCHARGE PLAN:  Medical Follow Up:     Follow up with primary care provider in 1 week  Follow up with specialist: ortho per recommendations  Appointments in Next Year      Oct 26, 2023 10:30 AM  (Arrive by 10:15 AM)  DX HIP/PELVIS/SPINE with RIDX1  [facility] ([facility] ) [phone]          Discharge Services: Home Care:  Occupational Therapy, Physical Therapy, Registered Nurse, and Home Health Aide  Discharge Instructions:   WBAT to LLE  Continue ice knee 20 minutes on as needed for pain  Nursing to ensure orders are updated with any changes by ortho before discharge  Monitor left knee incision for any signs of infection- drainage, redness, warmth, increased pain and update ortho with concerns    TOTAL DISCHARGE TIME:   Greater than 30 minutes  Electronically signed by:  CASSIDY Rollins CNP     Documentation of Face to Face and Certification for Home Health Services    I certify that patient: Cynthia Arita is under my care and that I, or a nurse practitioner or physician's assistant working with me, had a face-to-face encounter that meets the physician face-to-face encounter requirements with this patient on: 10/17/2023.    This encounter with the patient was in whole, or in part, for the following medical condition, which is the primary reason for home health care:   1. S/P total knee arthroplasty, left    2. Anemia due to blood  loss, acute    3. Pseudomonas urinary tract infection    4. Essential hypertension    5. Anxiety    6. Meniere's disease, unspecified laterality    7. Nausea    8. Gastroesophageal reflux disease, unspecified whether esophagitis present    9. Slow transit constipation    10. Cognitive impairment    11. Physical deconditioning      .    I certify that, based on my findings, the following services are medically necessary home health services: Nursing, Occupational Therapy, Physical Therapy, and HHA .    My clinical findings support the need for the above services because: Nurse is needed: to provide medication management, Occupational Therapy Services are needed to assess and treat cognitive ability and address ADL safety due to impairment in completing ADLs safely and independently as she returns home from TCU stay. and Physical Therapy Services are needed to assess and treat the following functional impairments: generalized weakness and deconditioning.      Further, I certify that my clinical findings support that this patient is homebound (i.e. absences from home require considerable and taxing effort and are for medical reasons or [employer] services or infrequently or of short duration when for other reasons) because: Requires assistance of another person or specialized equipment to access medical services because patient: Requires supervision of another for safe transfer...    Based on the above findings. I certify that this patient is confined to the home and needs intermittent skilled nursing care, physical therapy and/or speech therapy.  The patient is under my care, and I have initiated the establishment of the plan of care.  This patient will be followed by a physician who will periodically review the plan of care.  Physician/Provider to provide follow up care: Huyen Samuels, CASSIDY CNP on 10/17/2023 at 8:56 AM

## 2023-10-17 NOTE — PATIENT INSTRUCTIONS
[facility] Discharge Orders    Name: Cynthia Arita  : 1945  Planned Discharge Date: 10/20/2023  Discharged to: home    MEDICAL FOLLOW UP  Follow up with PCP in 1 week  Follow up with Specialists: follow up with ortho per recommendations    ORDER CHANGES:  Pain regimen adjusted in TCU    DISCHARGE MEDICATIONS:  The patient s pharmacy is authorized to dispense a 30-day supply of medications. Refill requests should be directed to the primary provider, Huyen Samuels   Medication: dilaudid , 15 tabs given to patient at the time of discharge to take home    Current Outpatient Medications   Medication Sig Dispense Refill    acetaminophen (TYLENOL) 500 MG tablet Take 2 tablets (1,000 mg) by mouth 3 times daily May use regular strength (325mg) acetaminophen over the counter medication when Rx runs out. Once pain improves take only as needed      aspirin (ASA) 325 MG EC tablet Take 1 tablet (325 mg) by mouth daily Through  then STOP      calcitonin, salmon, (MIACALCIN) 200 UNIT/ACT nasal spray USE 1 SPRAY IN 1 NOSTRIL  DAILY (ALTERNATING NOSTRILS DAILY) 11.1 mL 3    calcium citrate (CITRACAL) 950 (200 Ca) MG tablet Take 1 tablet (950 mg) by mouth 2 times daily 30 tablet 0    cyanocobalamin (VITAMIN B-12) 1000 MCG tablet Take 1,000 mcg by mouth daily      esomeprazole (NEXIUM) 40 MG DR capsule Take 1 capsule (40 mg) by mouth 2 times daily Take 30-60 minutes before eating. Dispense generic. 180 capsule 3    Flaxseed, Linseed, (FLAX SEED OIL) 1000 MG capsule Take 1 capsule by mouth daily Takes one in the PM      gabapentin (NEURONTIN) 100 MG capsule Take 1 capsule (100 mg) by mouth 3 times daily      HYDROmorphone (DILAUDID) 2 MG tablet Take 1-2 tablets (2-4 mg) by mouth every 4 hours as needed for moderate to severe pain 45 tablet 0    hydrOXYzine (ATARAX) 10 MG tablet Take 1 tablet (10 mg) by mouth 4 times daily as needed for itching or anxiety (with pain, moderate pain)      Lidocaine  (LIDOCARE) 4 % Patch Place 1 patch onto the skin every 24 hours To prevent lidocaine toxicity, patient should be patch free for 12 hrs daily.      meclizine (ANTIVERT) 25 MG tablet Take 25 mg by mouth 3 times daily      Misc Natural Products (GLUCOSAMINE CHOND COMPLEX/MSM PO) Takes one in the morning      Multiple Vitamins-Minerals (PRESERVISION AREDS 2) CAPS Take 1 tablet by mouth daily      ondansetron (ZOFRAN ODT) 4 MG ODT tab Take 1 tablet (4 mg) by mouth 2 times daily And BID  tablet 0    polyethylene glycol (MIRALAX) 17 g packet Take 17 g by mouth daily 7 packet 0    propranolol (INDERAL) 10 MG tablet Take 1 tablet (10 mg) by mouth 2 times daily 30 tablet 1    senna-docusate (SENOKOT-S/PERICOLACE) 8.6-50 MG tablet Take 2 tablets by mouth 2 times daily Take while on oral narcotics to prevent or treat constipation.      venlafaxine (EFFEXOR XR) 37.5 MG 24 hr capsule Take 1 capsule (37.5 mg) by mouth daily      vitamin D3 (CHOLECALCIFEROL) 50 mcg (2000 units) tablet Take 1 tablet by mouth daily Takes one in the morning 2,000 units         SERVICES:  Home Care:  Occupational Therapy, Physical Therapy, Registered Nurse, and Home Health Aide    ADDITIONAL INSTRUCTIONS:  WBAT to LLE  Continue ice knee 20 minutes on as needed for pain  Nursing to ensure orders are updated with any changes by ortho before discharge  Stop taking tylenol scheduled when pain improves, taper off gabapentin with ortho or PCP as pain improves. Ok to stop lidocaine patch as pain improves.  Monitor left knee incision for any signs of infection- drainage, redness, warmth, increased pain and update ortho with concerns    CASSIDY Rollins CNP  This document was electronically signed on October 17, 2023

## 2023-10-18 ENCOUNTER — TRANSFERRED RECORDS (OUTPATIENT)
Dept: HEALTH INFORMATION MANAGEMENT | Facility: CLINIC | Age: 78
End: 2023-10-18
Payer: MEDICARE

## 2023-10-19 ENCOUNTER — LAB REQUISITION (OUTPATIENT)
Dept: LAB | Facility: CLINIC | Age: 78
End: 2023-10-19
Payer: MEDICARE

## 2023-10-19 DIAGNOSIS — U07.1 COVID-19: ICD-10-CM

## 2023-10-20 DIAGNOSIS — Z96.652 TOTAL KNEE REPLACEMENT STATUS, LEFT: ICD-10-CM

## 2023-10-20 RX ORDER — HYDROMORPHONE HYDROCHLORIDE 2 MG/1
2-4 TABLET ORAL EVERY 4 HOURS PRN
Qty: 8 TABLET | Refills: 0 | Status: SHIPPED | OUTPATIENT
Start: 2023-10-20 | End: 2023-12-13

## 2023-10-22 ENCOUNTER — MEDICAL CORRESPONDENCE (OUTPATIENT)
Dept: HEALTH INFORMATION MANAGEMENT | Facility: CLINIC | Age: 78
End: 2023-10-22

## 2023-10-23 ENCOUNTER — TELEPHONE (OUTPATIENT)
Dept: INTERNAL MEDICINE | Facility: CLINIC | Age: 78
End: 2023-10-23
Payer: MEDICARE

## 2023-10-23 NOTE — TELEPHONE ENCOUNTER
Iman with [facility] calling for verbal orders    SN 1/week x 4 weeks, 1/week EOW x 60 days, 3 PRN visits for any changes  Home health aide 1/week x 4 weeks  PT/OT christelle ANDRADE to leave detailed message on secure line [phone], ask for [facility]

## 2023-10-24 NOTE — TELEPHONE ENCOUNTER
Called and spoke with Renato from [facility] (works with [facility]) to relay verbal orders.    Thank you,  Anant Reyes, Triage RN [facility]  10:40 AM 10/24/2023

## 2023-10-25 ENCOUNTER — ANCILLARY PROCEDURE (OUTPATIENT)
Dept: BONE DENSITY | Facility: CLINIC | Age: 78
End: 2023-10-25
Payer: MEDICARE

## 2023-10-25 DIAGNOSIS — Z78.0 ENCOUNTER FOR OSTEOPOROSIS SCREENING IN ASYMPTOMATIC POSTMENOPAUSAL PATIENT: ICD-10-CM

## 2023-10-25 DIAGNOSIS — Z13.820 ENCOUNTER FOR OSTEOPOROSIS SCREENING IN ASYMPTOMATIC POSTMENOPAUSAL PATIENT: ICD-10-CM

## 2023-10-25 PROCEDURE — 77085 DXA BONE DENSITY AXL VRT FX: CPT | Performed by: INTERNAL MEDICINE

## 2023-10-26 ENCOUNTER — VIRTUAL VISIT (OUTPATIENT)
Dept: PHARMACY | Facility: CLINIC | Age: 78
End: 2023-10-26
Attending: NURSE PRACTITIONER
Payer: COMMERCIAL

## 2023-10-26 DIAGNOSIS — M19.90 OSTEOARTHRITIS, UNSPECIFIED OSTEOARTHRITIS TYPE, UNSPECIFIED SITE: ICD-10-CM

## 2023-10-26 DIAGNOSIS — K21.9 GASTROESOPHAGEAL REFLUX DISEASE WITHOUT ESOPHAGITIS: ICD-10-CM

## 2023-10-26 DIAGNOSIS — F41.9 ANXIETY: ICD-10-CM

## 2023-10-26 DIAGNOSIS — Z96.652 STATUS POST TOTAL LEFT KNEE REPLACEMENT: Primary | ICD-10-CM

## 2023-10-26 DIAGNOSIS — H81.09 MENIERE'S DISEASE, UNSPECIFIED LATERALITY: ICD-10-CM

## 2023-10-26 DIAGNOSIS — M81.0 OSTEOPOROSIS, UNSPECIFIED OSTEOPOROSIS TYPE, UNSPECIFIED PATHOLOGICAL FRACTURE PRESENCE: ICD-10-CM

## 2023-10-26 DIAGNOSIS — Z78.9 TAKES DIETARY SUPPLEMENTS: ICD-10-CM

## 2023-10-26 PROCEDURE — 99207 PR NO CHARGE LOS: CPT | Performed by: PHARMACIST

## 2023-10-26 RX ORDER — NAPROXEN SODIUM 220 MG
220 TABLET ORAL 2 TIMES DAILY PRN
COMMUNITY

## 2023-10-26 RX ORDER — MULTIVIT WITH MINERALS/LUTEIN
1 TABLET ORAL DAILY
COMMUNITY

## 2023-10-26 RX ORDER — ANTIARTHRITIC COMBINATION NO.2 900 MG
1 TABLET ORAL DAILY
COMMUNITY

## 2023-10-26 NOTE — PROGRESS NOTES
Medication Therapy Management (MTM) Encounter    ASSESSMENT:                            Medication Adherence/Access: No issues identified    S/p left total knee arthroplasty/Osteoarthritis: Improving per patient. Follow-up plan in place. Would benefit from following up with orthopedics sooner regarding gabapentin plan.       Meniere's disease: Stable per patient.       GERD: Stable.      Anxiety: Somewhat stable per patient.      Osteoporosis/Supplements: Stable.     PLAN:                            Contact your Orthopedics team to discuss the plan of therapy for your gabapentin.  They may need to refill if they plan to continue. If you notice an increase in pain and/or anxiety after stopping this let your care team know.     Follow-up: As Needed    SUBJECTIVE/OBJECTIVE:                          Cynthia Arita is a 78 year old female called for a transitions of care visit. She was discharged from [facility] on 10/4/2023 for left total knee arthroplasty. Joined on phone today by nurse, Sabrina.     Reason for visit: Hospital/TCU Follow-up.    Allergies/ADRs: Reviewed in chart  Past Medical History: Reviewed in chart  Tobacco: She reports that she has never smoked. She has never used smokeless tobacco.  Alcohol: not currently using    Medication Adherence/Access: Patient uses pill box(es) - set up by home care RN  Patient takes medications 2 time(s) per day.   Per patient, misses medication 0 times per week.   Medication barriers: none.     S/p left total knee arthroplasty/Osteoarthritis:   Acetaminophen 1000 mg three times daily  Aspirin 325 mg daily until 11/2  Gabapentin 100 mg three times daily - unsure if this is supposed to be continued.  Does not have [facility] follow-up until later in November.   Pain has been improving, but some days are worse.   Taking Senna-S twice daily and Miralax daily for constipation.       Meniere's disease:  Meclizine 25 mg three times daily as needed (twice  daily)  Ondansetron 4 mg twice daily  Nausea and dizziness have been okay since home. Denies any known side effects.      GERD:   Esomeprazole 40 mg twice daily   Patient reports no symptoms. Denies any side effects.      Anxiety:  Venlafaxine XR 37.5 mg daily  Propranolol 10 mg twice daily  Hydroxyzine 10 mg four times as needed  Doing okay as long as she is getting sleep. Denies any known side effects.      Osteoporosis/Supplements: Patient is taking biotin 5000 mcg daily, calcium citrate twice daily, vitamin B12 1,000 mcg daily, vitamin E-fish oil daily, flaxseed daily, glucosamine/chondroitin twice daily, Preservision AREDS 2 daily, daily multivitamin, and vitamin D3 50 mcg daily. Denies any issues.     Today's Vitals: LMP  (LMP Unknown)     Wt Readings from Last 5 Encounters:   10/16/23 140 lb (63.5 kg)   10/09/23 140 lb 1.6 oz (63.5 kg)   10/04/23 134 lb (60.8 kg)   10/02/23 134 lb 12.8 oz (61.1 kg)   09/18/23 135 lb (61.2 kg)     ----------------  Post Discharge Medication Reconciliation Status: discharge medications reconciled and changed, per note/orders.    I spent 22 minutes with this patient today. A copy of the visit note was provided to the patient's provider(s).    A summary of these recommendations was mailed to the patient.    Jovani Buchanan, PharmD, [facility]  Medication Therapy Management Pharmacist  Pager: [phone]    Telemedicine Visit Details  Type of service:  Telephone visit  Start Time:  2:37 PM  End Time:  2:59 PM       Medication Therapy Recommendations  No medication therapy recommendations to display

## 2023-10-26 NOTE — LETTER
"[address] [id number] [phone]      October 30, 2023    Cynthia Arita                                                                                                                     [address] [facility] [address]      Dear Cynthia,    Recommendations from today's MTM visit:                                                    MTM (medication therapy management) is a service provided by a clinical pharmacist designed to help you get the most of out of your medicines.   Today we reviewed what your medicines are for, how to know if they are working, that your medicines are safe and how to make your medicine regimen as easy as possible.      Contact your Orthopedics team to discuss the plan of therapy for your gabapentin.  They may need to refill if they plan to continue. If you notice an increase in pain and/or anxiety after stopping this let your care team know.     Follow-up: As Needed    It was great speaking with you today.  I value your experience and would be very thankful for your time in providing feedback in our clinic survey. In the next few days, you may receive an email or text message from [employer] with a link to a survey related to your  clinical pharmacist.\"     To schedule another MTM appointment, please call the clinic directly or you may call the MTM scheduling line at [phone] or toll-free at [phone].     My Clinical Pharmacist's contact information:                                                      Please feel free to contact me with any questions or concerns you have.      Jovani Buchanan, PharmD, [facility]  Medication Therapy Management Pharmacist  Pager: [phone]    "

## 2023-10-27 ENCOUNTER — TELEPHONE (OUTPATIENT)
Dept: INTERNAL MEDICINE | Facility: CLINIC | Age: 78
End: 2023-10-27
Payer: MEDICARE

## 2023-10-27 NOTE — TELEPHONE ENCOUNTER
Home Care is calling regarding an established patient with [facility].       Requesting orders from: Huyen Samuels  Provider is following patient: please confirm your following patient as has not seen primary care provider recently    Orders Requested    Physical Therapy  Request for initial certification (first set of orders)   Frequency:  1x/wk for 4 wks, then q other week for 4 weeks      Confirmed ok to leave a detailed message with call back.  Contact information confirmed and updated as needed.    Heaven Gardner RN

## 2023-10-27 NOTE — TELEPHONE ENCOUNTER
[facility] calling for new PHYSICAL THERAPY orders. 1/week for 4 weeks and every other week for 4 weeks.    [facility] [phone]

## 2023-10-30 NOTE — PATIENT INSTRUCTIONS
"Recommendations from today's MTM visit:                                                    MTM (medication therapy management) is a service provided by a clinical pharmacist designed to help you get the most of out of your medicines.   Today we reviewed what your medicines are for, how to know if they are working, that your medicines are safe and how to make your medicine regimen as easy as possible.      Contact your Orthopedics team to discuss the plan of therapy for your gabapentin.  They may need to refill if they plan to continue. If you notice an increase in pain and/or anxiety after stopping this let your care team know.     Follow-up: As Needed    It was great speaking with you today.  I value your experience and would be very thankful for your time in providing feedback in our clinic survey. In the next few days, you may receive an email or text message from "[employer] with a link to a survey related to your  clinical pharmacist.\"     To schedule another MTM appointment, please call the clinic directly or you may call the MTM scheduling line at [phone] or toll-free at [phone].     My Clinical Pharmacist's contact information:                                                      Please feel free to contact me with any questions or concerns you have.      Jovani Buchanan, Desiree, [facility]  Medication Therapy Management Pharmacist  Pager: [phone]  "

## 2023-11-07 NOTE — PROGRESS NOTES
Dr Oconnor's note      Patient's instructions / PLAN:                                                        Plan:  1. Hold all the vitamins and supplements for 2-3 weeks  2. Metoclopramide 5 mg 2-3 times a day before meals    3. Hold Nexium for  1-2 weeks. If you feel acid taste, you may resume the Nexium   4. Take Ranitidine 150 mg twice a day   5. Make a follow up appointment with dr Samuels in about 2 weeks      ASSESSMENT & PLAN:                                                      74-year-old with chronic epigastric pain, with evaluation of [facility] comes in because her pain persist.  I think for now we should eliminate all the medications that can cause nausea and irritation of her stomach.  If she does not feel better, I would consider HIDA scan and possible gastric emptying studies    (R10.13) Epigastric pain  (primary encounter diagnosis)  (K31.9) Gastropathy  Comment:   Plan: metoclopramide (REGLAN) 5 MG tablet, ranitidine        (ZANTAC) 150 MG tablet,                Chief complaint:                                                      Dyspepsia    SUBJECTIVE:                                                    Cynthia Arita is a 74 year old female who presents to clinic today for the following health issues:    Abd pain  --She states that she has been dealing with epigastric pain for about 3 weeks.  I think it is much longer since she had an abdominal CT in February for abdominal pain  --She has seen dr Ojeda twice at [facility] where she had multiple test.  She shows me the report of upper endoscopy: Reactive gastropathy and negative H. pylori.  Because her symptoms persisted, but she tried to see Dr. Ojeda again.  Because she was not available she saw .  She understood from  that there is nothing wrong with her.  --She feels very frustrated because her symptoms persist and affect the quality of life.  She has constant epigastric pain and sometimes cramps.  No appetite and  "she forces herself to eat.  She burps a lot.  She denies blood in stools, melena, diarrhea or constipation.  No fever.  -- She feels full after eating only small amount.  -- CT Abd (2/3/19), reviewed, no concerns  --  US Abd (5/7/19), reviewed, no concerns  --She has been taking Nexium in the morning since 2004.  Since epigastric pain and nausea, she has been taking Zofran 2-3 times a day.        Review of Systems:                                                      ROS: negative for fever, chills, cough, wheezes, chest pain, shortness of breath, vomiting, abdominal pain, leg swelling positive for abdominal discomfort as above    This document serves as a record of the services and decisions personally performed and made by Elvin Sapp MD. It was created on her behalf by Dara Richards, a trained medical scribe. The creation of this document is based on the provider's statements to the medical scribe.  Dara Richards July 29, 2019 2:26 PM             OBJECTIVE:             Physical exam:  Blood pressure 114/68, pulse 87, temperature 98  F (36.7  C), temperature source Oral, resp. rate 20, height 1.6 m (5' 3\"), weight 56.2 kg (124 lb), SpO2 98 %, not currently breastfeeding.   NAD, appears comfortable  Skin: no rashes   Neck: supple, no JVD,  No thyroidmegaly. Lymph nodes nonpalpable cervical and supraclavicular.  Chest: clear to auscultation bilaterally, good respiratory effort  Heart: S1 S2, RRR, no mgr appreciated  Abdomen: soft, mild tender epig , no hepatosplenomegaly or masses appreciated, no abdominal bruit, present bowel sounds  Extremities: no edema,   Neurologic: A, Ox3, no focal signs appreciated    PMHx: reviewed  Past Medical History:   Diagnosis Date     Anxiety      Complication of anesthesia      Diverticulosis      GERD (gastroesophageal reflux disease)      HTN (hypertension)      Meniere's disease      Nephrolithiasis      Pain in right knee      PONV (postoperative nausea and " vomiting)       PSHx: reviewed  Past Surgical History:   Procedure Laterality Date     ARTHROPLASTY KNEE Right 1/21/2019    Procedure: Right total knee arthroplasty;  Surgeon: Denton Amor MD;  Location: [facility] OR     C VAGINAL HYSTERECTOMY      with left oophorectomy     EYE SURGERY      cataract surgery both eyes        Meds: reviewed  Current Outpatient Medications   Medication Sig Dispense Refill     acetaminophen (TYLENOL) 325 MG tablet Take 2 tablets (650 mg) by mouth every 4 hours as needed for other (multimodal surgical pain management along with NSAIDS and opioid medication as indicated based on pain control and physical function.)       ALPRAZolam (XANAX) 0.5 MG tablet Take 1 tablet (0.5 mg) by mouth 3 times daily as needed for anxiety 15 tablet 0     calcitonin, salmon, (MIACALCIN) 200 UNIT/ACT nasal spray USE 1 SPRAY IN 1 NOSTRIL  DAILY (ALTERNATING NOSTRILS DAILY) 11.1 mL 3     Cholecalciferol (VITAMIN D3) 2000 UNITS CAPS Take 2,000 Units by mouth every morning        diazepam (VALIUM) 2 MG tablet TAKE 1 TABLET BY MOUTH EVERY 6 HOURS AS NEEDED FOR ANXIETY OR VERTIGO 40 tablet 0     Flaxseed, Linseed, (FLAXSEED OIL) 1000 MG CAPS Take 1,000 mg by mouth every evening        Glucosamine-Chondroitin (GLUCOSAMINE CHONDR COMPLEX PO) Take 1 capsule by mouth 2 times daily        Multiple Vitamins-Minerals (CENTRUM SILVER) per tablet Take 1 tablet by mouth daily       Multiple Vitamins-Minerals (OCUVITE PRESERVISION PO) Take 1 tablet by mouth 2 times daily        NEXIUM 40 MG CR capsule Take 40 mg by mouth daily        Omega-3 Fatty Acids (OMEGA-3 FISH OIL) 1000 MG CAPS Take 1,000 mg by mouth 2 times daily        propranolol (INDERAL) 20 MG tablet TAKE 1 TABLET BY MOUTH  DAILY AS NEEDED FOR BLOOD  PRESSURE HIGHER THAN 170 30 tablet 0     RANITIDINE HCL PO Take 150 mg by mouth At Bedtime        triamterene-HCTZ (MAXZIDE-25) 37.5-25 MG tablet Take 1 tablet by mouth daily 10 tablet 0     naproxen sodium  (ALEVE) 220 MG tablet Take 220 mg by mouth       ondansetron (ZOFRAN-ODT) 4 MG ODT tab          Soc Hx: reviewed  Fam Hx: reviewed    The information in this document, created by the medical scribe for me, accurately reflects the services I personally performed and the decisions made by me. I have reviewed and approved this document for accuracy prior to leaving the patient care area.  July 29, 2019 2:47 PM        Senait Oconnor MD  Internal Medicine      Detail Level: Generalized Quality 431: Preventive Care And Screening: Unhealthy Alcohol Use - Screening: Patient not identified as an unhealthy alcohol user when screened for unhealthy alcohol use using a systematic screening method Quality 226: Preventive Care And Screening: Tobacco Use: Screening And Cessation Intervention: Patient screened for tobacco use and is an ex/non-smoker

## 2023-11-10 ENCOUNTER — TELEPHONE (OUTPATIENT)
Dept: INTERNAL MEDICINE | Facility: CLINIC | Age: 78
End: 2023-11-10
Payer: MEDICARE

## 2023-11-11 DIAGNOSIS — M81.0 AGE-RELATED OSTEOPOROSIS WITHOUT CURRENT PATHOLOGICAL FRACTURE: ICD-10-CM

## 2023-11-13 RX ORDER — CALCITONIN SALMON 200 [IU]/.09ML
SPRAY, METERED NASAL
Qty: 11.1 ML | Refills: 3 | Status: SHIPPED | OUTPATIENT
Start: 2023-11-13 | End: 2024-07-11

## 2023-11-14 ENCOUNTER — TRANSFERRED RECORDS (OUTPATIENT)
Dept: HEALTH INFORMATION MANAGEMENT | Facility: CLINIC | Age: 78
End: 2023-11-14
Payer: MEDICARE

## 2023-11-14 DIAGNOSIS — Z53.9 DIAGNOSIS NOT YET DEFINED: Primary | ICD-10-CM

## 2023-11-14 PROCEDURE — G0180 MD CERTIFICATION HHA PATIENT: HCPCS | Performed by: INTERNAL MEDICINE

## 2023-11-17 ENCOUNTER — TELEPHONE (OUTPATIENT)
Dept: INTERNAL MEDICINE | Facility: CLINIC | Age: 78
End: 2023-11-17
Payer: MEDICARE

## 2023-11-17 NOTE — TELEPHONE ENCOUNTER
Ayse with [facility] call to report.     Discharge from [facility] today per patient request. Patient will continue to received out patient physical therapy  therapy. Patient hs vomited 2x today, reports being nausous and dizzy. Ayse recommended she see pcp and patient says she has an appointment today.       Call back number [phone]      Patient has appointment with CLINT Magaña  this morning. Will route to her for awareness.

## 2023-11-17 NOTE — TELEPHONE ENCOUNTER
Called to patient who states she forgot what day she was being seen. Advised patient to go to urgent care this weekend if feeling any worse. Patient says she will.    Thank you,  Anant Reyes, Triage RN [facility]  1:19 PM 11/17/2023

## 2023-11-28 DIAGNOSIS — R11.0 NAUSEA: ICD-10-CM

## 2023-11-28 DIAGNOSIS — H81.02 MENIERE'S DISEASE OF LEFT EAR: ICD-10-CM

## 2023-11-28 RX ORDER — ONDANSETRON 4 MG/1
4 TABLET, ORALLY DISINTEGRATING ORAL 2 TIMES DAILY
Qty: 240 TABLET | Refills: 0 | Status: SHIPPED | OUTPATIENT
Start: 2023-11-28 | End: 2024-03-19

## 2023-11-29 ENCOUNTER — TELEPHONE (OUTPATIENT)
Dept: INTERNAL MEDICINE | Facility: CLINIC | Age: 78
End: 2023-11-29
Payer: MEDICARE

## 2023-11-29 DIAGNOSIS — H81.02 MENIERE'S DISEASE OF LEFT EAR: ICD-10-CM

## 2023-11-29 DIAGNOSIS — R11.0 NAUSEA: ICD-10-CM

## 2023-11-29 NOTE — TELEPHONE ENCOUNTER
Fax received from [employer] requesting clarification on the dosage of the ondansetron (ZOFRAN ODT) 4 MG ODT tab.  Please advise if it is 1 Tablet by mouth 2 Times daily or is it 1 Tablet 4 times daily as needed.  Please send new RX.

## 2023-11-29 NOTE — TELEPHONE ENCOUNTER
Please let them know The instructions are correct she is to take it twice a day scheduled and an additional 2 times a day PRN

## 2023-12-13 ENCOUNTER — OFFICE VISIT (OUTPATIENT)
Dept: INTERNAL MEDICINE | Facility: CLINIC | Age: 78
End: 2023-12-13
Payer: MEDICARE

## 2023-12-13 VITALS
RESPIRATION RATE: 14 BRPM | OXYGEN SATURATION: 99 % | HEIGHT: 60 IN | DIASTOLIC BLOOD PRESSURE: 72 MMHG | SYSTOLIC BLOOD PRESSURE: 120 MMHG | WEIGHT: 131 LBS | TEMPERATURE: 97.4 F | HEART RATE: 63 BPM | BODY MASS INDEX: 25.72 KG/M2

## 2023-12-13 DIAGNOSIS — I10 ESSENTIAL HYPERTENSION: Primary | ICD-10-CM

## 2023-12-13 DIAGNOSIS — R11.0 NAUSEA: ICD-10-CM

## 2023-12-13 DIAGNOSIS — L85.3 DRY SKIN: ICD-10-CM

## 2023-12-13 DIAGNOSIS — D64.9 ANEMIA, UNSPECIFIED TYPE: ICD-10-CM

## 2023-12-13 DIAGNOSIS — Z96.652 STATUS POST LEFT KNEE REPLACEMENT: ICD-10-CM

## 2023-12-13 DIAGNOSIS — H81.02 MENIERE'S DISEASE OF LEFT EAR: ICD-10-CM

## 2023-12-13 DIAGNOSIS — R26.89 BALANCE PROBLEMS: Primary | ICD-10-CM

## 2023-12-13 LAB
BASOPHILS # BLD AUTO: 0 10E3/UL (ref 0–0.2)
BASOPHILS NFR BLD AUTO: 1 %
EOSINOPHIL # BLD AUTO: 0.2 10E3/UL (ref 0–0.7)
EOSINOPHIL NFR BLD AUTO: 3 %
ERYTHROCYTE [DISTWIDTH] IN BLOOD BY AUTOMATED COUNT: 13.4 % (ref 10–15)
HCT VFR BLD AUTO: 39.9 % (ref 35–47)
HGB BLD-MCNC: 12.7 G/DL (ref 11.7–15.7)
IMM GRANULOCYTES # BLD: 0 10E3/UL
IMM GRANULOCYTES NFR BLD: 0 %
LYMPHOCYTES # BLD AUTO: 2.1 10E3/UL (ref 0.8–5.3)
LYMPHOCYTES NFR BLD AUTO: 35 %
MCH RBC QN AUTO: 28.8 PG (ref 26.5–33)
MCHC RBC AUTO-ENTMCNC: 31.8 G/DL (ref 31.5–36.5)
MCV RBC AUTO: 91 FL (ref 78–100)
MONOCYTES # BLD AUTO: 0.5 10E3/UL (ref 0–1.3)
MONOCYTES NFR BLD AUTO: 8 %
NEUTROPHILS # BLD AUTO: 3.2 10E3/UL (ref 1.6–8.3)
NEUTROPHILS NFR BLD AUTO: 53 %
PLATELET # BLD AUTO: 332 10E3/UL (ref 150–450)
RBC # BLD AUTO: 4.41 10E6/UL (ref 3.8–5.2)
WBC # BLD AUTO: 5.9 10E3/UL (ref 4–11)

## 2023-12-13 PROCEDURE — 85025 COMPLETE CBC W/AUTO DIFF WBC: CPT | Performed by: NURSE PRACTITIONER

## 2023-12-13 PROCEDURE — 83540 ASSAY OF IRON: CPT | Performed by: NURSE PRACTITIONER

## 2023-12-13 PROCEDURE — 84443 ASSAY THYROID STIM HORMONE: CPT | Performed by: NURSE PRACTITIONER

## 2023-12-13 PROCEDURE — G0008 ADMIN INFLUENZA VIRUS VAC: HCPCS | Performed by: NURSE PRACTITIONER

## 2023-12-13 PROCEDURE — 83550 IRON BINDING TEST: CPT | Performed by: NURSE PRACTITIONER

## 2023-12-13 PROCEDURE — 36415 COLL VENOUS BLD VENIPUNCTURE: CPT | Performed by: NURSE PRACTITIONER

## 2023-12-13 PROCEDURE — 99213 OFFICE O/P EST LOW 20 MIN: CPT | Mod: 25 | Performed by: NURSE PRACTITIONER

## 2023-12-13 PROCEDURE — 90662 IIV NO PRSV INCREASED AG IM: CPT | Performed by: NURSE PRACTITIONER

## 2023-12-13 PROCEDURE — 80048 BASIC METABOLIC PNL TOTAL CA: CPT | Performed by: NURSE PRACTITIONER

## 2023-12-13 PROCEDURE — 82728 ASSAY OF FERRITIN: CPT | Performed by: NURSE PRACTITIONER

## 2023-12-13 RX ORDER — RESPIRATORY SYNCYTIAL VIRUS VACCINE 120MCG/0.5
0.5 KIT INTRAMUSCULAR ONCE
Qty: 1 EACH | Refills: 0 | Status: CANCELLED | OUTPATIENT
Start: 2023-12-13 | End: 2023-12-13

## 2023-12-13 RX ORDER — ONDANSETRON 4 MG/1
4 TABLET, ORALLY DISINTEGRATING ORAL 2 TIMES DAILY
Qty: 240 TABLET | Refills: 0 | OUTPATIENT
Start: 2023-12-13

## 2023-12-13 ASSESSMENT — ANXIETY QUESTIONNAIRES
4. TROUBLE RELAXING: NOT AT ALL
3. WORRYING TOO MUCH ABOUT DIFFERENT THINGS: NOT AT ALL
6. BECOMING EASILY ANNOYED OR IRRITABLE: NOT AT ALL
IF YOU CHECKED OFF ANY PROBLEMS ON THIS QUESTIONNAIRE, HOW DIFFICULT HAVE THESE PROBLEMS MADE IT FOR YOU TO DO YOUR WORK, TAKE CARE OF THINGS AT HOME, OR GET ALONG WITH OTHER PEOPLE: NOT DIFFICULT AT ALL
GAD7 TOTAL SCORE: 0
7. FEELING AFRAID AS IF SOMETHING AWFUL MIGHT HAPPEN: NOT AT ALL
5. BEING SO RESTLESS THAT IT IS HARD TO SIT STILL: NOT AT ALL
GAD7 TOTAL SCORE: 0
2. NOT BEING ABLE TO STOP OR CONTROL WORRYING: NOT AT ALL
1. FEELING NERVOUS, ANXIOUS, OR ON EDGE: NOT AT ALL

## 2023-12-13 ASSESSMENT — PATIENT HEALTH QUESTIONNAIRE - PHQ9
10. IF YOU CHECKED OFF ANY PROBLEMS, HOW DIFFICULT HAVE THESE PROBLEMS MADE IT FOR YOU TO DO YOUR WORK, TAKE CARE OF THINGS AT HOME, OR GET ALONG WITH OTHER PEOPLE: NOT DIFFICULT AT ALL
SUM OF ALL RESPONSES TO PHQ QUESTIONS 1-9: 1
SUM OF ALL RESPONSES TO PHQ QUESTIONS 1-9: 1

## 2023-12-13 NOTE — PROGRESS NOTES
Assessment & Plan     Essential hypertension  In good range   - CBC with platelets and differential; Future  - Basic metabolic panel  (Ca, Cl, CO2, Creat, Gluc, K, Na, BUN); Future  - TSH with free T4 reflex; Future    Dry skin    - TSH with free T4 reflex; Future    Status post left knee replacement  Working on rehab     Anemia, unspecified type    - CBC with platelets and differential; Future  - Iron and iron binding capacity; Future  - Ferritin; Future      16 minutes spent by me on the date of the encounter doing chart review, history and exam, documentation and further activities per the note       BMI:   Estimated body mass index is 25.58 kg/m  as calculated from the following:    Height as of this encounter: 1.524 m (5').    Weight as of this encounter: 59.4 kg (131 lb).       Patient Instructions   Lab in [address]    May consider CERAVE or VANICREAM for dry skin     Humidifier at home     CASSIDY López CNP  [facility]   Cynthia is a 78 year old, presenting for the following health issues:  RECHECK (Dry skin, needs back check)        12/13/2023    10:20 AM   Additional Questions   Roomed by Karin RG       History of Present Illness       Reason for visit:  Dry skin lack of moisture    She eats 2-3 servings of fruits and vegetables daily.She consumes 2 sweetened beverage(s) daily.She exercises with enough effort to increase her heart rate 10 to 19 minutes per day.  She exercises with enough effort to increase her heart rate 3 or less days per week.   She is taking medications regularly.       Knee replacement - working hard on movement  October 2 nd  left     Feels dry   Wakes up at night and feels she is choking- using biotene      Blood pressure in good range         Review of Systems   Constitutional, HEENT, cardiovascular, pulmonary, GI, , musculoskeletal, neuro, skin, endocrine and psych systems are negative, except as otherwise noted.      Objective     /72   Pulse 63   Temp 97.4  F (36.3  C) (Oral)   Resp 14   Ht 1.524 m (5')   Wt 59.4 kg (131 lb)   LMP  (LMP Unknown)   SpO2 99%   BMI 25.58 kg/m    Body mass index is 25.58 kg/m .  Physical Exam   GENERAL: alert and no distress  RESP: lungs clear to auscultation - no rales, rhonchi or wheezes  CV: regular rate and rhythm, normal S1 S2, no S3 or S4, no murmur, click or rub, no peripheral edema and peripheral pulses strong  ABDOMEN: soft, nontender, no hepatosplenomegaly, no masses and bowel sounds normal  MS: left knee repair   PSYCH: mentation appears normal, affect normal/bright    Lab

## 2023-12-13 NOTE — NURSING NOTE
Chief Complaint   Patient presents with    RECHECK     Dry skin, needs back check     initial /72   Pulse 63   Temp 97.4  F (36.3  C) (Oral)   Resp 14   Ht 1.524 m (5')   Wt 59.4 kg (131 lb)   LMP  (LMP Unknown)   SpO2 99%   BMI 25.58 kg/m   Estimated body mass index is 25.58 kg/m  as calculated from the following:    Height as of this encounter: 1.524 m (5').    Weight as of this encounter: 59.4 kg (131 lb)..  bp completed using cuff size regular  BEAR FELIPE LPN

## 2023-12-13 NOTE — LETTER
December 14, 2023      Cynthia Arita  [address]  [place] [address]        Dear ,    We are writing to inform you of your test results.    Lab are in acceptable limits       Resulted Orders   Basic metabolic panel  (Ca, Cl, CO2, Creat, Gluc, K, Na, BUN)   Result Value Ref Range    Sodium 140 135 - 145 mmol/L      Comment:      Reference intervals for this test were updated on 09/26/2023 to more accurately reflect our healthy population. There may be differences in the flagging of prior results with similar values performed with this method. Interpretation of those prior results can be made in the context of the updated reference intervals.     Potassium 4.7 3.4 - 5.3 mmol/L    Chloride 104 98 - 107 mmol/L    Carbon Dioxide (CO2) 28 22 - 29 mmol/L    Anion Gap 8 7 - 15 mmol/L    Urea Nitrogen 25.4 (H) 8.0 - 23.0 mg/dL    Creatinine 0.50 (L) 0.51 - 0.95 mg/dL    GFR Estimate >90 >60 mL/min/1.73m2    Calcium 9.3 8.8 - 10.2 mg/dL    Glucose 107 (H) 70 - 99 mg/dL   TSH with free T4 reflex   Result Value Ref Range    TSH 3.42 0.30 - 4.20 uIU/mL   Iron and iron binding capacity   Result Value Ref Range    Iron 49 37 - 145 ug/dL    Iron Binding Capacity 266 240 - 430 ug/dL    Iron Sat Index 18 15 - 46 %   Ferritin   Result Value Ref Range    Ferritin 27 11 - 328 ng/mL   CBC with platelets and differential   Result Value Ref Range    WBC Count 5.9 4.0 - 11.0 10e3/uL    RBC Count 4.41 3.80 - 5.20 10e6/uL    Hemoglobin 12.7 11.7 - 15.7 g/dL    Hematocrit 39.9 35.0 - 47.0 %    MCV 91 78 - 100 fL    MCH 28.8 26.5 - 33.0 pg    MCHC 31.8 31.5 - 36.5 g/dL    RDW 13.4 10.0 - 15.0 %    Platelet Count 332 150 - 450 10e3/uL    % Neutrophils 53 %    % Lymphocytes 35 %    % Monocytes 8 %    % Eosinophils 3 %    % Basophils 1 %    % Immature Granulocytes 0 %    Absolute Neutrophils 3.2 1.6 - 8.3 10e3/uL    Absolute Lymphocytes 2.1 0.8 - 5.3 10e3/uL    Absolute Monocytes 0.5 0.0 - 1.3 10e3/uL    Absolute Eosinophils 0.2 0.0 - 0.7  10e3/uL    Absolute Basophils 0.0 0.0 - 0.2 10e3/uL    Absolute Immature Granulocytes 0.0 <=0.4 10e3/uL       If you have any questions or concerns, please call the clinic at the number listed above.       Sincerely,      CASSIDY López CNP

## 2023-12-14 LAB
ANION GAP SERPL CALCULATED.3IONS-SCNC: 8 MMOL/L (ref 7–15)
BUN SERPL-MCNC: 25.4 MG/DL (ref 8–23)
CALCIUM SERPL-MCNC: 9.3 MG/DL (ref 8.8–10.2)
CHLORIDE SERPL-SCNC: 104 MMOL/L (ref 98–107)
CREAT SERPL-MCNC: 0.5 MG/DL (ref 0.51–0.95)
DEPRECATED HCO3 PLAS-SCNC: 28 MMOL/L (ref 22–29)
EGFRCR SERPLBLD CKD-EPI 2021: >90 ML/MIN/1.73M2
FERRITIN SERPL-MCNC: 27 NG/ML (ref 11–328)
GLUCOSE SERPL-MCNC: 107 MG/DL (ref 70–99)
IRON BINDING CAPACITY (ROCHE): 266 UG/DL (ref 240–430)
IRON SATN MFR SERPL: 18 % (ref 15–46)
IRON SERPL-MCNC: 49 UG/DL (ref 37–145)
POTASSIUM SERPL-SCNC: 4.7 MMOL/L (ref 3.4–5.3)
SODIUM SERPL-SCNC: 140 MMOL/L (ref 135–145)
TSH SERPL DL<=0.005 MIU/L-ACNC: 3.42 UIU/ML (ref 0.3–4.2)

## 2023-12-18 ENCOUNTER — NURSE TRIAGE (OUTPATIENT)
Dept: INTERNAL MEDICINE | Facility: CLINIC | Age: 78
End: 2023-12-18
Payer: MEDICARE

## 2023-12-18 NOTE — TELEPHONE ENCOUNTER
Situation      Patient calling about extreme dry mouth for the last month    Assessment   Patient wants provider input if medication are causing her dry mouth.     Trying to drink water often and using cough drops which is not helping.  Has used biotene with no relief.     Reviewed medication.- she has been taking 8mg of Zofran in am and pm since.   Patient states she has been taking Zofran 8mg 2 times a day way before her mouth was extremely dry.    Denied any narcotics     Recommendations   Protocol states patient should be seen today. Patient would like provider input on next steps.     Reason for Disposition   Dry mouth and drinking more liquids than usual (thirsty) and present more than 1 day (24 hours)    Additional Information   Negative: SEVERE difficulty breathing (e.g., struggling for each breath, speaks in single words, stridor)   Negative: Sounds like a life-threatening emergency to the triager   Negative: Injury to tooth or teeth   Negative: Injury to mouth   Negative: Cold sore suspected (i.e., fever blister sore) on the outer lip   Negative: Tooth is painful or swelling around a tooth   Negative: Mouth is painful   Negative: Throat is painful   Negative: Drooling or spitting out saliva (because can't swallow) and new-onset   Negative: Bleeding from mouth and won't stop after 10 minutes of direct pressure   Negative: Electrical burn of mouth   Negative: Chemical burn of mouth   Negative: Difficulty breathing and not severe   Negative: Patient sounds very sick or weak to the triager   Negative: Bloody crusts on lips or sores in mouth AND a rash anywhere else on body (back, chest, face, palms, soles)   Negative: Gum bleeding and taking Coumadin (warfarin) or other strong blood thinner, or known bleeding disorder (e.g., thrombocytopenia)   Negative: Dry mouth and urinating more frequently than usual (i.e., frequency)    Protocols used: Mouth Symptoms-A-OH

## 2023-12-18 NOTE — TELEPHONE ENCOUNTER
The Antivert likely adds to it but is not the main cause. She simply has dry mouth and there is no good treatment.

## 2023-12-19 NOTE — TELEPHONE ENCOUNTER
Provider Recommendation Follow Up:   Reached patient/caregiver. Informed of provider's recommendations. Also suggested small frequent sips of water, ice chips, chewing gum and sugar free candy to help with dry mouth and good oral hygiene. Patient verbalized understanding and agrees with the plan.    Anjelica White RN  [facility]

## 2024-03-14 ENCOUNTER — VIRTUAL VISIT (OUTPATIENT)
Dept: INTERNAL MEDICINE | Facility: CLINIC | Age: 79
End: 2024-03-14
Payer: MEDICARE

## 2024-03-14 DIAGNOSIS — R30.0 DYSURIA: Primary | ICD-10-CM

## 2024-03-14 LAB
ALBUMIN UR-MCNC: NEGATIVE MG/DL
APPEARANCE UR: CLEAR
BACTERIA #/AREA URNS HPF: ABNORMAL /HPF
BILIRUB UR QL STRIP: NEGATIVE
COLOR UR AUTO: YELLOW
GLUCOSE UR STRIP-MCNC: NEGATIVE MG/DL
HGB UR QL STRIP: ABNORMAL
KETONES UR STRIP-MCNC: NEGATIVE MG/DL
LEUKOCYTE ESTERASE UR QL STRIP: ABNORMAL
NITRATE UR QL: NEGATIVE
PH UR STRIP: 5.5 [PH] (ref 5–7)
RBC #/AREA URNS AUTO: ABNORMAL /HPF
SP GR UR STRIP: 1.02 (ref 1–1.03)
SQUAMOUS #/AREA URNS AUTO: ABNORMAL /LPF
UROBILINOGEN UR STRIP-ACNC: 0.2 E.U./DL
WBC #/AREA URNS AUTO: ABNORMAL /HPF
WBC CLUMPS #/AREA URNS HPF: PRESENT /HPF

## 2024-03-14 PROCEDURE — 81001 URINALYSIS AUTO W/SCOPE: CPT | Mod: 93 | Performed by: NURSE PRACTITIONER

## 2024-03-14 PROCEDURE — 99441 PR PHYSICIAN TELEPHONE EVALUATION 5-10 MIN: CPT | Mod: 93 | Performed by: NURSE PRACTITIONER

## 2024-03-14 NOTE — PROGRESS NOTES
Cynthia is a 78 year old who is being evaluated via a billable telephone visit.    What phone number would you like to be contacted at? [phone]  How would you like to obtain your AVS? Rubia  Originating Location (pt. Location): Home    Distant Location (provider location):  On-site    Assessment & Plan     Dysuria  Neg urine   Declined referral   - UA with Microscopic reflex to Culture - lab collect; Future  - UA with Microscopic reflex to Culture - lab collect  - UA Microscopic with Reflex to Culture      10 minutes spent by me on the date of the encounter doing chart review, history and exam, documentation and further activities per the note      BMI  Estimated body mass index is 25.58 kg/m  as calculated from the following:    Height as of 12/13/23: 1.524 m (5').    Weight as of 12/13/23: 59.4 kg (131 lb).         Patient Instructions   Keep fluids up     If another episode  of blood without infection in urine would see urology     Subjective   Cynthia is a 78 year old, presenting for the following health issues:  Urinary Problem        3/14/2024     4:00 PM   Additional Questions   Roomed by MALENA Gray LPN   Accompanied by self         3/14/2024     4:00 PM   Patient Reported Additional Medications   Patient reports taking the following new medications none     History of Present Illness       Reason for visit:  Burning with urination  Symptom onset:  1-3 days ago    She eats 0-1 servings of fruits and vegetables daily.She consumes 0 sweetened beverage(s) daily.She exercises with enough effort to increase her heart rate 9 or less minutes per day.  She exercises with enough effort to increase her heart rate 3 or less days per week.   She is taking medications regularly.     She has had UTI in past   Has some dysuria   Some burning with urination   Tries to drink fluids as much as she can     Urology referral declined     Review of Systems  Constitutional, neuro, ENT, endocrine, pulmonary, cardiac,  "gastrointestinal, genitourinary, musculoskeletal, integument and psychiatric systems are negative, except as otherwise noted.      Objective    Vitals - Patient Reported  Weight (Patient Reported): 61.2 kg (135 lb)  Height (Patient Reported): 162.6 cm (5' 4\")  BMI (Based on Pt Reported Ht/Wt): 23.17        Physical Exam   General: Alert and no distress //Respiratory: No audible wheeze, cough, or shortness of breath // Psychiatric:  Appropriate affect, tone, and pace of words      Color Urine (no units)   Date Value   03/14/2024 Yellow   06/11/2021 Yellow     Appearance Urine (no units)   Date Value   03/14/2024 Clear   06/11/2021 Clear     Glucose Urine (mg/dL)   Date Value   03/14/2024 Negative   06/11/2021 Negative     Bilirubin Urine (no units)   Date Value   03/14/2024 Negative   06/11/2021 Negative     Ketones Urine (mg/dL)   Date Value   03/14/2024 Negative   06/11/2021 Negative     Specific Gravity Urine (no units)   Date Value   03/14/2024 1.025   06/11/2021 1.025     pH Urine   Date Value   03/14/2024 5.5   06/11/2021 5.5 pH     Protein Albumin Urine (mg/dL)   Date Value   03/14/2024 Negative   06/11/2021 Negative     Urobilinogen Urine   Date Value   03/14/2024 0.2 E.U./dL   06/11/2021 0.2 EU/dL     Nitrite Urine (no units)   Date Value   03/14/2024 Negative   06/11/2021 Negative     Leukocyte Esterase Urine (no units)   Date Value   03/14/2024 Small (A)   06/11/2021 Moderate (A)          Phone call duration: 11 minutes  Signed Electronically by: CASSIDY López CNP      "

## 2024-03-19 ENCOUNTER — TELEPHONE (OUTPATIENT)
Dept: INTERNAL MEDICINE | Facility: CLINIC | Age: 79
End: 2024-03-19
Payer: MEDICARE

## 2024-03-19 DIAGNOSIS — H81.02 MENIERE'S DISEASE OF LEFT EAR: ICD-10-CM

## 2024-03-19 DIAGNOSIS — R11.0 NAUSEA: ICD-10-CM

## 2024-03-19 RX ORDER — ONDANSETRON 4 MG/1
4 TABLET, ORALLY DISINTEGRATING ORAL 2 TIMES DAILY
Qty: 240 TABLET | Refills: 1 | Status: SHIPPED | OUTPATIENT
Start: 2024-03-19 | End: 2024-08-09

## 2024-03-19 NOTE — TELEPHONE ENCOUNTER
This pt ended up in Ob Gyn somehow through phones.    She just wants a refill of her ondansetron. She has taken this for a long time for general nausea.     Last visit 3/14/24 virtual, 12/13/23 in person.            Ivania PEREZ RN BSN

## 2024-03-19 NOTE — TELEPHONE ENCOUNTER
PRIOR AUTHORIZATION DENIED    Medication: ONDANSETRON 4 MG PO TBDP  Insurance Company: [employer] ([facility]) - Phone [phone] Fax [phone]  Denial Date: 3/19/2024  Denial Reason(s):       Appeal Information:     Patient Notified: No

## 2024-03-19 NOTE — TELEPHONE ENCOUNTER
Call back to patient. State she called:    Insurance Company: [employer] ([facility]) - Phone [phone] Fax [phone]    States she was told that the order was entered for her to take 8 tablets per day. States she usually only takes 2 per pay. The order is written for 2 times daily and twice daily prn. Order has been written this way since 10/5/23. Patient has not picked up a prescription since then.    Previous order was written with instructions to DISSOLVE 1 TABLET ON THE TONGUE  EVERY 6 HOURS. This order has been written this way since 2019.    States this is a new plan for patient. States next step would be an appeal. Phone [phone] Fax [phone]. This is the Medicare Part D appeal line. Attempted to call this number. Reached a series of voice prompts to which writer does not know the answer. Will have patient reach out to her insurance. Call to patient. Advised.

## 2024-03-19 NOTE — TELEPHONE ENCOUNTER
"Call to patient. Advised. States she has \"post op nausea\" since she had knee surgery in October. Advised unlikely we could use post op pain as a diagnosis from a surgery 7 months ago. Patient will try calling insurance.   "

## 2024-04-11 ENCOUNTER — LAB (OUTPATIENT)
Dept: LAB | Facility: CLINIC | Age: 79
End: 2024-04-11
Payer: MEDICARE

## 2024-04-11 ENCOUNTER — TELEPHONE (OUTPATIENT)
Dept: INTERNAL MEDICINE | Facility: CLINIC | Age: 79
End: 2024-04-11
Payer: MEDICARE

## 2024-04-11 DIAGNOSIS — R30.0 DYSURIA: Primary | ICD-10-CM

## 2024-04-11 DIAGNOSIS — N39.0 URINARY TRACT INFECTION WITHOUT HEMATURIA, SITE UNSPECIFIED: Primary | ICD-10-CM

## 2024-04-11 DIAGNOSIS — R30.0 DYSURIA: ICD-10-CM

## 2024-04-11 LAB
ALBUMIN UR-MCNC: ABNORMAL MG/DL
APPEARANCE UR: ABNORMAL
BACTERIA #/AREA URNS HPF: ABNORMAL /HPF
BILIRUB UR QL STRIP: NEGATIVE
COLOR UR AUTO: YELLOW
GLUCOSE UR STRIP-MCNC: NEGATIVE MG/DL
HGB UR QL STRIP: ABNORMAL
KETONES UR STRIP-MCNC: NEGATIVE MG/DL
LEUKOCYTE ESTERASE UR QL STRIP: ABNORMAL
NITRATE UR QL: POSITIVE
PH UR STRIP: 5.5 [PH] (ref 5–7)
RBC #/AREA URNS AUTO: ABNORMAL /HPF
SP GR UR STRIP: 1.02 (ref 1–1.03)
SQUAMOUS #/AREA URNS AUTO: ABNORMAL /LPF
UROBILINOGEN UR STRIP-ACNC: 0.2 E.U./DL
WBC #/AREA URNS AUTO: >100 /HPF

## 2024-04-11 PROCEDURE — 87086 URINE CULTURE/COLONY COUNT: CPT

## 2024-04-11 PROCEDURE — 81001 URINALYSIS AUTO W/SCOPE: CPT

## 2024-04-11 PROCEDURE — 87186 SC STD MICRODIL/AGAR DIL: CPT

## 2024-04-11 RX ORDER — CEFDINIR 300 MG/1
300 CAPSULE ORAL 2 TIMES DAILY
Qty: 20 CAPSULE | Refills: 0 | Status: SHIPPED | OUTPATIENT
Start: 2024-04-11 | End: 2024-07-11

## 2024-04-11 NOTE — TELEPHONE ENCOUNTER
Patient calls, states that she feels she has bladder infection again and she is asking if a Rx can be called in. Patient was seen on 03/14 by Olinda Small and UA was done (awaiting review).     Please advise. Pharmacy pended if appropriate. Thank you.

## 2024-04-11 NOTE — TELEPHONE ENCOUNTER
"Call to patient to relay provider message. Patient states that she is having dysuria and feels \"shaky\" when she uses the bathroom. Advised patient that she may want to consider being seen in clinic or at urgent care. Advised patient to leave urine sample at lab at least per provider recommendation/request.    Patient says she will try to get ride to leave urine sample. Patient declined urology referral again.    Thank you,  Anant Reyes, Triage RN [facility]  2:06 PM 4/11/2024   "

## 2024-04-11 NOTE — TELEPHONE ENCOUNTER
3/14 -no treatment recommended with that urine    It was not infected   It was discussed in that appointment that it was negative and no treatment needed     If she want to provide a urine sample, we can see if infection present     We did discuss and she declined a referral to urology at that visit   If she wishes to have referral I will place one     Per my note   If another episode of blood without infection in urine would see urology

## 2024-04-12 ENCOUNTER — LAB (OUTPATIENT)
Dept: LAB | Facility: CLINIC | Age: 79
End: 2024-04-12
Payer: MEDICARE

## 2024-04-12 ENCOUNTER — TELEPHONE (OUTPATIENT)
Dept: INTERNAL MEDICINE | Facility: CLINIC | Age: 79
End: 2024-04-12
Payer: MEDICARE

## 2024-04-12 DIAGNOSIS — Z20.822 EXPOSURE TO 2019 NOVEL CORONAVIRUS: ICD-10-CM

## 2024-04-12 DIAGNOSIS — Z20.822 EXPOSURE TO 2019 NOVEL CORONAVIRUS: Primary | ICD-10-CM

## 2024-04-12 LAB — BACTERIA UR CULT: ABNORMAL

## 2024-04-12 PROCEDURE — 87635 SARS-COV-2 COVID-19 AMP PRB: CPT

## 2024-04-12 NOTE — TELEPHONE ENCOUNTER
"S-(situation): Patient requesting COVID lab test     B-(background): Patient was in [place] and was in contact wit 2 people who tested positive for COVID     A-(assessment): Patient is not having any symptoms of yet, patient would like to just \"come in and get tested\"     R-(recommendations): patient would like message sent to Olinda Martino RN       "

## 2024-04-13 ENCOUNTER — VIRTUAL VISIT (OUTPATIENT)
Dept: URGENT CARE | Facility: CLINIC | Age: 79
End: 2024-04-13
Payer: MEDICARE

## 2024-04-13 ENCOUNTER — TELEPHONE (OUTPATIENT)
Dept: NURSING | Facility: CLINIC | Age: 79
End: 2024-04-13

## 2024-04-13 ENCOUNTER — NURSE TRIAGE (OUTPATIENT)
Dept: NURSING | Facility: CLINIC | Age: 79
End: 2024-04-13
Payer: MEDICARE

## 2024-04-13 ENCOUNTER — TELEPHONE (OUTPATIENT)
Dept: PEDIATRICS | Facility: CLINIC | Age: 79
End: 2024-04-13

## 2024-04-13 DIAGNOSIS — U07.1 INFECTION DUE TO 2019 NOVEL CORONAVIRUS: Primary | ICD-10-CM

## 2024-04-13 LAB — SARS-COV-2 RNA RESP QL NAA+PROBE: POSITIVE

## 2024-04-13 PROCEDURE — 99441 PR PHYSICIAN TELEPHONE EVALUATION 5-10 MIN: CPT | Mod: 93

## 2024-04-13 NOTE — PROGRESS NOTES
CC: COVID +    COVID + yesterday in clinic.  +UA yesterday- started on Cefdinir.  First time getting COVID.    Cynthia is a 78 year old who is being evaluated via a billable telephone visit.      Originating Location (pt. Location): Home    Distant Location (provider location):  Off-site    Assessment & Plan     Infection due to 2019 novel coronavirus    - nirmatrelvir and ritonavir (PAXLOVID) 300 mg/100 mg therapy pack; Take 3 tablets by mouth 2 times daily for 5 days (Take 2 Nirmatrelvir tablets and 1 Ritonavir tablet twice daily for 5 days)    COVID-19 positive patient.  Encounter for consideration of medication intervention. Patient does qualify for a prescription. Full discussion with patient including medication options, risks and benefits. Potential drug interactions reviewed with patient.     Treatment Planned Paxlovid RX sent to [facility]    Temporary change to home medications: None   None     Estimated body mass index is 25.58 kg/m  as calculated from the following:    Height as of 12/13/23: 1.524 m (5').    Weight as of 12/13/23: 59.4 kg (131 lb).  GFR Estimate   Date Value Ref Range Status   12/13/2023 >90 >60 mL/min/1.73m2 Final   03/08/2021 86 >60 mL/min/[1.73_m2] Final     Comment:     Non  GFR Calc  Starting 12/18/2018, serum creatinine based estimated GFR (eGFR) will be   calculated using the Chronic Kidney Disease Epidemiology Collaboration   (CKD-EPI) equation.       Lab Results   Component Value Date    [id number] Positive (A) 04/12/2024         Aida Marie is a 78 year old, presenting for the following health issues:  Infection    HPI     COVID + yesterday in clinic.  +UA yesterday- started on Cefdinir.  First time getting COVID.        Review of Systems  Constitutional, neuro, ENT, endocrine, pulmonary, cardiac, gastrointestinal, genitourinary, musculoskeletal, integument and psychiatric systems are negative, except as otherwise noted.      Objective            Vitals:  No vitals were obtained today due to virtual visit.    Physical Exam   General: Alert and no distress //Respiratory: No audible wheeze, cough, or shortness of breath // Psychiatric:  Appropriate affect, tone, and pace of words        Phone call duration: 10 minutes  Signed Electronically by: [facility]

## 2024-04-13 NOTE — TELEPHONE ENCOUNTER
Coronavirus (COVID-19) Notification    Caller Name (Patient, parent, daughter/son, grandparent, etc)  Patient     Reason for call  Notify of Positive Coronavirus (COVID-19) lab results, assess symptoms,  review [facility] recommendations    Lab Result    Lab test:  2019-nCoV rRt-PCR or SARS-CoV-2 PCR    Oropharyngeal AND/OR nasopharyngeal swabs is POSITIVE for 2019-nCoV RNA/SARS-COV-2 PCR (COVID-19 virus)      Gather patient reported symptoms   Assessment   Current Symptoms at time of phone call, reported by patient: (if no symptoms, document: No symptoms] Cough / sleep    Date of symptom(s) onset (if applicable) 4/8 or 4/9     If at time of call, Patients symptoms have worsened, the Patient should contact 911 or have someone drive them to Emergency Dept promptly:    If Patient calling 911, inform 911 personal that you have tested positive for the Coronavirus (COVID-19).  Place mask on and await 911 to arrive.  If Emergency Dept, If possible, please have another adult drive you to the Emergency Dept but you need to wear mask when in contact with other people.      Treatment Options:   Is patient interested in discussing COVID treatment? Yes.   Is this a [place] or [place] Patient? No:     Are you an agent trained to scheduling? Yes.     Review information with Patient    Your result was positive. This means you have COVID-19 (coronavirus).    How can I protect others?    These guidelines are for isolating before returning to work, school or .    If you DO have symptoms  Stay home and away from others   For at least 5 days after your symptoms started, AND  You are fever free for 24 hours (with no medicine that reduces fever), AND  Your other symptoms are better  Wear a mask for 10 full days anytime you are around others    If you DON'T have symptoms  Stay home and away from others for at least 5 days after your positive test  Wear a mask for 10 full days anytime you are around others    There may be  different guidelines for healthcare facilities.  Please check with the specific sites before arriving.    If you have been told by a doctor that you were severely ill with COVID-19 or are immunocompromised, you should isolate for at least 10 days.    You should not go back to work until you meet the guidelines above for ending your home isolation. You don't need to be retested for COVID-19 before going back to work--studies show that you won't spread the virus if it's been at least 10 days since your symptoms started (or 20 days, if you have a weak immune system).    Employers, schools, and daycares: This is an official notice for this person's medical guidelines for returning in-person.  They must meet the above guidelines before going back to work, school or  in person.    You will receive a positive COVID-19 letter via [employer] or the mail soon with additional self-care information.    Would you like me to review some of that information with you now?  No    If you were tested for an upcoming procedure, please contact your provider for next steps.    Aislinn Guan

## 2024-04-13 NOTE — TELEPHONE ENCOUNTER
Tested for covid-19 and wants results. She's anxious. She will call back later today if she doesn't get a call that she's positive. Nothing found related to covid-19results in chart at this time. I told her, if she gets the call that she's positive, call the RN line and we will do an evaluation and discuss what next steps would be, including Paxlovid.  Mary Griggs, HUASM  [facility]    Reason for Disposition   [1] Follow-up call to recent contact AND [2] information only call, no triage required    Additional Information   Negative: [1] Caller is not with the adult (patient) AND [2] reporting urgent symptoms   Negative: Lab result questions   Negative: Medication questions   Negative: Caller can't be reached by phone   Negative: Caller has already spoken to PCP or another triager   Negative: RN needs further essential information from caller in order to complete triage   Negative: Requesting regular office appointment   Negative: [1] Caller requesting NON-URGENT health information AND [2] PCP's office is the best resource   Negative: Question about upcoming scheduled test, no triage required and triager able to answer question   Negative: [1] Caller is not with the adult (patient) AND [2] probable NON-URGENT symptoms   Negative: General information question, no triage required and triager able to answer question   Negative: Health Information question, no triage required and triager able to answer question    Protocols used: Information Only Call - No Triage-A-

## 2024-04-13 NOTE — TELEPHONE ENCOUNTER
COVID Positive/Requesting COVID treatment    Patient is positive for COVID and requesting treatment options.    Date of positive COVID test (PCR or at home)? 4/12/24  Current COVID symptoms: cough and fatigue  Date COVID symptoms began: 4/8 or 4/9    Message should be routed to clinic RN pool. Best phone number to use for call back: [phone]

## 2024-04-15 NOTE — TELEPHONE ENCOUNTER
Call to patient to check in on patient. Patient states that she was already prescribed Paxlovid 04/13/2024 and patient is feeling about the same. Patient reports she was also tested and treated for a UTI.     Patient reports that the Paxlovid leaves a horrible taste in her mouth, but she will continue to take it. Patient appreciated the call to check in on her.     Thank you,  Anant Reyes, Triage RN [facility]fil[facility] Garrochales  7:59 AM 4/15/2024

## 2024-05-01 ENCOUNTER — PATIENT OUTREACH (OUTPATIENT)
Dept: CARE COORDINATION | Facility: CLINIC | Age: 79
End: 2024-05-01
Payer: MEDICARE

## 2024-05-01 NOTE — PROGRESS NOTES
Clinic Care Coordination Chart Review     Situation: Patient chart reviewed by care coordination team members and leader.     Background: Primary Care Care Coordination program populated on 4/30/2024, however, after deep chart review it was noted that the referral was entered by Chanelle Hua PA-C on 10/2/23 but never signed and therefore never populated to Care Coordination team for outreach.     Assessment: Reason for referral was related to resource planning leading up to a surgery in October 2023.  Patient has completed this surgery on 10/2/23 and completed several follow up visits successfully.     Plan/Recommendations: No outreach will be conducted by Care Coordination team at this time.  If future needs arise, a new Care Coordination referral can be placed.     Yovana Kellogg, OFELIAN, RN   Manager of Ambulatory Care Management  [facility]

## 2024-05-14 ENCOUNTER — TELEPHONE (OUTPATIENT)
Dept: INTERNAL MEDICINE | Facility: CLINIC | Age: 79
End: 2024-05-14
Payer: MEDICARE

## 2024-05-14 NOTE — TELEPHONE ENCOUNTER
Advised patient of provider recommendation via telephone.  Patient reports she tried icy hot and ibuprofen that did not help. Patient reported some apprehension with going to another provider, writer explained that PCP may have to refer to orthopedics even after evaluation and so going to walk-in clinic skips that step. Patient agreed to go to walk-in, writer gave address for clinic.     Salena WEINER

## 2024-05-14 NOTE — TELEPHONE ENCOUNTER
Continue zofran for nausea, try Biotene rinse for dry mouth, hard candy to promote salivation.  Laurence Patel CNP       [facility] Medicine  Progress note    Team: [facility] [place] [place] HOSP MED W Olga Washington MD  Admit Date: 5/7/2024  Code status: Full Code    Principal Problem:  Severe sepsis    Interval hx:  No new complaints.  feeding patient. Patient using a straw like a utensil trying to feed herself    PEx  Temp:  [96.2 °F (35.7 °C)-98.5 °F (36.9 °C)]   Pulse:  [70-79]   Resp:  [16-18]   BP: ()/(56-93)   SpO2:  [93 %-97 %]     Intake/Output Summary (Last 24 hours) at 5/13/2024 2131  Last data filed at 5/13/2024 1110  Gross per 24 hour   Intake 550 ml   Output 2700 ml   Net -2150 ml       General Appearance: no acute distress, WD, elderly, ill-appearing  Heart: regular rate and rhythm, no heave  Respiratory: Normal respiratory effort, symmetric excursion, bilateral vesicular breath sounds   Abdomen: Soft, non-tender; bowel sounds active  Skin: intact, no rash, no ulcers  Neurologic:  No focal numbness or weakness  Mental status: Alert, oriented x 4, affect appropriate    Recent Labs   Lab 05/11/24  0508 05/12/24  0458 05/13/24  0509   WBC 4.34 4.00 4.76   HGB 12.0 11.4* 11.6*   HCT 38.8 35.6* 36.9*    161 172     Recent Labs   Lab 05/08/24  0600 05/09/24  0408 05/10/24  0509 05/11/24  0508 05/12/24  0458 05/13/24  0509    140 136 136 134* 132*   K 4.5 4.8 3.9 5.1 5.0 5.7*    104 105 104 103 102   CO2 18* 24 23 19* 19* 16*   BUN 59* 62* 27* 37* 46* 54*   CREATININE 7.6* 7.9* 3.9* 5.9* 6.5* 7.1*   GLU 95 95 116* 127* 110 191*   CALCIUM 8.8 9.0 9.7 9.3 8.7 8.9   MG 2.2 2.3 2.0  --   --   --    PHOS 3.7 5.7* 2.8  --   --   --      Recent Labs   Lab 05/07/24  2312 05/08/24  0600 05/09/24  0408 05/10/24  0509   ALKPHOS  --  139* 129 170*   ALT  --  10 8* 9*   AST  --  22 18 26   ALBUMIN  --  2.2* 2.2* 2.5*   PROT  --  7.5 7.4 8.5*   BILITOT  --  0.9 0.9 0.7   INR 1.2  --   --   --         Recent Labs   Lab 05/12/24  0614 05/12/24  2034 05/13/24  0631 05/13/24  1107 05/13/24  1220 05/13/24  1757   POCTGLUCOSE 117* 242*  236* 123* 114* 173*       Scheduled Meds:   allopurinoL  50 mg Oral Every other day    apixaban  5 mg Oral BID    atorvastatin  80 mg Oral Daily    carvediloL  6.25 mg Oral BID    cefTRIAXone (Rocephin) IV (PEDS and ADULTS)  1 g Intravenous Q24H    clopidogreL  75 mg Oral Daily    famotidine  20 mg Oral Daily    sertraline  100 mg Oral Daily    traZODone  25 mg Oral QHS     Continuous Infusions:  As Needed:    Current Facility-Administered Medications:     dextrose 10%, 12.5 g, Intravenous, PRN    dextrose 10%, 25 g, Intravenous, PRN    glucagon (human recombinant), 1 mg, Intramuscular, PRN    hydrALAZINE, 25 mg, Oral, Q8H PRN    insulin aspart U-100, 0-10 Units, Subcutaneous, Q6H PRN    oxyCODONE, 5 mg, Oral, Q6H PRN    QUEtiapine, 25 mg, Oral, Daily PRN    sodium chloride 0.9%, 10 mL, Intravenous, PRN    Assessment and Plan  / Problems managed today    * Severe sepsis  - ID consulted   - 2/2 pan-sensitive E coli UTI; continue Ropcehin through 5/13 and end of 5/14     ESRD (end stage renal disease)  HD per nephro; appreciate assistance     Chronic combined systolic and diastolic heart failure  - Continue Coreg  - HD for volume status       Encephalopathy  - Vascular neurology consulted. Appreciate assistance   - CTH, MRI brain negative, Tox negative  - Delirium precautions   - Replace electrolytes to keep Mg > 2, Phos > 3, and K > 4  - Nephrology HD MWF, dialyzed 5/8/24    Appears to be functionally declining slowly. Unclear true baseline. Vascular neurology signed off.   Possibly acute exacerbation with UTI/sepsis. Continue antibiotics through 5/13 and end of 5/14   Improving daily, but slowly     Impaired functional mobility and endurance  - PT/OT consulted  - Possible SNF placement if she is a candidate. If not, will need to discuss group home care with family      Paroxysmal atrial fibrillation  - Continue coreg  - Continue Eliquis     Anemia due to chronic kidney disease, on chronic dialysis  - At Baseline  -  Epo per nephro     CAD (coronary artery disease)  - Continue home plavix     Type 2 diabetes mellitus with hyperglycemia, with long-term current use of insulin  - Inpatient glucose goal 140-180  - Most Recent HbA1c: 8.0 in 4/2024   - MDSSI   - Glucose checks POCT qACHS ordered       CAROLIN (generalized anxiety disorder)  - Continue home sertraline and trazodone     Peripheral vascular disease  - Continue plavix       Diet:   regular diet  GI PPx: not needed  DVT PPx:  apixaban  Airways: room air  Wounds: none    Goals of Care:  Return to prior functional status     Discharge Planning   DEVAN: 5/15/2024   Is the patient medically ready for discharge?:     Reason for patient still in hospital (select all that apply): Patient trending condition and Treatment  Discharge Plan A: Skilled Nursing Facility   Discharge Delays: None known at this time    Olga Washington MD

## 2024-05-14 NOTE — TELEPHONE ENCOUNTER
Called and spoke with patient. Pain x 1 week radiating from her elbow to shoulder. She has tried NSAIDS and topical pain remedies with no relief. Pain started when she was lifting something.     Offered appointment with sameday provider - patient declined.    Are there any days this patient could be seen with Betsey Magaña sooner than scheduled appointment for 7/11/2024?   Odomzo Counseling- I discussed with the patient the risks of Odomzo including but not limited to nausea, vomiting, diarrhea, constipation, weight loss, changes in the sense of taste, decreased appetite, muscle spasms, and hair loss.  The patient verbalized understanding of the proper use and possible adverse effects of Odomzo.  All of the patient's questions and concerns were addressed.

## 2024-05-14 NOTE — TELEPHONE ENCOUNTER
I do not have anything available unfortunately.  I would actually recommend that she goes to the [place] [facility] which is located in the same building as a pharmacy.  This is a walk-in clinic.

## 2024-05-14 NOTE — TELEPHONE ENCOUNTER
General Call    Contacts         Type Contact Phone/Fax    05/14/2024 03:23 PM CDT Phone (Incoming) Cynthia Arita (Self) [phone] (M)          Reason for Call:  Pt has severe pain from her elbow to wrist but first available appt isn't until July (scheduled). Can you call pt if she can be seen sooner? Thank you!    Okay to leave a detailed message?: Yes at Home number on file [phone] (home)

## 2024-05-15 ENCOUNTER — ANCILLARY PROCEDURE (OUTPATIENT)
Dept: GENERAL RADIOLOGY | Facility: CLINIC | Age: 79
End: 2024-05-15
Attending: PHYSICIAN ASSISTANT
Payer: MEDICARE

## 2024-05-15 ENCOUNTER — OFFICE VISIT (OUTPATIENT)
Dept: ORTHOPEDICS | Facility: CLINIC | Age: 79
End: 2024-05-15
Payer: MEDICARE

## 2024-05-15 VITALS — HEIGHT: 62 IN | WEIGHT: 140 LBS | BODY MASS INDEX: 25.76 KG/M2

## 2024-05-15 DIAGNOSIS — M67.912 DISORDER OF LEFT ROTATOR CUFF: Primary | ICD-10-CM

## 2024-05-15 DIAGNOSIS — M75.22 TENDONITIS OF UPPER BICEPS TENDON OF LEFT SHOULDER: ICD-10-CM

## 2024-05-15 DIAGNOSIS — M25.512 LEFT SHOULDER PAIN: ICD-10-CM

## 2024-05-15 PROCEDURE — 73030 X-RAY EXAM OF SHOULDER: CPT | Mod: TC | Performed by: RADIOLOGY

## 2024-05-15 PROCEDURE — 99203 OFFICE O/P NEW LOW 30 MIN: CPT | Performed by: PHYSICIAN ASSISTANT

## 2024-05-15 NOTE — PROGRESS NOTES
"ASSESSMENT & PLAN  Patient Instructions     1. Disorder of left rotator cuff    2. Tendonitis of upper biceps tendon of left shoulder        -Patient seen today for her left shoulder pain.  She denies any injury but does report a lot of overhead usage.  We reviewed her clinical exam in detail today as well as her imaging.  She is most symptomatic about the left proximal biceps tendon.  We discussed management strategies for this including conservative treatment with ibuprofen and Tylenol along with topical creams such as Voltaren gel, Aspercreme, Salonpas, Biofreeze as well as heat and ice.  We discussed physical therapy to work on stretches and exercises and a referral was placed for this.  Given her most symptomatic areas her proximal biceps tendon we will refer her for corticosteroid injection.  -Follow-up with sports medicine for a corticosteroid injection in the left proximal biceps tendon.    -Call direct clinic number [[phone]] at any time with questions or concerns.        Yovana Piedra PA-C  [facility]    -----  Chief Complaint   Patient presents with    Left Shoulder - Pain       SUBJECTIVE  Cynthia Arita is a/an 78 year old female who is right handed who is seen as a WALK IN patient for evaluation of left shoulder pain.  Onset was yesterday morning when patient was lifting and felt something strange in her arm and has gotten worse. Patient reports pain is located through all her arm muscles, patient points to anterior portion of the arm through the brachioradialis. Symptoms are worsened by OH motion, very point tender, putting on her seatbelt, reaching away and behind her body.  She has tried icyhot and ibuprofen. Associated symptoms include \"fingers feel goofy\" numbness/tingling.    The patient is seen alone    Prior injury/Surgical history of affected joint: nothing  Social History/Occupation: retired    REVIEW OF SYSTEMS:  Pertinent positives/negative: As stated " "above in HPI    OBJECTIVE:  Ht 1.575 m (5' 2\")   Wt 63.5 kg (140 lb)   LMP  (LMP Unknown)   BMI 25.61 kg/m     General: Alert and in no distress  Skin: no visable rashes  CV: Extremities appear well perfused   Resp: normal respiratory effort, no conversational dyspnea   Psych: normal mood, affect  MSK: Shoulder exam:  left shoulder     Range of Motion:   Forward flexion: 120 passively, 40 active   Abduction:  45 active, 70 passively  Internal rotation: Limited due to pain  External rotation: 45    Inspection:    There is no visible deformity  There is no erythema or signs of infection  There is no open wounds    Palpation:     Sternoclavicular joint nontender   Acromioclavicular joint nontender   Subacromial space mild tender  Glenohumeral joint mild discomfort  Proximal biceps tendon Tender (most tenderful point on exam and reports reproduction of symptoms)   Posterior shoulder and periscapular region nontender     Strength:   Abduction (arm at side) 5/5  Internal rotation (arm at side) 5/5  External rotation (arm at side)  5/5  Adduction 5/5    Impingement tests:   Neer (forward flexion) painfree  Cabral (IR with arm flexed, abducted) painfree  Empty can (thumb down in scaption position) pain  Crossover (AC joint compression) painfree  [place] (AC joint/labral compression) painfree  Speeds pain    Sensation:  Intact to light touch throughout upper extremity     Radial pulse is palpable and equal to contralateral side      RADIOLOGY:  Final results and radiologist's interpretation available in the [facility] health record.  Images below were personally reviewed and discussed with the patient in the office today.  My personal interpretation of the performed imaging:   Shoulder x-rays demonstrate degenerative changes.  No acute fracture or dislocation is seen.      "

## 2024-05-15 NOTE — PATIENT INSTRUCTIONS
1. Disorder of left rotator cuff    2. Tendonitis of upper biceps tendon of left shoulder        -Patient seen today for her left shoulder pain.  She denies any injury but does report a lot of overhead usage.  We reviewed her clinical exam in detail today as well as her imaging.  She is most symptomatic about the left proximal biceps tendon.  We discussed management strategies for this including conservative treatment with ibuprofen and Tylenol along with topical creams such as Voltaren gel, Aspercreme, Salonpas, Biofreeze as well as heat and ice.  We discussed physical therapy to work on stretches and exercises and a referral was placed for this.  Given her most symptomatic areas her proximal biceps tendon we will refer her for corticosteroid injection.  -Follow-up with sports medicine for a corticosteroid injection in the left proximal biceps tendon.    -Call direct clinic number [[phone]] at any time with questions or concerns.

## 2024-05-15 NOTE — LETTER
5/15/2024         RE: Cynthia Arita  [address] [place] [address]        Dear Colleague,    Thank you for referring your patient, Cynthia Arita, to the [facility] [place]. Please see a copy of my visit note below.    ASSESSMENT & PLAN  Patient Instructions     1. Disorder of left rotator cuff    2. Tendonitis of upper biceps tendon of left shoulder        -Patient seen today for her left shoulder pain.  She denies any injury but does report a lot of overhead usage.  We reviewed her clinical exam in detail today as well as her imaging.  She is most symptomatic about the left proximal biceps tendon.  We discussed management strategies for this including conservative treatment with ibuprofen and Tylenol along with topical creams such as Voltaren gel, Aspercreme, Salonpas, Biofreeze as well as heat and ice.  We discussed physical therapy to work on stretches and exercises and a referral was placed for this.  Given her most symptomatic areas her proximal biceps tendon we will refer her for corticosteroid injection.  -Follow-up with sports medicine for a corticosteroid injection in the left proximal biceps tendon.    -Call direct clinic number [[phone]] at any time with questions or concerns.        Yovana Piedra PA-C  [facility]    -----  Chief Complaint   Patient presents with     Left Shoulder - Pain       SUBJECTIVE  Cynthia Arita is a/an 78 year old female who is right handed who is seen as a WALK IN patient for evaluation of left shoulder pain.  Onset was yesterday morning when patient was lifting and felt something strange in her arm and has gotten worse. Patient reports pain is located through all her arm muscles, patient points to anterior portion of the arm through the brachioradialis. Symptoms are worsened by OH motion, very point tender, putting on her seatbelt, reaching away and behind her body.  She has tried icyhot and  "ibuprofen. Associated symptoms include \"fingers feel goofy\" numbness/tingling.    The patient is seen alone    Prior injury/Surgical history of affected joint: nothing  Social History/Occupation: retired    REVIEW OF SYSTEMS:  Pertinent positives/negative: As stated above in HPI    OBJECTIVE:  Ht 1.575 m (5' 2\")   Wt 63.5 kg (140 lb)   LMP  (LMP Unknown)   BMI 25.61 kg/m     General: Alert and in no distress  Skin: no visable rashes  CV: Extremities appear well perfused   Resp: normal respiratory effort, no conversational dyspnea   Psych: normal mood, affect  MSK: Shoulder exam:  left shoulder     Range of Motion:   Forward flexion: 120 passively, 40 active   Abduction:  45 active, 70 passively  Internal rotation: Limited due to pain  External rotation: 45    Inspection:    There is no visible deformity  There is no erythema or signs of infection  There is no open wounds    Palpation:     Sternoclavicular joint nontender   Acromioclavicular joint nontender   Subacromial space mild tender  Glenohumeral joint mild discomfort  Proximal biceps tendon Tender (most tenderful point on exam and reports reproduction of symptoms)   Posterior shoulder and periscapular region nontender     Strength:   Abduction (arm at side) 5/5  Internal rotation (arm at side) 5/5  External rotation (arm at side)  5/5  Adduction 5/5    Impingement tests:   Neer (forward flexion) painfree  Cabral (IR with arm flexed, abducted) painfree  Empty can (thumb down in scaption position) pain  Crossover (AC joint compression) painfree  [place] (AC joint/labral compression) painfree  Speeds pain    Sensation:  Intact to light touch throughout upper extremity     Radial pulse is palpable and equal to contralateral side      RADIOLOGY:  Final results and radiologist's interpretation available in the [facility] health record.  Images below were personally reviewed and discussed with the patient in the office today.  My personal interpretation of the " performed imaging:   Shoulder x-rays demonstrate degenerative changes.  No acute fracture or dislocation is seen.        Again, thank you for allowing me to participate in the care of your patient.        Sincerely,        Yovana Piedra PA-C

## 2024-05-20 ENCOUNTER — OFFICE VISIT (OUTPATIENT)
Dept: INTERNAL MEDICINE | Facility: CLINIC | Age: 79
End: 2024-05-20
Payer: MEDICARE

## 2024-05-20 ENCOUNTER — ALLIED HEALTH/NURSE VISIT (OUTPATIENT)
Dept: NURSING | Facility: CLINIC | Age: 79
End: 2024-05-20
Payer: MEDICARE

## 2024-05-20 VITALS
SYSTOLIC BLOOD PRESSURE: 156 MMHG | TEMPERATURE: 97.4 F | HEART RATE: 68 BPM | BODY MASS INDEX: 25.58 KG/M2 | OXYGEN SATURATION: 97 % | HEIGHT: 62 IN | WEIGHT: 139 LBS | DIASTOLIC BLOOD PRESSURE: 87 MMHG | RESPIRATION RATE: 16 BRPM

## 2024-05-20 DIAGNOSIS — R35.0 URINARY FREQUENCY: Primary | ICD-10-CM

## 2024-05-20 DIAGNOSIS — N39.0 URINARY TRACT INFECTION WITHOUT HEMATURIA, SITE UNSPECIFIED: ICD-10-CM

## 2024-05-20 DIAGNOSIS — Z53.9 DIAGNOSIS FOR ++++ WALK IN CLINIC ++++: Primary | ICD-10-CM

## 2024-05-20 DIAGNOSIS — N39.0 FREQUENT UTI: ICD-10-CM

## 2024-05-20 LAB
ALBUMIN UR-MCNC: 30 MG/DL
APPEARANCE UR: ABNORMAL
BACTERIA #/AREA URNS HPF: ABNORMAL /HPF
BILIRUB UR QL STRIP: NEGATIVE
COLOR UR AUTO: YELLOW
GLUCOSE UR STRIP-MCNC: NEGATIVE MG/DL
HGB UR QL STRIP: ABNORMAL
KETONES UR STRIP-MCNC: NEGATIVE MG/DL
LEUKOCYTE ESTERASE UR QL STRIP: ABNORMAL
MUCOUS THREADS #/AREA URNS LPF: PRESENT /LPF
NITRATE UR QL: POSITIVE
PH UR STRIP: 6.5 [PH] (ref 5–7)
RBC #/AREA URNS AUTO: ABNORMAL /HPF
SP GR UR STRIP: 1.02 (ref 1–1.03)
SQUAMOUS #/AREA URNS AUTO: ABNORMAL /LPF
UROBILINOGEN UR STRIP-ACNC: 0.2 E.U./DL
WBC #/AREA URNS AUTO: >100 /HPF
WBC CLUMPS #/AREA URNS HPF: PRESENT /HPF

## 2024-05-20 PROCEDURE — 99213 OFFICE O/P EST LOW 20 MIN: CPT | Performed by: PHYSICIAN ASSISTANT

## 2024-05-20 PROCEDURE — 87186 SC STD MICRODIL/AGAR DIL: CPT | Performed by: PHYSICIAN ASSISTANT

## 2024-05-20 PROCEDURE — 99207 PR NO CHARGE NURSE ONLY: CPT | Performed by: INTERNAL MEDICINE

## 2024-05-20 PROCEDURE — 87086 URINE CULTURE/COLONY COUNT: CPT | Performed by: PHYSICIAN ASSISTANT

## 2024-05-20 PROCEDURE — 81001 URINALYSIS AUTO W/SCOPE: CPT | Performed by: PHYSICIAN ASSISTANT

## 2024-05-20 RX ORDER — ESTRADIOL 0.1 MG/G
2 CREAM VAGINAL
Qty: 42.5 G | Refills: 0 | Status: SHIPPED | OUTPATIENT
Start: 2024-05-20

## 2024-05-20 RX ORDER — SULFAMETHOXAZOLE/TRIMETHOPRIM 800-160 MG
1 TABLET ORAL 2 TIMES DAILY
Qty: 10 TABLET | Refills: 0 | Status: SHIPPED | OUTPATIENT
Start: 2024-05-20 | End: 2024-05-25

## 2024-05-20 ASSESSMENT — ANXIETY QUESTIONNAIRES
7. FEELING AFRAID AS IF SOMETHING AWFUL MIGHT HAPPEN: NOT AT ALL
3. WORRYING TOO MUCH ABOUT DIFFERENT THINGS: SEVERAL DAYS
5. BEING SO RESTLESS THAT IT IS HARD TO SIT STILL: NOT AT ALL
7. FEELING AFRAID AS IF SOMETHING AWFUL MIGHT HAPPEN: NOT AT ALL
1. FEELING NERVOUS, ANXIOUS, OR ON EDGE: SEVERAL DAYS
8. IF YOU CHECKED OFF ANY PROBLEMS, HOW DIFFICULT HAVE THESE MADE IT FOR YOU TO DO YOUR WORK, TAKE CARE OF THINGS AT HOME, OR GET ALONG WITH OTHER PEOPLE?: SOMEWHAT DIFFICULT
GAD7 TOTAL SCORE: 4
GAD7 TOTAL SCORE: 4
IF YOU CHECKED OFF ANY PROBLEMS ON THIS QUESTIONNAIRE, HOW DIFFICULT HAVE THESE PROBLEMS MADE IT FOR YOU TO DO YOUR WORK, TAKE CARE OF THINGS AT HOME, OR GET ALONG WITH OTHER PEOPLE: SOMEWHAT DIFFICULT
2. NOT BEING ABLE TO STOP OR CONTROL WORRYING: NOT AT ALL
GAD7 TOTAL SCORE: 4
6. BECOMING EASILY ANNOYED OR IRRITABLE: SEVERAL DAYS
4. TROUBLE RELAXING: SEVERAL DAYS

## 2024-05-20 ASSESSMENT — PATIENT HEALTH QUESTIONNAIRE - PHQ9
SUM OF ALL RESPONSES TO PHQ QUESTIONS 1-9: 12
10. IF YOU CHECKED OFF ANY PROBLEMS, HOW DIFFICULT HAVE THESE PROBLEMS MADE IT FOR YOU TO DO YOUR WORK, TAKE CARE OF THINGS AT HOME, OR GET ALONG WITH OTHER PEOPLE: SOMEWHAT DIFFICULT
SUM OF ALL RESPONSES TO PHQ QUESTIONS 1-9: 12

## 2024-05-20 NOTE — PATIENT INSTRUCTIONS
To apply estrogen cream with your finger, you should:  Wash your hands with soap and warm water.  Squeeze a small amount of cream onto your finger.  Insert your finger into the vaginal opening.  Move your finger in a circular or back-and-forth motion to spread the cream along the vaginal walls.  Be cautious not to insert too deeply.  Repeat this process 2-3 times a week at bedtime.    When you first  the prescription for the estrogen cream, apply it every night for one week. Then decrease to the 2-3 times a week described above.    This can help keep the vaginal and urethral tissues healthy and prevent urinary tract infections

## 2024-05-20 NOTE — PROGRESS NOTES
"Patient walks in to see if she can get a urinary order placed for a urinary tract infection.     Patient states that she has been having dysuria, frequency, darker/cloudy urine and \"zapping in hands\" x 2 days. Patient reports some abdominal discomfort, but denies flanks pian. No fevers. Patient reports dry mouth.     Patient has history of UTI twice in the past year. This writer offered appointment with same day provider this afternoon. Patient doesn't want to be seen. She just wants to leave urine sample so she can get antibiotic.     Patient asking to connect with Olinda Small (not in today) or Betsey Magaña NP to see if they can place UA for her.     Huddled with Betsey Magaña NP who states patient needs to be seen. Patient as not happy, but was able to get in with same day provider today. Kaykay Madden, provider was agreeable to see patient before appointment was scheduled.     May 20, 2024  3:30 PM  (Arrive by 3:10 PM)  Provider Visit with Kaykay Madden PA-C  [facility] ([facility] ) [phone]     This writer assisted patient to  to get her checked in to see Kaykay Madden today.    Thank you,  Anant Reyes, Triage RN [facility]  2:14 PM 5/20/2024           "

## 2024-05-20 NOTE — PROGRESS NOTES
Sports Medicine Procedure Note    Cynthia was seen today for pain.    Diagnoses and all orders for this visit:    Tendonitis of upper biceps tendon of left shoulder  -     Large Joint Injection/Arthocentesis        Cynthia Airta is a/an 78 year old female who is seen for Corticosteroid injection of left proximal biceps tendon sheath steroid injection.   Last injection: none    -similar pain pre and post injection  -If no improvement can follow-up as having pain down into arm and deltoid. Could be indicative of rotator cuff injury in addition to biceps injury. .   -Post-injection instructions were provided to the patient  -Return sooner if develops new or worsening symptoms.    Options for treatment and/or follow-up care were reviewed with the patient was actively involved in the decision making process. Patient verbalized understanding and was in agreement with the plan.    Agreeable to CSI injection injection. Discussed risks and benefits. they are aware of risks such as skin hypopigmentation, hyperglycemia, bleeding, and infection.       Addendum to the procedure note below.:  This was not a large joint injection but a tendon sheath upper extremity injection that was given in the left proximal biceps tendon sheath.  Large Joint Injection/Arthocentesis    Date/Time: 5/22/2024 10:27 AM    Performed by: Nathalia Barfield DO  Authorized by: Nathalia Barfield DO    Indications:  Pain and osteoarthritis  Needle Size:  22 G  Guidance: ultrasound    Approach:  Anterior  Location:  Shoulder   Location comment:  L Proximal Biceps Tendon Sheath       Medications:  40 mg triamcinolone 40 MG/ML; 1 mL lidocaine 1 %  Outcome:  Tolerated well, no immediate complications  Procedure discussed: discussed risks, benefits, and alternatives    Consent Given by:  Patient  Timeout: timeout called immediately prior to procedure    Prep: patient was prepped and draped in usual sterile fashion     Ultrasound was used to ensure safe and  accurate needle placement and injection. Ultrasound images of the procedure were permanently stored.    Ultrasound was necessary to avoid vital structures located in the shoulder including the nerves and vessels and to ensure proper placement into tendon sheath.       Nathalia Barfield DO  [facility] [employer]        Post-Injection Discharge Instructions    You may shower, however avoid swimming, tub baths or hot tubs for 24 hours following your procedure  You may have a mild to moderate increase in pain for a few days following the injection.  The lidocaine (local numbing medicine) will wear off in several hours. It usually takes 3-5 days for the steroid medication to start working although it may take up to 14 days for full effect.   You may use ice packs for 10-15 minutes, 3 to 4 times a day at the injection site for comfort if needed  You may use extra strength Tylenol for pain control if necessary   If you were fasting, you may resume your normal diet and medications after the procedure  If you have diabetes, your blood sugar may be higher than normal for 10-14 days following a steroid injection. Contact your doctor who manages your diabetes if your blood sugar is significantly higher than usual    If you experience any of the following, call Sports Medicine @ [phone] or [phone]  -Fever over 100 degrees F  -Swelling, bleeding, redness, drainage, warmth at the injection site  -New or significant worsening pain        Dr. Nathalia Barfield, DO  [facility]  Sports Medicine

## 2024-05-20 NOTE — PROGRESS NOTES
Assessment & Plan     Urinary frequency  See below  - UA Macroscopic with reflex to Microscopic and Culture - Clinic Collect  - Urine Microscopic Exam  - Urine Culture    Urinary tract infection without hematuria, site unspecified  UA results are consistent with UTI.  We will send urine culture and start Bactrim.  She is aware we will contact her if culture results dictate and need for change in antibiotic therapy.  She has been experiencing recurrent UTIs.  See below  - sulfamethoxazole-trimethoprim (BACTRIM DS) 800-160 MG tablet; Take 1 tablet by mouth 2 times daily for 5 days    Frequent UTI  We will initiate vaginal estrogen to hopefully decrease frequency of UTIs.  Discussed daily use for approximately 1 week, then can decrease to twice a week use.  Gave printed instructions of how to apply the cream.  - estradiol (ESTRACE) 0.1 MG/GM vaginal cream; Place 2 g vaginally twice a week            Aida Marie is a 78 year old, presenting for the following health issues:    Urinary Problem    For 2 days.    5/20/2024     2:44 PM   Additional Questions   Roomed by MALENA Gray LPN   Accompanied by self         5/20/2024     2:44 PM   Patient Reported Additional Medications   Patient reports taking the following new medications none     HPI:   Urinary symptoms:   Has been having urinary symptoms for the past few days.  Symptoms include frequency, small voids, urgency, some burning and abdominal discomfort.    She denies hematuria, fevers, chills, nausea, vomiting, flank pain.    Has been having UTIs frequently over the past couple of years.  Past urine cultures have grown E. coli, Pseudomonas, Enterococcus.  Overall, she has tolerated antibiotics well    Has never used vaginal estrogen before.    Past Medical History:   Diagnosis Date    Anxiety     Basal cell carcinoma     Complication of anesthesia     Diverticulosis     GERD (gastroesophageal reflux disease)     HTN (hypertension)     Meniere's disease      "Nephrolithiasis     Pain in right knee     PONV (postoperative nausea and vomiting)            Review of Systems  Constitutional, HEENT, cardiovascular, pulmonary, gi and gu systems are negative, except as otherwise noted.      Objective    BP (!) 167/87   Pulse 68   Temp 97.4  F (36.3  C) (Oral)   Resp 16   Ht 1.575 m (5' 2\")   Wt 63 kg (139 lb)   LMP  (LMP Unknown)   SpO2 97%   Breastfeeding No   BMI 25.42 kg/m    Body mass index is 25.42 kg/m .  Physical Exam   GENERAL: alert and no distress  RESP: lungs clear to auscultation - no rales, rhonchi or wheezes  CV: regular rate and rhythm, normal S1 S2, no S3 or S4, no murmur, click or rub, no peripheral edema  ABDOMEN: soft, nontender, no hepatosplenomegaly, no masses and bowel sounds normal  BACK: no CVA tenderness    Results for orders placed or performed in visit on 05/20/24 (from the past 24 hour(s))   UA Macroscopic with reflex to Microscopic and Culture - Clinic Collect    Specimen: Urine, Midstream   Result Value Ref Range    Color Urine Yellow Colorless, Straw, Light Yellow, Yellow    Appearance Urine Cloudy (A) Clear    Glucose Urine Negative Negative mg/dL    Bilirubin Urine Negative Negative    Ketones Urine Negative Negative mg/dL    Specific Gravity Urine 1.025 1.003 - 1.035    Blood Urine Moderate (A) Negative    pH Urine 6.5 5.0 - 7.0    Protein Albumin Urine 30 (A) Negative mg/dL    Urobilinogen Urine 0.2 0.2, 1.0 E.U./dL    Nitrite Urine Positive (A) Negative    Leukocyte Esterase Urine Large (A) Negative   Urine Microscopic Exam   Result Value Ref Range    Bacteria Urine Many (A) None Seen /HPF    RBC Urine 10-25 (A) 0-2 /HPF /HPF    WBC Urine >100 (A) 0-5 /HPF /HPF    Squamous Epithelials Urine Few (A) None Seen /LPF    WBC Clumps Urine Present (A) None Seen /HPF    Mucus Urine Present (A) None Seen /LPF           Signed Electronically by: Kaykay Madden PA-C    "

## 2024-05-21 ENCOUNTER — THERAPY VISIT (OUTPATIENT)
Dept: PHYSICAL THERAPY | Facility: CLINIC | Age: 79
End: 2024-05-21
Attending: PHYSICIAN ASSISTANT
Payer: MEDICARE

## 2024-05-21 DIAGNOSIS — M75.22 TENDONITIS OF UPPER BICEPS TENDON OF LEFT SHOULDER: ICD-10-CM

## 2024-05-21 DIAGNOSIS — M67.912 DISORDER OF LEFT ROTATOR CUFF: ICD-10-CM

## 2024-05-21 PROCEDURE — 97110 THERAPEUTIC EXERCISES: CPT | Mod: GP | Performed by: PHYSICAL THERAPIST

## 2024-05-21 PROCEDURE — 97161 PT EVAL LOW COMPLEX 20 MIN: CPT | Mod: GP | Performed by: PHYSICAL THERAPIST

## 2024-05-21 ASSESSMENT — ACTIVITIES OF DAILY LIVING (ADL)
REMOVING_SOMETHING_FROM_YOUR_BACK_POCKET: 8
PLACING_AN_OBJECT_ON_A_HIGH_SHELF: 10
WASHING_YOUR_BACK: 10
AT_ITS_WORST?: 8
TOUCHING_THE_BACK_OF_YOUR_NECK: 6
PUTTING_ON_YOUR_PANTS: 7
PLEASE_INDICATE_YOR_PRIMARY_REASON_FOR_REFERRAL_TO_THERAPY:: SHOULDER
CARRYING_A_HEAVY_OBJECT_OF_10_POUNDS: 10
PUTTING_ON_A_SHIRT_THAT_BUTTONS_DOWN_THE_FRONT: 7
PUSHING_WITH_THE_INVOLVED_ARM: 10
WASHING_YOUR_HAIR?: 7
WHEN_LYING_ON_THE_INVOLVED_SIDE: 3
PUTTING_ON_AN_UNDERSHIRT_OR_A_PULLOVER_SWEATER: 7
REACHING_FOR_SOMETHING_ON_A_HIGH_SHELF: 6

## 2024-05-21 NOTE — PROGRESS NOTES
PHYSICAL THERAPY EVALUATION  Type of Visit: Evaluation  Onset of left shoulder pain 24 when reaching overhead. Pt referred for physical therapy on 5-15-24   See electronic medical record for Abuse and Falls Screening details.    Subjective       Presenting condition or subjective complaint:    Date of onset: 05/15/24    Relevant medical history:     Dates & types of surgery:      Prior diagnostic imaging/testing results: MRI; X-ray     Prior therapy history for the same diagnosis, illness or injury:        Prior Level of Function  Transfers: Independent  Ambulation: Independent  ADL: Independent  IADL: Driving, Finances, Meal preparation    Living Environment  Social support: Alone   Type of home: 1 level   Stairs to enter the home: No       Ramp: No   Stairs inside the home: No       Help at home: None  Equipment owned: Straight Cane; Walker     Employment:      Hobbies/Interests:      Patient goals for therapy:      Pain assessment: Pain present  Location: left anterior shoulder /Ratin/10     Objective   SHOULDER:    Standing Posture: forward shoulder posture     Cervical Screen:     T-Spine Mobility:  decreased extension upper thoracic spine    Shoulder: (* indicates patient's pain)   PROM L PROM R AROM L AROM R MMT L MMT R   Flex 105  100  5    Abd 100  90  5    IR 70    5    ER 50  40  5    Ext/IR         Palpation: left biceps origion    Special tests:   L R   Impingement     Neer's -    Hawkin's-Hao -    Coracoid Impingement     Internal impingement     Labral     Anterior Slide     [place]'s     Crank     Instability     Apprehension (anterior)     Relocation (anterior)     Anterior Load & Shift     Posterior Load & Shift     Posterior instability (with 90 degrees flex)     Multi-Directional Instability      Sulcus     Biceps      Speed's     Rotator Cuff Tear     Drop Arm     Belly Press     Lift off        GH/Capsular Mobility  L  R   Posterior glide hypomobile    Inferior glide hypomobile     Anterior glide           Assessment & Plan   CLINICAL IMPRESSIONS  Medical Diagnosis: left rotator cuff/ biceps tendonitis    Treatment Diagnosis: left shoulder pain, decreasec ROM/strength   Impression/Assessment: Patient is a 78 year old female with left shoulder  complaints.  The following significant findings have been identified: Pain, Decreased ROM/flexibility, and Decreased strength. These impairments interfere with their ability to perform self care tasks, recreational activities, household chores, driving , household mobility, and community mobility as compared to previous level of function.     Clinical Decision Making (Complexity):  Clinical Presentation: Stable/Uncomplicated  Clinical Presentation Rationale: based on medical and personal factors listed in PT evaluation  Clinical Decision Making (Complexity): Low complexity    PLAN OF CARE  Treatment Interventions:  Modalities: Cryotherapy  Interventions: Therapeutic Exercise    Long Term Goals     PT Goal 1  Goal Identifier: reaching  Goal Description: pt able to reach overhead pain level 1  Rationale: to maximize safety and independence with performance of ADLs and functional tasks;to maximize safety and independence within the home;to maximize safety and independence within the community;to maximize safety and independence with transportation;to maximize safety and independence with self cares  Target Date: 07/30/24      Frequency of Treatment: 1x/week  Duration of Treatment: 6 weeks    Recommended Referrals to Other Professionals:   Education Assessment:   Learner/Method: No Barriers to Learning    Risks and benefits of evaluation/treatment have been explained.   Patient/Family/caregiver agrees with Plan of Care.     Evaluation Time:             Signing Clinician: Julio Timmons PT      [facility]                                                                                   OUTPATIENT PHYSICAL THERAPY      PLAN OF  TREATMENT FOR OUTPATIENT REHABILITATION   Patient's Last Name, First Name, LESLIEDONADrew  Cynthia Arita YOB: 1945   Provider's Name   [facility]   Medical Record No.  [id number]     Onset Date: 05/15/24  Start of Care Date: 05/21/24     Medical Diagnosis:  left rotator cuff/ biceps tendonitis      PT Treatment Diagnosis:  left shoulder pain, decreasec ROM/strength Plan of Treatment  Frequency/Duration: 1x/week/ 6 weeks    Certification date from 05/21/24 to 07/30/24         See note for plan of treatment details and functional goals     Julio Timmons, PT                         I CERTIFY THE NEED FOR THESE SERVICES FURNISHED UNDER        THIS PLAN OF TREATMENT AND WHILE UNDER MY CARE     (Physician attestation of this document indicates review and certification of the therapy plan).              Referring Provider:  Yovana Piedra    Initial Assessment  See Epic Evaluation- Start of Care Date: 05/21/24

## 2024-05-22 ENCOUNTER — OFFICE VISIT (OUTPATIENT)
Dept: ORTHOPEDICS | Facility: CLINIC | Age: 79
End: 2024-05-22
Payer: MEDICARE

## 2024-05-22 DIAGNOSIS — M75.22 TENDONITIS OF UPPER BICEPS TENDON OF LEFT SHOULDER: Primary | ICD-10-CM

## 2024-05-22 PROCEDURE — 20550 NJX 1 TENDON SHEATH/LIGAMENT: CPT | Mod: LT | Performed by: STUDENT IN AN ORGANIZED HEALTH CARE EDUCATION/TRAINING PROGRAM

## 2024-05-22 PROCEDURE — 76942 ECHO GUIDE FOR BIOPSY: CPT | Performed by: STUDENT IN AN ORGANIZED HEALTH CARE EDUCATION/TRAINING PROGRAM

## 2024-05-22 RX ORDER — TRIAMCINOLONE ACETONIDE 40 MG/ML
40 INJECTION, SUSPENSION INTRA-ARTICULAR; INTRAMUSCULAR
Status: SHIPPED | OUTPATIENT
Start: 2024-05-22

## 2024-05-22 RX ORDER — LIDOCAINE HYDROCHLORIDE 10 MG/ML
1 INJECTION, SOLUTION INFILTRATION; PERINEURAL
Status: SHIPPED | OUTPATIENT
Start: 2024-05-22

## 2024-05-22 RX ADMIN — LIDOCAINE HYDROCHLORIDE 1 ML: 10 INJECTION, SOLUTION INFILTRATION; PERINEURAL at 10:27

## 2024-05-22 RX ADMIN — TRIAMCINOLONE ACETONIDE 40 MG: 40 INJECTION, SUSPENSION INTRA-ARTICULAR; INTRAMUSCULAR at 10:27

## 2024-05-22 NOTE — PATIENT INSTRUCTIONS
1. Tendonitis of upper biceps tendon of left shoulder        -If no improvement follow-up and can consider next steps.   -Cortisone injection today.. can repeat every 3-4 months as needed. Discussed risks. Consented. Tolerated well. See procedure note.    -  Injection Discharge Instructions    You may shower, however avoid swimming, tub baths or hot tubs for 24 hours following your procedure  You may have a mild to moderate increase in pain for several days following the injection.  It may take up to 14 days for the steroid medication to start working although you may feel the effect as early as a few days after the procedure.  You may use ice packs for 10-15 minutes, 3 to 4 times a day at the injection site for comfort  You may use anti-inflammatory medications (such as Ibuprofen or Aleve or Advil) or Tylenol for pain control if necessary  If you were fasting, you may resume your normal diet and medications after the procedure  If you have diabetes, check your blood sugar more frequently than usual as your blood sugar may be higher than normal for 10-14 days following a steroid injection. Contact your doctor who manages your diabetes if your blood sugar is higher than usual    If you experience any of the following, call  @ [phone] or [phone]  -Fever over 100 degree F  -Swelling, bleeding, redness, drainage, warmth at the injection site  - New or worsening pain      Please call [phone]  Ask for my team if you have any questions or concerns    Nathalia Barfield DO  [facility]      Thank you for choosing [facility]!    CLINIC LOCATIONS:     [facility]  TRIAGE LINE: [phone]   [address] [place] [address] APPOINTMENTS: [phone]   [place] [address] RADIOLOGY: [phone]   (Monday, Thursday & Friday) PHYSICAL THERAPY: [phone]    BILLING QUESTIONS: [phone]   [facility] FAX: [phone] [address]    [place] [address]     (Wednesday)

## 2024-05-22 NOTE — LETTER
5/22/2024         RE: Cynthia Arita  [address] [place] [address]        Dear Colleague,    Thank you for referring your patient, Cynthia Arita, to the [facility] [place]. Please see a copy of my visit note below.    Sports Medicine Procedure Note    Cynthia was seen today for pain.    Diagnoses and all orders for this visit:    Tendonitis of upper biceps tendon of left shoulder  -     Large Joint Injection/Arthocentesis        Cynthia Arita is a/an 78 year old female who is seen for Corticosteroid injection of left proximal biceps tendon sheath steroid injection.   Last injection: none    -similar pain pre and post injection  -If no improvement can follow-up as having pain down into arm and deltoid. Could be indicative of rotator cuff injury in addition to biceps injury. .   -Post-injection instructions were provided to the patient  -Return sooner if develops new or worsening symptoms.    Options for treatment and/or follow-up care were reviewed with the patient was actively involved in the decision making process. Patient verbalized understanding and was in agreement with the plan.    Agreeable to CSI injection injection. Discussed risks and benefits. they are aware of risks such as skin hypopigmentation, hyperglycemia, bleeding, and infection.       No follow-ups on file.      Large Joint Injection/Arthocentesis    Date/Time: 5/22/2024 10:27 AM    Performed by: Nathalia Barfield DO  Authorized by: Nathalia Barfield DO    Indications:  Pain and osteoarthritis  Needle Size:  22 G  Guidance: ultrasound    Approach:  Anterior  Location:  Shoulder   Location comment:  L Proximal Biceps Tendon Sheath       Medications:  40 mg triamcinolone 40 MG/ML; 1 mL lidocaine 1 %  Outcome:  Tolerated well, no immediate complications  Procedure discussed: discussed risks, benefits, and alternatives    Consent Given by:  Patient  Timeout: timeout called immediately prior to procedure    Prep:  patient was prepped and draped in usual sterile fashion     Ultrasound was used to ensure safe and accurate needle placement and injection. Ultrasound images of the procedure were permanently stored.          Nathalia Barfield DO  [facility] [place]        Post-Injection Discharge Instructions    You may shower, however avoid swimming, tub baths or hot tubs for 24 hours following your procedure  You may have a mild to moderate increase in pain for a few days following the injection.  The lidocaine (local numbing medicine) will wear off in several hours. It usually takes 3-5 days for the steroid medication to start working although it may take up to 14 days for full effect.   You may use ice packs for 10-15 minutes, 3 to 4 times a day at the injection site for comfort if needed  You may use extra strength Tylenol for pain control if necessary   If you were fasting, you may resume your normal diet and medications after the procedure  If you have diabetes, your blood sugar may be higher than normal for 10-14 days following a steroid injection. Contact your doctor who manages your diabetes if your blood sugar is significantly higher than usual    If you experience any of the following, call Sports Medicine @ [phone] or [phone]  -Fever over 100 degrees F  -Swelling, bleeding, redness, drainage, warmth at the injection site  -New or significant worsening pain        Dr. Nathalia Barfield DO  [facility]  Sports Medicine       Again, thank you for allowing me to participate in the care of your patient.        Sincerely,        Nathalia Barfield DO

## 2024-05-23 LAB
BACTERIA UR CULT: ABNORMAL
BACTERIA UR CULT: ABNORMAL

## 2024-05-29 ENCOUNTER — PATIENT OUTREACH (OUTPATIENT)
Dept: INTERNAL MEDICINE | Facility: CLINIC | Age: 79
End: 2024-05-29

## 2024-05-29 ENCOUNTER — THERAPY VISIT (OUTPATIENT)
Dept: PHYSICAL THERAPY | Facility: CLINIC | Age: 79
End: 2024-05-29
Attending: PHYSICIAN ASSISTANT
Payer: MEDICARE

## 2024-05-29 DIAGNOSIS — M75.22 TENDONITIS OF UPPER BICEPS TENDON OF LEFT SHOULDER: ICD-10-CM

## 2024-05-29 DIAGNOSIS — M67.912 DISORDER OF LEFT ROTATOR CUFF: Primary | ICD-10-CM

## 2024-05-29 PROCEDURE — 97110 THERAPEUTIC EXERCISES: CPT | Mod: GP | Performed by: PHYSICAL THERAPIST

## 2024-05-29 NOTE — TELEPHONE ENCOUNTER
Patient Quality Outreach    Patient is due for the following:   Hypertension -  Hypertension follow-up visit      Topic Date Due    Zoster (Shingles) Vaccine (1 of 2) Never done    COVID-19 Vaccine (6 - 2023-24 season) 01/27/2024       Next Steps:   Patient has upcoming appointment, these items will be addressed at that time.    Type of outreach:    Chart review performed, no outreach needed.      Questions for provider review:               Moo Chairez MA

## 2024-06-04 ENCOUNTER — THERAPY VISIT (OUTPATIENT)
Dept: PHYSICAL THERAPY | Facility: CLINIC | Age: 79
End: 2024-06-04
Payer: MEDICARE

## 2024-06-04 DIAGNOSIS — M75.22 TENDONITIS OF UPPER BICEPS TENDON OF LEFT SHOULDER: ICD-10-CM

## 2024-06-04 DIAGNOSIS — M67.912 DISORDER OF LEFT ROTATOR CUFF: Primary | ICD-10-CM

## 2024-06-04 PROCEDURE — 97110 THERAPEUTIC EXERCISES: CPT | Mod: GP | Performed by: PHYSICAL THERAPIST

## 2024-06-25 ENCOUNTER — THERAPY VISIT (OUTPATIENT)
Dept: PHYSICAL THERAPY | Facility: CLINIC | Age: 79
End: 2024-06-25
Payer: MEDICARE

## 2024-06-25 DIAGNOSIS — M67.912 DISORDER OF LEFT ROTATOR CUFF: Primary | ICD-10-CM

## 2024-06-25 DIAGNOSIS — M75.22 TENDONITIS OF UPPER BICEPS TENDON OF LEFT SHOULDER: ICD-10-CM

## 2024-06-25 PROCEDURE — 97110 THERAPEUTIC EXERCISES: CPT | Mod: GP | Performed by: PHYSICAL THERAPIST

## 2024-06-25 NOTE — PROGRESS NOTES
06/25/24 0500   Appointment Info   Signing clinician's name / credentials maurilio brown pt   Total/Authorized Visits 10   Visits Used 4   Medical Diagnosis left rotator cuff/ biceps tendonitis   PT Tx Diagnosis left shoulder pain, decreasec ROM/strength   Other pertinent information re-eval after 6 visits   Quick Adds Certification   Progress Note/Certification   Start of Care Date 05/21/24   Onset of illness/injury or Date of Surgery 05/15/24   Therapy Frequency 1x/week   Predicted Duration 6 weeks   Certification date from 05/21/24   Certification date to 07/30/24   PT Goal 1   Goal Identifier reaching   Goal Description pt able to reach overhead pain level 1   Rationale to maximize safety and independence with performance of ADLs and functional tasks;to maximize safety and independence within the home;to maximize safety and independence within the community;to maximize safety and independence with transportation;to maximize safety and independence with self cares   Target Date 07/30/24   Subjective Report   Subjective Report pt notes increased mobility and decreased pain. pt notes increased ease with dressing, reaching and hair care. pt still notes some dificulty with heavy lifting   Objective Measure 1   Objective Measure AROM left shoulder flexion 120 , extension 47 abduction 123. MMT shoulder flexion 4/5 extension 5/5 abduction 4/5   PT Modalities   PT Modalities Cryotherapy   Cryotherapy   Treatment Detail HEP   Treatment Interventions (PT)   Interventions Therapeutic Procedure/Exercise;Neuromuscular Re-education   Therapeutic Procedure/Exercise   Therapeutic Procedures: strength, endurance, ROM, flexibility minutes (42858) 35   Therapeutic Procedures Ther Proc 2;Ther Proc 3;Ther Proc 4;Ther Proc 5;Ther Proc 6;Ther Proc 7   Ther Proc 7 standing shoulder flexion 15 x 1#   Ther Proc 7 - Details biceps curl 15 x 1#   PTRx Ther Proc 1 Pendulum/Codmans   PTRx Ther Proc 1 - Details 10   PTRx Ther Proc 2 wand IR    PTRx Ther Proc 2 - Details 5 x 10 seconds   PTRx Ther Proc 3 Passive Shoulder Flexion   PTRx Ther Proc 3 - Details 5 x 10 seconds   PTRx Ther Proc 4 Scapular Retraction/Depression   PTRx Ther Proc 4 - Details 15 x 5 seconds   PTRx Ther Proc 5 Pec Stretch Doorway   PTRx Ther Proc 5 - Details arms down 4 x 10 seconds   Skilled Intervention required multiple manual and verbal cues for correct form for all exercises   Patient Response/Progress tolerated well   Therapeutic Activity   PTRx Ther Act 1 Icing   PTRx Ther Act 1 - Details No Notes   PTRx Ther Act 2 Posture Correction with Lumbar Roll   PTRx Ther Act 2 - Details use towel roll at home   Neuromuscular Re-education   PTRx Neuro Re-ed 1 Shoulder Theraband Rows   PTRx Neuro Re-ed 1 - Details Move shoulder blade down and back; manual cues required   PTRx Neuro Re-ed 2 Shoulder Theraband Low Row/[place]   PTRx Neuro Re-ed 2 - Details Move shoulder blade down and back; manual cues required   Education   Learner/Method No Barriers to Learning   Plan   Home program PTRX Printouts   Plan for next session progress as tolerated         DISCHARGE  Reason for Discharge: pt is progressing towards goals     Equipment Issued: none     Discharge Plan: Patient to continue home program.    Referring Provider:  Yovana Piedra

## 2024-07-11 ENCOUNTER — OFFICE VISIT (OUTPATIENT)
Dept: INTERNAL MEDICINE | Facility: CLINIC | Age: 79
End: 2024-07-11
Payer: MEDICARE

## 2024-07-11 VITALS
HEIGHT: 62 IN | RESPIRATION RATE: 20 BRPM | DIASTOLIC BLOOD PRESSURE: 78 MMHG | OXYGEN SATURATION: 98 % | WEIGHT: 139 LBS | HEART RATE: 68 BPM | TEMPERATURE: 97.5 F | SYSTOLIC BLOOD PRESSURE: 164 MMHG | BODY MASS INDEX: 25.58 KG/M2

## 2024-07-11 DIAGNOSIS — M81.0 OSTEOPOROSIS, UNSPECIFIED OSTEOPOROSIS TYPE, UNSPECIFIED PATHOLOGICAL FRACTURE PRESENCE: ICD-10-CM

## 2024-07-11 DIAGNOSIS — Z12.31 ENCOUNTER FOR SCREENING MAMMOGRAM FOR BREAST CANCER: ICD-10-CM

## 2024-07-11 DIAGNOSIS — I10 ESSENTIAL HYPERTENSION: Primary | ICD-10-CM

## 2024-07-11 PROCEDURE — 99214 OFFICE O/P EST MOD 30 MIN: CPT

## 2024-07-11 PROCEDURE — G2211 COMPLEX E/M VISIT ADD ON: HCPCS

## 2024-07-11 RX ORDER — ALENDRONATE SODIUM 70 MG/1
70 TABLET ORAL
Qty: 13 TABLET | Refills: 3 | Status: SHIPPED | OUTPATIENT
Start: 2024-07-11

## 2024-07-11 ASSESSMENT — PATIENT HEALTH QUESTIONNAIRE - PHQ9
SUM OF ALL RESPONSES TO PHQ QUESTIONS 1-9: 6
SUM OF ALL RESPONSES TO PHQ QUESTIONS 1-9: 6
10. IF YOU CHECKED OFF ANY PROBLEMS, HOW DIFFICULT HAVE THESE PROBLEMS MADE IT FOR YOU TO DO YOUR WORK, TAKE CARE OF THINGS AT HOME, OR GET ALONG WITH OTHER PEOPLE: NOT DIFFICULT AT ALL

## 2024-07-11 ASSESSMENT — ANXIETY QUESTIONNAIRES
7. FEELING AFRAID AS IF SOMETHING AWFUL MIGHT HAPPEN: NOT AT ALL
8. IF YOU CHECKED OFF ANY PROBLEMS, HOW DIFFICULT HAVE THESE MADE IT FOR YOU TO DO YOUR WORK, TAKE CARE OF THINGS AT HOME, OR GET ALONG WITH OTHER PEOPLE?: SOMEWHAT DIFFICULT
GAD7 TOTAL SCORE: 5

## 2024-07-11 NOTE — PROGRESS NOTES
"  Assessment & Plan     (I10) Essential hypertension  (primary encounter diagnosis)  Comment: BP is above goal today at 164/78. She is not on any BP medication. She mentioned that she does take Propranolol 10MG BID for anxiety and thought this would also help with her BP. I have asked her to start monitoring BP 2-3x/week and bring BP readings to her wellness appt.   Plan:     (M81.0) Osteoporosis, unspecified osteoporosis type, unspecified pathological fracture presence  Comment: Will switch from Calcitonin to Fosamax. Pt is agreeable. DXA last updated in 2023.  Plan: alendronate (FOSAMAX) 70 MG tablet, Vitamin D         Deficiency            Dr. Parker (ENT) prescribes her Effexor for her vertigo. She has history of chronic nausea, dizziness and xerostomia. Also tells me that her nails are very brittle. She takes biotin and a multivitamin daily. Her TSH was normal 6 months ago,    The longitudinal plan of care for the diagnosis(es)/condition(s) as documented were addressed during this visit. Due to the added complexity in care, I will continue to support Cynthia in the subsequent management and with ongoing continuity of care.      BMI  Estimated body mass index is 25.42 kg/m  as calculated from the following:    Height as of this encounter: 1.575 m (5' 2\").    Weight as of this encounter: 63 kg (139 lb).           Subjective   Cynthia is a 79 year old, presenting for the following health issues:  Arm Problem        7/11/2024     8:03 AM   Additional Questions   Roomed by MALENA Gray LPN   Accompanied by self         7/11/2024     8:03 AM   Patient Reported Additional Medications   Patient reports taking the following new medications none     HPI             Objective    BP (!) 164/78   Pulse 68   Temp 97.5  F (36.4  C) (Oral)   Resp 20   Ht 1.575 m (5' 2\")   Wt 63 kg (139 lb)   LMP  (LMP Unknown)   SpO2 98%   Breastfeeding No   BMI 25.42 kg/m    Body mass index is 25.42 kg/m .    Physical Exam  Constitutional: "       General: She is not in acute distress.     Appearance: Normal appearance. She is not ill-appearing, toxic-appearing or diaphoretic.   HENT:      Head: Normocephalic and atraumatic.   Eyes:      Conjunctiva/sclera: Conjunctivae normal.   Cardiovascular:      Rate and Rhythm: Normal rate and regular rhythm.      Heart sounds: Normal heart sounds.   Pulmonary:      Effort: Pulmonary effort is normal.      Breath sounds: Normal breath sounds.   Skin:     General: Skin is warm and dry.   Neurological:      Mental Status: She is alert and oriented to person, place, and time.   Psychiatric:         Mood and Affect: Mood normal.         Behavior: Behavior normal.         Thought Content: Thought content normal.         Judgment: Judgment normal.               Signed Electronically by: CASSIDY Dhillon CNP    .undefined[^^  Answers submitted by the patient for this visit:  Patient Health Questionnaire (Submitted on 7/11/2024)  If you checked off any problems, how difficult have these problems made it for you to do your work, take care of things at home, or get along with other people?: Not difficult at all  PHQ9 TOTAL SCORE: 6

## 2024-07-11 NOTE — PATIENT INSTRUCTIONS
STOP the nasal spray. We are starting you on a medication called Fosamax as this is a much more effective treatment than the Calcitonin nasal spray.       Start checking blood pressure 1-2x/week.  Ideally it should be <140/90.   Write down these readings and bring them with you when you come in for your wellness appt.

## 2024-07-12 ENCOUNTER — APPOINTMENT (OUTPATIENT)
Dept: CT IMAGING | Facility: CLINIC | Age: 79
End: 2024-07-12
Attending: EMERGENCY MEDICINE
Payer: MEDICARE

## 2024-07-12 ENCOUNTER — APPOINTMENT (OUTPATIENT)
Dept: GENERAL RADIOLOGY | Facility: CLINIC | Age: 79
End: 2024-07-12
Attending: EMERGENCY MEDICINE
Payer: MEDICARE

## 2024-07-12 ENCOUNTER — HOSPITAL ENCOUNTER (EMERGENCY)
Facility: CLINIC | Age: 79
Discharge: HOME | End: 2024-07-12
Attending: EMERGENCY MEDICINE | Admitting: EMERGENCY MEDICINE
Payer: MEDICARE

## 2024-07-12 VITALS
HEART RATE: 60 BPM | WEIGHT: 141.09 LBS | OXYGEN SATURATION: 98 % | DIASTOLIC BLOOD PRESSURE: 83 MMHG | RESPIRATION RATE: 16 BRPM | SYSTOLIC BLOOD PRESSURE: 173 MMHG | BODY MASS INDEX: 25.81 KG/M2 | TEMPERATURE: 98 F

## 2024-07-12 DIAGNOSIS — S40.022A ARM CONTUSION, LEFT, INITIAL ENCOUNTER: ICD-10-CM

## 2024-07-12 DIAGNOSIS — S30.0XXA CONTUSION OF LOWER BACK, INITIAL ENCOUNTER: ICD-10-CM

## 2024-07-12 PROCEDURE — 72131 CT LUMBAR SPINE W/O DYE: CPT | Mod: ME

## 2024-07-12 PROCEDURE — 72192 CT PELVIS W/O DYE: CPT | Mod: MG

## 2024-07-12 PROCEDURE — 99284 EMERGENCY DEPT VISIT MOD MDM: CPT | Mod: 25

## 2024-07-12 PROCEDURE — 73060 X-RAY EXAM OF HUMERUS: CPT | Mod: LT

## 2024-07-12 PROCEDURE — 250N000013 HC RX MED GY IP 250 OP 250 PS 637: Performed by: EMERGENCY MEDICINE

## 2024-07-12 RX ORDER — ACETAMINOPHEN 500 MG
1000 TABLET ORAL ONCE
Status: COMPLETED | OUTPATIENT
Start: 2024-07-12 | End: 2024-07-12

## 2024-07-12 RX ORDER — ACETAMINOPHEN 500 MG
1000 TABLET ORAL 3 TIMES DAILY PRN
COMMUNITY
Start: 2024-07-12

## 2024-07-12 RX ADMIN — ACETAMINOPHEN 1000 MG: 500 TABLET, FILM COATED ORAL at 13:39

## 2024-07-12 ASSESSMENT — COLUMBIA-SUICIDE SEVERITY RATING SCALE - C-SSRS
6. HAVE YOU EVER DONE ANYTHING, STARTED TO DO ANYTHING, OR PREPARED TO DO ANYTHING TO END YOUR LIFE?: NO
1. IN THE PAST MONTH, HAVE YOU WISHED YOU WERE DEAD OR WISHED YOU COULD GO TO SLEEP AND NOT WAKE UP?: NO
2. HAVE YOU ACTUALLY HAD ANY THOUGHTS OF KILLING YOURSELF IN THE PAST MONTH?: NO

## 2024-07-12 ASSESSMENT — ACTIVITIES OF DAILY LIVING (ADL)
ADLS_ACUITY_SCORE: 38
ADLS_ACUITY_SCORE: 38

## 2024-07-12 NOTE — ED TRIAGE NOTES
Pt had a mechanical fall 4-5 days ago. Has been having low back pain since. Hit her head on a cabinet, but denies loss of consciousness. Pt had a yerly physical yesterday. Went to the chiropractor today and they recommended she come in for an xray of her back. Pt alert and ambulatory in triage.

## 2024-07-12 NOTE — ED PROVIDER NOTES
Emergency Department Note      History of Present Illness     Chief Complaint   Fall      HPI   Cynthia Arita is a 79 year old female who presents with left shoulder pain and low back pain after recent fall. 4-5 days ago pt was walking into her kitchen when she slipped and fell forward with left arm outstretched. She did hit her head on a cabinet. No LOC. Since fall pain has been in the low back. No new numbness or weakness in her legs. No trouble emptying bladder or perineal numbness. Also have left shoulder pain. No headaches. NO vision change. No blood thinner use. No other recent illness or medical concerns. NO fevers. No abdominal pains or urinary symptoms.  No other pains or injuries. Pt has not been taking anything for pain.       Past Medical History     Medical History and Problem List   Anxiety  Basal cell carcinoma  Anesthesia complication  Diverticulosis  GERD  Hypertension  Meniere's disease  Nephrolithiasis  PONV    Medications   Fosamax  Asa  Citracal  Nexium  Antivert  Naproxen  Zofran  Inderal  Effexor    Surgical History   Right total knee arthroplasty  Left total knee arthroplasty  Cataract removal bilateral  Hysterectomy with left oophorectomy    Physical Exam     Patient Vitals for the past 24 hrs:   BP Temp Temp src Pulse Resp SpO2 Weight   07/12/24 1427 (!) 173/83 -- -- -- -- -- --   07/12/24 1425 (!) 176/86 -- -- 60 -- 98 % --   07/12/24 1128 (!) 166/88 98  F (36.7  C) Temporal 62 -- -- --   07/12/24 1126 -- -- -- -- 16 98 % 64 kg (141 lb 1.5 oz)     Physical Exam  VS: Reviewed per above  HENT: Mucous membranes moist, no nuchal rigidity no midline c spine ttp.   EYES: sclera anicteric, EOMI, no schuler sign or raccoon eyes.   CV: Rate as noted,  regular rhythm.   RESP: Effort normal. Breath sounds are normal bilaterally.  GI: no tenderness/rebound/guarding, not distended.  NEURO: GCS 15, cranial nerves II through XII are intact, 5 out of 5 strength in all 4 extremities, sensation is intact  light touch in all 4 extremities  MSK: No deformity of the extremities, no midline t spine ttp. Paraspinal lumbar muscle ttp without midline L spine ttp. TTP over iliac wing/sacral region as well.  Mild tenderness of left upper arm.  No pain with passive range of motion of left shoulder or left elbow or left wrist or joints of the other extremities.  SKIN: Warm and dry      Diagnostics     Lab Results   Labs Ordered and Resulted from Time of ED Arrival to Time of ED Departure - No data to display    Imaging   Humerus XR,  G/E 2 views, left   Final Result   IMPRESSION: No acute fracture or malalignment. Mild-to-moderate   glenohumeral and acromioclavicular joint degenerative changes.      ABIGAIL TRUJILLO MD            SYSTEM ID:  [id number]      CT Lumbar Spine w/o Contrast   Final Result   Impression:   1. No acute fracture or subluxation.   2. Multilevel lumbar ispondylosis as detailed above.      BRUNO ACOSTA MD            SYSTEM ID:  [id number]      CT Pelvis Bone wo Contrast   Final Result   IMPRESSION:   1.  No evidence of acute trauma in the pelvis.   2.  Degenerative changes throughout the pelvis and lower lumbar spine.   3.  Other ancillary findings as described above.      ABIGAIL TRUJILLO MD            SYSTEM ID:  [id number]            ED Course      Medications Administered   Medications   acetaminophen (TYLENOL) tablet 1,000 mg (1,000 mg Oral $Given 7/12/24 1339)       Procedures   Procedures         ED Course   ED Course as of 07/12/24 1531   Fri Jul 12, 2024   1327 I obtained history and examined the patient as noted above.     1456 I rechecked the patient and explained findings. We discussed plan for discharge and patient is in agreement with plan.            Medical Decision Making / Diagnosis     ALEXA Arita is a 79 year old female who presents to the ER for evaluation of low back pain and left upper arm pain 4 to 5 days after fall.  On arrival she has mild hypertension.  She is  neurologically intact.  History and exam not supportive of occult brain injury or neck injury or chest injury or abdominal injury/pathology.  Her discomfort localizes to the lumbar paraspinal region and upper sacral/pelvic region.  X-ray of the left humerus does not suggest bony pathology.  Suspect contusion in this region.  CT of the lumbar spine and pelvis not suggestive of occult bony injury either.  No red flags on history or exam to suggest cauda equina or conus medullaris syndrome.  Suspect muscular strain and possible localized radiculopathy related to fall.  After Tylenol here she is feeling improved.  Plan to continue this regimen as needed at home.  Recommended close primary care follow-up and return precautions discussed.    Disposition   The patient was discharged.     Diagnosis     ICD-10-CM    1. Contusion of lower back, initial encounter  S30.0XXA       2. Arm contusion, left, initial encounter  S40.022A            Discharge Medications   Discharge Medication List as of 7/12/2024  3:05 PM            Scribe Disclosure:  I, Gina Felipe, am serving as a scribe at 1:47 PM on 7/12/2024 to document services personally performed by Eriwn Taylor MD based on my observations and the provider's statements to me.        Erwin Taylor MD  07/12/24 3063

## 2024-07-15 ENCOUNTER — PATIENT OUTREACH (OUTPATIENT)
Dept: INTERNAL MEDICINE | Facility: CLINIC | Age: 79
End: 2024-07-15
Payer: MEDICARE

## 2024-07-15 ENCOUNTER — TELEPHONE (OUTPATIENT)
Dept: INTERNAL MEDICINE | Facility: CLINIC | Age: 79
End: 2024-07-15
Payer: MEDICARE

## 2024-07-15 NOTE — TELEPHONE ENCOUNTER
[place] # [phone]   Pt is needing an appointment with her PCP for her hospital discharge -     All appointments are virtual     Please advise     Tank you    Cathi Fowler RN, BSN  [facility] [place][note 2]

## 2024-07-15 NOTE — TELEPHONE ENCOUNTER
Can patient set up hospital follow up in a video slot, but have it be in person?    Thank you,  Anant Reyes, Triage RN [facility]  3:51 PM 7/15/2024

## 2024-07-15 NOTE — TELEPHONE ENCOUNTER
Transitions of Care Outreach  Chief Complaint   Patient presents with    Hospital F/U     Fall - back pain       Most Recent Admission Date: 7/12/2024   Most Recent Admission Diagnosis:      Most Recent Discharge Date: 7/12/2024   Most Recent Discharge Diagnosis: Contusion of lower back, initial encounter - S30.0XXA  Arm contusion, left, initial encounter - S40.022A     Transitions of Care Assessment    Discharge Assessment  How are you doing now that you are home?: Pt states she is doing the same.  How are your symptoms? (Red Flag symptoms escalate to triage hotline per guidelines): Unchanged  Do you know how to contact your clinic care team if you have future questions or changes to your health status? : Yes  Does the patient have their discharge instructions? : Yes  Does the patient have questions regarding their discharge instructions? : No  Were you started on any new medications or were there changes to any of your previous medications? : Yes (Tylenol directions changed per ER provider.)  Does the patient have all of their medications?: Yes  Do you have questions regarding any of your medications? : No  Do you have all of your needed medical supplies or equipment (DME)?  (i.e. oxygen tank, CPAP, cane, etc.): Yes    Follow up Plan     Discharge Follow-Up  Discharge follow up appointment scheduled in alignment with recommended follow up timeframe or Transitions of Risk Category? (Low = within 30 days; Moderate= within 14 days; High= within 7 days): No (Pt is not at home.  Will call back to schedule a f/u appt.)    Future Appointments   Date Time Provider Department Center   8/5/2024 10:20 AM [id number]   8/22/2024 10:30 AM Betsey Magaña, APRN CNP [facility]       Outpatient Plan as outlined on AVS reviewed with patient.    For any urgent concerns, please contact our 24 hour nurse triage line: [phone] ([phone])       Jessie Castellanos RN

## 2024-07-23 ENCOUNTER — OFFICE VISIT (OUTPATIENT)
Dept: INTERNAL MEDICINE | Facility: CLINIC | Age: 79
End: 2024-07-23
Payer: MEDICARE

## 2024-07-23 ENCOUNTER — TELEPHONE (OUTPATIENT)
Dept: INTERNAL MEDICINE | Facility: CLINIC | Age: 79
End: 2024-07-23

## 2024-07-23 VITALS
OXYGEN SATURATION: 96 % | DIASTOLIC BLOOD PRESSURE: 92 MMHG | HEART RATE: 63 BPM | TEMPERATURE: 98.3 F | RESPIRATION RATE: 20 BRPM | WEIGHT: 139.8 LBS | SYSTOLIC BLOOD PRESSURE: 173 MMHG | HEIGHT: 62 IN | BODY MASS INDEX: 25.73 KG/M2

## 2024-07-23 DIAGNOSIS — M54.50 ACUTE BILATERAL LOW BACK PAIN WITHOUT SCIATICA: ICD-10-CM

## 2024-07-23 DIAGNOSIS — Z91.81 PERSONAL HISTORY OF FALL: ICD-10-CM

## 2024-07-23 DIAGNOSIS — I10 BENIGN ESSENTIAL HYPERTENSION: Primary | ICD-10-CM

## 2024-07-23 PROCEDURE — 99214 OFFICE O/P EST MOD 30 MIN: CPT

## 2024-07-23 PROCEDURE — G2211 COMPLEX E/M VISIT ADD ON: HCPCS

## 2024-07-23 RX ORDER — LOSARTAN POTASSIUM 25 MG/1
25 TABLET ORAL DAILY
Qty: 90 TABLET | Refills: 0 | Status: SHIPPED | OUTPATIENT
Start: 2024-07-23 | End: 2024-08-22

## 2024-07-23 RX ORDER — LIDOCAINE 4 G/G
1 PATCH TOPICAL EVERY 24 HOURS
Qty: 30 PATCH | Refills: 0 | Status: SHIPPED | OUTPATIENT
Start: 2024-07-23 | End: 2024-08-22

## 2024-07-23 ASSESSMENT — PAIN SCALES - GENERAL: PAINLEVEL: SEVERE PAIN (7)

## 2024-07-23 NOTE — PROGRESS NOTES
Assessment & Plan     (I10) Benign essential hypertension  (primary encounter diagnosis)  Comment: Patient is here for ER follow-up. Has been dealing with high blood pressures. At this time I will start her on 25 mg of losartan. Advised to follow-up with PCP in 1 month. Has wellness exam scheduled in 1 month.   Plan: losartan (COZAAR) 25 MG tablet        Medication sent to pharmacy. Discussed side effects.     (M54.50) Acute bilateral low back pain without sciatica  Comment: patient here for bilateral low back pain for the last month. Was seen in ER and have CT of back and as well as x-rays done. Was told to take tylenol. Gets minimal relief from tylenol. Will send in lidocaine patches and have her follow-up with spine.   Plan: Spine  Referral, Lidocaine (LIDOCARE)         4 % Patch        Referral placed. Medication sent to pharmacy.     (Z91.81) Personal history of fall  Comment: patient suffered a fall 3-4 weeks ago and has ongoing back pain. Will refer to spine specialist.   Plan: Spine  Referral        Referral placed.         MED REC REQUIRED  Post Medication Reconciliation Status:           Aida Marie is a 79 year old, presenting for the following health issues:  Patient is here for a ER follow-up. She suffered a fall 2-3 weeks ago. She went to the ER on 7/12/2024 and got x-rays done and was told to take tylenol for the pain but that isn't helping all the way. It only reduces the pain. She tries ice to help the pain. When she sits or lays down she needs something very hard behind her back to help the pain.     Blood pressure: No chest pain, SOB or heart palpitations.     ER F/U        7/23/2024    10:44 AM   Additional Questions   Roomed by Wilma AMARO   Accompanied by n/a     HPI       ED/UC Followup:    Facility:  [facility]  Date of visit: 07/12/2024  Reason for visit: contusion of lower back  Current Status: pt c/o sharp pain in lower left side of back, states that the tylenol has her  "worse off than before        Review of Systems  Constitutional, HEENT, cardiovascular, pulmonary, gi and gu systems are negative, except as otherwise noted.      Objective    BP (!) 173/92 (BP Location: Right arm, Cuff Size: Adult Regular)   Pulse 63   Temp 98.3  F (36.8  C) (Tympanic)   Resp 20   Ht 1.581 m (5' 2.25\")   Wt 63.4 kg (139 lb 12.8 oz)   LMP  (LMP Unknown)   SpO2 96%   BMI 25.36 kg/m    Body mass index is 25.36 kg/m .  Physical Exam   GENERAL: alert and no distress  NECK: no adenopathy, no asymmetry, masses, or scars  RESP: lungs clear to auscultation - no rales, rhonchi or wheezes  CV: regular rate and rhythm, normal S1 S2, no S3 or S4, no murmur, click or rub, no peripheral edema  ABDOMEN: soft, nontender, no hepatosplenomegaly, no masses and bowel sounds normal  MS: no gross musculoskeletal defects noted, no edema  Comprehensive back pain exam:  Tenderness of bilateral low back pain            Signed Electronically by: CASSIDY Roy CNP    "

## 2024-07-23 NOTE — PATIENT INSTRUCTIONS
Start losartan 25 mg today.   Goal BP of less than 140/90.     Follow-up with PCP in 30 days for BP check and labs.

## 2024-07-23 NOTE — TELEPHONE ENCOUNTER
"Patient calls today to state she saw Virginia Franz NP today and went to pharmacy to  her prescription for Losartan, but was told her medication would cost $80. This writer thinks med patient patient is referring to is Lidocaine patch. Patient says she doesn't want to call pharmacy or insurance to find out if Lidocaine patch will need prior authorization because \"I don't know what to say.\"    Call to [facility] pharmacy - [phone] to discuss med. Pharmacy staff states Lidocaine 4% patch will cost patient $53.79 and pharmacist states that Medicare part D won't cover this, but patient can get over the counter patches with same strength for cheaper price.     Patient was requesting a call back today before 6 pm with any updates.     Call back to patient to notify her. Patient verbalizes understanding of instructions and indicates no further questions at this time.    Thank you,  Anant Reyes, Triage RN [facility]  4:11 PM 7/23/2024   "

## 2024-08-05 ENCOUNTER — HOSPITAL ENCOUNTER (OUTPATIENT)
Dept: MAMMOGRAPHY | Facility: CLINIC | Age: 79
Discharge: HOME | End: 2024-08-05
Payer: MEDICARE

## 2024-08-05 DIAGNOSIS — Z12.31 ENCOUNTER FOR SCREENING MAMMOGRAM FOR BREAST CANCER: ICD-10-CM

## 2024-08-05 PROCEDURE — 77063 BREAST TOMOSYNTHESIS BI: CPT

## 2024-08-07 ENCOUNTER — TELEPHONE (OUTPATIENT)
Dept: INTERNAL MEDICINE | Facility: CLINIC | Age: 79
End: 2024-08-07
Payer: MEDICARE

## 2024-08-07 NOTE — TELEPHONE ENCOUNTER
Patient calls looking for mammogram results from 08/05/2024. Relayed to patient that provider needed to review results first. Patient requesting provider look at results ASAP.    Thank you,  Anant Reyes, Triage RN [facility]  8:40 AM 8/7/2024

## 2024-08-08 NOTE — TELEPHONE ENCOUNTER
Her mammogram results were normal. Also, normal mammogram results no longer come to the PCP's in-basket :)

## 2024-08-09 ENCOUNTER — TELEPHONE (OUTPATIENT)
Dept: INTERNAL MEDICINE | Facility: CLINIC | Age: 79
End: 2024-08-09
Payer: MEDICARE

## 2024-08-09 DIAGNOSIS — R11.0 NAUSEA: ICD-10-CM

## 2024-08-09 DIAGNOSIS — H81.02 MENIERE'S DISEASE OF LEFT EAR: ICD-10-CM

## 2024-08-09 RX ORDER — ONDANSETRON 4 MG/1
4 TABLET, ORALLY DISINTEGRATING ORAL 2 TIMES DAILY
Qty: 240 TABLET | Refills: 1 | Status: SHIPPED | OUTPATIENT
Start: 2024-08-09 | End: 2024-08-09

## 2024-08-09 RX ORDER — ONDANSETRON 4 MG/1
4 TABLET, ORALLY DISINTEGRATING ORAL 2 TIMES DAILY
Qty: 90 TABLET | Refills: 3 | Status: SHIPPED | OUTPATIENT
Start: 2024-08-09 | End: 2024-08-12

## 2024-08-09 NOTE — TELEPHONE ENCOUNTER
She does follow with an ENT doctor for her history of meneire's disease and vertigo. Since it sounds like this is what is causing her chronic nausea,  I would recommend that she talk to Dr. Parker of ENT to see if they have an alternative medication in mind since the Zofran was denied by her insurance

## 2024-08-09 NOTE — TELEPHONE ENCOUNTER
"Call to pt. She staets she has tried \"everything\" and nothing works like Ondansetron.   After review of internet, and coupons, she is going to pay cash. $14.00 for #90.   Faxed to [facility].   "

## 2024-08-09 NOTE — TELEPHONE ENCOUNTER
Retail Pharmacy Prior Authorization Team   Phone: [phone]    PRIOR AUTHORIZATION DENIED    Medication: ONDANSETRON 4 MG PO TBDP  Insurance Company: [employer] ([facility]) - Phone [phone] Fax [phone]  Denial Date: 8/9/2024  Denial Reason(s): MUST HAVE A MEDICARE PART D MEDICALLY ACCEPTED INDICATION      Appeal Information: IF THE PROVIDER WOULD LIKE TO APPEAL THIS DECISION PLEASE PROVIDE THE PA TEAM WITH A LETTER OF MEDICAL NECESSITY      Patient Notified: NO

## 2024-08-10 DIAGNOSIS — K21.00 GASTROESOPHAGEAL REFLUX DISEASE WITH ESOPHAGITIS WITHOUT HEMORRHAGE: ICD-10-CM

## 2024-08-12 RX ORDER — ONDANSETRON 4 MG/1
4 TABLET, ORALLY DISINTEGRATING ORAL 2 TIMES DAILY
Qty: 90 TABLET | Refills: 3 | Status: SHIPPED | OUTPATIENT
Start: 2024-08-12 | End: 2024-09-24

## 2024-08-12 RX ORDER — ESOMEPRAZOLE MAGNESIUM 40 MG/1
CAPSULE, DELAYED RELEASE ORAL
Qty: 180 CAPSULE | Refills: 3 | Status: SHIPPED | OUTPATIENT
Start: 2024-08-12

## 2024-08-12 NOTE — TELEPHONE ENCOUNTER
Patient states she takes this, but has been buying them over the counter. Patient says over the counter has been getting too expensive. Patient would like primary care provider to send prescription for this. Patient confirms she is taking 40 mg twice daily.     Refill pending. Routing to primary care provider to advise.     Thank you,  Anant Reyes, Triage RN [facility]  2:07 PM 8/12/2024

## 2024-08-12 NOTE — TELEPHONE ENCOUNTER
Please call pt - as this was last filled in 2021 - large gap since filled last     Is she still taking this? At what dose? Route to provider after getting in touch with pt     Thank you     Cathi Fowler RN, BSN  [facility]

## 2024-08-22 ENCOUNTER — OFFICE VISIT (OUTPATIENT)
Dept: INTERNAL MEDICINE | Facility: CLINIC | Age: 79
End: 2024-08-22
Payer: MEDICARE

## 2024-08-22 VITALS
SYSTOLIC BLOOD PRESSURE: 151 MMHG | RESPIRATION RATE: 16 BRPM | HEIGHT: 62 IN | TEMPERATURE: 97.1 F | DIASTOLIC BLOOD PRESSURE: 80 MMHG | WEIGHT: 139 LBS | HEART RATE: 60 BPM | BODY MASS INDEX: 25.58 KG/M2 | OXYGEN SATURATION: 97 %

## 2024-08-22 DIAGNOSIS — Z00.00 ENCOUNTER FOR MEDICARE ANNUAL WELLNESS EXAM: Primary | ICD-10-CM

## 2024-08-22 DIAGNOSIS — M81.0 OSTEOPOROSIS, UNSPECIFIED OSTEOPOROSIS TYPE, UNSPECIFIED PATHOLOGICAL FRACTURE PRESENCE: ICD-10-CM

## 2024-08-22 DIAGNOSIS — Z13.220 SCREENING FOR HYPERLIPIDEMIA: ICD-10-CM

## 2024-08-22 DIAGNOSIS — I10 BENIGN ESSENTIAL HYPERTENSION: ICD-10-CM

## 2024-08-22 PROCEDURE — G0439 PPPS, SUBSEQ VISIT: HCPCS

## 2024-08-22 PROCEDURE — 99214 OFFICE O/P EST MOD 30 MIN: CPT | Mod: 25

## 2024-08-22 RX ORDER — LOSARTAN POTASSIUM 50 MG/1
50 TABLET ORAL DAILY
Qty: 90 TABLET | Refills: 0 | Status: SHIPPED | OUTPATIENT
Start: 2024-08-22 | End: 2024-08-28

## 2024-08-22 ASSESSMENT — SOCIAL DETERMINANTS OF HEALTH (SDOH): HOW OFTEN DO YOU GET TOGETHER WITH FRIENDS OR RELATIVES?: THREE TIMES A WEEK

## 2024-08-22 ASSESSMENT — PATIENT HEALTH QUESTIONNAIRE - PHQ9
10. IF YOU CHECKED OFF ANY PROBLEMS, HOW DIFFICULT HAVE THESE PROBLEMS MADE IT FOR YOU TO DO YOUR WORK, TAKE CARE OF THINGS AT HOME, OR GET ALONG WITH OTHER PEOPLE: NOT DIFFICULT AT ALL
SUM OF ALL RESPONSES TO PHQ QUESTIONS 1-9: 6
SUM OF ALL RESPONSES TO PHQ QUESTIONS 1-9: 6

## 2024-08-22 NOTE — PROGRESS NOTES
Preventive Care Visit  [facility]  CASSIDY Dhillon CNP, Internal Medicine  Aug 22, 2024  {Provider  Link to [facility] :715794}    {PROVIDER CHARTING PREFERENCE:300918}    Aida Marie is a 79 year old, presenting for the following:  Wellness Visit        8/22/2024    10:34 AM   Additional Questions   Roomed by Lisa   {ROOMER positive Fall Risk- Gait Speed Test is required click here to document the Gait Speed Test and then refresh the note to pull in results  :891090}  {ROOMER if patient is in their first year of Medicare a vision screen is required click here to document the Vison screen and then refresh the note to pull in results  :385926}    Health Care Directive  Patient does not have a Health Care Directive or Living Will: Patient states has Advance Directive and will bring in a copy to clinic.    HPI  ***  {MA/LPN/RN Pre-Provider Visit Orders- hCG/UA/Strep (Optional):059508}  {SUPERLIST (Optional):302426}  {additonal problems for provider to add (Optional):505198}      8/22/2024   General Health   How would you rate your overall physical health? (!) FAIR   Feel stress (tense, anxious, or unable to sleep) Only a little      (!) STRESS CONCERN      8/22/2024   Nutrition   Diet: Regular (no restrictions)            8/21/2023   Exercise   Frequency of exercise: 2-3 days/week            8/22/2024   Social Factors   Frequency of gathering with friends or relatives Three times a week   Worry food won't last until get money to buy more No   Food not last or not have enough money for food? No   Do you have housing? (Housing is defined as stable permanent housing and does not include staying ouside in a car, in a tent, in an abandoned building, in an overnight shelter, or couch-surfing.) Yes   Are you worried about losing your housing? No   Lack of transportation? No   Unable to get utilities (heat,electricity)? No            8/22/2024   Fall Risk   Fallen 2 or more times in the past  year? Yes   Trouble with walking or balance? Yes        {Positive Fall Risk- Gait Speed Test is required and was not documented before note was started.  If results do not appear, ask staff to complete.  Once completed, refresh note to pull in results Click here to link Gait Speed Test  :173118}      8/22/2024   Activities of Daily Living- Home Safety   Needs help with the following daily activites Shopping   Safety concerns in the home None of the above            8/22/2024   Dental   Dentist two times every year? Yes            8/22/2024   Hearing Screening   Hearing concerns? (!) I NEED TO ASK PEOPLE TO SPEAK UP OR REPEAT THEMSELVES.            8/22/2024   Driving Risk Screening   Patient/family members have concerns about driving No            8/22/2024   General Alertness/Fatigue Screening   Have you been more tired than usual lately? (!) YES            8/22/2024   Urinary Incontinence Screening   Bothered by leaking urine in past 6 months No             Today's PHQ-9 Score:       8/22/2024    10:16 AM   PHQ-9 SCORE   PHQ-9 Total Score MyChart 6 (Mild depression)   PHQ-9 Total Score 6         8/22/2024   Substance Use   Alcohol more than 3/day or more than 7/wk Not Applicable   Do you have a current opioid prescription? No   How severe/bad is pain from 1 to 10? 9/10   Do you use any other substances recreationally? No    (!) PRESCRIPTION DRUGS       Multiple values from one day are sorted in reverse-chronological order     Social History     Tobacco Use    Smoking status: Never    Smokeless tobacco: Never   Vaping Use    Vaping status: Never Used   Substance Use Topics    Alcohol use: Yes     Comment: rare    Drug use: No     {Provider  If there are gaps in the social history shown above, please follow the link to update and then refresh the note Link to Social and Substance History :447620}      8/5/2024   LAST FHS-7 RESULTS   1st degree relative breast or ovarian cancer No   Any relative bilateral breast  cancer No   Any male have breast cancer No   Any ONE woman have BOTH breast AND ovarian cancer No   Any woman with breast cancer before 50yrs No   2 or more relatives with breast AND/OR ovarian cancer No   2 or more relatives with breast AND/OR bowel cancer No        {If any of the questions to the FHS7 are answered yes, consider referral for genetic counseling.    Additional indications for genetic referral include personal history of breast or ovarian cancer, genetic mutation in 1st degree relative which increases risk of breast cancer including BRCA1, BRCA2, WINNIE, PALB 2, TP53, CHEK2, PTEN, CDH1, STK11 (per ACS) and/or 1st degree relative with history of pancreatic or high-risk prostate cancer (per NCCN):204476}   {Mammogram Decision Support (Optional):823518}      {Link to Fracture Risk Assessment Tool (Optional):163654}    {Provider  REQUIRED FOR AWV Use the storyboard to review patient history, after sections have been marked as reviewed, refresh note to capture documentation:571768}  {Provider   REQUIRED AWV use this link to review and update sexual activity history  after section has been marked as reviewed, refresh note to capture documentation:121080}  Reviewed and updated as needed this visit by Provider                    {HISTORY OPTIONS (Optional):589478}  Current providers sharing in care for this patient include:  Patient Care Team:  Betsey Magaña APRN CNP as PCP - General (Internal Medicine)  Daniel Buchanan [facility] as Assigned MTM Pharmacist  Betsey Magaña APRN CNP as Assigned PCP  Nathalia Barfield DO as Assigned Musculoskeletal Provider    The following health maintenance items are reviewed in Epic and correct as of today:  Health Maintenance   Topic Date Due    DEPRESSION ACTION PLAN  Never done    ZOSTER IMMUNIZATION (1 of 2) Never done    COVID-19 Vaccine (6 - 2023-24 season) 01/27/2024    ANNUAL REVIEW OF HM ORDERS  08/21/2024    MEDICARE ANNUAL WELLNESS VISIT  08/21/2024    INFLUENZA  "VACCINE (1) 09/01/2024    PHQ-9  02/22/2025    DUSTIN ASSESSMENT  05/20/2025    FALL RISK ASSESSMENT  08/22/2025    GLUCOSE  12/13/2026    LIPID  08/02/2027    DTAP/TDAP/TD IMMUNIZATION (2 - Td or Tdap) 08/28/2027    ADVANCE CARE PLANNING  09/18/2028    DEXA  10/25/2038    Pneumococcal Vaccine: 65+ Years  Completed    RSV VACCINE (Pregnancy & 60+)  Completed    HPV IMMUNIZATION  Aged Out    MENINGITIS IMMUNIZATION  Aged Out    RSV MONOCLONAL ANTIBODY  Aged Out    MAMMO SCREENING  Discontinued    COLORECTAL CANCER SCREENING  Discontinued       {ROS Picklists (Optional):715571}     Objective    Exam  BP (!) 151/80   Pulse 60   Temp 97.1  F (36.2  C) (Oral)   Resp 16   Ht 1.581 m (5' 2.25\")   Wt 63 kg (139 lb)   LMP  (LMP Unknown)   SpO2 97%   BMI 25.22 kg/m     Estimated body mass index is 25.22 kg/m  as calculated from the following:    Height as of this encounter: 1.581 m (5' 2.25\").    Weight as of this encounter: 63 kg (139 lb).    Physical Exam  {Exam Choices (Optional):070447}  {Provider  The Mini-Cog is incomplete, use link to complete and refresh note Link to Mini-Cog :876653}       No data to display              {A Mini-Cog total score of 0-2 suggests the possibility of dementia, score of 3-5 suggests no dementia:358956}         Signed Electronically by: CASSIDY Dhillon CNP  {Email feedback regarding this note to [email or web address]   :162375}  Answers submitted by the patient for this visit:  Patient Health Questionnaire (Submitted on 8/22/2024)  If you checked off any problems, how difficult have these problems made it for you to do your work, take care of things at home, or get along with other people?: Not difficult at all  PHQ9 TOTAL SCORE: 6    "

## 2024-08-22 NOTE — PROGRESS NOTES
Preventive Care Visit  [facility]  CASSIDY Dhillon CNP, Internal Medicine  Aug 22, 2024      Assessment & Plan     (Z00.00) Encounter for Medicare annual wellness exam  (primary encounter diagnosis)  Comment: Pt presents for annual visit    She is following with [facility] orthopedics for her low back pain. They found recent T9 compression fracture on thoracic MRI. She is wearing TLSO and will be in this for 12 weeks total. She will see them for follow up on 9/17/24.   Plan:     (I10) Benign essential hypertension  Comment: BP continues to be uncontrolled on Losartan 25MG- will increase to 50MG. She will see me in 4-6 weeks for follow up  Plan: losartan (COZAAR) 50 MG tablet, Basic metabolic        panel  (Ca, Cl, CO2, Creat, Gluc, K, Na, BUN),         Albumin Random Urine Quantitative with Creat         Ratio            (Z13.220) Screening for hyperlipidemia  Comment:   Plan: Lipid panel reflex to direct LDL Fasting            (M81.0) Osteoporosis, unspecified osteoporosis type, unspecified pathological fracture presence  Comment:   She has yet to start the fosamax- she will get this picked up from the pharmacy. We recently switched her from calcitonin nasal spray to this for her osteoporosis.  Plan:         Counseling  Appropriate preventive services were addressed with this patient via screening, questionnaire, or discussion as appropriate for fall prevention, nutrition, physical activity, Tobacco-use cessation, social engagement, weight loss and cognition.  Checklist reviewing preventive services available has been given to the patient.  Reviewed patient's diet, addressing concerns and/or questions.   She is at risk for psychosocial distress and has been provided with information to reduce risk.   Discussed possible causes of fatigue. Updated plan of care.  Patient reported difficulty with activities of daily living were addressed today.The patient was provided with written information  regarding signs of hearing loss.   The patient's PHQ-9 score is consistent with mild depression. She was provided with information regarding depression.           Aida Marie is a 79 year old, presenting for the following:  Wellness Visit        8/22/2024    10:34 AM   Additional Questions   Roomed by Lisa         [facility] Care Directive  Patient does not have a Health Care Directive or Living Will: Patient states has Advance Directive and will bring in a copy to clinic.    HPI        8/22/2024   General Health   How would you rate your overall physical health? (!) FAIR   Feel stress (tense, anxious, or unable to sleep) Only a little      (!) STRESS CONCERN      8/22/2024   Nutrition   Diet: Regular (no restrictions)            8/21/2023   Exercise   Frequency of exercise: 2-3 days/week            8/22/2024   Social Factors   Frequency of gathering with friends or relatives Three times a week   Worry food won't last until get money to buy more No   Food not last or not have enough money for food? No   Do you have housing? (Housing is defined as stable permanent housing and does not include staying ouside in a car, in a tent, in an abandoned building, in an overnight shelter, or couch-surfing.) Yes   Are you worried about losing your housing? No   Lack of transportation? No   Unable to get utilities (heat,electricity)? No            8/22/2024   Fall Risk   Fallen 2 or more times in the past year? Yes   Trouble with walking or balance? Yes              8/22/2024   Activities of Daily Living- Home Safety   Needs help with the following daily activites Shopping   Safety concerns in the home None of the above            8/22/2024   Dental   Dentist two times every year? Yes            8/22/2024   Hearing Screening   Hearing concerns? (!) I NEED TO ASK PEOPLE TO SPEAK UP OR REPEAT THEMSELVES.            8/22/2024   Driving Risk Screening   Patient/family members have concerns about driving No            8/22/2024    General Alertness/Fatigue Screening   Have you been more tired than usual lately? (!) YES            8/22/2024   Urinary Incontinence Screening   Bothered by leaking urine in past 6 months No             Today's PHQ-9 Score:       8/22/2024    10:16 AM   PHQ-9 SCORE   PHQ-9 Total Score MyChart 6 (Mild depression)   PHQ-9 Total Score 6         8/22/2024   Substance Use   Alcohol more than 3/day or more than 7/wk Not Applicable   Do you have a current opioid prescription? No   How severe/bad is pain from 1 to 10? 9/10   Do you use any other substances recreationally? No    (!) PRESCRIPTION DRUGS       Multiple values from one day are sorted in reverse-chronological order     Social History     Tobacco Use    Smoking status: Never    Smokeless tobacco: Never   Vaping Use    Vaping status: Never Used   Substance Use Topics    Alcohol use: Yes     Comment: rare    Drug use: No           8/5/2024   LAST FHS-7 RESULTS   1st degree relative breast or ovarian cancer No   Any relative bilateral breast cancer No   Any male have breast cancer No   Any ONE woman have BOTH breast AND ovarian cancer No   Any woman with breast cancer before 50yrs No   2 or more relatives with breast AND/OR ovarian cancer No   2 or more relatives with breast AND/OR bowel cancer No                         Reviewed and updated as needed this visit by Provider                    Current providers sharing in care for this patient include:  Patient Care Team:  Betsey Magaña APRN CNP as PCP - General (Internal Medicine)  Daniel Buchanan [facility] as Assigned MTM Pharmacist  Betsey Magaña APRN CNP as Assigned PCP  Nathalia Barfield DO as Assigned Musculoskeletal Provider    The following health maintenance items are reviewed in Epic and correct as of today:  Health Maintenance   Topic Date Due    DEPRESSION ACTION PLAN  Never done    ZOSTER IMMUNIZATION (1 of 2) Never done    COVID-19 Vaccine (6 - 2023-24 season) 01/27/2024    MEDICARE ANNUAL WELLNESS  "VISIT  08/21/2024    INFLUENZA VACCINE (1) 09/01/2024    PHQ-9  02/22/2025    DUSTIN ASSESSMENT  05/20/2025    ANNUAL REVIEW OF HM ORDERS  08/22/2025    FALL RISK ASSESSMENT  08/22/2025    GLUCOSE  12/13/2026    LIPID  08/02/2027    DTAP/TDAP/TD IMMUNIZATION (2 - Td or Tdap) 08/28/2027    ADVANCE CARE PLANNING  08/22/2029    DEXA  10/25/2038    Pneumococcal Vaccine: 65+ Years  Completed    RSV VACCINE (Pregnancy & 60+)  Completed    HPV IMMUNIZATION  Aged Out    MENINGITIS IMMUNIZATION  Aged Out    RSV MONOCLONAL ANTIBODY  Aged Out    MAMMO SCREENING  Discontinued    COLORECTAL CANCER SCREENING  Discontinued            Objective    Exam  BP (!) 151/80   Pulse 60   Temp 97.1  F (36.2  C) (Oral)   Resp 16   Ht 1.581 m (5' 2.25\")   Wt 63 kg (139 lb)   LMP  (LMP Unknown)   SpO2 97%   BMI 25.22 kg/m     Estimated body mass index is 25.22 kg/m  as calculated from the following:    Height as of this encounter: 1.581 m (5' 2.25\").    Weight as of this encounter: 63 kg (139 lb).    Physical Exam  Constitutional:       General: She is not in acute distress.     Appearance: Normal appearance. She is not ill-appearing, toxic-appearing or diaphoretic.   HENT:      Head: Normocephalic and atraumatic.   Eyes:      Conjunctiva/sclera: Conjunctivae normal.   Cardiovascular:      Rate and Rhythm: Normal rate and regular rhythm.      Heart sounds: Normal heart sounds.   Pulmonary:      Effort: Pulmonary effort is normal.      Breath sounds: Normal breath sounds.   Skin:     General: Skin is warm and dry.   Neurological:      Mental Status: She is alert and oriented to person, place, and time.   Psychiatric:         Mood and Affect: Mood normal.         Behavior: Behavior normal.         Thought Content: Thought content normal.         Judgment: Judgment normal.            8/22/2024   Mini Cog   Clock Draw Score 0 Abnormal   3 Item Recall 0 objects recalled   Mini Cog Total Score 0                 Signed Electronically by: Betsey KUMARI" CASSIDY Magaña CNP    Answers submitted by the patient for this visit:  Patient Health Questionnaire (Submitted on 8/22/2024)  If you checked off any problems, how difficult have these problems made it for you to do your work, take care of things at home, or get along with other people?: Not difficult at all  PHQ9 TOTAL SCORE: 6     6 lbs 10 ounces

## 2024-08-23 ENCOUNTER — LAB (OUTPATIENT)
Dept: LAB | Facility: CLINIC | Age: 79
End: 2024-08-23
Payer: MEDICARE

## 2024-08-23 DIAGNOSIS — M81.0 OSTEOPOROSIS, UNSPECIFIED OSTEOPOROSIS TYPE, UNSPECIFIED PATHOLOGICAL FRACTURE PRESENCE: ICD-10-CM

## 2024-08-23 DIAGNOSIS — Z13.220 SCREENING FOR HYPERLIPIDEMIA: ICD-10-CM

## 2024-08-23 DIAGNOSIS — I10 BENIGN ESSENTIAL HYPERTENSION: ICD-10-CM

## 2024-08-23 LAB
ANION GAP SERPL CALCULATED.3IONS-SCNC: 10 MMOL/L (ref 7–15)
BUN SERPL-MCNC: 23.2 MG/DL (ref 8–23)
CALCIUM SERPL-MCNC: 9.5 MG/DL (ref 8.8–10.4)
CHLORIDE SERPL-SCNC: 104 MMOL/L (ref 98–107)
CHOLEST SERPL-MCNC: 231 MG/DL
CREAT SERPL-MCNC: 0.62 MG/DL (ref 0.51–0.95)
CREAT UR-MCNC: 107 MG/DL
EGFRCR SERPLBLD CKD-EPI 2021: 90 ML/MIN/1.73M2
FASTING STATUS PATIENT QL REPORTED: YES
FASTING STATUS PATIENT QL REPORTED: YES
GLUCOSE SERPL-MCNC: 105 MG/DL (ref 70–99)
HCO3 SERPL-SCNC: 26 MMOL/L (ref 22–29)
HDLC SERPL-MCNC: 49 MG/DL
LDLC SERPL CALC-MCNC: 144 MG/DL
MICROALBUMIN UR-MCNC: 33.5 MG/L
MICROALBUMIN/CREAT UR: 31.31 MG/G CR (ref 0–25)
NONHDLC SERPL-MCNC: 182 MG/DL
POTASSIUM SERPL-SCNC: 4.7 MMOL/L (ref 3.4–5.3)
SODIUM SERPL-SCNC: 140 MMOL/L (ref 135–145)
TRIGL SERPL-MCNC: 191 MG/DL
VIT D+METAB SERPL-MCNC: 72 NG/ML (ref 20–50)

## 2024-08-23 PROCEDURE — 82043 UR ALBUMIN QUANTITATIVE: CPT

## 2024-08-23 PROCEDURE — 80048 BASIC METABOLIC PNL TOTAL CA: CPT

## 2024-08-23 PROCEDURE — 82570 ASSAY OF URINE CREATININE: CPT

## 2024-08-23 PROCEDURE — 82306 VITAMIN D 25 HYDROXY: CPT

## 2024-08-23 PROCEDURE — 80061 LIPID PANEL: CPT

## 2024-08-23 PROCEDURE — 36415 COLL VENOUS BLD VENIPUNCTURE: CPT

## 2024-08-26 ENCOUNTER — TELEPHONE (OUTPATIENT)
Dept: INTERNAL MEDICINE | Facility: CLINIC | Age: 79
End: 2024-08-26
Payer: MEDICARE

## 2024-08-26 ENCOUNTER — NURSE TRIAGE (OUTPATIENT)
Dept: INTERNAL MEDICINE | Facility: CLINIC | Age: 79
End: 2024-08-26
Payer: MEDICARE

## 2024-08-26 DIAGNOSIS — I10 BENIGN ESSENTIAL HYPERTENSION: ICD-10-CM

## 2024-08-26 NOTE — TELEPHONE ENCOUNTER
"Patient calling in to notify provider of BP of 172/92. Declines triage, pt is asymptomatic and states \"I was just supposed to call my doctor and let her know if it was still high after increasing Losartan\".     Last OV 8/22/2024:      "

## 2024-08-26 NOTE — TELEPHONE ENCOUNTER
Have her continue to watch her blood pressures at home.  Would recommend she is checking 3 times per week.  If in about 10 days her blood pressures are still greater than 140/80, she should call and talk to a nurse as I may then increase her dose prior to seeing me next month.

## 2024-08-26 NOTE — TELEPHONE ENCOUNTER
Incoming call from priority line, stated that patient is reporting high blood pressure at 172/92. Line got disconnected before RN was able to talk to patient. Attempted to call patient back, but was unable to reach her.

## 2024-08-27 ENCOUNTER — HOSPITAL ENCOUNTER (EMERGENCY)
Facility: CLINIC | Age: 79
Discharge: LEFT BEFORE DISCHARGE | End: 2024-08-27
Payer: MEDICARE

## 2024-08-27 NOTE — TELEPHONE ENCOUNTER
Nurse Triage SBAR    Is this a 2nd Level Triage? YES, LICENSED PRACTITIONER REVIEW IS REQUIRED    Situation: patient reports she is feeling dizzy and had a high blood pressure     Background: writer called patient regarding labs, during call patient reports symptoms and BP she called in yesterday.     Assessment: Patient reports that is having some dizziness, lightheadedness, headache, endorses some shortness of breath. Overall not feeling herself.     Protocol Recommended Disposition:   Call ADS/Go to ED/UCC Now (Or To Office with PCP Approval)    Recommendation: Recommended patient go to ER with high BP and symptomatic. Patient verbalized understanding.     Routed to provider    Does the patient meet one of the following criteria for ADS visit consideration? 16+ years old, with an [facility] PCP     TIP  Providers, please consider if this condition is appropriate for management at one of our Acute and Diagnostic Services sites.     If patient is a good candidate, please use dotphrase <dot>triageresponse and select Refer to ADS to document.  Reason for Disposition   Systolic BP >= 160 OR Diastolic >= 100, and any cardiac or neurologic symptoms (e.g., chest pain, difficulty breathing, unsteady gait, blurred vision)    Additional Information   Negative: Sounds like a life-threatening emergency to the triager   Negative: Pregnant > 20 weeks or postpartum (< 6 weeks after delivery) and new hand or face swelling   Negative: Pregnant > 20 weeks and BP > 140/90    Protocols used: High Blood Pressure-A-OH

## 2024-08-27 NOTE — TELEPHONE ENCOUNTER
Pt returning call.    Pt checked went and had blood pressure checked at [facility] ED. 161/93 mmHg  5 days ago BP was 172/92 mmHg.    Writer relayed PCP message below. Pt requesting BP's be sent to PCP for review and advisement.     Opal Hall RN

## 2024-08-27 NOTE — TELEPHONE ENCOUNTER
Have her go ahead and increase her losartan to 100 mg.  She has 50 mg tablets at home so she can take 2 of these in order to equal 100 mg.    I would recommend she give it 3 or 4 days prior to rechecking the blood pressure as it might take a few days for the increased dose to kick in.    Also, she does not need to be checking blood pressure multiple times per day

## 2024-08-28 RX ORDER — LOSARTAN POTASSIUM 50 MG/1
100 TABLET ORAL DAILY
Qty: 180 TABLET | Refills: 1 | Status: SHIPPED
Start: 2024-08-28 | End: 2024-09-05

## 2024-08-28 NOTE — TELEPHONE ENCOUNTER
Call to pt and discussed. She verbalized back, instructions to increase to 2 pills a day of the Losartan. Updated med list.

## 2024-08-30 NOTE — TELEPHONE ENCOUNTER
Patient called to update us with BP reading today, taken at [facility]: 184/93 mmHg. Patient reports experiencing chronic back pain from fall, which she believes may be contributing to her high BP reading. States that she feels a little weak; no chest pain, headache or lightheadedness.     Per patient, the pharmacist plans to recheck her BP at 3PM, as the initial reading was taken without sitting for 10-15 mins first. She was instructed to sit calmly for 15 mins prior to the new measurement.    This writer advised pt to update us this afternoon. Patient agreed.

## 2024-08-30 NOTE — TELEPHONE ENCOUNTER
Please advise pt:    Elevated BP readings noted. Does not require immediate evaluation. Continue with medication regimen previously recommended by Betsey Magaña. Recommend providing an update early next week.

## 2024-08-30 NOTE — TELEPHONE ENCOUNTER
Patient returns call, [facility] and is calling back as instructed. BP Currently is 170/103, it was taken about 30 minutes ago. Patient states she currently has low energy, blurred vision and is tired.   Patient seemed rushed, did not want to have further triage done.   Will forward to PCP and team.

## 2024-09-03 NOTE — TELEPHONE ENCOUNTER
"Call to patient. States she lost the sheet she had the numbers written down on but she did not check her blood pressure over the weekend. Patient reports she increased Losartan to 100 mg (50 mg x 2) twice daily \"as directed\". Writer does not see that patient was advised to increase to twice daily, only that she was advised to increase to 100 mg daily. Patient states she was told to increase to 100 mg - two 50 mg tablets twice daily and has been doing this since 8/31. Patient took 100 mg (50 mg x 2) this morning. Advised patient not to take any additional doses today until she receives further updates from our clinic. Patient states she does \"not feel comfortable\" checking her blood pressure herself so she has been going to [facility] so she does not have an updated blood pressure since taking 100 mg twice daily.   "

## 2024-09-03 NOTE — TELEPHONE ENCOUNTER
Call to pt and discussed.     She will take losartan 100 mg daily, (50 mg, 2 tablets ONE TIME at 8 AM, she wrote this down).   She has not checked her BP this week.   [place] advised that it would be good for pt to schedule a follow up this week. There are no opening with Betsey. Only Approval req slots. [place] is full too.     Will route to Betsey to see if would like to see pt on Thursday or Friday?   (Pt gets a little confused with what dose she is to be taking.)     Advised pt to check BP tomorrow if she is able to. She verbalized understanding and will wait for call back later this week.

## 2024-09-04 NOTE — TELEPHONE ENCOUNTER
Attempted to contact pt. Left message to call clinic.     Scheduled pt at 2:10 tomorrow (Thursday) if this works for pt with Betsey.

## 2024-09-05 ENCOUNTER — OFFICE VISIT (OUTPATIENT)
Dept: INTERNAL MEDICINE | Facility: CLINIC | Age: 79
End: 2024-09-05
Payer: MEDICARE

## 2024-09-05 VITALS
SYSTOLIC BLOOD PRESSURE: 138 MMHG | TEMPERATURE: 97.1 F | OXYGEN SATURATION: 97 % | DIASTOLIC BLOOD PRESSURE: 79 MMHG | HEART RATE: 63 BPM | BODY MASS INDEX: 25.58 KG/M2 | RESPIRATION RATE: 16 BRPM | HEIGHT: 62 IN | WEIGHT: 139 LBS

## 2024-09-05 DIAGNOSIS — I10 BENIGN ESSENTIAL HYPERTENSION: Primary | ICD-10-CM

## 2024-09-05 PROCEDURE — G2211 COMPLEX E/M VISIT ADD ON: HCPCS

## 2024-09-05 PROCEDURE — 99214 OFFICE O/P EST MOD 30 MIN: CPT

## 2024-09-05 RX ORDER — AMLODIPINE BESYLATE 5 MG/1
5 TABLET ORAL DAILY
Qty: 90 TABLET | Refills: 0 | Status: SHIPPED | OUTPATIENT
Start: 2024-09-05

## 2024-09-05 RX ORDER — LOSARTAN POTASSIUM 100 MG/1
100 TABLET ORAL DAILY
Qty: 90 TABLET | Refills: 1 | Status: SHIPPED | OUTPATIENT
Start: 2024-09-05

## 2024-09-05 ASSESSMENT — ANXIETY QUESTIONNAIRES
GAD7 TOTAL SCORE: 5
7. FEELING AFRAID AS IF SOMETHING AWFUL MIGHT HAPPEN: NOT AT ALL
GAD7 TOTAL SCORE: 5
GAD7 TOTAL SCORE: 5
8. IF YOU CHECKED OFF ANY PROBLEMS, HOW DIFFICULT HAVE THESE MADE IT FOR YOU TO DO YOUR WORK, TAKE CARE OF THINGS AT HOME, OR GET ALONG WITH OTHER PEOPLE?: SOMEWHAT DIFFICULT

## 2024-09-05 NOTE — PATIENT INSTRUCTIONS
You should be taking 2 of the Losartan 50mg tablets to equal 100MG once daily in the morning.    I am going to start a new medication in addition to the Losartan (this is called Amlodipine). This should be taken at the same time as the Losartan once daily in the morning.     See me in 4 weeks. Check blood pressure twice weekly at home. Make sure you are checking the blood pressure readings at least 45 minutes after medication was taken.

## 2024-09-05 NOTE — PROGRESS NOTES
Assessment & Plan     (I10) Benign essential hypertension  (primary encounter diagnosis)  Comment:   She presents today for follow up.   When I saw Cynthia on 8/22, we increased Losartan from 25MG to 50MG.   On 8/27 she had talked to triage RN and I gave okay to increase to 100MG due to ongoing high BP readings.  Today she tells me she is taking Losartan 100MG BID rather than once daily.     BP is under adequate control today at 138/79.  However, her BP readings have been quite elevated at the pharmacy: 172/92, 184/93, 161/93, 170/103. Since she has been taking more than the recommended dose of Losartan, I will have her decrease from 200MG of Losartan to 100MG and add on Amlodipine 5MG.   She is aware that she is only to be taking these medications once daily.  She will see me in 3 weeks for follow-up visit    Plan: amLODIPine (NORVASC) 5 MG tablet, losartan         (COZAAR) 100 MG tablet, Home Blood Pressure         Monitor Order for DME - ONLY FOR DME          The longitudinal plan of care for the diagnosis(es)/condition(s) as documented were addressed during this visit. Due to the added complexity in care, I will continue to support Cynthia in the subsequent management and with ongoing continuity of care.      Subjective   Cynthia is a 79 year old, presenting for the following health issues:  Follow Up        9/5/2024     2:37 PM   Additional Questions   Roomed by matthias     History of Present Illness       Back Pain:  She presents for follow up of back pain. Patient's back pain is a chronic problem.  Location of back pain:  Left lower back, left middle of back, left side of neck, left shoulder, left hip and left side of waist  Description of back pain: cramping, sharp and stabbing  Back pain spreads: left shoulder and left side of neck    Since patient first noticed back pain, pain is: always present, but gets better and worse  Does back pain interfere with her job:  Yes       Mental Health Follow-up:  Patient  "presents to follow-up on Depression & Anxiety.Patient's depression since last visit has been:  Better  The patient is having other symptoms associated with depression.  Patient's anxiety since last visit has been:  Better  The patient is having other symptoms associated with anxiety.  Any significant life events: No and grief or loss  Patient is feeling anxious or having panic attacks.  Patient has no concerns about alcohol or drug use.    Hypertension: She presents for follow up of hypertension.  She does check blood pressure  regularly outside of the clinic. Outside blood pressures have been over 140/90. She follows a low salt diet. She consumes 1 sweetened beverage(s) daily. She exercises with enough effort to increase her heart rate 4 days per week.                Objective    /79   Pulse 63   Temp 97.1  F (36.2  C) (Oral)   Resp 16   Ht 1.581 m (5' 2.25\")   Wt 63 kg (139 lb)   LMP  (LMP Unknown)   SpO2 97%   BMI 25.22 kg/m    Body mass index is 25.22 kg/m .      Physical Exam  Constitutional:       General: She is not in acute distress.     Appearance: Normal appearance. She is not ill-appearing, toxic-appearing or diaphoretic.   HENT:      Head: Normocephalic and atraumatic.   Eyes:      Conjunctiva/sclera: Conjunctivae normal.   Pulmonary:      Effort: Pulmonary effort is normal.    Skin:     General: Skin is warm and dry.   Neurological:      Mental Status: She is alert and oriented to person, place, and time.   Psychiatric:         Mood and Affect: Mood normal.         Behavior: Behavior normal.         Thought Content: Thought content normal.         Judgment: Judgment normal.           Signed Electronically by: CASSIDY Dhillon CNP    "

## 2024-09-24 ENCOUNTER — TELEPHONE (OUTPATIENT)
Dept: INTERNAL MEDICINE | Facility: CLINIC | Age: 79
End: 2024-09-24
Payer: MEDICARE

## 2024-09-24 DIAGNOSIS — H81.02 MENIERE'S DISEASE OF LEFT EAR: ICD-10-CM

## 2024-09-24 DIAGNOSIS — R11.0 NAUSEA: ICD-10-CM

## 2024-09-24 RX ORDER — ONDANSETRON 4 MG/1
4 TABLET, ORALLY DISINTEGRATING ORAL 2 TIMES DAILY
Qty: 90 TABLET | Refills: 3 | Status: SHIPPED | OUTPATIENT
Start: 2024-09-24

## 2024-09-24 NOTE — TELEPHONE ENCOUNTER
Pt calls regarding her Zofran. The PA was denied.     Faxed Rx to [facility]. She can use Good Rx coupon and pay $12.59 for #90.     Put note in Rx that pt will pay cash with a coupon.     Pt agrees with this. She will check with [facility] later.

## 2024-10-10 ENCOUNTER — TELEPHONE (OUTPATIENT)
Dept: INTERNAL MEDICINE | Facility: CLINIC | Age: 79
End: 2024-10-10
Payer: MEDICARE

## 2024-10-10 DIAGNOSIS — M54.50 ACUTE BILATERAL LOW BACK PAIN WITHOUT SCIATICA: ICD-10-CM

## 2024-10-10 NOTE — TELEPHONE ENCOUNTER
Received fax from pharmacy requesting refill of Lidocaine 4% patches. Not on current med list.     Last ordered by [place] Franz NP.       Routing to primary care provider.     Thank you,  Anant Reyes, Triage RN [facility]  5:08 PM 10/10/2024

## 2024-10-11 RX ORDER — LIDOCAINE 4 G/G
1 PATCH TOPICAL EVERY 24 HOURS
Qty: 30 PATCH | Refills: 0 | OUTPATIENT
Start: 2024-10-11

## 2024-10-11 NOTE — TELEPHONE ENCOUNTER
This is not covered by her insurance. Please see telephone encounter from 7/23. She can get these over the counter for cheaper

## 2024-10-18 ENCOUNTER — TELEPHONE (OUTPATIENT)
Dept: INTERNAL MEDICINE | Facility: CLINIC | Age: 79
End: 2024-10-18

## 2024-10-18 ENCOUNTER — OFFICE VISIT (OUTPATIENT)
Dept: INTERNAL MEDICINE | Facility: CLINIC | Age: 79
End: 2024-10-18
Payer: MEDICARE

## 2024-10-18 VITALS
RESPIRATION RATE: 16 BRPM | SYSTOLIC BLOOD PRESSURE: 141 MMHG | HEIGHT: 62 IN | DIASTOLIC BLOOD PRESSURE: 78 MMHG | WEIGHT: 139 LBS | OXYGEN SATURATION: 99 % | TEMPERATURE: 96.5 F | BODY MASS INDEX: 25.58 KG/M2 | HEART RATE: 63 BPM

## 2024-10-18 DIAGNOSIS — R07.81 RIB PAIN: Primary | ICD-10-CM

## 2024-10-18 DIAGNOSIS — I10 BENIGN ESSENTIAL HYPERTENSION: Primary | ICD-10-CM

## 2024-10-18 DIAGNOSIS — R10.32 LLQ ABDOMINAL PAIN: ICD-10-CM

## 2024-10-18 PROCEDURE — G2211 COMPLEX E/M VISIT ADD ON: HCPCS

## 2024-10-18 PROCEDURE — 99214 OFFICE O/P EST MOD 30 MIN: CPT

## 2024-10-18 ASSESSMENT — PATIENT HEALTH QUESTIONNAIRE - PHQ9
SUM OF ALL RESPONSES TO PHQ QUESTIONS 1-9: 4
SUM OF ALL RESPONSES TO PHQ QUESTIONS 1-9: 4
10. IF YOU CHECKED OFF ANY PROBLEMS, HOW DIFFICULT HAVE THESE PROBLEMS MADE IT FOR YOU TO DO YOUR WORK, TAKE CARE OF THINGS AT HOME, OR GET ALONG WITH OTHER PEOPLE: SOMEWHAT DIFFICULT

## 2024-10-18 NOTE — PROGRESS NOTES
Assessment & Plan     (I10) Benign essential hypertension  (primary encounter diagnosis)  Comment: She tells me today that BP outside clinic has been <140 consistently. Will continue on Losartan 100MG and Amlodipine 5MG. BP today in clinic is slightly above goal at 141/78 and 142/75. Given that readings outside clinic are <140, will go ahead and continue on current medication doses for now.   Plan:     (R10.32) LLQ abdominal pain  Comment: For the past 10 days she endorses pain in her rib cage on both sides. They are sore to the touch. She denies any recent fall.  She has been being followed by [facility] for recent T9 compression fracture seen on thoracic MRI on 8/7/24. Compression fracture was sustained after a fall in early August. I wonder if her pain is referred pain from her back. She also has new LLQ pain which is sore to the touch.  No GI/ symptoms such as nausea, constipation, vomiting, diarrhea, dysuria, hematuria, hematochezia. No fevers or chills.  Cynthia has been using a lidocaine patch for her stomach pain- she is not sure if this helps with the pain or not.  Will check CBC, CMP and UA today for her new sx of LLQ pain. No need for imaging at this time.  Recommend she continue using tylenol PRN. If rib pain persists, she should be seen by ortho or sports medicine for follow up.   Plan: Comprehensive metabolic panel (BMP + Alb, Alk         Phos, ALT, AST, Total. Bili, TP), CBC with         platelets              The longitudinal plan of care for the diagnosis(es)/condition(s) as documented were addressed during this visit. Due to the added complexity in care, I will continue to support Cynthia in the subsequent management and with ongoing continuity of care.        Subjective   Cynthia is a 79 year old, presenting for the following health issues:  RECHECK      10/18/2024    12:43 PM   Additional Questions   Roomed by Lisa     History of Present Illness       Back Pain:  She presents for follow up of back pain.  "Patient's back pain is a chronic problem.  Location of back pain:  Left lower back, left middle of back, left hip and left side of waist  Description of back pain: cramping, sharp and stabbing  Back pain spreads: left buttocks, left thigh and left side of neck    Since patient first noticed back pain, pain is: unchanged  Does back pain interfere with her job:  Yes      She is taking medications regularly.             Objective    BP (!) 142/75   Pulse 63   Temp (!) 96.5  F (35.8  C) (Tympanic)   Resp 16   Ht 1.581 m (5' 2.25\")   Wt 63 kg (139 lb)   LMP  (LMP Unknown)   SpO2 99%   BMI 25.22 kg/m    Body mass index is 25.22 kg/m .  Physical Exam  Constitutional:       General: She is not in acute distress.     Appearance: Normal appearance. She is not ill-appearing, toxic-appearing or diaphoretic.   HENT:      Head: Normocephalic and atraumatic.   Cardiovascular:      Rate and Rhythm: Normal rate and regular rhythm.      Heart sounds: Normal heart sounds.   Pulmonary:      Effort: Pulmonary effort is normal.      Breath sounds: Normal breath sounds.   Abdominal:      General: There is no distension.      Palpations: Abdomen is soft.      Tenderness: There is no abdominal tenderness. There is no guarding.   Skin:     General: Skin is warm and dry.   Neurological:      Mental Status: She is alert and oriented to person, place, and time.   Psychiatric:         Mood and Affect: Mood normal.         Behavior: Behavior normal.         Thought Content: Thought content normal.         Judgment: Judgment normal.                      Signed Electronically by: CASSIDY Dhillon CNP    "

## 2024-10-18 NOTE — TELEPHONE ENCOUNTER
General Call    Contacts       Contact Date/Time Type Contact Phone/Fax    10/18/2024 04:57 PM CDT Phone (Incoming) Juan J Cynthia M (Self) [phone] (M)          Reason for Call:  was seen today and has rib pain, pcp recommended if pain persists to follow up with Ortho or Sports Medicine    What are your questions or concerns:  Pt wants to know who PCP would recommend she see at either of these.     Date of last appointment with provider: 10/18    Okay to leave a detailed message?: Yes at Cell number on file:    Telephone Information:   Mobile [phone]

## 2024-10-21 ENCOUNTER — ALLIED HEALTH/NURSE VISIT (OUTPATIENT)
Dept: NURSING | Facility: CLINIC | Age: 79
End: 2024-10-21
Payer: MEDICARE

## 2024-10-21 ENCOUNTER — LAB (OUTPATIENT)
Dept: LAB | Facility: CLINIC | Age: 79
End: 2024-10-21
Payer: MEDICARE

## 2024-10-21 VITALS
OXYGEN SATURATION: 96 % | HEART RATE: 81 BPM | SYSTOLIC BLOOD PRESSURE: 153 MMHG | DIASTOLIC BLOOD PRESSURE: 80 MMHG | TEMPERATURE: 97.1 F

## 2024-10-21 DIAGNOSIS — R10.32 LLQ ABDOMINAL PAIN: ICD-10-CM

## 2024-10-21 DIAGNOSIS — R31.29 MICROSCOPIC HEMATURIA: Primary | ICD-10-CM

## 2024-10-21 DIAGNOSIS — Z53.9 DIAGNOSIS FOR ++++ WALK IN CLINIC ++++: Primary | ICD-10-CM

## 2024-10-21 LAB
ERYTHROCYTE [DISTWIDTH] IN BLOOD BY AUTOMATED COUNT: 13.7 % (ref 10–15)
HCT VFR BLD AUTO: 39.2 % (ref 35–47)
HGB BLD-MCNC: 12.7 G/DL (ref 11.7–15.7)
MCH RBC QN AUTO: 29.5 PG (ref 26.5–33)
MCHC RBC AUTO-ENTMCNC: 32.4 G/DL (ref 31.5–36.5)
MCV RBC AUTO: 91 FL (ref 78–100)
PLATELET # BLD AUTO: 276 10E3/UL (ref 150–450)
RBC # BLD AUTO: 4.3 10E6/UL (ref 3.8–5.2)
WBC # BLD AUTO: 5.9 10E3/UL (ref 4–11)

## 2024-10-21 PROCEDURE — 36415 COLL VENOUS BLD VENIPUNCTURE: CPT

## 2024-10-21 PROCEDURE — 99207 PR NO CHARGE NURSE ONLY: CPT

## 2024-10-21 PROCEDURE — 80053 COMPREHEN METABOLIC PANEL: CPT

## 2024-10-21 PROCEDURE — 85027 COMPLETE CBC AUTOMATED: CPT

## 2024-10-21 ASSESSMENT — PAIN SCALES - GENERAL: PAINLEVEL: EXTREME PAIN (8)

## 2024-10-21 NOTE — PROGRESS NOTES
"Patient walks into clinic crying stating it feels like someone is cutting her left abdomen. Patient notes pain is localized and feels better when she ices it. Patient reports she willl throw up on occasion when she eats. Last emesis episode was last week. Patient rates pain as 8/10 today. Patient notes she is having smaller and harder bowel movements daily. Patient notes she is still able eat normally. Pain notes that pain gets worse whenever patient moves or \"does anything'.     Tx; Ice, lidocaine patch, hot pack.     Patient saw Betsey Magaña NP on 10/18/2024 where pain was discussed and was told to follow up with sports medicine, but patient was waiting for a referral to be placed. Primary care provider signed referral this afternoon. Patient was given paper copy of referral with phone number circled so she can contact sports medicine. Patient was also advised to do labs that were ordered on 10/18/2024 when patient say Betsey. Patient says she didn't know she had to do any. Lab appointment made and patient was walked to lab to get lab orders. Primary care provider will review labs once results are back. Patient understands the plain.    Strongly advised patient to consider emergency room due to pain or if symptoms worsen. Patient didn't seem very agreeable to plan to go to emergency room, but patient did say she will do labs today, contact sports medicine and contact clinic if any other concerns.    Thank you,  Anant Reyes, Triage RN [place]  4:28 PM 10/21/2024                     "

## 2024-10-21 NOTE — TELEPHONE ENCOUNTER
Last OV - 10/18/2024 with primary care provider.     Routing to primary care provider to advise as no referral was signed during OV.     Thank you,  Anant Reyes, Triage RN [place]  2:57 PM 10/21/2024

## 2024-10-22 ENCOUNTER — ALLIED HEALTH/NURSE VISIT (OUTPATIENT)
Dept: NURSING | Facility: CLINIC | Age: 79
End: 2024-10-22
Payer: MEDICARE

## 2024-10-22 ENCOUNTER — OFFICE VISIT (OUTPATIENT)
Dept: PEDIATRICS | Facility: CLINIC | Age: 79
End: 2024-10-22
Payer: MEDICARE

## 2024-10-22 ENCOUNTER — ANCILLARY PROCEDURE (OUTPATIENT)
Dept: GENERAL RADIOLOGY | Facility: CLINIC | Age: 79
End: 2024-10-22
Attending: INTERNAL MEDICINE
Payer: MEDICARE

## 2024-10-22 ENCOUNTER — PATIENT OUTREACH (OUTPATIENT)
Dept: CARE COORDINATION | Facility: CLINIC | Age: 79
End: 2024-10-22

## 2024-10-22 VITALS
HEART RATE: 54 BPM | WEIGHT: 139 LBS | TEMPERATURE: 97.5 F | OXYGEN SATURATION: 98 % | SYSTOLIC BLOOD PRESSURE: 127 MMHG | DIASTOLIC BLOOD PRESSURE: 66 MMHG | BODY MASS INDEX: 25.22 KG/M2 | RESPIRATION RATE: 18 BRPM

## 2024-10-22 DIAGNOSIS — R10.32 LEFT LOWER QUADRANT ABDOMINAL PAIN: Primary | ICD-10-CM

## 2024-10-22 DIAGNOSIS — R07.89 CHEST WALL PAIN: ICD-10-CM

## 2024-10-22 DIAGNOSIS — R26.89 BALANCE PROBLEMS: ICD-10-CM

## 2024-10-22 DIAGNOSIS — R07.89 CHEST WALL PAIN: Primary | ICD-10-CM

## 2024-10-22 LAB
ALBUMIN SERPL BCG-MCNC: 4.3 G/DL (ref 3.5–5.2)
ALBUMIN UR-MCNC: NEGATIVE MG/DL
ALP SERPL-CCNC: 91 U/L (ref 40–150)
ALT SERPL W P-5'-P-CCNC: 15 U/L (ref 0–50)
ANION GAP SERPL CALCULATED.3IONS-SCNC: 11 MMOL/L (ref 7–15)
APPEARANCE UR: CLEAR
AST SERPL W P-5'-P-CCNC: 28 U/L (ref 0–45)
BACTERIA #/AREA URNS HPF: ABNORMAL /HPF
BILIRUB SERPL-MCNC: 0.2 MG/DL
BILIRUB UR QL STRIP: NEGATIVE
BUN SERPL-MCNC: 26.1 MG/DL (ref 8–23)
CALCIUM SERPL-MCNC: 9.9 MG/DL (ref 8.8–10.4)
CHLORIDE SERPL-SCNC: 106 MMOL/L (ref 98–107)
COLOR UR AUTO: YELLOW
CREAT SERPL-MCNC: 0.63 MG/DL (ref 0.51–0.95)
EGFRCR SERPLBLD CKD-EPI 2021: 89 ML/MIN/1.73M2
GLUCOSE SERPL-MCNC: 91 MG/DL (ref 70–99)
GLUCOSE UR STRIP-MCNC: NEGATIVE MG/DL
HCO3 SERPL-SCNC: 22 MMOL/L (ref 22–29)
HGB UR QL STRIP: NEGATIVE
KETONES UR STRIP-MCNC: NEGATIVE MG/DL
LEUKOCYTE ESTERASE UR QL STRIP: ABNORMAL
MUCOUS THREADS #/AREA URNS LPF: PRESENT /LPF
NITRATE UR QL: NEGATIVE
PH UR STRIP: 6.5 [PH] (ref 5–7)
POTASSIUM SERPL-SCNC: 4.4 MMOL/L (ref 3.4–5.3)
PROT SERPL-MCNC: 7.1 G/DL (ref 6.4–8.3)
RBC #/AREA URNS AUTO: ABNORMAL /HPF
SODIUM SERPL-SCNC: 139 MMOL/L (ref 135–145)
SP GR UR STRIP: 1.02 (ref 1–1.03)
SQUAMOUS #/AREA URNS AUTO: ABNORMAL /LPF
UROBILINOGEN UR STRIP-ACNC: 0.2 E.U./DL
WBC #/AREA URNS AUTO: ABNORMAL /HPF

## 2024-10-22 PROCEDURE — 99215 OFFICE O/P EST HI 40 MIN: CPT | Performed by: INTERNAL MEDICINE

## 2024-10-22 PROCEDURE — 81001 URINALYSIS AUTO W/SCOPE: CPT

## 2024-10-22 PROCEDURE — 99207 PR NO CHARGE NURSE ONLY: CPT

## 2024-10-22 PROCEDURE — 71101 X-RAY EXAM UNILAT RIBS/CHEST: CPT | Mod: TC | Performed by: RADIOLOGY

## 2024-10-22 RX ORDER — CALCITONIN SALMON 200 [IU]/.09ML
1 SPRAY, METERED NASAL DAILY
Qty: 3.7 ML | Refills: 1 | Status: SHIPPED | OUTPATIENT
Start: 2024-10-22

## 2024-10-22 NOTE — Clinical Note
Betsey, I saw Cynthia today at the [facility].  When I am careful to not put any pressure on the ribcage, there is no abdominal tenderness on her exam.   I think that her symptoms are all ribcage related, most likely from her fall some time ago.   She appears to have just not healed well.   I'm trying her on miacalcin, but am not terribly optimistic it will help.     Thanks, Martín

## 2024-10-22 NOTE — PATIENT INSTRUCTIONS
PLAN:  1.  Trial of Miacalcin nasal spray - one spray daily  2.  Trial of switching to Tylenol Arthritis 1300 mg twice daily   3.  Start taking Alendronate on an empty stomach in the morning, wait 30-60 minutes before other medication, food, or drink

## 2024-10-22 NOTE — PROGRESS NOTES
"Patient walks into clinic with complaints of pain in her left lower abdomen. Pain is constant but also comes with shooting pain at times. Pain is in an area aproximately 6 in x 6 in below left rib cage. This area is tender to palpation. Patient is able to point to a very specific spot where the pain is the worst. This area is less than 1 in x 1 in in size and patient is able to pin point the pain with one finger. Patient walked into clinic yesterday with same complaints but states the pain is worse today in that she is now also having the \"shocks\" of shooting pain that comes and goes. Patient reports off and on nausea and occasional emesis that she describes as \"fluid comes up when I burp\". This happened as recently as this morning. States she is burping more than normal. Urinating normally. Patient had a normal BM this morning.    Discussed with Dr. Mares in [facility]. He will see patient. Patient escorted to [facility] via wheel chair.  "

## 2024-10-22 NOTE — PROGRESS NOTES
"Acute and Diagnostic Services Clinic Visit    ASSESSMENT:     1.  L ribcage pain, rule out recent fracture   2.  Osteoporosis, not taking bisphosphonate correctly  3.  Unsteadiness.   Referred for Physical Therapy.    PLAN:  1.  Trial of Miacalcin nasal spray - one spray daily  2.  Trial of switching to Tylenol Arthritis 1300 mg twice daily   3.  Start taking Alendronate on an empty stomach in the morning, wait 30-60 minutes before other medication, food, or drink    Greater than 40 minutes were spent on chart review, patient care and assessment, and other management of this patient for this visit.           Aida Marie is a 79 year old, presenting for the following health issues:  Abdominal Pain (Left sided abdominal pain X 2 weeks)    HPI     Abdominal/Flank Pain  Onset/Duration: X 2 weeks  Description:   Character: Sharp and Stabbing  Location: left upper quadrant left lower quadrant  Radiation: None  Intensity: 7/10  Progression of Symptoms:  worsening  Accompanying Signs & Symptoms:  Fever/chills: no   Gas/Bloating: no   Nausea: YES  Vomitting: YES- vomiting with belching  Diarrhea: no   Constipation:no   Dysuria: no            Hematuria: no            Frequency: no            Incontinence of urine: no   History:            Last bowel movement: today \"normal\"  Trauma: no   Previous similar pain: no    Previous tests done: CBC, CMP, UA done yesterday            Previous Abdominal surgery: YES- appendectomy   Precipitating factors:   Does the pain change with:     Food: no      Bowel Movement: no     Urination: no              Other factors: YES- \"my mouth is always dry, I am continuously drinking water  Therapies tried and outcome:  Ice, heat, icy hot, none has helped    When food last eaten: butter toast @8:00 AM today, creamer with coffee         Review of Systems  ROS otherwise negative       Objective    /66 (BP Location: Left arm, Patient Position: Chair, Cuff Size: Adult Regular)   Pulse 54  "  Temp 97.5  F (36.4  C) (Oral)   Resp 18   Wt 63 kg (139 lb)   LMP  (LMP Unknown)   SpO2 98%   BMI 25.22 kg/m    Body mass index is 25.22 kg/m .  Physical Exam   GENERAL: alert and no distress.   Patient appears somewhat fatigued, walks with some unsteadiness, using cane part of the time  RESP: lungs clear to auscultation - no rales, rhonchi or wheezes  CHEST WALL:  Patient has mild tenderness in the posterolateral rib area, around ribs 10-11, and has more significant point tenderness over anterior rib edges as well.    CV: regular rate and rhythm, normal S1 S2, no S3 or S4, no murmur, click or rub, no peripheral edema  ABDOMEN: soft, no hepatosplenomegaly, no masses and bowel sounds normal.   When I am careful to not put any pressure on the ribcage, there is no abdominal tenderness  MS: no gross musculoskeletal defects noted, no edema    Xray of left ribs shows:  Chronic left seventh rib fracture. No definite acute left  rib fracture is identified. Osteopenia. No discernible pneumothorax.  Chronic lung changes.     I still wonder about a possible suble fracture of distal rib, most likely #10.        Signed Electronically by: Martín Mares MD

## 2024-10-24 ENCOUNTER — PATIENT OUTREACH (OUTPATIENT)
Dept: CARE COORDINATION | Facility: CLINIC | Age: 79
End: 2024-10-24
Payer: MEDICARE

## 2024-10-25 ENCOUNTER — TELEPHONE (OUTPATIENT)
Dept: INTERNAL MEDICINE | Facility: CLINIC | Age: 79
End: 2024-10-25

## 2024-10-25 ENCOUNTER — THERAPY VISIT (OUTPATIENT)
Dept: PHYSICAL THERAPY | Facility: CLINIC | Age: 79
End: 2024-10-25
Attending: INTERNAL MEDICINE
Payer: MEDICARE

## 2024-10-25 DIAGNOSIS — R26.89 BALANCE PROBLEMS: Primary | ICD-10-CM

## 2024-10-25 PROCEDURE — 97161 PT EVAL LOW COMPLEX 20 MIN: CPT | Mod: GP

## 2024-10-25 PROCEDURE — 97110 THERAPEUTIC EXERCISES: CPT | Mod: GP

## 2024-10-25 NOTE — TELEPHONE ENCOUNTER
Test Results        Who ordered the test:  caden    Type of test: Lab    Date of test:  10/22    Where was the test performed:  [facility]    What are your questions/concerns?:  Pt calling wondering what her results are as her symptoms regarding abdominal pain is not getting better.     Okay to leave a detailed message?: Yes at Home number on file [phone] (home)

## 2024-10-28 ENCOUNTER — HOSPITAL ENCOUNTER (OUTPATIENT)
Dept: CT IMAGING | Facility: CLINIC | Age: 79
Discharge: HOME | End: 2024-10-28
Attending: PHYSICIAN ASSISTANT | Admitting: PHYSICIAN ASSISTANT
Payer: MEDICARE

## 2024-10-28 ENCOUNTER — NURSE TRIAGE (OUTPATIENT)
Dept: INTERNAL MEDICINE | Facility: CLINIC | Age: 79
End: 2024-10-28

## 2024-10-28 ENCOUNTER — ALLIED HEALTH/NURSE VISIT (OUTPATIENT)
Dept: NURSING | Facility: CLINIC | Age: 79
End: 2024-10-28
Payer: MEDICARE

## 2024-10-28 ENCOUNTER — OFFICE VISIT (OUTPATIENT)
Dept: PEDIATRICS | Facility: CLINIC | Age: 79
End: 2024-10-28
Payer: MEDICARE

## 2024-10-28 VITALS
DIASTOLIC BLOOD PRESSURE: 80 MMHG | BODY MASS INDEX: 25.4 KG/M2 | HEART RATE: 81 BPM | SYSTOLIC BLOOD PRESSURE: 148 MMHG | RESPIRATION RATE: 18 BRPM | OXYGEN SATURATION: 97 % | WEIGHT: 140 LBS | TEMPERATURE: 98.3 F

## 2024-10-28 DIAGNOSIS — R11.2 NAUSEA AND VOMITING, UNSPECIFIED VOMITING TYPE: ICD-10-CM

## 2024-10-28 DIAGNOSIS — R10.32 LLQ ABDOMINAL PAIN: ICD-10-CM

## 2024-10-28 DIAGNOSIS — N20.0 RENAL CALCULUS, BILATERAL: ICD-10-CM

## 2024-10-28 DIAGNOSIS — F33.0 MILD RECURRENT MAJOR DEPRESSION (H): ICD-10-CM

## 2024-10-28 DIAGNOSIS — R10.12 LEFT UPPER QUADRANT PAIN: ICD-10-CM

## 2024-10-28 DIAGNOSIS — K59.00 CONSTIPATION, UNSPECIFIED CONSTIPATION TYPE: Primary | ICD-10-CM

## 2024-10-28 DIAGNOSIS — R31.29 MICROSCOPIC HEMATURIA: ICD-10-CM

## 2024-10-28 DIAGNOSIS — Z53.9 DIAGNOSIS FOR ++++ WALK IN CLINIC ++++: Primary | ICD-10-CM

## 2024-10-28 DIAGNOSIS — K57.30 DIVERTICULOSIS OF LARGE INTESTINE WITHOUT HEMORRHAGE: ICD-10-CM

## 2024-10-28 PROBLEM — H55.81 DEFICIENCY OF SACCADIC EYE MOVEMENTS: Status: ACTIVE | Noted: 2018-09-19

## 2024-10-28 PROBLEM — R10.9 ABDOMINAL PAIN: Status: RESOLVED | Noted: 2019-02-03 | Resolved: 2024-10-28

## 2024-10-28 PROBLEM — K44.9 DIAPHRAGMATIC HERNIA: Status: ACTIVE | Noted: 2019-04-24

## 2024-10-28 PROBLEM — K59.01 SLOW TRANSIT CONSTIPATION: Status: ACTIVE | Noted: 2023-10-04

## 2024-10-28 PROBLEM — H55.82 DEFICIENCY OF SMOOTH PURSUIT MOVEMENTS: Status: ACTIVE | Noted: 2018-09-19

## 2024-10-28 LAB
ALBUMIN SERPL BCG-MCNC: 4.3 G/DL (ref 3.5–5.2)
ALBUMIN UR-MCNC: NEGATIVE MG/DL
ALP SERPL-CCNC: 87 U/L (ref 40–150)
ALT SERPL W P-5'-P-CCNC: 18 U/L (ref 0–50)
ANION GAP SERPL CALCULATED.3IONS-SCNC: 11 MMOL/L (ref 7–15)
APPEARANCE UR: CLEAR
AST SERPL W P-5'-P-CCNC: 21 U/L (ref 0–45)
BACTERIA #/AREA URNS HPF: ABNORMAL /HPF
BASOPHILS # BLD AUTO: 0 10E3/UL (ref 0–0.2)
BASOPHILS NFR BLD AUTO: 1 %
BILIRUB SERPL-MCNC: 0.3 MG/DL
BILIRUB UR QL STRIP: NEGATIVE
BUN SERPL-MCNC: 19.5 MG/DL (ref 8–23)
CALCIUM SERPL-MCNC: 9.3 MG/DL (ref 8.8–10.4)
CHLORIDE SERPL-SCNC: 103 MMOL/L (ref 98–107)
COLOR UR AUTO: ABNORMAL
CREAT SERPL-MCNC: 0.52 MG/DL (ref 0.51–0.95)
CRP SERPL-MCNC: <3 MG/L
EGFRCR SERPLBLD CKD-EPI 2021: >90 ML/MIN/1.73M2
EOSINOPHIL # BLD AUTO: 0.2 10E3/UL (ref 0–0.7)
EOSINOPHIL NFR BLD AUTO: 2 %
ERYTHROCYTE [DISTWIDTH] IN BLOOD BY AUTOMATED COUNT: 14.1 % (ref 10–15)
ERYTHROCYTE [SEDIMENTATION RATE] IN BLOOD BY WESTERGREN METHOD: 8 MM/HR (ref 0–30)
GLUCOSE SERPL-MCNC: 91 MG/DL (ref 70–99)
GLUCOSE UR STRIP-MCNC: NEGATIVE MG/DL
HCO3 SERPL-SCNC: 24 MMOL/L (ref 22–29)
HCT VFR BLD AUTO: 40.4 % (ref 35–47)
HGB BLD-MCNC: 12.4 G/DL (ref 11.7–15.7)
HGB UR QL STRIP: NEGATIVE
HYALINE CASTS: 1 /LPF
IMM GRANULOCYTES # BLD: 0 10E3/UL
IMM GRANULOCYTES NFR BLD: 0 %
KETONES UR STRIP-MCNC: NEGATIVE MG/DL
LEUKOCYTE ESTERASE UR QL STRIP: ABNORMAL
LYMPHOCYTES # BLD AUTO: 2 10E3/UL (ref 0.8–5.3)
LYMPHOCYTES NFR BLD AUTO: 32 %
MCH RBC QN AUTO: 28.8 PG (ref 26.5–33)
MCHC RBC AUTO-ENTMCNC: 30.7 G/DL (ref 31.5–36.5)
MCV RBC AUTO: 94 FL (ref 78–100)
MONOCYTES # BLD AUTO: 0.6 10E3/UL (ref 0–1.3)
MONOCYTES NFR BLD AUTO: 10 %
MUCOUS THREADS #/AREA URNS LPF: PRESENT /LPF
NEUTROPHILS # BLD AUTO: 3.4 10E3/UL (ref 1.6–8.3)
NEUTROPHILS NFR BLD AUTO: 55 %
NITRATE UR QL: NEGATIVE
NRBC # BLD AUTO: 0 10E3/UL
NRBC BLD AUTO-RTO: 0 /100
PH UR STRIP: 7 [PH] (ref 5–7)
PLATELET # BLD AUTO: 277 10E3/UL (ref 150–450)
POTASSIUM SERPL-SCNC: 4.6 MMOL/L (ref 3.4–5.3)
PROT SERPL-MCNC: 7.1 G/DL (ref 6.4–8.3)
RBC # BLD AUTO: 4.31 10E6/UL (ref 3.8–5.2)
RBC URINE: 2 /HPF
SODIUM SERPL-SCNC: 138 MMOL/L (ref 135–145)
SP GR UR STRIP: 1 (ref 1–1.03)
SQUAMOUS EPITHELIAL: 4 /HPF
TRANSITIONAL EPI: 1 /HPF
UROBILINOGEN UR STRIP-MCNC: NORMAL MG/DL
WBC # BLD AUTO: 6.2 10E3/UL (ref 4–11)
WBC URINE: 3 /HPF

## 2024-10-28 PROCEDURE — 86140 C-REACTIVE PROTEIN: CPT | Performed by: PHYSICIAN ASSISTANT

## 2024-10-28 PROCEDURE — 99207 PR NO CHARGE NURSE ONLY: CPT

## 2024-10-28 PROCEDURE — 99215 OFFICE O/P EST HI 40 MIN: CPT | Performed by: PHYSICIAN ASSISTANT

## 2024-10-28 PROCEDURE — 81001 URINALYSIS AUTO W/SCOPE: CPT | Performed by: PHYSICIAN ASSISTANT

## 2024-10-28 PROCEDURE — 36415 COLL VENOUS BLD VENIPUNCTURE: CPT | Performed by: PHYSICIAN ASSISTANT

## 2024-10-28 PROCEDURE — 74177 CT ABD & PELVIS W/CONTRAST: CPT | Mod: MG

## 2024-10-28 PROCEDURE — 85025 COMPLETE CBC W/AUTO DIFF WBC: CPT | Performed by: PHYSICIAN ASSISTANT

## 2024-10-28 PROCEDURE — 87086 URINE CULTURE/COLONY COUNT: CPT | Performed by: PHYSICIAN ASSISTANT

## 2024-10-28 PROCEDURE — 250N000011 HC RX IP 250 OP 636: Performed by: PHYSICIAN ASSISTANT

## 2024-10-28 PROCEDURE — 85652 RBC SED RATE AUTOMATED: CPT | Performed by: PHYSICIAN ASSISTANT

## 2024-10-28 PROCEDURE — 80053 COMPREHEN METABOLIC PANEL: CPT | Performed by: PHYSICIAN ASSISTANT

## 2024-10-28 PROCEDURE — 250N000009 HC RX 250: Performed by: PHYSICIAN ASSISTANT

## 2024-10-28 RX ORDER — IOPAMIDOL 755 MG/ML
500 INJECTION, SOLUTION INTRAVASCULAR ONCE
Status: COMPLETED | OUTPATIENT
Start: 2024-10-28 | End: 2024-10-28

## 2024-10-28 RX ADMIN — SODIUM CHLORIDE 58 ML: 9 INJECTION, SOLUTION INTRAVENOUS at 12:17

## 2024-10-28 RX ADMIN — IOPAMIDOL 71 ML: 755 INJECTION, SOLUTION INTRAVENOUS at 12:17

## 2024-10-28 NOTE — TELEPHONE ENCOUNTER
"Reason for Disposition   Pain lasting > 10 minutes and over 50 years old    Additional Information   Negative: SEVERE difficulty breathing (e.g., struggling for each breath, speaks in single words)   Negative: Shock suspected (e.g., cold/pale/clammy skin, too weak to stand, low BP, rapid pulse)   Negative: Difficult to awaken or acting confused (e.g., disoriented, slurred speech)   Negative: Passed out (i.e., lost consciousness, collapsed and was not responding)   Negative: Visible sweat on face or sweat is dripping down   Negative: Sounds like a life-threatening emergency to the triager   Negative: Followed an abdomen (stomach) injury   Negative: Chest pain   Negative: Abdominal pain and pregnant < 20 weeks   Negative: Abdominal pain and pregnant 20 or more weeks   Negative: Abdomen bloating or swelling are main symptoms    Answer Assessment - Initial Assessment Questions  1. LOCATION: \"Where does it hurt?\"       Left upper abdomen just below left breast  2. RADIATION: \"Does the pain shoot anywhere else?\" (e.g., chest, back)      no  3. ONSET: \"When did the pain begin?\" (e.g., minutes, hours or days ago)       Week ago  4. SUDDEN: \"Gradual or sudden onset?\"      gradual  5. PATTERN \"Does the pain come and go, or is it constant?\"     - If it comes and goes: \"How long does it last?\" \"Do you have pain now?\"      (Note: Comes and goes means the pain is intermittent. It goes away completely between bouts.)     - If constant: \"Is it getting better, staying the same, or getting worse?\"       (Note: Constant means the pain never goes away completely; most serious pain is constant and gets worse.)       constant  6. SEVERITY: \"How bad is the pain?\"  (e.g., Scale 1-10; mild, moderate, or severe)     - MILD (1-3): Doesn't interfere with normal activities, abdomen soft and not tender to touch..      - MODERATE (4-7): Interferes with normal activities or awakens from sleep, abdomen tender to touch.      - SEVERE (8-10): " "Excruciating pain, doubled over, unable to do any normal activities.        Moderate  7. RECURRENT SYMPTOM: \"Have you ever had this type of stomach pain before?\" If Yes, ask: \"When was the last time?\" and \"What happened that time?\"       no  8. AGGRAVATING FACTORS: \"Does anything seem to cause this pain?\" (e.g., foods, stress, alcohol)      Nothing changes the pain  9. CARDIAC SYMPTOMS: \"Do you have any of the following symptoms: chest pain, difficulty breathing, sweating, nausea?\"      Nausea-but chronic  10. OTHER SYMPTOMS: \"Do you have any other symptoms?\" (e.g., back pain, diarrhea, fever, urination pain, vomiting)        Occasional vomiting(chronic with GERD and nausea)  11. PREGNANCY: \"Is there any chance you are pregnant?\" \"When was your last menstrual period?\"        no    Protocols used: Abdominal Pain - Upper-A-[place]    "

## 2024-10-28 NOTE — TELEPHONE ENCOUNTER
RN Referral to Acute and Diagnostic Services    [phone] ([place]) [place] [address] [facility], [address], [place] [address]           Presenting symptoms/reason for [facility] referral:  abdominal pain    Relevant Medical History:  seen recently by primary care provider and [facility]    Transition to [facility] clinic discussed with patient and patient is agreeable.    Patient has been advised that appointments at [facility] typically takes significantly longer than in clinic/urgent care (~ 2.5-3 hours or more):  YES  Patient has transportation and is available for ASAP same day appointment: Yes  Patient aware that [facility] will contact them directly YES, brought patient over from Internal Medicine clinic     Removed due to did not obtain information.   Patient has PICC line or port in place No         [facility] clinic has accepted this patient.

## 2024-10-28 NOTE — PROGRESS NOTES
Acute and Diagnostic Services Clinic Visit    Assessment & Plan     Constipation, unspecified constipation type  Diverticulosis of large intestine without hemorrhage  LLQ abdominal pain  Left upper quadrant pain  Nausea and vomiting, unspecified vomiting type  Stat CT reveals moderate stool burden and diverticulosis without diverticulitis.  Suspect that stool burden likely contributing to left-sided abdominal pain.  Recommend bowel cleanout.  Specific instructions given to patient in writing today.  Printout of last weeks x-ray also given to patient as she reported not receiving this.  Printout of CT results also given to patient today.  All questions answered to the patient's satisfaction.  If symptoms worsening or not improving follow-up with PCP/partner to determine next steps.  - CBC with platelets differential  - Erythrocyte sedimentation rate auto  - CRP inflammation  - Comprehensive metabolic panel  - CT Abdomen Pelvis w Contrast  - UA with Microscopic reflex to Culture  - Urine Culture    Renal calculus, bilateral  Microscopic hematuria  Incidental finding on imaging.  Hydration efforts encouraged.  Urine culture pending at the time of this dictation.  If infection found will contact patient and treat her appropriately.  - UA with Microscopic reflex to Culture  - Urine Culture Aerobic Bacterial - lab collect  - Urine Culture    Mild recurrent major depression (H)  On venlafaxine prescribed by Dr. Ralph Parker.  Continued follow-up by PCP recommended      42 minutes were spent doing chart review, history and exam, documentation and further activities per the note.       Return in about 1 week (around 11/4/2024) for evaluation if worsening/not improvingn with PCP or partner.      Kim Barry MBA, MS, PA-C  [facility]      Aida Marie is a 79 year old, presenting for the following health issues:  Abdominal Pain (LUQ pain X 3 weeks)    HPI  "      Abdominal/Flank Pain  Onset/Duration: X 3 weeks  Description:   Character: Sharp and Stabbing  Location: left upper quadrant left lower quadrant - feels like \"a knife twisting\" in upper abdomen.    Radiation: None  Intensity: 8/10  Progression of Symptoms:  worsening  Accompanying Signs & Symptoms:  Fever/chills: no   Gas/Bloating: no   Nausea: YES  Vomitting: YES- vomiting with belching -chronic with vertigo -currently having nausea and vomiting as a result of the level of pain in the left lower quadrant.  Diarrhea: no   Constipation:no   Dysuria: no            Hematuria: no            Frequency: no            Incontinence of urine: no   History:            Last bowel movement: today \"normal\"  Trauma: no   Previous similar pain: no    Previous tests done: CBC, CMP, UA, X ray last week           Previous Abdominal surgery: YES- appendectomy   Precipitating factors:   Does the pain change with:     Food: no      Bowel Movement: no     Urination: no              Other factors: YES- \"my mouth is always dry, I am continuously drinking water  Therapies tried and outcome:  Ice, heat, icy hot, none has helped - taking tylenol scheduled daily.  Nothing PRN for this pain aside from lidocaine patches and topical ointments.  Not helping much.  No trauma/falls.       When food last eaten: oatmeal today @ 07:30, milk, coffee      Reports taking all scheduled medications including esomeprazole 40 mg daily.  Pain does get severe enough to cause nausea and vomiting.  Denies melena.  Denies dysuria.    Had been evaluated at the [facility] 10/22/2024 by partner and rib x-ray revealed a chronic left-sided rib fracture (visible on previous imaging in 2022) but otherwise normal rib and chest x-ray.  No abdominal studies were done at that time.  Symptoms at the time of the 10/22/2024 visit sound to be more like atypical chest pain/gastritis although patient vocalizes that the pain and discomfort are the same today as they were on the " 22nd.    Review of Systems  Constitutional, HEENT, cardiovascular, pulmonary, GI, , musculoskeletal, neuro, skin, endocrine and psych systems are negative, except as otherwise noted.      Objective    BP (!) 148/80 (BP Location: Right arm, Patient Position: Chair, Cuff Size: Adult Regular)   Pulse 81   Temp 98.3  F (36.8  C) (Oral)   Resp 18   Wt 63.5 kg (140 lb)   LMP  (LMP Unknown)   SpO2 97%   BMI 25.40 kg/m    Body mass index is 25.4 kg/m .  Physical Exam   GENERAL: alert and no distress  EYES: Eyes grossly normal to inspection, PERRL and conjunctivae and sclerae normal  RESP: lungs clear to auscultation - no rales, rhonchi or wheezes  CV: regular rate and rhythm, normal S1 S2, no S3 or S4, no murmur, click or rub, no peripheral edema  ABDOMEN: soft, epigastric, left lower and left upper quadrant tenderness without guarding, no hepatosplenomegaly, no masses and bowel sounds normal  MS: no gross musculoskeletal defects noted, no edema  SKIN: no suspicious lesions or rashes  NEURO: Normal strength and tone, mentation intact and speech normal  PSYCH: mentation appears normal, affect normal/bright    Results for orders placed or performed during the hospital encounter of 10/28/24   CT Abdomen Pelvis w Contrast     Status: None    Narrative    CT ABDOMEN PELVIS W CONTRAST 10/28/2024 12:28 PM    CLINICAL HISTORY: Abdominal pain. LLQ abdominal pain; Left upper  quadrant pain; Nausea and vomiting, unspecified vomiting type;  Microscopic hematuria    TECHNIQUE: CT scan of the abdomen and pelvis was performed following  injection of IV contrast. Multiplanar reformats were obtained. Dose  reduction techniques were used.  CONTRAST: 71mL Isovue-370    COMPARISON: CT chest abdomen and pelvis 5/26/2022.    FINDINGS:   LOWER CHEST: Lung bases are clear.    HEPATOBILIARY: Few subcentimeter hypoattenuating hepatic lesions are  too small to characterize but did not require specific follow-up. No  radiopaque gallstone or  biliary ductal dilatation.    PANCREAS: No significant mass, duct dilatation, or inflammatory  change.    SPLEEN: Normal size.    ADRENAL GLANDS: No significant nodules.    KIDNEYS/BLADDER: No hydronephrosis. Punctate nonobstructing calculus  in the left interpolar kidney. Numerous nonobstructing punctate right  renal calculi. Left renal cyst and numerous additional too small to  characterize hypoattenuating lesions bilaterally do not require  specific follow-up. Unremarkable urinary bladder.    BOWEL: Colonic diverticulosis without findings of acute  diverticulitis. Moderate burden of stool throughout the colon. No  evidence of bowel obstruction.    PELVIC ORGANS: Hysterectomy. No pelvic mass.    ADDITIONAL FINDINGS: No ascites. No adenopathy in the abdomen or  pelvis. Mild-to-moderate burden of atherosclerotic disease without  abdominal aortic aneurysm.    MUSCULOSKELETAL: Degenerative changes of the spine. Small  fat-containing periumbilical hernia.      Impression    IMPRESSION:   1.  No acute findings in the abdomen or pelvis.  2.  Nonobstructing bilateral nephrolithiasis.  3.  Colonic diverticulosis without findings of acute diverticulitis.    PABLO GRACIA MD         SYSTEM ID:  [id number]   Results for orders placed or performed in visit on 10/28/24   Erythrocyte sedimentation rate auto     Status: Normal   Result Value Ref Range    Erythrocyte Sedimentation Rate 8 0 - 30 mm/hr   CRP inflammation     Status: Normal   Result Value Ref Range    CRP Inflammation <3.00 <5.00 mg/L   Comprehensive metabolic panel     Status: Normal   Result Value Ref Range    Sodium 138 135 - 145 mmol/L    Potassium 4.6 3.4 - 5.3 mmol/L    Carbon Dioxide (CO2) 24 22 - 29 mmol/L    Anion Gap 11 7 - 15 mmol/L    Urea Nitrogen 19.5 8.0 - 23.0 mg/dL    Creatinine 0.52 0.51 - 0.95 mg/dL    GFR Estimate >90 >60 mL/min/1.73m2    Calcium 9.3 8.8 - 10.4 mg/dL    Chloride 103 98 - 107 mmol/L    Glucose 91 70 - 99 mg/dL    Alkaline  Phosphatase 87 40 - 150 U/L    AST 21 0 - 45 U/L    ALT 18 0 - 50 U/L    Protein Total 7.1 6.4 - 8.3 g/dL    Albumin 4.3 3.5 - 5.2 g/dL    Bilirubin Total 0.3 <=1.2 mg/dL   CBC with platelets and differential     Status: Abnormal   Result Value Ref Range    WBC Count 6.2 4.0 - 11.0 10e3/uL    RBC Count 4.31 3.80 - 5.20 10e6/uL    Hemoglobin 12.4 11.7 - 15.7 g/dL    Hematocrit 40.4 35.0 - 47.0 %    MCV 94 78 - 100 fL    MCH 28.8 26.5 - 33.0 pg    MCHC 30.7 (L) 31.5 - 36.5 g/dL    RDW 14.1 10.0 - 15.0 %    Platelet Count 277 150 - 450 10e3/uL    % Neutrophils 55 %    % Lymphocytes 32 %    % Monocytes 10 %    % Eosinophils 2 %    % Basophils 1 %    % Immature Granulocytes 0 %    NRBCs per 100 WBC 0 <1 /100    Absolute Neutrophils 3.4 1.6 - 8.3 10e3/uL    Absolute Lymphocytes 2.0 0.8 - 5.3 10e3/uL    Absolute Monocytes 0.6 0.0 - 1.3 10e3/uL    Absolute Eosinophils 0.2 0.0 - 0.7 10e3/uL    Absolute Basophils 0.0 0.0 - 0.2 10e3/uL    Absolute Immature Granulocytes 0.0 <=0.4 10e3/uL    Absolute NRBCs 0.0 10e3/uL   UA with Microscopic reflex to Culture     Status: Abnormal    Specimen: Urine, Clean Catch   Result Value Ref Range    Color Urine Light Yellow Colorless, Straw, Light Yellow, Yellow    Appearance Urine Clear Clear    Glucose Urine Negative Negative mg/dL    Bilirubin Urine Negative Negative    Ketones Urine Negative Negative mg/dL    Specific Gravity Urine 1.005 1.003 - 1.035    Blood Urine Negative Negative    pH Urine 7.0 5.0 - 7.0    Protein Albumin Urine Negative Negative mg/dL    Urobilinogen Urine Normal Normal, 2.0 mg/dL    Nitrite Urine Negative Negative    Leukocyte Esterase Urine Large (A) Negative    Bacteria Urine Few (A) None Seen /HPF    Mucus Urine Present (A) None Seen /LPF    RBC Urine 2 <=2 /HPF    WBC Urine 3 <=5 /HPF    Squamous Epithelials Urine 4 (H) <=1 /HPF    Transitional Epithelials Urine 1 <=1 /HPF    Hyaline Casts Urine 1 <=2 /LPF    Narrative    Urine Culture ordered based on  laboratory criteria   CBC with platelets differential     Status: Abnormal    Narrative    The following orders were created for panel order CBC with platelets differential.  Procedure                               Abnormality         Status                     ---------                               -----------         ------                     CBC with platelets and d...[[id number]]  Abnormal            Final result                 Please view results for these tests on the individual orders.           Signed Electronically by: Kim Barry PA-C

## 2024-10-28 NOTE — PATIENT INSTRUCTIONS
MORNING OF MIRALAX CLEANOUT    When you wake up:  Begin a liquid diet. (See list below for suggestions.)  Take 2 Dulcolax (bisacodyl) tablets. (DO NOT CHEW.)    1 hour after waking up:  Mix the entire 238-gram bottle of MiraLAX with 64 ounces of a sports drink (avoid red colored ones)  Drink all of the mixture over the next few hours until gone. (Suggestion: An 8-ounce glass every 15-30 minutes equals 2-4 hours.)  It is very important to drink plenty of water and other liquids in order to avoid dehydration and to flush the bowel. (Although alcohol is a liquid, it can make you dehydrated. You should NOT drink alcohol while doing the cleanout.)    NOTE: Please stay home once you have started your cleanout. Also, the use of moist towelettes or wipes may help to minimize discomfort during the cleanout. A nonprescription 1% hydrocortisone cream may also be soothing when applied to the rectal area after each bowel movement.  It is common during the cleanout to experience some nausea, bloating, and/or abdominal distention. If you chilled the mixture prior to drinking it, you could experience chills from consuming so much cold liquid in a short time period. If you develop nausea or vomiting, slow down the rate at which you drink the solution. Please attempt to drink all of the laxative solution even if it takes you longer.Once stooling slows down, you may resume eating solid food.    LIQUID DIET  Juices  Coffee and Tea  Powdered Drinks  Water/Vitamin Water  Diet/Regular Sodas  Sports Drinks  Popsicles  Jell-O  Broths or Bouillon  Ensure or Boost

## 2024-10-28 NOTE — RESULT ENCOUNTER NOTE
Results discussed directly with patient while patient was present. Any further details documented in the note.   Kim Barry PA-C

## 2024-10-29 LAB — BACTERIA UR CULT: NORMAL

## 2024-10-31 ENCOUNTER — TELEPHONE (OUTPATIENT)
Dept: INTERNAL MEDICINE | Facility: CLINIC | Age: 79
End: 2024-10-31
Payer: MEDICARE

## 2024-10-31 DIAGNOSIS — I10 BENIGN ESSENTIAL HYPERTENSION: ICD-10-CM

## 2024-10-31 RX ORDER — AMLODIPINE BESYLATE 5 MG/1
5 TABLET ORAL DAILY
Qty: 90 TABLET | Refills: 0 | OUTPATIENT
Start: 2024-10-31

## 2024-10-31 NOTE — TELEPHONE ENCOUNTER
Pt calling stating pharmacy does not have Rx for Nexium. Requesting new Rx.    Advised pt new Rx was sent in August. Pt will call pharmacy to confirm.    Radha KOVACS RN, BSN PHN

## 2024-11-04 ENCOUNTER — TELEPHONE (OUTPATIENT)
Dept: INTERNAL MEDICINE | Facility: CLINIC | Age: 79
End: 2024-11-04
Payer: MEDICARE

## 2024-11-04 NOTE — TELEPHONE ENCOUNTER
"Patient reports her condition is not improving, still having lower left abdominal pain. Patient reports she stopped the miralax because her bowel movements are improved.     Per [facility] notes 10/28/24 \"Return in about 1 week (around 11/4/2024) for evaluation if worsening/not improvingn with PCP or partner. \"    Writer offered appointment with acute provider tomorrow. Patient asked if she could call back as she needs to ask someone for a ride first.     Summer RN 3:35 PM November 4, 2024   [facility]    " Spoke to [facility] who said she was going to call the pt today. She said to also fax over her most recent OV note, imaging and the referral form again to [phone]. Placed in fax bin.

## 2024-11-11 ENCOUNTER — TELEPHONE (OUTPATIENT)
Dept: INTERNAL MEDICINE | Facility: CLINIC | Age: 79
End: 2024-11-11
Payer: MEDICARE

## 2024-11-11 ENCOUNTER — HOSPITAL ENCOUNTER (EMERGENCY)
Facility: CLINIC | Age: 79
Discharge: HOME | End: 2024-11-11
Attending: STUDENT IN AN ORGANIZED HEALTH CARE EDUCATION/TRAINING PROGRAM | Admitting: STUDENT IN AN ORGANIZED HEALTH CARE EDUCATION/TRAINING PROGRAM
Payer: MEDICARE

## 2024-11-11 ENCOUNTER — APPOINTMENT (OUTPATIENT)
Dept: CT IMAGING | Facility: CLINIC | Age: 79
End: 2024-11-11
Attending: STUDENT IN AN ORGANIZED HEALTH CARE EDUCATION/TRAINING PROGRAM
Payer: MEDICARE

## 2024-11-11 VITALS
SYSTOLIC BLOOD PRESSURE: 145 MMHG | RESPIRATION RATE: 20 BRPM | WEIGHT: 143.74 LBS | OXYGEN SATURATION: 94 % | HEART RATE: 74 BPM | DIASTOLIC BLOOD PRESSURE: 84 MMHG | TEMPERATURE: 97.4 F | HEIGHT: 65 IN | BODY MASS INDEX: 23.95 KG/M2

## 2024-11-11 DIAGNOSIS — R10.9 LEFT SIDED ABDOMINAL PAIN: ICD-10-CM

## 2024-11-11 DIAGNOSIS — N28.9 RENAL LESION: ICD-10-CM

## 2024-11-11 LAB
ALBUMIN SERPL BCG-MCNC: 4.3 G/DL (ref 3.5–5.2)
ALBUMIN UR-MCNC: NEGATIVE MG/DL
ALP SERPL-CCNC: 99 U/L (ref 40–150)
ALT SERPL W P-5'-P-CCNC: 21 U/L (ref 0–50)
ANION GAP SERPL CALCULATED.3IONS-SCNC: 15 MMOL/L (ref 7–15)
APPEARANCE UR: CLEAR
AST SERPL W P-5'-P-CCNC: 37 U/L (ref 0–45)
BACTERIA #/AREA URNS HPF: ABNORMAL /HPF
BASOPHILS # BLD AUTO: 0 10E3/UL (ref 0–0.2)
BASOPHILS NFR BLD AUTO: 1 %
BILIRUB SERPL-MCNC: <0.2 MG/DL
BILIRUB UR QL STRIP: NEGATIVE
BUN SERPL-MCNC: 17.8 MG/DL (ref 8–23)
CALCIUM SERPL-MCNC: 9.1 MG/DL (ref 8.8–10.4)
CHLORIDE SERPL-SCNC: 104 MMOL/L (ref 98–107)
COLOR UR AUTO: ABNORMAL
CREAT SERPL-MCNC: 0.49 MG/DL (ref 0.51–0.95)
EGFRCR SERPLBLD CKD-EPI 2021: >90 ML/MIN/1.73M2
EOSINOPHIL # BLD AUTO: 0.3 10E3/UL (ref 0–0.7)
EOSINOPHIL NFR BLD AUTO: 4 %
ERYTHROCYTE [DISTWIDTH] IN BLOOD BY AUTOMATED COUNT: 14.1 % (ref 10–15)
GLUCOSE SERPL-MCNC: 91 MG/DL (ref 70–99)
GLUCOSE UR STRIP-MCNC: NEGATIVE MG/DL
HCO3 SERPL-SCNC: 22 MMOL/L (ref 22–29)
HCT VFR BLD AUTO: 39.6 % (ref 35–47)
HGB BLD-MCNC: 12.5 G/DL (ref 11.7–15.7)
HGB UR QL STRIP: NEGATIVE
IMM GRANULOCYTES # BLD: 0 10E3/UL
IMM GRANULOCYTES NFR BLD: 0 %
KETONES UR STRIP-MCNC: NEGATIVE MG/DL
LEUKOCYTE ESTERASE UR QL STRIP: ABNORMAL
LIPASE SERPL-CCNC: 47 U/L (ref 13–60)
LYMPHOCYTES # BLD AUTO: 2.1 10E3/UL (ref 0.8–5.3)
LYMPHOCYTES NFR BLD AUTO: 31 %
MCH RBC QN AUTO: 29 PG (ref 26.5–33)
MCHC RBC AUTO-ENTMCNC: 31.6 G/DL (ref 31.5–36.5)
MCV RBC AUTO: 92 FL (ref 78–100)
MONOCYTES # BLD AUTO: 0.6 10E3/UL (ref 0–1.3)
MONOCYTES NFR BLD AUTO: 9 %
MUCOUS THREADS #/AREA URNS LPF: PRESENT /LPF
NEUTROPHILS # BLD AUTO: 3.7 10E3/UL (ref 1.6–8.3)
NEUTROPHILS NFR BLD AUTO: 55 %
NITRATE UR QL: NEGATIVE
NRBC # BLD AUTO: 0 10E3/UL
NRBC BLD AUTO-RTO: 0 /100
PH UR STRIP: 6 [PH] (ref 5–7)
PLATELET # BLD AUTO: 277 10E3/UL (ref 150–450)
POTASSIUM SERPL-SCNC: 4.2 MMOL/L (ref 3.4–5.3)
PROT SERPL-MCNC: 7.2 G/DL (ref 6.4–8.3)
RADIOLOGIST FLAGS: NORMAL
RBC # BLD AUTO: 4.31 10E6/UL (ref 3.8–5.2)
RBC URINE: 1 /HPF
SODIUM SERPL-SCNC: 141 MMOL/L (ref 135–145)
SP GR UR STRIP: 1.02 (ref 1–1.03)
SQUAMOUS EPITHELIAL: <1 /HPF
UROBILINOGEN UR STRIP-MCNC: NORMAL MG/DL
WBC # BLD AUTO: 6.7 10E3/UL (ref 4–11)
WBC URINE: 3 /HPF

## 2024-11-11 PROCEDURE — 36415 COLL VENOUS BLD VENIPUNCTURE: CPT | Performed by: STUDENT IN AN ORGANIZED HEALTH CARE EDUCATION/TRAINING PROGRAM

## 2024-11-11 PROCEDURE — 99285 EMERGENCY DEPT VISIT HI MDM: CPT | Mod: 25

## 2024-11-11 PROCEDURE — 81001 URINALYSIS AUTO W/SCOPE: CPT | Performed by: STUDENT IN AN ORGANIZED HEALTH CARE EDUCATION/TRAINING PROGRAM

## 2024-11-11 PROCEDURE — 85004 AUTOMATED DIFF WBC COUNT: CPT | Performed by: STUDENT IN AN ORGANIZED HEALTH CARE EDUCATION/TRAINING PROGRAM

## 2024-11-11 PROCEDURE — 74177 CT ABD & PELVIS W/CONTRAST: CPT | Mod: MG

## 2024-11-11 PROCEDURE — 250N000009 HC RX 250: Performed by: STUDENT IN AN ORGANIZED HEALTH CARE EDUCATION/TRAINING PROGRAM

## 2024-11-11 PROCEDURE — 82310 ASSAY OF CALCIUM: CPT | Performed by: STUDENT IN AN ORGANIZED HEALTH CARE EDUCATION/TRAINING PROGRAM

## 2024-11-11 PROCEDURE — 83690 ASSAY OF LIPASE: CPT | Performed by: STUDENT IN AN ORGANIZED HEALTH CARE EDUCATION/TRAINING PROGRAM

## 2024-11-11 PROCEDURE — 250N000011 HC RX IP 250 OP 636: Performed by: STUDENT IN AN ORGANIZED HEALTH CARE EDUCATION/TRAINING PROGRAM

## 2024-11-11 RX ORDER — FAMOTIDINE 20 MG/1
20 TABLET, FILM COATED ORAL 2 TIMES DAILY
Qty: 28 TABLET | Refills: 0 | Status: SHIPPED | OUTPATIENT
Start: 2024-11-11 | End: 2024-11-25

## 2024-11-11 RX ORDER — IOPAMIDOL 755 MG/ML
500 INJECTION, SOLUTION INTRAVASCULAR ONCE
Status: COMPLETED | OUTPATIENT
Start: 2024-11-11 | End: 2024-11-11

## 2024-11-11 RX ADMIN — SODIUM CHLORIDE 58 ML: 9 INJECTION, SOLUTION INTRAVENOUS at 19:21

## 2024-11-11 RX ADMIN — IOPAMIDOL 72 ML: 755 INJECTION, SOLUTION INTRAVENOUS at 19:21

## 2024-11-11 ASSESSMENT — COLUMBIA-SUICIDE SEVERITY RATING SCALE - C-SSRS
2. HAVE YOU ACTUALLY HAD ANY THOUGHTS OF KILLING YOURSELF IN THE PAST MONTH?: NO
6. HAVE YOU EVER DONE ANYTHING, STARTED TO DO ANYTHING, OR PREPARED TO DO ANYTHING TO END YOUR LIFE?: NO
1. IN THE PAST MONTH, HAVE YOU WISHED YOU WERE DEAD OR WISHED YOU COULD GO TO SLEEP AND NOT WAKE UP?: NO

## 2024-11-11 ASSESSMENT — ACTIVITIES OF DAILY LIVING (ADL)
ADLS_ACUITY_SCORE: 0

## 2024-11-11 NOTE — ED TRIAGE NOTES
"Pt fracture her back 1 year ago. Pt has had rib pain and back pain for months on months- it hasn't gone away. Pt states she's taken things for pain but \"cannot remember.\" VSS Able to walk with no difficulty in triage. VSS     Triage Assessment (Adult)       Row Name 11/11/24 1604          Triage Assessment    Airway WDL WDL        Respiratory WDL    Respiratory WDL WDL        Cardiac WDL    Cardiac WDL WDL        Peripheral/Neurovascular WDL    Peripheral Neurovascular WDL WDL                     "

## 2024-11-11 NOTE — TELEPHONE ENCOUNTER
Advised patient of provider recommendation via telephone.     Patient reports that she has a fracture in her back. A few weeks she has been waking up with severe pain in her abdomen. Discussed with patient that if pain is severe would recommend ER as they have more capabilities than the UC. Patient verbalized understanding.     Salena RN 3:06 PM November 11, 2024   [facility]

## 2024-11-11 NOTE — ED PROVIDER NOTES
Emergency Department Note      History of Present Illness     Chief Complaint   Back Pain and Rib Pain      HPI   Cynthia Arita is a 79 year old female with a history of anxiety, gastroesophageal reflux disorder and hypertension who presents for evaluation of back and rib pain. She mentions she has had a fracture in her back a year ago and since then she has been having left sided upper abdominal pain. Today, the pain has been getting worse so she visited the ED. She reports constant pain which worsens with applying pressure to the region. She endorses worsening of pain with having bowel movements. She also endorses difficulty with her bowel movement with having to sit 20 minutes on toilet seat to defecate. She mentions her stool to be soft in consistency with occasional bright red blood spotted after defecation. She has been managing her pain with tylenol, ice application and pain patches at home which minimally relieves her symptoms.  She denies any provocation of pain with food or movement. She denies any change in appetite. Also denies nausea,dysuria or back pain. She mentions sometimes when she tries to burp, the food comes out from her mouth.     Independent Historian   None    Review of External Notes   I reviewed the office notes from 10/28/24-constipation and left lower quadrant abdominal pain along with nausea and vomiting.  Labs obtained along with urine and CT abdomen are all unremarkable.    Past Medical History   Medical History and Problem List   Anxiety  Basal cell carcinoma  Anesthesia complication  Diverticulosis  GERD  Hypertension  Meniere's disease  Nephrolithiasis  PONV     Medications   Fosamax  Asa  Citracal  Nexium  Antivert  Naproxen  Zofran  Inderal  Effexor     Surgical History   Right total knee arthroplasty  Left total knee arthroplasty  Cataract removal bilateral  Hysterectomy with left oophorectomy  Physical Exam     Patient Vitals for the past 24 hrs:   BP Temp Pulse Resp SpO2  "Height Weight   11/11/24 2106 (!) 145/84 -- 74 20 94 % -- --   11/11/24 1744 136/79 -- -- -- 95 % -- --   11/11/24 1604 (!) 131/102 97.4  F (36.3  C) 75 18 99 % 1.651 m (5' 5\") 65.2 kg (143 lb 11.8 oz)     Physical Exam  General: Alert and cooperative with exam. Patient in no apparent distress. Normal mentation.  Head:  Scalp is NC/AT  Eyes:  No scleral icterus, PERRL  ENT:  The external nose and ears are normal.   Neck:  Normal range of motion without rigidity.  CV:  Regular rate and rhythm    No pathologic murmur   Resp:  Breath sounds are clear bilaterally    Non-labored, no retractions or accessory muscle use  GI:  Left upper quadrant/epigastric pain to palpation.  All other quadrants are soft and nontender.  No distention or guarding.  MS:  No lower extremity edema   Skin:  Warm and dry, No rash or lesions noted.  Neuro:  Oriented x 3. No gross motor deficits.      Diagnostics     Lab Results   Labs Ordered and Resulted from Time of ED Arrival to Time of ED Departure   COMPREHENSIVE METABOLIC PANEL - Abnormal       Result Value    Sodium 141      Potassium 4.2      Carbon Dioxide (CO2) 22      Anion Gap 15      Urea Nitrogen 17.8      Creatinine 0.49 (*)     GFR Estimate >90      Calcium 9.1      Chloride 104      Glucose 91      Alkaline Phosphatase 99      AST 37      ALT 21      Protein Total 7.2      Albumin 4.3      Bilirubin Total <0.2     ROUTINE UA WITH MICROSCOPIC REFLEX TO CULTURE - Abnormal    Color Urine Light Yellow      Appearance Urine Clear      Glucose Urine Negative      Bilirubin Urine Negative      Ketones Urine Negative      Specific Gravity Urine 1.022      Blood Urine Negative      pH Urine 6.0      Protein Albumin Urine Negative      Urobilinogen Urine Normal      Nitrite Urine Negative      Leukocyte Esterase Urine Trace (*)     Bacteria Urine Few (*)     Mucus Urine Present (*)     RBC Urine 1      WBC Urine 3      Squamous Epithelials Urine <1     LIPASE - Normal    Lipase 47     CBC " WITH PLATELETS AND DIFFERENTIAL    WBC Count 6.7      RBC Count 4.31      Hemoglobin 12.5      Hematocrit 39.6      MCV 92      MCH 29.0      MCHC 31.6      RDW 14.1      Platelet Count 277      % Neutrophils 55      % Lymphocytes 31      % Monocytes 9      % Eosinophils 4      % Basophils 1      % Immature Granulocytes 0      NRBCs per 100 WBC 0      Absolute Neutrophils 3.7      Absolute Lymphocytes 2.1      Absolute Monocytes 0.6      Absolute Eosinophils 0.3      Absolute Basophils 0.0      Absolute Immature Granulocytes 0.0      Absolute NRBCs 0.0         Imaging   CT Abdomen Pelvis w Contrast   Final Result   IMPRESSION:    1.  No acute CT abnormality of the abdomen/pelvis.   2.  Indeterminate 1.3 cm exophytic lesion of the posterior mid zone right kidney. Renal ultrasound is recommended for further assessment.   3.  Hepatic steatosis.   4.  Colonic diverticulosis.   5.  Bilateral nonobstructing renal stones.         [Recommend Follow Up: Left renal ultrasound.]      This report will be copied to the [facility] to ensure a provider acknowledges the finding.               Independent Interpretation   None    ED Course      Medications Administered   Medications   iopamidol (ISOVUE-370) solution 500 mL (72 mLs Intravenous $Given 11/11/24 1921)   Sodium Chloride for CT Scan Flush Use (58 mLs Intravenous $Given 11/11/24 1921)       Procedures   Procedures     Discussion of Management   None    ED Course   ED Course as of 11/11/24 2124 Mon Nov 11, 2024 1755 I obtained the history and examined the patient as above.        Additional Documentation  None    Medical Decision Making / Diagnosis     CMS Diagnoses: None    MIPS       None    MDM   Cynthia Arita is a 79 year old female who presents with left upper quadrant abdominal pain.  Symptoms have been ongoing for quite some time and she has had workup done with her primary care provider that has been all reassuring however presents today due to  ongoing symptoms.  On exam, she does have some left upper quadrant/epigastric discomfort to palpation.  She is nontoxic appearance, vitally stable and afebrile and tolerating p.o. intake.  Considered etiologies include but not limited to pancreatitis, cholecystitis, cholelithiasis, peptic ulcer disease, among others.  Labs were all obtained and unremarkable.  No leukocytosis, LFT or renal dysfunction.  Urine did show trace leukocyte esterase however no significant white blood cell elevation.  Will send a culture and defer treatment based on culture results.  CT abdomen pelvis completed and does not show any evidence to suspicion for patient's symptoms however incidentally noted exophytic lesion to right kidney approximately 1.3 cm.  I do not suspect this is contributing to patient's symptoms however she will require urology follow-up for further evaluation along with renal ultrasound.  I discussed the results of imaging with patient and and the need for urology follow-up.  She voiced understanding and agreement with this.  Uncertain if her symptoms could be due to GERD however her symptoms do raise suspicion for this.  Will try course of Pepcid to see if this improves symptoms.  Recommend close follow-up with primary care provider as needed.  Reasons to return to ER discussed.    Disposition   The patient was discharged.     Diagnosis     ICD-10-CM    1. Left sided abdominal pain  R10.9       2. Renal lesion  N28.9 Adult Urology  Referral           Discharge Medications   Discharge Medication List as of 11/11/2024  9:00 PM        START taking these medications    Details   famotidine (PEPCID) 20 MG tablet Take 1 tablet (20 mg) by mouth 2 times daily for 14 days., Disp-28 tablet, R-0, E-Prescribe               Scribe Disclosure:  Rolando CARCAMO, am serving as a scribe at 5:49 PM on 11/11/2024 to document services personally performed by Yaneli Miller PA-C based on my observations and the provider's  statements to me.        Yaneli Miller PA-C  11/11/24 2126

## 2024-11-11 NOTE — TELEPHONE ENCOUNTER
Reason for Call:  Appointment Request    Patient requesting this type of appt:  Back and stomach issues    Requested provider: Betsey Magaña    Reason patient unable to be scheduled: Not within requested timeframe    When does patient want to be seen/preferred time:  Friday 11/15/24 if pos     Comments: Patient request Fri 15th    Okay to leave a detailed message?: Yes at Cell number on file:    Telephone Information:   Mobile [phone]       Call taken on 11/11/2024 at 1:00 PM by Senait Salinas

## 2024-11-11 NOTE — TELEPHONE ENCOUNTER
"Patient walked into clinic this morning at aproximately 10:00 with complaints of back pain from a \"broken back\" and abdominal pain. Patient reported to  staff that it felt like someone was \"stabbing\" her in the stomach. Patient has walked into the clinic 3 times within the past 3 weeks with same complaints. Patient was referred to [facility] twice after walking in. Patient was offered an appointment this afternoon with one of our acute care providers. Primary care provider is not in clinic today. Patient declined appointment as she wanted to be seen right away. Patient was advised by  staff that we are not a walk in clinic or an urgent care and can not accommodate appointments on a walk in basis. Patient became frustrated and started to leave the clinic. Writer was asked to come see patient as she \"could barely walk\" due to pain. Writer went to [facility] immediately and was informed patient had already left the clinic and was in the hallway. Writer encountered patient in the elevator with one of our  staff who had brought a wheelchair for patient. This staff member was attempting to encourage patient to go to ER. Patient kept saying she was going home and to let her go home. Writer also attempted to encourage patient to be seen in ER and offered to wheel her over to the ER. Patient continued to state she was going home and to just let her go. Writer and staff member watched patient exit the building. She was walking steadily using her cane.   "

## 2024-11-11 NOTE — TELEPHONE ENCOUNTER
Per Betsey Magaña, called pt to advise her to go to UC/ED to be seen for this issue. No answer, left detailed message advising pt of pcp's advise.

## 2024-11-12 NOTE — DISCHARGE INSTRUCTIONS
Lets try this medication called Pepcid that I have sent to your pharmacy to see if this helps with symptoms.  Also, lesion noted to your right kidney was found on imaging today.  Please follow-up with urology, referral was provided.  They will call to coordinate follow-up.  You will likely need to have a kidney ultrasound for further evaluation.

## 2024-11-13 DIAGNOSIS — I10 BENIGN ESSENTIAL HYPERTENSION: ICD-10-CM

## 2024-11-13 RX ORDER — AMLODIPINE BESYLATE 5 MG/1
5 TABLET ORAL DAILY
Qty: 90 TABLET | Refills: 0 | Status: SHIPPED | OUTPATIENT
Start: 2024-11-13

## 2024-11-15 ENCOUNTER — TELEPHONE (OUTPATIENT)
Dept: INTERNAL MEDICINE | Facility: CLINIC | Age: 79
End: 2024-11-15
Payer: MEDICARE

## 2024-11-15 NOTE — TELEPHONE ENCOUNTER
Patient calling stating she is wanting provider to recommend if she needs a flu and covid booster, due to recent health concerns.    Ok to ldm

## 2024-11-15 NOTE — TELEPHONE ENCOUNTER
Call to patient. Advised.    Patient states she still feels like someone is jabbing a knife in her left side. Patient states she needs an appointment with Betsey but does not want to schedule at this time. She will call back to schedule.

## 2024-11-18 ENCOUNTER — THERAPY VISIT (OUTPATIENT)
Dept: PHYSICAL THERAPY | Facility: CLINIC | Age: 79
End: 2024-11-18
Attending: INTERNAL MEDICINE
Payer: MEDICARE

## 2024-11-18 DIAGNOSIS — R26.89 BALANCE PROBLEMS: Primary | ICD-10-CM

## 2024-11-18 PROCEDURE — 97112 NEUROMUSCULAR REEDUCATION: CPT | Mod: GP | Performed by: PHYSICAL THERAPIST

## 2024-11-18 NOTE — PATIENT INSTRUCTIONS
Physical therapy notes 24    STRENGTH:   Stand up and sit down from the chair 5 times in a row trying not to use your arms. You can do it in front of a countertop but try not to touch it.    BALANCE:   Walk with your cane, turn your head side to side every 3 steps. Repeat turning your head up and down every 3 steps. Repeat 3-4 passes in your hallway each direction.     Stand on a folded blanket or towel with feet apart and eyes closed. You can stand near a countertop or with your back toward a corner/wall, but try not to touch it. Hold for 1 minute.     If symptoms start to increase and don't come back down, do box breathin. Breathe in (nose) for 4 seconds  2. Hold air in for 4 seconds  3. Blow air out (mouth) for 4 seconds  4. Hold air our for 4 seconds    WALKING:   Hit your heel first every step of the way.

## 2024-11-21 ENCOUNTER — OFFICE VISIT (OUTPATIENT)
Dept: UROLOGY | Facility: CLINIC | Age: 79
End: 2024-11-21
Payer: MEDICARE

## 2024-11-21 VITALS
BODY MASS INDEX: 23.82 KG/M2 | SYSTOLIC BLOOD PRESSURE: 122 MMHG | WEIGHT: 143 LBS | HEIGHT: 65 IN | DIASTOLIC BLOOD PRESSURE: 70 MMHG

## 2024-11-21 DIAGNOSIS — N28.9 RENAL LESION: ICD-10-CM

## 2024-11-21 DIAGNOSIS — Z87.81 HISTORY OF VERTEBRAL FRACTURE: Primary | ICD-10-CM

## 2024-11-21 ASSESSMENT — PAIN SCALES - GENERAL: PAINLEVEL_OUTOF10: NO PAIN (0)

## 2024-11-21 NOTE — PROGRESS NOTES
Chief Complaint:   Left upper abdomen pain  Kidney cyst on CT  few months         Consult or Referral:     Mr. Cynthia Arita is a 79 year old female seen at the request of Dr. Miller.         History of Present Illness:     Cynthia Arita is a 79 year old female being seen for evaluation of kidney cysts.  Duration of problem: Few months  Previous treatments: Treated for spine fracture    Incidentally detected kidney cyst on the recent CT  History of fall and T9 fracture about 3 months ago  Was on a brace for some time  Now complains of new onset pain in the left upper abdomen  Occasionally brought out by specific movements  Does not have any significant urinary issues  Non-smoker no family history of kidney cancer  Reviewed previous notes from Dr. Miller                 Past Medical History:     Past Medical History:   Diagnosis Date    Anxiety     Basal cell carcinoma     Complication of anesthesia     Diverticulosis     GERD (gastroesophageal reflux disease)     HTN (hypertension)     Meniere's disease     Nephrolithiasis     Pain in right knee     PONV (postoperative nausea and vomiting)             Past Surgical History:     Past Surgical History:   Procedure Laterality Date    ARTHROPLASTY KNEE Right 01/21/2019    Procedure: Right total knee arthroplasty;  Surgeon: Denton Amor MD;  Location: [facility] OR    ARTHROPLASTY KNEE Left 10/02/2023    Procedure: Left total knee arthroplasty;  Surgeon: Denton Amor MD;  Location: [facility] OR    EYE SURGERY      cataract surgery both eyes    ZZC VAGINAL HYSTERECTOMY      with left oophorectomy            Medications     Current Outpatient Medications   Medication Sig Dispense Refill    acetaminophen (TYLENOL) 500 MG tablet Take 2 tablets (1,000 mg) by mouth 3 times daily as needed for pain      alendronate (FOSAMAX) 70 MG tablet Take 1 tablet (70 mg) by mouth every 7 days 13 tablet 3    amLODIPine (NORVASC) 5 MG tablet TAKE 1 TABLET(5 MG) BY MOUTH DAILY 90  tablet 0    Biotin 5000 MCG TABS Take 1 tablet by mouth daily      calcitonin, salmon, (MIACALCIN) 200 UNIT/ACT nasal spray Spray 1 spray into one nostril alternating nostrils daily. Alternate nostril each day. 3.7 mL 1    calcium citrate (CITRACAL) 950 (200 Ca) MG tablet Take 1 tablet (950 mg) by mouth 2 times daily 30 tablet 0    cyanocobalamin (VITAMIN B-12) 1000 MCG tablet Take 1,000 mcg by mouth daily      DHA-EPA-Coenzyme Q10-Vitamin E (CO Q-10 VITAMIN E FISH OIL PO) Take 1 capsule by mouth daily      esomeprazole (NEXIUM) 40 MG DR capsule TAKE 1 CAPSULE BY MOUTH TWICE DAILY BEFORE BREAKFAST AND DINNER 180 capsule 3    estradiol (ESTRACE) 0.1 MG/GM vaginal cream Place 2 g vaginally twice a week 42.5 g 0    famotidine (PEPCID) 20 MG tablet Take 1 tablet (20 mg) by mouth 2 times daily for 14 days. 28 tablet 0    Flaxseed, Linseed, (FLAX SEED OIL) 1000 MG capsule Take 1 capsule by mouth daily Takes one in the PM      losartan (COZAAR) 100 MG tablet Take 1 tablet (100 mg) by mouth daily. 90 tablet 1    meclizine (ANTIVERT) 25 MG tablet Take 25 mg by mouth 3 times daily      Misc Natural Products (GLUCOSAMINE CHOND COMPLEX/MSM PO) Take 1 capsule by mouth 2 times daily Takes one in the morning      Multiple Vitamins-Minerals (PRESERVISION AREDS 2) CAPS Take 1 tablet by mouth 2 times daily      multivitamin (CENTRUM SILVER) tablet Take 1 tablet by mouth daily      naproxen sodium (EQ NAPROXEN SODIUM) 220 MG tablet Take 220 mg by mouth 2 times daily as needed for moderate pain      ondansetron (ZOFRAN ODT) 4 MG ODT tab Take 1 tablet (4 mg) by mouth 2 times daily. And BID PRN 90 tablet 3    polyethylene glycol (MIRALAX) 17 g packet Take 17 g by mouth daily 7 packet 0    propranolol (INDERAL) 10 MG tablet Take 1 tablet (10 mg) by mouth 2 times daily 30 tablet 1    venlafaxine (EFFEXOR XR) 37.5 MG 24 hr capsule Take 1 capsule (37.5 mg) by mouth daily      vitamin D3 (CHOLECALCIFEROL) 50 mcg (2000 units) tablet Take 1  tablet by mouth daily Takes one in the morning 2,000 units       Current Facility-Administered Medications   Medication Dose Route Frequency Provider Last Rate Last Admin    lidocaine 1 % injection 1 mL  1 mL      1 mL at 05/22/24 1027    triamcinolone (KENALOG-40) injection 40 mg  40 mg      40 mg at 05/22/24 1027            Family History:     Family History   Problem Relation Age of Onset    Neurologic Disorder Mother         palsy    Esophageal Cancer Father     Cancer Maternal Grandmother         lymph    Diabetes Paternal Grandmother     Neurologic Disorder Sister         Parkinson's    Hypertension Brother     Bipolar Disorder Sister     Brain Cancer Son 17            Social History:     Social History     Socioeconomic History    Marital status:      Spouse name: Not on file    Number of children: Not on file    Years of education: Not on file    Highest education level: Not on file   Occupational History    Not on file   Tobacco Use    Smoking status: Never    Smokeless tobacco: Never   Vaping Use    Vaping status: Never Used   Substance and Sexual Activity    Alcohol use: Yes     Comment: rare    Drug use: No    Sexual activity: Not Currently     Partners: Male   Other Topics Concern    Parent/sibling w/ CABG, MI or angioplasty before 65F 55M? Not Asked   Social History Narrative    Not on file     Social Drivers of Health     Financial Resource Strain: Low Risk  (8/22/2024)    Financial Resource Strain     Within the past 12 months, have you or your family members you live with been unable to get utilities (heat, electricity) when it was really needed?: No   Food Insecurity: Low Risk  (8/22/2024)    Food Insecurity     Within the past 12 months, did you worry that your food would run out before you got money to buy more?: No     Within the past 12 months, did the food you bought just not last and you didn t have money to get more?: No   Transportation Needs: Low Risk  (8/22/2024)    Transportation  Needs     Within the past 12 months, has lack of transportation kept you from medical appointments, getting your medicines, non-medical meetings or appointments, work, or from getting things that you need?: No   Physical Activity: Not on file   Stress: No Stress Concern Present (8/22/2024)    [employer] - Occupational Stress Questionnaire     Feeling of Stress : Only a little   Social Connections: Unknown (8/22/2024)    Social Connection and Isolation Panel [NHANES]     Frequency of Communication with Friends and Family: Not on file     Frequency of Social Gatherings with Friends and Family: Three times a week     Attends L[employer] Services: Not on file     Active Member of Clubs or Organizations: Not on file     Attends Club or Organization Meetings: Not on file     Marital Status: Not on file   Interpersonal Safety: Low Risk  (7/23/2024)    Interpersonal Safety     Do you feel physically and emotionally safe where you currently live?: Yes     Within the past 12 months, have you been hit, slapped, kicked or otherwise physically hurt by someone?: No     Within the past 12 months, have you been humiliated or emotionally abused in other ways by your partner or ex-partner?: No   Housing Stability: Low Risk  (8/22/2024)    Housing Stability     Do you have housing? : Yes     Are you worried about losing your housing?: No            Allergies:   Ativan [lorazepam], Dicyclomine, Gabapentin, Metoprolol, Pantoprazole, Tramadol, and Oxycodone         Review of Systems:  From intake questionnaire     Skin: negative  Eyes: negative  Ears/Nose/Throat: negative  Respiratory: No shortness of breath, dyspnea on exertion, cough, or hemoptysis  Cardiovascular: No chest pain or palpitations  Gastrointestinal: negative; no nausea/vomiting, constipation or diarrhea  Genitourinary: as per HPI  Musculoskeletal: negative  Neurologic: negative  Psychiatric: negative  Hematologic/Lymphatic/Immunologic:  "negative  Endocrine: negative         Physical Exam:     Patient is a 79 year old  female   Vitals: Blood pressure 122/70, height 1.651 m (5' 5\"), weight 64.9 kg (143 lb), not currently breastfeeding.  Constitutional: Body mass index is 23.8 kg/m .  Alert, no acute distress, oriented, conversant  Eyes: no scleral icterus; extraocular muscles intact, moist conjunctivae  Neck: trachea midline, no thyromegaly  Ears/nose/mouth: throat/mouth:normal, good dentition  Respiratory: no respiratory distress, or pursed lip breathing  Cardiovascular: pulses strong and intact; no obvious jugular venous distension present  Gastrointestinal: soft, nontender, no organomegaly or masses,   Lymphatics: No inguinal adenopathy  Musculoskeletal: extremities normal, no peripheral edema  Skin: no suspicious lesions or rashes  Neuro: Alert, oriented, speech and mentation normal  Psych: affect and mood normal, alert and oriented to person, place and time  Gait: Normal  : deferred      Labs and Pathology:    The following labs were reviewed by me and discussed with the patient:  UA: Normal  Significant for   Lab Results   Component Value Date    CR 0.49 11/11/2024    CR 0.52 10/28/2024    CR 0.63 10/21/2024    CR 0.62 08/23/2024    CR 0.50 12/13/2023    CR 0.49 10/09/2023    CR 0.49 10/04/2023    CR 0.46 10/03/2023    CR 0.59 09/18/2023    CR 0.60 04/08/2023    CR 0.64 03/08/2021    CR 0.54 01/13/2021    CR 0.58 08/26/2020    CR 0.65 08/22/2019    CR 0.61 05/16/2019    CR 0.63 02/04/2019    CR 0.82 02/03/2019    CR 0.51 01/30/2019    CR 0.58 01/25/2019    CR 0.56 01/24/2019     No results found for: \"PSA\"          Imaging:    The following imaging exams were independently viewed and interpreted by me and discussed with patient:  CT Scan Abd/Pelvis: EXAM: CT ABDOMEN PELVIS W CONTRAST  LOCATION: [facility]  DATE: 11/11/2024     INDICATION: Left upper quadrant abdominal pain.  COMPARISON: 10/28/2024.  TECHNIQUE: CT scan " of the abdomen and pelvis was performed following injection of IV contrast. Multiplanar reformats were obtained. Dose reduction techniques were used.  CONTRAST: 72 mL Isovue-370.     FINDINGS:     LOWER CHEST: Tiny mucous plug within the right lower lobe, series 3 image 8.     HEPATOBILIARY: Hepatic steatosis. Low-attenuation subcentimeter liver lesion(s) compatible with benign cysts or other benign lesions. No specific evaluation or follow-up is recommended in a low risk patient.     Gallbladder is normal.     No intrahepatic or intrahepatic biliary ductal dilatation.     PANCREAS: Enhances normally. No peripancreatic inflammatory fat stranding.     SPLEEN: Enhances normally. Normal size.     ADRENAL GLANDS: Normal.     KIDNEYS: Both kidneys enhance symmetrically, without hydronephrosis. Indeterminate exophytic lesion of the posterior mid zone right kidney, measuring 1.3 cm and with attenuation slightly greater than simple fluid attenuation, series 3 image 75. Other   subcentimeter low-attenuation renal lesions, which are too small to characterize, though statistically likely to represent simple cysts; no further follow-up of these lesions is recommended..     Multiple small bilateral nonobstructing renal stones.     Urinary bladder is unremarkable.     PELVIC ORGANS: Hysterectomy.     BOWEL: No evidence of acute gastrointestinal inflammation or obstruction. Colonic diverticulosis.     No intraperitoneal free fluid or free air. Tiny fat-containing umbilical hernia.     LYMPH NODES: No suspicious abdominal or pelvic lymphadenopathy.     VASCULATURE: No abdominal aortic aneurysm. Mild to moderate atheromatous disease.     MUSCULOSKELETAL: No suspicious abnormality.     OTHER: No additionally significant abnormalities.                                                                      IMPRESSION:   1.  No acute CT abnormality of the abdomen/pelvis.  2.  Indeterminate 1.3 cm exophytic lesion of the posterior mid zone  right kidney. Renal ultrasound is recommended for further assessment.  3.  Hepatic steatosis.  4.  Colonic diverticulosis.  5.  Bilateral nonobstructing renal stones.       Standardized Questionnaire:             Assessment and Plan:     Renal lesion  Appear to be hypodense type II Bosniak cysts  No further follow-up is necessary for the same  Very small nonobstructing kidney stones requiring no significant follow-up  - Adult Urology  Referral    History of vertebral fracture  Complains of pain in the left upper abdomen consistently at around T9-T8 region  Had vertebral fracture at T9 level  Would recommend follow-up with the spine  team to see if she needs any follow-up for her issues  - Spine  Referral; Future      Plan:  Follow-up as needed    Orders  Orders Placed This Encounter   Procedures    Spine  Referral       Donis Pompa MD  [facility] [place]      ==========================    Additional Billing and Coding Information:  Review of external notes as documented above   Review of the result(s) of each unique test - UA, creatinine, CT abdomen pelvis                20 minutes spent by me on the date of the encounter doing chart review, review of test results, interpretation of tests, patient visit, and documentation     ==========================

## 2024-11-21 NOTE — NURSING NOTE
Chief Complaint   Patient presents with    renal mass     Pt reports the mass on her kidney is sometimes painful.  Pt denies gross hematuria or dysuria.    Brit Gayle, Clinic Assistant

## 2024-11-21 NOTE — PATIENT INSTRUCTIONS
No obvious issues related to the kidneys  Cysts with possible some hyperdense material Yolyk 2  Follow-up as needed  PLaced a spine referral for the pain

## 2024-11-21 NOTE — LETTER
11/21/2024       RE: Cynthia Arita  [address] [place] [address]     Dear Colleague,    Thank you for referring your patient, Cynthia Arita, to the [facility]OLOG[facility] [place] at [facility]. Please see a copy of my visit note below.          Chief Complaint:   Left upper abdomen pain  Kidney cyst on CT  few months         Consult or Referral:     Mr. Cynthia Arita is a 79 year old female seen at the request of Dr. Miller.         History of Present Illness:     Cynthia Arita is a 79 year old female being seen for evaluation of kidney cysts.  Duration of problem: Few months  Previous treatments: Treated for spine fracture    Incidentally detected kidney cyst on the recent CT  History of fall and T9 fracture about 3 months ago  Was on a brace for some time  Now complains of new onset pain in the left upper abdomen  Occasionally brought out by specific movements  Does not have any significant urinary issues  Non-smoker no family history of kidney cancer  Reviewed previous notes from Dr. Miller                 Past Medical History:     Past Medical History:   Diagnosis Date     Anxiety      Basal cell carcinoma      Complication of anesthesia      Diverticulosis      GERD (gastroesophageal reflux disease)      HTN (hypertension)      Meniere's disease      Nephrolithiasis      Pain in right knee      PONV (postoperative nausea and vomiting)             Past Surgical History:     Past Surgical History:   Procedure Laterality Date     ARTHROPLASTY KNEE Right 01/21/2019    Procedure: Right total knee arthroplasty;  Surgeon: Denton Amor MD;  Location: [facility] OR     ARTHROPLASTY KNEE Left 10/02/2023    Procedure: Left total knee arthroplasty;  Surgeon: Denton Amor MD;  Location: [facility] OR     EYE SURGERY      cataract surgery both eyes     ZZC VAGINAL HYSTERECTOMY      with left oophorectomy            Medications     Current Outpatient Medications    Medication Sig Dispense Refill     acetaminophen (TYLENOL) 500 MG tablet Take 2 tablets (1,000 mg) by mouth 3 times daily as needed for pain       alendronate (FOSAMAX) 70 MG tablet Take 1 tablet (70 mg) by mouth every 7 days 13 tablet 3     amLODIPine (NORVASC) 5 MG tablet TAKE 1 TABLET(5 MG) BY MOUTH DAILY 90 tablet 0     Biotin 5000 MCG TABS Take 1 tablet by mouth daily       calcitonin, salmon, (MIACALCIN) 200 UNIT/ACT nasal spray Spray 1 spray into one nostril alternating nostrils daily. Alternate nostril each day. 3.7 mL 1     calcium citrate (CITRACAL) 950 (200 Ca) MG tablet Take 1 tablet (950 mg) by mouth 2 times daily 30 tablet 0     cyanocobalamin (VITAMIN B-12) 1000 MCG tablet Take 1,000 mcg by mouth daily       DHA-EPA-Coenzyme Q10-Vitamin E (CO Q-10 VITAMIN E FISH OIL PO) Take 1 capsule by mouth daily       esomeprazole (NEXIUM) 40 MG DR capsule TAKE 1 CAPSULE BY MOUTH TWICE DAILY BEFORE BREAKFAST AND DINNER 180 capsule 3     estradiol (ESTRACE) 0.1 MG/GM vaginal cream Place 2 g vaginally twice a week 42.5 g 0     famotidine (PEPCID) 20 MG tablet Take 1 tablet (20 mg) by mouth 2 times daily for 14 days. 28 tablet 0     Flaxseed, Linseed, (FLAX SEED OIL) 1000 MG capsule Take 1 capsule by mouth daily Takes one in the PM       losartan (COZAAR) 100 MG tablet Take 1 tablet (100 mg) by mouth daily. 90 tablet 1     meclizine (ANTIVERT) 25 MG tablet Take 25 mg by mouth 3 times daily       Misc Natural Products (GLUCOSAMINE CHOND COMPLEX/MSM PO) Take 1 capsule by mouth 2 times daily Takes one in the morning       Multiple Vitamins-Minerals (PRESERVISION AREDS 2) CAPS Take 1 tablet by mouth 2 times daily       multivitamin (CENTRUM SILVER) tablet Take 1 tablet by mouth daily       naproxen sodium (EQ NAPROXEN SODIUM) 220 MG tablet Take 220 mg by mouth 2 times daily as needed for moderate pain       ondansetron (ZOFRAN ODT) 4 MG ODT tab Take 1 tablet (4 mg) by mouth 2 times daily. And BID PRN 90 tablet 3      polyethylene glycol (MIRALAX) 17 g packet Take 17 g by mouth daily 7 packet 0     propranolol (INDERAL) 10 MG tablet Take 1 tablet (10 mg) by mouth 2 times daily 30 tablet 1     venlafaxine (EFFEXOR XR) 37.5 MG 24 hr capsule Take 1 capsule (37.5 mg) by mouth daily       vitamin D3 (CHOLECALCIFEROL) 50 mcg (2000 units) tablet Take 1 tablet by mouth daily Takes one in the morning 2,000 units       Current Facility-Administered Medications   Medication Dose Route Frequency Provider Last Rate Last Admin     lidocaine 1 % injection 1 mL  1 mL      1 mL at 05/22/24 1027     triamcinolone (KENALOG-40) injection 40 mg  40 mg      40 mg at 05/22/24 1027            Family History:     Family History   Problem Relation Age of Onset     Neurologic Disorder Mother         palsy     Esophageal Cancer Father      Cancer Maternal Grandmother         lymph     Diabetes Paternal Grandmother      Neurologic Disorder Sister         Parkinson's     Hypertension Brother      Bipolar Disorder Sister      Brain Cancer Son 17            Social History:     Social History     Socioeconomic History     Marital status:      Spouse name: Not on file     Number of children: Not on file     Years of education: Not on file     Highest education level: Not on file   Occupational History     Not on file   Tobacco Use     Smoking status: Never     Smokeless tobacco: Never   Vaping Use     Vaping status: Never Used   Substance and Sexual Activity     Alcohol use: Yes     Comment: rare     Drug use: No     Sexual activity: Not Currently     Partners: Male   Other Topics Concern     Parent/sibling w/ CABG, MI or angioplasty before 65F 55M? Not Asked   Social History Narrative     Not on file     Social Drivers of Health     Financial Resource Strain: Low Risk  (8/22/2024)    Financial Resource Strain      Within the past 12 months, have you or your family members you live with been unable to get utilities (heat, electricity) when it was really  needed?: No   Food Insecurity: Low Risk  (8/22/2024)    Food Insecurity      Within the past 12 months, did you worry that your food would run out before you got money to buy more?: No      Within the past 12 months, did the food you bought just not last and you didn t have money to get more?: No   Transportation Needs: Low Risk  (8/22/2024)    Transportation Needs      Within the past 12 months, has lack of transportation kept you from medical appointments, getting your medicines, non-medical meetings or appointments, work, or from getting things that you need?: No   Physical Activity: Not on file   Stress: No Stress Concern Present (8/22/2024)    [employer] - Occupational Stress Questionnaire      Feeling of Stress : Only a little   Social Connections: Unknown (8/22/2024)    Social Connection and Isolation Panel [NHANES]      Frequency of Communication with Friends and Family: Not on file      Frequency of Social Gatherings with Friends and Family: Three times a week      Attends Sikhism Services: Not on file      Active Member of Clubs or Organizations: Not on file      Attends Club or Organization Meetings: Not on file      Marital Status: Not on file   Interpersonal Safety: Low Risk  (7/23/2024)    Interpersonal Safety      Do you feel physically and emotionally safe where you currently live?: Yes      Within the past 12 months, have you been hit, slapped, kicked or otherwise physically hurt by someone?: No      Within the past 12 months, have you been humiliated or emotionally abused in other ways by your partner or ex-partner?: No   Housing Stability: Low Risk  (8/22/2024)    Housing Stability      Do you have housing? : Yes      Are you worried about losing your housing?: No            Allergies:   Ativan [lorazepam], Dicyclomine, Gabapentin, Metoprolol, Pantoprazole, Tramadol, and Oxycodone         Review of Systems:  From intake questionnaire     Skin: negative  Eyes:  "negative  Ears/Nose/Throat: negative  Respiratory: No shortness of breath, dyspnea on exertion, cough, or hemoptysis  Cardiovascular: No chest pain or palpitations  Gastrointestinal: negative; no nausea/vomiting, constipation or diarrhea  Genitourinary: as per HPI  Musculoskeletal: negative  Neurologic: negative  Psychiatric: negative  Hematologic/Lymphatic/Immunologic: negative  Endocrine: negative         Physical Exam:     Patient is a 79 year old  female   Vitals: Blood pressure 122/70, height 1.651 m (5' 5\"), weight 64.9 kg (143 lb), not currently breastfeeding.  Constitutional: Body mass index is 23.8 kg/m .  Alert, no acute distress, oriented, conversant  Eyes: no scleral icterus; extraocular muscles intact, moist conjunctivae  Neck: trachea midline, no thyromegaly  Ears/nose/mouth: throat/mouth:normal, good dentition  Respiratory: no respiratory distress, or pursed lip breathing  Cardiovascular: pulses strong and intact; no obvious jugular venous distension present  Gastrointestinal: soft, nontender, no organomegaly or masses,   Lymphatics: No inguinal adenopathy  Musculoskeletal: extremities normal, no peripheral edema  Skin: no suspicious lesions or rashes  Neuro: Alert, oriented, speech and mentation normal  Psych: affect and mood normal, alert and oriented to person, place and time  Gait: Normal  : deferred      Labs and Pathology:    The following labs were reviewed by me and discussed with the patient:  UA: Normal  Significant for   Lab Results   Component Value Date    CR 0.49 11/11/2024    CR 0.52 10/28/2024    CR 0.63 10/21/2024    CR 0.62 08/23/2024    CR 0.50 12/13/2023    CR 0.49 10/09/2023    CR 0.49 10/04/2023    CR 0.46 10/03/2023    CR 0.59 09/18/2023    CR 0.60 04/08/2023    CR 0.64 03/08/2021    CR 0.54 01/13/2021    CR 0.58 08/26/2020    CR 0.65 08/22/2019    CR 0.61 05/16/2019    CR 0.63 02/04/2019    CR 0.82 02/03/2019    CR 0.51 01/30/2019    CR 0.58 01/25/2019    CR 0.56 01/24/2019 " "    No results found for: \"PSA\"          Imaging:    The following imaging exams were independently viewed and interpreted by me and discussed with patient:  CT Scan Abd/Pelvis: EXAM: CT ABDOMEN PELVIS W CONTRAST  [facility]ATION[facility]  DATE: 11/11/2024     INDICATION: Left upper quadrant abdominal pain.  COMPARISON: 10/28/2024.  TECHNIQUE: CT scan of the abdomen and pelvis was performed following injection of IV contrast. Multiplanar reformats were obtained. Dose reduction techniques were used.  CONTRAST: 72 mL Isovue-370.     FINDINGS:     LOWER CHEST: Tiny mucous plug within the right lower lobe, series 3 image 8.     HEPATOBILIARY: Hepatic steatosis. Low-attenuation subcentimeter liver lesion(s) compatible with benign cysts or other benign lesions. No specific evaluation or follow-up is recommended in a low risk patient.     Gallbladder is normal.     No intrahepatic or intrahepatic biliary ductal dilatation.     PANCREAS: Enhances normally. No peripancreatic inflammatory fat stranding.     SPLEEN: Enhances normally. Normal size.     ADRENAL GLANDS: Normal.     KIDNEYS: Both kidneys enhance symmetrically, without hydronephrosis. Indeterminate exophytic lesion of the posterior mid zone right kidney, measuring 1.3 cm and with attenuation slightly greater than simple fluid attenuation, series 3 image 75. Other   subcentimeter low-attenuation renal lesions, which are too small to characterize, though statistically likely to represent simple cysts; no further follow-up of these lesions is recommended..     Multiple small bilateral nonobstructing renal stones.     Urinary bladder is unremarkable.     PELVIC ORGANS: Hysterectomy.     BOWEL: No evidence of acute gastrointestinal inflammation or obstruction. Colonic diverticulosis.     No intraperitoneal free fluid or free air. Tiny fat-containing umbilical hernia.     LYMPH NODES: No suspicious abdominal or pelvic lymphadenopathy.     VASCULATURE: " No abdominal aortic aneurysm. Mild to moderate atheromatous disease.     MUSCULOSKELETAL: No suspicious abnormality.     OTHER: No additionally significant abnormalities.                                                                      IMPRESSION:   1.  No acute CT abnormality of the abdomen/pelvis.  2.  Indeterminate 1.3 cm exophytic lesion of the posterior mid zone right kidney. Renal ultrasound is recommended for further assessment.  3.  Hepatic steatosis.  4.  Colonic diverticulosis.  5.  Bilateral nonobstructing renal stones.       Standardized Questionnaire:             Assessment and Plan:     Renal lesion  Appear to be hypodense type II Bosniak cysts  No further follow-up is necessary for the same  Very small nonobstructing kidney stones requiring no significant follow-up  - Adult Urology  Referral    History of vertebral fracture  Complains of pain in the left upper abdomen consistently at around T9-T8 region  Had vertebral fracture at T9 level  Would recommend follow-up with the spine  team to see if she needs any follow-up for her issues  - Spine  Referral; Future      Plan:  Follow-up as needed    Orders  Orders Placed This Encounter   Procedures     Spine  Referral       Donis Pompa MD  [facility]OLOG[facility] [place]      ==========================    Additional Billing and Coding Information:  Review of external notes as documented above   Review of the result(s) of each unique test - UA, creatinine, CT abdomen pelvis                20 minutes spent by me on the date of the encounter doing chart review, review of test results, interpretation of tests, patient visit, and documentation     ==========================      Again, thank you for allowing me to participate in the care of your patient.      Sincerely,    Donis Pompa MD

## 2024-12-13 DIAGNOSIS — R07.89 CHEST WALL PAIN: ICD-10-CM

## 2024-12-18 RX ORDER — CALCITONIN SALMON 200 [IU]/.09ML
SPRAY, METERED NASAL
Qty: 3.7 ML | Refills: 1 | Status: SHIPPED | OUTPATIENT
Start: 2024-12-18

## 2024-12-19 NOTE — TELEPHONE ENCOUNTER
Pending Prescriptions:                       Disp   Refills    calcitonin, salmon, (MIACALCIN) 200 UNIT/A*11.1 mL3        Sig: USE 1 SPRAY IN 1 NOSTRIL  DAILY (ALTERNATING NOSTRILS           DAILY)    Routing refill request to provider for review/approval because:  Needs provider review        Rx sent to pharmacy on file

## 2024-12-24 ENCOUNTER — NURSE TRIAGE (OUTPATIENT)
Dept: INTERNAL MEDICINE | Facility: CLINIC | Age: 79
End: 2024-12-24
Payer: MEDICARE

## 2024-12-24 NOTE — TELEPHONE ENCOUNTER
Reason for Disposition   MILD-MODERATE diarrhea (e.g., 1-6 times / day more than normal)    Additional Information   Negative: Shock suspected (e.g., cold/pale/clammy skin, too weak to stand, low BP, rapid pulse)   Negative: Difficult to awaken or acting confused (e.g., disoriented, slurred speech)   Negative: Sounds like a life-threatening emergency to the triager   Negative: Vomiting also present and worse than the diarrhea   Negative: Blood in stool and without diarrhea   Negative: Diarrhea begins while taking an antibiotic by mouth (oral antibiotic)   Negative: SEVERE abdominal pain (e.g., excruciating) and present > 1 hour   Negative: SEVERE abdominal pain and age > 60 years   Negative: Bloody, black, or tarry bowel movements  (Exception: Chronic-unchanged black-grey bowel movements and is taking iron pills or Pepto-Bismol.)   Negative: SEVERE diarrhea (e.g., 7 or more times / day more than normal) and age > 60 years   Negative: Constant abdominal pain lasting > 2 hours   Negative: Drinking very little and dehydration suspected (e.g., no urine > 12 hours, very dry mouth, very lightheaded)   Negative: Patient sounds very sick or weak to the triager   Negative: SEVERE diarrhea (e.g., 7 or more times / day more than normal) and present > 24 hours (1 day)   Negative: MODERATE diarrhea (e.g., 4-6 times / day more than normal) and present > 48 hours (2 days)   Negative: MODERATE diarrhea (e.g., 4-6 times / day more than normal) and age > 70 years   Negative: Abdominal pain  (Exceptions: Pain clears completely with each passage of diarrhea stool,  or symptoms similar to previously diagnosed irritable bowel syndrome.)   Negative: Fever > 101 F (38.3 C)   Negative: Blood in the stool  (Exception: Only on toilet paper. Reason: Diarrhea can cause rectal irritation with blood on wiping.)   Negative: Mucus or pus in stool has been present > 2 days and diarrhea is more than mild   Negative: Weak immune system (e.g., HIV  "positive, cancer chemo, splenectomy, organ transplant, chronic steroids)   Negative: Travel to a foreign country in past month   Negative: Recent antibiotic therapy (i.e., within last 2 months) and diarrhea present > 3 days since antibiotic was stopped   Negative: Recent hospitalization and diarrhea present > 3 days   Negative: Tube feedings (e.g., nasogastric, g-tube, j-tube)   Negative: MILD diarrhea (e.g., 1-3 or more stools than normal in past 24 hours) diarrhea and present > 7 days  (Exception: Chronic diarrhea that is not worse.)   Negative: Patient wants to be seen   Negative: Diarrhea is a chronic symptom (recurrent or ongoing AND lasting > 4 weeks)    Answer Assessment - Initial Assessment Questions  1. DIARRHEA SEVERITY: \"How bad is the diarrhea?\" \"How many more stools have you had in the past 24 hours than normal?\"       Not currently having diarrhea  2. ONSET: \"When did the diarrhea begin?\"       4-5 days ago  3. STOOL DESCRIPTION:  \"How loose or watery is the diarrhea?\" \"What is the stool color?\" \"Is there any blood or mucous in the stool?\"      No stools   4. VOMITING: \"Are you also vomiting?\" If Yes, ask: \"How many times in the past 24 hours?\"       Yes 3 days ago.  No further vomiting  5. ABDOMEN PAIN: \"Are you having any abdomen pain?\" If Yes, ask: \"What does it feel like?\" (e.g., crampy, dull, intermittent, constant)       no  6. ABDOMEN PAIN SEVERITY: If present, ask: \"How bad is the pain?\"  (e.g., Scale 1-10; mild, moderate, or severe)      no  7. ORAL INTAKE: If vomiting, \"Have you been able to drink liquids?\" \"How much liquids have you had in the past 24 hours?\"      Yes, no vomiting for past 3 days  8. HYDRATION: \"Any signs of dehydration?\" (e.g., dry mouth [not just dry lips], too weak to stand, dizziness, new weight loss) \"When did you last urinate?\"      Dry mouth.  Urinated last at 0600-about 5 hours ago.  9. EXPOSURE: \"Have you traveled to a foreign country recently?\" \"Have you been " "exposed to anyone with diarrhea?\" \"Could you have eaten any food that was spoiled?\"      no  10. ANTIBIOTIC USE: \"Are you taking antibiotics now or have you taken antibiotics in the past 2 months?\"        no  11. OTHER SYMPTOMS: \"Do you have any other symptoms?\" (e.g., fever, blood in stool)        Fever.   12. PREGNANCY: \"Is there any chance you are pregnant?\" \"When was your last menstrual period?\"        N/a    Protocols used: Diarrhea-A-OH    "

## 2025-02-09 ENCOUNTER — TELEPHONE (OUTPATIENT)
Dept: PEDIATRICS | Facility: CLINIC | Age: 80
End: 2025-02-09
Payer: MEDICARE

## 2025-02-09 DIAGNOSIS — R07.89 CHEST WALL PAIN: ICD-10-CM

## 2025-02-10 RX ORDER — CALCITONIN SALMON 200 [IU]/.09ML
SPRAY, METERED NASAL
Qty: 3.7 ML | Refills: 1 | OUTPATIENT
Start: 2025-02-10

## 2025-02-10 NOTE — TELEPHONE ENCOUNTER
Patient advised she is to be off of the Calcitonin spray and should be taking only Fosamax for osteoporosis. YUE Avalos R.N.

## 2025-02-10 NOTE — TELEPHONE ENCOUNTER
Please call patient to explain:    This medication was discontinued back in July 2024 due to its low effectiveness in treating osteoporosis.  I switched her to Fosamax at that time and patient was instructed to discontinue the calcitonin spray.    It looks like one of my colleagues prescribed this for the patient a few months ago for chest wall pain?  I am not aware of this medication treating chest wall pain and would recommend that she discontinue this medication again.    She should remain on her Fosamax.  STOP the calcitonin spray.

## 2025-02-18 DIAGNOSIS — I10 BENIGN ESSENTIAL HYPERTENSION: ICD-10-CM

## 2025-02-19 ENCOUNTER — ANESTHESIA (OUTPATIENT)
Dept: SURGERY | Facility: CLINIC | Age: 80
End: 2025-02-19
Payer: MEDICARE

## 2025-02-19 ENCOUNTER — APPOINTMENT (OUTPATIENT)
Dept: GENERAL RADIOLOGY | Facility: CLINIC | Age: 80
DRG: 660 | End: 2025-02-19
Attending: EMERGENCY MEDICINE
Payer: MEDICARE

## 2025-02-19 ENCOUNTER — HOSPITAL ENCOUNTER (INPATIENT)
Facility: CLINIC | Age: 80
DRG: 660 | End: 2025-02-19
Attending: EMERGENCY MEDICINE | Admitting: INTERNAL MEDICINE
Payer: MEDICARE

## 2025-02-19 ENCOUNTER — APPOINTMENT (OUTPATIENT)
Dept: GENERAL RADIOLOGY | Facility: CLINIC | Age: 80
DRG: 660 | End: 2025-02-19
Attending: UROLOGY
Payer: MEDICARE

## 2025-02-19 ENCOUNTER — APPOINTMENT (OUTPATIENT)
Dept: MRI IMAGING | Facility: CLINIC | Age: 80
DRG: 660 | End: 2025-02-19
Attending: EMERGENCY MEDICINE
Payer: MEDICARE

## 2025-02-19 ENCOUNTER — APPOINTMENT (OUTPATIENT)
Dept: CT IMAGING | Facility: CLINIC | Age: 80
DRG: 660 | End: 2025-02-19
Attending: EMERGENCY MEDICINE
Payer: MEDICARE

## 2025-02-19 ENCOUNTER — ANESTHESIA EVENT (OUTPATIENT)
Dept: SURGERY | Facility: CLINIC | Age: 80
End: 2025-02-19
Payer: MEDICARE

## 2025-02-19 DIAGNOSIS — N20.1 URETEROLITHIASIS: ICD-10-CM

## 2025-02-19 DIAGNOSIS — S22.070A CLOSED WEDGE COMPRESSION FRACTURE OF T9 VERTEBRA, INITIAL ENCOUNTER (H): ICD-10-CM

## 2025-02-19 LAB
ALBUMIN UR-MCNC: 20 MG/DL
ANION GAP SERPL CALCULATED.3IONS-SCNC: 15 MMOL/L (ref 7–15)
APPEARANCE UR: ABNORMAL
ATRIAL RATE - MUSE: 85 BPM
BACTERIA #/AREA URNS HPF: ABNORMAL /HPF
BASOPHILS # BLD AUTO: 0 10E3/UL (ref 0–0.2)
BASOPHILS NFR BLD AUTO: 0 %
BILIRUB UR QL STRIP: NEGATIVE
BUN SERPL-MCNC: 24.7 MG/DL (ref 8–23)
CALCIUM SERPL-MCNC: 9.7 MG/DL (ref 8.8–10.4)
CHLORIDE SERPL-SCNC: 100 MMOL/L (ref 98–107)
COLOR UR AUTO: YELLOW
CREAT SERPL-MCNC: 0.79 MG/DL (ref 0.51–0.95)
DIASTOLIC BLOOD PRESSURE - MUSE: NORMAL MMHG
EGFRCR SERPLBLD CKD-EPI 2021: 75 ML/MIN/1.73M2
EOSINOPHIL # BLD AUTO: 0 10E3/UL (ref 0–0.7)
EOSINOPHIL NFR BLD AUTO: 1 %
ERYTHROCYTE [DISTWIDTH] IN BLOOD BY AUTOMATED COUNT: 13.3 % (ref 10–15)
GLUCOSE BLDC GLUCOMTR-MCNC: 82 MG/DL (ref 70–99)
GLUCOSE SERPL-MCNC: 96 MG/DL (ref 70–99)
GLUCOSE UR STRIP-MCNC: NEGATIVE MG/DL
HCO3 SERPL-SCNC: 23 MMOL/L (ref 22–29)
HCT VFR BLD AUTO: 38.5 % (ref 35–47)
HGB BLD-MCNC: 12.2 G/DL (ref 11.7–15.7)
HGB UR QL STRIP: ABNORMAL
HOLD SPECIMEN: NORMAL
HOLD SPECIMEN: NORMAL
IMM GRANULOCYTES # BLD: 0 10E3/UL
IMM GRANULOCYTES NFR BLD: 1 %
INTERPRETATION ECG - MUSE: NORMAL
KETONES UR STRIP-MCNC: NEGATIVE MG/DL
LEUKOCYTE ESTERASE UR QL STRIP: ABNORMAL
LYMPHOCYTES # BLD AUTO: 0.2 10E3/UL (ref 0.8–5.3)
LYMPHOCYTES NFR BLD AUTO: 9 %
MCH RBC QN AUTO: 28.8 PG (ref 26.5–33)
MCHC RBC AUTO-ENTMCNC: 31.7 G/DL (ref 31.5–36.5)
MCV RBC AUTO: 91 FL (ref 78–100)
MONOCYTES # BLD AUTO: 0 10E3/UL (ref 0–1.3)
MONOCYTES NFR BLD AUTO: 1 %
MUCOUS THREADS #/AREA URNS LPF: PRESENT /LPF
NEUTROPHILS # BLD AUTO: 1.9 10E3/UL (ref 1.6–8.3)
NEUTROPHILS NFR BLD AUTO: 88 %
NITRATE UR QL: NEGATIVE
NRBC # BLD AUTO: 0 10E3/UL
NRBC BLD AUTO-RTO: 0 /100
P AXIS - MUSE: 48 DEGREES
PH UR STRIP: 6.5 [PH] (ref 5–7)
PLAT MORPH BLD: NORMAL
PLATELET # BLD AUTO: 199 10E3/UL (ref 150–450)
POTASSIUM SERPL-SCNC: 3.5 MMOL/L (ref 3.4–5.3)
PR INTERVAL - MUSE: 142 MS
QRS DURATION - MUSE: 90 MS
QT - MUSE: 386 MS
QTC - MUSE: 459 MS
R AXIS - MUSE: -43 DEGREES
RBC # BLD AUTO: 4.24 10E6/UL (ref 3.8–5.2)
RBC MORPH BLD: NORMAL
RBC URINE: 15 /HPF
SODIUM SERPL-SCNC: 138 MMOL/L (ref 135–145)
SP GR UR STRIP: 1.01 (ref 1–1.03)
SQUAMOUS EPITHELIAL: 5 /HPF
SYSTOLIC BLOOD PRESSURE - MUSE: NORMAL MMHG
T AXIS - MUSE: 40 DEGREES
UROBILINOGEN UR STRIP-MCNC: NORMAL MG/DL
VENTRICULAR RATE- MUSE: 85 BPM
WBC # BLD AUTO: 2.1 10E3/UL (ref 4–11)
WBC URINE: >182 /HPF

## 2025-02-19 PROCEDURE — 74176 CT ABD & PELVIS W/O CONTRAST: CPT

## 2025-02-19 PROCEDURE — 99285 EMERGENCY DEPT VISIT HI MDM: CPT | Mod: 25

## 2025-02-19 PROCEDURE — C1769 GUIDE WIRE: HCPCS | Performed by: UROLOGY

## 2025-02-19 PROCEDURE — 360N000083 HC SURGERY LEVEL 3 W/ FLUORO, PER MIN: Performed by: UROLOGY

## 2025-02-19 PROCEDURE — BT1D1ZZ FLUOROSCOPY OF RIGHT KIDNEY, URETER AND BLADDER USING LOW OSMOLAR CONTRAST: ICD-10-PCS | Performed by: UROLOGY

## 2025-02-19 PROCEDURE — 87086 URINE CULTURE/COLONY COUNT: CPT | Performed by: EMERGENCY MEDICINE

## 2025-02-19 PROCEDURE — 258N000003 HC RX IP 258 OP 636: Performed by: PHYSICIAN ASSISTANT

## 2025-02-19 PROCEDURE — 250N000009 HC RX 250: Performed by: UROLOGY

## 2025-02-19 PROCEDURE — 250N000009 HC RX 250: Performed by: ANESTHESIOLOGY

## 2025-02-19 PROCEDURE — 96375 TX/PRO/DX INJ NEW DRUG ADDON: CPT

## 2025-02-19 PROCEDURE — 250N000013 HC RX MED GY IP 250 OP 250 PS 637: Performed by: UROLOGY

## 2025-02-19 PROCEDURE — 96366 THER/PROPH/DIAG IV INF ADDON: CPT

## 2025-02-19 PROCEDURE — 999N000141 HC STATISTIC PRE-PROCEDURE NURSING ASSESSMENT: Performed by: UROLOGY

## 2025-02-19 PROCEDURE — G0378 HOSPITAL OBSERVATION PER HR: HCPCS

## 2025-02-19 PROCEDURE — 52332 CYSTOSCOPY AND TREATMENT: CPT | Mod: RT | Performed by: UROLOGY

## 2025-02-19 PROCEDURE — 999N000179 XR SURGERY CARM FLUORO LESS THAN 5 MIN W STILLS

## 2025-02-19 PROCEDURE — 250N000013 HC RX MED GY IP 250 OP 250 PS 637: Performed by: EMERGENCY MEDICINE

## 2025-02-19 PROCEDURE — 250N000011 HC RX IP 250 OP 636: Performed by: EMERGENCY MEDICINE

## 2025-02-19 PROCEDURE — 96365 THER/PROPH/DIAG IV INF INIT: CPT

## 2025-02-19 PROCEDURE — 250N000025 HC SEVOFLURANE, PER MIN: Performed by: UROLOGY

## 2025-02-19 PROCEDURE — 258N000003 HC RX IP 258 OP 636: Performed by: EMERGENCY MEDICINE

## 2025-02-19 PROCEDURE — 80048 BASIC METABOLIC PNL TOTAL CA: CPT | Performed by: EMERGENCY MEDICINE

## 2025-02-19 PROCEDURE — 85025 COMPLETE CBC W/AUTO DIFF WBC: CPT | Performed by: EMERGENCY MEDICINE

## 2025-02-19 PROCEDURE — 70450 CT HEAD/BRAIN W/O DYE: CPT

## 2025-02-19 PROCEDURE — 272N000001 HC OR GENERAL SUPPLY STERILE: Performed by: UROLOGY

## 2025-02-19 PROCEDURE — 258N000003 HC RX IP 258 OP 636: Performed by: UROLOGY

## 2025-02-19 PROCEDURE — 370N000017 HC ANESTHESIA TECHNICAL FEE, PER MIN: Performed by: UROLOGY

## 2025-02-19 PROCEDURE — 81001 URINALYSIS AUTO W/SCOPE: CPT | Performed by: EMERGENCY MEDICINE

## 2025-02-19 PROCEDURE — 0T768DZ DILATION OF RIGHT URETER WITH INTRALUMINAL DEVICE, VIA NATURAL OR ARTIFICIAL OPENING ENDOSCOPIC: ICD-10-PCS | Performed by: UROLOGY

## 2025-02-19 PROCEDURE — 120N000001 HC R&B MED SURG/OB

## 2025-02-19 PROCEDURE — 99222 1ST HOSP IP/OBS MODERATE 55: CPT | Performed by: PHYSICIAN ASSISTANT

## 2025-02-19 PROCEDURE — 82962 GLUCOSE BLOOD TEST: CPT

## 2025-02-19 PROCEDURE — 255N000002 HC RX 255 OP 636: Performed by: UROLOGY

## 2025-02-19 PROCEDURE — 74420 UROGRAPHY RTRGR +-KUB: CPT | Mod: 26 | Performed by: UROLOGY

## 2025-02-19 PROCEDURE — 250N000013 HC RX MED GY IP 250 OP 250 PS 637: Performed by: PHYSICIAN ASSISTANT

## 2025-02-19 PROCEDURE — 710N000009 HC RECOVERY PHASE 1, LEVEL 1, PER MIN: Performed by: UROLOGY

## 2025-02-19 PROCEDURE — 72131 CT LUMBAR SPINE W/O DYE: CPT

## 2025-02-19 PROCEDURE — 250N000011 HC RX IP 250 OP 636: Performed by: ANESTHESIOLOGY

## 2025-02-19 PROCEDURE — C2617 STENT, NON-COR, TEM W/O DEL: HCPCS | Performed by: UROLOGY

## 2025-02-19 PROCEDURE — C1758 CATHETER, URETERAL: HCPCS | Performed by: UROLOGY

## 2025-02-19 PROCEDURE — 96361 HYDRATE IV INFUSION ADD-ON: CPT

## 2025-02-19 PROCEDURE — 250N000011 HC RX IP 250 OP 636: Performed by: UROLOGY

## 2025-02-19 PROCEDURE — 72146 MRI CHEST SPINE W/O DYE: CPT

## 2025-02-19 PROCEDURE — 250N000013 HC RX MED GY IP 250 OP 250 PS 637: Performed by: INTERNAL MEDICINE

## 2025-02-19 PROCEDURE — 99214 OFFICE O/P EST MOD 30 MIN: CPT | Mod: 25 | Performed by: UROLOGY

## 2025-02-19 PROCEDURE — 258N000003 HC RX IP 258 OP 636: Performed by: ANESTHESIOLOGY

## 2025-02-19 PROCEDURE — 258N000001 HC RX 258: Performed by: UROLOGY

## 2025-02-19 PROCEDURE — 71046 X-RAY EXAM CHEST 2 VIEWS: CPT

## 2025-02-19 PROCEDURE — 72128 CT CHEST SPINE W/O DYE: CPT

## 2025-02-19 PROCEDURE — 36415 COLL VENOUS BLD VENIPUNCTURE: CPT | Performed by: EMERGENCY MEDICINE

## 2025-02-19 PROCEDURE — 93005 ELECTROCARDIOGRAM TRACING: CPT

## 2025-02-19 PROCEDURE — 82310 ASSAY OF CALCIUM: CPT | Performed by: EMERGENCY MEDICINE

## 2025-02-19 DEVICE — STENT URETERAL POLARIS ULTRA 6FRX28CM M0061921340: Type: IMPLANTABLE DEVICE | Site: URETER | Status: FUNCTIONAL

## 2025-02-19 RX ORDER — ONDANSETRON 2 MG/ML
4 INJECTION INTRAMUSCULAR; INTRAVENOUS EVERY 6 HOURS PRN
Status: DISCONTINUED | OUTPATIENT
Start: 2025-02-19 | End: 2025-02-24 | Stop reason: HOSPADM

## 2025-02-19 RX ORDER — FENTANYL CITRATE 50 UG/ML
25 INJECTION, SOLUTION INTRAMUSCULAR; INTRAVENOUS EVERY 5 MIN PRN
Status: DISCONTINUED | OUTPATIENT
Start: 2025-02-19 | End: 2025-02-19 | Stop reason: HOSPADM

## 2025-02-19 RX ORDER — ONDANSETRON 2 MG/ML
INJECTION INTRAMUSCULAR; INTRAVENOUS PRN
Status: DISCONTINUED | OUTPATIENT
Start: 2025-02-19 | End: 2025-02-19

## 2025-02-19 RX ORDER — LIDOCAINE 40 MG/G
CREAM TOPICAL
Status: DISCONTINUED | OUTPATIENT
Start: 2025-02-19 | End: 2025-02-24 | Stop reason: HOSPADM

## 2025-02-19 RX ORDER — MEPERIDINE HYDROCHLORIDE 25 MG/ML
12.5 INJECTION INTRAMUSCULAR; INTRAVENOUS; SUBCUTANEOUS EVERY 5 MIN PRN
Status: DISCONTINUED | OUTPATIENT
Start: 2025-02-19 | End: 2025-02-19 | Stop reason: HOSPADM

## 2025-02-19 RX ORDER — DEXAMETHASONE SODIUM PHOSPHATE 4 MG/ML
4 INJECTION, SOLUTION INTRA-ARTICULAR; INTRALESIONAL; INTRAMUSCULAR; INTRAVENOUS; SOFT TISSUE
Status: DISCONTINUED | OUTPATIENT
Start: 2025-02-19 | End: 2025-02-19 | Stop reason: HOSPADM

## 2025-02-19 RX ORDER — SODIUM CHLORIDE 9 MG/ML
INJECTION, SOLUTION INTRAVENOUS CONTINUOUS
Status: ACTIVE | OUTPATIENT
Start: 2025-02-20 | End: 2025-02-20

## 2025-02-19 RX ORDER — ACETAMINOPHEN 650 MG/1
650 SUPPOSITORY RECTAL ONCE
Status: COMPLETED | OUTPATIENT
Start: 2025-02-19 | End: 2025-02-19

## 2025-02-19 RX ORDER — FENTANYL CITRATE 50 UG/ML
INJECTION, SOLUTION INTRAMUSCULAR; INTRAVENOUS PRN
Status: DISCONTINUED | OUTPATIENT
Start: 2025-02-19 | End: 2025-02-19

## 2025-02-19 RX ORDER — AMOXICILLIN 250 MG
1 CAPSULE ORAL 2 TIMES DAILY PRN
Status: DISCONTINUED | OUTPATIENT
Start: 2025-02-19 | End: 2025-02-24 | Stop reason: HOSPADM

## 2025-02-19 RX ORDER — ACETAMINOPHEN 325 MG/1
975 TABLET ORAL ONCE
Status: COMPLETED | OUTPATIENT
Start: 2025-02-19 | End: 2025-02-19

## 2025-02-19 RX ORDER — ONDANSETRON 4 MG/1
4 TABLET, ORALLY DISINTEGRATING ORAL EVERY 30 MIN PRN
Status: DISCONTINUED | OUTPATIENT
Start: 2025-02-19 | End: 2025-02-19 | Stop reason: HOSPADM

## 2025-02-19 RX ORDER — SODIUM CHLORIDE, SODIUM LACTATE, POTASSIUM CHLORIDE, CALCIUM CHLORIDE 600; 310; 30; 20 MG/100ML; MG/100ML; MG/100ML; MG/100ML
INJECTION, SOLUTION INTRAVENOUS CONTINUOUS
Status: DISCONTINUED | OUTPATIENT
Start: 2025-02-19 | End: 2025-02-19 | Stop reason: HOSPADM

## 2025-02-19 RX ORDER — SODIUM CHLORIDE 9 MG/ML
INJECTION, SOLUTION INTRAVENOUS CONTINUOUS
Status: ACTIVE | OUTPATIENT
Start: 2025-02-19 | End: 2025-02-19

## 2025-02-19 RX ORDER — FENTANYL CITRATE 50 UG/ML
50 INJECTION, SOLUTION INTRAMUSCULAR; INTRAVENOUS EVERY 5 MIN PRN
Status: DISCONTINUED | OUTPATIENT
Start: 2025-02-19 | End: 2025-02-19 | Stop reason: HOSPADM

## 2025-02-19 RX ORDER — FLUORIDE TOOTHPASTE
5 TOOTHPASTE DENTAL 4 TIMES DAILY
Status: DISCONTINUED | OUTPATIENT
Start: 2025-02-19 | End: 2025-02-24 | Stop reason: HOSPADM

## 2025-02-19 RX ORDER — ALBUTEROL SULFATE 0.83 MG/ML
2.5 SOLUTION RESPIRATORY (INHALATION) EVERY 4 HOURS PRN
Status: DISCONTINUED | OUTPATIENT
Start: 2025-02-19 | End: 2025-02-19 | Stop reason: HOSPADM

## 2025-02-19 RX ORDER — PANTOPRAZOLE SODIUM 40 MG/1
40 TABLET, DELAYED RELEASE ORAL
Status: DISCONTINUED | OUTPATIENT
Start: 2025-02-20 | End: 2025-02-19

## 2025-02-19 RX ORDER — LIDOCAINE HYDROCHLORIDE 20 MG/ML
INJECTION, SOLUTION INFILTRATION; PERINEURAL PRN
Status: DISCONTINUED | OUTPATIENT
Start: 2025-02-19 | End: 2025-02-19

## 2025-02-19 RX ORDER — AMOXICILLIN 250 MG
2 CAPSULE ORAL 2 TIMES DAILY PRN
Status: DISCONTINUED | OUTPATIENT
Start: 2025-02-19 | End: 2025-02-24 | Stop reason: HOSPADM

## 2025-02-19 RX ORDER — MECLIZINE HYDROCHLORIDE 25 MG/1
25 TABLET ORAL 2 TIMES DAILY PRN
Status: DISCONTINUED | OUTPATIENT
Start: 2025-02-19 | End: 2025-02-20

## 2025-02-19 RX ORDER — ONDANSETRON 2 MG/ML
4 INJECTION INTRAMUSCULAR; INTRAVENOUS EVERY 30 MIN PRN
Status: DISCONTINUED | OUTPATIENT
Start: 2025-02-19 | End: 2025-02-19

## 2025-02-19 RX ORDER — CEFAZOLIN SODIUM/WATER 2 G/20 ML
2 SYRINGE (ML) INTRAVENOUS
Status: DISCONTINUED | OUTPATIENT
Start: 2025-02-19 | End: 2025-02-19

## 2025-02-19 RX ORDER — ACETAMINOPHEN 325 MG/1
650 TABLET ORAL EVERY 4 HOURS PRN
Status: DISCONTINUED | OUTPATIENT
Start: 2025-02-19 | End: 2025-02-24 | Stop reason: HOSPADM

## 2025-02-19 RX ORDER — MECLIZINE HYDROCHLORIDE 25 MG/1
25 TABLET ORAL ONCE
Status: COMPLETED | OUTPATIENT
Start: 2025-02-19 | End: 2025-02-19

## 2025-02-19 RX ORDER — LABETALOL HYDROCHLORIDE 5 MG/ML
10 INJECTION, SOLUTION INTRAVENOUS
Status: DISCONTINUED | OUTPATIENT
Start: 2025-02-19 | End: 2025-02-19 | Stop reason: HOSPADM

## 2025-02-19 RX ORDER — ESOMEPRAZOLE MAGNESIUM 40 MG/1
40 CAPSULE, DELAYED RELEASE ORAL 2 TIMES DAILY
COMMUNITY

## 2025-02-19 RX ORDER — ONDANSETRON 4 MG/1
4 TABLET, ORALLY DISINTEGRATING ORAL 2 TIMES DAILY PRN
COMMUNITY

## 2025-02-19 RX ORDER — PROMETHAZINE HYDROCHLORIDE 25 MG/1
25 TABLET ORAL EVERY 6 HOURS PRN
COMMUNITY
Start: 2025-02-05

## 2025-02-19 RX ORDER — ONDANSETRON 4 MG/1
4 TABLET, ORALLY DISINTEGRATING ORAL EVERY 6 HOURS PRN
Status: DISCONTINUED | OUTPATIENT
Start: 2025-02-19 | End: 2025-02-24 | Stop reason: HOSPADM

## 2025-02-19 RX ORDER — HYDRALAZINE HYDROCHLORIDE 20 MG/ML
2.5-5 INJECTION INTRAMUSCULAR; INTRAVENOUS EVERY 10 MIN PRN
Status: DISCONTINUED | OUTPATIENT
Start: 2025-02-19 | End: 2025-02-19 | Stop reason: HOSPADM

## 2025-02-19 RX ORDER — HYDROXYZINE HYDROCHLORIDE 10 MG/1
10 TABLET, FILM COATED ORAL EVERY 6 HOURS PRN
Status: DISCONTINUED | OUTPATIENT
Start: 2025-02-19 | End: 2025-02-19 | Stop reason: HOSPADM

## 2025-02-19 RX ORDER — HYDROMORPHONE HCL IN WATER/PF 6 MG/30 ML
0.4 PATIENT CONTROLLED ANALGESIA SYRINGE INTRAVENOUS EVERY 5 MIN PRN
Status: DISCONTINUED | OUTPATIENT
Start: 2025-02-19 | End: 2025-02-19 | Stop reason: HOSPADM

## 2025-02-19 RX ORDER — LOSARTAN POTASSIUM 100 MG/1
100 TABLET ORAL DAILY
Qty: 90 TABLET | Refills: 1 | Status: ON HOLD | OUTPATIENT
Start: 2025-02-19

## 2025-02-19 RX ORDER — PROPRANOLOL HYDROCHLORIDE 10 MG/1
10 TABLET ORAL 2 TIMES DAILY
Status: DISCONTINUED | OUTPATIENT
Start: 2025-02-19 | End: 2025-02-24 | Stop reason: HOSPADM

## 2025-02-19 RX ORDER — NALOXONE HYDROCHLORIDE 0.4 MG/ML
0.1 INJECTION, SOLUTION INTRAMUSCULAR; INTRAVENOUS; SUBCUTANEOUS
Status: DISCONTINUED | OUTPATIENT
Start: 2025-02-19 | End: 2025-02-19 | Stop reason: HOSPADM

## 2025-02-19 RX ORDER — CEFTRIAXONE 1 G/1
1 INJECTION, POWDER, FOR SOLUTION INTRAMUSCULAR; INTRAVENOUS ONCE
Status: COMPLETED | OUTPATIENT
Start: 2025-02-19 | End: 2025-02-19

## 2025-02-19 RX ORDER — AMLODIPINE BESYLATE 5 MG/1
5 TABLET ORAL DAILY
Status: DISCONTINUED | OUTPATIENT
Start: 2025-02-19 | End: 2025-02-24 | Stop reason: HOSPADM

## 2025-02-19 RX ORDER — LOSARTAN POTASSIUM 100 MG/1
100 TABLET ORAL EVERY MORNING
Status: DISCONTINUED | OUTPATIENT
Start: 2025-02-20 | End: 2025-02-24 | Stop reason: HOSPADM

## 2025-02-19 RX ORDER — ONDANSETRON 2 MG/ML
4 INJECTION INTRAMUSCULAR; INTRAVENOUS EVERY 30 MIN PRN
Status: DISCONTINUED | OUTPATIENT
Start: 2025-02-19 | End: 2025-02-19 | Stop reason: HOSPADM

## 2025-02-19 RX ORDER — CEFAZOLIN SODIUM/WATER 2 G/20 ML
2 SYRINGE (ML) INTRAVENOUS SEE ADMIN INSTRUCTIONS
Status: DISCONTINUED | OUTPATIENT
Start: 2025-02-19 | End: 2025-02-19

## 2025-02-19 RX ORDER — HYDROMORPHONE HCL IN WATER/PF 6 MG/30 ML
0.2 PATIENT CONTROLLED ANALGESIA SYRINGE INTRAVENOUS EVERY 5 MIN PRN
Status: DISCONTINUED | OUTPATIENT
Start: 2025-02-19 | End: 2025-02-19 | Stop reason: HOSPADM

## 2025-02-19 RX ORDER — VENLAFAXINE HYDROCHLORIDE 37.5 MG/1
37.5 CAPSULE, EXTENDED RELEASE ORAL DAILY
Status: DISCONTINUED | OUTPATIENT
Start: 2025-02-19 | End: 2025-02-24 | Stop reason: HOSPADM

## 2025-02-19 RX ORDER — CEFTRIAXONE 1 G/1
1 INJECTION, POWDER, FOR SOLUTION INTRAMUSCULAR; INTRAVENOUS EVERY 24 HOURS
Status: DISCONTINUED | OUTPATIENT
Start: 2025-02-20 | End: 2025-02-21

## 2025-02-19 RX ORDER — ACETAMINOPHEN 500 MG
1000 TABLET ORAL DAILY
COMMUNITY

## 2025-02-19 RX ORDER — PROPOFOL 10 MG/ML
INJECTION, EMULSION INTRAVENOUS PRN
Status: DISCONTINUED | OUTPATIENT
Start: 2025-02-19 | End: 2025-02-19

## 2025-02-19 RX ADMIN — SODIUM CHLORIDE: 9 INJECTION, SOLUTION INTRAVENOUS at 12:49

## 2025-02-19 RX ADMIN — ONDANSETRON 4 MG: 2 INJECTION INTRAMUSCULAR; INTRAVENOUS at 16:34

## 2025-02-19 RX ADMIN — ACETAMINOPHEN 650 MG: 325 TABLET, FILM COATED ORAL at 13:01

## 2025-02-19 RX ADMIN — LIDOCAINE HYDROCHLORIDE 40 MG: 20 INJECTION, SOLUTION INFILTRATION; PERINEURAL at 16:37

## 2025-02-19 RX ADMIN — FENTANYL CITRATE 25 MCG: 50 INJECTION, SOLUTION INTRAMUSCULAR; INTRAVENOUS at 17:37

## 2025-02-19 RX ADMIN — CEFTRIAXONE 1 G: 1 INJECTION, POWDER, FOR SOLUTION INTRAMUSCULAR; INTRAVENOUS at 09:18

## 2025-02-19 RX ADMIN — VENLAFAXINE HYDROCHLORIDE 37.5 MG: 37.5 CAPSULE, EXTENDED RELEASE ORAL at 13:08

## 2025-02-19 RX ADMIN — SODIUM CHLORIDE 1000 ML: 9 INJECTION, SOLUTION INTRAVENOUS at 03:51

## 2025-02-19 RX ADMIN — ONDANSETRON 4 MG: 2 INJECTION, SOLUTION INTRAMUSCULAR; INTRAVENOUS at 03:51

## 2025-02-19 RX ADMIN — PHENYLEPHRINE HYDROCHLORIDE 200 MCG: 10 INJECTION INTRAVENOUS at 16:33

## 2025-02-19 RX ADMIN — FENTANYL CITRATE 25 MCG: 50 INJECTION, SOLUTION INTRAMUSCULAR; INTRAVENOUS at 17:25

## 2025-02-19 RX ADMIN — AMLODIPINE BESYLATE 5 MG: 5 TABLET ORAL at 12:09

## 2025-02-19 RX ADMIN — FENTANYL CITRATE 50 MCG: 50 INJECTION INTRAMUSCULAR; INTRAVENOUS at 16:37

## 2025-02-19 RX ADMIN — Medication 5 ML: at 13:22

## 2025-02-19 RX ADMIN — PROPRANOLOL HYDROCHLORIDE 10 MG: 10 TABLET ORAL at 13:07

## 2025-02-19 RX ADMIN — SODIUM CHLORIDE: 9 INJECTION, SOLUTION INTRAVENOUS at 16:20

## 2025-02-19 RX ADMIN — MECLIZINE HYDROCHLORIDE 25 MG: 25 TABLET ORAL at 03:51

## 2025-02-19 RX ADMIN — PHENYLEPHRINE HYDROCHLORIDE 200 MCG: 10 INJECTION INTRAVENOUS at 16:30

## 2025-02-19 RX ADMIN — PROPOFOL 150 MG: 10 INJECTION, EMULSION INTRAVENOUS at 16:37

## 2025-02-19 RX ADMIN — Medication 2 G: at 16:17

## 2025-02-19 RX ADMIN — ONDANSETRON 4 MG: 2 INJECTION, SOLUTION INTRAMUSCULAR; INTRAVENOUS at 17:18

## 2025-02-19 ASSESSMENT — ACTIVITIES OF DAILY LIVING (ADL)
ADLS_ACUITY_SCORE: 51
ADLS_ACUITY_SCORE: 54
ADLS_ACUITY_SCORE: 53
ADLS_ACUITY_SCORE: 54
ADLS_ACUITY_SCORE: 54
ADLS_ACUITY_SCORE: 51
ADLS_ACUITY_SCORE: 54
ADLS_ACUITY_SCORE: 54
ADLS_ACUITY_SCORE: 51
ADLS_ACUITY_SCORE: 54
ADLS_ACUITY_SCORE: 51
ADLS_ACUITY_SCORE: 51
ADLS_ACUITY_SCORE: 54
ADLS_ACUITY_SCORE: 51

## 2025-02-19 NOTE — PROGRESS NOTES
Contacted regarding 78 yo female presenting to ER with back pain after fall two days ago.  Lumbar CT reveals age indeterminate T9 compression fracture.    Imaging:    Lumbar CT:    FINDINGS:  VERTEBRA: Moderate (50% height loss) age-indeterminate but likely acute T9 vertebral body wedge compression fracture with associated Schmorl's node.  Remainder the vertebral body heights maintained.     CANAL/FORAMINA: No canal or neural foraminal stenosis.     PARASPINAL: No extraspinal abnormality.                                                                      IMPRESSION:  1.  Moderate age-indeterminate but likely acute T9 vertebral body wedge compression fracture.    RECOMMENDATIONS:  Thoracic MRI without contrast.  Bed rest.  NS will make final recommendation after MRI.  NS will see in consultation today on rounds.    ANA Krishna  [facility]  [address] [address]    Tel [phone]  Pager [phone]

## 2025-02-19 NOTE — H&P
[facility]  Internal Medicine  History and Physical      Patient Name: Cynthia Arita MRN# [id number]   Age: 79 year old YOB: 1945     Date of Admission:2/19/2025    Primary care provider: Betsey Magaña  Date of Service: 2/19/2025         Assessment and Plan:   Cynthia Arita is a 79 year old female with a history of IBS, Meniere's Disease, Dizziness, Tinnitus, Urinary Retention, Insomnia, HLD, HTN, Anxiety, GERD, Osteoporosis, Nephrolithiasis, Diverticulosis, Skin Cancer, Depression, Tremor, RLS, Hepatic Steatosis, T9 Vertebral Fracture who presents to the ED today with a fall and dizziness.    Renal Colic  Right Hydronephrosis and Hydroureter  Possible UTI  Tmax 99.4, wbc 2.1, creatinine wnl.  UA is abnormal.  CT imaging reveals marked right hydronephrosis and hydroureter down to the level of a 2 mm stone at the right ureterovesicular junction with multiple bilateral nonobstructive renal calculi and bilateral renal cysts.  - npo, IVF, antiemetics, analgesics prn  - IV Ceftriaxone  - follow up urine culture  - strain urine  - Urology consult    Fall  Chronic T9 Compression Fracture  Hx of Dementia  Patient presented to the ED after a fall out of her car when she hit the back of her head and her back - pt reports to me this was two weeks ago but seems her report to the ED provider was that this happened more acutely? This prompted a chart review that does show hx of Dementia with SLUMS score in 2023 of 17/30.     Patient has a known T9 compression fracture.  MRI Thoracic Spine from 8/2024 describes this as mild/mod of the inferior T9 endplate anteriorly with associated prominent marrow edema with slight retropulsion.  CT Spine on admission revealed an age indeterminate T9 vertebral body wedge compression fracture.  CTH is negative.    - NSx consulted by ED and recommended Thoracic MRI which reveals a chronic appearing T9 compression fracture  - analgesics prn  - PT/SW consult for discharge  "planning; patient lives alone    Meniere's Disease  Pt reports chronic dizziness at baseline.  - continue Meclizine prn    HTN/HLD  Tremor  - continue Propranolol, Amlodipine and Losartan    GERD  - continue PPI    Depression/Anxiety/Insomnia  - continue Venlafaxine    Osteoporosis  - resume Fosamax upon discharge      CODE: full  Diet/IVF: npo, NS  DVT ppx: scd  Medically Ready for Discharge: Anticipated in 2-4 Days        Adriana Hawkinsniraj EM PA-C  Physician Assistant   Hospitalist Service                Chief Complaint:   Fall, dizziness          HPI:   79 year old female with a history of IBS, Meniere's Disease, Dizziness, Tinnitus, Urinary Retention, Insomnia, HLD, HTN, Anxiety, GERD, Osteoporosis, Nephrolithiasis, Diverticulosis, Skin Cancer, Depression, Tremor, RLS, Hepatic Steatosis, T9 Vertebral Fracture who presents to the ED today with a fall and dizziness.    History obtained from patient, chart review and verbal discussion with the ED provider.    Patient reports she fell two weeks ago while getting out of her car and hit the back of her head and her back.  She has had mild \"irritation\" since that time, but denies pain.  She has chronic dizziness at baseline due to chronic vertigo which she reports is unchanged.  She denies any chest pain, abdominal pain, nausea, emesis or dysuria today.  She lives alone.         Past Medical History:     Past Medical History:   Diagnosis Date    Anxiety     Basal cell carcinoma     Complication of anesthesia     Diverticulosis     GERD (gastroesophageal reflux disease)     HTN (hypertension)     Meniere's disease     Nephrolithiasis     Pain in right knee     PONV (postoperative nausea and vomiting)           Past Surgical History:     Past Surgical History:   Procedure Laterality Date    ARTHROPLASTY KNEE Right 01/21/2019    Procedure: Right total knee arthroplasty;  Surgeon: Denton Amor MD;  Location: [facility] OR    ARTHROPLASTY KNEE Left 10/02/2023    Procedure: " Left total knee arthroplasty;  Surgeon: Denton Amor MD;  Location: [facility] OR    EYE SURGERY      cataract surgery both eyes    IR LUMBAR PUNCTURE  11/19/2019    ZZC VAGINAL HYSTERECTOMY      with left oophorectomy          Social History:     Social History     Socioeconomic History    Marital status:      Spouse name: Not on file    Number of children: Not on file    Years of education: Not on file    Highest education level: Not on file   Occupational History    Not on file   Tobacco Use    Smoking status: Never    Smokeless tobacco: Never   Vaping Use    Vaping status: Never Used   Substance and Sexual Activity    Alcohol use: Yes     Comment: rare    Drug use: No    Sexual activity: Not Currently     Partners: Male   Other Topics Concern    Parent/sibling w/ CABG, MI or angioplasty before 65F 55M? Not Asked   Social History Narrative    Not on file     Social Drivers of Health     Financial Resource Strain: Low Risk  (8/22/2024)    Financial Resource Strain     Within the past 12 months, have you or your family members you live with been unable to get utilities (heat, electricity) when it was really needed?: No   Food Insecurity: Low Risk  (8/22/2024)    Food Insecurity     Within the past 12 months, did you worry that your food would run out before you got money to buy more?: No     Within the past 12 months, did the food you bought just not last and you didn t have money to get more?: No   Transportation Needs: Low Risk  (8/22/2024)    Transportation Needs     Within the past 12 months, has lack of transportation kept you from medical appointments, getting your medicines, non-medical meetings or appointments, work, or from getting things that you need?: No   Physical Activity: Not on file   Stress: No Stress Concern Present (8/22/2024)    [employer] [place] [employer] - Occupational Stress Questionnaire     Feeling of Stress : Only a little   Social Connections: Unknown (8/22/2024)     Social Connection and Isolation Panel [NHANES]     Frequency of Communication with Friends and Family: Not on file     Frequency of Social Gatherings with Friends and Family: Three times a week     Attends Jain Services: Not on file     Active Member of Clubs or Organizations: Not on file     Attends Club or Organization Meetings: Not on file     Marital Status: Not on file   Interpersonal Safety: Low Risk  (7/23/2024)    Interpersonal Safety     Do you feel physically and emotionally safe where you currently live?: Yes     Within the past 12 months, have you been hit, slapped, kicked or otherwise physically hurt by someone?: No     Within the past 12 months, have you been humiliated or emotionally abused in other ways by your partner or ex-partner?: No   Housing Stability: Low Risk  (8/22/2024)    Housing Stability     Do you have housing? : Yes     Are you worried about losing your housing?: No          Family History:     Family History   Problem Relation Age of Onset    Neurologic Disorder Mother         palsy    Esophageal Cancer Father     Cancer Maternal Grandmother         lymph    Diabetes Paternal Grandmother     Neurologic Disorder Sister         Parkinson's    Hypertension Brother     Bipolar Disorder Sister     Brain Cancer Son 17          Allergies:      Allergies   Allergen Reactions    Ativan [Lorazepam]      Sick -     Dicyclomine      Other reaction(s): dry mouth    Gabapentin Nausea    Metoprolol      Nausea      Pantoprazole Other (See Comments), Nausea and Vomiting and GI Disturbance     Red in the face, sleepiness, face swelling, joint pain, dizziness    Tramadol Nausea and Vomiting    Oxycodone Anxiety          Medications:     Prior to Admission medications    Medication Sig Last Dose Taking? Auth Provider Long Term End Date   acetaminophen (TYLENOL) 500 MG tablet Take 1,000 mg by mouth daily. 2/18/2025 Yes Unknown, Entered By History No    alendronate (FOSAMAX) 70 MG tablet Take 1  tablet (70 mg) by mouth every 7 days 2/12/2025 Yes Betsey Magaña APRN CNP Yes    amLODIPine (NORVASC) 5 MG tablet TAKE 1 TABLET(5 MG) BY MOUTH DAILY 2/18/2025 Morning Yes Betsey Magaña APRN CNP Yes    Biotin 5000 MCG TABS Take 1 tablet by mouth daily 2/18/2025 Morning Yes Reported, Patient     calcitonin, salmon, (MIACALCIN) 200 UNIT/ACT nasal spray SPRAY ONCE IN 1 NOSTRIL ONCE DAILY, ALTERNATING NOSTRIL DAILY 2/18/2025 Morning Yes Hazel Villanueva CNP Yes    cyanocobalamin (VITAMIN B-12) 1000 MCG tablet Take 1,000 mcg by mouth daily 2/18/2025 Morning Yes Reported, Patient     DHA-EPA-Coenzyme Q10-Vitamin E (CO Q-10 VITAMIN E FISH OIL PO) Take 1 capsule by mouth daily 2/18/2025 Morning Yes Reported, Patient     esomeprazole (NEXIUM) 40 MG DR capsule Take 40 mg by mouth 2 times daily. Take 30-60 minutes before eating. 2/18/2025 Evening Yes Unknown, Entered By History     estradiol (ESTRACE) 0.1 MG/GM vaginal cream Place 2 g vaginally twice a week Unknown Yes Kaykay Madden PA-C     Flaxseed, Linseed, (FLAX SEED OIL) 1000 MG capsule Take 1 capsule by mouth daily Takes one in the PM 2/18/2025 Morning Yes Reported, Patient     losartan (COZAAR) 100 MG tablet Take 1 tablet (100 mg) by mouth daily. 2/18/2025 Morning Yes Betsey Magaña APRN CNP Yes    meclizine (ANTIVERT) 25 MG tablet Take 25 mg by mouth 2 times daily. 2/18/2025 Evening Yes Reported, Patient     Misc Natural Products (GLUCOSAMINE CHOND COMPLEX/MSM PO) Take 1 capsule by mouth 2 times daily Takes one in the morning 2/18/2025 Evening Yes Reported, Patient     Multiple Vitamins-Minerals (PRESERVISION AREDS 2) CAPS Take 1 tablet by mouth 2 times daily 2/18/2025 Evening Yes Huyen Samuels MD     multivitamin (CENTRUM SILVER) tablet Take 1 tablet by mouth daily 2/18/2025 Morning Yes Reported, Patient     naproxen sodium (EQ NAPROXEN SODIUM) 220 MG tablet Take 220 mg by mouth 2 times daily as needed for moderate pain Unknown Yes Reported, Patient    "  ondansetron (ZOFRAN ODT) 4 MG ODT tab Take 4 mg by mouth 2 times daily as needed for nausea. Unknown Yes Unknown, Entered By History No    polyethylene glycol (MIRALAX) 17 g packet Take 17 g by mouth daily Unknown Yes Chanelle Hua PA-C     promethazine (PHENERGAN) 25 MG tablet Take 25 mg by mouth every 6 hours as needed for nausea or vomiting. Unknown Yes Unknown, Entered By History     propranolol (INDERAL) 10 MG tablet Take 1 tablet (10 mg) by mouth 2 times daily 2/18/2025 Evening Yes Katina Mallory APRN CNP Yes    venlafaxine (EFFEXOR XR) 37.5 MG 24 hr capsule Take 1 capsule (37.5 mg) by mouth daily 2/18/2025 Morning Yes Betsey Magaña APRN CNP Yes    vitamin D3 (CHOLECALCIFEROL) 50 mcg (2000 units) tablet Take 1 tablet by mouth daily Takes one in the morning 2,000 units 2/18/2025 Morning Yes Reported, Patient            Review of Systems:   A complete ROS was performed and is negative other than what is stated in the HPI.       Physical Exam:   Blood pressure 103/53, pulse 87, temperature 99.4  F (37.4  C), temperature source Oral, resp. rate 18, height 1.626 m (5' 4\"), weight 67.3 kg (148 lb 5.9 oz), SpO2 94%, not currently breastfeeding.  General: Alert, interactive, NAD, lying in bed  HEENT: AT/NC  Chest/Resp: clear to auscultation bilaterally, no crackles or wheezes  Heart/CV: regular rate and rhythm, no murmur  Abdomen/GI: Soft, nontender, nondistended. +BS.  No rebound or guarding.  No CVA tenderness  Extremities/MSK: No LE edema.  Mild upper back to to palpation.  Skin: Warm and dry  Neuro: Alert & oriented x 3, no focal deficits, moves all extremities equally         Labs:   ROUTINE ICU LABS (Last four results)  CMP  Recent Labs   Lab 02/19/25  0340      POTASSIUM 3.5   CHLORIDE 100   CO2 23   ANIONGAP 15   GLC 96   BUN 24.7*   CR 0.79   GFRESTIMATED 75   ANDERS 9.7     CBC  Recent Labs   Lab 02/19/25  0340   WBC 2.1*   RBC 4.24   HGB 12.2   HCT 38.5   MCV 91   MCH 28.8   MCHC 31.7 "   RDW 13.3             Imaging/Procedures:     Results for orders placed or performed during the hospital encounter of 02/19/25   CT Head w/o Contrast    Narrative    EXAM: CT HEAD W/O CONTRAST  LOCATION: [facility]  DATE: 2/19/2025    INDICATION: fall, head trauma, vomiting  COMPARISON: MRI brain April 8, 2023   TECHNIQUE: Routine CT Head without IV contrast. Multiplanar reformats. Dose reduction techniques were used.    FINDINGS:  INTRACRANIAL CONTENTS: No intracranial hemorrhage, extraaxial collection, or mass effect.  No CT evidence of acute infarct. Mild presumed chronic small vessel ischemic changes. Mild to moderate generalized volume loss. No hydrocephalus.     VISUALIZED ORBITS/SINUSES/MASTOIDS: Prior bilateral cataract surgery. Visualized portions of the orbits are otherwise unremarkable. No paranasal sinus mucosal disease. Left mastoidectomy. No mastoid effusion.     BONES/SOFT TISSUES: No acute abnormality.      Impression    IMPRESSION:  1.  No CT evidence for acute intracranial process.  2.  Brain atrophy and presumed chronic microvascular ischemic changes as above.     CT Lumbar Spine w/o Contrast    Narrative    EXAM: CT LUMBAR SPINE W/O CONTRAST  LOCATION: [facility]  DATE: 2/19/2025    INDICATION: fall, lumbar tenderness  COMPARISON: None.  TECHNIQUE: Routine CT Lumbar Spine without IV contrast. Multiplanar reformats. Dose reduction techniques were used.     FINDINGS:  VERTEBRA: Normal vertebral body heights. Mild dextrocurvature. Normal sagittal alignment.. No fracture or posttraumatic subluxation.     CANAL/FORAMINA: Multilevel spondylosis without high grade canal stenosis.    PARASPINAL: Moderate right perinephric inflammatory stranding with moderate hydroureteronephrosis. No obstructing stone identified however the distal ureter is not within the field-of-view. Multiple small nonobstructing calculi within the renal pelvis.       Impression     IMPRESSION:  1.  No fracture or posttraumatic subluxation.  2.  Moderate right perinephric inflammatory stranding with moderate hydroureteronephrosis. No obstructing stone identified however the distal ureter is not within the field-of-view and a distal stone is possible. Consider dedicated CT abdomen pelvis for   further evaluation.     XR Chest 2 Views    Narrative    EXAM: XR CHEST 2 VIEWS  LOCATION: [facility]  DATE: 2/19/2025    INDICATION: fall, chest wall tenderness  COMPARISON: Imaged lower chest from CT abdomen and pelvis on 11/11/2024.      Impression    IMPRESSION:   1.  Bibasilar atelectasis.  2.  No pleural effusion or pneumothorax.  3.  Normal heart size.  4.  Probable inferior endplate compression fracture of a mid thoracic spine vertebral body and left posterolateral sixth rib fracture (both age-indeterminate). Consider chest and thoracic spine CT.   CT Abdomen Pelvis w/o Contrast    Narrative    EXAM: CT ABDOMEN PELVIS W/O CONTRAST  LOCATION: [facility]  DATE: 2/19/2025    INDICATION: right sided hydronephrosis on CT lumbar, rule out distal stone  COMPARISON: 11/11/2024  TECHNIQUE: CT scan of the abdomen and pelvis was performed without IV contrast. Multiplanar reformats were obtained. Dose reduction techniques were used.  CONTRAST: None.    FINDINGS:   LOWER CHEST: Linear atelectasis in the right middle lobe extending to subpleural focus which could represent an area of rounded atelectasis. Linear atelectasis or scarring in the posterior lower lungs. Small hiatal hernia.    HEPATOBILIARY: No significant mass or bile duct dilatation. No calcified gallstones.     PANCREAS: No significant mass, duct dilatation, or inflammatory change.    SPLEEN: Normal size.    ADRENAL GLANDS: Normal.    KIDNEYS/BLADDER: Marked right hydronephrosis and hydroureter down to the level of a 2 mm stone at the right ureterovesicular junction. Multiple scattered nonobstructive  right renal calculi. Tiny scattered left renal calculi. Left peripelvic cysts.   Multiple parenchymal cysts bilaterally. No left hydronephrosis or hydroureter. Bladder unremarkable.    BOWEL: Nonspecific nonobstructive bowel gas pattern. No inflammatory changes. Appendix not seen. Colonic diverticula without evidence for diverticulitis. Fatty ileocecal valve.    LYMPH NODES: No lymphadenopathy.    VASCULATURE: No abdominal aortic aneurysm.    PELVIC ORGANS: No pelvic masses. No pelvic free fluid. Uterus appears to be surgically absent.    MUSCULOSKELETAL: Mild spondylosis. Degenerative disc disease at the L3-L4 and L4-L5 interspaces with vacuum sign present. No inguinal or ventral hernias.      Impression    IMPRESSION:   1.  Marked right hydronephrosis and hydroureter down to the level of a 2 mm stone at the right ureterovesicular junction.  2.  Multiple bilateral nonobstructive renal calculi.  3.  Bilateral renal cysts.  4.  Colonic diverticulosis.       CT Thoracic Spine w/o Contrast    Narrative    EXAM: CT THORACIC SPINE W/O CONTRAST  LOCATION: [facility]  DATE: 2/19/2025    INDICATION: possible compression fracture seen on chest xr  COMPARISON: X-ray chest February 19, 2025. Chest plain film October 22, 2024   TECHNIQUE: Routine CT Thoracic Spine without IV contrast. Multiplanar reformats. Dose reduction techniques were used.     FINDINGS:  VERTEBRA: Moderate (50% height loss) age-indeterminate but likely acute T9 vertebral body wedge compression fracture with associated Schmorl's node.  Remainder the vertebral body heights maintained.    CANAL/FORAMINA: No canal or neural foraminal stenosis.    PARASPINAL: No extraspinal abnormality.      Impression    IMPRESSION:  1.  Moderate age-indeterminate but likely acute T9 vertebral body wedge compression fracture.

## 2025-02-19 NOTE — ANESTHESIA PREPROCEDURE EVALUATION
Anesthesia Pre-Procedure Evaluation    Patient: Cynthia Arita   MRN: [id number] : 1945        Procedure : Procedure(s):  CYSTOSCOPY, WITH URETERAL STENT INSERTION          Past Medical History:   Diagnosis Date    Anxiety     Basal cell carcinoma     Complication of anesthesia     Diverticulosis     GERD (gastroesophageal reflux disease)     HTN (hypertension)     Meniere's disease     Nephrolithiasis     Pain in right knee     PONV (postoperative nausea and vomiting)       Past Surgical History:   Procedure Laterality Date    ARTHROPLASTY KNEE Right 2019    Procedure: Right total knee arthroplasty;  Surgeon: Denton Amor MD;  Location: [facility] OR    ARTHROPLASTY KNEE Left 10/02/2023    Procedure: Left total knee arthroplasty;  Surgeon: Denton Amor MD;  Location: [facility] OR    EYE SURGERY      cataract surgery both eyes    IR LUMBAR PUNCTURE  2019    ZZC VAGINAL HYSTERECTOMY      with left oophorectomy      Allergies   Allergen Reactions    Ativan [Lorazepam]      Sick -     Dicyclomine      Other reaction(s): dry mouth    Gabapentin Nausea    Metoprolol      Nausea      Pantoprazole Other (See Comments), Nausea and Vomiting and GI Disturbance     Red in the face, sleepiness, face swelling, joint pain, dizziness    Tramadol Nausea and Vomiting    Oxycodone Anxiety      Social History     Tobacco Use    Smoking status: Never    Smokeless tobacco: Never   Substance Use Topics    Alcohol use: Yes     Comment: rare      Wt Readings from Last 1 Encounters:   25 67.3 kg (148 lb 5.9 oz)        Anesthesia Evaluation        History of anesthetic complications  - PONV.      ROS/MED HX  ENT/Pulmonary:       Neurologic:     (+)   dementia,                             Cardiovascular:     (+)  hypertension- -   -  - -                                      METS/Exercise Tolerance:     Hematologic:       Musculoskeletal:       GI/Hepatic:     (+) GERD,                   Renal/Genitourinary:    "  (+) renal disease,             Endo:       Psychiatric/Substance Use:       Infectious Disease:       Malignancy:       Other:            Physical Exam    Airway        Mallampati: II   TM distance: > 3 FB   Neck ROM: full   Mouth opening: > 3 cm    Respiratory Devices and Support         Dental       (+) Minor Abnormalities - some fillings, tiny chips      Cardiovascular          Rhythm and rate: regular     Pulmonary           breath sounds clear to auscultation           OUTSIDE LABS:  CBC:   Lab Results   Component Value Date    WBC 2.1 (L) 02/19/2025    WBC 6.7 11/11/2024    HGB 12.2 02/19/2025    HGB 12.5 11/11/2024    HCT 38.5 02/19/2025    HCT 39.6 11/11/2024     02/19/2025     11/11/2024     BMP:   Lab Results   Component Value Date     02/19/2025     11/11/2024    POTASSIUM 3.5 02/19/2025    POTASSIUM 4.2 11/11/2024    CHLORIDE 100 02/19/2025    CHLORIDE 104 11/11/2024    CO2 23 02/19/2025    CO2 22 11/11/2024    BUN 24.7 (H) 02/19/2025    BUN 17.8 11/11/2024    CR 0.79 02/19/2025    CR 0.49 (L) 11/11/2024    GLC 82 02/19/2025    GLC 96 02/19/2025     COAGS: No results found for: \"PTT\", \"INR\", \"FIBR\"  POC:   Lab Results   Component Value Date     (H) 01/23/2019     HEPATIC:   Lab Results   Component Value Date    ALBUMIN 4.3 11/11/2024    PROTTOTAL 7.2 11/11/2024    ALT 21 11/11/2024    AST 37 11/11/2024    ALKPHOS 99 11/11/2024    BILITOTAL <0.2 11/11/2024     OTHER:   Lab Results   Component Value Date    LACT 1.8 10/03/2023    ANDERS 9.7 02/19/2025    MAG 1.8 01/25/2019    LIPASE 47 11/11/2024    TSH 3.42 12/13/2023    T4 1.05 03/27/2023    CRP <2.9 01/20/2020    SED 8 10/28/2024       Anesthesia Plan    ASA Status:  3    NPO Status:  NPO Appropriate    Anesthesia Type: General.     - Airway: LMA   Induction: Intravenous, Propofol.   Maintenance: Balanced.        Consents    Anesthesia Plan(s) and associated risks, benefits, and realistic alternatives discussed. Questions " "answered and patient/representative(s) expressed understanding.     - Discussed:     - Discussed with:  Patient            Postoperative Care    Pain management: IV analgesics.   PONV prophylaxis: Ondansetron (or other 5HT-3), Dexamethasone or Solumedrol     Comments:    Other Comments: ..Pt unreliable historian, Pt's information per chart review             Everett Thomson MD        Clinically Significant Risk Factors Present on Admission                   # Hypertension: Noted on problem list           # Overweight: Estimated body mass index is 25.47 kg/m  as calculated from the following:    Height as of this encounter: 1.626 m (5' 4\").    Weight as of this encounter: 67.3 kg (148 lb 5.9 oz).                "

## 2025-02-19 NOTE — ED NOTES
[facility]  ED Nurse Handoff Report    ED Chief complaint: Dizziness, Nausea, and Fall  . ED Diagnosis:   Final diagnoses:   Ureterolithiasis   Closed wedge compression fracture of T9 vertebra, initial encounter (H)       Allergies:   Allergies   Allergen Reactions    Ativan [Lorazepam]      Sick -     Dicyclomine      Other reaction(s): dry mouth    Gabapentin Nausea    Metoprolol      Nausea      Pantoprazole Other (See Comments), Nausea and Vomiting and GI Disturbance     Red in the face, sleepiness, face swelling, joint pain, dizziness    Tramadol Nausea and Vomiting    Oxycodone Anxiety       Code Status: Full Code    Activity level - Baseline/Home:  independent and cane .  Activity Level - Current:   standby and walker. *Would benefit from using a walker versus a cane  Lift room needed: No.   Bariatric: No   Needed: No   Isolation: No.   Infection: Not Applicable.     Respiratory status: Room air    Vital Signs (within 30 minutes):   Vitals:    02/19/25 0530 02/19/25 0715 02/19/25 0808 02/19/25 0900   BP: 121/74 107/55 103/53    Pulse: 88 92 97 87   Resp:  18 18 18   Temp:       TempSrc:       SpO2:  93% 92% 94%   Weight:       Height:           Cardiac Rhythm:  ,   Cardiac  Cardiac Rhythm: Normal sinus rhythm  Pain level:    Patient confused: No.   Patient Falls Risk: nonskid shoes/slippers when out of bed, arm band in place, patient and family education, assistive device/personal items within reach, activity supervised, and room door open.   Elimination Status: Has voided     Patient Report - Initial Complaint: Dizziness.   Focused Assessment: Cynthia Arita is a 79 year old female with past medical history of Ménière's disease with chronic vertigo, GERD, osteoporosis, anxiety, hypertension and depression who presents to the emergency department with head trauma after a fall.  Patient reports she was getting out of her car and slipped on some ice and hit the back of her head.   She does not believe that she lost consciousness.  She was able to get in the house but began to have a headache as well as nausea and vomiting.  She denies being on any blood thinning medications.  Also notes some pain in her lower back and some chest wall pain.  She denies any pain in her extremities.  She is able to walk.  Patient notes that she suffers from dizziness almost daily.  However notes that it waxes and wanes.  She is followed closely by ENT for her Ménière's disease.  Patient states that she lives alone and typically gets around with a cane.      Abnormal Results:   Labs Ordered and Resulted from Time of ED Arrival to Time of ED Departure   BASIC METABOLIC PANEL - Abnormal       Result Value    Sodium 138      Potassium 3.5      Chloride 100      Carbon Dioxide (CO2) 23      Anion Gap 15      Urea Nitrogen 24.7 (*)     Creatinine 0.79      GFR Estimate 75      Calcium 9.7      Glucose 96     CBC WITH PLATELETS AND DIFFERENTIAL - Abnormal    WBC Count 2.1 (*)     RBC Count 4.24      Hemoglobin 12.2      Hematocrit 38.5      MCV 91      MCH 28.8      MCHC 31.7      RDW 13.3      Platelet Count 199      % Neutrophils 88      % Lymphocytes 9      % Monocytes 1      % Eosinophils 1      % Basophils 0      % Immature Granulocytes 1      NRBCs per 100 WBC 0      Absolute Neutrophils 1.9      Absolute Lymphocytes 0.2 (*)     Absolute Monocytes 0.0      Absolute Eosinophils 0.0      Absolute Basophils 0.0      Absolute Immature Granulocytes 0.0      Absolute NRBCs 0.0     ROUTINE UA WITH MICROSCOPIC REFLEX TO CULTURE - Abnormal    Color Urine Yellow      Appearance Urine Slightly Cloudy (*)     Glucose Urine Negative      Bilirubin Urine Negative      Ketones Urine Negative      Specific Gravity Urine 1.015      Blood Urine Trace (*)     pH Urine 6.5      Protein Albumin Urine 20 (*)     Urobilinogen Urine Normal      Nitrite Urine Negative      Leukocyte Esterase Urine Large (*)     Bacteria Urine Few (*)      Mucus Urine Present (*)     RBC Urine 15 (*)     WBC Urine >182 (*)     Squamous Epithelials Urine 5 (*)    RBC AND PLATELET MORPHOLOGY    RBC Morphology Confirmed RBC Indices      Platelet Assessment        Value: Automated Count Confirmed. Platelet morphology is normal.   URINE CULTURE        CT Abdomen Pelvis w/o Contrast   Final Result   IMPRESSION:    1.  Marked right hydronephrosis and hydroureter down to the level of a 2 mm stone at the right ureterovesicular junction.   2.  Multiple bilateral nonobstructive renal calculi.   3.  Bilateral renal cysts.   4.  Colonic diverticulosis.            CT Thoracic Spine w/o Contrast   Final Result   IMPRESSION:   1.  Moderate age-indeterminate but likely acute T9 vertebral body wedge compression fracture.         CT Lumbar Spine w/o Contrast   Final Result   IMPRESSION:   1.  No fracture or posttraumatic subluxation.   2.  Moderate right perinephric inflammatory stranding with moderate hydroureteronephrosis. No obstructing stone identified however the distal ureter is not within the field-of-view and a distal stone is possible. Consider dedicated CT abdomen pelvis for    further evaluation.         CT Head w/o Contrast   Final Result   IMPRESSION:   1.  No CT evidence for acute intracranial process.   2.  Brain atrophy and presumed chronic microvascular ischemic changes as above.         XR Chest 2 Views   Final Result   IMPRESSION:    1.  Bibasilar atelectasis.   2.  No pleural effusion or pneumothorax.   3.  Normal heart size.   4.  Probable inferior endplate compression fracture of a mid thoracic spine vertebral body and left posterolateral sixth rib fracture (both age-indeterminate). Consider chest and thoracic spine CT.      MR Thoracic Spine w/o Contrast    (Results Pending)       Treatments provided: IV, labs, imaging, meds  Family Comments: none  OBS brochure/video discussed/provided to patient:  No  ED Medications:   Medications   ondansetron (ZOFRAN)  injection 4 mg (4 mg Intravenous $Given 2/19/25 4485)   cefTRIAXone (ROCEPHIN) 1 g vial to attach to  mL bag for ADULTS or NS 50 mL bag for PEDS (1 g Intravenous $New Bag 2/19/25 8224)   sodium chloride 0.9% BOLUS 1,000 mL (0 mLs Intravenous Stopped 2/19/25 0600)   meclizine (ANTIVERT) tablet 25 mg (25 mg Oral $Given 2/19/25 4451)       Drips infusing:  No  For the majority of the shift this patient was Green.   Interventions performed were updated on plan of care and interventions.    Sepsis treatment initiated: No    Cares/treatment/interventions/medications to be completed following ED care: admission orders    ED Nurse Name: Elke Eden RN  9:26 AM

## 2025-02-19 NOTE — PHARMACY-ADMISSION MEDICATION HISTORY
Pharmacy Intern Admission Medication History    Admission medication history is complete. The information provided in this note is only as accurate as the sources available at the time of the update.    Information Source(s): Patient and CareEverywhere/SureScripts via in-person    Pertinent Information: Confirmed patient is only taking Venlafaxine once daily as dispense hx shows its prescribed to be taken BID.     Changes made to PTA medication list:  Added: Compazine   Deleted: Calcium citrate  Changed:   Tylenol 500m tabs TID PRN-> 2 tabs daily   Meclizine 25mg: TID-> BID   Zofran 4m tab BID +  BID PRN-> BID PRN     Allergies reviewed with patient and updates made in EHR: no, reviewed by nurse     Medication History Completed By: Mere Baldwin RPH 2025 9:45 AM    PTA Med List   Medication Sig Note Last Dose/Taking    acetaminophen (TYLENOL) 500 MG tablet Take 1,000 mg by mouth daily.  2025    alendronate (FOSAMAX) 70 MG tablet Take 1 tablet (70 mg) by mouth every 7 days 2025: Wednesday Past Week    amLODIPine (NORVASC) 5 MG tablet TAKE 1 TABLET(5 MG) BY MOUTH DAILY  2025 Morning    Biotin 5000 MCG TABS Take 1 tablet by mouth daily  2025 Morning    calcitonin, salmon, (MIACALCIN) 200 UNIT/ACT nasal spray SPRAY ONCE IN 1 NOSTRIL ONCE DAILY, ALTERNATING NOSTRIL DAILY  2025 Morning    cyanocobalamin (VITAMIN B-12) 1000 MCG tablet Take 1,000 mcg by mouth daily  2025 Morning    DHA-EPA-Coenzyme Q10-Vitamin E (CO Q-10 VITAMIN E FISH OIL PO) Take 1 capsule by mouth daily  2025 Morning    esomeprazole (NEXIUM) 40 MG DR capsule Take 40 mg by mouth 2 times daily. Take 30-60 minutes before eating.  2025 Evening    estradiol (ESTRACE) 0.1 MG/GM vaginal cream Place 2 g vaginally twice a week  Unknown    Flaxseed, Linseed, (FLAX SEED OIL) 1000 MG capsule Take 1 capsule by mouth daily Takes one in the PM  2025 Morning    losartan (COZAAR) 100 MG tablet Take 1 tablet (100  mg) by mouth daily.  2/18/2025 Morning    meclizine (ANTIVERT) 25 MG tablet Take 25 mg by mouth 2 times daily.  2/18/2025 Evening    Misc Natural Products (GLUCOSAMINE CHOND COMPLEX/MSM PO) Take 1 capsule by mouth 2 times daily Takes one in the morning  2/18/2025 Evening    Multiple Vitamins-Minerals (PRESERVISION AREDS 2) CAPS Take 1 tablet by mouth 2 times daily  2/18/2025 Evening    multivitamin (CENTRUM SILVER) tablet Take 1 tablet by mouth daily  2/18/2025 Morning    naproxen sodium (EQ NAPROXEN SODIUM) 220 MG tablet Take 220 mg by mouth 2 times daily as needed for moderate pain  Unknown    ondansetron (ZOFRAN ODT) 4 MG ODT tab Take 4 mg by mouth 2 times daily as needed for nausea.  Unknown    polyethylene glycol (MIRALAX) 17 g packet Take 17 g by mouth daily  Unknown    promethazine (PHENERGAN) 25 MG tablet Take 25 mg by mouth every 6 hours as needed for nausea or vomiting.  Unknown    propranolol (INDERAL) 10 MG tablet Take 1 tablet (10 mg) by mouth 2 times daily  2/18/2025 Evening    venlafaxine (EFFEXOR XR) 37.5 MG 24 hr capsule Take 1 capsule (37.5 mg) by mouth daily  2/18/2025 Morning    vitamin D3 (CHOLECALCIFEROL) 50 mcg (2000 units) tablet Take 1 tablet by mouth daily Takes one in the morning 2,000 units  2/18/2025 Morning

## 2025-02-19 NOTE — ED PROVIDER NOTES
Sign-out Note    Received this patient in sign-out from Dr. Omalley at 6:24 AM.  Please refer to earlier documentation detailing presenting complaints, evaluation, and ED course.  In brief, patient is being seen in the ER for dizziness, fall, back pain.    Recommendations from previous provider: F/U on results of T-spine MRI and UA.      ED course under my care:  UA with suggestion of infection.  Urology contacted and will plan OR for stenting later today.  Hospital team accepting for admission.   MRI T-spine stable.    Disposition: Admit          Chavez Vizcaino MD  02/19/25 6824       Chavez Vizcaino MD  02/19/25 1107

## 2025-02-19 NOTE — OP NOTE
SURGEON:  Joseluis Ramires MD     PREOPERATIVE DIAGNOSIS:  Right obstructing ureteral stone with urinary tract infection     POSTOPERATIVE DIAGNOSIS:  Right obstructing ureteral stone with urinary tract infection     PROCEDURE PERFORMED:  Cystoscopy, right retrograde pyelogram, interpretation of fluoroscopic images. Right ureteral stent placement     ANESTHESIA:  General.     COMPLICATIONS:  None.    INDICATIONS FOR PROCEDURE: This is an 79-year-old woman with an obstructing distal right ureteral stone and urinary tract infection presents for stent placement    DESCRIPTION OF PROCEDURE: The risks and benefits of the procedure were explained in detail to the patient and informed consent was obtained.  The patient was brought to the operating room and placed supine on the operating room table and underwent general endotracheal anesthetic.  The patient was moved down to the dorsal lithotomy position and was prepped and draped in the standard sterile fashion.  I inserted the 22 Armenian rigid cystoscope through the urethra into the bladder and I performed cystoscopy.  There were no urothelial abnormalities identified.  I identified the right ureteral orifice and cannulated the orifice with a ureteral catheter.  I performed a retrograde pyelogram.  There was severe hydronephrosis and hydroureter with a very tortuous ureter.  I passed a Glidewire into the left kidney under fluoroscopic guidance and then I backloaded off the ureteral catheter.  I then placed a 6 x 28 double-J ureteral stent over the Glidewire.  I pulled back the wire and a good curl was seen in the renal pelvis under fluoroscopy.  A good curl was seen in the bladder under direct visualization.  The patient tolerated the procedure well without complication.  The patient went to the postanesthetic care unit in good condition.  She will be treated with antibiotics for her urinary tract infection.  After 2 weeks with a stent indwelling I will repeat a  noncontrast CT scan to see if the stone passes alongside the stent.

## 2025-02-19 NOTE — ANESTHESIA POSTPROCEDURE EVALUATION
Patient: Cynthia Arita    Procedure: Procedure(s):  CYSTOSCOPY, WITH URETERAL STENT INSERTION       Anesthesia Type:  General    Note:  Disposition: Inpatient   Postop Pain Control: Uneventful            Sign Out: Well controlled pain   PONV: No   Neuro/Psych: Uneventful            Sign Out: Acceptable/Baseline neuro status   Airway/Respiratory: Uneventful            Sign Out: Acceptable/Baseline resp. status   CV/Hemodynamics: Uneventful            Sign Out: Acceptable CV status; No obvious hypovolemia; No obvious fluid overload   Other NRE: NONE   DID A NON-ROUTINE EVENT OCCUR?            Last vitals:  Vitals Value Taken Time   /92 02/19/25 1700   Temp 98.06  F (36.7  C) 02/19/25 1701   Pulse 90 02/19/25 1704   Resp 66 02/19/25 1702   SpO2 92 % 02/19/25 1705   Vitals shown include unfiled device data.    Electronically Signed By: Everett Thomson MD  February 19, 2025  5:06 PM

## 2025-02-19 NOTE — ED TRIAGE NOTES
Arrives via EMS from home. Complaints of nausea and dizziness post fall from two days ago. States she hit her head. Had told EMS she was on a blood thinner, but review of chart does not have a blood thinner listed. Patient denies having any SOB or chest pain. States her mouth is just very dry. EMS EKG within normal limits. A&Ox3     Triage Assessment (Adult)       Row Name 02/19/25 0323          Triage Assessment    Airway WDL WDL        Respiratory WDL    Respiratory WDL WDL        Skin Circulation/Temperature WDL    Skin Circulation/Temperature WDL WDL        Cardiac WDL    Cardiac WDL WDL        Peripheral/Neurovascular WDL    Peripheral Neurovascular WDL WDL        Cognitive/Neuro/Behavioral WDL    Cognitive/Neuro/Behavioral WDL WDL

## 2025-02-19 NOTE — ANESTHESIA PROCEDURE NOTES
Airway       Patient location during procedure: OR  Staff -        Anesthesiologist:  Everett Thomson MD       CRNA: Kim Mayers APRN CRNA       Performed By: CRNAIndications and Patient Condition       Indications for airway management: luly-procedural       Induction type:intravenous       Mask difficulty assessment: 0 - not attempted    Final Airway Details       Final airway type: supraglottic airway    Supraglottic Airway Details        Type: LMA       Brand: I-Gel       LMA size: 4    Post intubation assessment        Placement verified by: capnometry, equal breath sounds and chest rise        Number of attempts at approach: 1       Number of other approaches attempted: 0       Secured with: commercial tube belle       Ease of procedure: easy       Dentition: Intact and Unchanged

## 2025-02-19 NOTE — ED PROVIDER NOTES
Emergency Department Note      History of Present Illness     Chief Complaint   Fall, dizziness     HPI   Cynthia Arita is a 79 year old female with past medical history of Ménière's disease with chronic vertigo, GERD, osteoporosis, anxiety, hypertension and depression who presents to the emergency department with head trauma after a fall.  Patient reports she was getting out of her car and slipped on some ice and hit the back of her head.  She does not believe that she lost consciousness.  She was able to get in the house but began to have a headache as well as nausea and vomiting.  She denies being on any blood thinning medications.  Also notes some pain in her lower back and some chest wall pain.  She denies any pain in her extremities.  She is able to walk.  Patient notes that she suffers from dizziness almost daily.  However notes that it waxes and wanes.  She is followed closely by ENT for her Ménière's disease.  Patient states that she lives alone and typically gets around with a cane.    Past Medical History     Medical History and Problem List   Past Medical History:   Diagnosis Date    Anxiety     Basal cell carcinoma     Complication of anesthesia     Diverticulosis     GERD (gastroesophageal reflux disease)     HTN (hypertension)     Meniere's disease     Nephrolithiasis     Pain in right knee     PONV (postoperative nausea and vomiting)        Medications   acetaminophen (TYLENOL) 500 MG tablet  alendronate (FOSAMAX) 70 MG tablet  amLODIPine (NORVASC) 5 MG tablet  Biotin 5000 MCG TABS  calcitonin, salmon, (MIACALCIN) 200 UNIT/ACT nasal spray  calcium citrate (CITRACAL) 950 (200 Ca) MG tablet  cyanocobalamin (VITAMIN B-12) 1000 MCG tablet  DHA-EPA-Coenzyme Q10-Vitamin E (CO Q-10 VITAMIN E FISH OIL PO)  esomeprazole (NEXIUM) 40 MG DR capsule  estradiol (ESTRACE) 0.1 MG/GM vaginal cream  Flaxseed, Linseed, (FLAX SEED OIL) 1000 MG capsule  losartan (COZAAR) 100 MG tablet  meclizine (ANTIVERT) 25 MG  "tablet  Misc Natural Products (GLUCOSAMINE CHOND COMPLEX/MSM PO)  Multiple Vitamins-Minerals (PRESERVISION AREDS 2) CAPS  multivitamin (CENTRUM SILVER) tablet  naproxen sodium (EQ NAPROXEN SODIUM) 220 MG tablet  ondansetron (ZOFRAN ODT) 4 MG ODT tab  polyethylene glycol (MIRALAX) 17 g packet  propranolol (INDERAL) 10 MG tablet  venlafaxine (EFFEXOR XR) 37.5 MG 24 hr capsule  vitamin D3 (CHOLECALCIFEROL) 50 mcg (2000 units) tablet        Surgical History   Past Surgical History:   Procedure Laterality Date    ARTHROPLASTY KNEE Right 01/21/2019    Procedure: Right total knee arthroplasty;  Surgeon: Denton Amor MD;  Location: [facility] OR    ARTHROPLASTY KNEE Left 10/02/2023    Procedure: Left total knee arthroplasty;  Surgeon: Denton Amor MD;  Location: [facility] OR    EYE SURGERY      cataract surgery both eyes    IR LUMBAR PUNCTURE  11/19/2019    ZZC VAGINAL HYSTERECTOMY      with left oophorectomy       Physical Exam     Patient Vitals for the past 24 hrs:   BP Temp Temp src Pulse Resp SpO2 Height Weight   02/19/25 0530 121/74 -- -- 88 -- -- -- --   02/19/25 0500 120/61 -- -- 87 -- -- -- --   02/19/25 0441 (!) 148/70 -- -- 91 -- -- -- --   02/19/25 0321 -- -- -- -- -- 96 % -- --   02/19/25 0318 (!) 147/93 99.4  F (37.4  C) Oral -- 18 97 % 1.626 m (5' 4\") 67.3 kg (148 lb 5.9 oz)     Physical Exam  General: Patient is alert, awake and interactive when I enter the room  Head: Small hematoma without laceration to the occiput.  Eyes: The pupils are equal, round, and reactive to light. Conjunctivae and sclerae are normal  ENT: No hemotympanum or signs basilar skull fracture. The oropharynx is normal without erythema. No tenderness to palpation of the face, nose or jaw.   Neck: Normal range of motion. No cervical midline tenderness.   CV: Regular rate.   Resp: Lungs are clear without wheezes or rales. No distress. No crepitance.   GI: Abdomen is soft, no rigidity, guarding, or rebound. No contusion. No distension. " "No tenderness to palpation in any quadrant.     MS: Mild diffuse lumbar tenderness.  Normal tone. Joints grossly normal without effusions. No asymmetric leg swelling, calf or thigh tenderness. Normal motor assessment of all extremities. PROM performed of all major joints without pain . No C/T tenderness in midline  Skin: No rash or lesions noted. Normal capillary refill noted  Neuro:  GCS 15.  CN\"s II-XII intact. Speech is normal and fluent. Face is symmetric. Strength is normal and symmetric.   Psych: Normal affect.  Appropriate interactions.     Diagnostics     Lab Results   Labs Ordered and Resulted from Time of ED Arrival to Time of ED Departure   BASIC METABOLIC PANEL - Abnormal       Result Value    Sodium 138      Potassium 3.5      Chloride 100      Carbon Dioxide (CO2) 23      Anion Gap 15      Urea Nitrogen 24.7 (*)     Creatinine 0.79      GFR Estimate 75      Calcium 9.7      Glucose 96     CBC WITH PLATELETS AND DIFFERENTIAL - Abnormal    WBC Count 2.1 (*)     RBC Count 4.24      Hemoglobin 12.2      Hematocrit 38.5      MCV 91      MCH 28.8      MCHC 31.7      RDW 13.3      Platelet Count 199      % Neutrophils 88      % Lymphocytes 9      % Monocytes 1      % Eosinophils 1      % Basophils 0      % Immature Granulocytes 1      NRBCs per 100 WBC 0      Absolute Neutrophils 1.9      Absolute Lymphocytes 0.2 (*)     Absolute Monocytes 0.0      Absolute Eosinophils 0.0      Absolute Basophils 0.0      Absolute Immature Granulocytes 0.0      Absolute NRBCs 0.0     RBC AND PLATELET MORPHOLOGY    RBC Morphology Confirmed RBC Indices      Platelet Assessment        Value: Automated Count Confirmed. Platelet morphology is normal.   ROUTINE UA WITH MICROSCOPIC REFLEX TO CULTURE       Imaging   CT Abdomen Pelvis w/o Contrast   Final Result   IMPRESSION:    1.  Marked right hydronephrosis and hydroureter down to the level of a 2 mm stone at the right ureterovesicular junction.   2.  Multiple bilateral " nonobstructive renal calculi.   3.  Bilateral renal cysts.   4.  Colonic diverticulosis.            CT Thoracic Spine w/o Contrast   Final Result   IMPRESSION:   1.  Moderate age-indeterminate but likely acute T9 vertebral body wedge compression fracture.         CT Lumbar Spine w/o Contrast   Final Result   IMPRESSION:   1.  No fracture or posttraumatic subluxation.   2.  Moderate right perinephric inflammatory stranding with moderate hydroureteronephrosis. No obstructing stone identified however the distal ureter is not within the field-of-view and a distal stone is possible. Consider dedicated CT abdomen pelvis for    further evaluation.         CT Head w/o Contrast   Final Result   IMPRESSION:   1.  No CT evidence for acute intracranial process.   2.  Brain atrophy and presumed chronic microvascular ischemic changes as above.         XR Chest 2 Views   Final Result   IMPRESSION:    1.  Bibasilar atelectasis.   2.  No pleural effusion or pneumothorax.   3.  Normal heart size.   4.  Probable inferior endplate compression fracture of a mid thoracic spine vertebral body and left posterolateral sixth rib fracture (both age-indeterminate). Consider chest and thoracic spine CT.      MR Thoracic Spine w/o Contrast    (Results Pending)       EKG   ECG results from 02/19/25   EKG 12-lead, tracing only     Value    Systolic Blood Pressure     Diastolic Blood Pressure     Ventricular Rate 85    Atrial Rate 85    DE Interval 142    QRS Duration 90        QTc 459    P Axis 48    R AXIS -43    T Axis 40    Interpretation ECG      Sinus rhythm  Left axis deviation  Pulmonary disease pattern  Abnormal ECG  When compared with ECG of 08-Apr-2023 13:15,  No significant change was found  Unconfirmed report - interpretation of this ECG is computer generated - see medical record for final interpretation  Confirmed by - EMERGENCY ROOM, PHYSICIAN ([id number]),  MITZI FISHER ([id number]) on 2/19/2025 6:35:35 AM          ED Course       Medications Administered   Medications   ondansetron (ZOFRAN) injection 4 mg (4 mg Intravenous $Given 2/19/25 0351)   sodium chloride 0.9% BOLUS 1,000 mL (0 mLs Intravenous Stopped 2/19/25 0600)   meclizine (ANTIVERT) tablet 25 mg (25 mg Oral $Given 2/19/25 0351)       Procedures   Procedures     Discussion of Management   Discussed care with Arlene Madden of neurosurgery    Medical Decision Making / Diagnosis     ALEXA Arita is a 79 year old female who presents to the emergency department with a fall after slipping on the ice.  She reports some mid and lower back pain and believes she hit her head but did not lose consciousness.  She is not on blood thinners.  Physical exam detailed above.  Head CT was obtained which shows no signs of intracranial hemorrhage or skull fracture.  CT of the thoracic spine shows an age-indeterminate T9 fracture.  Review of old MRIs she has had a fracture there in the past.  However it is unclear whether it is worse today.  Discussed the case with neurosurgery.  Will obtain MRI to further evaluate the acute nature of this fracture.  CT of the lumbar spine was negative, however there was an incidental finding hydronephrosis seen on CT.  Therefore CT of the abdomen pelvis was obtained which revealed a 2 mm right-sided kidney stone at the UPJ with associated hydroureter and hydronephrosis.  Patient has no significant flank pain.  Will obtain a UA.  Patient's ultimate disposition will be based on ambulation trial, pain control, results of MRI and results of UA.  Despite all of this, I think there is a strong possibility that the patient will be able to go home as her pain is minimal at this time.    Disposition   Pending     Diagnosis     ICD-10-CM    1. Ureterolithiasis  N20.1       2. Closed wedge compression fracture of T9 vertebra, initial encounter (H)  S22.070A            MD Shahram Moy Christopher Joseph, MD  02/19/25 0620

## 2025-02-19 NOTE — CONSULTS
[facility] Consultation by [facility]    Cynthia Arita MRN# [id number]   Age: 79 year old YOB: 1945     Date of Admission:  2/19/2025    Reason for consult: Renal colic right hydronephrosis and hydroureter       Requesting PA/MD: KIMMY WORKMAN       Level of consult: Consult, follow and place orders             Impression and Plan:   Impression/Assessment:   Cynthia Arita is a 79 year old female with 2 mm right ureteral stone  UTI  Bilateral renal cysts  Fall   Dizziness with history of Ménière's disease with chronic vertigo   Concern for dementia      Plan:   -NPO.  -plan to OR today for cystoscopy and right ureteral stent placement.  We discussed that given the size of the stone, this may be able to pass on its own alongside the stent.  If it is unable to pass on its own, will need to consider definitive ureteroscopy in several weeks after treatment of concern for urinary tract infection.  This will likely include cystoscopy, right-sided ureteroscopy laser lithotripsy and basketing, and right ureteral stent exchange.  -Possible side effects with an indwelling ureteral stent such as urgency and frequency of urination, dysuria, hematuria, symptoms of urine reflux, and some achiness in the side. Indwelling ureteral stents need to be exchanged every three months or removed by three months.    -IVF fluids  -Plan on IV antibiotics.  Follow cultures.  Transition to culture specific as available and oral as available and when clinically appropriate.  -Plan to hold off on Flomax 0.4 mg once daily due to lower blood pressure.  -Strain urine.  -Continue to monitor leukopenia.  -Pain and nausea medication per primary service.  -Thank you for involving us in the care of this patient. We will continue to follow along.     Aisha Khan PA-C  [facility]   [phone]               Chief Complaint:   Fall, dizziness     History is obtained from the patient, patient's sister on the phone,  and EMR.         History of Present Illness:   This patient is a 79 year old female who presented to the ER with a medical history of Ménière's disease with chronic vertigo, GERD, osteoporosis, anxiety, hypertension, depression, and possible dementia.  The ER physician note was that she was getting out of her car and slipped on some ice and hit the back of her head.  She did not believe she lost consciousness.  She had a headache as well as nausea and vomiting.  In discussion, with the patient and her sister on the phone, patient notes that this was actually several weeks ago.  She does note that she hit her head.    She does have a T8-9 fracture that was noted after a fall around August or 2024.  She did see one of our urologists, Dr. Pompa, in November regarding her renal cysts.    Patient underwent imaging last night which showed a 2 mm stone at the right UVJ.  There was quite a bit of hydronephrosis.  Urinalysis was concerning for urinary tract infection.  Patient denies any hematuria, dysuria, fevers, or chills.  She does endorse nausea, but denies vomiting.    Her sister does note that the patient has had recurrent issues with recurrent UTIs.  She has never previously had a kidney stone.  Her sister notes that there are multiple family members that have had kidney stones.    Patient was started on IV Rocephin.  Urine culture is in process.  Evidence of leukopenia, WBC 2.1.  Hemoglobin 12.2.  Creatinine 0.79 EGFR 75.  This is actually a rising creatinine from patient's baseline.          Past Medical History:     Past Medical History:   Diagnosis Date    Anxiety     Basal cell carcinoma     Complication of anesthesia     Diverticulosis     GERD (gastroesophageal reflux disease)     HTN (hypertension)     Meniere's disease     Nephrolithiasis     Pain in right knee     PONV (postoperative nausea and vomiting)             Past Surgical History:     Past Surgical History:   Procedure Laterality Date     ARTHROPLASTY KNEE Right 01/21/2019    Procedure: Right total knee arthroplasty;  Surgeon: Denton Amor MD;  Location: [facility] OR    ARTHROPLASTY KNEE Left 10/02/2023    Procedure: Left total knee arthroplasty;  Surgeon: Denton Amor MD;  Location: [facility] OR    EYE SURGERY      cataract surgery both eyes    IR LUMBAR PUNCTURE  11/19/2019    ZZC VAGINAL HYSTERECTOMY      with left oophorectomy             Social History:     Social History     Tobacco Use    Smoking status: Never    Smokeless tobacco: Never   Substance Use Topics    Alcohol use: Yes     Comment: rare          Family History:     Family History   Problem Relation Age of Onset    Neurologic Disorder Mother         palsy    Esophageal Cancer Father     Cancer Maternal Grandmother         lymph    Diabetes Paternal Grandmother     Neurologic Disorder Sister         Parkinson's    Hypertension Brother     Bipolar Disorder Sister     Brain Cancer Son 17   Multiple family members with nephrolithiasis including her and her sister's children.         Allergies:     Allergies   Allergen Reactions    Ativan [Lorazepam]      Sick -     Dicyclomine      Other reaction(s): dry mouth    Gabapentin Nausea    Metoprolol      Nausea      Pantoprazole Other (See Comments), Nausea and Vomiting and GI Disturbance     Red in the face, sleepiness, face swelling, joint pain, dizziness    Tramadol Nausea and Vomiting    Oxycodone Anxiety             Medications:     Current Facility-Administered Medications   Medication Dose Route Frequency Provider Last Rate Last Admin    [Auto Hold] acetaminophen (TYLENOL) tablet 650 mg  650 mg Oral Q4H PRN Adriana Best PA-C   650 mg at 02/19/25 1301    [Auto Hold] amLODIPine (NORVASC) tablet 5 mg  5 mg Oral Daily Adriana Best PA-C   5 mg at 02/19/25 1209    [Auto Hold] artificial saliva (BIOTENE DRY MOUTHWASH) liquid 5 mL  5 mL Swish & Spit 4x Daily Dylan Marte MD   5 mL at 02/19/25 1322    ceFAZolin  Sodium (ANCEF) injection 2 g  2 g Intravenous Pre-Op/Pre-procedure x 1 dose Joseluis Ramires MD        ceFAZolin Sodium (ANCEF) injection 2 g  2 g Intravenous See Admin Instructions Joseluis Ramires MD        [Auto Hold] cefTRIAXone (ROCEPHIN) 1 g vial to attach to  mL bag for ADULTS or NS 50 mL bag for PEDS  1 g Intravenous Q24H Adriana Best PA-C        [Auto Hold] lidocaine (LMX4) cream   Topical Q1H PRN Adriana Best PA-C        [Auto Hold] lidocaine 1 % 0.1-1 mL  0.1-1 mL Other Q1H PRN Adriana Best PA-C        [Auto Hold] losartan (COZAAR) tablet 100 mg  100 mg Oral QAM Adriana Best PA-C        [Auto Hold] meclizine (ANTIVERT) tablet 25 mg  25 mg Oral BID PRN Adriana Best PA-C        [Auto Hold] ondansetron (ZOFRAN ODT) ODT tab 4 mg  4 mg Oral Q6H PRN Adriana Best PA-C        Or    [Auto Hold] ondansetron (ZOFRAN) injection 4 mg  4 mg Intravenous Q6H PRN Adriana Best PA-C        [Auto Hold] propranolol (INDERAL) tablet 10 mg  10 mg Oral BID Adriana Best PA-C   10 mg at 02/19/25 1307    [Auto Hold] senna-docusate (SENOKOT-S/PERICOLACE) 8.6-50 MG per tablet 1 tablet  1 tablet Oral BID Adriana Gilmore PA-C        Or    [Auto Hold] senna-docusate (SENOKOT-S/PERICOLACE) 8.6-50 MG per tablet 2 tablet  2 tablet Oral BID PRN Adriana Best PA-C        [Auto Hold] sodium chloride (PF) 0.9% PF flush 3 mL  3 mL Intracatheter Q8H Adriana Best PA-C   3 mL at 02/19/25 1250    [Auto Hold] sodium chloride (PF) 0.9% PF flush 3 mL  3 mL Intracatheter q1 min prn Adriana Best PA-C        sodium chloride 0.9 % infusion   Intravenous Continuous Adriana Best PA-C 100 mL/hr at 02/19/25 1249 New Bag at 02/19/25 1249    [Auto Hold] venlafaxine (EFFEXOR XR) 24 hr capsule 37.5 mg  37.5 mg Oral Daily Adriana Best PA-C   37.5 mg at 02/19/25 1308             Review of Systems:   A comprehensive 10-point review of systems was performed and  "found to be negative except as described in the HPI.     BP 96/40 (BP Location: Left arm)   Pulse 77   Temp 99  F (37.2  C) (Core)   Resp 16   Ht 1.626 m (5' 4\")   Wt 67.3 kg (148 lb 5.9 oz)   LMP  (LMP Unknown)   SpO2 92%   BMI 25.47 kg/m    PSYCH: NAD  EYES: EOMI  MOUTH: MMM  NECK: Supple, no notable adenopathy  RESP: Unlabored breathing  CARDIAC: No LE edema, regular radial pulse  SKIN: Warm, no rashes  ABD: soft, Nontender  NEURO: AAO x3, slightly confused  URO: Urinating on own          Data:     Lab Results   Component Value Date    WBC 2.1 (L) 02/19/2025    HGB 12.2 02/19/2025    HCT 38.5 02/19/2025    MCV 91 02/19/2025     02/19/2025     Lab Results   Component Value Date    CR 0.79 02/19/2025    CR 0.49 (L) 11/11/2024     Recent Labs   Lab 02/19/25  0808   COLOR Yellow   APPEARANCE Slightly Cloudy*   URINEGLC Negative   URINEBILI Negative   URINEKETONE Negative   SG 1.015   URINEPH 6.5   PROTEIN 20*   NITRITE Negative   LEUKEST Large*   RBCU 15*   WBCU >182*        Urine culture: in process.    IMAGING    MR Thoracic Spine w/o Contrast    Result Date: 2/19/2025  EXAM: MR THORACIC SPINE W/O CONTRAST LOCATION: [facility] DATE: 2/19/2025 INDICATION: evaluated T9 fracture COMPARISON: 02/19/2025. TECHNIQUE: Routine Thoracic Spine MRI without IV contrast. FINDINGS: Similar T9 and impression deformity. Associated inferior endplate intravertebral disc herniation. No marrow edema within the associated vertebral body. Otherwise preserved vertebral body heights. Preserved alignment and marrow signal. Normal intervertebral discs and facets for patient age. Normal cord signal. No high-grade canal or neural foraminal stenosis at any level. No acute extraspinal abnormality.     IMPRESSION:  Chronic appearing T9 compression fracture. No evidence of acute bone or soft tissue injury.    CT Abdomen Pelvis w/o Contrast    Result Date: 2/19/2025  EXAM: CT ABDOMEN PELVIS W/O CONTRAST " LOCATION: [facility] DATE: 2/19/2025 INDICATION: right sided hydronephrosis on CT lumbar, rule out distal stone COMPARISON: 11/11/2024 TECHNIQUE: CT scan of the abdomen and pelvis was performed without IV contrast. Multiplanar reformats were obtained. Dose reduction techniques were used. CONTRAST: None. FINDINGS: LOWER CHEST: Linear atelectasis in the right middle lobe extending to subpleural focus which could represent an area of rounded atelectasis. Linear atelectasis or scarring in the posterior lower lungs. Small hiatal hernia. HEPATOBILIARY: No significant mass or bile duct dilatation. No calcified gallstones. PANCREAS: No significant mass, duct dilatation, or inflammatory change. SPLEEN: Normal size. ADRENAL GLANDS: Normal. KIDNEYS/BLADDER: Marked right hydronephrosis and hydroureter down to the level of a 2 mm stone at the right ureterovesicular junction. Multiple scattered nonobstructive right renal calculi. Tiny scattered left renal calculi. Left peripelvic cysts. Multiple parenchymal cysts bilaterally. No left hydronephrosis or hydroureter. Bladder unremarkable. BOWEL: Nonspecific nonobstructive bowel gas pattern. No inflammatory changes. Appendix not seen. Colonic diverticula without evidence for diverticulitis. Fatty ileocecal valve. LYMPH NODES: No lymphadenopathy. VASCULATURE: No abdominal aortic aneurysm. PELVIC ORGANS: No pelvic masses. No pelvic free fluid. Uterus appears to be surgically absent. MUSCULOSKELETAL: Mild spondylosis. Degenerative disc disease at the L3-L4 and L4-L5 interspaces with vacuum sign present. No inguinal or ventral hernias.     IMPRESSION: 1.  Marked right hydronephrosis and hydroureter down to the level of a 2 mm stone at the right ureterovesicular junction. 2.  Multiple bilateral nonobstructive renal calculi. 3.  Bilateral renal cysts. 4.  Colonic diverticulosis.     CT Thoracic Spine w/o Contrast    Result Date: 2/19/2025  EXAM: CT THORACIC SPINE W/O  CONTRAST LOCATION: [facility] DATE: 2/19/2025 INDICATION: possible compression fracture seen on chest xr COMPARISON: X-ray chest February 19, 2025. Chest plain film October 22, 2024 TECHNIQUE: Routine CT Thoracic Spine without IV contrast. Multiplanar reformats. Dose reduction techniques were used. FINDINGS: VERTEBRA: Moderate (50% height loss) age-indeterminate but likely acute T9 vertebral body wedge compression fracture with associated Schmorl's node.  Remainder the vertebral body heights maintained. CANAL/FORAMINA: No canal or neural foraminal stenosis. PARASPINAL: No extraspinal abnormality.     IMPRESSION: 1.  Moderate age-indeterminate but likely acute T9 vertebral body wedge compression fracture.     CT Lumbar Spine w/o Contrast    Result Date: 2/19/2025  EXAM: CT LUMBAR SPINE W/O CONTRAST LOCATION: [facility] DATE: 2/19/2025 INDICATION: fall, lumbar tenderness COMPARISON: None. TECHNIQUE: Routine CT Lumbar Spine without IV contrast. Multiplanar reformats. Dose reduction techniques were used. FINDINGS: VERTEBRA: Normal vertebral body heights. Mild dextrocurvature. Normal sagittal alignment.. No fracture or posttraumatic subluxation. CANAL/FORAMINA: Multilevel spondylosis without high grade canal stenosis. PARASPINAL: Moderate right perinephric inflammatory stranding with moderate hydroureteronephrosis. No obstructing stone identified however the distal ureter is not within the field-of-view. Multiple small nonobstructing calculi within the renal pelvis.     IMPRESSION: 1.  No fracture or posttraumatic subluxation. 2.  Moderate right perinephric inflammatory stranding with moderate hydroureteronephrosis. No obstructing stone identified however the distal ureter is not within the field-of-view and a distal stone is possible. Consider dedicated CT abdomen pelvis for further evaluation.     XR Chest 2 Views    Result Date: 2/19/2025  EXAM: XR CHEST 2 VIEWS LOCATION:  [facility] DATE: 2/19/2025 INDICATION: fall, chest wall tenderness COMPARISON: Imaged lower chest from CT abdomen and pelvis on 11/11/2024.     IMPRESSION: 1.  Bibasilar atelectasis. 2.  No pleural effusion or pneumothorax. 3.  Normal heart size. 4.  Probable inferior endplate compression fracture of a mid thoracic spine vertebral body and left posterolateral sixth rib fracture (both age-indeterminate). Consider chest and thoracic spine CT.    CT Head w/o Contrast    Result Date: 2/19/2025  EXAM: CT HEAD W/O CONTRAST LOCATION: [facility] DATE: 2/19/2025 INDICATION: fall, head trauma, vomiting COMPARISON: MRI brain April 8, 2023 TECHNIQUE: Routine CT Head without IV contrast. Multiplanar reformats. Dose reduction techniques were used. FINDINGS: INTRACRANIAL CONTENTS: No intracranial hemorrhage, extraaxial collection, or mass effect.  No CT evidence of acute infarct. Mild presumed chronic small vessel ischemic changes. Mild to moderate generalized volume loss. No hydrocephalus. VISUALIZED ORBITS/SINUSES/MASTOIDS: Prior bilateral cataract surgery. Visualized portions of the orbits are otherwise unremarkable. No paranasal sinus mucosal disease. Left mastoidectomy. No mastoid effusion. BONES/SOFT TISSUES: No acute abnormality.     IMPRESSION: 1.  No CT evidence for acute intracranial process. 2.  Brain atrophy and presumed chronic microvascular ischemic changes as above.     Discussed with Dr. Ramires and Terell Carlos PA-C   [facility]  [phone]

## 2025-02-19 NOTE — PROGRESS NOTES
Surgical quick note:    Compression fracture is chronic in nature.  No further neurosurgical follow-up or evaluation needed.    Neurosurgery to sign off    Derick Rainey DNP

## 2025-02-19 NOTE — ANESTHESIA CARE TRANSFER NOTE
Patient: Cynthia Arita    Procedure: Procedure(s):  CYSTOSCOPY, WITH URETERAL STENT INSERTION       Diagnosis: Ureterolithiasis [N20.1]  Diagnosis Additional Information: No value filed.    Anesthesia Type:   General     Note:    Oropharynx: oropharynx clear of all foreign objects  Level of Consciousness: awake    Level of Supplemental Oxygen (L/min / FiO2): 6  Independent Airway: airway patency satisfactory and stable  Dentition: dentition unchanged  Vital Signs Stable: post-procedure vital signs reviewed and stable  Report to RN Given: handoff report given  Patient transferred to: PACU    Handoff Report: Identifed the Patient, Identified the Reponsible Provider, Reviewed the pertinent medical history, Discussed the surgical course, Reviewed Intra-OP anesthesia mangement and issues during anesthesia, Set expectations for post-procedure period and Allowed opportunity for questions and acknowledgement of understanding  Vitals:  Vitals Value Taken Time   /57 02/19/25 1655   Temp 98.42  F (36.9  C) 02/19/25 1657   Pulse 75 02/19/25 1657   Resp     SpO2 100 % 02/19/25 1657   Vitals shown include unfiled device data.    Electronically Signed By: CASSIDY Schafer CRNA  February 19, 2025  4:58 PM

## 2025-02-20 ENCOUNTER — APPOINTMENT (OUTPATIENT)
Dept: PHYSICAL THERAPY | Facility: CLINIC | Age: 80
DRG: 660 | End: 2025-02-20
Attending: UROLOGY
Payer: MEDICARE

## 2025-02-20 VITALS
BODY MASS INDEX: 25.33 KG/M2 | RESPIRATION RATE: 18 BRPM | WEIGHT: 148.37 LBS | TEMPERATURE: 97.9 F | DIASTOLIC BLOOD PRESSURE: 64 MMHG | HEART RATE: 76 BPM | OXYGEN SATURATION: 96 % | SYSTOLIC BLOOD PRESSURE: 128 MMHG | HEIGHT: 64 IN

## 2025-02-20 DIAGNOSIS — N20.1 URETERAL STONE: Primary | ICD-10-CM

## 2025-02-20 LAB
ANION GAP SERPL CALCULATED.3IONS-SCNC: 10 MMOL/L (ref 7–15)
BACTERIA UR CULT: NORMAL
BUN SERPL-MCNC: 28.5 MG/DL (ref 8–23)
CALCIUM SERPL-MCNC: 8 MG/DL (ref 8.8–10.4)
CHLORIDE SERPL-SCNC: 100 MMOL/L (ref 98–107)
CREAT SERPL-MCNC: 0.77 MG/DL (ref 0.51–0.95)
EGFRCR SERPLBLD CKD-EPI 2021: 78 ML/MIN/1.73M2
ERYTHROCYTE [DISTWIDTH] IN BLOOD BY AUTOMATED COUNT: 14 % (ref 10–15)
GLUCOSE SERPL-MCNC: 123 MG/DL (ref 70–99)
HCO3 SERPL-SCNC: 19 MMOL/L (ref 22–29)
HCT VFR BLD AUTO: 30.3 % (ref 35–47)
HGB BLD-MCNC: 9.8 G/DL (ref 11.7–15.7)
MCH RBC QN AUTO: 28.5 PG (ref 26.5–33)
MCHC RBC AUTO-ENTMCNC: 32.3 G/DL (ref 31.5–36.5)
MCV RBC AUTO: 88 FL (ref 78–100)
PLATELET # BLD AUTO: 143 10E3/UL (ref 150–450)
POTASSIUM SERPL-SCNC: 3.6 MMOL/L (ref 3.4–5.3)
RBC # BLD AUTO: 3.44 10E6/UL (ref 3.8–5.2)
SODIUM SERPL-SCNC: 129 MMOL/L (ref 135–145)
WBC # BLD AUTO: 9.6 10E3/UL (ref 4–11)

## 2025-02-20 PROCEDURE — 250N000013 HC RX MED GY IP 250 OP 250 PS 637: Performed by: UROLOGY

## 2025-02-20 PROCEDURE — 258N000003 HC RX IP 258 OP 636: Performed by: INTERNAL MEDICINE

## 2025-02-20 PROCEDURE — 97161 PT EVAL LOW COMPLEX 20 MIN: CPT | Mod: GP

## 2025-02-20 PROCEDURE — 80048 BASIC METABOLIC PNL TOTAL CA: CPT | Performed by: PHYSICIAN ASSISTANT

## 2025-02-20 PROCEDURE — 99231 SBSQ HOSP IP/OBS SF/LOW 25: CPT | Performed by: PHYSICIAN ASSISTANT

## 2025-02-20 PROCEDURE — 99233 SBSQ HOSP IP/OBS HIGH 50: CPT | Performed by: INTERNAL MEDICINE

## 2025-02-20 PROCEDURE — G0378 HOSPITAL OBSERVATION PER HR: HCPCS

## 2025-02-20 PROCEDURE — 250N000013 HC RX MED GY IP 250 OP 250 PS 637: Performed by: INTERNAL MEDICINE

## 2025-02-20 PROCEDURE — 250N000011 HC RX IP 250 OP 636: Performed by: UROLOGY

## 2025-02-20 PROCEDURE — 36415 COLL VENOUS BLD VENIPUNCTURE: CPT | Performed by: PHYSICIAN ASSISTANT

## 2025-02-20 PROCEDURE — 82310 ASSAY OF CALCIUM: CPT | Performed by: PHYSICIAN ASSISTANT

## 2025-02-20 PROCEDURE — 85027 COMPLETE CBC AUTOMATED: CPT | Performed by: PHYSICIAN ASSISTANT

## 2025-02-20 PROCEDURE — 97530 THERAPEUTIC ACTIVITIES: CPT | Mod: GP

## 2025-02-20 PROCEDURE — 120N000001 HC R&B MED SURG/OB

## 2025-02-20 RX ORDER — SODIUM CHLORIDE 9 MG/ML
INJECTION, SOLUTION INTRAVENOUS CONTINUOUS
Status: ACTIVE | OUTPATIENT
Start: 2025-02-20 | End: 2025-02-21

## 2025-02-20 RX ORDER — MECLIZINE HYDROCHLORIDE 25 MG/1
25 TABLET ORAL 3 TIMES DAILY PRN
Status: DISCONTINUED | OUTPATIENT
Start: 2025-02-20 | End: 2025-02-24 | Stop reason: HOSPADM

## 2025-02-20 RX ADMIN — MECLIZINE HYDROCHLORIDE 25 MG: 25 TABLET ORAL at 17:22

## 2025-02-20 RX ADMIN — PROPRANOLOL HYDROCHLORIDE 10 MG: 10 TABLET ORAL at 20:40

## 2025-02-20 RX ADMIN — LOSARTAN POTASSIUM 100 MG: 100 TABLET, FILM COATED ORAL at 08:25

## 2025-02-20 RX ADMIN — Medication 5 ML: at 17:11

## 2025-02-20 RX ADMIN — MECLIZINE HYDROCHLORIDE 25 MG: 25 TABLET ORAL at 09:03

## 2025-02-20 RX ADMIN — ACETAMINOPHEN 650 MG: 325 TABLET, FILM COATED ORAL at 13:46

## 2025-02-20 RX ADMIN — ACETAMINOPHEN 650 MG: 325 TABLET, FILM COATED ORAL at 20:43

## 2025-02-20 RX ADMIN — Medication 5 ML: at 08:32

## 2025-02-20 RX ADMIN — SODIUM CHLORIDE 500 ML: 9 INJECTION, SOLUTION INTRAVENOUS at 12:24

## 2025-02-20 RX ADMIN — AMLODIPINE BESYLATE 5 MG: 5 TABLET ORAL at 08:25

## 2025-02-20 RX ADMIN — CEFTRIAXONE 1 G: 1 INJECTION, POWDER, FOR SOLUTION INTRAMUSCULAR; INTRAVENOUS at 06:50

## 2025-02-20 RX ADMIN — ACETAMINOPHEN 650 MG: 325 TABLET, FILM COATED ORAL at 08:24

## 2025-02-20 RX ADMIN — PROPRANOLOL HYDROCHLORIDE 10 MG: 10 TABLET ORAL at 08:27

## 2025-02-20 RX ADMIN — Medication 5 ML: at 11:46

## 2025-02-20 RX ADMIN — Medication 5 ML: at 20:33

## 2025-02-20 RX ADMIN — VENLAFAXINE HYDROCHLORIDE 37.5 MG: 37.5 CAPSULE, EXTENDED RELEASE ORAL at 09:02

## 2025-02-20 RX ADMIN — SODIUM CHLORIDE: 9 INJECTION, SOLUTION INTRAVENOUS at 17:27

## 2025-02-20 ASSESSMENT — ACTIVITIES OF DAILY LIVING (ADL)
ADLS_ACUITY_SCORE: 58
ADLS_ACUITY_SCORE: 59
ADLS_ACUITY_SCORE: 59
ADLS_ACUITY_SCORE: 58
ADLS_ACUITY_SCORE: 59
ADLS_ACUITY_SCORE: 58
ADLS_ACUITY_SCORE: 59
ADLS_ACUITY_SCORE: 59
ADLS_ACUITY_SCORE: 55
ADLS_ACUITY_SCORE: 59
ADLS_ACUITY_SCORE: 58
ADLS_ACUITY_SCORE: 59
ADLS_ACUITY_SCORE: 58
ADLS_ACUITY_SCORE: 58
ADLS_ACUITY_SCORE: 55
ADLS_ACUITY_SCORE: 59
ADLS_ACUITY_SCORE: 59
ADLS_ACUITY_SCORE: 58

## 2025-02-20 NOTE — PROGRESS NOTES
02/20/25 0913   Appointment Info   Signing Clinician's Name / Credentials (PT) Dia Galan DPT   Living Environment   People in Home alone   Current Living Arrangements condominium   Home Accessibility stairs to enter home   Number of Stairs, Main Entrance 1   Self-Care   Usual Activity Tolerance moderate   Current Activity Tolerance poor   Equipment Currently Used at Home cane, straight   Fall history within last six months yes   Activity/Exercise/Self-Care Comment Pt reports IND at baseline w/ SPC   General Information   Onset of Illness/Injury or Date of Surgery 02/19/25   Referring Physician Joseluis Ramires MD   Patient/Family Therapy Goals Statement (PT) Return home   Pertinent History of Current Problem (include personal factors and/or comorbidities that impact the POC) Cynthia Arita is a 79 year old female with a history of IBS, Meniere's Disease, Dizziness, Tinnitus, Urinary Retention, Insomnia, HLD, HTN, Anxiety, GERD, Osteoporosis, Nephrolithiasis, Diverticulosis, Skin Cancer, Depression, Tremor, RLS, Hepatic Steatosis, T9 Vertebral Fracture who presents to the ED today with a fall and dizziness.   Existing Precautions/Restrictions fall   Cognition   Affect/Mental Status (Cognition) confused   Orientation Status (Cognition) oriented to;person;place   Follows Commands (Cognition) follows one-step commands   Safety Deficit (Cognition) impulsivity   Pain Assessment   Patient Currently in Pain No   Posture    Posture Forward head position;Protracted shoulders   Range of Motion (ROM)   Range of Motion ROM is WFL   Strength (Manual Muscle Testing)   Strength (Manual Muscle Testing) Deficits observed during functional mobility   Bed Mobility   Comment, (Bed Mobility) Supine to sit CGA   Transfers   Comment, (Transfers) Sit to stand Mod A   Gait/Stairs (Locomotion)   Comment, (Gait/Stairs) Not tested d/t weakness and dizziness   Balance   Balance Comments Fair seated and standing   Sensory Examination    Sensory Perception patient reports no sensory changes   Clinical Impression   Criteria for Skilled Therapeutic Intervention Yes, treatment indicated   PT Diagnosis (PT) Impaired functional mobility and gait   Influenced by the following impairments Pain, weakness, decreased activity tolerance, impaired balance   Functional limitations due to impairments Limited functional mobility requiring AD and assist   Clinical Presentation (PT Evaluation Complexity) stable   Clinical Presentation Rationale Based on PMH, current status, and social support   Clinical Decision Making (Complexity) low complexity   Planned Therapy Interventions (PT) balance training;bed mobility training;gait training;stair training;transfer training;progressive activity/exercise;strengthening   Risk & Benefits of therapy have been explained evaluation/treatment results reviewed;care plan/treatment goals reviewed;risks/benefits reviewed;current/potential barriers reviewed;participants voiced agreement with care plan;participants included;patient   PT Total Evaluation Time   PT Eval, Low Complexity Minutes (07315) 10   Physical Therapy Goals   PT Frequency Daily   PT Predicted Duration/Target Date for Goal Attainment 02/27/25   PT Goals Bed Mobility;Transfers;Gait;Stairs   PT: Bed Mobility Independent;Supine to/from sit   PT: Transfers Modified independent;Sit to/from stand;Assistive device   PT: Gait Modified independent;Assistive device;50 feet   PT: Stairs Supervision/stand-by assist;1 stair   Interventions   Interventions Quick Adds Therapeutic Activity   Therapeutic Activity   Therapeutic Activities: dynamic activities to improve functional performance Minutes (65364) 14   Symptoms Noted During/After Treatment Fatigue;Dizziness   Treatment Detail/Skilled Intervention Pt beginning supine to sit transfer upon arrival, pt impulsive throughout session. Pt agreeable to PT. VCs throughout session, for safety and slowing down. Pt able to shift hips  forward w/ VCs. Pt w/ baseline dizziness. Pt performed stand pivot transfer to commode w/ FWW and Min A. Pt sat on commode, able to void. Pt performed sit to stand w/ FWW and Min A. Pt total assist for pericares. Pt performed stand pivot back to bed w/ FWW and Min A. Pt sat EOB. RN present. Attempted to get BP, unable to get reading. Pt transferred to supine w/ CGA. Pt able to reposition in bed. Reported continued dizziness. All needs w/in reach, bed alarm on, RN at side.   PT Discharge Planning   PT Plan Progress transfers, amb, monitor dizziness.   PT Discharge Recommendation (DC Rec) Transitional Care Facility   PT Rationale for DC Rec Pt below baseline, currently Ax1 for stand pivot transfer. Pt limited by weakness and dizziness. Pt lives alone, recommend TCU to increase strength and functional mobility. W/ continued IP PT and resolution of symptoms pt may progress to IND-SBA and return home. Will continue to assess.   PT Brief overview of current status Ax1 SPT. Goals of therapy will be to address safe mobility and make recs for d/c to next level of care. Pt and RN will continue to follow all falls risk precautions as documented by RN staff while hospitalized   PT Total Distance Amb During Session (feet) 0   Physical Therapy Time and Intention   Timed Code Treatment Minutes 14   Total Session Time (sum of timed and untimed services) 24

## 2025-02-20 NOTE — PROGRESS NOTES
[facility]rology Progress Note          Assessment and Plan:     Assessment:     POD 1 Cystoscopy, right retrograde pyelogram, interpretation of fluoroscopic images. Right ureteral stent placement     Ureterolithiasis      Plan:   -Continue with indwelling ureteral stent.  -Strain urine while inpatient.  Submit any clot stone for analysis.  -Will plan on CT ab pelvis without contrast in approximately 2 weeks to see if the stone passes alongside the stent.  If so, we will need to coordinate ureteral stent removal.  If it is not passed, we will need to set patient up for outpatient ureteroscopy.  -Possible side effects with an indwelling ureteral stent such as urgency and frequency of urination, dysuria, hematuria, symptoms of urine reflux, and some achiness in the side. Indwelling ureteral stents need to be exchanged every three months or removed by three months.    -Urine culture showed mixed ochoa.  Would still recommend patient be sent on empiric antibiotics.  Would just do a third-generation cephalosporin such as cefdinir.  -Kidney function within normal limits and leukopenia has resolved.  -Okay to discharge from urology perspective.  Will plan on signing off.  Please contact us with any urological concerns.    Aisha Khan PA-C   [facility]  [phone]               Interval History:     Doing okay. Has headache, dizzy, and feels fatigued.  Afebrile without tachycardia.  Denies N/V/F/C.  Son at bedside.  Occasional hypotension.  Urine culture: 10-50,000 CFU/mL mixed ochoa.  On IV Rocephin.  Creatinine 0.77 EGFR 78.  Evidence of hyponatremia.  WBC 9.6 (2.1).  Hemoglobin 9.8.  Denies hematuria or dysuria.              Review of Systems:     The 5 point Review of Systems is negative other than noted in the HPI             Medications:     Current Facility-Administered Medications   Medication Dose Route Frequency Provider Last Rate Last Admin    acetaminophen (TYLENOL) tablet 650 mg   650 mg Oral Q4H PRN Joseluis Ramires MD   650 mg at 02/20/25 1346    amLODIPine (NORVASC) tablet 5 mg  5 mg Oral Daily Joseluis Ramires MD   5 mg at 02/20/25 0825    artificial saliva (BIOTENE DRY MOUTHWASH) liquid 5 mL  5 mL Swish & Spit 4x Daily Joseluis Ramires MD   5 mL at 02/20/25 1146    cefTRIAXone (ROCEPHIN) 1 g vial to attach to  mL bag for ADULTS or NS 50 mL bag for PEDS  1 g Intravenous Q24H Joseluis Ramires MD   1 g at 02/20/25 0650    lidocaine (LMX4) cream   Topical Q1H PRN Joseluis Ramires MD        lidocaine 1 % 0.1-1 mL  0.1-1 mL Other Q1H PRN Joseluis Ramires MD        losartan (COZAAR) tablet 100 mg  100 mg Oral QAM Joseluis Ramires MD   100 mg at 02/20/25 0825    meclizine (ANTIVERT) tablet 25 mg  25 mg Oral BID PRN Joseluis Ramires MD   25 mg at 02/20/25 0903    ondansetron (ZOFRAN ODT) ODT tab 4 mg  4 mg Oral Q6H PRN Joseluis Ramires MD        Or    ondansetron (ZOFRAN) injection 4 mg  4 mg Intravenous Q6H PRN Joseluis Ramires MD        propranolol (INDERAL) tablet 10 mg  10 mg Oral BID Joseluis Ramires MD   10 mg at 02/20/25 0827    senna-docusate (SENOKOT-S/PERICOLACE) 8.6-50 MG per tablet 1 tablet  1 tablet Oral BID PRN Joseluis Ramires MD        Or    senna-docusate (SENOKOT-S/PERICOLACE) 8.6-50 MG per tablet 2 tablet  2 tablet Oral BID PRN Joseluis Ramires MD        sodium chloride (PF) 0.9% PF flush 3 mL  3 mL Intracatheter Q8H Joseluis Ramires MD   3 mL at 02/20/25 1146    sodium chloride (PF) 0.9% PF flush 3 mL  3 mL Intracatheter q1 min prn Joseluis Ramires MD        venlafaxine (EFFEXOR XR) 24 hr capsule 37.5 mg  37.5 mg Oral Daily Joseluis Ramires MD   37.5 mg at 02/20/25 0902                  Physical Exam:   Vitals were reviewed  Patient Vitals for the past 8 hrs:   BP Temp Temp src Pulse Resp SpO2   02/20/25 1335 110/50 97.6  F (36.4  C) Temporal 82 20 --    02/20/25 1223 -- -- -- -- -- 95 %   02/20/25 1151 104/47 -- -- -- -- 94 %   02/20/25 1144 -- -- -- -- -- 92 %   02/20/25 1140 89/40 99.3  F (37.4  C) Temporal 74 16 (!) 89 %   02/20/25 1012 92/42 -- -- -- -- 94 %   02/20/25 0905 107/49 -- -- 80 -- 93 %   02/20/25 0827 -- -- -- 88 -- --   02/20/25 0812 123/59 -- -- -- -- --   02/20/25 0731 120/52 98.5  F (36.9  C) Temporal 87 18 94 %     GEN: NAD, lying in bed  EYES: EOMI  MOUTH: MMM  NECK: Supple  RESP: Unlabored breathing  SKIN: Warm  ABD: soft  NEURO: AAO           Data:     Lab Results   Component Value Date    NTBNPI 460 05/26/2022     Lab Results   Component Value Date    WBC 9.6 02/20/2025    WBC 2.1 (L) 02/19/2025    WBC 6.7 11/11/2024    HGB 9.8 (L) 02/20/2025    HGB 12.2 02/19/2025    HGB 12.5 11/11/2024    HCT 30.3 (L) 02/20/2025    HCT 38.5 02/19/2025    HCT 39.6 11/11/2024    MCV 88 02/20/2025    MCV 91 02/19/2025    MCV 92 11/11/2024     (L) 02/20/2025     02/19/2025     11/11/2024   All cultures:  Recent Labs   Lab 02/19/25  0808   CULTURE 10,000-50,000 CFU/mL Mixture of Urogenital Delia

## 2025-02-20 NOTE — PLAN OF CARE
"Int confusion. Disoriented to time. Bps soft, MD notified. 500ml bolus given. 1L O2, cont pulse ox. Tylenol given for headache. Reporting dizziness this am. MD aware. Prn meclizine given. Tolerating regular diet. Up ax1 gb/walker pivot to BSC. Straining voids. Discharge plan is TBD.    Goal Outcome Evaluation:      Plan of Care Reviewed With: patient, child    Overall Patient Progress: no changeOverall Patient Progress: no change       Problem: Adult Inpatient Plan of Care  Goal: Plan of Care Review  Description: The Plan of Care Review/Shift note should be completed every shift.  The Outcome Evaluation is a brief statement about your assessment that the patient is improving, declining, or no change.  This information will be displayed automatically on your shift  note.  Outcome: Progressing  Flowsheets (Taken 2/20/2025 1220)  Plan of Care Reviewed With:   patient   child  Overall Patient Progress: no change  Goal: Patient-Specific Goal (Individualized)  Description: You can add care plan individualizations to a care plan. Examples of Individualization might be:  \"Parent requests to be called daily at 9am for status\", \"I have a hard time hearing out of my right ear\", or \"Do not touch me to wake me up as it startles  me\".  Outcome: Progressing  Goal: Absence of Hospital-Acquired Illness or Injury  Outcome: Progressing  Intervention: Identify and Manage Fall Risk  Recent Flowsheet Documentation  Taken 2/20/2025 0915 by Rosie Carr RN  Safety Promotion/Fall Prevention: safety round/check completed  Intervention: Prevent Skin Injury  Recent Flowsheet Documentation  Taken 2/20/2025 0915 by Rosie Carr RN  Body Position: position changed independently  Skin Protection: adhesive use limited  Intervention: Prevent and Manage VTE (Venous Thromboembolism) Risk  Recent Flowsheet Documentation  Taken 2/20/2025 0915 by Rosie Carr RN  VTE Prevention/Management: SCDs on (sequential compression " devices)  Intervention: Prevent Infection  Recent Flowsheet Documentation  Taken 2/20/2025 0915 by Rosie Carr RN  Infection Prevention:   rest/sleep promoted   single patient room provided   hand hygiene promoted  Goal: Optimal Comfort and Wellbeing  Outcome: Progressing  Intervention: Monitor Pain and Promote Comfort  Recent Flowsheet Documentation  Taken 2/20/2025 0915 by Rosie Carr RN  Pain Management Interventions:   medication (see MAR)   rest  Goal: Readiness for Transition of Care  Outcome: Progressing     Problem: Surgery Nonspecified  Goal: Absence of Bleeding  Outcome: Progressing  Goal: Effective Bowel Elimination  Outcome: Progressing  Goal: Fluid and Electrolyte Balance  Outcome: Progressing  Intervention: Monitor and Manage Fluid and Electrolyte Balance  Recent Flowsheet Documentation  Taken 2/20/2025 0915 by Rosie Carr RN  Fluid/Electrolyte Management: fluids provided  Goal: Blood Glucose Level Within Targeted Range  Outcome: Progressing  Goal: Absence of Infection Signs and Symptoms  Outcome: Progressing  Goal: Anesthesia/Sedation Recovery  Outcome: Progressing  Intervention: Optimize Anesthesia Recovery  Recent Flowsheet Documentation  Taken 2/20/2025 0915 by Rosie Carr RN  Safety Promotion/Fall Prevention: safety round/check completed  Reorientation Measures:   clock in view   reorientation provided  Goal: Optimal Pain Control and Function  Outcome: Progressing  Intervention: Prevent or Manage Pain  Recent Flowsheet Documentation  Taken 2/20/2025 0915 by Rosie Carr RN  Pain Management Interventions:   medication (see MAR)   rest  Goal: Nausea and Vomiting Relief  Outcome: Progressing  Goal: Effective Urinary Elimination  Outcome: Progressing  Goal: Effective Oxygenation and Ventilation  Outcome: Progressing  Intervention: Optimize Oxygenation and Ventilation  Recent Flowsheet Documentation  Taken 2/20/2025 0915 by Rosie Carr RN  Cough And Deep Breathing: done  independently per patient  Head of Bed (HOB) Positioning: HOB at 30-45 degrees  Taken 2/20/2025 0905 by Rosie Carr, RN  Activity Management:   up to bedside commode   activity adjusted per tolerance

## 2025-02-20 NOTE — PLAN OF CARE
"PRIMARY DIAGNOSIS: \"GENERIC\" NURSING  OUTPATIENT/OBSERVATION GOALS TO BE MET BEFORE DISCHARGE:  ADLs back to baseline: Yes    Activity and level of assistance: Up with 1 assist, gait belt, and walker.    Pain status: Pain free.    Return to near baseline physical activity: Yes     Discharge Planner Nurse   Safe discharge environment identified: Yes  Barriers to discharge: Yes    Pt becoming more alert this morning. Up to bedside commode. Tolerating liquids without difficulty. IV fluids continue to infuse. Monitoring voids and PVRs. Last void was 125cc, with PVR of 188, with another void of 50cc. Encouraging fluid intake.          Entered by: Sarah Ackerman RN 02/20/2025 6:38 AM     Please review provider order for any additional goals.   Nurse to notify provider when observation goals have been met and patient is ready for discharge.Goal Outcome Evaluation:      Plan of Care Reviewed With: patient    Overall Patient Progress: improvingOverall Patient Progress: improving    Outcome Evaluation: Pt more alert this morning. IV Fluids Infusing. Urine more yellow in color. Strain Urine.      "

## 2025-02-20 NOTE — PLAN OF CARE
"Goal Outcome Evaluation:      Plan of Care Reviewed With: patient    Overall Patient Progress: no changeOverall Patient Progress: no change    Outcome Evaluation: Pt slight confused. IV Fluids Infusing. Dark Judith Urine.    PRIMARY DIAGNOSIS: \"GENERIC\" NURSING  OUTPATIENT/OBSERVATION GOALS TO BE MET BEFORE DISCHARGE:  ADLs back to baseline: No    Activity and level of assistance: Assist of 1, walker and gait belt to and from bedside commode.    Pain status: Pain free.    Return to near baseline physical activity: No     Discharge Planner Nurse   Safe discharge environment identified:  Home  Barriers to discharge: Yes       Entered by: Sarah Ackerman RN 02/20/2025 4:08 AM     Please review provider order for any additional goals.   Nurse to notify provider when observation goals have been met and patient is ready for discharge.  "

## 2025-02-20 NOTE — PLAN OF CARE
"Goal Outcome Evaluation:      Plan of Care Reviewed With: patient    Overall Patient Progress: improvingOverall Patient Progress: improving    Outcome Evaluation: emesis when transferred from [facility] to bed after PACU. nausea resolved. denies pain. confused for first few hours. spoke on cell phone with dede cordoba DIL and one son Ferdinand present in room. pt thought she was in her room. bed alarm in place. reoriented frequently. confusion resolved. denies pain. has not voided. bladder scanned. 237ml. only sips of water. md notified and iv flds continues. bp's soft. inderal held. continue to monitor.    Problem: Adult Inpatient Plan of Care  Goal: Plan of Care Review  Description: The Plan of Care Review/Shift note should be completed every shift.  The Outcome Evaluation is a brief statement about your assessment that the patient is improving, declining, or no change.  This information will be displayed automatically on your shift  note.  Outcome: Progressing  Flowsheets (Taken 2/19/2025 3682)  Outcome Evaluation: emesis when transferred from [facility] to bed after PACU. nausea resolved. denies pain. confused for first few hours. spoke on cell phone with dede cordoba DIL and one son Ferdinand present in room. pt thought she was in her room. bed alarm in place. reoriented frequently. confusion resolved. denies pain. has not voided. bladder scanned. 237ml. only sips of water. md notified and iv flds continues. bp's soft. inderal held. continue to monitor.  Plan of Care Reviewed With: patient  Overall Patient Progress: improving  Goal: Patient-Specific Goal (Individualized)  Description: You can add care plan individualizations to a care plan. Examples of Individualization might be:  \"Parent requests to be called daily at 9am for status\", \"I have a hard time hearing out of my right ear\", or \"Do not touch me to wake me up as it startles  me\".  Outcome: Progressing  Goal: Absence of Hospital-Acquired Illness or Injury  Outcome: " Progressing  Intervention: Identify and Manage Fall Risk  Recent Flowsheet Documentation  Taken 2/19/2025 1840 by Franci Wolff RN  Safety Promotion/Fall Prevention:   activity supervised   clutter free environment maintained   lighting adjusted   mobility aid in reach   nonskid shoes/slippers when out of bed   patient and family education   room door open   room near nurse's station   room organization consistent   safety round/check completed   supervised activity  Intervention: Prevent Skin Injury  Recent Flowsheet Documentation  Taken 2/19/2025 1840 by Franci Wolff RN  Body Position:   right   side-lying 30 degrees  Skin Protection: adhesive use limited  Intervention: Prevent Infection  Recent Flowsheet Documentation  Taken 2/19/2025 1840 by Franci Wolff RN  Infection Prevention:   hand hygiene promoted   rest/sleep promoted   single patient room provided  Goal: Optimal Comfort and Wellbeing  Outcome: Progressing  Intervention: Monitor Pain and Promote Comfort  Recent Flowsheet Documentation  Taken 2/19/2025 1840 by Franci Wolff RN  Pain Management Interventions:   repositioned   rest  Goal: Readiness for Transition of Care  Outcome: Progressing     Problem: Surgery Nonspecified  Goal: Absence of Bleeding  Outcome: Progressing  Goal: Effective Bowel Elimination  Outcome: Progressing  Goal: Fluid and Electrolyte Balance  Outcome: Progressing  Intervention: Monitor and Manage Fluid and Electrolyte Balance  Recent Flowsheet Documentation  Taken 2/19/2025 1840 by Franci Wolff RN  Fluid/Electrolyte Management:   intravenous fluids adjusted   fluids provided  Goal: Blood Glucose Level Within Targeted Range  Outcome: Progressing  Goal: Absence of Infection Signs and Symptoms  Outcome: Progressing  Goal: Anesthesia/Sedation Recovery  Outcome: Progressing  Intervention: Optimize Anesthesia Recovery  Recent Flowsheet Documentation  Taken 2/19/2025 1840 by Franci Wolff RN  Safety Promotion/Fall Prevention:   activity  supervised   clutter free environment maintained   lighting adjusted   mobility aid in reach   nonskid shoes/slippers when out of bed   patient and family education   room door open   room near nurse's station   room organization consistent   safety round/check completed   supervised activity  Goal: Optimal Pain Control and Function  Outcome: Progressing  Intervention: Prevent or Manage Pain  Recent Flowsheet Documentation  Taken 2/19/2025 1840 by Franci Wolff, RN  Pain Management Interventions:   repositioned   rest  Goal: Nausea and Vomiting Relief  Outcome: Progressing  Goal: Effective Urinary Elimination  Outcome: Progressing  Goal: Effective Oxygenation and Ventilation  Outcome: Progressing  Intervention: Optimize Oxygenation and Ventilation  Recent Flowsheet Documentation  Taken 2/19/2025 1840 by Franci Wolff, RN  Activity Management: activity adjusted per tolerance  Head of Bed (HOB) Positioning: HOB at 30 degrees

## 2025-02-20 NOTE — PROVIDER NOTIFICATION
s/p stent. no void. bladder scan at 2223 for 237ml asymptomatic. did have small bm about 8pm. denies pain. NS at 100ml/hr supposed to SL at 2200 but bp soft. last 89/45 and 101/64 with hr 78 and 80   Sent to [facility]arghe[facility] Jhaveri via voicera text. Charge nurse updated.

## 2025-02-20 NOTE — PROVIDER NOTIFICATION
can we keep the NS at 100ml/hr. shes still only taking sips and bp soft. Thanks  Sent to [employer] via voicera text.

## 2025-02-20 NOTE — CONSULTS
Care Management Initial Consult    General Information  Assessment completed with: Patient,    Type of CM/SW Visit: Initial Assessment    Primary Care Provider verified and updated as needed: Yes   Readmission within the last 30 days: no previous admission in last 30 days      Reason for Consult: care coordination/care conference, discharge planning       Communication Assessment  Patient's communication style: spoken language (English or Bilingual)             Cognitive  Cognitive/Neuro/Behavioral: .WDL except, orientation  Level of Consciousness: intermittent confusion, alert  Arousal Level: opens eyes spontaneously  Orientation: disoriented to, time  Mood/Behavior: calm, cooperative  Best Language: 0 - No aphasia  Speech: clear, spontaneous    Current Resources:   Equipment currently used at home: cane, straight      Employment/Financial:  Does the patient's insurance plan have a 3 day qualifying hospital stay waiver?  Yes     Which insurance plan 3 day waiver is available? [facility] [facility]    Will the waiver be used for post-acute placement? Yes    Lifestyle & Psychosocial Needs:  Social Drivers of Health     Food Insecurity: Low Risk  (8/22/2024)    Food Insecurity     Within the past 12 months, did you worry that your food would run out before you got money to buy more?: No     Within the past 12 months, did the food you bought just not last and you didn t have money to get more?: No   Depression: Not at risk (10/18/2024)    PHQ-2     PHQ-2 Score: 2   Housing Stability: Low Risk  (8/22/2024)    Housing Stability     Do you have housing? : Yes     Are you worried about losing your housing?: No   Tobacco Use: Low Risk  (2/19/2025)    Patient History     Smoking Tobacco Use: Never     Smokeless Tobacco Use: Never     Passive Exposure: Not on file   Financial Resource Strain: Low Risk  (8/22/2024)    Financial Resource Strain     Within the past 12 months, have you or your family members you live with been unable to get  utilities (heat, electricity) when it was really needed?: No   Alcohol Use: Not on file   Transportation Needs: Low Risk  (8/22/2024)    Transportation Needs     Within the past 12 months, has lack of transportation kept you from medical appointments, getting your medicines, non-medical meetings or appointments, work, or from getting things that you need?: No   Physical Activity: Not on file   Interpersonal Safety: Low Risk  (2/19/2025)    Interpersonal Safety     Do you feel physically and emotionally safe where you currently live?: Yes     Within the past 12 months, have you been hit, slapped, kicked or otherwise physically hurt by someone?: No     Within the past 12 months, have you been humiliated or emotionally abused in other ways by your partner or ex-partner?: No   Stress: No Stress Concern Present (8/22/2024)    [employer] - Occupational Stress Questionnaire     Feeling of Stress : Only a little   Social Connections: Unknown (8/22/2024)    Social Connection and Isolation Panel [NHANES]     Frequency of Communication with Friends and Family: Not on file     Frequency of Social Gatherings with Friends and Family: Three times a week     Attends Mu-ism Services: Not on file     Active Member of Clubs or Organizations: Not on file     Attends Club or Organization Meetings: Not on file     Marital Status: Not on file   Health Literacy: Not on file             Discussed  Partnership in Safe Discharge Planning  document with patient/family: No    Additional Information:  CM/BALJIT consulted for care coordination/discharge planning.     PT recs TCU for discharge placement, ADAM met w patient at bedside and shared this rec. She would like to discuss w her son first and stated he is coming to visit her this afternoon. She was provided w the list of [facility] facilities.     Next Steps: follow up w patient/family for discharge planning    Di Ayala, RN, BSN  Inpatient Care Coordination  [facility]  [facility]  [phone]      Di Ayala RN       Warm
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
## 2025-02-21 ENCOUNTER — APPOINTMENT (OUTPATIENT)
Dept: PHYSICAL THERAPY | Facility: CLINIC | Age: 80
DRG: 660 | End: 2025-02-21
Payer: MEDICARE

## 2025-02-21 LAB
ANION GAP SERPL CALCULATED.3IONS-SCNC: 15 MMOL/L (ref 7–15)
BUN SERPL-MCNC: 15.3 MG/DL (ref 8–23)
CALCIUM SERPL-MCNC: 8.4 MG/DL (ref 8.8–10.4)
CHLORIDE SERPL-SCNC: 99 MMOL/L (ref 98–107)
CREAT SERPL-MCNC: 0.5 MG/DL (ref 0.51–0.95)
EGFRCR SERPLBLD CKD-EPI 2021: >90 ML/MIN/1.73M2
ERYTHROCYTE [DISTWIDTH] IN BLOOD BY AUTOMATED COUNT: 13.8 % (ref 10–15)
GLUCOSE SERPL-MCNC: 80 MG/DL (ref 70–99)
HCO3 SERPL-SCNC: 17 MMOL/L (ref 22–29)
HCT VFR BLD AUTO: 30.2 % (ref 35–47)
HGB BLD-MCNC: 9.7 G/DL (ref 11.7–15.7)
LACTATE SERPL-SCNC: 1.7 MMOL/L (ref 0.7–2)
MCH RBC QN AUTO: 28.4 PG (ref 26.5–33)
MCHC RBC AUTO-ENTMCNC: 32.1 G/DL (ref 31.5–36.5)
MCV RBC AUTO: 89 FL (ref 78–100)
PLATELET # BLD AUTO: 138 10E3/UL (ref 150–450)
POTASSIUM SERPL-SCNC: 3.5 MMOL/L (ref 3.4–5.3)
RBC # BLD AUTO: 3.41 10E6/UL (ref 3.8–5.2)
SODIUM SERPL-SCNC: 131 MMOL/L (ref 135–145)
WBC # BLD AUTO: 7.8 10E3/UL (ref 4–11)

## 2025-02-21 PROCEDURE — 83605 ASSAY OF LACTIC ACID: CPT | Performed by: INTERNAL MEDICINE

## 2025-02-21 PROCEDURE — 258N000003 HC RX IP 258 OP 636: Performed by: INTERNAL MEDICINE

## 2025-02-21 PROCEDURE — 99232 SBSQ HOSP IP/OBS MODERATE 35: CPT | Performed by: INTERNAL MEDICINE

## 2025-02-21 PROCEDURE — 250N000011 HC RX IP 250 OP 636: Performed by: UROLOGY

## 2025-02-21 PROCEDURE — 85049 AUTOMATED PLATELET COUNT: CPT | Performed by: INTERNAL MEDICINE

## 2025-02-21 PROCEDURE — 36415 COLL VENOUS BLD VENIPUNCTURE: CPT | Performed by: INTERNAL MEDICINE

## 2025-02-21 PROCEDURE — 250N000013 HC RX MED GY IP 250 OP 250 PS 637: Performed by: UROLOGY

## 2025-02-21 PROCEDURE — 120N000001 HC R&B MED SURG/OB

## 2025-02-21 PROCEDURE — 250N000013 HC RX MED GY IP 250 OP 250 PS 637: Performed by: INTERNAL MEDICINE

## 2025-02-21 PROCEDURE — 80048 BASIC METABOLIC PNL TOTAL CA: CPT | Performed by: INTERNAL MEDICINE

## 2025-02-21 PROCEDURE — 97530 THERAPEUTIC ACTIVITIES: CPT | Mod: GP

## 2025-02-21 PROCEDURE — 82435 ASSAY OF BLOOD CHLORIDE: CPT | Performed by: INTERNAL MEDICINE

## 2025-02-21 PROCEDURE — 85018 HEMOGLOBIN: CPT | Performed by: INTERNAL MEDICINE

## 2025-02-21 RX ORDER — CEFDINIR 300 MG/1
300 CAPSULE ORAL EVERY 12 HOURS SCHEDULED
Status: DISCONTINUED | OUTPATIENT
Start: 2025-02-21 | End: 2025-02-24 | Stop reason: HOSPADM

## 2025-02-21 RX ORDER — SODIUM CHLORIDE 9 MG/ML
INJECTION, SOLUTION INTRAVENOUS CONTINUOUS
Status: DISCONTINUED | OUTPATIENT
Start: 2025-02-21 | End: 2025-02-23

## 2025-02-21 RX ADMIN — PROPRANOLOL HYDROCHLORIDE 10 MG: 10 TABLET ORAL at 09:08

## 2025-02-21 RX ADMIN — MECLIZINE HYDROCHLORIDE 25 MG: 25 TABLET ORAL at 20:37

## 2025-02-21 RX ADMIN — Medication 5 ML: at 16:44

## 2025-02-21 RX ADMIN — ACETAMINOPHEN 650 MG: 325 TABLET, FILM COATED ORAL at 20:37

## 2025-02-21 RX ADMIN — Medication 5 ML: at 09:09

## 2025-02-21 RX ADMIN — AMLODIPINE BESYLATE 5 MG: 5 TABLET ORAL at 09:08

## 2025-02-21 RX ADMIN — VENLAFAXINE HYDROCHLORIDE 37.5 MG: 37.5 CAPSULE, EXTENDED RELEASE ORAL at 09:08

## 2025-02-21 RX ADMIN — Medication 5 ML: at 20:32

## 2025-02-21 RX ADMIN — Medication 5 ML: at 13:06

## 2025-02-21 RX ADMIN — CEFDINIR 300 MG: 300 CAPSULE ORAL at 20:33

## 2025-02-21 RX ADMIN — ONDANSETRON 4 MG: 2 INJECTION INTRAMUSCULAR; INTRAVENOUS at 17:58

## 2025-02-21 RX ADMIN — LOSARTAN POTASSIUM 100 MG: 100 TABLET, FILM COATED ORAL at 09:08

## 2025-02-21 RX ADMIN — ACETAMINOPHEN 650 MG: 325 TABLET, FILM COATED ORAL at 07:21

## 2025-02-21 RX ADMIN — PROPRANOLOL HYDROCHLORIDE 10 MG: 10 TABLET ORAL at 20:33

## 2025-02-21 RX ADMIN — ONDANSETRON 4 MG: 2 INJECTION INTRAMUSCULAR; INTRAVENOUS at 07:24

## 2025-02-21 RX ADMIN — CEFTRIAXONE 1 G: 1 INJECTION, POWDER, FOR SOLUTION INTRAMUSCULAR; INTRAVENOUS at 06:00

## 2025-02-21 RX ADMIN — SODIUM CHLORIDE: 0.9 INJECTION, SOLUTION INTRAVENOUS at 18:21

## 2025-02-21 ASSESSMENT — ACTIVITIES OF DAILY LIVING (ADL)
ADLS_ACUITY_SCORE: 67
ADLS_ACUITY_SCORE: 58
ADLS_ACUITY_SCORE: 59
ADLS_ACUITY_SCORE: 67
ADLS_ACUITY_SCORE: 59
ADLS_ACUITY_SCORE: 61
ADLS_ACUITY_SCORE: 61
ADLS_ACUITY_SCORE: 59
ADLS_ACUITY_SCORE: 63
ADLS_ACUITY_SCORE: 61
ADLS_ACUITY_SCORE: 61
ADLS_ACUITY_SCORE: 67
ADLS_ACUITY_SCORE: 61
ADLS_ACUITY_SCORE: 61
ADLS_ACUITY_SCORE: 67
ADLS_ACUITY_SCORE: 63
ADLS_ACUITY_SCORE: 67
ADLS_ACUITY_SCORE: 61
ADLS_ACUITY_SCORE: 67
ADLS_ACUITY_SCORE: 67
ADLS_ACUITY_SCORE: 61

## 2025-02-21 NOTE — PLAN OF CARE
"Pt up frequently to void. PVR ~200. Straining for stone, none noted. . IVF infusing at 100. IV rocephin. Denied pain most of the night but complained of lower back pain early this morning- tylenol and heat used.     Shift events:   up frequently to void. Small hard stool x2. Slept poorly, emotional this morning.     Treatment plan:   Strain urine. Pain control. Urology, PT and SW following in addition to primary hospitalist.                               Problem: Adult Inpatient Plan of Care  Goal: Plan of Care Review  Description: The Plan of Care Review/Shift note should be completed every shift.  The Outcome Evaluation is a brief statement about your assessment that the patient is improving, declining, or no change.  This information will be displayed automatically on your shift  note.  Outcome: Progressing  Flowsheets (Taken 2/21/2025 0816)  Plan of Care Reviewed With: patient  Overall Patient Progress: no change  Goal: Patient-Specific Goal (Individualized)  Description: You can add care plan individualizations to a care plan. Examples of Individualization might be:  \"Parent requests to be called daily at 9am for status\", \"I have a hard time hearing out of my right ear\", or \"Do not touch me to wake me up as it startles  me\".  Outcome: Progressing  Goal: Absence of Hospital-Acquired Illness or Injury  Outcome: Progressing  Intervention: Identify and Manage Fall Risk  Recent Flowsheet Documentation  Taken 2/21/2025 0005 by Flores Rivera, RN  Safety Promotion/Fall Prevention: safety round/check completed  Intervention: Prevent Skin Injury  Recent Flowsheet Documentation  Taken 2/21/2025 0005 by Flores Rivera, RN  Body Position: position changed independently  Goal: Optimal Comfort and Wellbeing  Outcome: Progressing  Goal: Readiness for Transition of Care  Outcome: Progressing   Goal Outcome Evaluation:      Plan of Care Reviewed With: patient    Overall Patient Progress: no changeOverall Patient " Progress: no change

## 2025-02-21 NOTE — PROGRESS NOTES
Care Management Follow Up    Length of Stay (days): 2    Expected Discharge Date: 02/22/2025     Concerns to be Addressed: care coordination/care conferences, discharge planning     Patient plan of care discussed at interdisciplinary rounds: Yes    Anticipated Discharge Disposition:  Transitional Care  Anticipated Discharge Services:    Anticipated Discharge DME:      Patient/family educated on Medicare website which has current facility and service quality ratings:  yes  Education Provided on the Discharge Plan: Yes  Patient/Family in Agreement with the Plan:  yes    Referrals Placed by CM/SW:  Post Acute Facilities     Discussed  Partnership in Safe Discharge Planning  document with patient/family: No       Additional Information:  CM spoke w patient and family at bedside. Patient is agreeable to TCU at discharge, would like to [facility], and [facility]. CM sent. Patient stated family would be able to provide discharge transport.     Next Steps: follow up on TCU referrals, eva VALDEZ RN, BSN  Inpatient Care Coordination  [facility]  [phone]

## 2025-02-21 NOTE — PROGRESS NOTES
[facility]    Hospitalist Progress Note  Provider : Dylan Marte MD, MD  Date of Service (when I saw the patient): 02/21/2025    Assessment & Plan   Cynthia Arita is a 79 year old female with a history of IBS, Meniere's Disease, Dizziness, Tinnitus, Urinary Retention, Insomnia, HLD, HTN, Anxiety, GERD, Osteoporosis, Nephrolithiasis, Diverticulosis, Skin Cancer, Depression, Tremor, RLS, Hepatic Steatosis, T9 Vertebral Fracture who was admitted to the ED today with a fall and dizziness.     Renal Colic  Right Hydronephrosis and Hydroureter s/p Cystoscopy, right retrograde pyelogram, interpretation of fluoroscopic images. Right ureteral stent placement 2/19   Possible UTI  On admission, Tmax 99.4, wbc 2.1, creatinine wnl. UA is abnormal.  CT imaging reveals marked right hydronephrosis and hydroureter down to the level of a 2 mm stone at the right ureterovesicular junction with multiple bilateral nonobstructive renal calculi and bilateral renal cysts.  Patient underwent cystoscopy, right retrograde pyelogram, interpretation of fluoroscopic images. Right ureteral stent placement 2/19.   -She was treated with IV Ceftriaxone  -urine culture growing mixed urogenital ochoa.   -urology recommending to discharge her on empiric antibiotic to complete course of treatment. Transitioned to oral Cefdinir to complete course of treatment  -Likely discharge to TCU tomorrow      Fall  Chronic T9 Compression Fracture  Hx of Dementia  Per chart review she does show hx of Dementia with SLUMS score in 2023 of 17/30.  Patient has a known T9 compression fracture.  MRI Thoracic Spine from 8/2024 describes this as mild/mod of the inferior T9 endplate anteriorly with associated prominent marrow edema with slight retropulsion.  CT Spine on admission revealed an age indeterminate T9 vertebral body wedge compression fracture.  CTH is negative.    - NSx consulted by ED and recommended Thoracic MRI which reveals a  chronic appearing T9 compression fracture.   - pain meds prn  - PT/SW consulted for discharge planning  -Therapy recommending discharge to TCU     Meniere's Disease  Pt reports chronic dizziness at baseline.  -continue Meclizine prn     HTN/HLD  Tremor  -continue Propranolol, Amlodipine and Losartan     GERD  -continue PPI     Depression/Anxiety/Insomnia  - continue Venlafaxine     Osteoporosis  - resume Fosamax upon discharge     CODE: full  Diet/IVF: npo, NS  DVT ppx: scd  Medically Ready for Discharge: Anticipated in 2-4 Days    DVT Prophylaxis: Pneumatic Compression Devices    Code Status: Full Code    Medically Ready for Discharge: Tomorrow      Dylan Marte MD    Interval History   Patient seen and examined. Chart reviewed. She stated that she is feeling much better today. She denies pain. She has no nausea or vomiting. Also denies fever. No chest pain.     -Data reviewed today: I reviewed all new labs and imaging results over the last 24 hours. I personally reviewed     Physical Exam   Temp: 99.6  F (37.6  C) Temp src: Temporal BP: (!) 147/61 Pulse: 80   Resp: 17 SpO2: 94 % O2 Device: None (Room air) Oxygen Delivery: 1 LPM  Vitals:    02/19/25 0318   Weight: 67.3 kg (148 lb 5.9 oz)     Vital Signs with Ranges  Temp:  [97.9  F (36.6  C)-99.6  F (37.6  C)] 99.6  F (37.6  C)  Pulse:  [72-89] 80  Resp:  [16-18] 17  BP: (101-147)/(46-64) 147/61  SpO2:  [91 %-96 %] 94 %  I/O last 3 completed shifts:  In: 500 [P.O.:500]  Out: 1225 [Urine:1225]    GEN:  Alert, awake. Oriented to self  HEENT:  moist buccal mucosa  CV:  Regular rate and rhythm, no murmur or JVD.  S1 + S2 noted, no S3 or S4.  LUNGS:  Clear to auscultation bilaterally without rales/rhonchi/wheezing/retractions.  Symmetric chest rise on inhalation noted.  ABD:  Active bowel sounds, soft, non-tender/non-distended.  No rebound/guarding/rigidity.  EXT:  No edema or cyanosis.  Hands/feet warm to touch with good signs of peripheral perfusion.  No joint  synovitis noted.  SKIN:  Dry to touch, no exanthems noted in the visualized areas.  NEURO:  Symmetric muscle strength, sensation to touch grossly intact.  No new focal deficits appreciated.    Medications   Current Facility-Administered Medications   Medication Dose Route Frequency Provider Last Rate Last Admin     Current Facility-Administered Medications   Medication Dose Route Frequency Provider Last Rate Last Admin    amLODIPine (NORVASC) tablet 5 mg  5 mg Oral Daily Joseluis Ramires MD   5 mg at 02/21/25 0908    artificial saliva (BIOTENE DRY MOUTHWASH) liquid 5 mL  5 mL Swish & Spit 4x Daily Joseluis Ramires MD   5 mL at 02/21/25 1306    cefTRIAXone (ROCEPHIN) 1 g vial to attach to  mL bag for ADULTS or NS 50 mL bag for PEDS  1 g Intravenous Q24H Joseluis Ramires MD   1 g at 02/21/25 0600    losartan (COZAAR) tablet 100 mg  100 mg Oral QAM Joseluis Ramires MD   100 mg at 02/21/25 0908    propranolol (INDERAL) tablet 10 mg  10 mg Oral BID Joseluis Ramires MD   10 mg at 02/21/25 0908    sodium chloride (PF) 0.9% PF flush 3 mL  3 mL Intracatheter Q8H Joseluis Ramires MD   3 mL at 02/21/25 0908    venlafaxine (EFFEXOR XR) 24 hr capsule 37.5 mg  37.5 mg Oral Daily Joseluis Ramires MD   37.5 mg at 02/21/25 0908       Data   Recent Labs   Lab 02/21/25  0817 02/20/25  1123 02/19/25  1440 02/19/25  0340   WBC 7.8 9.6  --  2.1*   HGB 9.7* 9.8*  --  12.2   MCV 89 88  --  91   * 143*  --  199   * 129*  --  138   POTASSIUM 3.5 3.6  --  3.5   CHLORIDE 99 100  --  100   CO2 17* 19*  --  23   BUN 15.3 28.5*  --  24.7*   CR 0.50* 0.77  --  0.79   ANIONGAP 15 10  --  15   ANDERS 8.4* 8.0*  --  9.7   GLC 80 123* 82 96       No results found for this or any previous visit (from the past 24 hours).

## 2025-02-21 NOTE — PLAN OF CARE
"Goal Outcome Evaluation:      Plan of Care Reviewed With: patient, family    Overall Patient Progress: improvingOverall Patient Progress: improving    Outcome Evaluation: int confusion. urgency and freq. incont of urine x 1. family here. sitting up in the chair. sl iv. up with one assist. gb. walker. amb to bathroom. tcu planning.    VS-afebrile, stable. Int confused. [place].   Lung Sounds-clear, no cough, on room air. Taking deep breathes.   O2-on room air.   GI  -faint Bs. -flatus lbm is unknown. Tolerating reg diet. Denies nausea.   -voiding in bathroom and bsc. Incont at times. Has urgency and frequency. Straining urine.   IVF-sl iv.   Dressings-none.   CMS-denies numbness and tingling, +pp.   Drain-none.   Activity-up with one assist. Gb, walker. Working with PT.   Pain-denies pain and no pain meds given.   D/C Plan-family here. Supportive of cares. Family decided tcu. Sws met with family.       Problem: Adult Inpatient Plan of Care  Goal: Plan of Care Review  Description: The Plan of Care Review/Shift note should be completed every shift.  The Outcome Evaluation is a brief statement about your assessment that the patient is improving, declining, or no change.  This information will be displayed automatically on your shift  note.  Outcome: Progressing  Flowsheets (Taken 2/21/2025 1129)  Outcome Evaluation: int confusion. urgency and freq. incont of urine x 1. family here. sitting up in the chair. sl iv. up with one assist. gb. walker. amb to bathroom. tcu planning.  Plan of Care Reviewed With:   patient   family  Overall Patient Progress: improving  Goal: Patient-Specific Goal (Individualized)  Description: You can add care plan individualizations to a care plan. Examples of Individualization might be:  \"Parent requests to be called daily at 9am for status\", \"I have a hard time hearing out of my right ear\", or \"Do not touch me to wake me up as it startles  me\".  Outcome: Progressing  Goal: Absence of " Hospital-Acquired Illness or Injury  Outcome: Progressing  Intervention: Identify and Manage Fall Risk  Recent Flowsheet Documentation  Taken 2/21/2025 0900 by Olinda Rasheed RN  Safety Promotion/Fall Prevention: safety round/check completed  Intervention: Prevent Skin Injury  Recent Flowsheet Documentation  Taken 2/21/2025 0900 by Olinda Rasheed RN  Body Position: position changed independently  Skin Protection: adhesive use limited  Intervention: Prevent and Manage VTE (Venous Thromboembolism) Risk  Recent Flowsheet Documentation  Taken 2/21/2025 0900 by Olinda Rasheed RN  VTE Prevention/Management: SCDs on (sequential compression devices)  Intervention: Prevent Infection  Recent Flowsheet Documentation  Taken 2/21/2025 0900 by Olinda Rasheed RN  Infection Prevention:   rest/sleep promoted   single patient room provided   hand hygiene promoted  Goal: Optimal Comfort and Wellbeing  Outcome: Progressing  Intervention: Monitor Pain and Promote Comfort  Recent Flowsheet Documentation  Taken 2/21/2025 0900 by Olinda Rasheed RN  Pain Management Interventions:   medication (see MAR)   rest  Goal: Readiness for Transition of Care  Outcome: Progressing     Problem: Surgery Nonspecified  Goal: Absence of Bleeding  Outcome: Progressing  Goal: Effective Bowel Elimination  Outcome: Progressing  Goal: Fluid and Electrolyte Balance  Outcome: Progressing  Intervention: Monitor and Manage Fluid and Electrolyte Balance  Recent Flowsheet Documentation  Taken 2/21/2025 0900 by Olinda Rasheed RN  Fluid/Electrolyte Management: fluids provided  Goal: Blood Glucose Level Within Targeted Range  Outcome: Progressing  Goal: Absence of Infection Signs and Symptoms  Outcome: Progressing  Intervention: Prevent or Manage Infection  Recent Flowsheet Documentation  Taken 2/21/2025 0900 by Olinda Rasheed RN  Infection Management: aseptic technique maintained  Goal: Anesthesia/Sedation Recovery  Outcome: Progressing  Intervention: Optimize  Anesthesia Recovery  Recent Flowsheet Documentation  Taken 2/21/2025 0900 by Olinda Rasheed RN  Safety Promotion/Fall Prevention: safety round/check completed  Reorientation Measures:   clock in view   reorientation provided  Goal: Optimal Pain Control and Function  Outcome: Progressing  Intervention: Prevent or Manage Pain  Recent Flowsheet Documentation  Taken 2/21/2025 0900 by Olinda Rasheed RN  Pain Management Interventions:   medication (see MAR)   rest  Goal: Nausea and Vomiting Relief  Outcome: Progressing  Goal: Effective Urinary Elimination  Outcome: Progressing  Intervention: Monitor and Manage Urinary Retention  Recent Flowsheet Documentation  Taken 2/21/2025 0900 by Olinda Rasheed RN  Urinary Elimination Promotion:   absorbent pad/diaper use encouraged   positioned for ease of voiding  Goal: Effective Oxygenation and Ventilation  Outcome: Progressing  Intervention: Optimize Oxygenation and Ventilation  Recent Flowsheet Documentation  Taken 2/21/2025 1128 by Olinda Rasheed RN  Activity Management:   ambulated to bathroom   up in chair  Taken 2/21/2025 0900 by Olinda Rasheed RN  Cough And Deep Breathing: done independently per patient  Airway/Ventilation Management:   pulmonary hygiene promoted   airway patency maintained  Activity Management:   activity adjusted per tolerance   up to bedside commode  Head of Bed (HOB) Positioning: HOB at 30-45 degrees

## 2025-02-21 NOTE — PLAN OF CARE
" Goal Outcome Evaluation:      Plan of Care Reviewed With: patient    Overall Patient Progress: no changeOverall Patient Progress: no change    Outcome Evaluation: Intermittent confusion, 1 LPM NC, pain mngd w/ tylenol, dizziness mngd w/ IVF & meclizine, Ax1 GB & walker pivot to bedside commode, voiding ok urine strained, discharge plan TBD    Problem: Adult Inpatient Plan of Care  Goal: Plan of Care Review  Description: The Plan of Care Review/Shift note should be completed every shift.  The Outcome Evaluation is a brief statement about your assessment that the patient is improving, declining, or no change.  This information will be displayed automatically on your shift  note.  Outcome: Progressing  Flowsheets (Taken 2/20/2025 2305)  Outcome Evaluation: Intermittent confusion, 1 LPM NC, pain mngd w/ tylenol, dizziness mngd w/ IVF & meclizine, Ax1 GB & walker pivot to bedside commode, voiding ok urine strained, discharge plan TBD  Plan of Care Reviewed With: patient  Overall Patient Progress: no change  Goal: Patient-Specific Goal (Individualized)  Description: You can add care plan individualizations to a care plan. Examples of Individualization might be:  \"Parent requests to be called daily at 9am for status\", \"I have a hard time hearing out of my right ear\", or \"Do not touch me to wake me up as it startles  me\".  Outcome: Progressing  Goal: Absence of Hospital-Acquired Illness or Injury  Outcome: Progressing  Intervention: Identify and Manage Fall Risk  Recent Flowsheet Documentation  Taken 2/20/2025 1648 by Malka Godfrey, RN  Safety Promotion/Fall Prevention: safety round/check completed  Intervention: Prevent Skin Injury  Recent Flowsheet Documentation  Taken 2/20/2025 1648 by Malka Godfrey, RN  Body Position: supine, head elevated  Intervention: Prevent and Manage VTE (Venous Thromboembolism) Risk  Recent Flowsheet Documentation  Taken 2/20/2025 1648 by Malka Godfrey, HUSAM  VTE " Prevention/Management: SCDs on (sequential compression devices)  Intervention: Prevent Infection  Recent Flowsheet Documentation  Taken 2/20/2025 1648 by Malka Godfrey RN  Infection Prevention: hand hygiene promoted  Goal: Optimal Comfort and Wellbeing  Outcome: Progressing  Intervention: Monitor Pain and Promote Comfort  Recent Flowsheet Documentation  Taken 2/20/2025 2043 by Malka Godfrey RN  Pain Management Interventions:   medication (see MAR)   rest   quiet environment facilitated  Goal: Readiness for Transition of Care  Outcome: Progressing     Problem: Surgery Nonspecified  Goal: Absence of Bleeding  Outcome: Progressing  Goal: Effective Bowel Elimination  Outcome: Progressing  Goal: Fluid and Electrolyte Balance  Outcome: Progressing  Goal: Blood Glucose Level Within Targeted Range  Outcome: Progressing  Goal: Absence of Infection Signs and Symptoms  Outcome: Progressing  Goal: Anesthesia/Sedation Recovery  Outcome: Progressing  Intervention: Optimize Anesthesia Recovery  Recent Flowsheet Documentation  Taken 2/20/2025 1648 by Malka Godfrey RN  Safety Promotion/Fall Prevention: safety round/check completed  Goal: Optimal Pain Control and Function  Outcome: Progressing  Intervention: Prevent or Manage Pain  Recent Flowsheet Documentation  Taken 2/20/2025 2043 by Malka Godfrey RN  Pain Management Interventions:   medication (see MAR)   rest   quiet environment facilitated  Goal: Nausea and Vomiting Relief  Outcome: Progressing  Goal: Effective Urinary Elimination  Outcome: Progressing  Goal: Effective Oxygenation and Ventilation  Outcome: Progressing  Intervention: Optimize Oxygenation and Ventilation  Recent Flowsheet Documentation  Taken 2/20/2025 1915 by Malka Godfrey RN  Activity Management: up to bedside commode  Taken 2/20/2025 1733 by Malka Godfrey RN  Activity Management: up to bedside commode  Taken 2/20/2025 1648 by Malka Godfrey RN  Cough And Deep Breathing:  done independently per patient  Activity Management: activity adjusted per tolerance  Head of Bed (HOB) Positioning: HOB at 30-45 degrees

## 2025-02-22 PROCEDURE — 250N000011 HC RX IP 250 OP 636: Performed by: UROLOGY

## 2025-02-22 PROCEDURE — 250N000013 HC RX MED GY IP 250 OP 250 PS 637: Performed by: INTERNAL MEDICINE

## 2025-02-22 PROCEDURE — 250N000013 HC RX MED GY IP 250 OP 250 PS 637: Performed by: UROLOGY

## 2025-02-22 PROCEDURE — 258N000003 HC RX IP 258 OP 636: Performed by: INTERNAL MEDICINE

## 2025-02-22 PROCEDURE — 99232 SBSQ HOSP IP/OBS MODERATE 35: CPT | Performed by: INTERNAL MEDICINE

## 2025-02-22 PROCEDURE — 120N000001 HC R&B MED SURG/OB

## 2025-02-22 RX ADMIN — AMLODIPINE BESYLATE 5 MG: 5 TABLET ORAL at 09:45

## 2025-02-22 RX ADMIN — PROPRANOLOL HYDROCHLORIDE 10 MG: 10 TABLET ORAL at 09:46

## 2025-02-22 RX ADMIN — VENLAFAXINE HYDROCHLORIDE 37.5 MG: 37.5 CAPSULE, EXTENDED RELEASE ORAL at 09:45

## 2025-02-22 RX ADMIN — MECLIZINE HYDROCHLORIDE 25 MG: 25 TABLET ORAL at 20:37

## 2025-02-22 RX ADMIN — SODIUM CHLORIDE: 0.9 INJECTION, SOLUTION INTRAVENOUS at 14:41

## 2025-02-22 RX ADMIN — Medication 5 ML: at 12:22

## 2025-02-22 RX ADMIN — CEFDINIR 300 MG: 300 CAPSULE ORAL at 20:04

## 2025-02-22 RX ADMIN — ONDANSETRON 4 MG: 2 INJECTION INTRAMUSCULAR; INTRAVENOUS at 12:44

## 2025-02-22 RX ADMIN — Medication 5 ML: at 17:41

## 2025-02-22 RX ADMIN — PROPRANOLOL HYDROCHLORIDE 10 MG: 10 TABLET ORAL at 20:04

## 2025-02-22 RX ADMIN — ACETAMINOPHEN 650 MG: 325 TABLET, FILM COATED ORAL at 20:13

## 2025-02-22 RX ADMIN — LOSARTAN POTASSIUM 100 MG: 100 TABLET, FILM COATED ORAL at 09:46

## 2025-02-22 RX ADMIN — MECLIZINE HYDROCHLORIDE 25 MG: 25 TABLET ORAL at 14:35

## 2025-02-22 RX ADMIN — Medication 5 ML: at 09:47

## 2025-02-22 RX ADMIN — CEFDINIR 300 MG: 300 CAPSULE ORAL at 09:45

## 2025-02-22 RX ADMIN — Medication 5 ML: at 20:13

## 2025-02-22 RX ADMIN — SODIUM CHLORIDE: 0.9 INJECTION, SOLUTION INTRAVENOUS at 03:29

## 2025-02-22 ASSESSMENT — ACTIVITIES OF DAILY LIVING (ADL)
ADLS_ACUITY_SCORE: 58
ADLS_ACUITY_SCORE: 62
ADLS_ACUITY_SCORE: 62
ADLS_ACUITY_SCORE: 58
ADLS_ACUITY_SCORE: 44
ADLS_ACUITY_SCORE: 58
ADLS_ACUITY_SCORE: 58
ADLS_ACUITY_SCORE: 44
ADLS_ACUITY_SCORE: 58
ADLS_ACUITY_SCORE: 58
ADLS_ACUITY_SCORE: 43
ADLS_ACUITY_SCORE: 44
ADLS_ACUITY_SCORE: 40
ADLS_ACUITY_SCORE: 43
ADLS_ACUITY_SCORE: 58

## 2025-02-22 NOTE — PROGRESS NOTES
[facility]  Hospitalist Progress Note  Admit 2/19/2025  3:06 AM    Name: Cynthia Arita    MRN: [id number]  Provider:  Dario Hill MD, [facility]    Date of Service: 02/22/2025     Reason for Stay (Diagnosis): Renal Colic, Right Hydronephrosis/Hydroureter, S/p Ureteral Stent (2/19)         Summary of hospital stay & Assessment/Plan:   Summary of Stay: Cynthia Arita is a 79 year old female who was admitted on 2/19/2025    79F with a history of IBS, Meniere's disease, urinary retention, osteoporosis, HTN, HLD, depression, and T9 compression fracture, admitted for fall and dizziness, found to have right renal colic with hydronephrosis and hydroureter due to a 2 mm UVJ stone. Underwent cystoscopy with ureteral stent placement on 2/19. UA abnormal, urine culture with mixed ochoa; treated with IV ceftriaxone, now transitioned to oral cefdinir. Chronic T9 compression fracture noted, with NSx recommending pain management. PT/SW involved, with anticipated TCU discharge tomorrow.  Renal Colic, Right Hydronephrosis/Hydroureter, S/p Ureteral Stent (2/19)  2 mm UVJ stone with bilateral nonobstructing stones, renal cysts  Treated with IV ceftriaxone, transitioned to oral cefdinir  Urology recommends completing antibiotic course  Anticipate TCU discharge when bed is available  Fall, Chronic T9 Compression Fracture  MRI: Chronic T9 wedge compression, stable  Pain control PRN  PT/SW consulted, therapy recommends TCU  Meniere's Disease  Baseline dizziness  Continue Meclizine PRN  Hypertension, Hyperlipidemia, Tremor  Continue propranolol, amlodipine, losartan  GERD  Continue PPI  Depression, Anxiety, Insomnia  Continue venlafaxine  Osteoporosis  Resume Fosamax upon discharge      Clinically Significant Risk Factors         # Hyponatremia: Lowest Na = 129 mmol/L in last 2 days, will monitor as appropriate           # Hypertension: Noted on problem list            # Overweight: Estimated body mass index is 25.47 kg/m  as  "calculated from the following:    Height as of this encounter: 1.626 m (5' 4\").    Weight as of this encounter: 67.3 kg (148 lb 5.9 oz)., PRESENT ON ADMISSION              DVT Prophylaxis: Pneumatic Compression Devices  Code Status:  Full Code    Disposition Plan     Medically Ready for Discharge: Ready Now   transitional care unit once when a bed is available.     Entered: Dario Hill MD 02/22/2025, 8:02 AM         Family with her in the room updated        Interval History:       Denies new complaints  Today's plan detailed above discussed with nursing               Physical Exam:   Physical Exam   Temp: 98.2  F (36.8  C) Temp src: Temporal BP: 136/51 Pulse: 75   Resp: 17 SpO2: (!) 89 % (nurse notified) O2 Device: None (Room air)    Vitals:    02/19/25 0318   Weight: 67.3 kg (148 lb 5.9 oz)     I/O last 3 completed shifts:  In: 420 [P.O.:420]  Out: 1050 [Urine:1050]          GENERAL:  Comfortable.   PSYCH: pleasant, oriented, No acute distress.  EYES: PERRLA, Normal conjunctiva.  HEART:  Normal S1, S2 with no edema.  LUNGS:  Clear to auscultation, normal Respiratory effort.  ABDOMEN:  Soft, no hepatosplenomegaly, normal bowel sounds.  SKIN:  Dry to touch, No rash.  Neuro: Non focal with normal motor power, sensation, CN's and Reflexes.    Medications   Current Facility-Administered Medications   Medication Dose Route Frequency Provider Last Rate Last Admin    sodium chloride 0.9 % infusion   Intravenous Continuous Dylan Marte  mL/hr at 02/22/25 0329 New Bag at 02/22/25 0329     Current Facility-Administered Medications   Medication Dose Route Frequency Provider Last Rate Last Admin    amLODIPine (NORVASC) tablet 5 mg  5 mg Oral Daily Joseluis Ramires MD   5 mg at 02/21/25 0908    artificial saliva (BIOTENE DRY MOUTHWASH) liquid 5 mL  5 mL Swish & Spit 4x Daily Joseluis Ramires MD   5 mL at 02/21/25 2032    cefdinir (OMNICEF) capsule 300 mg  300 mg Oral Q12H [facility] (08/20) " Dylan Marte MD   300 mg at 02/21/25 2033    losartan (COZAAR) tablet 100 mg  100 mg Oral QAM Joseluis Ramires MD   100 mg at 02/21/25 0908    propranolol (INDERAL) tablet 10 mg  10 mg Oral BID Joseluis Ramires MD   10 mg at 02/21/25 2033    sodium chloride (PF) 0.9% PF flush 3 mL  3 mL Intracatheter Q8H Joseluis Ramires MD   3 mL at 02/21/25 1644    venlafaxine (EFFEXOR XR) 24 hr capsule 37.5 mg  37.5 mg Oral Daily Joseluis Ramires MD   37.5 mg at 02/21/25 0908     Data     -Data reviewed today:  I personally reviewed  all new labs and imaging results over the last 24 hours.    Recent Labs   Lab 02/21/25  0817 02/20/25  1123 02/19/25  0340   WBC 7.8 9.6 2.1*   HGB 9.7* 9.8* 12.2   HCT 30.2* 30.3* 38.5   MCV 89 88 91   * 143* 199     Recent Labs   Lab 02/21/25  0817 02/20/25  1123 02/19/25  1440 02/19/25  0340   * 129*  --  138   POTASSIUM 3.5 3.6  --  3.5   CHLORIDE 99 100  --  100   CO2 17* 19*  --  23   ANIONGAP 15 10  --  15   GLC 80 123* 82 96   BUN 15.3 28.5*  --  24.7*   CR 0.50* 0.77  --  0.79   GFRESTIMATED >90 78  --  75   ANDERS 8.4* 8.0*  --  9.7       No results found for this or any previous visit (from the past 24 hours).    This document was produced using voice recognition software

## 2025-02-22 NOTE — PLAN OF CARE
"Goal Outcome Evaluation:      Plan of Care Reviewed With: patient    Overall Patient Progress: improvingOverall Patient Progress: improving    Outcome Evaluation: able to shower. family here. supportive. int confused, unable to tell me month, day, date. +recall on year. name of hospital, reason    VS-stable, afebrile,   Lung Sounds-clear, no cough, taking deep breathes.   O2-applied 2 LNC, to maintain sats above 90% weaned to room air during the day   GI-+Bs, +Flatus, lbm unknown. Tolerating reg diet. Denies nausea.   -voiding, can be incont. Adequate. Straining urine.   IVF- at 100.   Dressings-none..   CMS-denies numbness and tingling, +pp. Warm.   Drain-none.   Activity-up with one assist. Walker, gb, walking to bathroom, sitting up in the chair. Able to shower.   Pain-denies at this time. No pain meds given.   D/C Plan-tcu. Medically ready.   Family supportive, present.     1200 and 1430When getting up  to the bathroom, pt is getting dizzy, nausea and vomited. Iv zofran given. Meclizine given. Did not eat lunch.        Problem: Adult Inpatient Plan of Care  Goal: Plan of Care Review  Description: The Plan of Care Review/Shift note should be completed every shift.  The Outcome Evaluation is a brief statement about your assessment that the patient is improving, declining, or no change.  This information will be displayed automatically on your shift  note.  Outcome: Progressing  Flowsheets (Taken 2/22/2025 1137)  Outcome Evaluation: able to shower. family here. supportive. int confused, unable to tell me month, day, date. +recall on year. name of hospital, reason  Plan of Care Reviewed With: patient  Overall Patient Progress: improving  Goal: Patient-Specific Goal (Individualized)  Description: You can add care plan individualizations to a care plan. Examples of Individualization might be:  \"Parent requests to be called daily at 9am for status\", \"I have a hard time hearing out of my right ear\", or \"Do not touch " "me to wake me up as it startles  me\".  Outcome: Progressing  Goal: Absence of Hospital-Acquired Illness or Injury  Outcome: Progressing  Intervention: Identify and Manage Fall Risk  Recent Flowsheet Documentation  Taken 2/22/2025 0900 by Olinda Rasheed RN  Safety Promotion/Fall Prevention: safety round/check completed  Intervention: Prevent Skin Injury  Recent Flowsheet Documentation  Taken 2/22/2025 0900 by Olinda Rasheed RN  Skin Protection: protective footwear used  Intervention: Prevent and Manage VTE (Venous Thromboembolism) Risk  Recent Flowsheet Documentation  Taken 2/22/2025 0900 by Olinda Rasheed RN  VTE Prevention/Management: SCDs on (sequential compression devices)  Intervention: Prevent Infection  Recent Flowsheet Documentation  Taken 2/22/2025 0900 by Olinda Rasheed RN  Infection Prevention: hand hygiene promoted  Goal: Optimal Comfort and Wellbeing  Outcome: Progressing  Goal: Readiness for Transition of Care  Outcome: Progressing     Problem: Surgery Nonspecified  Goal: Absence of Bleeding  Outcome: Progressing  Goal: Effective Bowel Elimination  Outcome: Progressing  Goal: Fluid and Electrolyte Balance  Outcome: Progressing  Intervention: Monitor and Manage Fluid and Electrolyte Balance  Recent Flowsheet Documentation  Taken 2/22/2025 0900 by Olinda Rasheed RN  Fluid/Electrolyte Management: fluids provided  Goal: Blood Glucose Level Within Targeted Range  Outcome: Progressing  Goal: Absence of Infection Signs and Symptoms  Outcome: Progressing  Intervention: Prevent or Manage Infection  Recent Flowsheet Documentation  Taken 2/22/2025 0900 by Olinda Rasheed RN  Infection Management: aseptic technique maintained  Goal: Anesthesia/Sedation Recovery  Outcome: Progressing  Intervention: Optimize Anesthesia Recovery  Recent Flowsheet Documentation  Taken 2/22/2025 0900 by Olinda Rasheed RN  Safety Promotion/Fall Prevention: safety round/check completed  Reorientation Measures:   clock in view   familiar " social contact encouraged  Stabilization Measures: verbal stimulation provided  Goal: Optimal Pain Control and Function  Outcome: Progressing  Goal: Nausea and Vomiting Relief  Outcome: Progressing  Goal: Effective Urinary Elimination  Outcome: Progressing  Intervention: Monitor and Manage Urinary Retention  Recent Flowsheet Documentation  Taken 2/22/2025 0900 by Olinda Rasheed RN  Urinary Elimination Promotion: toileting offered  Goal: Effective Oxygenation and Ventilation  Outcome: Progressing  Intervention: Optimize Oxygenation and Ventilation  Recent Flowsheet Documentation  Taken 2/22/2025 0900 by Olinda Rasheed RN  Cough And Deep Breathing: done independently per patient  Airway/Ventilation Management:   airway patency maintained   pulmonary hygiene promoted  Activity Management: activity adjusted per tolerance

## 2025-02-22 NOTE — PLAN OF CARE
"Goal Outcome Evaluation:      Plan of Care Reviewed With: patient    Overall Patient Progress: no changeOverall Patient Progress: no change    Outcome Evaluation: Intermittent confusion, lethargic, VSS on RA, pain mngd w/ tylenol, dizziness mngd w/ meclizine, triggered sepsis- IVF infusing, lactic checked, nausea mngd w/ zofran, voiding ok via bathroom, discharge date TBD    Problem: Adult Inpatient Plan of Care  Goal: Plan of Care Review  Description: The Plan of Care Review/Shift note should be completed every shift.  The Outcome Evaluation is a brief statement about your assessment that the patient is improving, declining, or no change.  This information will be displayed automatically on your shift  note.  Outcome: Progressing  Flowsheets (Taken 2/21/2025 2239)  Outcome Evaluation: Intermittent confusion, lethargic, VSS on, pain mngd w/ tylenol, dizziness mngd w/ meclizine, triggered sepsis- IVF infusing, lactic monitored, nausea mngd w/ zofran, voiding ok via bathroom, discharge date TBD  Plan of Care Reviewed With: patient  Overall Patient Progress: no change  Goal: Patient-Specific Goal (Individualized)  Description: You can add care plan individualizations to a care plan. Examples of Individualization might be:  \"Parent requests to be called daily at 9am for status\", \"I have a hard time hearing out of my right ear\", or \"Do not touch me to wake me up as it startles  me\".  Outcome: Progressing  Goal: Absence of Hospital-Acquired Illness or Injury  Outcome: Progressing  Intervention: Identify and Manage Fall Risk  Recent Flowsheet Documentation  Taken 2/21/2025 1646 by Malka Godfrey, RN  Safety Promotion/Fall Prevention: safety round/check completed  Intervention: Prevent and Manage VTE (Venous Thromboembolism) Risk  Recent Flowsheet Documentation  Taken 2/21/2025 1646 by Malka Godfrey, RN  VTE Prevention/Management: SCDs on (sequential compression devices)  Intervention: Prevent Infection  Recent " Flowsheet Documentation  Taken 2/21/2025 1646 by Malka Godfrey RN  Infection Prevention: hand hygiene promoted  Goal: Optimal Comfort and Wellbeing  Outcome: Progressing  Intervention: Monitor Pain and Promote Comfort  Recent Flowsheet Documentation  Taken 2/21/2025 2123 by Malka Godfrey RN  Pain Management Interventions: rest  Taken 2/21/2025 2028 by Malka Godfrey RN  Pain Management Interventions:   medication (see MAR)   quiet environment facilitated  Goal: Readiness for Transition of Care  Outcome: Progressing     Problem: Surgery Nonspecified  Goal: Absence of Bleeding  Outcome: Progressing  Goal: Effective Bowel Elimination  Outcome: Progressing  Goal: Fluid and Electrolyte Balance  Outcome: Progressing  Intervention: Monitor and Manage Fluid and Electrolyte Balance  Recent Flowsheet Documentation  Taken 2/21/2025 1646 by Malka Godfrey RN  Fluid/Electrolyte Management: fluids provided  Goal: Blood Glucose Level Within Targeted Range  Outcome: Progressing  Goal: Absence of Infection Signs and Symptoms  Outcome: Progressing  Goal: Anesthesia/Sedation Recovery  Outcome: Progressing  Intervention: Optimize Anesthesia Recovery  Recent Flowsheet Documentation  Taken 2/21/2025 1646 by Malka Godfrey RN  Safety Promotion/Fall Prevention: safety round/check completed  Reorientation Measures:   clock in view   familiar social contact encouraged  Goal: Optimal Pain Control and Function  Outcome: Progressing  Intervention: Prevent or Manage Pain  Recent Flowsheet Documentation  Taken 2/21/2025 2123 by Malka Godfrey RN  Pain Management Interventions: rest  Taken 2/21/2025 2028 by Malka Godfrey RN  Pain Management Interventions:   medication (see MAR)   quiet environment facilitated  Goal: Nausea and Vomiting Relief  Outcome: Progressing  Goal: Effective Urinary Elimination  Outcome: Progressing  Intervention: Monitor and Manage Urinary Retention  Recent Flowsheet  Documentation  Taken 2/21/2025 1646 by Malka Godfrey, RN  Urinary Elimination Promotion: toileting offered  Goal: Effective Oxygenation and Ventilation  Outcome: Progressing  Intervention: Optimize Oxygenation and Ventilation  Recent Flowsheet Documentation  Taken 2/21/2025 1646 by Malka Godfrey, RN  Cough And Deep Breathing: done independently per patient  Activity Management: activity adjusted per tolerance

## 2025-02-23 ENCOUNTER — APPOINTMENT (OUTPATIENT)
Dept: PHYSICAL THERAPY | Facility: CLINIC | Age: 80
DRG: 660 | End: 2025-02-23
Payer: MEDICARE

## 2025-02-23 PROCEDURE — 99239 HOSP IP/OBS DSCHRG MGMT >30: CPT | Performed by: INTERNAL MEDICINE

## 2025-02-23 PROCEDURE — 120N000001 HC R&B MED SURG/OB

## 2025-02-23 PROCEDURE — 250N000013 HC RX MED GY IP 250 OP 250 PS 637: Performed by: INTERNAL MEDICINE

## 2025-02-23 PROCEDURE — 97116 GAIT TRAINING THERAPY: CPT | Mod: GP | Performed by: PHYSICAL THERAPIST

## 2025-02-23 PROCEDURE — 250N000013 HC RX MED GY IP 250 OP 250 PS 637: Performed by: UROLOGY

## 2025-02-23 PROCEDURE — 258N000003 HC RX IP 258 OP 636: Performed by: INTERNAL MEDICINE

## 2025-02-23 PROCEDURE — 250N000011 HC RX IP 250 OP 636: Performed by: UROLOGY

## 2025-02-23 PROCEDURE — 97530 THERAPEUTIC ACTIVITIES: CPT | Mod: GP | Performed by: PHYSICAL THERAPIST

## 2025-02-23 RX ADMIN — PROPRANOLOL HYDROCHLORIDE 10 MG: 10 TABLET ORAL at 09:43

## 2025-02-23 RX ADMIN — ONDANSETRON 4 MG: 4 TABLET, ORALLY DISINTEGRATING ORAL at 21:58

## 2025-02-23 RX ADMIN — MECLIZINE HYDROCHLORIDE 25 MG: 25 TABLET ORAL at 15:36

## 2025-02-23 RX ADMIN — ACETAMINOPHEN 650 MG: 325 TABLET, FILM COATED ORAL at 15:36

## 2025-02-23 RX ADMIN — SODIUM CHLORIDE: 0.9 INJECTION, SOLUTION INTRAVENOUS at 00:20

## 2025-02-23 RX ADMIN — ONDANSETRON 4 MG: 4 TABLET, ORALLY DISINTEGRATING ORAL at 15:52

## 2025-02-23 RX ADMIN — LOSARTAN POTASSIUM 100 MG: 100 TABLET, FILM COATED ORAL at 09:43

## 2025-02-23 RX ADMIN — Medication 5 ML: at 12:59

## 2025-02-23 RX ADMIN — Medication 5 ML: at 15:36

## 2025-02-23 RX ADMIN — CEFDINIR 300 MG: 300 CAPSULE ORAL at 09:43

## 2025-02-23 RX ADMIN — Medication 5 ML: at 19:38

## 2025-02-23 RX ADMIN — Medication 5 ML: at 09:43

## 2025-02-23 RX ADMIN — PROPRANOLOL HYDROCHLORIDE 10 MG: 10 TABLET ORAL at 19:35

## 2025-02-23 RX ADMIN — SODIUM CHLORIDE: 0.9 INJECTION, SOLUTION INTRAVENOUS at 10:00

## 2025-02-23 RX ADMIN — CEFDINIR 300 MG: 300 CAPSULE ORAL at 19:35

## 2025-02-23 RX ADMIN — AMLODIPINE BESYLATE 5 MG: 5 TABLET ORAL at 09:43

## 2025-02-23 RX ADMIN — VENLAFAXINE HYDROCHLORIDE 37.5 MG: 37.5 CAPSULE, EXTENDED RELEASE ORAL at 09:43

## 2025-02-23 ASSESSMENT — ACTIVITIES OF DAILY LIVING (ADL)
ADLS_ACUITY_SCORE: 43
ADLS_ACUITY_SCORE: 43
ADLS_ACUITY_SCORE: 40
ADLS_ACUITY_SCORE: 43
ADLS_ACUITY_SCORE: 44
ADLS_ACUITY_SCORE: 44
ADLS_ACUITY_SCORE: 43
ADLS_ACUITY_SCORE: 43
ADLS_ACUITY_SCORE: 44
ADLS_ACUITY_SCORE: 40
ADLS_ACUITY_SCORE: 43
ADLS_ACUITY_SCORE: 44
ADLS_ACUITY_SCORE: 43
ADLS_ACUITY_SCORE: 43
ADLS_ACUITY_SCORE: 40
ADLS_ACUITY_SCORE: 40
ADLS_ACUITY_SCORE: 44
ADLS_ACUITY_SCORE: 40

## 2025-02-23 NOTE — PLAN OF CARE
"Pt is alert and intermittently confused. Pt denies any pain or shortness of breath and on room air.    IV infusing at 100 ml/hr.     Pt ambulates 1 GB+W pivot to bedside commode. Ongoing monitoring.    Plan: SW following with discharge plans. Discharge TBD.    /49 (BP Location: Left arm)   Pulse 68   Temp 97.6  F (36.4  C) (Temporal)   Resp 16   Ht 1.626 m (5' 4\")   Wt 67.3 kg (148 lb 5.9 oz)   LMP  (LMP Unknown)   SpO2 92%   BMI 25.47 kg/m       Problem: Adult Inpatient Plan of Care  Goal: Plan of Care Review  Description: The Plan of Care Review/Shift note should be completed every shift.  The Outcome Evaluation is a brief statement about your assessment that the patient is improving, declining, or no change.  This information will be displayed automatically on your shift  note.  Outcome: Progressing  Flowsheets (Taken 2/23/2025 0249)  Outcome Evaluation: Pt is alert and confused. Pt denies any pain.  Plan of Care Reviewed With: patient  Overall Patient Progress: no change  Goal: Patient-Specific Goal (Individualized)  Description: You can add care plan individualizations to a care plan. Examples of Individualization might be:  \"Parent requests to be called daily at 9am for status\", \"I have a hard time hearing out of my right ear\", or \"Do not touch me to wake me up as it startles  me\".  Outcome: Progressing  Goal: Absence of Hospital-Acquired Illness or Injury  Outcome: Progressing  Intervention: Identify and Manage Fall Risk  Recent Flowsheet Documentation  Taken 2/23/2025 0200 by Rocyk Vincent, RN  Safety Promotion/Fall Prevention: safety round/check completed  Taken 2/23/2025 0100 by Rocky Vincent, RN  Safety Promotion/Fall Prevention: safety round/check completed  Taken 2/23/2025 0024 by Rocky Vincent, RN  Safety Promotion/Fall Prevention:   safety round/check completed   room near nurse's station   nonskid shoes/slippers when out of bed   lighting adjusted   assistive device/personal " items within reach   clutter free environment maintained  Taken 2/22/2025 2300 by Rocky Vincent RN  Safety Promotion/Fall Prevention: safety round/check completed  Intervention: Prevent Skin Injury  Recent Flowsheet Documentation  Taken 2/23/2025 0024 by Rocky Vincent RN  Body Position: position changed independently  Intervention: Prevent Infection  Recent Flowsheet Documentation  Taken 2/23/2025 0024 by Rocky Vincent RN  Infection Prevention:   rest/sleep promoted   single patient room provided   hand hygiene promoted  Goal: Optimal Comfort and Wellbeing  Outcome: Progressing  Goal: Readiness for Transition of Care  Outcome: Progressing     Problem: Surgery Nonspecified  Goal: Absence of Bleeding  Outcome: Progressing  Goal: Effective Bowel Elimination  Outcome: Progressing  Goal: Fluid and Electrolyte Balance  Outcome: Progressing  Goal: Blood Glucose Level Within Targeted Range  Outcome: Progressing  Goal: Absence of Infection Signs and Symptoms  Outcome: Progressing  Goal: Anesthesia/Sedation Recovery  Outcome: Progressing  Intervention: Optimize Anesthesia Recovery  Recent Flowsheet Documentation  Taken 2/23/2025 0200 by Rocky Vincetn RN  Safety Promotion/Fall Prevention: safety round/check completed  Taken 2/23/2025 0100 by Rocky Vincent RN  Safety Promotion/Fall Prevention: safety round/check completed  Taken 2/23/2025 0024 by Rocky Vincent RN  Safety Promotion/Fall Prevention:   safety round/check completed   room near nurse's station   nonskid shoes/slippers when out of bed   lighting adjusted   assistive device/personal items within reach   clutter free environment maintained  Reorientation Measures: clock in view  Taken 2/22/2025 2300 by Rocky Vincent RN  Safety Promotion/Fall Prevention: safety round/check completed  Goal: Optimal Pain Control and Function  Outcome: Progressing  Goal: Nausea and Vomiting Relief  Outcome: Progressing  Goal: Effective Urinary  Elimination  Outcome: Progressing  Goal: Effective Oxygenation and Ventilation  Outcome: Progressing  Intervention: Optimize Oxygenation and Ventilation  Recent Flowsheet Documentation  Taken 2/23/2025 0024 by Rocky Vincent RN  Head of Bed (HOB) Positioning: HOB at 20-30 degrees   Goal Outcome Evaluation:      Plan of Care Reviewed With: patient    Overall Patient Progress: no changeOverall Patient Progress: no change    Outcome Evaluation: Pt is alert and confused. Pt denies any pain.

## 2025-02-23 NOTE — PLAN OF CARE
"Goal Outcome Evaluation:      Plan of Care Reviewed With: patient, family    Overall Patient Progress: improvingOverall Patient Progress: improving    Outcome Evaluation: alert and orientated. family here and very supportive. denies pain . denies nausea. denies dizziness. up with one assist. walking to bathroom  on oral abx.    VS-stable. Afebrile. Slightly light headed at 1315. Meclizine   Lung Sounds-clear, no cough, on room air during the day. Using IS upto [id number].   O2-on room air.   GI-+bs. +flatus. Had bm yesterday. Tolerating reg diet. Denies nausea. Ate bkst, but did not eat lunch.   -voiding, adequate. Slightly cloudy. Medium yellow in color. Straining. No stones seen.   IVF- ns at 100. Sl in the afternoon  Dressings-none.   CMS-denies numbness and tingling, +pp. No swelling noted.   Drain-none.   Activity-up with one assist. Walker, belt. Walking to the bathroom. Sitting up in the chair most of the day/   Pain-denies pain and no pain meds given.   D/C Plan-tcu needed. Family here. Family will transport. Sws following, bed available on Monday.       Problem: Adult Inpatient Plan of Care  Goal: Plan of Care Review  Description: The Plan of Care Review/Shift note should be completed every shift.  The Outcome Evaluation is a brief statement about your assessment that the patient is improving, declining, or no change.  This information will be displayed automatically on your shift  note.  Outcome: Progressing  Flowsheets (Taken 2/23/2025 1109)  Outcome Evaluation: alert and orientated. family here and very supportive. denies pain . denies nausea. denies dizziness. up with one assist. walking to bathroom  on oral abx.  Plan of Care Reviewed With:   patient   family  Overall Patient Progress: improving  Goal: Patient-Specific Goal (Individualized)  Description: You can add care plan individualizations to a care plan. Examples of Individualization might be:  \"Parent requests to be called daily at 9am for " "status\", \"I have a hard time hearing out of my right ear\", or \"Do not touch me to wake me up as it startles  me\".  Outcome: Progressing  Goal: Absence of Hospital-Acquired Illness or Injury  Outcome: Progressing  Intervention: Identify and Manage Fall Risk  Recent Flowsheet Documentation  Taken 2/23/2025 1000 by Olinda Rasheed RN  Safety Promotion/Fall Prevention: safety round/check completed  Intervention: Prevent Skin Injury  Recent Flowsheet Documentation  Taken 2/23/2025 1000 by Olinda Rasheed RN  Body Position: supine, head elevated  Skin Protection: protective footwear used  Intervention: Prevent and Manage VTE (Venous Thromboembolism) Risk  Recent Flowsheet Documentation  Taken 2/23/2025 1000 by Olinda Rasheed RN  VTE Prevention/Management: SCDs on (sequential compression devices)  Intervention: Prevent Infection  Recent Flowsheet Documentation  Taken 2/23/2025 1000 by Olinda Rasheed RN  Infection Prevention: hand hygiene promoted  Goal: Optimal Comfort and Wellbeing  Outcome: Progressing  Goal: Readiness for Transition of Care  Outcome: Progressing     Problem: Surgery Nonspecified  Goal: Absence of Bleeding  Outcome: Progressing  Goal: Effective Bowel Elimination  Outcome: Progressing  Intervention: Enhance Bowel Motility and Elimination  Recent Flowsheet Documentation  Taken 2/23/2025 1000 by Olinda Rasheed RN  Bowel Motility Enhancement:   ambulation promoted   fluid intake encouraged   oral intake encouraged  Goal: Fluid and Electrolyte Balance  Outcome: Progressing  Intervention: Monitor and Manage Fluid and Electrolyte Balance  Recent Flowsheet Documentation  Taken 2/23/2025 1000 by Olinda Rasheed RN  Fluid/Electrolyte Management: fluids provided  Goal: Blood Glucose Level Within Targeted Range  Outcome: Progressing  Goal: Absence of Infection Signs and Symptoms  Outcome: Progressing  Intervention: Prevent or Manage Infection  Recent Flowsheet Documentation  Taken 2/23/2025 1000 by Olinda Rasheed" RN  Infection Management: aseptic technique maintained  Goal: Anesthesia/Sedation Recovery  Outcome: Progressing  Intervention: Optimize Anesthesia Recovery  Recent Flowsheet Documentation  Taken 2/23/2025 1000 by Olinda Rasheed RN  Safety Promotion/Fall Prevention: safety round/check completed  Administration (IS):   mouthpiece utilized   proper technique demonstrated  Reorientation Measures:   clock in view   familiar social contact encouraged  Level Incentive Spirometer (mL): 1200  Number of Repetitions (IS): 5  Patient Tolerance (IS): fair  Goal: Optimal Pain Control and Function  Outcome: Progressing  Goal: Nausea and Vomiting Relief  Outcome: Progressing  Goal: Effective Urinary Elimination  Outcome: Progressing  Intervention: Monitor and Manage Urinary Retention  Recent Flowsheet Documentation  Taken 2/23/2025 1000 by Olinda Rasheed RN  Urinary Elimination Promotion: toileting offered  Goal: Effective Oxygenation and Ventilation  Outcome: Progressing  Intervention: Optimize Oxygenation and Ventilation  Recent Flowsheet Documentation  Taken 2/23/2025 1000 by Olinda Rasheed RN  Cough And Deep Breathing: done independently per patient  Airway/Ventilation Management: pulmonary hygiene promoted  Activity Management: activity adjusted per tolerance  Head of Bed (HOB) Positioning: HOB at 20-30 degrees  Taken 2/23/2025 0900 by Olinda Rasheed RN  Activity Management: up in chair

## 2025-02-23 NOTE — DISCHARGE SUMMARY
[facility]  Discharge Summary  Hospitalist      Date of Admission:  2/19/2025  Date of Discharge:  2/23/2025  Provider:  Dario Hill MD. [facility]  Date of Service (when I last saw the patient): 02/23/25      Primary Provider: Betsey Magaña          Discharge Diagnosis:     Discharge Diagnoses   Discharge Diagnoses:  Right renal colic with hydronephrosis and hydroureter, status post ureteral stent placement (2/19)  2 mm ureterovesical junction stone with bilateral nonobstructing stones  Urinary tract infection, treated with IV ceftriaxone, now on oral cefdinir  Fall with chronic T9 compression fracture, stable  Meniere's disease  Hypertension  Hyperlipidemia  Essential tremor  GERD  Depression, anxiety, insomnia  Osteoporosis    Other medical issues:  Past Medical History:   Diagnosis Date    Anxiety     Basal cell carcinoma     Complication of anesthesia     Diverticulosis     GERD (gastroesophageal reflux disease)     HTN (hypertension)     Meniere's disease     Nephrolithiasis     Pain in right knee     PONV (postoperative nausea and vomiting)          Please see the admission history and physical for full details.     Hospital Course     Cynthia Arita was admitted on 2/19/2025.  The following problems were addressed during her hospitalization:    Discharge Summary  History of Presentation:  An 79-year-old female with a history of IBS, Meniere's disease, urinary retention, osteoporosis, hypertension, hyperlipidemia, depression, and a chronic T9 compression fracture was admitted for fall and dizziness. Evaluation revealed right renal colic with hydronephrosis and hydroureter due to a 2 mm ureterovesical junction (UVJ) stone. She underwent cystoscopy with ureteral stent placement on 2/19. Urinalysis was abnormal, and urine culture showed mixed ochoa, for which she was treated with IV ceftriaxone, now transitioned to oral cefdinir. Chronic T9 compression fracture was noted, with Neurosurgery  recommending pain management. PT and social work were involved, with a plan for discharge to a transitional care unit (TCU) when a bed is available.  Hospital Course and Consultant Recommendations:  Renal Colic, Right Hydronephrosis/Hydroureter, Status Post Ureteral Stent (2/19)  2 mm UVJ stone with bilateral nonobstructing stones and renal cysts  Treated with IV ceftriaxone, transitioned to oral cefdinir completed course  Awaiting TCU bed for discharge  Fall, Chronic T9 Compression Fracture  MRI showed a stable chronic T9 wedge compression fracture  Pain managed with PRN analgesia  PT/SW involved; therapy recommends TCU  Meniere's Disease  Baseline dizziness  Continue Meclizine PRN  Hypertension, Hyperlipidemia, Essential Tremor  Continue propranolol, amlodipine, losartan  GERD  Continue PPI  Depression, Anxiety, Insomnia  Continue venlafaxine  Osteoporosis  Resume Fosamax upon discharge    Disposition:  Awaiting bed availability for discharge to a transitional care unit for continued therapy and monitoring.    Pending Results   Unresulted Labs Ordered in the Past 30 Days of this Admission       No orders found from 1/20/2025 to 2/20/2025.            Discharge Orders      General info for SNF    Length of Stay Estimate: Short Term Care: Estimated # of Days <30  Condition at Discharge: Improving  Level of care:skilled   Rehabilitation Potential: Good  Admission H&P remains valid and up-to-date: Yes  Recent Chemotherapy: N/A  Use Nursing Home Standing Orders: Yes     Mantoux instructions    Give two-step Mantoux (PPD) Per Facility Policy Yes     Follow Up and recommended labs and tests    Follow-up with TCU provider in 2 to 3 days with a repeat CBC and BMP  Follow-up with urology as outpatient     Reason for your hospital stay    Renal colic and compression fracture     Activity - Up with assistive device     Activity - Up with nursing assistance     Physical Therapy Adult Consult    Evaluate and treat as clinically  indicated.    Reason: Back pain deconditioned     Occupational Therapy Adult Consult    Evaluate and treat as clinically indicated.    Reason:  Back pain deconditioned     Fall precautions     Diet    Follow this diet upon discharge: Current Diet:Orders Placed This Encounter      Regular Diet Adult       Code Status   Full Code       Primary Care Physician   Betsey Magaña    Physical Exam   Temp: 98.6  F (37  C) Temp src: Temporal BP: 133/58 Pulse: 70   Resp: 16 SpO2: 97 % O2 Device: None (Room air)    Vitals:    02/19/25 0318   Weight: 67.3 kg (148 lb 5.9 oz)     Vital Signs with Ranges  Temp:  [96.8  F (36  C)-98.9  F (37.2  C)] 98.6  F (37  C)  Pulse:  [65-81] 70  Resp:  [16] 16  BP: (118-157)/(45-71) 133/58  SpO2:  [92 %-97 %] 97 %  I/O last 3 completed shifts:  In: 3013 [P.O.:120; I.V.:2893]  Out: 1310 [Urine:1250; Emesis/NG output:60]    Constitutional:  alert, cooperative, no apparent distress  Respiratory: No increased work of breathing, good air exchange, no crackles or wheezing.  Cardiovascular: apical impulse,normal S1 and S2  GI: bowel sounds present, soft, non-distended, non-tender      Discharge Disposition   Discharged to short-term care facility    Consultations This Hospital Stay   UROLOGY IP CONSULT  PHYSICAL THERAPY ADULT IP CONSULT  CARE MANAGEMENT / SOCIAL WORK IP CONSULT  PHYSICAL THERAPY ADULT IP CONSULT  OCCUPATIONAL THERAPY ADULT IP CONSULT    Time Spent on this Encounter   I, Dario Hill MD, personally saw the patient today and spent greater than 30 minutes discharging this patient.      Discharge Medications   Current Discharge Medication List        CONTINUE these medications which have NOT CHANGED    Details   acetaminophen (TYLENOL) 500 MG tablet Take 1,000 mg by mouth daily.      alendronate (FOSAMAX) 70 MG tablet Take 1 tablet (70 mg) by mouth every 7 days  Qty: 13 tablet, Refills: 3    Associated Diagnoses: Osteoporosis, unspecified osteoporosis type, unspecified pathological  fracture presence      amLODIPine (NORVASC) 5 MG tablet TAKE 1 TABLET(5 MG) BY MOUTH DAILY  Qty: 90 tablet, Refills: 0    Associated Diagnoses: Benign essential hypertension      Biotin 5000 MCG TABS Take 1 tablet by mouth daily      calcitonin, salmon, (MIACALCIN) 200 UNIT/ACT nasal spray SPRAY ONCE IN 1 NOSTRIL ONCE DAILY, ALTERNATING NOSTRIL DAILY  Qty: 3.7 mL, Refills: 1    Associated Diagnoses: Chest wall pain      cyanocobalamin (VITAMIN B-12) 1000 MCG tablet Take 1,000 mcg by mouth daily      DHA-EPA-Coenzyme Q10-Vitamin E (CO Q-10 VITAMIN E FISH OIL PO) Take 1 capsule by mouth daily      esomeprazole (NEXIUM) 40 MG DR capsule Take 40 mg by mouth 2 times daily. Take 30-60 minutes before eating.      estradiol (ESTRACE) 0.1 MG/GM vaginal cream Place 2 g vaginally twice a week  Qty: 42.5 g, Refills: 0    Associated Diagnoses: Frequent UTI      Flaxseed, Linseed, (FLAX SEED OIL) 1000 MG capsule Take 1 capsule by mouth daily Takes one in the PM      meclizine (ANTIVERT) 25 MG tablet Take 25 mg by mouth 2 times daily.      Misc Natural Products (GLUCOSAMINE CHOND COMPLEX/MSM PO) Take 1 capsule by mouth 2 times daily Takes one in the morning      Multiple Vitamins-Minerals (PRESERVISION AREDS 2) CAPS Take 1 tablet by mouth 2 times daily      multivitamin (CENTRUM SILVER) tablet Take 1 tablet by mouth daily      naproxen sodium (EQ NAPROXEN SODIUM) 220 MG tablet Take 220 mg by mouth 2 times daily as needed for moderate pain      ondansetron (ZOFRAN ODT) 4 MG ODT tab Take 4 mg by mouth 2 times daily as needed for nausea.      polyethylene glycol (MIRALAX) 17 g packet Take 17 g by mouth daily  Qty: 7 packet, Refills: 0    Associated Diagnoses: Total knee replacement status, left      promethazine (PHENERGAN) 25 MG tablet Take 25 mg by mouth every 6 hours as needed for nausea or vomiting.      propranolol (INDERAL) 10 MG tablet Take 1 tablet (10 mg) by mouth 2 times daily  Qty: 30 tablet, Refills: 1      venlafaxine  (EFFEXOR XR) 37.5 MG 24 hr capsule Take 1 capsule (37.5 mg) by mouth daily    Comments: Dr Parker prescribes      vitamin D3 (CHOLECALCIFEROL) 50 mcg (2000 units) tablet Take 1 tablet by mouth daily Takes one in the morning 2,000 units      losartan (COZAAR) 100 MG tablet TAKE 1 TABLET(100 MG) BY MOUTH DAILY  Qty: 90 tablet, Refills: 1    Associated Diagnoses: Benign essential hypertension           Allergies   Allergies   Allergen Reactions    Ativan [Lorazepam]      Sick -     Dicyclomine      Other reaction(s): dry mouth    Gabapentin Nausea    Metoprolol      Nausea      Pantoprazole Other (See Comments), Nausea and Vomiting and GI Disturbance     Red in the face, sleepiness, face swelling, joint pain, dizziness    Tramadol Nausea and Vomiting    Oxycodone Anxiety     Data   Most Recent 3 CBC's:  Recent Labs   Lab Test 02/21/25  0817 02/20/25  1123 02/19/25  0340   WBC 7.8 9.6 2.1*   HGB 9.7* 9.8* 12.2   MCV 89 88 91   * 143* 199      Most Recent 3 BMP's:  Recent Labs   Lab Test 02/21/25  0817 02/20/25  1123 02/19/25  1440 02/19/25  0340   * 129*  --  138   POTASSIUM 3.5 3.6  --  3.5   CHLORIDE 99 100  --  100   CO2 17* 19*  --  23   BUN 15.3 28.5*  --  24.7*   CR 0.50* 0.77  --  0.79   ANIONGAP 15 10  --  15   ANDERS 8.4* 8.0*  --  9.7   GLC 80 123* 82 96     Most Recent 2 LFT's:  Recent Labs   Lab Test 11/11/24  1823 10/28/24  1211   AST 37 21   ALT 21 18   ALKPHOS 99 87   BILITOTAL <0.2 0.3     Most Recent INR's and Anticoagulation Dosing History:  Anticoagulation Dose History           No data to display              Most Recent 3 Troponin's:  Recent Labs   Lab Test 11/17/18  1520 04/03/17  0334 04/02/17  2320   TROPI <0.015 <0.015  The 99th percentile for upper reference range is 0.045 ug/L.  Troponin values in   the range of 0.045 - 0.120 ug/L may be associated with risks of adverse   clinical events.   <0.015  The 99th percentile for upper reference range is 0.045 ug/L.  Troponin values in   the  range of 0.045 - 0.120 ug/L may be associated with risks of adverse   clinical events.       Most Recent Cholesterol Panel:  Recent Labs   Lab Test 08/23/24  0813   CHOL 231*   *   HDL 49*   TRIG 191*     Most Recent 6 Bacteria Isolates From Any Culture (See EPIC Reports for Culture Details):  Recent Labs   Lab Test 06/11/21  1356 04/28/21  1323 10/22/20  1310 12/02/19  1314 02/27/19  1100 01/10/19  0854   CULT >100,000 colonies/mL  Escherichia coli  * >100,000 colonies/mL  Escherichia coli  * >100,000 colonies/mL  Escherichia coli  * >100,000 colonies/mL  Escherichia coli  * <10,000 colonies/mL  mixed urogenital ochoa   >100,000 colonies/mL  Escherichia coli  *     Most Recent TSH, T4 and A1c Labs:  Recent Labs   Lab Test 12/13/23  1114 03/27/23  1547   TSH 3.42 2.94   T4  --  1.05     Results for orders placed or performed during the hospital encounter of 02/19/25   CT Head w/o Contrast    Narrative    EXAM: CT HEAD W/O CONTRAST  LOCATION: [facility]  DATE: 2/19/2025    INDICATION: fall, head trauma, vomiting  COMPARISON: MRI brain April 8, 2023   TECHNIQUE: Routine CT Head without IV contrast. Multiplanar reformats. Dose reduction techniques were used.    FINDINGS:  INTRACRANIAL CONTENTS: No intracranial hemorrhage, extraaxial collection, or mass effect.  No CT evidence of acute infarct. Mild presumed chronic small vessel ischemic changes. Mild to moderate generalized volume loss. No hydrocephalus.     VISUALIZED ORBITS/SINUSES/MASTOIDS: Prior bilateral cataract surgery. Visualized portions of the orbits are otherwise unremarkable. No paranasal sinus mucosal disease. Left mastoidectomy. No mastoid effusion.     BONES/SOFT TISSUES: No acute abnormality.      Impression    IMPRESSION:  1.  No CT evidence for acute intracranial process.  2.  Brain atrophy and presumed chronic microvascular ischemic changes as above.     CT Lumbar Spine w/o Contrast    Narrative    EXAM: CT LUMBAR SPINE  W/O CONTRAST  LOCATION: [facility]  DATE: 2/19/2025    INDICATION: fall, lumbar tenderness  COMPARISON: None.  TECHNIQUE: Routine CT Lumbar Spine without IV contrast. Multiplanar reformats. Dose reduction techniques were used.     FINDINGS:  VERTEBRA: Normal vertebral body heights. Mild dextrocurvature. Normal sagittal alignment.. No fracture or posttraumatic subluxation.     CANAL/FORAMINA: Multilevel spondylosis without high grade canal stenosis.    PARASPINAL: Moderate right perinephric inflammatory stranding with moderate hydroureteronephrosis. No obstructing stone identified however the distal ureter is not within the field-of-view. Multiple small nonobstructing calculi within the renal pelvis.       Impression    IMPRESSION:  1.  No fracture or posttraumatic subluxation.  2.  Moderate right perinephric inflammatory stranding with moderate hydroureteronephrosis. No obstructing stone identified however the distal ureter is not within the field-of-view and a distal stone is possible. Consider dedicated CT abdomen pelvis for   further evaluation.     XR Chest 2 Views    Narrative    EXAM: XR CHEST 2 VIEWS  LOCATION: [facility]  DATE: 2/19/2025    INDICATION: fall, chest wall tenderness  COMPARISON: Imaged lower chest from CT abdomen and pelvis on 11/11/2024.      Impression    IMPRESSION:   1.  Bibasilar atelectasis.  2.  No pleural effusion or pneumothorax.  3.  Normal heart size.  4.  Probable inferior endplate compression fracture of a mid thoracic spine vertebral body and left posterolateral sixth rib fracture (both age-indeterminate). Consider chest and thoracic spine CT.   CT Abdomen Pelvis w/o Contrast    Narrative    EXAM: CT ABDOMEN PELVIS W/O CONTRAST  LOCATION: [facility]  DATE: 2/19/2025    INDICATION: right sided hydronephrosis on CT lumbar, rule out distal stone  COMPARISON: 11/11/2024  TECHNIQUE: CT scan of the abdomen and pelvis  was performed without IV contrast. Multiplanar reformats were obtained. Dose reduction techniques were used.  CONTRAST: None.    FINDINGS:   LOWER CHEST: Linear atelectasis in the right middle lobe extending to subpleural focus which could represent an area of rounded atelectasis. Linear atelectasis or scarring in the posterior lower lungs. Small hiatal hernia.    HEPATOBILIARY: No significant mass or bile duct dilatation. No calcified gallstones.     PANCREAS: No significant mass, duct dilatation, or inflammatory change.    SPLEEN: Normal size.    ADRENAL GLANDS: Normal.    KIDNEYS/BLADDER: Marked right hydronephrosis and hydroureter down to the level of a 2 mm stone at the right ureterovesicular junction. Multiple scattered nonobstructive right renal calculi. Tiny scattered left renal calculi. Left peripelvic cysts.   Multiple parenchymal cysts bilaterally. No left hydronephrosis or hydroureter. Bladder unremarkable.    BOWEL: Nonspecific nonobstructive bowel gas pattern. No inflammatory changes. Appendix not seen. Colonic diverticula without evidence for diverticulitis. Fatty ileocecal valve.    LYMPH NODES: No lymphadenopathy.    VASCULATURE: No abdominal aortic aneurysm.    PELVIC ORGANS: No pelvic masses. No pelvic free fluid. Uterus appears to be surgically absent.    MUSCULOSKELETAL: Mild spondylosis. Degenerative disc disease at the L3-L4 and L4-L5 interspaces with vacuum sign present. No inguinal or ventral hernias.      Impression    IMPRESSION:   1.  Marked right hydronephrosis and hydroureter down to the level of a 2 mm stone at the right ureterovesicular junction.  2.  Multiple bilateral nonobstructive renal calculi.  3.  Bilateral renal cysts.  4.  Colonic diverticulosis.       CT Thoracic Spine w/o Contrast    Narrative    EXAM: CT THORACIC SPINE W/O CONTRAST  LOCATION: [facility]  DATE: 2/19/2025    INDICATION: possible compression fracture seen on chest xr  COMPARISON: X-ray  chest February 19, 2025. Chest plain film October 22, 2024   TECHNIQUE: Routine CT Thoracic Spine without IV contrast. Multiplanar reformats. Dose reduction techniques were used.     FINDINGS:  VERTEBRA: Moderate (50% height loss) age-indeterminate but likely acute T9 vertebral body wedge compression fracture with associated Schmorl's node.  Remainder the vertebral body heights maintained.    CANAL/FORAMINA: No canal or neural foraminal stenosis.    PARASPINAL: No extraspinal abnormality.      Impression    IMPRESSION:  1.  Moderate age-indeterminate but likely acute T9 vertebral body wedge compression fracture.     MR Thoracic Spine w/o Contrast    Narrative    EXAM: MR THORACIC SPINE W/O CONTRAST  LOCATION: [facility]  DATE: 2/19/2025    INDICATION: evaluated T9 fracture  COMPARISON: 02/19/2025.  TECHNIQUE: Routine Thoracic Spine MRI without IV contrast.    FINDINGS:   Similar T9 and impression deformity. Associated inferior endplate intravertebral disc herniation. No marrow edema within the associated vertebral body. Otherwise preserved vertebral body heights. Preserved alignment and marrow signal. Normal   intervertebral discs and facets for patient age. Normal cord signal. No high-grade canal or neural foraminal stenosis at any level. No acute extraspinal abnormality.      Impression    IMPRESSION:  Chronic appearing T9 compression fracture. No evidence of acute bone or soft tissue injury.   XR Surgery LA[facility]ISHA L/T 5 Min Fluoro w Stills    Narrative    This exam was marked as non-reportable because it will not be read by a   radiologist or a [facility] non-radiologist provider.                 Disclaimer: This note consists of symbols derived from keyboarding, dictation and/or voice recognition software. As a result, there may be errors in the script that have gone undetected. Please consider this when interpreting information found in this chart.

## 2025-02-23 NOTE — PLAN OF CARE
" Goal Outcome Evaluation:      Plan of Care Reviewed With: patient    Overall Patient Progress: no changeOverall Patient Progress: no change    Outcome Evaluation: Intermittent confusion, VSS on RA, pain mngd w/ tylenol, dizziness mngd w/ meclizine, IVF infusing, denies nausea, voiding ok, x2 Bms, discharge plan TCU    Problem: Adult Inpatient Plan of Care  Goal: Plan of Care Review  Description: The Plan of Care Review/Shift note should be completed every shift.  The Outcome Evaluation is a brief statement about your assessment that the patient is improving, declining, or no change.  This information will be displayed automatically on your shift  note.  Outcome: Progressing  Flowsheets (Taken 2/22/2025 2232)  Outcome Evaluation: Intermittent confusion, VSS on RA, pain mngd w/ tylenol, dizziness mngd w/ meclizine, IVF infusing, denies nausea, voiding ok, x2 Bms, discharge plan TCU  Plan of Care Reviewed With: patient  Overall Patient Progress: no change  Goal: Patient-Specific Goal (Individualized)  Description: You can add care plan individualizations to a care plan. Examples of Individualization might be:  \"Parent requests to be called daily at 9am for status\", \"I have a hard time hearing out of my right ear\", or \"Do not touch me to wake me up as it startles  me\".  Outcome: Progressing  Goal: Absence of Hospital-Acquired Illness or Injury  Outcome: Progressing  Intervention: Identify and Manage Fall Risk  Recent Flowsheet Documentation  Taken 2/22/2025 1736 by Malka Godfrey RN  Safety Promotion/Fall Prevention: safety round/check completed  Intervention: Prevent Skin Injury  Recent Flowsheet Documentation  Taken 2/22/2025 1736 by Malka Godfrey RN  Body Position: supine, head elevated  Intervention: Prevent and Manage VTE (Venous Thromboembolism) Risk  Recent Flowsheet Documentation  Taken 2/22/2025 1736 by Malka Godfrey RN  VTE Prevention/Management: SCDs on (sequential compression " devices)  Intervention: Prevent Infection  Recent Flowsheet Documentation  Taken 2/22/2025 1736 by Malka Godfrey RN  Infection Prevention: hand hygiene promoted  Goal: Optimal Comfort and Wellbeing  Outcome: Progressing  Intervention: Monitor Pain and Promote Comfort  Recent Flowsheet Documentation  Taken 2/22/2025 2059 by Malka Godfrey RN  Pain Management Interventions:   quiet environment facilitated   rest  Taken 2/22/2025 2013 by Malka Godfrey RN  Pain Management Interventions:   medication (see MAR)   quiet environment facilitated   rest  Taken 2/22/2025 1714 by Malka Godfrey RN  Pain Management Interventions: rest  Goal: Readiness for Transition of Care  Outcome: Progressing     Problem: Surgery Nonspecified  Goal: Absence of Bleeding  Outcome: Progressing  Goal: Effective Bowel Elimination  Outcome: Progressing  Goal: Fluid and Electrolyte Balance  Outcome: Progressing  Intervention: Monitor and Manage Fluid and Electrolyte Balance  Recent Flowsheet Documentation  Taken 2/22/2025 1736 by Malka Godfrey RN  Fluid/Electrolyte Management: fluids provided  Goal: Blood Glucose Level Within Targeted Range  Outcome: Progressing  Goal: Absence of Infection Signs and Symptoms  Outcome: Progressing  Goal: Anesthesia/Sedation Recovery  Outcome: Progressing  Intervention: Optimize Anesthesia Recovery  Recent Flowsheet Documentation  Taken 2/22/2025 1736 by Malka Godfrey RN  Safety Promotion/Fall Prevention: safety round/check completed  Administration (IS):   mouthpiece utilized   proper technique demonstrated  Reorientation Measures:   clock in view   familiar social contact encouraged  Level Incentive Spirometer (mL): 1000  Number of Repetitions (IS): 4  Patient Tolerance (IS): fair  Goal: Optimal Pain Control and Function  Outcome: Progressing  Intervention: Prevent or Manage Pain  Recent Flowsheet Documentation  Taken 2/22/2025 2059 by Malka Godfrey RN  Pain Management  Interventions:   quiet environment facilitated   rest  Taken 2/22/2025 2013 by Malka Godfrey RN  Pain Management Interventions:   medication (see MAR)   quiet environment facilitated   rest  Taken 2/22/2025 1714 by Malka Godfrey RN  Pain Management Interventions: rest  Goal: Nausea and Vomiting Relief  Outcome: Progressing  Goal: Effective Urinary Elimination  Outcome: Progressing  Intervention: Monitor and Manage Urinary Retention  Recent Flowsheet Documentation  Taken 2/22/2025 1736 by Malka Godfrey RN  Urinary Elimination Promotion: toileting offered  Goal: Effective Oxygenation and Ventilation  Outcome: Progressing  Intervention: Optimize Oxygenation and Ventilation  Recent Flowsheet Documentation  Taken 2/22/2025 1736 by Malka Godfrey RN  Cough And Deep Breathing: done independently per patient  Activity Management: activity adjusted per tolerance  Head of Bed (HOB) Positioning: HOB at 20-30 degrees

## 2025-02-24 ENCOUNTER — LAB REQUISITION (OUTPATIENT)
Dept: LAB | Facility: CLINIC | Age: 80
End: 2025-02-24
Payer: MEDICARE

## 2025-02-24 VITALS
WEIGHT: 148.37 LBS | RESPIRATION RATE: 19 BRPM | BODY MASS INDEX: 25.33 KG/M2 | OXYGEN SATURATION: 97 % | SYSTOLIC BLOOD PRESSURE: 150 MMHG | HEART RATE: 71 BPM | HEIGHT: 64 IN | TEMPERATURE: 98.8 F | DIASTOLIC BLOOD PRESSURE: 79 MMHG

## 2025-02-24 DIAGNOSIS — Z11.1 ENCOUNTER FOR SCREENING FOR RESPIRATORY TUBERCULOSIS: ICD-10-CM

## 2025-02-24 PROCEDURE — 250N000013 HC RX MED GY IP 250 OP 250 PS 637: Performed by: UROLOGY

## 2025-02-24 PROCEDURE — 250N000013 HC RX MED GY IP 250 OP 250 PS 637: Performed by: INTERNAL MEDICINE

## 2025-02-24 PROCEDURE — 99231 SBSQ HOSP IP/OBS SF/LOW 25: CPT | Performed by: HOSPITALIST

## 2025-02-24 PROCEDURE — 250N000011 HC RX IP 250 OP 636: Performed by: UROLOGY

## 2025-02-24 RX ADMIN — CEFDINIR 300 MG: 300 CAPSULE ORAL at 08:53

## 2025-02-24 RX ADMIN — MECLIZINE HYDROCHLORIDE 25 MG: 25 TABLET ORAL at 10:35

## 2025-02-24 RX ADMIN — LOSARTAN POTASSIUM 100 MG: 100 TABLET, FILM COATED ORAL at 08:53

## 2025-02-24 RX ADMIN — ONDANSETRON 4 MG: 4 TABLET, ORALLY DISINTEGRATING ORAL at 13:02

## 2025-02-24 RX ADMIN — VENLAFAXINE HYDROCHLORIDE 37.5 MG: 37.5 CAPSULE, EXTENDED RELEASE ORAL at 08:53

## 2025-02-24 RX ADMIN — Medication 5 ML: at 11:26

## 2025-02-24 RX ADMIN — Medication 5 ML: at 08:52

## 2025-02-24 RX ADMIN — PROPRANOLOL HYDROCHLORIDE 10 MG: 10 TABLET ORAL at 08:53

## 2025-02-24 RX ADMIN — AMLODIPINE BESYLATE 5 MG: 5 TABLET ORAL at 08:53

## 2025-02-24 ASSESSMENT — ACTIVITIES OF DAILY LIVING (ADL)
ADLS_ACUITY_SCORE: 40
ADLS_ACUITY_SCORE: 44
ADLS_ACUITY_SCORE: 40
ADLS_ACUITY_SCORE: 44
ADLS_ACUITY_SCORE: 44
ADLS_ACUITY_SCORE: 40
ADLS_ACUITY_SCORE: 40
ADLS_ACUITY_SCORE: 44
ADLS_ACUITY_SCORE: 44

## 2025-02-24 NOTE — PROGRESS NOTES
Care Management Discharge Note    Discharge Date: 02/24/2025       Discharge Disposition: Transitional Care    Discharge Services:  none    Discharge DME:  none    Discharge Transportation:  family    Private pay costs discussed: Not applicable    Does the patient's insurance plan have a 3 day qualifying hospital stay waiver?  Yes     Which insurance plan 3 day waiver is available? [employer]    Will the waiver be used for post-acute placement? No    PAS Confirmation Code: [id number]  Patient/family educated on Medicare website which has current facility and service quality ratings: yes    Education Provided on the Discharge Plan: Yes  Persons Notified of Discharge Plans: patient, son  Patient/Family in Agreement with the Plan: yes    Handoff Referral Completed: No, handoff not indicated or clinically appropriate    Additional Information:  Confirmed bed availability with [facility] for today. They are able to take patient after 1600. Updated son and he is able to transport patient at that time.   Updated patient and care team.   [place] to [place].     Lizett Arias RN  Care Coordinator  [facility]

## 2025-02-24 NOTE — PLAN OF CARE
" Goal Outcome Evaluation:      Plan of Care Reviewed With: patient    Overall Patient Progress: no changeOverall Patient Progress: no change    Outcome Evaluation: Intermittent confusion, VSS on RA, pain mngd w/ tylenol, dizziness mngd w/ meclizine, IV SL, nausea mngd w/ zofran, voiding ok, discharge plan TCU    Problem: Adult Inpatient Plan of Care  Goal: Plan of Care Review  Description: The Plan of Care Review/Shift note should be completed every shift.  The Outcome Evaluation is a brief statement about your assessment that the patient is improving, declining, or no change.  This information will be displayed automatically on your shift  note.  Outcome: Progressing  Flowsheets (Taken 2/23/2025 2230)  Outcome Evaluation: Intermittent confusion, VSS on RA, pain mngd w/ tylenol, dizziness mngd w/ meclizine, IV SL, nausea mngd w/ zofran, voiding ok, discharge plan TCU  Plan of Care Reviewed With: patient  Overall Patient Progress: no change  Goal: Patient-Specific Goal (Individualized)  Description: You can add care plan individualizations to a care plan. Examples of Individualization might be:  \"Parent requests to be called daily at 9am for status\", \"I have a hard time hearing out of my right ear\", or \"Do not touch me to wake me up as it startles  me\".  Outcome: Progressing  Goal: Absence of Hospital-Acquired Illness or Injury  Outcome: Progressing  Intervention: Identify and Manage Fall Risk  Recent Flowsheet Documentation  Taken 2/23/2025 1546 by Malka Godfrey, RN  Safety Promotion/Fall Prevention: safety round/check completed  Intervention: Prevent and Manage VTE (Venous Thromboembolism) Risk  Recent Flowsheet Documentation  Taken 2/23/2025 1546 by Malka Godfrey, RN  VTE Prevention/Management: SCDs on (sequential compression devices)  Intervention: Prevent Infection  Recent Flowsheet Documentation  Taken 2/23/2025 1546 by Malka Godfrey, RN  Infection Prevention: hand hygiene promoted  Goal: Optimal " Comfort and Wellbeing  Outcome: Progressing  Intervention: Monitor Pain and Promote Comfort  Recent Flowsheet Documentation  Taken 2/23/2025 1622 by Malka Godfrey RN  Pain Management Interventions:   aromatherapy   cold applied   rest   relaxation techniques promoted  Taken 2/23/2025 1536 by Malka Godfrey RN  Pain Management Interventions: medication (see MAR)  Goal: Readiness for Transition of Care  Outcome: Progressing     Problem: Surgery Nonspecified  Goal: Absence of Bleeding  Outcome: Progressing  Goal: Effective Bowel Elimination  Outcome: Progressing  Goal: Fluid and Electrolyte Balance  Outcome: Progressing  Intervention: Monitor and Manage Fluid and Electrolyte Balance  Recent Flowsheet Documentation  Taken 2/23/2025 1546 by Malka Godfrey RN  Fluid/Electrolyte Management: fluids provided  Goal: Blood Glucose Level Within Targeted Range  Outcome: Progressing  Goal: Absence of Infection Signs and Symptoms  Outcome: Progressing  Goal: Anesthesia/Sedation Recovery  Outcome: Progressing  Intervention: Optimize Anesthesia Recovery  Recent Flowsheet Documentation  Taken 2/23/2025 1546 by Malka Godfrey RN  Safety Promotion/Fall Prevention: safety round/check completed  Administration (IS):   mouthpiece utilized   proper technique demonstrated  Reorientation Measures:   clock in view   familiar social contact encouraged  Level Incentive Spirometer (mL): 1500  Number of Repetitions (IS): 4  Patient Tolerance (IS): fair  Goal: Optimal Pain Control and Function  Outcome: Progressing  Intervention: Prevent or Manage Pain  Recent Flowsheet Documentation  Taken 2/23/2025 1622 by Malka Godfrey RN  Pain Management Interventions:   aromatherapy   cold applied   rest   relaxation techniques promoted  Taken 2/23/2025 1536 by Malka Godfrey RN  Pain Management Interventions: medication (see MAR)  Goal: Nausea and Vomiting Relief  Outcome: Progressing  Intervention: Prevent or Manage Nausea  and Vomiting  Recent Flowsheet Documentation  Taken 2/23/2025 1546 by Malka Godfrey RN  Nausea/Vomiting Interventions:   antiemetic   cool cloth applied   aromatherapy utilized  Goal: Effective Urinary Elimination  Outcome: Progressing  Intervention: Monitor and Manage Urinary Retention  Recent Flowsheet Documentation  Taken 2/23/2025 1546 by Malka Godfrey, RN  Urinary Elimination Promotion: toileting offered  Goal: Effective Oxygenation and Ventilation  Outcome: Progressing  Intervention: Optimize Oxygenation and Ventilation  Recent Flowsheet Documentation  Taken 2/23/2025 1546 by Malka Godfrey RN  Cough And Deep Breathing: done independently per patient  Activity Management: activity adjusted per tolerance

## 2025-02-24 NOTE — PROGRESS NOTES
See discharge summary from Dr. Hill from 2/23.  Planning for discharge to TCU.  Pt feels OK but still generally weak.  No CP, SOB, AP.  No n/v.  Ordered some bfast.      Vitals:    02/23/25 1516 02/23/25 1935 02/23/25 2230 02/24/25 0806   BP: (!) 142/60 (!) 142/65 132/64 (!) 159/73   BP Location: Left arm Left arm Left arm    Patient Position:  Semi-Tyson's     Cuff Size:  Adult Regular     Pulse: 70 83 70 79   Resp: 18 18 16 19   Temp: 97.9  F (36.6  C) 98.5  F (36.9  C) 98.2  F (36.8  C) 98  F (36.7  C)   TempSrc: Temporal Temporal Temporal    SpO2: 93% 94% 92% 94%   Weight:       Height:         On exam, pt is sitting up in bed. Comfortable. Conversational.  Heart is regular, no murmur. Lungs clear. Ext WWP.  She answers appropriately. No tremor.    Plan:  -Discharge to TCU.  Final discharge order placed.     Ferdinand Reeves MD

## 2025-02-24 NOTE — PLAN OF CARE
"Intermittent confusion. Alert and pleasant. Denies pain. VSS on room air. Prn meclizine given for dizziness. Prn po zofran given for nausea. Had small episode of emesis, MD aware. Up ax1 gb/walker. Straining voids. Had shower today. Regular diet. Plan is to discharge to TCU this sol with son as transport after 4pm.    Goal Outcome Evaluation:      Plan of Care Reviewed With: patient, child    Overall Patient Progress: improvingOverall Patient Progress: improving       Problem: Adult Inpatient Plan of Care  Goal: Plan of Care Review  Description: The Plan of Care Review/Shift note should be completed every shift.  The Outcome Evaluation is a brief statement about your assessment that the patient is improving, declining, or no change.  This information will be displayed automatically on your shift  note.  Outcome: Progressing  Flowsheets (Taken 2/24/2025 1008)  Plan of Care Reviewed With:   patient   child  Overall Patient Progress: improving  Goal: Patient-Specific Goal (Individualized)  Description: You can add care plan individualizations to a care plan. Examples of Individualization might be:  \"Parent requests to be called daily at 9am for status\", \"I have a hard time hearing out of my right ear\", or \"Do not touch me to wake me up as it startles  me\".  Outcome: Progressing  Goal: Absence of Hospital-Acquired Illness or Injury  Outcome: Progressing  Intervention: Identify and Manage Fall Risk  Recent Flowsheet Documentation  Taken 2/24/2025 0919 by Rosie Carr RN  Safety Promotion/Fall Prevention: safety round/check completed  Intervention: Prevent and Manage VTE (Venous Thromboembolism) Risk  Recent Flowsheet Documentation  Taken 2/24/2025 0919 by Rosie Carr RN  VTE Prevention/Management: SCDs off (sequential compression devices)  Intervention: Prevent Infection  Recent Flowsheet Documentation  Taken 2/24/2025 0919 by Rosie Carr RN  Infection Prevention:   single patient room provided   rest/sleep " promoted   hand hygiene promoted  Goal: Optimal Comfort and Wellbeing  Outcome: Progressing  Goal: Readiness for Transition of Care  Outcome: Progressing     Problem: Surgery Nonspecified  Goal: Absence of Bleeding  Outcome: Progressing  Goal: Effective Bowel Elimination  Outcome: Progressing  Goal: Fluid and Electrolyte Balance  Outcome: Progressing  Goal: Blood Glucose Level Within Targeted Range  Outcome: Progressing  Goal: Absence of Infection Signs and Symptoms  Outcome: Progressing  Goal: Anesthesia/Sedation Recovery  Outcome: Progressing  Intervention: Optimize Anesthesia Recovery  Recent Flowsheet Documentation  Taken 2/24/2025 0919 by Rosie Carr RN  Safety Promotion/Fall Prevention: safety round/check completed  Reorientation Measures: reorientation provided  Goal: Optimal Pain Control and Function  Outcome: Progressing  Goal: Nausea and Vomiting Relief  Outcome: Progressing  Goal: Effective Urinary Elimination  Outcome: Progressing  Goal: Effective Oxygenation and Ventilation  Outcome: Progressing  Intervention: Optimize Oxygenation and Ventilation  Recent Flowsheet Documentation  Taken 2/24/2025 0919 by Rosie Carr RN  Cough And Deep Breathing: done independently per patient  Taken 2/24/2025 0900 by Rosie Carr RN  Activity Management:   ambulated to bathroom   up in chair   activity adjusted per tolerance

## 2025-02-24 NOTE — PLAN OF CARE
"Orientation: intermittent confusion  Pain: pain is managed with PRN pain medication  O2: RA  GI/: Ambulates to restroom  Activity: A1 GB with walker.  Diet: regular diet  Protocols: none  Major shift events:  Plan: continue with POC  Problem: Adult Inpatient Plan of Care  Goal: Plan of Care Review  Description: The Plan of Care Review/Shift note should be completed every shift.  The Outcome Evaluation is a brief statement about your assessment that the patient is improving, declining, or no change.  This information will be displayed automatically on your shift  note.  Outcome: Progressing  Flowsheets (Taken 2/24/2025 0201)  Plan of Care Reviewed With: patient  Overall Patient Progress: no change  Goal: Patient-Specific Goal (Individualized)  Description: You can add care plan individualizations to a care plan. Examples of Individualization might be:  \"Parent requests to be called daily at 9am for status\", \"I have a hard time hearing out of my right ear\", or \"Do not touch me to wake me up as it startles  me\".  Outcome: Progressing  Goal: Absence of Hospital-Acquired Illness or Injury  Outcome: Progressing  Intervention: Identify and Manage Fall Risk  Recent Flowsheet Documentation  Taken 2/24/2025 0022 by Wilber Flores RN  Safety Promotion/Fall Prevention: safety round/check completed  Intervention: Prevent and Manage VTE (Venous Thromboembolism) Risk  Recent Flowsheet Documentation  Taken 2/24/2025 0022 by Wilber Flores RN  VTE Prevention/Management: SCDs on (sequential compression devices)  Intervention: Prevent Infection  Recent Flowsheet Documentation  Taken 2/24/2025 0022 by Wilber Flores RN  Infection Prevention: hand hygiene promoted  Goal: Optimal Comfort and Wellbeing  Outcome: Progressing  Goal: Readiness for Transition of Care  Outcome: Progressing     Problem: Surgery Nonspecified  Goal: Absence of Bleeding  Outcome: Progressing  Goal: Effective Bowel Elimination  Outcome: Progressing  Goal: " Fluid and Electrolyte Balance  Outcome: Progressing  Intervention: Monitor and Manage Fluid and Electrolyte Balance  Recent Flowsheet Documentation  Taken 2/24/2025 0022 by Wilber Flores RN  Fluid/Electrolyte Management: fluids provided  Goal: Blood Glucose Level Within Targeted Range  Outcome: Progressing  Goal: Absence of Infection Signs and Symptoms  Outcome: Progressing  Goal: Anesthesia/Sedation Recovery  Outcome: Progressing  Intervention: Optimize Anesthesia Recovery  Recent Flowsheet Documentation  Taken 2/24/2025 0022 by Wilber Flores RN  Safety Promotion/Fall Prevention: safety round/check completed  Administration (IS):   mouthpiece utilized   proper technique demonstrated  Reorientation Measures:   clock in view   familiar social contact encouraged  Patient Tolerance (IS): fair  Goal: Optimal Pain Control and Function  Outcome: Progressing  Goal: Nausea and Vomiting Relief  Outcome: Progressing  Intervention: Prevent or Manage Nausea and Vomiting  Recent Flowsheet Documentation  Taken 2/24/2025 0022 by Wilber Flores RN  Nausea/Vomiting Interventions:   antiemetic   cool cloth applied   aromatherapy utilized  Goal: Effective Urinary Elimination  Outcome: Progressing  Intervention: Monitor and Manage Urinary Retention  Recent Flowsheet Documentation  Taken 2/24/2025 0022 by Wilber Flores RN  Urinary Elimination Promotion: toileting offered  Goal: Effective Oxygenation and Ventilation  Outcome: Progressing  Intervention: Optimize Oxygenation and Ventilation  Recent Flowsheet Documentation  Taken 2/24/2025 0022 by Wilber Flores RN  Cough And Deep Breathing: done independently per patient  Activity Management: activity adjusted per tolerance   Goal Outcome Evaluation:      Plan of Care Reviewed With: patient    Overall Patient Progress: no changeOverall Patient Progress: no change

## 2025-02-24 NOTE — PLAN OF CARE
Goal Outcome Evaluation:    Pt discharged @1615 to [facility]. PIV removed by support staff and pt was escorted off the unit. TCU packet sent with patient and son.    Discharge Note    Patient discharged to TCU via private vehicle  accompanied by son.  IV: Discontinued  Prescriptions e-prescribed to pharmacy.   Belongings reviewed and sent with patient and family.   Home medications returned to patient: NA  Equipment sent with: patient, N/A.   patient and family verbalizes understanding of discharge instructions. AVS given to family.  Additional education completed?  NA

## 2025-02-25 ENCOUNTER — LAB REQUISITION (OUTPATIENT)
Dept: LAB | Facility: CLINIC | Age: 80
End: 2025-02-25
Payer: MEDICARE

## 2025-02-25 ENCOUNTER — TRANSITIONAL CARE UNIT VISIT (OUTPATIENT)
Dept: GERIATRICS | Facility: CLINIC | Age: 80
End: 2025-02-25
Payer: MEDICARE

## 2025-02-25 VITALS
DIASTOLIC BLOOD PRESSURE: 65 MMHG | OXYGEN SATURATION: 95 % | HEIGHT: 64 IN | WEIGHT: 148 LBS | BODY MASS INDEX: 25.27 KG/M2 | RESPIRATION RATE: 18 BRPM | TEMPERATURE: 98.7 F | SYSTOLIC BLOOD PRESSURE: 136 MMHG | HEART RATE: 73 BPM

## 2025-02-25 DIAGNOSIS — K21.9 GASTROESOPHAGEAL REFLUX DISEASE, UNSPECIFIED WHETHER ESOPHAGITIS PRESENT: ICD-10-CM

## 2025-02-25 DIAGNOSIS — F33.0 MILD RECURRENT MAJOR DEPRESSION: ICD-10-CM

## 2025-02-25 DIAGNOSIS — S09.90XD TRAUMATIC INJURY OF HEAD, SUBSEQUENT ENCOUNTER: ICD-10-CM

## 2025-02-25 DIAGNOSIS — N13.30 HYDRONEPHROSIS OF RIGHT KIDNEY: ICD-10-CM

## 2025-02-25 DIAGNOSIS — R41.89 COGNITIVE IMPAIRMENT: ICD-10-CM

## 2025-02-25 DIAGNOSIS — S22.070D CLOSED WEDGE COMPRESSION FRACTURE OF T9 VERTEBRA WITH ROUTINE HEALING, SUBSEQUENT ENCOUNTER: Primary | ICD-10-CM

## 2025-02-25 DIAGNOSIS — F41.9 ANXIETY: ICD-10-CM

## 2025-02-25 DIAGNOSIS — N13.2 HYDRONEPHROSIS WITH RENAL AND URETERAL CALCULOUS OBSTRUCTION: ICD-10-CM
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
## 2025-02-25 DIAGNOSIS — I10 ESSENTIAL HYPERTENSION: ICD-10-CM

## 2025-02-25 DIAGNOSIS — H81.09 MENIERE'S DISEASE, UNSPECIFIED LATERALITY: ICD-10-CM

## 2025-02-25 DIAGNOSIS — R53.81 PHYSICAL DECONDITIONING: ICD-10-CM

## 2025-02-25 PROCEDURE — 99310 SBSQ NF CARE HIGH MDM 45: CPT | Performed by: PHYSICIAN ASSISTANT

## 2025-02-25 PROCEDURE — 86481 TB AG RESPONSE T-CELL SUSP: CPT | Mod: ORL | Performed by: PHYSICIAN ASSISTANT

## 2025-02-25 PROCEDURE — P9604 ONE-WAY ALLOW PRORATED TRIP: HCPCS | Mod: ORL | Performed by: PHYSICIAN ASSISTANT

## 2025-02-25 PROCEDURE — 36415 COLL VENOUS BLD VENIPUNCTURE: CPT | Mod: ORL | Performed by: PHYSICIAN ASSISTANT

## 2025-02-25 RX ORDER — SENNOSIDES 8.6 MG
650 CAPSULE ORAL 3 TIMES DAILY PRN
Status: SHIPPED
Start: 2025-02-25

## 2025-02-25 NOTE — PROGRESS NOTES
[facility] GERIATRICS    PRIMARY CARE PROVIDER AND CLINIC:  Betsey Magaña, APRN CNP, [address] / [facility] [address]  Chief Complaint   Patient presents with    Hospital F/U      [facility] Medical Record Number:  [id number]  Place of Service where encounter took place:  [facility] ([facility]) [[id number]]    Cynthia Arita  is a 79 year old  (1945), admitted to the above facility from  [facility]. Hospital stay 2/19/25 through 2/24/25..   HPI:   Notes from hospitalization reviewed including H&P Urology consult and D/c summary    Cynthia Arita is a 79-year-old female with a past medical historyOf Ménière's disease, GERD, IBS, chronic nausea and vomiting, nephrolithiasis, anxiety, depression who was recently hospitalized at [facility].  Presented for evaluation of a fall.  Trauma workup revealed right hydronephrosis.  Urology consulted.  Underwent ureteral stent placement.  Urine culture grew normal ochoa.  Was treated with course of antibiotics.  Deconditioned compared to baseline to discharge to U    Cynthia is evaluated in her room.  Finishing breakfast.  Hospitalization reviewed.  Denies symptoms related to ureteral stent.  Discussed plan for urology follow-up as outpatient.  Continues to have a mild headache from head injury.  Indicates she slipped on the ice outside of her condo.  Denies visual changes.  Does struggle with Ménière's disease with associated chronic, intermittent nausea, vomiting and dizziness.  Has several as needed medications available.  Does believe she takes meclizine on a scheduled basis.  Lives independently in her own condo.  Sets up her own meds.  Continues to drive.     Additionally, called and spoke with son Jose Luis.  Introduced self and role.  TCU course discussed.  Questions answered.    Review of nursing home EMR: -145    CODE STATUS/ADVANCE DIRECTIVES DISCUSSION:  Prior  CPR/Full code   ALLERGIES:   Allergies   Allergen  Reactions    Ativan [Lorazepam]      Sick -     Dicyclomine      Other reaction(s): dry mouth    Gabapentin Nausea    Metoprolol      Nausea      Pantoprazole Other (See Comments), Nausea and Vomiting and GI Disturbance     Red in the face, sleepiness, face swelling, joint pain, dizziness    Tramadol Nausea and Vomiting    Oxycodone Anxiety      PAST MEDICAL HISTORY:   Past Medical History:   Diagnosis Date    Anxiety     Basal cell carcinoma     Complication of anesthesia     Diverticulosis     GERD (gastroesophageal reflux disease)     HTN (hypertension)     Meniere's disease     Nephrolithiasis     Pain in right knee     PONV (postoperative nausea and vomiting)       PAST SURGICAL HISTORY:   has a past surgical history that includes VAGINAL HYSTERECTOMY; Eye surgery; Arthroplasty knee (Right, 01/21/2019); Arthroplasty knee (Left, 10/02/2023); Combined Cystoscopy, Insert Stent Ureter(s) (Right, 2/19/2025); and IR Lumbar Puncture (11/19/2019).  FAMILY HISTORY: family history includes Bipolar Disorder in her sister; Brain Cancer (age of onset: 17) in her son; Cancer in her maternal grandmother; Diabetes in her paternal grandmother; Esophageal Cancer in her father; Hypertension in her brother; Neurologic Disorder in her mother and sister.  SOCIAL HISTORY:   reports that she has never smoked. She has never used smokeless tobacco. She reports current alcohol use. She reports that she does not use drugs.  Patient's living condition: lives alone    Post Discharge Medication Reconciliation Status:   MED REC REQUIRED  Post Medication Reconciliation Status: discharge medications reconciled and changed, per note/orders       Current Outpatient Medications   Medication Sig Dispense Refill    acetaminophen (TYLENOL) 500 MG tablet Take 1,000 mg by mouth daily.      acetaminophen (TYLENOL) 650 MG CR tablet Take 1 tablet (650 mg) by mouth 3 times daily as needed for mild pain or fever.      alendronate (FOSAMAX) 70 MG tablet Take  1 tablet (70 mg) by mouth every 7 days 13 tablet 3    amLODIPine (NORVASC) 5 MG tablet TAKE 1 TABLET(5 MG) BY MOUTH DAILY 90 tablet 0    Biotin 5000 MCG TABS Take 1 tablet by mouth daily      calcitonin, salmon, (MIACALCIN) 200 UNIT/ACT nasal spray SPRAY ONCE IN 1 NOSTRIL ONCE DAILY, ALTERNATING NOSTRIL DAILY 3.7 mL 1    cyanocobalamin (VITAMIN B-12) 1000 MCG tablet Take 1,000 mcg by mouth daily      Flaxseed, Linseed, (FLAX SEED OIL) 1000 MG capsule Take 1 capsule by mouth daily Takes one in the PM      losartan (COZAAR) 100 MG tablet TAKE 1 TABLET(100 MG) BY MOUTH DAILY 90 tablet 1    meclizine (ANTIVERT) 25 MG tablet Take 25 mg by mouth 2 times daily.      Multiple Vitamins-Minerals (PRESERVISION AREDS 2) CAPS Take 1 tablet by mouth 2 times daily      multivitamin (CENTRUM SILVER) tablet Take 1 tablet by mouth daily      naproxen sodium (EQ NAPROXEN SODIUM) 220 MG tablet Take 220 mg by mouth 2 times daily as needed for moderate pain      DHA-EPA-Coenzyme Q10-Vitamin E (CO Q-10 VITAMIN E FISH OIL PO) Take 1 capsule by mouth daily      esomeprazole (NEXIUM) 40 MG DR capsule Take 40 mg by mouth 2 times daily. Take 30-60 minutes before eating.      estradiol (ESTRACE) 0.1 MG/GM vaginal cream Place 2 g vaginally twice a week (Patient not taking: Reported on 2/25/2025) 42.5 g 0    Misc Natural Products (GLUCOSAMINE CHOND COMPLEX/MSM PO) Take 1 capsule by mouth 2 times daily Takes one in the morning      ondansetron (ZOFRAN ODT) 4 MG ODT tab Take 4 mg by mouth 2 times daily as needed for nausea.      polyethylene glycol (MIRALAX) 17 g packet Take 17 g by mouth daily 7 packet 0    promethazine (PHENERGAN) 25 MG tablet Take 25 mg by mouth every 6 hours as needed for nausea or vomiting.      propranolol (INDERAL) 10 MG tablet Take 1 tablet (10 mg) by mouth 2 times daily 30 tablet 1    venlafaxine (EFFEXOR XR) 37.5 MG 24 hr capsule Take 1 capsule (37.5 mg) by mouth daily      vitamin D3 (CHOLECALCIFEROL) 50 mcg (2000 units)  "tablet Take 1 tablet by mouth daily Takes one in the morning 2,000 units       No current facility-administered medications for this visit.       ROS:  10 point ROS of systems including Constitutional, Eyes, Respiratory, Cardiovascular, Gastroenterology, Genitourinary, Integumentary, Musculoskeletal, Psychiatric were all negative except for pertinent positives noted in my HPI.    Vitals:  /65   Pulse 73   Temp 98.7  F (37.1  C)   Resp 18   Ht 1.626 m (5' 4\")   Wt 67.1 kg (148 lb)   LMP  (LMP Unknown)   SpO2 95%   BMI 25.40 kg/m    Exam:  Physical Exam  Vitals (Facility EMR) reviewed.   Constitutional:       General: She is not in acute distress.  HENT:      Head: Normocephalic and atraumatic.   Eyes:      General: No scleral icterus.  Cardiovascular:      Rate and Rhythm: Normal rate and regular rhythm.      Heart sounds: No murmur heard.  Pulmonary:      Effort: Pulmonary effort is normal.      Breath sounds: No wheezing.   Musculoskeletal:      Right lower leg: No edema.      Left lower leg: No edema.   Skin:     General: Skin is warm and dry.      Findings: No rash.   Neurological:      Mental Status: She is alert. Mental status is at baseline.   Psychiatric:         Behavior: Behavior normal.           Lab/Diagnostic data:  Recent labs in [facility] reviewed by me today.  and Most Recent 3 CBC's:  Recent Labs   Lab Test 02/21/25  0817 02/20/25  1123 02/19/25  0340   WBC 7.8 9.6 2.1*   HGB 9.7* 9.8* 12.2   MCV 89 88 91   * 143* 199     Most Recent 3 BMP's:  Recent Labs   Lab Test 02/21/25  0817 02/20/25  1123 02/19/25  1440 02/19/25  0340   * 129*  --  138   POTASSIUM 3.5 3.6  --  3.5   CHLORIDE 99 100  --  100   CO2 17* 19*  --  23   BUN 15.3 28.5*  --  24.7*   CR 0.50* 0.77  --  0.79   ANIONGAP 15 10  --  15   ANDERS 8.4* 8.0*  --  9.7   GLC 80 123* 82 96     Most Recent 2 LFT's:  Recent Labs   Lab Test 11/11/24  1823 10/28/24  1211   AST 37 21   ALT 21 18   ALKPHOS 99 87   BILITOTAL <0.2 0.3 "     7-Day Micro Results       Collected Updated Procedure Result Status      02/25/2025 0650 02/25/2025 1115 Quantiferon TB Gold Plus Grey Tube [[id number]]   Blood from Hand, Left    In process Component Value   No component results            02/25/2025 0650 02/25/2025 1115 Quantiferon TB Gold Plus Green Tube [[id number]]   Blood from Hand, Left    In process Component Value   No component results            02/25/2025 0650 02/25/2025 1115 Quantiferon TB Gold Plus Yellow Tube [[id number]]   Blood from Hand, Left    In process Component Value   No component results            02/25/2025 0650 02/25/2025 1115 Quantiferon TB Gold Plus Purple Tube [[id number]]   Blood from Hand, Left    In process Component Value   No component results            02/19/2025 0808 02/20/2025 0548 Urine Culture [[id number]]   Urine, Clean Catch    Final result Component Value   Culture 10,000-50,000 CFU/mL Mixture of Urogenital Delia                     Most Recent TSH and T4:  Recent Labs   Lab Test 12/13/23  1114 03/27/23  1547   TSH 3.42 2.94   T4  --  1.05     Most Recent Hemoglobin A1c:No lab results found.  Most Recent Urinalysis:  Recent Labs   Lab Test 02/19/25  0808 10/28/24  1237 10/22/24  0830   COLOR Yellow   < > Yellow   APPEARANCE Slightly Cloudy*   < > Clear   URINEGLC Negative   < > Negative   URINEBILI Negative   < > Negative   URINEKETONE Negative   < > Negative   SG 1.015   < > 1.020   UBLD Trace*   < > Negative   URINEPH 6.5   < > 6.5   PROTEIN 20*   < > Negative   UROBILINOGEN  --   --  0.2   NITRITE Negative   < > Negative   LEUKEST Large*   < > Trace*   RBCU 15*   < > 2-5*   WBCU >182*   < > 5-10*    < > = values in this interval not displayed.     EXAM: CT ABDOMEN PELVIS W/O CONTRAST  LOCATION: [facility]  DATE: 2/19/2025     INDICATION: right sided hydronephrosis on CT lumbar, rule out distal stone  COMPARISON: 11/11/2024  TECHNIQUE: CT scan of the abdomen and pelvis was performed  without IV contrast. Multiplanar reformats were obtained. Dose reduction techniques were used.  CONTRAST: None.     FINDINGS:   LOWER CHEST: Linear atelectasis in the right middle lobe extending to subpleural focus which could represent an area of rounded atelectasis. Linear atelectasis or scarring in the posterior lower lungs. Small hiatal hernia.     HEPATOBILIARY: No significant mass or bile duct dilatation. No calcified gallstones.      PANCREAS: No significant mass, duct dilatation, or inflammatory change.     SPLEEN: Normal size.     ADRENAL GLANDS: Normal.     KIDNEYS/BLADDER: Marked right hydronephrosis and hydroureter down to the level of a 2 mm stone at the right ureterovesicular junction. Multiple scattered nonobstructive right renal calculi. Tiny scattered left renal calculi. Left peripelvic cysts.   Multiple parenchymal cysts bilaterally. No left hydronephrosis or hydroureter. Bladder unremarkable.     BOWEL: Nonspecific nonobstructive bowel gas pattern. No inflammatory changes. Appendix not seen. Colonic diverticula without evidence for diverticulitis. Fatty ileocecal valve.     LYMPH NODES: No lymphadenopathy.     VASCULATURE: No abdominal aortic aneurysm.     PELVIC ORGANS: No pelvic masses. No pelvic free fluid. Uterus appears to be surgically absent.     MUSCULOSKELETAL: Mild spondylosis. Degenerative disc disease at the L3-L4 and L4-L5 interspaces with vacuum sign present. No inguinal or ventral hernias.                                                                        IMPRESSION:   1.  Marked right hydronephrosis and hydroureter down to the level of a 2 mm stone at the right ureterovesicular junction.  2.  Multiple bilateral nonobstructive renal calculi.  3.  Bilateral renal cysts.  4.  Colonic diverticulosis.    EXAM: MR THORACIC SPINE W/O CONTRAST  LOCATION: [facility]  DATE: 2/19/2025     INDICATION: evaluated T9 fracture  COMPARISON: 02/19/2025.  TECHNIQUE: Routine  Thoracic Spine MRI without IV contrast.     FINDINGS:   Similar T9 and impression deformity. Associated inferior endplate intravertebral disc herniation. No marrow edema within the associated vertebral body. Otherwise preserved vertebral body heights. Preserved alignment and marrow signal. Normal   intervertebral discs and facets for patient age. Normal cord signal. No high-grade canal or neural foraminal stenosis at any level. No acute extraspinal abnormality.                                                                      IMPRESSION:  Chronic appearing T9 compression fracture. No evidence of acute bone or soft tissue injury.     ASSESSMENT/PLAN:  Right nephrolithiasis  Right hydronephrosis status post ureteral stent 2/19: Urine culture grew normal ochoa.  Treated with ceftriaxone and cefdinir during hospital stay.  Completed antibiotics  Patient indicates not taking estradiol cream at home.  Will hold the TCU  Follow-up with urology in 2 weeks for repeat CT scan to assess location of stone  BMP and CBC 2/28      Fall  Head Trauma  Chronic T8-9 compression fracture: Slipped on the ice. Head CT negative. MRI with stable t9 compression fracture  Continue scheduled and as needed Tylenol  PT/OT  Denies pain today    Physical deconditioning  PT/OT/social work      Ménière's disease  Continue scheduled meclizine  Zofran and promethazine available as needed for nausea and vomiting    Hypertension  Continue propranolol and amlodipine    GERD  IBS  Continue Nexium  Continue bowel meds  Antiemetics as above    Depression  DUSTIN  Cognitive impairment Previous SLUMS 2023 17/30.  Lives independently.  Still drives  Continue venlafaxine and propranolol  OT/social work consulted    Osteoporosis  Continue Fosamax and calcitonin      Electronically signed by:  Louise Hermosillo PA-C       This note was completed in part using Dragon voice recognition software. Although reviewed after completion, some word and grammatical  errors may occur.      A total 48 min were spent on this hospital follow-up visit including review of hospitalization, medication reconciliation, evaluating patient, separate phone call to patient's family member, writing orders and documenting.  All services performed on day of visit

## 2025-02-25 NOTE — LETTER
2/25/2025      Cynthia Arita  [address] [place] [address]        [facility] GERIATRICS    PRIMARY CARE PROVIDER AND CLINIC:  Betsey Magaña, APRN CNP, [address] [facility] [address]  Chief Complaint   Patient presents with     Hospital F/U      [facility] Medical Record Number:  [id number]  Place of Service where encounter took place:  [facility] ([facility]) [[id number]]    Cynthia Arita  is a 79 year old  (1945), admitted to the above facility from  [facility]. Hospital stay 2/19/25 through 2/24/25..   HPI:   Notes from hospitalization reviewed including H&P Urology consult and D/c summary    Cynthia Arita is a 79-year-old female with a past medical historyOf Ménière's disease, GERD, IBS, chronic nausea and vomiting, nephrolithiasis, anxiety, depression who was recently hospitalized at [facility].  Presented for evaluation of a fall.  Trauma workup revealed right hydronephrosis.  Urology consulted.  Underwent ureteral stent placement.  Urine culture grew normal ochoa.  Was treated with course of antibiotics.  Deconditioned compared to baseline to discharge to U    Cynthia is evaluated in her room.  Finishing breakfast.  Hospitalization reviewed.  Denies symptoms related to ureteral stent.  Discussed plan for urology follow-up as outpatient.  Continues to have a mild headache from head injury.  Indicates she slipped on the ice outside of her condo.  Denies visual changes.  Does struggle with Ménière's disease with associated chronic, intermittent nausea, vomiting and dizziness.  Has several as needed medications available.  Does believe she takes meclizine on a scheduled basis.  Lives independently in her own condo.  Sets up her own meds.  Continues to drive.     Additionally, called and spoke with son Jose Luis.  Introduced self and role.  TCU course discussed.  Questions answered.    Review of nursing home EMR: -145    CODE STATUS/ADVANCE DIRECTIVES  DISCUSSION:  Prior  CPR/Full code   ALLERGIES:   Allergies   Allergen Reactions     Ativan [Lorazepam]      Sick -      Dicyclomine      Other reaction(s): dry mouth     Gabapentin Nausea     Metoprolol      Nausea       Pantoprazole Other (See Comments), Nausea and Vomiting and GI Disturbance     Red in the face, sleepiness, face swelling, joint pain, dizziness     Tramadol Nausea and Vomiting     Oxycodone Anxiety      PAST MEDICAL HISTORY:   Past Medical History:   Diagnosis Date     Anxiety      Basal cell carcinoma      Complication of anesthesia      Diverticulosis      GERD (gastroesophageal reflux disease)      HTN (hypertension)      Meniere's disease      Nephrolithiasis      Pain in right knee      PONV (postoperative nausea and vomiting)       PAST SURGICAL HISTORY:   has a past surgical history that includes VAGINAL HYSTERECTOMY; Eye surgery; Arthroplasty knee (Right, 01/21/2019); Arthroplasty knee (Left, 10/02/2023); Combined Cystoscopy, Insert Stent Ureter(s) (Right, 2/19/2025); and IR Lumbar Puncture (11/19/2019).  FAMILY HISTORY: family history includes Bipolar Disorder in her sister; Brain Cancer (age of onset: 17) in her son; Cancer in her maternal grandmother; Diabetes in her paternal grandmother; Esophageal Cancer in her father; Hypertension in her brother; Neurologic Disorder in her mother and sister.  SOCIAL HISTORY:   reports that she has never smoked. She has never used smokeless tobacco. She reports current alcohol use. She reports that she does not use drugs.  Patient's living condition: lives alone    Post Discharge Medication Reconciliation Status:   MED REC REQUIRED  Post Medication Reconciliation Status: discharge medications reconciled and changed, per note/orders       Current Outpatient Medications   Medication Sig Dispense Refill     acetaminophen (TYLENOL) 500 MG tablet Take 1,000 mg by mouth daily.       acetaminophen (TYLENOL) 650 MG CR tablet Take 1 tablet (650 mg) by mouth 3  times daily as needed for mild pain or fever.       alendronate (FOSAMAX) 70 MG tablet Take 1 tablet (70 mg) by mouth every 7 days 13 tablet 3     amLODIPine (NORVASC) 5 MG tablet TAKE 1 TABLET(5 MG) BY MOUTH DAILY 90 tablet 0     Biotin 5000 MCG TABS Take 1 tablet by mouth daily       calcitonin, salmon, (MIACALCIN) 200 UNIT/ACT nasal spray SPRAY ONCE IN 1 NOSTRIL ONCE DAILY, ALTERNATING NOSTRIL DAILY 3.7 mL 1     cyanocobalamin (VITAMIN B-12) 1000 MCG tablet Take 1,000 mcg by mouth daily       Flaxseed, Linseed, (FLAX SEED OIL) 1000 MG capsule Take 1 capsule by mouth daily Takes one in the PM       losartan (COZAAR) 100 MG tablet TAKE 1 TABLET(100 MG) BY MOUTH DAILY 90 tablet 1     meclizine (ANTIVERT) 25 MG tablet Take 25 mg by mouth 2 times daily.       Multiple Vitamins-Minerals (PRESERVISION AREDS 2) CAPS Take 1 tablet by mouth 2 times daily       multivitamin (CENTRUM SILVER) tablet Take 1 tablet by mouth daily       naproxen sodium (EQ NAPROXEN SODIUM) 220 MG tablet Take 220 mg by mouth 2 times daily as needed for moderate pain       DHA-EPA-Coenzyme Q10-Vitamin E (CO Q-10 VITAMIN E FISH OIL PO) Take 1 capsule by mouth daily       esomeprazole (NEXIUM) 40 MG DR capsule Take 40 mg by mouth 2 times daily. Take 30-60 minutes before eating.       estradiol (ESTRACE) 0.1 MG/GM vaginal cream Place 2 g vaginally twice a week (Patient not taking: Reported on 2/25/2025) 42.5 g 0     Misc Natural Products (GLUCOSAMINE CHOND COMPLEX/MSM PO) Take 1 capsule by mouth 2 times daily Takes one in the morning       ondansetron (ZOFRAN ODT) 4 MG ODT tab Take 4 mg by mouth 2 times daily as needed for nausea.       polyethylene glycol (MIRALAX) 17 g packet Take 17 g by mouth daily 7 packet 0     promethazine (PHENERGAN) 25 MG tablet Take 25 mg by mouth every 6 hours as needed for nausea or vomiting.       propranolol (INDERAL) 10 MG tablet Take 1 tablet (10 mg) by mouth 2 times daily 30 tablet 1     venlafaxine (EFFEXOR XR) 37.5  "MG 24 hr capsule Take 1 capsule (37.5 mg) by mouth daily       vitamin D3 (CHOLECALCIFEROL) 50 mcg (2000 units) tablet Take 1 tablet by mouth daily Takes one in the morning 2,000 units       No current facility-administered medications for this visit.       ROS:  10 point ROS of systems including Constitutional, Eyes, Respiratory, Cardiovascular, Gastroenterology, Genitourinary, Integumentary, Musculoskeletal, Psychiatric were all negative except for pertinent positives noted in my HPI.    Vitals:  /65   Pulse 73   Temp 98.7  F (37.1  C)   Resp 18   Ht 1.626 m (5' 4\")   Wt 67.1 kg (148 lb)   LMP  (LMP Unknown)   SpO2 95%   BMI 25.40 kg/m    Exam:  Physical Exam  Vitals (Facility EMR) reviewed.   Constitutional:       General: She is not in acute distress.  HENT:      Head: Normocephalic and atraumatic.   Eyes:      General: No scleral icterus.  Cardiovascular:      Rate and Rhythm: Normal rate and regular rhythm.      Heart sounds: No murmur heard.  Pulmonary:      Effort: Pulmonary effort is normal.      Breath sounds: No wheezing.   Musculoskeletal:      Right lower leg: No edema.      Left lower leg: No edema.   Skin:     General: Skin is warm and dry.      Findings: No rash.   Neurological:      Mental Status: She is alert. Mental status is at baseline.   Psychiatric:         Behavior: Behavior normal.           Lab/Diagnostic data:  Recent labs in [facility] reviewed by me today.  and Most Recent 3 CBC's:  Recent Labs   Lab Test 02/21/25  0817 02/20/25  1123 02/19/25  0340   WBC 7.8 9.6 2.1*   HGB 9.7* 9.8* 12.2   MCV 89 88 91   * 143* 199     Most Recent 3 BMP's:  Recent Labs   Lab Test 02/21/25  0817 02/20/25  1123 02/19/25  1440 02/19/25  0340   * 129*  --  138   POTASSIUM 3.5 3.6  --  3.5   CHLORIDE 99 100  --  100   CO2 17* 19*  --  23   BUN 15.3 28.5*  --  24.7*   CR 0.50* 0.77  --  0.79   ANIONGAP 15 10  --  15   ANDERS 8.4* 8.0*  --  9.7   GLC 80 123* 82 96     Most Recent 2 " LFT's:  Recent Labs   Lab Test 11/11/24  1823 10/28/24  1211   AST 37 21   ALT 21 18   ALKPHOS 99 87   BILITOTAL <0.2 0.3     7-Day Micro Results       Collected Updated Procedure Result Status      02/25/2025 0650 02/25/2025 1115 Quantiferon TB Gold Plus Grey Tube [[id number]]   Blood from Hand, Left    In process Component Value   No component results            02/25/2025 0650 02/25/2025 1115 Quantiferon TB Gold Plus Green Tube [[id number]]   Blood from Hand, Left    In process Component Value   No component results            02/25/2025 0650 02/25/2025 1115 Quantiferon TB Gold Plus Yellow Tube [[id number]]   Blood from Hand, Left    In process Component Value   No component results            02/25/2025 0650 02/25/2025 1115 Quantiferon TB Gold Plus Purple Tube [[id number]]   Blood from Hand, Left    In process Component Value   No component results            02/19/2025 0808 02/20/2025 0548 Urine Culture [[id number]]   Urine, Clean Catch    Final result Component Value   Culture 10,000-50,000 CFU/mL Mixture of Urogenital Delia                     Most Recent TSH and T4:  Recent Labs   Lab Test 12/13/23  1114 03/27/23  1547   TSH 3.42 2.94   T4  --  1.05     Most Recent Hemoglobin A1c:No lab results found.  Most Recent Urinalysis:  Recent Labs   Lab Test 02/19/25  0808 10/28/24  1237 10/22/24  0830   COLOR Yellow   < > Yellow   APPEARANCE Slightly Cloudy*   < > Clear   URINEGLC Negative   < > Negative   URINEBILI Negative   < > Negative   URINEKETONE Negative   < > Negative   SG 1.015   < > 1.020   UBLD Trace*   < > Negative   URINEPH 6.5   < > 6.5   PROTEIN 20*   < > Negative   UROBILINOGEN  --   --  0.2   NITRITE Negative   < > Negative   LEUKEST Large*   < > Trace*   RBCU 15*   < > 2-5*   WBCU >182*   < > 5-10*    < > = values in this interval not displayed.     EXAM: CT ABDOMEN PELVIS W/O CONTRAST  LOCATION: [facility]  DATE: 2/19/2025     INDICATION: right sided  hydronephrosis on CT lumbar, rule out distal stone  COMPARISON: 11/11/2024  TECHNIQUE: CT scan of the abdomen and pelvis was performed without IV contrast. Multiplanar reformats were obtained. Dose reduction techniques were used.  CONTRAST: None.     FINDINGS:   LOWER CHEST: Linear atelectasis in the right middle lobe extending to subpleural focus which could represent an area of rounded atelectasis. Linear atelectasis or scarring in the posterior lower lungs. Small hiatal hernia.     HEPATOBILIARY: No significant mass or bile duct dilatation. No calcified gallstones.      PANCREAS: No significant mass, duct dilatation, or inflammatory change.     SPLEEN: Normal size.     ADRENAL GLANDS: Normal.     KIDNEYS/BLADDER: Marked right hydronephrosis and hydroureter down to the level of a 2 mm stone at the right ureterovesicular junction. Multiple scattered nonobstructive right renal calculi. Tiny scattered left renal calculi. Left peripelvic cysts.   Multiple parenchymal cysts bilaterally. No left hydronephrosis or hydroureter. Bladder unremarkable.     BOWEL: Nonspecific nonobstructive bowel gas pattern. No inflammatory changes. Appendix not seen. Colonic diverticula without evidence for diverticulitis. Fatty ileocecal valve.     LYMPH NODES: No lymphadenopathy.     VASCULATURE: No abdominal aortic aneurysm.     PELVIC ORGANS: No pelvic masses. No pelvic free fluid. Uterus appears to be surgically absent.     MUSCULOSKELETAL: Mild spondylosis. Degenerative disc disease at the L3-L4 and L4-L5 interspaces with vacuum sign present. No inguinal or ventral hernias.                                                                        IMPRESSION:   1.  Marked right hydronephrosis and hydroureter down to the level of a 2 mm stone at the right ureterovesicular junction.  2.  Multiple bilateral nonobstructive renal calculi.  3.  Bilateral renal cysts.  4.  Colonic diverticulosis.    EXAM: MR THORACIC SPINE W/O  CONTRAST  LOCATION: [facility]  DATE: 2/19/2025     INDICATION: evaluated T9 fracture  COMPARISON: 02/19/2025.  TECHNIQUE: Routine Thoracic Spine MRI without IV contrast.     FINDINGS:   Similar T9 and impression deformity. Associated inferior endplate intravertebral disc herniation. No marrow edema within the associated vertebral body. Otherwise preserved vertebral body heights. Preserved alignment and marrow signal. Normal   intervertebral discs and facets for patient age. Normal cord signal. No high-grade canal or neural foraminal stenosis at any level. No acute extraspinal abnormality.                                                                      IMPRESSION:  Chronic appearing T9 compression fracture. No evidence of acute bone or soft tissue injury.     ASSESSMENT/PLAN:  Right nephrolithiasis  Right hydronephrosis status post ureteral stent 2/19: Urine culture grew normal ochoa.  Treated with ceftriaxone and cefdinir during hospital stay.  Completed antibiotics  Patient indicates not taking estradiol cream at home.  Will hold the TCU  Follow-up with urology in 2 weeks for repeat CT scan to assess location of stone  BMP and CBC 2/28      Fall  Head Trauma  Chronic T8-9 compression fracture: Slipped on the ice. Head CT negative. MRI with stable t9 compression fracture  Continue scheduled and as needed Tylenol  PT/OT  Denies pain today    Physical deconditioning  PT/OT/social work      Ménière's disease  Continue scheduled meclizine  Zofran and promethazine available as needed for nausea and vomiting    Hypertension  Continue propranolol and amlodipine    GERD  IBS  Continue Nexium  Continue bowel meds  Antiemetics as above    Depression  DUSTIN  Cognitive impairment Previous SLUMS 2023 17/30.  Lives independently.  Still drives  Continue venlafaxine and propranolol  OT/social work consulted    Osteoporosis  Continue Fosamax and calcitonin      Electronically signed by:  Louise Elizabeth  KATHARINE Hermosillo       This note was completed in part using Dragon voice recognition software. Although reviewed after completion, some word and grammatical errors may occur.      A total 48 min were spent on this hospital follow-up visit including review of hospitalization, medication reconciliation, evaluating patient, separate phone call to patient's family member, writing orders and documenting.  All services performed on day of visit                     Sincerely,        Louise Hermosillo PA-C    Electronically signed

## 2025-02-25 NOTE — PLAN OF CARE
Physical Therapy Discharge Summary    Reason for therapy discharge:    Discharged to transitional care facility.    Progress towards therapy goal(s). See goals on Care Plan in [facility] electronic health record for goal details.  Goals not met.  Barriers to achieving goals:   discharge from facility.    Therapy recommendation(s):    Continued therapy is recommended.  Rationale/Recommendations:  to advance indepdence with functional mobility..      Summary completed from chart review patient not seen by documenting therapist

## 2025-02-26 LAB
GAMMA INTERFERON BACKGROUND BLD IA-ACNC: 0.02 IU/ML
M TB IFN-G BLD-IMP: NEGATIVE
M TB IFN-G CD4+ BCKGRND COR BLD-ACNC: 1.74 IU/ML
MITOGEN IGNF BCKGRD COR BLD-ACNC: 0.01 IU/ML
MITOGEN IGNF BCKGRD COR BLD-ACNC: 0.01 IU/ML
QUANTIFERON MITOGEN: 1.76 IU/ML
QUANTIFERON NIL TUBE: 0.02 IU/ML
QUANTIFERON TB1 TUBE: 0.03 IU/ML
QUANTIFERON TB2 TUBE: 0.03

## 2025-02-27 ENCOUNTER — TELEPHONE (OUTPATIENT)
Dept: INTERNAL MEDICINE | Facility: CLINIC | Age: 80
End: 2025-02-27
Payer: MEDICARE

## 2025-02-27 NOTE — TELEPHONE ENCOUNTER
General Call      Reason for Call:  Home Care question    What are your questions or concerns:  Wondering if Betsey Magaña will follow patient for home care    Date of last appointment with provider: 10/18/24    Okay to leave a detailed message?: Yes at Other phone number:  [phone]

## 2025-02-27 NOTE — TELEPHONE ENCOUNTER
Call to Mely and advised.   Verbal order given to approve visits until certification received for MD signature.

## 2025-02-28 LAB
ANION GAP SERPL CALCULATED.3IONS-SCNC: 10 MMOL/L (ref 7–15)
BUN SERPL-MCNC: 13.1 MG/DL (ref 8–23)
CALCIUM SERPL-MCNC: 9 MG/DL (ref 8.8–10.4)
CHLORIDE SERPL-SCNC: 105 MMOL/L (ref 98–107)
CREAT SERPL-MCNC: 0.48 MG/DL (ref 0.51–0.95)
EGFRCR SERPLBLD CKD-EPI 2021: >90 ML/MIN/1.73M2
ERYTHROCYTE [DISTWIDTH] IN BLOOD BY AUTOMATED COUNT: 14.2 % (ref 10–15)
GLUCOSE SERPL-MCNC: 102 MG/DL (ref 70–99)
HCO3 SERPL-SCNC: 25 MMOL/L (ref 22–29)
HCT VFR BLD AUTO: 35.1 % (ref 35–47)
HGB BLD-MCNC: 10.5 G/DL (ref 11.7–15.7)
MCH RBC QN AUTO: 27.7 PG (ref 26.5–33)
MCHC RBC AUTO-ENTMCNC: 29.9 G/DL (ref 31.5–36.5)
MCV RBC AUTO: 93 FL (ref 78–100)
PLATELET # BLD AUTO: 439 10E3/UL (ref 150–450)
POTASSIUM SERPL-SCNC: 4 MMOL/L (ref 3.4–5.3)
RBC # BLD AUTO: 3.79 10E6/UL (ref 3.8–5.2)
SODIUM SERPL-SCNC: 140 MMOL/L (ref 135–145)
WBC # BLD AUTO: 6.5 10E3/UL (ref 4–11)

## 2025-02-28 PROCEDURE — 80048 BASIC METABOLIC PNL TOTAL CA: CPT | Mod: ORL | Performed by: PHYSICIAN ASSISTANT

## 2025-02-28 PROCEDURE — 85027 COMPLETE CBC AUTOMATED: CPT | Mod: ORL | Performed by: PHYSICIAN ASSISTANT

## 2025-02-28 PROCEDURE — P9604 ONE-WAY ALLOW PRORATED TRIP: HCPCS | Mod: ORL | Performed by: PHYSICIAN ASSISTANT

## 2025-02-28 PROCEDURE — 36415 COLL VENOUS BLD VENIPUNCTURE: CPT | Mod: ORL | Performed by: PHYSICIAN ASSISTANT

## 2025-03-03 ENCOUNTER — PATIENT OUTREACH (OUTPATIENT)
Dept: CARE COORDINATION | Facility: CLINIC | Age: 80
End: 2025-03-03
Payer: MEDICARE

## 2025-03-03 ENCOUNTER — NURSE TRIAGE (OUTPATIENT)
Dept: INTERNAL MEDICINE | Facility: CLINIC | Age: 80
End: 2025-03-03

## 2025-03-03 ENCOUNTER — TELEPHONE (OUTPATIENT)
Dept: INTERNAL MEDICINE | Facility: CLINIC | Age: 80
End: 2025-03-03
Payer: MEDICARE

## 2025-03-03 DIAGNOSIS — Z09 HOSPITAL DISCHARGE FOLLOW-UP: ICD-10-CM

## 2025-03-03 NOTE — TELEPHONE ENCOUNTER
"Dr. Holloway huddled with this writer, states that he was able to get a hold of the pt. The provider advised the pt to submit urine tomorrow and the pt reportedly said that if she's going to submit urine, she mighty as well preferred to be seen by a provider. Dr. Holloway advised to schedule pt with first available provider.     Called pt who requested to be seen at 1PM. Pt will arrange her transportation - advised to arrive at 1:10 PM.   Informed that Betsey won't be here tomorrow, but Renetta Blanc is able to see her. (Pt says \"Does she speak english like I do? Because last time I was seen there, it was a different provider and I didn't understand anything she said.\") Pt asked if she can submit urine right away. Informed pt that the order wasn't placed yet. Pt says \"ok. If it doesn't work out tomorrow, I'll just have to see someone else. Thank you.\"   "

## 2025-03-03 NOTE — TELEPHONE ENCOUNTER
Writer huddled with Dr. Holloway who advised that preferably, pt need to be seen in person but OK for a telephone visit.     Called pt and scheduled for a telephone visit.   Pt verbalized willingness to be seen tomorrow afternoon but he will talk to the provider first.    Mar 03, 2025 5:30 PM  Provider Visit with Taj Holloway MD  [facility] ([facility] ) [phone]

## 2025-03-03 NOTE — LETTER
[facility]   To:             Please give to facility    From:   Rohini Chacon RN  Care Coordinator   [facility]   P: [phone]  [email or web address]   Patient Name:  Cynthia Arita YOB: 1945   Admit date: 2/24/25      *Information Needed:  Please contact me when the patient will discharge (or if they will move to long term care)- include the discharge date, disposition, and main diagnosis   If the patient is discharged with home care services, please provide the name of the agency    Also- Please inform me if a care conference is being held.   Phone, Fax or Email with information                   Thank you            Electronically signed

## 2025-03-03 NOTE — TELEPHONE ENCOUNTER
"Nurse Triage SBAR    Is this a 2nd Level Triage? YES, LICENSED PRACTITIONER REVIEW IS REQUIRED    Situation: pt called to report burning sensation with urination and inability to empty bladder fully x 3-4 days.     Background: pt was hospitalized 2 weeks ago for ureterolithiasis.     Assessment: Bladder feels mildly full but was only able to urinate small amount. Increased frequency but with small amount of urine. Mouth is dry and always nauseated (normal for patient). Pt is drinking a lot of fluids (water, gatorade). No fever, headache, flank pain or any other symptoms.     Protocol Recommended Disposition:   No disposition on file.    Recommendation: Go to  at a minimum but pt refused. \"I don't want to go to any doctor, urgent care or ED, I'm sorry\". She would like to seek advise from any clinic provider, and if they can order UA and treatment.     Routing to covering providers.     Routed to provider    Does the patient meet one of the following criteria for ADS visit consideration? 16+ years old, with an [facility] PCP     TIP  Providers, please consider if this condition is appropriate for management at one of our Acute and Diagnostic Services sites.     If patient is a good candidate, please use dotphrase <dot>triageresponse and select Refer to ADS to document.    Reason for Disposition   Patient sounds very sick or weak to the triager    Additional Information   Negative: Shock suspected (e.g., cold/pale/clammy skin, too weak to stand, low BP, rapid pulse)   Negative: Sounds like a life-threatening emergency to the triager   Negative: Followed a female genital area injury (e.g., labia, vagina, vulva)   Negative: Followed a male genital area injury (penis, scrotum)   Negative: Vaginal discharge   Negative: Pus (white, yellow) or bloody discharge from end of penis   Negative: Pain or burning with passing urine (urination) and pregnant   Negative: Pain or burning with passing urine (urination) and female   " "Negative: Pain or burning with passing urine (urination) and male   Negative: Pain or itching in the vulvar area   Negative: Pain in scrotum is main symptom   Negative: Blood in the urine is main symptom   Negative: Symptoms arising from use of a urinary catheter (e.g., coude, Dyer)   Negative: Unable to urinate (or only a few drops) > 4 hours and bladder feels very full (e.g., palpable bladder or strong urge to urinate)    Answer Assessment - Initial Assessment Questions  1. SYMPTOM: \"What's the main symptom you're concerned about?\" (e.g., frequency, incontinence)      Bladder feels full but unable to empty bladder fully, burning sensation when urinating.   2. ONSET: \"When did the symptoms  start?\"      3-4 days ago  3. PAIN: \"Is there any pain?\" If Yes, ask: \"How bad is it?\" (Scale: 1-10; mild, moderate, severe)      Mild to moderate  4. CAUSE: \"What do you think is causing the symptoms?\"      UTI?  5. OTHER SYMPTOMS: \"Do you have any other symptoms?\" (e.g., blood in urine, fever, flank pain, pain with urination)      No.    Protocols used: Urinary Symptoms-A-OH    "

## 2025-03-03 NOTE — TELEPHONE ENCOUNTER
Home Care is calling regarding an established patient with [facility].  Requesting orders from: Betsey Magaña RN APPROVED: RN able to provide verbal orders.  Home Care will send orders for signature.  RN will close encounter.  Is this a request for a temporary pause in the home care episode?  Yes  FYI: patient was to start home care on Saturday 3/1/25. Patient refused as she had too many visitors at that time. Next available start time is Friday 3/7/25      Caller: Sarah  Home Care Agency: [facility]  Phone number Home Care can be reached at: [phone]  Okay to leave a detailed message?: Yes    Alejo Lopez RN

## 2025-03-03 NOTE — PROGRESS NOTES
Clinic Care Coordination Contact  Care Coordination Transition Communication    Clinical Data: Patient was hospitalized at [facility] from 2/19 to 2/24/25 with diagnosis of ureterolithiasis.     Assessment: Patient has transitioned to TCU/ARU for short term rehabilitation:    Facility Name: [facility]   Transition Communication:  Notified facility of Primary Care- Care Coordination support via Epic fax.    Plan: Care Coordinator will await notification from facility staff informing of patient's discharge plans/needs. Care Coordinator will review chart and outreach to facility staff every 4 weeks and as needed.     Rohini Chacon RN, BSN, CPHN, CCM  [facility]  [facility], and [facility]  [email or web address]  Office: [phone]  Employed by [facility]

## 2025-03-04 ENCOUNTER — OFFICE VISIT (OUTPATIENT)
Dept: INTERNAL MEDICINE | Facility: CLINIC | Age: 80
End: 2025-03-04
Payer: MEDICARE

## 2025-03-04 ENCOUNTER — PATIENT OUTREACH (OUTPATIENT)
Dept: CARE COORDINATION | Facility: CLINIC | Age: 80
End: 2025-03-04

## 2025-03-04 VITALS
DIASTOLIC BLOOD PRESSURE: 71 MMHG | BODY MASS INDEX: 23.15 KG/M2 | HEART RATE: 67 BPM | HEIGHT: 64 IN | SYSTOLIC BLOOD PRESSURE: 129 MMHG | RESPIRATION RATE: 16 BRPM | OXYGEN SATURATION: 97 % | TEMPERATURE: 97.5 F | WEIGHT: 135.6 LBS

## 2025-03-04 DIAGNOSIS — R30.0 DYSURIA: ICD-10-CM

## 2025-03-04 DIAGNOSIS — R31.9 URINARY TRACT INFECTION WITH HEMATURIA, SITE UNSPECIFIED: Primary | ICD-10-CM

## 2025-03-04 DIAGNOSIS — Z09 FOLLOW-UP EXAMINATION: ICD-10-CM

## 2025-03-04 DIAGNOSIS — N39.0 URINARY TRACT INFECTION WITH HEMATURIA, SITE UNSPECIFIED: Primary | ICD-10-CM

## 2025-03-04 DIAGNOSIS — N13.30 HYDRONEPHROSIS, UNSPECIFIED HYDRONEPHROSIS TYPE: ICD-10-CM

## 2025-03-04 LAB
ALBUMIN UR-MCNC: >=300 MG/DL
ANION GAP SERPL CALCULATED.3IONS-SCNC: 10 MMOL/L (ref 7–15)
APPEARANCE UR: ABNORMAL
BACTERIA #/AREA URNS HPF: ABNORMAL /HPF
BASOPHILS # BLD AUTO: 0.1 10E3/UL (ref 0–0.2)
BASOPHILS NFR BLD AUTO: 1 %
BILIRUB UR QL STRIP: ABNORMAL
BUN SERPL-MCNC: 27.2 MG/DL (ref 8–23)
CALCIUM SERPL-MCNC: 10.5 MG/DL (ref 8.8–10.4)
CHLORIDE SERPL-SCNC: 100 MMOL/L (ref 98–107)
COLOR UR AUTO: YELLOW
CREAT SERPL-MCNC: 0.73 MG/DL (ref 0.51–0.95)
EGFRCR SERPLBLD CKD-EPI 2021: 83 ML/MIN/1.73M2
EOSINOPHIL # BLD AUTO: 0.3 10E3/UL (ref 0–0.7)
EOSINOPHIL NFR BLD AUTO: 4 %
ERYTHROCYTE [DISTWIDTH] IN BLOOD BY AUTOMATED COUNT: 14.1 % (ref 10–15)
GLUCOSE SERPL-MCNC: 89 MG/DL (ref 70–99)
GLUCOSE UR STRIP-MCNC: NEGATIVE MG/DL
HCO3 SERPL-SCNC: 25 MMOL/L (ref 22–29)
HCT VFR BLD AUTO: 37 % (ref 35–47)
HGB BLD-MCNC: 11.9 G/DL (ref 11.7–15.7)
HGB UR QL STRIP: ABNORMAL
IMM GRANULOCYTES # BLD: 0 10E3/UL
IMM GRANULOCYTES NFR BLD: 0 %
KETONES UR STRIP-MCNC: NEGATIVE MG/DL
LEUKOCYTE ESTERASE UR QL STRIP: ABNORMAL
LYMPHOCYTES # BLD AUTO: 1.9 10E3/UL (ref 0.8–5.3)
LYMPHOCYTES NFR BLD AUTO: 29 %
MCH RBC QN AUTO: 29 PG (ref 26.5–33)
MCHC RBC AUTO-ENTMCNC: 32.2 G/DL (ref 31.5–36.5)
MCV RBC AUTO: 90 FL (ref 78–100)
MONOCYTES # BLD AUTO: 0.8 10E3/UL (ref 0–1.3)
MONOCYTES NFR BLD AUTO: 13 %
NEUTROPHILS # BLD AUTO: 3.4 10E3/UL (ref 1.6–8.3)
NEUTROPHILS NFR BLD AUTO: 52 %
NITRATE UR QL: NEGATIVE
PH UR STRIP: 6 [PH] (ref 5–7)
PLATELET # BLD AUTO: 532 10E3/UL (ref 150–450)
POTASSIUM SERPL-SCNC: 4.5 MMOL/L (ref 3.4–5.3)
RBC # BLD AUTO: 4.1 10E6/UL (ref 3.8–5.2)
RBC #/AREA URNS AUTO: >100 /HPF
SODIUM SERPL-SCNC: 135 MMOL/L (ref 135–145)
SP GR UR STRIP: >=1.03 (ref 1–1.03)
SQUAMOUS #/AREA URNS AUTO: ABNORMAL /LPF
UROBILINOGEN UR STRIP-ACNC: 0.2 E.U./DL
WBC # BLD AUTO: 6.6 10E3/UL (ref 4–11)
WBC #/AREA URNS AUTO: ABNORMAL /HPF

## 2025-03-04 PROCEDURE — 85025 COMPLETE CBC W/AUTO DIFF WBC: CPT

## 2025-03-04 PROCEDURE — 80048 BASIC METABOLIC PNL TOTAL CA: CPT

## 2025-03-04 PROCEDURE — 81001 URINALYSIS AUTO W/SCOPE: CPT

## 2025-03-04 PROCEDURE — 3074F SYST BP LT 130 MM HG: CPT

## 2025-03-04 PROCEDURE — 87086 URINE CULTURE/COLONY COUNT: CPT

## 2025-03-04 PROCEDURE — 3078F DIAST BP <80 MM HG: CPT

## 2025-03-04 PROCEDURE — 36415 COLL VENOUS BLD VENIPUNCTURE: CPT

## 2025-03-04 PROCEDURE — 1126F AMNT PAIN NOTED NONE PRSNT: CPT

## 2025-03-04 PROCEDURE — 99214 OFFICE O/P EST MOD 30 MIN: CPT

## 2025-03-04 RX ORDER — CIPROFLOXACIN 500 MG/1
500 TABLET, FILM COATED ORAL 2 TIMES DAILY
Qty: 14 TABLET | Refills: 0 | Status: SHIPPED | OUTPATIENT
Start: 2025-03-04

## 2025-03-04 ASSESSMENT — PAIN SCALES - GENERAL: PAINLEVEL_OUTOF10: NO PAIN (0)

## 2025-03-04 ASSESSMENT — ANXIETY QUESTIONNAIRES
4. TROUBLE RELAXING: NOT AT ALL
7. FEELING AFRAID AS IF SOMETHING AWFUL MIGHT HAPPEN: NOT AT ALL
8. IF YOU CHECKED OFF ANY PROBLEMS, HOW DIFFICULT HAVE THESE MADE IT FOR YOU TO DO YOUR WORK, TAKE CARE OF THINGS AT HOME, OR GET ALONG WITH OTHER PEOPLE?: NOT DIFFICULT AT ALL
GAD7 TOTAL SCORE: 0
IF YOU CHECKED OFF ANY PROBLEMS ON THIS QUESTIONNAIRE, HOW DIFFICULT HAVE THESE PROBLEMS MADE IT FOR YOU TO DO YOUR WORK, TAKE CARE OF THINGS AT HOME, OR GET ALONG WITH OTHER PEOPLE: NOT DIFFICULT AT ALL
3. WORRYING TOO MUCH ABOUT DIFFERENT THINGS: NOT AT ALL
6. BECOMING EASILY ANNOYED OR IRRITABLE: NOT AT ALL
5. BEING SO RESTLESS THAT IT IS HARD TO SIT STILL: NOT AT ALL
GAD7 TOTAL SCORE: 0
2. NOT BEING ABLE TO STOP OR CONTROL WORRYING: NOT AT ALL
GAD7 TOTAL SCORE: 0
1. FEELING NERVOUS, ANXIOUS, OR ON EDGE: NOT AT ALL
7. FEELING AFRAID AS IF SOMETHING AWFUL MIGHT HAPPEN: NOT AT ALL

## 2025-03-04 NOTE — PROGRESS NOTES
Clinic Care Coordination Contact  Transitions of Care Outreach  Chief Complaint   Patient presents with    Clinic Care Coordination - Initial    Clinic Care Coordination - Homecare/TCU     Most Recent Admission Date: 2/19/2025   Most Recent Admission Diagnosis: Ureterolithiasis - N20.1  Closed wedge compression fracture of T9 vertebra, initial encounter (H) - S22.070A     Most Recent Discharge Date: 2/24/2025   Most Recent Discharge Diagnosis: Ureterolithiasis - N20.1  Closed wedge compression fracture of T9 vertebra, initial encounter (H) - S22.070A     Transitions of Care Assessment    Discharge Assessment  How are you doing now that you are home?: She has nausea, she has burning with urination.  How are your symptoms? (Red Flag symptoms escalate to triage hotline per guidelines): Improved  Do you know how to contact your clinic care team if you have future questions or changes to your health status? : Yes  Does the patient have their discharge instructions? : Yes  Does the patient have questions regarding their discharge instructions? : No  Were you started on any new medications or were there changes to any of your previous medications? : No  Does the patient have all of their medications?: Yes  Do you have questions regarding any of your medications? : No  Do you have all of your needed medical supplies or equipment (DME)?  (i.e. oxygen tank, CPAP, cane, etc.): Yes    Post-op ([facility] CTA Only)  If the patient had a surgery or procedure, do they have any questions for a nurse?: No    Post-op (Clinicians Only)  Did the patient have surgery or a procedure: No  Fever: No  Chills: No  Eating & Drinking: eating and drinking without complaints/concerns  Additional Symptoms: nausea, decreased appetite  Bowel Function: normal  Date of last BM: 03/04/25  Urinary Status: voiding without complaint/concerns (Burning with urination)    Follow up Plan     Discharge Follow-Up  Discharge follow up appointment scheduled in  alignment with recommended follow up timeframe or Transitions of Risk Category? (Low = within 30 days; Moderate= within 14 days; High= within 7 days): Yes  Discharge Follow Up Appointment Date: 03/04/25  Discharge Follow Up Appointment Scheduled with?: Primary Care Provider    RN CC contacted patient for post-hospital follow up and to introduce Care Coordination. Full assessment not indicated as patient declined to have Care Coordination support at this time. She was agreeable to receiving an introduction letter with additional information on Care Coordination via [employer]. Verified patient address in chart is valid.     RN CC will send patient introduction letter via [email or web address].     Addendum: RN CC printed introduction letter while in clinic on 3/6/25 and mailed to patient via [employer] Postal Service.     Future Appointments   Date Time Provider Department Center   3/4/2025  1:30 PM Jessie Blanc APRN CNP [facility]     Outpatient Plan as outlined on AVS reviewed with patient.    For any urgent concerns, please contact our 24 hour nurse triage line: [phone] ([phone])       Rohini Chacon RN, BSN, CPHN, [facility] Management  [facility], and [facility]  [email or web address]  Office: [phone]  Employed by [facility]

## 2025-03-04 NOTE — PATIENT INSTRUCTIONS
Complete ciprofloxacin antibiotic regimen.   Take antibiotic with food to reduce stomach upset.   Take a probiotic or eat yogurt to further reduce stomach upset.   Return to office if symptoms get worse or if you develop a fever    Urology will call you for an appointment

## 2025-03-04 NOTE — PROGRESS NOTES
Assessment & Plan     Follow-up examination  Hydronephrosis, unspecified hydronephrosis type  This is a follow up to an ED to Hospital discharge from 2/19/2025 to 2/23/2025.  Patient was admitted for fall and dizziness.  Pt had an evaluation that revealed right renal colic with hydronephrosis and hydroureter due to a 2 mm ureterovesical junction (UVJ) stone. She underwent cystoscopy with ureteral stent placement on 2/19. Urinalysis was abnormal, and urine culture showed mixed ochoa, for which she was treated with IV ceftriaxone, now transitioned to oral cefdinir. Chronic T9 compression fracture was noted, with Neurosurgery recommending pain management. Pt went to [facility] for rehab.  - Adult Urology  Referral; Future  Pt has another CTabdomen scheduled for 3/22/2025    Urinary tract infection with hematuria, site unspecified  Dysuria   Pt states that her symptoms never seemed to resolve.   Patient endorses flank pain upon palpation bilaterally.  Pt endorses urinary frequency and dysuria. Pt endorses some blood in her urine or at least on toilet tissue from wiping. Pt has concentrated urine. Pt is taking estradiol cream. Pt denies any fevers, night sweats or chills. Pt endorses bilateral flank pain upon palpation and suprapubic pain.  The patient seems to be somewhat confused however when I quizzed her about her children and about current events and her birthday she was accurate.  - Basic metabolic panel  (Ca, Cl, CO2, Creat, Gluc, K, Na, BUN); Future  - CBC with platelets and differential; Future  - ciprofloxacin (CIPRO) 500 MG tablet; Take 1 tablet (500 mg) by mouth 2 times daily.  - Adult Urology  Referral; Future  - Basic metabolic panel  (Ca, Cl, CO2, Creat, Gluc, K, Na, BUN)  - CBC with platelets and differential  - UA Macroscopic with reflex to Microscopic and Culture - Clinic Collect  - UA Microscopic with Reflex to Culture  - Urine Culture  Instructed patient to take ciprofloxacin with  food probiotic or yogurt to reduce stomach upset and diarrhea explained to patient that if she has worse symptoms, establish as a fever or more confusion that she needs to  return to be seen      MED REC REQUIRED  Post Medication Reconciliation Status:       MEDICATIONS:   Orders Placed This Encounter   Medications    ciprofloxacin (CIPRO) 500 MG tablet     Sig: Take 1 tablet (500 mg) by mouth 2 times daily.     Dispense:  14 tablet     Refill:  0     There are no discontinued medications.       - Continue other medications without change    Subjective   Cynthia is a 79 year old, presenting for the following health issues: This is a follow up to an ED to Hospital discharge from 2/19/2025 to 2/23/2025.  Patient was admitted for fall and dizziness.  Pt had an evaluation that revealed right renal colic with hydronephrosis and hydroureter due to a 2 mm ureterovesical junction (UVJ) stone. She underwent cystoscopy with ureteral stent placement on 2/19. Urinalysis was abnormal, and urine culture showed mixed ochoa, for which she was treated with IV ceftriaxone, now transitioned to oral cefdinir. Chronic T9 compression fracture was noted, with Neurosurgery recommending pain management. Pt diagnosed with right renal colic, right hydronephrosis/hydroureter 2 mm UVJ stone with bilateral nonobstructing stones and renal cysts. Pt was treated with IV ceftriaxone, transitioned to oral cefdinir completed course and went to [facility] for rehab.    Pt states that her symptoms never seemed to resolve.   Patient endorses flank pain upon palpation bilaterally.  Pt endorses urinary frequency and dysuria. Pt endorses some blood in her urine or at least on toilet tissue from wiping. Pt has concentrated urine. Pt is taking estradiol cream. Pt denies any fevers, night sweats or chills. Pt endorses bilateral flank pain upon palpation and suprapubic pain.  The patient seems to be somewhat confused however when I quizzed her about her children  and about current events and her birthday she was accurate.    UTI (Ongoing 1 week)      3/4/2025     1:32 PM   Additional Questions   Roomed by Basia Miranda   Accompanied by self         3/4/2025     1:32 PM   Patient Reported Additional Medications   Patient reports taking the following new medications no     UTI    History of Present Illness       Mental Health Follow-up:  Patient presents to follow-up on Depression & Anxiety.Patient's depression since last visit has been:  Medium  The patient is having other symptoms associated with depression.  Patient's anxiety since last visit has been:  Medium  The patient is having other symptoms associated with anxiety.  Any significant life events: health concerns  Patient is feeling anxious or having panic attacks.  Patient has no concerns about alcohol or drug use.   She is taking medications regularly.            5/27/2022 7/15/2024 3/4/2025   Post Discharge Outreach   Admission Date 5/26/2022     Reason for Admission Pneumonia of right lower lobe     Discharge Date 5/26/2022     Discharge Diagnosis Pneumonia of right lower lobe     How are you doing now that you are home? I am not doing good. I have been sleeping a lot and still having some symptoms. Pt states she is doing the same. She has nausea, she has burning with urination.   How are your symptoms? (Red Flag symptoms escalate to triage hotline per guidelines) Unchanged Unchanged Improved   Do you feel your condition is stable enough to be safe at home until your provider visit? Yes     Does the patient have their discharge instructions?  No - Review discharge instructions Yes Yes   Does the patient have questions regarding their discharge instructions?  No No No   Were you started on any new medications or were there changes to any of your previous medications?  Yes Yes No   Does the patient have all of their medications? Yes Yes Yes   Do you have questions regarding any of your medications?  No No No   Do  you have all of your needed medical supplies or equipment (DME)?  (i.e. oxygen tank, CPAP, cane, etc.)  Yes Yes   Discharge follow-up appointment scheduled within 14 calendar days?  No     Discharge Follow Up Appointment Date   3/4/2025   Discharge Follow Up Appointment Scheduled with?   Primary Care Provider       Hospital Follow-up Visit:    Hospital/Nursing Home/IP Rehab Facility: [facility]  Date of Admission: 2/19/2025  Date of Discharge: 2/23/2025  Reason(s) for Admission: ureterolithiasis and closed wedge compression fracture of T9  Was the patient in the ICU or did the patient experience delirium during hospitalization?  No  Do you have any other stressors you would like to discuss with your provider? No    Problems taking medications regularly:  None  Medication changes since discharge: None  Problems adhering to non-medication therapy:  None    Summary of hospitalization:  [facility] discharge summary reviewed  Diagnostic Tests/Treatments reviewed.  Follow up needed: CBC and BMP and UAUC  Other Healthcare Providers Involved in Patient s Care:          urology  Update since discharge: same.         Plan of care communicated with patient                 Review of Systems  Constitutional, neuro, ENT, endocrine, pulmonary, cardiac, gastrointestinal, genitourinary, musculoskeletal, integument and psychiatric systems are negative, except as otherwise noted.      Objective    LMP  (LMP Unknown)   There is no height or weight on file to calculate BMI.  Physical Exam   GENERAL: alert and no distress  NECK: no adenopathy, no asymmetry, masses, or scars  RESP: lungs clear to auscultation - no rales, rhonchi or wheezes  CV: regular rate and rhythm, normal S1 S2, no S3 or S4, no murmur, click or rub, no peripheral edema  ABDOMEN: tenderness suprapubic and bowel sounds normal  MS: no gross musculoskeletal defects noted, no edema  SKIN: no suspicious lesions or rashes  NEURO: Normal  strength and tone, sensory exam grossly becky, abnormal mental status - some confusion, oriented times 4, and speech normal  Comprehensive back pain exam:  Tenderness of bilateral flank  PSYCH: mentation appears normal, affect normal/bright            Signed Electronically by: CASSIDY Peraza CNP

## 2025-03-05 ENCOUNTER — TELEPHONE (OUTPATIENT)
Dept: INTERNAL MEDICINE | Facility: CLINIC | Age: 80
End: 2025-03-05
Payer: MEDICARE

## 2025-03-05 LAB — BACTERIA UR CULT: NORMAL

## 2025-03-05 NOTE — TELEPHONE ENCOUNTER
MTM referral from: Transitions of Care (recent hospital discharge, TCU discharge, or ED visit)    MTM referral outreach attempt #2 on March 5, 2025 at 2:26 PM      Outcome: Patient not reachable after several attempts, routed to Pharmacist Team/Provider as an FYI    Use vbc for the carrier/Plan on the flowsheet      MT Practitioner please send patient letter    DARIO Lopez   [phone]

## 2025-03-06 ENCOUNTER — TELEPHONE (OUTPATIENT)
Dept: INTERNAL MEDICINE | Facility: CLINIC | Age: 80
End: 2025-03-06
Payer: MEDICARE

## 2025-03-06 ENCOUNTER — TELEPHONE (OUTPATIENT)
Dept: UROLOGY | Facility: CLINIC | Age: 80
End: 2025-03-06
Payer: MEDICARE

## 2025-03-06 NOTE — TELEPHONE ENCOUNTER
Health Call Center    Phone Message    May a detailed message be left on voicemail: yes     Reason for Call: Other: Patients son calling asking about follow up care. Patient had procedure with stent placed on 2.19.2025 with Dr Ramires. Please Son Jose Luis back to discuss follow up care.      Action Taken: Other: [facility]    Travel Screening: Not Applicable     Date of Service:

## 2025-03-06 NOTE — TELEPHONE ENCOUNTER
Home Care is calling regarding an established patient with [facility].  Requesting orders from: Betsey Magaña  PROVIDER AUTHORIZATION REQUIRED: RN unable to provide verbal approval for all orders.  See below for additional information.  RN will contact Home care with information after provider review.  Is this a request for a temporary pause in the home care episode?  No    Orders Requested    Skilled Nursing  Physical Therapy  Occupational Therapy  Request for delay in care, service to be provided within 7 days of previously   Service rescheduled to 3/10  Supposed to start tomorrow, family wants to delay until next week.     RN gave verbal order: Yes        Home Care Agency: [facility] care    ENRIQUE BARBOZA RN

## 2025-03-10 ENCOUNTER — OFFICE VISIT (OUTPATIENT)
Dept: INTERNAL MEDICINE | Facility: CLINIC | Age: 80
End: 2025-03-10
Payer: MEDICARE

## 2025-03-10 ENCOUNTER — TELEPHONE (OUTPATIENT)
Dept: UROLOGY | Facility: CLINIC | Age: 80
End: 2025-03-10

## 2025-03-10 ENCOUNTER — TELEPHONE (OUTPATIENT)
Dept: INTERNAL MEDICINE | Facility: CLINIC | Age: 80
End: 2025-03-10

## 2025-03-10 ENCOUNTER — NURSE TRIAGE (OUTPATIENT)
Dept: NURSING | Facility: CLINIC | Age: 80
End: 2025-03-10

## 2025-03-10 ENCOUNTER — MEDICAL CORRESPONDENCE (OUTPATIENT)
Dept: HEALTH INFORMATION MANAGEMENT | Facility: CLINIC | Age: 80
End: 2025-03-10

## 2025-03-10 VITALS
SYSTOLIC BLOOD PRESSURE: 126 MMHG | DIASTOLIC BLOOD PRESSURE: 77 MMHG | OXYGEN SATURATION: 97 % | HEART RATE: 60 BPM | RESPIRATION RATE: 16 BRPM | BODY MASS INDEX: 23.52 KG/M2 | WEIGHT: 137 LBS | TEMPERATURE: 97 F

## 2025-03-10 DIAGNOSIS — R39.89 SUSPECTED URINARY TRACT INFECTION: ICD-10-CM

## 2025-03-10 DIAGNOSIS — R41.0 CONFUSION: ICD-10-CM

## 2025-03-10 DIAGNOSIS — R69 TAKING MULTIPLE MEDICATIONS FOR CHRONIC DISEASE: ICD-10-CM

## 2025-03-10 DIAGNOSIS — R20.2 SENSATION OF SKIN CRAWLING: ICD-10-CM

## 2025-03-10 DIAGNOSIS — R39.89 SUSPECTED URINARY TRACT INFECTION: Primary | ICD-10-CM

## 2025-03-10 DIAGNOSIS — Z96.0 URETERAL STENT PRESENT: ICD-10-CM

## 2025-03-10 DIAGNOSIS — R39.198 DIFFICULTY URINATING: Primary | ICD-10-CM

## 2025-03-10 DIAGNOSIS — N32.89 BLADDER SPASM: Primary | ICD-10-CM

## 2025-03-10 PROBLEM — N39.0 URINARY TRACT INFECTION, SITE NOT SPECIFIED: Status: ACTIVE | Noted: 2019-01-30

## 2025-03-10 PROBLEM — F40.298 SIMPLE PHOBIA: Status: ACTIVE | Noted: 2018-09-19

## 2025-03-10 PROBLEM — R26.89 OTHER ABNORMALITIES OF GAIT AND MOBILITY: Status: ACTIVE | Noted: 2023-10-04

## 2025-03-10 PROBLEM — R33.9 RETENTION OF URINE, UNSPECIFIED: Status: ACTIVE | Noted: 2019-01-27

## 2025-03-10 PROBLEM — F41.9 ANXIETY DISORDER, UNSPECIFIED: Status: ACTIVE | Noted: 2019-01-25

## 2025-03-10 PROBLEM — F45.21 ILLNESS ANXIETY DISORDER: Status: ACTIVE | Noted: 2017-04-19

## 2025-03-10 PROBLEM — Z96.652 PRESENCE OF LEFT ARTIFICIAL KNEE JOINT: Status: ACTIVE | Noted: 2023-10-04

## 2025-03-10 PROBLEM — G47.00 INSOMNIA, UNSPECIFIED: Status: ACTIVE | Noted: 2019-01-25

## 2025-03-10 PROBLEM — M17.12 UNILATERAL PRIMARY OSTEOARTHRITIS, LEFT KNEE: Status: ACTIVE | Noted: 2023-10-04

## 2025-03-10 PROBLEM — E78.00 PURE HYPERCHOLESTEROLEMIA, UNSPECIFIED: Status: ACTIVE | Noted: 2019-01-25

## 2025-03-10 PROBLEM — H81.90 VESTIBULAR DYSFUNCTION: Status: ACTIVE | Noted: 2018-09-19

## 2025-03-10 LAB
ALBUMIN UR-MCNC: >=300 MG/DL
APPEARANCE UR: ABNORMAL
BILIRUB UR QL STRIP: NEGATIVE
COLOR UR AUTO: YELLOW
GLUCOSE UR STRIP-MCNC: NEGATIVE MG/DL
HGB UR QL STRIP: ABNORMAL
KETONES UR STRIP-MCNC: NEGATIVE MG/DL
LEUKOCYTE ESTERASE UR QL STRIP: ABNORMAL
NITRATE UR QL: NEGATIVE
PH UR STRIP: 6 [PH] (ref 5–7)
SP GR UR STRIP: 1.02 (ref 1–1.03)
UROBILINOGEN UR STRIP-ACNC: 0.2 E.U./DL

## 2025-03-10 PROCEDURE — 81003 URINALYSIS AUTO W/O SCOPE: CPT | Mod: QW | Performed by: PHYSICIAN ASSISTANT

## 2025-03-10 PROCEDURE — 87086 URINE CULTURE/COLONY COUNT: CPT | Performed by: PHYSICIAN ASSISTANT

## 2025-03-10 RX ORDER — TAMSULOSIN HYDROCHLORIDE 0.4 MG/1
0.4 CAPSULE ORAL DAILY
Qty: 30 CAPSULE | Refills: 0 | Status: SHIPPED | OUTPATIENT
Start: 2025-03-10

## 2025-03-10 RX ORDER — SULFAMETHOXAZOLE AND TRIMETHOPRIM 800; 160 MG/1; MG/1
160 TABLET ORAL
COMMUNITY
Start: 2023-04-02

## 2025-03-10 RX ORDER — OXYBUTYNIN CHLORIDE 10 MG/1
10 TABLET, EXTENDED RELEASE ORAL DAILY
Qty: 30 TABLET | Refills: 0 | Status: SHIPPED | OUTPATIENT
Start: 2025-03-10 | End: 2025-04-09

## 2025-03-10 ASSESSMENT — PAIN SCALES - GENERAL: PAINLEVEL_OUTOF10: NO PAIN (0)

## 2025-03-10 NOTE — PATIENT INSTRUCTIONS
Call ENT doctor regarding meclizine, promethazine, and ondansetron  Urologist sending new medications to hopefully help with urine flow and bladder spasms and pain

## 2025-03-10 NOTE — PROGRESS NOTES
Assessment & Plan     Difficulty urinating  Had been reporting difficulty urinating with small voids.  However, she had a normal urination this morning, as well as 1 during her appointment today.  Low suspicion for urinary retention.  She can try the oxybutynin and Flomax as prescribed by urology.  The Azo they advised could help with discomfort as well.  Urine culture is pending.  Recently completed Cipro, so suspicion for infection is low.  Advise contacting urology if she ever goes more than 12 hours without a substantial void.  She had been reporting a lot of pressure in the suprapubic area.  However, this was relieved by urinating during her appointment this afternoon    Ureteral stent present  Stent may be causing some bladder irritation.  Ideally Flomax and oxybutynin will help with her recent symptoms.  Has scheduled follow-up with urology on the 24th.  Advised her to contact them sooner if she has ongoing problems    Suspected urinary tract infection  - UA without Microscopic [[id number]]  - Urine Culture Aerobic Bacterial [[id number]]    Confusion  May be experiencing delirium since her hospitalization.  Recently started home care and will be having nursing assistance with her medications.  Reviewed her medication list in depth with her and her daughter-in-law today.  See below.    Taking multiple medications for chronic disease  Reviewed her medication list in detail.  She should continue on her blood pressure medications: Amlodipine, losartan, and propranolol.  Discussed that she does not need to take both Nexium and Pepcid.  She plans to continue on Nexium for the time being.  Advised her to contact her ENT regarding the meclizine, promethazine, and ondansetron.  I suspect these should be taken as needed rather than on a scheduled basis.  However, would like ENT to weigh in on this.  Advised against additional ibuprofen doses, as she has been taking Aleve twice daily.  Discussed the purpose of alendronate  and that it should be taken once per week.   Advised follow-up with PCP for any ongoing questions regarding her daily medications    Sensation of skin crawling  She continues to attribute the skin crawling sensation to her stent.  Discussed that this is potentially a side effect of the Cipro.  She should monitor for symptom resolution over the next couple of days, as she has finished the course of Cipro.    60 minutes spent on the day of the encounter, including chart review, history, patient counseling, and documentation    Subjective   Cynthia is a 79 year old, presenting for the following health issues:  Vaginal Problem (Vaginal pressure. Urology wants her to do a bladder scan.)        3/10/2025     1:22 PM   Additional Questions   Roomed by Humphrey Irby CMA   Accompanied by daughter- in -law - Ivania         3/10/2025     1:22 PM   Patient Reported Additional Medications   Patient reports taking the following new medications no     Vaginal Problem     History of Present Illness       Mental Health Follow-up:  Patient presents to follow-up on Depression & Anxiety.Patient's depression since last visit has been:  Medium  The patient is having other symptoms associated with depression.  Patient's anxiety since last visit has been:  Medium  The patient is having other symptoms associated with anxiety.  Any significant life events: health concerns  Patient is feeling anxious or having panic attacks.  Patient has no concerns about alcohol or drug use.   She is taking medications regularly.            5/27/2022 7/15/2024 3/4/2025   Post Discharge Outreach   Admission Date 5/26/2022     Reason for Admission Pneumonia of right lower lobe     Discharge Date 5/26/2022     Discharge Diagnosis Pneumonia of right lower lobe     How are you doing now that you are home? I am not doing good. I have been sleeping a lot and still having some symptoms. Pt states she is doing the same. She has nausea, she has burning with urination.    How are your symptoms? (Red Flag symptoms escalate to triage hotline per guidelines) Unchanged Unchanged Improved   Do you feel your condition is stable enough to be safe at home until your provider visit? Yes     Does the patient have their discharge instructions?  No - Review discharge instructions Yes Yes   Does the patient have questions regarding their discharge instructions?  No No No   Were you started on any new medications or were there changes to any of your previous medications?  Yes Yes No   Does the patient have all of their medications? Yes Yes Yes   Do you have questions regarding any of your medications?  No No No   Do you have all of your needed medical supplies or equipment (DME)?  (i.e. oxygen tank, CPAP, cane, etc.)  Yes Yes   Discharge follow-up appointment scheduled within 14 calendar days?  No     Discharge Follow Up Appointment Date   3/4/2025   Discharge Follow Up Appointment Scheduled with?   Primary Care Provider       Patient presents to clinic with her daughter-in-law to discuss multiple concerns.  She was hospitalized in February and had a ureteral stent placed due to nephrolithiasis.  She reports having a lot of symptoms that she is attributing to the stent.  Has some suprapubic discomfort and is experiencing difficulty urinating.  Says that she will feel the urge to urinate but can only go a few drops.  Is also having some flank pain  Keeps feeling the sensation like there is crawling throughout her body.  She sometimes feels this in her abdomen but also feels it in her extremities.    After her discharge from the hospital, she was in a TCU for 5 days.  She has now been home for over a week  She was seen for hospital follow-up appointment and was put on a 1 week course of Cipro.  Her urine culture was negative  She finished her last dose of Cipro yesterday    Daughter-in-law states that patient has been more confused since since her hospitalization.  They are concerned that she is  taking duplicate medications, particularly Nexium.  They found 1 prescription bottle and 1 over-the-counter bottle of Nexium at home and she reported that she had been taking both  They want to review her medication list  Daughter in law is wondering if she needs to be on 3 blood pressure medications  Discussed that the meds she is on have different mechanisms of action and she likely did not have good blood pressure control with 1 or 2 agents  Has been taking her Effexor  Has been taking Phenergan, ondansetron, and meclizine daily  Reports that these are for her vertigo  Has been following with ENT  Prescription bottle for meclizine and says to be taken every 6 hours for 7 days as needed  Is on multiple supplements  Takes naproxen twice daily and Tylenol twice daily.  Also has some ibuprofen but does not appear to be taking this regularly  Discussed that alendronate is a once weekly medication for osteoporosis    She spoke with her urology office this morning.  Urologist was planning to send in oxybutynin, Flomax, and Azo to help with her recent urinary symptoms  She is scheduled to see them on March 24 and will likely have stent removed at that time  Patient was confused and thought that stent was being removed in clinic today    She does report having a large void early this morning, but has only had small voids since  Had 1 decent sized urination yesterday, otherwise small voids  Reporting some constipation recently  She denies vaginal itching or discharge      Review of Systems  Constitutional, HEENT, cardiovascular, pulmonary, gi and gu systems are negative, except as otherwise noted.      Objective    /77 (BP Location: Left arm, Patient Position: Sitting, Cuff Size: Adult Regular)   Pulse 60   Temp 97  F (36.1  C) (Oral)   Resp 16   Wt 62.1 kg (137 lb)   LMP  (LMP Unknown)   SpO2 97%   BMI 23.52 kg/m    Body mass index is 23.52 kg/m .  Physical Exam   GENERAL: alert and no distress  MS: no gross  musculoskeletal defects noted, no edema  SKIN: no suspicious lesions or rashes  PSYCH: confused and affect normal/bright    Results for orders placed or performed in visit on 03/10/25 (from the past 24 hours)   UA without Microscopic [[id number]]   Result Value Ref Range    Color Urine Yellow Colorless, Straw, Light Yellow, Yellow    Appearance Urine Cloudy (A) Clear    Glucose Urine Negative Negative mg/dL    Bilirubin Urine Negative Negative    Ketones Urine Negative Negative mg/dL    Specific Gravity Urine 1.025 1.003 - 1.035    Blood Urine Large (A) Negative    pH Urine 6.0 5.0 - 7.0    Protein Albumin Urine >=300 (A) Negative mg/dL    Urobilinogen Urine 0.2 0.2, 1.0 E.U./dL    Nitrite Urine Negative Negative    Leukocyte Esterase Urine Moderate (A) Negative           Signed Electronically by: Kaykay Madden PA-C

## 2025-03-10 NOTE — TELEPHONE ENCOUNTER
Home Care is calling regarding an established patient with [facility].  Requesting orders from: Betsey Magaña RN APPROVED: RN able to provide verbal orders.  Home Care will send orders for signature.  RN will close encounter.  Is this a request for a temporary pause in the home care episode?  No    Orders Requested    Skilled Nursing  Request for initial certification (first set of orders)   Frequency: 2/week for 1 week, then 1/week for 5 weeks, starting 3/10  RN gave verbal order: Yes      Physical Therapy  Request for initial evaluation and treatment (one time)   RN gave verbal order: Yes      Occupational Therapy  Request for initial evaluation and treatment (one time)   RN gave verbal order: Yes      Social Work  Request for initial evaluation and treatment (one time)   RN gave verbal order: Yes        Caller: [facility]  Home Care Agency: [facility] care       ENRIQUE BARBOZA RN

## 2025-03-10 NOTE — TELEPHONE ENCOUNTER
"  Nurse Triage SBAR    Is this a 2nd Level Triage? YES, LICENSED PRACTITIONER REVIEW IS REQUIRED    Situation: patient with concerns for voiding with burning, pressure and only able to void drops of urine.    Lower abdomen uncomfortable and tight.  \"Skin crawling\",  \"I feel like my whole inside is being torn, my uterus hurts\"    Background:   Cystoscopy, right retrograde pyelogram, rigth ureteral stent placement, interpretation of fluoroscopic images   Joseluis Ramires MD    on  2-    RT  03-    Assessment:   Patient states that  Patient states that it feels like there is a \"beater inside of me\"  just below  my belly button,  \"whole inside is being torn\"  Pain 10/10 pressure to use the Bathroom.    Lower abdomen uncomfortable and tight  \"Feels like there is a beater in side of me.\"  \"My whole inside being torn\" bellow my belly button.   \"My Uterus hurts\"  She also states that she has some tingling and burning nerves in her hands ans wrists that have been there since last weekend.   \"Tightening skin\"  She did have a BM last night  She did finish a round of Cipro recently.  Mouth is dry and lips as well,  using bioteen for that.  Has had 2 cups of fluid so far today.  Taking Tylenol for pain twice a day, she was told to do.  Urine \"dark\"  \"I don't know\"  pressure to void, frequency, pain.  She did state she did have some blood on the tissue x1, but does not remember when it was.  She does have an appt with [facility] today at 1:40, she states she was told for a UA.    She does live alone, but her family is close.    Patient very agitated ; fast talking, loud, raised voice. \"I can't take this anymore\"    \"will somebody help  me with these stents\"  \"I was told to call [facility] and all they are doing is a UA.  Then they told me to Call Urology, which is where I am now, calling\"    \"Oh, I need help with this stent\"  Patient then disconnected the call from Triage Nurse    Protocol Recommended Disposition:   GO TO " "ED, pt refused    Recommendation:   Keep your appointment with [facility] today.  RN will route message to Urology for a call back  Keep hydrated.  Patient lives alone, but family is very close and will take her to the appt today.    Routed to provider        Reason for Disposition   SEVERE abdominal pain (e.g., excruciating)    Additional Information   Negative: Passed out (e.g., fainted, lost consciousness, blacked out and was not responding)   Negative: Shock suspected (e.g., cold/pale/clammy skin, too weak to stand, low BP, rapid pulse)   Negative: Sounds like a life-threatening emergency to the triager    Answer Assessment - Initial Assessment Questions  1. LOCATION: \"Where does it hurt?\"       Patient states that it feels like there is a \"beater inside of me\"  just below  my belly button,  \"whole inside is being torn\"  2. RADIATION: \"Does the pain shoot anywhere else?\" (e.g., chest, back)      no  3. ONSET: \"When did the pain begin?\" (e.g., minutes, hours or days ago)       Unable to tell  4. SUDDEN: \"Gradual or sudden onset?\"      sudden  5. PATTERN \"Does the pain come and go, or is it constant?\"      constant  6. SEVERITY: \"How bad is the pain?\"  (e.g., Scale 1-10; mild, moderate, or severe)      10/10  7. RECURRENT SYMPTOM: \"Have you ever had this type of stomach pain before?\" If Yes, ask: \"When was the last time?\" and \"What happened that time?\"       \"I don't know\"  8. CAUSE: \"What do you think is causing the stomach pain?\"      The stent  9. RELIEVING/AGGRAVATING FACTORS: \"What makes it better or worse?\" (e.g., antacids, bending or twisting motion, bowel movement)      Patient is taking Tylenol twice a day  10. OTHER SYMPTOMS: \"Do you have any other symptoms?\" (e.g., back pain, diarrhea, fever, urination pain, vomiting)        Pain with urination, frequency, pressure to void and \"then just gtts\"  Mouth so dry and lips, using bioteen.  She has had 2 glasses of water so far today.  Patient does have an appt with " "[facility] today at  1:40, she states she was told for a UA.    Pt states that she will NOT go to ED  She states that she did have some blood x1 on the toilet tissue after void.  Pain 10/10 pressure when has to go to Bathroom.  Lower abdomen uncomfortable and tight.  She also has some tingling and burning to the wrist and hands.  \"Tightening skin\"  Last BM last night.  She did complete a treatment of Cipro, see chart  She states that her skin is crawling in side of me.  She feels that all of this is related to the stent.    She does live alone, but her family is close.    Patient very agitated ; fast talking, loud, raised voice. \"I can't take this anymore\"    \"will somebody help  me with these stents\"  \"I was told to call [facility] and all they are doing is a UA.  Then they told me to Call Urology, which is where I am now, calling\"      11. PREGNANCY: \"Is there any chance you are pregnant?\" \"When was your last menstrual period?\"        no    Protocols used: Abdominal Pain - Female-A-[place]    "

## 2025-03-10 NOTE — TELEPHONE ENCOUNTER
Home Care is calling regarding an established patient with [facility].  Requesting orders from: Betsey Magaña RN APPROVED: RN able to provide verbal orders.  Home Care will send orders for signature.  RN will close encounter.  Is this a request for a temporary pause in the home care episode?  Yes  FYI: Patient was to start home care today, is wanting to delay care till next Monday 3/17/25 per Cait WEINER from [facility]      Caller: Cait  Home Care Agency: [facility]  Phone number Home Care can be reached at: [phone] ext [phone]  Okay to leave a detailed message?: Yes    Alejo Lopez, RN

## 2025-03-11 LAB — BACTERIA UR CULT: NORMAL

## 2025-03-12 ENCOUNTER — TELEPHONE (OUTPATIENT)
Dept: INTERNAL MEDICINE | Facility: CLINIC | Age: 80
End: 2025-03-12
Payer: MEDICARE

## 2025-03-12 NOTE — TELEPHONE ENCOUNTER
Home Care is calling regarding an established patient with [facility].  Requesting orders from: Betsey Magaña RN APPROVED: RN able to provide verbal orders.  Home Care will send orders for signature.  RN will close encounter.  Is this a request for a temporary pause in the home care episode?  No    Orders Requested    Physical Therapy  Request for initial certification (first set of orders)   Frequency: 1x wk for 6 wks  RN gave verbal order: Yes        Caller: Yimi  Home Care Agency: [facility]    Phone number Home Care can be reached at: [phone]  Okay to leave a detailed message?: Yes    Salena Roger RN

## 2025-03-17 ENCOUNTER — TELEPHONE (OUTPATIENT)
Dept: INTERNAL MEDICINE | Facility: CLINIC | Age: 80
End: 2025-03-17
Payer: MEDICARE

## 2025-03-17 ENCOUNTER — MEDICAL CORRESPONDENCE (OUTPATIENT)
Dept: HEALTH INFORMATION MANAGEMENT | Facility: CLINIC | Age: 80
End: 2025-03-17

## 2025-03-17 NOTE — TELEPHONE ENCOUNTER
Home Care is calling regarding an established patient with [facility].  Requesting orders from: Betsey Magaña RN APPROVED: RN able to provide verbal orders.  Home Care will send orders for signature.  RN will close encounter.  Is this a request for a temporary pause in the home care episode?  No    Orders Requested    Skilled Nursing  Request for initial certification (first set of orders)   Frequency: 1 X week for 4 weeks   RN gave verbal order: Yes      Physical Therapy  Request for initial evaluation and treatment (one time)   RN gave verbal order: Yes    Occupational Therapy  Request for initial evaluation and treatment (one time)   RN gave verbal order: Yes    Caller: Kacie   Home Care Agency: [facility]   Phone number Home Care can be reached at: [phone]  Okay to leave a detailed message?: Yes    Ivania Ignacoi, RN

## 2025-03-17 NOTE — ANESTHESIA PROCEDURE NOTES
Adductor canal Procedure Note    Pre-Procedure   Staff -        Anesthesiologist:  Pasha Madden MD       Performed By: anesthesiologist       Referred By: jag       Procedure Start/Stop Times: 10/2/2023 7:12 AM and 10/2/2023 7:18 AM       Pre-Anesthestic Checklist: patient identified, IV checked, site marked, risks and benefits discussed, informed consent, monitors and equipment checked, pre-op evaluation, at physician/surgeon's request and post-op pain management  Timeout:       Correct Patient: Yes        Correct Procedure: Yes        Correct Site: Yes        Correct Position: Yes        Correct Laterality: Yes        Site Marked: Yes  Procedure Documentation  Procedure: Adductor canal       Laterality: left       Patient Position: supine       Patient Prep/Sterile Barriers: sterile gloves, mask       Skin prep: Chloraprep       Needle Type: insulated       Needle Gauge: 22.        Needle Length (Inches): 2        Ultrasound guided       1. Ultrasound was used to identify targeted nerve, plexus, vascular marker, or fascial plane and place a needle adjacent to it in real-time.       2. Ultrasound was used to visualize the spread of anesthetic in close proximity to the above referenced structure.    Assessment/Narrative         The placement was negative for: blood aspirated, painful injection and site bleeding       Paresthesias: No.       Test dose of mL at.         Test dose negative, 3 minutes after injection, for signs of intravascular, subdural, or intrathecal injection.       Bolus given via needle..        Secured via.        Insertion/Infusion Method: Single Shot       Complications: none       Injection made incrementally with aspirations every 5 mL.    Medication(s) Administered   Bupivacaine 0.25% w/ 1:200K Epi (Injection) - Injection   20 mL - 10/2/2023 7:12:00 AM  Medication Administration Time: 10/2/2023 7:12 AM     Comments:  The surgeon has given a verbal order transferring care of this  "patient to me for the performance of a regional analgesia block for post-op pain control. It is requested of me because I am uniquely trained and qualified to perform this block and the surgeon is neither trained nor qualified to perform this procedure.    20 ml 0.25% bupivacaine with 1:200k epi      FOR [facility] ONLY:   Pain Team Contact information: please page the Pain Team Via [employer]. Search \"Pain\". During daytime hours, please page the attending first. At night please page the resident first.      " 98.8

## 2025-03-18 ENCOUNTER — TELEPHONE (OUTPATIENT)
Dept: UROLOGY | Facility: CLINIC | Age: 80
End: 2025-03-18
Payer: MEDICARE

## 2025-03-18 NOTE — TELEPHONE ENCOUNTER
[facility] Call [facility]    Phone Message    May a detailed message be left on voicemail: yes     Reason for Call: Other: Pt is in pain due her stent. Pt was crying. Laila call pt back. Thanks.     Action Taken:     [facility]         Travel Screening: Not Applicable     Date of Service:

## 2025-03-19 ENCOUNTER — TELEPHONE (OUTPATIENT)
Dept: UROLOGY | Facility: CLINIC | Age: 80
End: 2025-03-19
Payer: MEDICARE

## 2025-03-19 NOTE — TELEPHONE ENCOUNTER
Patient is calling, very dysphoric. Patient is having severe stent pain and irritation. This is not new, has been ongoing for weeks.     She says she doesn't know what she is taking but presumably she is taking the tamsulosin and oxybutinin which have been prescribed.  She is having dry mouth (a known side effect of oxybutinin). Eventually she is able to confirm that she is taking both of these.     I advised her that if her pain is that poorly controlled her option is to go to the ER (she does not want to). She is due to have a CT scan tomorrow and should follow up with Dr. Ramires after    Yimi Rodriguez MD   [facility] Urology  [phone] clinic phone

## 2025-03-20 ENCOUNTER — HOSPITAL ENCOUNTER (OUTPATIENT)
Dept: CT IMAGING | Facility: CLINIC | Age: 80
Discharge: HOME | End: 2025-03-20
Attending: PHYSICIAN ASSISTANT
Payer: MEDICARE

## 2025-03-20 DIAGNOSIS — N20.1 URETERAL STONE: ICD-10-CM

## 2025-03-20 LAB — RADIOLOGIST FLAGS: NORMAL

## 2025-03-20 PROCEDURE — 74176 CT ABD & PELVIS W/O CONTRAST: CPT

## 2025-03-25 ENCOUNTER — TELEPHONE (OUTPATIENT)
Dept: INTERNAL MEDICINE | Facility: CLINIC | Age: 80
End: 2025-03-25
Payer: MEDICARE

## 2025-03-25 NOTE — TELEPHONE ENCOUNTER
Home Care is calling regarding an established patient with [facility].  Requesting orders from: Betsey Magaña RN APPROVED: RN able to provide verbal orders.  Home Care will send orders for signature.  RN will close encounter.  Is this a request for a temporary pause in the home care episode?  No    Orders Requested    Occupational Therapy  Request for initial certification (first set of orders)   Frequency:   1 week x 1 week for family education on cognition    FYI to provider upcoming visit next week:    TCU recommended driving assessment for a slums score of 12/30. Cammie completed assessment today that yielded a CPT score of 4.4, also indicating driving assessment is needed.    RN gave verbal order: Yes      Radha Dupree RN

## 2025-03-26 ENCOUNTER — TELEPHONE (OUTPATIENT)
Dept: INTERNAL MEDICINE | Facility: CLINIC | Age: 80
End: 2025-03-26

## 2025-03-26 NOTE — TELEPHONE ENCOUNTER
[facility] * physical therapy order  received via fax     Forms in Dr. mail box for review and signature.

## 2025-03-27 ENCOUNTER — TELEPHONE (OUTPATIENT)
Dept: INTERNAL MEDICINE | Facility: CLINIC | Age: 80
End: 2025-03-27
Payer: MEDICARE

## 2025-03-31 ENCOUNTER — OFFICE VISIT (OUTPATIENT)
Dept: INTERNAL MEDICINE | Facility: CLINIC | Age: 80
End: 2025-03-31
Payer: MEDICARE

## 2025-03-31 ENCOUNTER — TELEPHONE (OUTPATIENT)
Dept: INTERNAL MEDICINE | Facility: CLINIC | Age: 80
End: 2025-03-31

## 2025-03-31 VITALS
OXYGEN SATURATION: 97 % | RESPIRATION RATE: 16 BRPM | WEIGHT: 135 LBS | SYSTOLIC BLOOD PRESSURE: 126 MMHG | HEIGHT: 67 IN | DIASTOLIC BLOOD PRESSURE: 71 MMHG | TEMPERATURE: 98.4 F | BODY MASS INDEX: 21.19 KG/M2 | HEART RATE: 66 BPM

## 2025-03-31 DIAGNOSIS — N13.30 HYDRONEPHROSIS, UNSPECIFIED HYDRONEPHROSIS TYPE: Primary | ICD-10-CM

## 2025-03-31 DIAGNOSIS — R41.89 COGNITIVE CHANGES: ICD-10-CM

## 2025-03-31 DIAGNOSIS — I10 BENIGN ESSENTIAL HYPERTENSION: ICD-10-CM

## 2025-03-31 PROCEDURE — 3074F SYST BP LT 130 MM HG: CPT

## 2025-03-31 PROCEDURE — G2211 COMPLEX E/M VISIT ADD ON: HCPCS

## 2025-03-31 PROCEDURE — 99214 OFFICE O/P EST MOD 30 MIN: CPT

## 2025-03-31 PROCEDURE — 3078F DIAST BP <80 MM HG: CPT

## 2025-03-31 RX ORDER — LOSARTAN POTASSIUM 100 MG/1
100 TABLET ORAL DAILY
Qty: 90 TABLET | Refills: 3 | Status: SHIPPED | OUTPATIENT
Start: 2025-03-31

## 2025-03-31 ASSESSMENT — PATIENT HEALTH QUESTIONNAIRE - PHQ9
10. IF YOU CHECKED OFF ANY PROBLEMS, HOW DIFFICULT HAVE THESE PROBLEMS MADE IT FOR YOU TO DO YOUR WORK, TAKE CARE OF THINGS AT HOME, OR GET ALONG WITH OTHER PEOPLE: NOT DIFFICULT AT ALL
SUM OF ALL RESPONSES TO PHQ QUESTIONS 1-9: 0
SUM OF ALL RESPONSES TO PHQ QUESTIONS 1-9: 0

## 2025-03-31 NOTE — TELEPHONE ENCOUNTER
General Call    Contacts       Contact Date/Time Type Contact Phone/Fax    03/31/2025 04:45 PM CDT Phone (Incoming) AritaCynthia (Self) [phone] (M)          Reason for Call:  LOSARTAN    What are your questions or concerns:  The patient called to let provider Gómez know that she takes Losartan 100 mg, 1 daily.  After she is done with medication, she will call to get a refill.     Date of last appointment with provider: 03/31/2025    Okay to leave a detailed message?: Yes at Cell number on file:    Telephone Information:   Mobile [phone]

## 2025-03-31 NOTE — PROGRESS NOTES
Assessment & Plan     (N13.30) Hydronephrosis, unspecified hydronephrosis type  (primary encounter diagnosis)  Comment: The patient was hospitalized from February 19 to February 23 for right renal colic accompanied by hydronephrosis and hydroureter, status post ureteral stent placement on February 19. The stent was placed due to a urinary tract infection with a small distal right ureteral stone. A CT scan performed two weeks later confirmed that the stone had passed. The ureteral stent was removed on March 21, 2025. The patient experienced significant pain while the stent was in place but reports substantial relief since its removal.    The patient has a history of a T9 compression fracture, sustained around June 2024, for which she was evaluated by [facility]. She completed a 12-week course of treatment with a thoracolumbosacral orthosis (TLSO) brace.    Regarding her hypertension management, the patient has not been taking Amlodipine. Given that her blood pressure is well-controlled today, it is deemed appropriate for her to continue without Amlodipine and maintain her current regimen of Losartan 100 mg.    There is concern about her driving capabilities due to a recent SLUMS score of 12/30, indicating the need for a driving assessment. It is recommended that she complete this assessment. The patient reports that she only drives to the medical clinic and grocery store and avoids driving at night. A referral to [facility] for a driving evaluation will be sent.    The patient is receiving home healthcare services on Mondays and will also be receiving occupational and physical therapy. This appointment serves as a face-to-face encounter for home health care requirements    (I10) Benign essential hypertension  Comment:   Plan: losartan (COZAAR) 100 MG tablet            (R41.89) Cognitive changes  Comment:   Plan: Occupational Therapy  Referral            The longitudinal plan of care for the  "diagnosis(es)/condition(s) as documented were addressed during this visit. Due to the added complexity in care, I will continue to support Cynthia in the subsequent management and with ongoing continuity of care.    30 minutes spent on the date of the encounter doing chart review, history and exam, documentation and further activities per the note          MED REC REQUIRED  Post Medication Reconciliation Status:           Aida Marie is a 79 year old, presenting for the following health issues:  RECHECK (Hospital and TCU )      3/31/2025     2:01 PM   Additional Questions   Roomed by Lisa HERRING            Objective    /71   Pulse 66   Temp 98.4  F (36.9  C) (Tympanic)   Resp 16   Ht 1.702 m (5' 7\")   Wt 61.2 kg (135 lb)   LMP  (LMP Unknown)   SpO2 97%   BMI 21.14 kg/m    Body mass index is 21.14 kg/m .    Physical Exam  Constitutional:       General: She is not in acute distress.     Appearance: Normal appearance. She is not ill-appearing, toxic-appearing or diaphoretic.   HENT:      Head: Normocephalic and atraumatic.   Eyes:      Conjunctiva/sclera: Conjunctivae normal.   Pulmonary:      Effort: Pulmonary effort is normal.   Skin:     General: Skin is warm and dry.   Neurological:      Mental Status: She is alert and oriented to person, place, and time.   Psychiatric:         Mood and Affect: Mood normal.         Behavior: Behavior normal.         Thought Content: Thought content normal.         Judgment: Judgment normal.             Signed Electronically by: CASSIDY Dhillon CNP    "

## 2025-04-01 ENCOUNTER — TELEPHONE (OUTPATIENT)
Dept: INTERNAL MEDICINE | Facility: CLINIC | Age: 80
End: 2025-04-01
Payer: MEDICARE

## 2025-04-01 NOTE — TELEPHONE ENCOUNTER
Home Care is calling regarding an established patient with [facility].  Requesting orders from: Betsey Magaña RN APPROVED: RN able to provide verbal orders.  Home Care will send orders for signature.  RN will close encounter.  Is this a request for a temporary pause in the home care episode?  No    Orders Requested    Occupational Therapy  Request for continuation of care with increase in frequency  Frequency: 3 visits with in 5 weeks   RN gave verbal order: Yes    [facility] OT   Phone number Home Care can be reached at: [phone]  Okay to leave a detailed message?: Yes      Cathi Fowler RN

## 2025-04-02 ENCOUNTER — TELEPHONE (OUTPATIENT)
Dept: INTERNAL MEDICINE | Facility: CLINIC | Age: 80
End: 2025-04-02
Payer: MEDICARE

## 2025-04-03 DIAGNOSIS — Z53.9 DIAGNOSIS NOT YET DEFINED: Primary | ICD-10-CM

## 2025-04-03 PROCEDURE — G0180 MD CERTIFICATION HHA PATIENT: HCPCS

## 2025-04-03 NOTE — TELEPHONE ENCOUNTER
Prescription approved per [facility] Refill Protocol.     No

## 2025-04-04 DIAGNOSIS — Z53.9 DIAGNOSIS NOT YET DEFINED: Primary | ICD-10-CM

## 2025-04-07 ENCOUNTER — TELEPHONE (OUTPATIENT)
Dept: INTERNAL MEDICINE | Facility: CLINIC | Age: 80
End: 2025-04-07
Payer: MEDICARE

## 2025-04-07 DIAGNOSIS — I10 BENIGN ESSENTIAL HYPERTENSION: Primary | ICD-10-CM

## 2025-04-07 DIAGNOSIS — M81.0 OSTEOPOROSIS, UNSPECIFIED OSTEOPOROSIS TYPE, UNSPECIFIED PATHOLOGICAL FRACTURE PRESENCE: ICD-10-CM

## 2025-04-07 RX ORDER — CHOLECALCIFEROL (VITAMIN D3) 50 MCG
1 TABLET ORAL EVERY MORNING
Qty: 90 TABLET | Refills: 3 | Status: SHIPPED | OUTPATIENT
Start: 2025-04-07

## 2025-04-07 RX ORDER — AMLODIPINE BESYLATE 5 MG/1
5 TABLET ORAL
Qty: 90 TABLET | Refills: 3 | Status: SHIPPED | OUTPATIENT
Start: 2025-04-07

## 2025-04-07 RX ORDER — ALENDRONATE SODIUM 70 MG/1
TABLET ORAL
Qty: 4 TABLET | Refills: 3 | Status: SHIPPED | OUTPATIENT
Start: 2025-04-07

## 2025-04-07 NOTE — TELEPHONE ENCOUNTER
Home Care is calling regarding an established patient with [facility].  Requesting orders from: Betsey Magaña RN APPROVED: RN able to provide verbal orders.  Home Care will send orders for signature.  RN will close encounter.  Is this a request for a temporary pause in the home care episode?  No    Orders Requested    Skilled Nursing  Request for continuation of care with no increase or decrease in frequency  Frequency: extend for another 6 weeks, for 1/week  RN gave verbal order: Yes            ENRIQUE BARBOZA RN

## 2025-04-07 NOTE — TELEPHONE ENCOUNTER
4/6/25 SKILLED NURSING/OCCUPATIONAL THERAPY order received via fax. Form in your mailbox to be signed.

## 2025-05-05 ENCOUNTER — TELEPHONE (OUTPATIENT)
Dept: INTERNAL MEDICINE | Facility: CLINIC | Age: 80
End: 2025-05-05
Payer: MEDICARE

## 2025-05-05 NOTE — TELEPHONE ENCOUNTER
5/2/25 add on discipline skilled nursing evaluation form recieved via fax. Form in your mailbox for signature.

## 2025-05-28 ENCOUNTER — TRANSFERRED RECORDS (OUTPATIENT)
Dept: HEALTH INFORMATION MANAGEMENT | Facility: CLINIC | Age: 80
End: 2025-05-28
Payer: MEDICARE

## 2025-07-23 ENCOUNTER — PATIENT OUTREACH (OUTPATIENT)
Dept: CARE COORDINATION | Facility: CLINIC | Age: 80
End: 2025-07-23
Payer: MEDICARE

## 2025-07-31 DIAGNOSIS — R11.0 NAUSEA: Primary | ICD-10-CM

## 2025-07-31 RX ORDER — ONDANSETRON 4 MG/1
TABLET, ORALLY DISINTEGRATING ORAL
Qty: 90 TABLET | Refills: 3 | Status: SHIPPED | OUTPATIENT
Start: 2025-07-31

## 2025-07-31 RX ORDER — MECLIZINE HYDROCHLORIDE 25 MG/1
25 TABLET ORAL 2 TIMES DAILY
Qty: 180 TABLET | Refills: 3 | Status: SHIPPED | OUTPATIENT
Start: 2025-07-31

## 2025-07-31 NOTE — TELEPHONE ENCOUNTER
Clinic RN: Please investigate patient's chart or contact patient if the information cannot be found because the medication is listed as historical or discontinued. Confirm patient is taking this medication. Document findings and route refill encounter to provider for approval or denial.    Jessie Castellanos RN on 7/31/2025 at 9:49 AM

## 2025-07-31 NOTE — TELEPHONE ENCOUNTER
Call to patient who states she is requesting refill of meclizine as well, previously refilled by her old neurologist, Dr. Ralph Parker. Patient says she is no longer seeing him.    Patient would like prescription from Betsey since she no longer sees neurologist.    Thank you,  Anant, Triage [facility]    11:16 AM 7/31/2025

## 2025-08-06 ENCOUNTER — PATIENT OUTREACH (OUTPATIENT)
Dept: CARE COORDINATION | Facility: CLINIC | Age: 80
End: 2025-08-06
Payer: MEDICARE

## (undated) DEVICE — BAG CLEAR TRASH 1.3M 39X33" P4040C

## (undated) DEVICE — BLADE SAW SAGITTAL STRK 25X90X1.27MM HD SYS 6 6125-127-090

## (undated) DEVICE — SU VICRYL 2-0 CT-1 27" UND J259H

## (undated) DEVICE — GLOVE PROTEXIS POWDER FREE 7.0 ORTHOPEDIC 2D73ET70

## (undated) DEVICE — GLOVE BIOGEL PI MICRO INDICATOR UNDERGLOVE SZ 7.0 48970

## (undated) DEVICE — TOURNIQUET SGL  BLADDER 30"X4" BLUE 5921030135

## (undated) DEVICE — GLOVE BIOGEL PI MICRO INDICATOR UNDERGLOVE SZ 8.5 48985

## (undated) DEVICE — LINEN FULL SHEET 5511

## (undated) DEVICE — SYR 03ML LL W/O NDL 309657

## (undated) DEVICE — CAST PADDING 6" STERILE 9046S

## (undated) DEVICE — DRAIN HEMOVAC RESERVOIR KIT 10FR 1/8" MED 00-2550-002-10

## (undated) DEVICE — SOL WATER IRRIG 1000ML BOTTLE 2F7114

## (undated) DEVICE — NDL BLUNT 18GA 1" W/O FILTER 305181

## (undated) DEVICE — SUCTION MANIFOLD NEPTUNE 2 SYS 4 PORT 0702-020-000

## (undated) DEVICE — SU VICRYL 1 CT-1 27" J341H

## (undated) DEVICE — BLADE SAW SAGITTAL STRK 13X90X1.27MM HD SYS 6 6113-127-090

## (undated) DEVICE — SU ETHIBOND 0 CT-1 CR 8X18" CX21D

## (undated) DEVICE — GLOVE PROTEXIS W/NEU-THERA 8.5  2D73TE85

## (undated) DEVICE — PREP DYNA-HEX 4% CHG SCRUB 4OZ BOTTLE MDS098710

## (undated) DEVICE — LINEN ORTHO ACL PACK 5447

## (undated) DEVICE — GLOVE BIOGEL PI SZ 7.0 40870

## (undated) DEVICE — GLOVE BIOGEL PI MICRO SZ 8.5 48585

## (undated) DEVICE — SOL NACL 0.9% IRRIG 3000ML BAG 2B7477

## (undated) DEVICE — GLOVE PROTEXIS POWDER FREE 8.5 ORTHOPEDIC 2D73ET85

## (undated) DEVICE — GLOVE PROTEXIS BLUE W/NEU-THERA 8.5  2D73EB85

## (undated) DEVICE — TOURNIQUET CUFF 30" REPRO BLUE 60-7070-105

## (undated) DEVICE — DRSG AQUACEL AG HYDROFIBER  3.5X10" 422605

## (undated) DEVICE — CATH TRAY FOLEY SURESTEP 16FR DRAIN BAG STATOCK A899916

## (undated) DEVICE — DECANTER VIAL 2006S

## (undated) DEVICE — BONE CEMENT MIXEVAC III HI VAC KIT  0206-015-000

## (undated) DEVICE — SET HANDPIECE INTERPULSE W/COAXIAL FAN SPRAY TIP 0210118000

## (undated) DEVICE — PACK TOTAL KNEE BOXED LATEX FREE PO15TKFCT

## (undated) DEVICE — GLOVE PROTEXIS BLUE W/NEU-THERA 7.0  2D73EB70

## (undated) DEVICE — DRSG AQUACEL AG 3.5X9.75" HYDROFIBER 412011

## (undated) DEVICE — GLOVE BIOGEL PI SZ 8.5 40885

## (undated) DEVICE — TUBING IRRIG TUR Y TYPE 96" LF 6543-01

## (undated) DEVICE — GLOVE PROTEXIS W/NEU-THERA 7.0  2D73TE70

## (undated) DEVICE — CONTRAST ISOVUE 300 61% IOPAMIDOL 10X30ML VIAL 131525

## (undated) DEVICE — SOL NACL 0.9% IRRIG 1000ML BOTTLE 07138-09

## (undated) DEVICE — GLOVE BIOGEL PI ULTRATOUCH SZ 7.5 41175

## (undated) DEVICE — CATH URETERAL FLEX TIP TIGERTAIL 06FRX70CM 139006

## (undated) DEVICE — PREP CHLORAPREP 26ML TINTED ORANGE  260815

## (undated) DEVICE — PACK CYSTO CUSTOM RIDGES

## (undated) DEVICE — LINEN HALF SHEET 5512

## (undated) DEVICE — GUIDEWIRE URO STR STIFF .035"X150CM NITINOL 150NSS35

## (undated) DEVICE — PREP CHLORAPREP 26ML TINTED HI-LITE ORANGE 930815

## (undated) RX ORDER — FENTANYL CITRATE 50 UG/ML
INJECTION, SOLUTION INTRAMUSCULAR; INTRAVENOUS
Status: DISPENSED
Start: 2019-01-21

## (undated) RX ORDER — PROPOFOL 10 MG/ML
INJECTION, EMULSION INTRAVENOUS
Status: DISPENSED
Start: 2023-10-02

## (undated) RX ORDER — PROPOFOL 10 MG/ML
INJECTION, EMULSION INTRAVENOUS
Status: DISPENSED
Start: 2019-01-21

## (undated) RX ORDER — FENTANYL CITRATE 50 UG/ML
INJECTION, SOLUTION INTRAMUSCULAR; INTRAVENOUS
Status: DISPENSED
Start: 2025-02-19

## (undated) RX ORDER — ONDANSETRON 2 MG/ML
INJECTION INTRAMUSCULAR; INTRAVENOUS
Status: DISPENSED
Start: 2025-02-19

## (undated) RX ORDER — ONDANSETRON 2 MG/ML
INJECTION INTRAMUSCULAR; INTRAVENOUS
Status: DISPENSED
Start: 2019-01-21

## (undated) RX ORDER — SCOLOPAMINE TRANSDERMAL SYSTEM 1 MG/1
PATCH, EXTENDED RELEASE TRANSDERMAL
Status: DISPENSED
Start: 2019-01-21

## (undated) RX ORDER — CELECOXIB 200 MG/1
CAPSULE ORAL
Status: DISPENSED
Start: 2019-01-21

## (undated) RX ORDER — GLYCOPYRROLATE 0.2 MG/ML
INJECTION INTRAMUSCULAR; INTRAVENOUS
Status: DISPENSED
Start: 2023-10-02

## (undated) RX ORDER — PROPOFOL 10 MG/ML
INJECTION, EMULSION INTRAVENOUS
Status: DISPENSED
Start: 2025-02-19

## (undated) RX ORDER — OXYCODONE HCL 10 MG/1
TABLET, FILM COATED, EXTENDED RELEASE ORAL
Status: DISPENSED
Start: 2019-01-21

## (undated) RX ORDER — CEFAZOLIN SODIUM/WATER 2 G/20 ML
SYRINGE (ML) INTRAVENOUS
Status: DISPENSED
Start: 2023-10-02

## (undated) RX ORDER — LIDOCAINE HYDROCHLORIDE 10 MG/ML
INJECTION, SOLUTION EPIDURAL; INFILTRATION; INTRACAUDAL; PERINEURAL
Status: DISPENSED
Start: 2019-01-21

## (undated) RX ORDER — LIDOCAINE HYDROCHLORIDE 10 MG/ML
INJECTION, SOLUTION EPIDURAL; INFILTRATION; INTRACAUDAL; PERINEURAL
Status: DISPENSED
Start: 2025-02-19

## (undated) RX ORDER — CEFAZOLIN SODIUM/WATER 2 G/20 ML
SYRINGE (ML) INTRAVENOUS
Status: DISPENSED
Start: 2025-02-19

## (undated) RX ORDER — BUPIVACAINE HYDROCHLORIDE AND EPINEPHRINE 5; 5 MG/ML; UG/ML
INJECTION, SOLUTION EPIDURAL; INTRACAUDAL; PERINEURAL
Status: DISPENSED
Start: 2019-01-21

## (undated) RX ORDER — CEFAZOLIN SODIUM 2 G/100ML
INJECTION, SOLUTION INTRAVENOUS
Status: DISPENSED
Start: 2019-01-21

## (undated) RX ORDER — TRANEXAMIC ACID 10 MG/ML
INJECTION, SOLUTION INTRAVENOUS
Status: DISPENSED
Start: 2023-10-02

## (undated) RX ORDER — LIDOCAINE HYDROCHLORIDE 10 MG/ML
INJECTION, SOLUTION EPIDURAL; INFILTRATION; INTRACAUDAL; PERINEURAL
Status: DISPENSED
Start: 2023-10-02

## (undated) RX ORDER — ACETAMINOPHEN 325 MG/1
TABLET ORAL
Status: DISPENSED
Start: 2023-10-02

## (undated) RX ORDER — ONDANSETRON 2 MG/ML
INJECTION INTRAMUSCULAR; INTRAVENOUS
Status: DISPENSED
Start: 2023-10-02

## (undated) RX ORDER — DEXAMETHASONE SODIUM PHOSPHATE 4 MG/ML
INJECTION, SOLUTION INTRA-ARTICULAR; INTRALESIONAL; INTRAMUSCULAR; INTRAVENOUS; SOFT TISSUE
Status: DISPENSED
Start: 2019-01-21

## (undated) RX ORDER — DIPHENHYDRAMINE HYDROCHLORIDE 50 MG/ML
INJECTION INTRAMUSCULAR; INTRAVENOUS
Status: DISPENSED
Start: 2019-01-21

## (undated) RX ORDER — KETOROLAC TROMETHAMINE 30 MG/ML
INJECTION, SOLUTION INTRAMUSCULAR; INTRAVENOUS
Status: DISPENSED
Start: 2019-01-21

## (undated) RX ORDER — DEXAMETHASONE SODIUM PHOSPHATE 4 MG/ML
INJECTION, SOLUTION INTRA-ARTICULAR; INTRALESIONAL; INTRAMUSCULAR; INTRAVENOUS; SOFT TISSUE
Status: DISPENSED
Start: 2025-02-19

## (undated) RX ORDER — TRANEXAMIC ACID 650 MG/1
TABLET ORAL
Status: DISPENSED
Start: 2023-10-02